# Patient Record
Sex: FEMALE | Race: WHITE | NOT HISPANIC OR LATINO | Employment: FULL TIME | URBAN - METROPOLITAN AREA
[De-identification: names, ages, dates, MRNs, and addresses within clinical notes are randomized per-mention and may not be internally consistent; named-entity substitution may affect disease eponyms.]

---

## 2022-07-07 ENCOUNTER — APPOINTMENT (OUTPATIENT)
Dept: LAB | Facility: CLINIC | Age: 47
End: 2022-07-07

## 2022-07-07 ENCOUNTER — OCCMED (OUTPATIENT)
Dept: URGENT CARE | Facility: CLINIC | Age: 47
End: 2022-07-07

## 2022-07-07 DIAGNOSIS — Z02.1 ENCOUNTER FOR PRE-EMPLOYMENT EXAMINATION: Primary | ICD-10-CM

## 2022-07-07 DIAGNOSIS — Z02.1 ENCOUNTER FOR PRE-EMPLOYMENT EXAMINATION: ICD-10-CM

## 2022-07-07 LAB
MEV IGG SER QL IA: NORMAL
MEV IGG SER QL IA: NORMAL
MUV IGG SER QL IA: NORMAL
MUV IGG SER QL IA: NORMAL
RUBV IGG SERPL IA-ACNC: 43.1 IU/ML
RUBV IGG SERPL IA-ACNC: 43.1 IU/ML
VZV IGG SER QL IA: ABNORMAL
VZV IGG SER QL IA: ABNORMAL

## 2022-07-07 PROCEDURE — 86480 TB TEST CELL IMMUN MEASURE: CPT

## 2022-07-07 PROCEDURE — 86735 MUMPS ANTIBODY: CPT

## 2022-07-07 PROCEDURE — 86762 RUBELLA ANTIBODY: CPT

## 2022-07-07 PROCEDURE — 36415 COLL VENOUS BLD VENIPUNCTURE: CPT

## 2022-07-07 PROCEDURE — 86765 RUBEOLA ANTIBODY: CPT

## 2022-07-07 PROCEDURE — 86787 VARICELLA-ZOSTER ANTIBODY: CPT

## 2022-07-08 LAB
GAMMA INTERFERON BACKGROUND BLD IA-ACNC: 0.06 IU/ML
GAMMA INTERFERON BACKGROUND BLD IA-ACNC: 0.06 IU/ML
M TB IFN-G BLD-IMP: NEGATIVE
M TB IFN-G BLD-IMP: NEGATIVE
M TB IFN-G CD4+ BCKGRND COR BLD-ACNC: -0.01 IU/ML
M TB IFN-G CD4+ BCKGRND COR BLD-ACNC: -0.01 IU/ML
M TB IFN-G CD4+ BCKGRND COR BLD-ACNC: 0.02 IU/ML
M TB IFN-G CD4+ BCKGRND COR BLD-ACNC: 0.02 IU/ML
MITOGEN IGNF BCKGRD COR BLD-ACNC: >10 IU/ML
MITOGEN IGNF BCKGRD COR BLD-ACNC: >10 IU/ML

## 2022-08-24 ENCOUNTER — OFFICE VISIT (OUTPATIENT)
Dept: INTERNAL MEDICINE CLINIC | Facility: OTHER | Age: 47
End: 2022-08-24
Payer: COMMERCIAL

## 2022-08-24 VITALS
OXYGEN SATURATION: 99 % | TEMPERATURE: 98 F | BODY MASS INDEX: 32.96 KG/M2 | WEIGHT: 186 LBS | DIASTOLIC BLOOD PRESSURE: 84 MMHG | HEART RATE: 69 BPM | HEIGHT: 63 IN | SYSTOLIC BLOOD PRESSURE: 128 MMHG

## 2022-08-24 DIAGNOSIS — G47.00 INSOMNIA, UNSPECIFIED TYPE: ICD-10-CM

## 2022-08-24 DIAGNOSIS — Z12.31 ENCOUNTER FOR SCREENING MAMMOGRAM FOR MALIGNANT NEOPLASM OF BREAST: Primary | ICD-10-CM

## 2022-08-24 DIAGNOSIS — Z13.228 SCREENING FOR METABOLIC DISORDER: ICD-10-CM

## 2022-08-24 DIAGNOSIS — G25.0 ESSENTIAL TREMOR: ICD-10-CM

## 2022-08-24 DIAGNOSIS — R91.8 MULTIPLE LUNG NODULES: ICD-10-CM

## 2022-08-24 DIAGNOSIS — R07.89 CHEST DISCOMFORT: ICD-10-CM

## 2022-08-24 DIAGNOSIS — R79.89 ABNORMAL TSH: ICD-10-CM

## 2022-08-24 DIAGNOSIS — E03.8 SUBCLINICAL HYPOTHYROIDISM: ICD-10-CM

## 2022-08-24 DIAGNOSIS — E55.9 VITAMIN D DEFICIENCY: ICD-10-CM

## 2022-08-24 DIAGNOSIS — G47.33 OSA (OBSTRUCTIVE SLEEP APNEA): ICD-10-CM

## 2022-08-24 PROBLEM — K42.9 UMBILICAL HERNIA WITHOUT OBSTRUCTION AND WITHOUT GANGRENE: Status: ACTIVE | Noted: 2022-08-24

## 2022-08-24 PROBLEM — N20.0 KIDNEY STONE ON LEFT SIDE: Status: ACTIVE | Noted: 2022-08-24

## 2022-08-24 PROCEDURE — 93000 ELECTROCARDIOGRAM COMPLETE: CPT | Performed by: NURSE PRACTITIONER

## 2022-08-24 PROCEDURE — 99204 OFFICE O/P NEW MOD 45 MIN: CPT | Performed by: NURSE PRACTITIONER

## 2022-08-24 RX ORDER — HYDROXYZINE HYDROCHLORIDE 25 MG/1
25 TABLET, FILM COATED ORAL EVERY 6 HOURS PRN
Qty: 30 TABLET | Refills: 0 | Status: SHIPPED | OUTPATIENT
Start: 2022-08-24 | End: 2022-09-21

## 2022-08-24 NOTE — ASSESSMENT & PLAN NOTE
Will give referral to sleep medicine  Patient had home sleep testing but would prefer in sleep lab testing

## 2022-08-24 NOTE — PROGRESS NOTES
Assessment/Plan:    IBIS (obstructive sleep apnea)  Will give referral to sleep medicine  Patient had home sleep testing but would prefer in sleep lab testing  Essential tremor  Had previously seen Neurology on propanolol, had side effects with medication, currently not on medication  Multiple lung nodules  Several tiny nodules noted on recent CT  Chest discomfort  EKG NSR  Insomnia  Start Atarax as needed for anxiety  BMI Counseling: Body mass index is 33 48 kg/m²  The BMI is above normal  Nutrition recommendations include decreasing portion sizes, encouraging healthy choices of fruits and vegetables, decreasing fast food intake, consuming healthier snacks, limiting drinks that contain sugar, moderation in carbohydrate intake, increasing intake of lean protein, reducing intake of saturated and trans fat and reducing intake of cholesterol  Exercise recommendations include moderate physical activity 150 minutes/week and exercising 3-5 times per week  Rationale for BMI follow-up plan is due to patient being overweight or obese  Depression Screening and Follow-up Plan: Patient was screened for depression during today's encounter  They screened negative with a PHQ-2 score of 0  Diagnoses and all orders for this visit:    Encounter for screening mammogram for malignant neoplasm of breast  -     Mammo screening bilateral w 3d & cad; Future    IBIS (obstructive sleep apnea)  -     Diagnostic Sleep Study; Future    Insomnia, unspecified type  -     hydrOXYzine HCL (ATARAX) 25 mg tablet; Take 1 tablet (25 mg total) by mouth every 6 (six) hours as needed for anxiety    Chest discomfort  -     POCT ECG    Screening for metabolic disorder  -     CBC and differential  -     Comprehensive metabolic panel;  Future  -     Lipid panel    Subclinical hypothyroidism    Vitamin D deficiency  -     Vitamin D 25 hydroxy    Abnormal TSH  -     TSH, 3rd generation with Free T4 reflex    Essential tremor    Multiple lung nodules          Subjective:      Patient ID: Sal Echeverria is a 42 y o  female  Patient presents today to establish care with our practice  She denies any significant past medical history  Patient reports that she has been having trouble losing weight, she gained weight throughout the [facility] pandemic  She has been hesitant to restart working out because she has been having some chest discomfort on and off  Some mild shortness of breath  Denies crushing chest pain  Does report familial history of cardiac disease  Insomnia/Fatigue- had previous home sleep studies, but she did have a CPAP machine however it is a fields that she which is recalled and patient did not get a replaced CPAP machine, but she had 2 at home studies however due to worsening insomnia she requests and in sleep lab study for further evaluation prior to new CPAP machine being ordered  She reports history of insomnia, has tried multiple medications before either the medication was ineffective or had side effects  Has history of anxiety, previously on Lexapro which had worked well for her  Patient reports she does not want to restart this medication at this time she feels well controlled  The following portions of the patient's history were reviewed and updated as appropriate: allergies, current medications, past family history, past medical history, past social history, past surgical history and problem list     Review of Systems   Constitutional: Positive for fatigue  Negative for activity change, appetite change, chills, diaphoresis and fever  HENT: Negative for congestion, ear discharge, ear pain, postnasal drip, rhinorrhea, sinus pressure, sinus pain and sore throat  Eyes: Negative for pain, discharge, itching and visual disturbance  Respiratory: Negative for cough, chest tightness, shortness of breath and wheezing  Cardiovascular: Negative for chest pain, palpitations and leg swelling  Gastrointestinal: Negative for abdominal pain, constipation, diarrhea, nausea and vomiting  Endocrine: Negative for polydipsia, polyphagia and polyuria  Genitourinary: Negative for difficulty urinating, dysuria and urgency  Musculoskeletal: Negative for arthralgias, back pain and neck pain  Skin: Negative for rash and wound  Neurological: Negative for dizziness, weakness, numbness and headaches  Psychiatric/Behavioral: Positive for sleep disturbance  Past Medical History:   Diagnosis Date    Anxiety     Kidney stone          Current Outpatient Medications:     hydrOXYzine HCL (ATARAX) 25 mg tablet, Take 1 tablet (25 mg total) by mouth every 6 (six) hours as needed for anxiety, Disp: 30 tablet, Rfl: 0    Allergies   Allergen Reactions    Codeine Palpitations    Latex Rash    Penicillin V Rash and Vomiting       Social History   Past Surgical History:   Procedure Laterality Date    BACK SURGERY      L5 - S1    BLADDER SURGERY      HYSTERECTOMY      SPINE SURGERY      TUBAL LIGATION       Family History   Problem Relation Age of Onset    Heart attack Mother     Diabetes Father     Breast cancer Maternal Grandmother     Diabetes Paternal Grandmother     Breast cancer Maternal Aunt     Bone cancer Maternal Aunt        Objective:  /84 (BP Location: Left arm, Patient Position: Sitting, Cuff Size: Large)   Pulse 69   Temp 98 °F (36 7 °C) (Temporal)   Ht 5' 2 5" (1 588 m)   Wt 84 4 kg (186 lb)   SpO2 99%   BMI 33 48 kg/m²     No results found for this or any previous visit (from the past 1344 hour(s))  Physical Exam  Constitutional:       General: She is not in acute distress  Appearance: She is well-developed  She is not diaphoretic  HENT:      Head: Normocephalic and atraumatic  Right Ear: External ear normal       Left Ear: External ear normal       Nose: Nose normal       Mouth/Throat:      Pharynx: No oropharyngeal exudate     Eyes:      General: Right eye: No discharge  Left eye: No discharge  Conjunctiva/sclera: Conjunctivae normal       Pupils: Pupils are equal, round, and reactive to light  Neck:      Thyroid: No thyromegaly  Cardiovascular:      Rate and Rhythm: Normal rate and regular rhythm  Heart sounds: Normal heart sounds  No murmur heard  No friction rub  No gallop  Pulmonary:      Effort: Pulmonary effort is normal  No respiratory distress  Breath sounds: Normal breath sounds  No stridor  No wheezing or rales  Abdominal:      General: Bowel sounds are normal  There is no distension  Palpations: Abdomen is soft  Tenderness: There is no abdominal tenderness  Musculoskeletal:      Cervical back: Normal range of motion and neck supple  Lymphadenopathy:      Cervical: No cervical adenopathy  Skin:     General: Skin is warm and dry  Findings: No erythema or rash  Neurological:      Mental Status: She is alert and oriented to person, place, and time  Psychiatric:         Behavior: Behavior normal          Thought Content:  Thought content normal          Judgment: Judgment normal

## 2022-08-24 NOTE — ASSESSMENT & PLAN NOTE
Had previously seen Neurology on propanolol, had side effects with medication, currently not on medication

## 2022-08-27 ENCOUNTER — APPOINTMENT (OUTPATIENT)
Dept: LAB | Facility: IMAGING CENTER | Age: 47
End: 2022-08-27
Payer: COMMERCIAL

## 2022-08-27 DIAGNOSIS — Z13.228 SCREENING FOR METABOLIC DISORDER: ICD-10-CM

## 2022-08-27 LAB
25(OH)D3 SERPL-MCNC: 52.3 NG/ML (ref 30–100)
ALBUMIN SERPL BCP-MCNC: 3.8 G/DL (ref 3.5–5)
ALP SERPL-CCNC: 76 U/L (ref 46–116)
ALT SERPL W P-5'-P-CCNC: 29 U/L (ref 12–78)
ANION GAP SERPL CALCULATED.3IONS-SCNC: 4 MMOL/L (ref 4–13)
AST SERPL W P-5'-P-CCNC: 15 U/L (ref 5–45)
BASOPHILS # BLD AUTO: 0.08 THOUSANDS/ΜL (ref 0–0.1)
BASOPHILS NFR BLD AUTO: 1 % (ref 0–1)
BILIRUB SERPL-MCNC: 0.22 MG/DL (ref 0.2–1)
BUN SERPL-MCNC: 23 MG/DL (ref 5–25)
CALCIUM SERPL-MCNC: 9.7 MG/DL (ref 8.3–10.1)
CHLORIDE SERPL-SCNC: 113 MMOL/L (ref 96–108)
CHOLEST SERPL-MCNC: 168 MG/DL
CO2 SERPL-SCNC: 25 MMOL/L (ref 21–32)
CREAT SERPL-MCNC: 0.92 MG/DL (ref 0.6–1.3)
EOSINOPHIL # BLD AUTO: 0.2 THOUSAND/ΜL (ref 0–0.61)
EOSINOPHIL NFR BLD AUTO: 4 % (ref 0–6)
ERYTHROCYTE [DISTWIDTH] IN BLOOD BY AUTOMATED COUNT: 12.3 % (ref 11.6–15.1)
GFR SERPL CREATININE-BSD FRML MDRD: 75 ML/MIN/1.73SQ M
GLUCOSE P FAST SERPL-MCNC: 89 MG/DL (ref 65–99)
HCT VFR BLD AUTO: 43.8 % (ref 34.8–46.1)
HDLC SERPL-MCNC: 50 MG/DL
HGB BLD-MCNC: 13.6 G/DL (ref 11.5–15.4)
IMM GRANULOCYTES # BLD AUTO: 0.01 THOUSAND/UL (ref 0–0.2)
IMM GRANULOCYTES NFR BLD AUTO: 0 % (ref 0–2)
LDLC SERPL CALC-MCNC: 85 MG/DL (ref 0–100)
LYMPHOCYTES # BLD AUTO: 1.91 THOUSANDS/ΜL (ref 0.6–4.47)
LYMPHOCYTES NFR BLD AUTO: 33 % (ref 14–44)
MCH RBC QN AUTO: 28 PG (ref 26.8–34.3)
MCHC RBC AUTO-ENTMCNC: 31.1 G/DL (ref 31.4–37.4)
MCV RBC AUTO: 90 FL (ref 82–98)
MONOCYTES # BLD AUTO: 0.46 THOUSAND/ΜL (ref 0.17–1.22)
MONOCYTES NFR BLD AUTO: 8 % (ref 4–12)
NEUTROPHILS # BLD AUTO: 3.12 THOUSANDS/ΜL (ref 1.85–7.62)
NEUTS SEG NFR BLD AUTO: 54 % (ref 43–75)
NONHDLC SERPL-MCNC: 118 MG/DL
NRBC BLD AUTO-RTO: 0 /100 WBCS
PLATELET # BLD AUTO: 229 THOUSANDS/UL (ref 149–390)
PMV BLD AUTO: 12 FL (ref 8.9–12.7)
POTASSIUM SERPL-SCNC: 4.1 MMOL/L (ref 3.5–5.3)
PROT SERPL-MCNC: 7 G/DL (ref 6.4–8.4)
RBC # BLD AUTO: 4.86 MILLION/UL (ref 3.81–5.12)
SODIUM SERPL-SCNC: 142 MMOL/L (ref 135–147)
TRIGL SERPL-MCNC: 166 MG/DL
TSH SERPL DL<=0.05 MIU/L-ACNC: 2.49 UIU/ML (ref 0.45–4.5)
WBC # BLD AUTO: 5.78 THOUSAND/UL (ref 4.31–10.16)

## 2022-08-27 PROCEDURE — 85025 COMPLETE CBC W/AUTO DIFF WBC: CPT | Performed by: NURSE PRACTITIONER

## 2022-08-27 PROCEDURE — 80053 COMPREHEN METABOLIC PANEL: CPT

## 2022-08-27 PROCEDURE — 80061 LIPID PANEL: CPT | Performed by: NURSE PRACTITIONER

## 2022-08-27 PROCEDURE — 82306 VITAMIN D 25 HYDROXY: CPT | Performed by: NURSE PRACTITIONER

## 2022-08-27 PROCEDURE — 36415 COLL VENOUS BLD VENIPUNCTURE: CPT | Performed by: NURSE PRACTITIONER

## 2022-08-27 PROCEDURE — 84443 ASSAY THYROID STIM HORMONE: CPT | Performed by: NURSE PRACTITIONER

## 2022-09-15 ENCOUNTER — TELEPHONE (OUTPATIENT)
Dept: SLEEP CENTER | Facility: CLINIC | Age: 47
End: 2022-09-15

## 2022-09-15 NOTE — TELEPHONE ENCOUNTER
----- Message from John Hughes MD sent at 9/14/2022  1:57 PM EDT -----  Approved    ----- Message -----  From: Alexa De La Cruz  Sent: [id number]   9:35 AM EDT  To: Sleep Medicine [place] Provider    This diagnostic sleep study needs approval      If approved please sign and return to clerical pool  If denied please include reasons why  Also provide alternative testing if warranted  Please sign and return to clerical pool

## 2022-09-16 ENCOUNTER — HOSPITAL ENCOUNTER (OUTPATIENT)
Dept: RADIOLOGY | Facility: IMAGING CENTER | Age: 47
End: 2022-09-16
Payer: COMMERCIAL

## 2022-09-16 VITALS — WEIGHT: 185 LBS | BODY MASS INDEX: 32.78 KG/M2 | HEIGHT: 63 IN

## 2022-09-16 DIAGNOSIS — Z12.31 ENCOUNTER FOR SCREENING MAMMOGRAM FOR MALIGNANT NEOPLASM OF BREAST: ICD-10-CM

## 2022-09-16 PROCEDURE — 77063 BREAST TOMOSYNTHESIS BI: CPT

## 2022-09-16 PROCEDURE — 77067 SCR MAMMO BI INCL CAD: CPT

## 2022-09-21 ENCOUNTER — OFFICE VISIT (OUTPATIENT)
Dept: INTERNAL MEDICINE CLINIC | Facility: OTHER | Age: 47
End: 2022-09-21
Payer: COMMERCIAL

## 2022-09-21 VITALS
HEART RATE: 74 BPM | OXYGEN SATURATION: 98 % | DIASTOLIC BLOOD PRESSURE: 80 MMHG | HEIGHT: 63 IN | WEIGHT: 185 LBS | BODY MASS INDEX: 32.78 KG/M2 | SYSTOLIC BLOOD PRESSURE: 130 MMHG | TEMPERATURE: 98.9 F

## 2022-09-21 DIAGNOSIS — G47.00 INSOMNIA, UNSPECIFIED TYPE: Primary | ICD-10-CM

## 2022-09-21 PROCEDURE — 99213 OFFICE O/P EST LOW 20 MIN: CPT | Performed by: NURSE PRACTITIONER

## 2022-09-21 RX ORDER — ESZOPICLONE 1 MG/1
1 TABLET, FILM COATED ORAL
Qty: 30 TABLET | Refills: 0
Start: 2022-09-21 | End: 2022-09-22 | Stop reason: SDUPTHER

## 2022-09-21 NOTE — PROGRESS NOTES
Assessment/Plan:    Insomnia  Will trail lunesta  Diagnoses and all orders for this visit:    Insomnia, unspecified type  -     eszopiclone (LUNESTA) 1 mg tablet; Take 1 tablet (1 mg total) by mouth daily at bedtime as needed for sleep Take immediately before bedtime    Other orders  -     Multiple Vitamins-Minerals (MULTIVITAMIN ADULTS PO); Take 1 tablet by mouth daily  -     ACIDOPHILUS LACTOBACILLUS PO; Take 1 tablet by mouth daily          Subjective:      Patient ID: Keesha Cameron is a 42 y o  female  Patient presents today to follow up on blood work and insomnia  She reports that Atarax helped for insomnia but she would sometimes feel foggy the next day, and it did not help much for her insomnia  Note from last office visit:  She denies any significant past medical history  Patient reports that she has been having trouble losing weight, she gained weight throughout the [facility] pandemic  She has been hesitant to restart working out because she has been having some chest discomfort on and off  Some mild shortness of breath  Denies crushing chest pain  Does report familial history of cardiac disease  Insomnia/Fatigue- had previous home sleep studies, but she did have a CPAP machine however it is a fields that she which is recalled and patient did not get a replaced CPAP machine, but she had 2 at home studies however due to worsening insomnia she requests and in sleep lab study for further evaluation prior to new CPAP machine being ordered  She reports history of insomnia, has tried multiple medications before either the medication was ineffective or had side effects  Has history of anxiety, previously on Lexapro which had worked well for her  Patient reports she does not want to restart this medication at this time she feels well controlled        The following portions of the patient's history were reviewed and updated as appropriate: allergies, current medications, past family history, past medical history, past social history, past surgical history and problem list     Review of Systems   Constitutional: Negative for activity change, appetite change, chills, diaphoresis and fever  HENT: Negative for congestion, ear discharge, ear pain, postnasal drip, rhinorrhea, sinus pressure, sinus pain and sore throat  Eyes: Negative for pain, discharge, itching and visual disturbance  Respiratory: Negative for cough, chest tightness, shortness of breath and wheezing  Cardiovascular: Negative for chest pain, palpitations and leg swelling  Gastrointestinal: Negative for abdominal pain, constipation, diarrhea, nausea and vomiting  Endocrine: Negative for polydipsia, polyphagia and polyuria  Genitourinary: Negative for difficulty urinating, dysuria and urgency  Musculoskeletal: Negative for arthralgias, back pain and neck pain  Skin: Negative for rash and wound  Neurological: Negative for dizziness, weakness, numbness and headaches  Psychiatric/Behavioral: Positive for sleep disturbance  Negative for self-injury  The patient is not nervous/anxious  Past Medical History:   Diagnosis Date    Anxiety     Kidney stone          Current Outpatient Medications:     ACIDOPHILUS LACTOBACILLUS PO, Take 1 tablet by mouth daily, Disp: , Rfl:     eszopiclone (LUNESTA) 1 mg tablet, Take 1 tablet (1 mg total) by mouth daily at bedtime as needed for sleep Take immediately before bedtime, Disp: 30 tablet, Rfl: 0    Multiple Vitamins-Minerals (MULTIVITAMIN ADULTS PO), Take 1 tablet by mouth daily, Disp: , Rfl:     Allergies   Allergen Reactions    Codeine Palpitations     Other reaction(s):  Other (See Comments)  Other reaction(s): Irregular heart rate  Rash,vomiting, heart palpitations    Latex Rash    Penicillin V Rash and Vomiting       Social History   Past Surgical History:   Procedure Laterality Date    APPENDECTOMY      BACK SURGERY      L5 - S1    BLADDER SURGERY  HYSTERECTOMY      age 34 still has lt ovary    SPINE SURGERY      TUBAL LIGATION       Family History   Problem Relation Age of Onset    Heart attack Mother     Heart disease Mother     Diabetes Father     Alcohol abuse Father     Breast cancer Maternal Grandmother     Lung cancer Maternal Grandmother 76    Arthritis Maternal Grandmother     No Known Problems Maternal Grandfather     Diabetes Paternal Grandmother     Stroke Paternal Grandmother     No Known Problems Paternal Grandfather     Breast cancer Maternal Aunt 37    Bone cancer Maternal Aunt     No Known Problems Maternal Aunt     Thyroid disease Son     No Known Problems Paternal Aunt     Substance Abuse Paternal Uncle     Drug abuse Paternal Uncle     Suicide Attempts Brother        Objective:  /80 (BP Location: Left arm, Patient Position: Sitting, Cuff Size: Large)   Pulse 74   Temp 98 9 °F (37 2 °C) (Temporal)   Ht 5' 3" (1 6 m)   Wt 83 9 kg (185 lb)   SpO2 98%   BMI 32 77 kg/m²     Recent Results (from the past 1344 hour(s))   CBC and differential    Collection Time: 08/27/22  7:07 AM   Result Value Ref Range    WBC 5 78 4 31 - 10 16 Thousand/uL    RBC 4 86 3 81 - 5 12 Million/uL    Hemoglobin 13 6 11 5 - 15 4 g/dL    Hematocrit 43 8 34 8 - 46 1 %    MCV 90 82 - 98 fL    MCH 28 0 26 8 - 34 3 pg    MCHC 31 1 (L) 31 4 - 37 4 g/dL    RDW 12 3 11 6 - 15 1 %    MPV 12 0 8 9 - 12 7 fL    Platelets 212 630 - 350 Thousands/uL    nRBC 0 /100 WBCs    Neutrophils Relative 54 43 - 75 %    Immat GRANS % 0 0 - 2 %    Lymphocytes Relative 33 14 - 44 %    Monocytes Relative 8 4 - 12 %    Eosinophils Relative 4 0 - 6 %    Basophils Relative 1 0 - 1 %    Neutrophils Absolute 3 12 1 85 - 7 62 Thousands/µL    Immature Grans Absolute 0 01 0 00 - 0 20 Thousand/uL    Lymphocytes Absolute 1 91 0 60 - 4 47 Thousands/µL    Monocytes Absolute 0 46 0 17 - 1 22 Thousand/µL    Eosinophils Absolute 0 20 0 00 - 0 61 Thousand/µL    Basophils Absolute 0 08 0 00 - 0 10 Thousands/µL   Lipid panel    Collection Time: 08/27/22  7:07 AM   Result Value Ref Range    Cholesterol 168 See Comment mg/dL    Triglycerides 166 (H) See Comment mg/dL    HDL, Direct 50 >=50 mg/dL    LDL Calculated 85 0 - 100 mg/dL    Non-HDL-Chol (CHOL-HDL) 118 mg/dl   TSH, 3rd generation with Free T4 reflex    Collection Time: 08/27/22  7:07 AM   Result Value Ref Range    TSH 3RD GENERATON 2 490 0 450 - 4 500 uIU/mL   Vitamin D 25 hydroxy    Collection Time: 08/27/22  7:07 AM   Result Value Ref Range    Vit D, 25-Hydroxy 52 3 30 0 - 100 0 ng/mL   Comprehensive metabolic panel    Collection Time: 08/27/22  7:07 AM   Result Value Ref Range    Sodium 142 135 - 147 mmol/L    Potassium 4 1 3 5 - 5 3 mmol/L    Chloride 113 (H) 96 - 108 mmol/L    CO2 25 21 - 32 mmol/L    ANION GAP 4 4 - 13 mmol/L    BUN 23 5 - 25 mg/dL    Creatinine 0 92 0 60 - 1 30 mg/dL    Glucose, Fasting 89 65 - 99 mg/dL    Calcium 9 7 8 3 - 10 1 mg/dL    AST 15 5 - 45 U/L    ALT 29 12 - 78 U/L    Alkaline Phosphatase 76 46 - 116 U/L    Total Protein 7 0 6 4 - 8 4 g/dL    Albumin 3 8 3 5 - 5 0 g/dL    Total Bilirubin 0 22 0 20 - 1 00 mg/dL    eGFR 75 ml/min/1 73sq m            Physical Exam  Constitutional:       General: She is not in acute distress  Appearance: She is well-developed  She is not diaphoretic  HENT:      Head: Normocephalic and atraumatic  Right Ear: External ear normal       Left Ear: External ear normal       Nose: Nose normal       Mouth/Throat:      Pharynx: No oropharyngeal exudate  Eyes:      General:         Right eye: No discharge  Left eye: No discharge  Conjunctiva/sclera: Conjunctivae normal       Pupils: Pupils are equal, round, and reactive to light  Neck:      Thyroid: No thyromegaly  Cardiovascular:      Rate and Rhythm: Normal rate and regular rhythm  Heart sounds: Normal heart sounds  No murmur heard  No friction rub  No gallop     Pulmonary: Effort: Pulmonary effort is normal  No respiratory distress  Breath sounds: Normal breath sounds  No stridor  No wheezing or rales  Abdominal:      General: Bowel sounds are normal  There is no distension  Palpations: Abdomen is soft  Tenderness: There is no abdominal tenderness  Musculoskeletal:      Cervical back: Normal range of motion and neck supple  Lymphadenopathy:      Cervical: No cervical adenopathy  Skin:     General: Skin is warm and dry  Findings: No erythema or rash  Neurological:      Mental Status: She is alert and oriented to person, place, and time  Psychiatric:         Behavior: Behavior normal          Thought Content:  Thought content normal          Judgment: Judgment normal

## 2022-09-22 RX ORDER — ESZOPICLONE 1 MG/1
1 TABLET, FILM COATED ORAL
Qty: 30 TABLET | Refills: 0 | Status: SHIPPED | OUTPATIENT
Start: 2022-09-22

## 2022-10-25 ENCOUNTER — OFFICE VISIT (OUTPATIENT)
Dept: INTERNAL MEDICINE CLINIC | Facility: OTHER | Age: 47
End: 2022-10-25
Payer: COMMERCIAL

## 2022-10-25 ENCOUNTER — HOSPITAL ENCOUNTER (OUTPATIENT)
Dept: RADIOLOGY | Facility: IMAGING CENTER | Age: 47
Discharge: HOME | End: 2022-10-25
Payer: COMMERCIAL

## 2022-10-25 VITALS
HEIGHT: 63 IN | BODY MASS INDEX: 31.89 KG/M2 | WEIGHT: 180 LBS | OXYGEN SATURATION: 96 % | DIASTOLIC BLOOD PRESSURE: 80 MMHG | SYSTOLIC BLOOD PRESSURE: 120 MMHG | TEMPERATURE: 98.6 F | HEART RATE: 95 BPM

## 2022-10-25 DIAGNOSIS — Z12.11 SCREENING FOR COLON CANCER: ICD-10-CM

## 2022-10-25 DIAGNOSIS — K21.9 GASTROESOPHAGEAL REFLUX DISEASE WITHOUT ESOPHAGITIS: ICD-10-CM

## 2022-10-25 DIAGNOSIS — F41.9 ANXIETY: ICD-10-CM

## 2022-10-25 DIAGNOSIS — K21.9 GASTROESOPHAGEAL REFLUX DISEASE WITHOUT ESOPHAGITIS: Primary | ICD-10-CM

## 2022-10-25 PROCEDURE — 99214 OFFICE O/P EST MOD 30 MIN: CPT | Performed by: NURSE PRACTITIONER

## 2022-10-25 PROCEDURE — 71111 X-RAY EXAM RIBS/CHEST4/> VWS: CPT

## 2022-10-25 RX ORDER — ESCITALOPRAM OXALATE 10 MG/1
10 TABLET ORAL DAILY
Qty: 30 TABLET | Refills: 1 | Status: SHIPPED | OUTPATIENT
Start: 2022-10-25

## 2022-10-25 RX ORDER — PANTOPRAZOLE SODIUM 40 MG/1
40 TABLET, DELAYED RELEASE ORAL
Qty: 90 TABLET | Refills: 1 | Status: SHIPPED | OUTPATIENT
Start: 2022-10-25

## 2022-10-25 NOTE — PROGRESS NOTES
Assessment/Plan:    Gastroesophageal reflux disease without esophagitis  Will get x-ray of ribs and chest   Will start Protonix  You may use OTC tums as needed  Recommend small frequent meals throughout the day  Avoid aggravating foods - spicy foods, acidic foods - such as tomato and citrus  Avoid alcohol  Do not lay down for at least 30 mins after eating  May need referral to GI for EGD if no improvement  Diagnoses and all orders for this visit:    Gastroesophageal reflux disease without esophagitis  -     XR ribs bilateral 4+ vw w pa chest; Future  -     pantoprazole (PROTONIX) 40 mg tablet; Take 1 tablet (40 mg total) by mouth daily before breakfast    Anxiety  -     escitalopram (Lexapro) 10 mg tablet; Take 1 tablet (10 mg total) by mouth daily    Screening for colon cancer  -     Cologuaraza          Subjective:      Patient ID: Tom Cotton is a 41 y o  female  She feels a burning that starts in her epigastric area and at times it feels like the food is coming back up  She also feels tender at her epigastric area with a small “lump”  Abdominal Pain  This is a new problem  The current episode started 1 to 4 weeks ago  The onset quality is sudden  The problem occurs constantly  The most recent episode lasted 24 hours  The problem has been unchanged  The pain is located in the epigastric region  The pain is at a severity of 4/10  The quality of the pain is aching  The abdominal pain does not radiate  Pertinent negatives include no anorexia, arthralgias, belching, constipation, diarrhea, dysuria, fever, flatus, frequency, headaches, hematochezia, hematuria, melena, myalgias, nausea, vomiting or weight loss  The pain is aggravated by eating  The pain is relieved by nothing  Prior diagnostic workup includes CT scan         The following portions of the patient's history were reviewed and updated as appropriate: allergies, current medications, past family history, past medical history, past social history, past surgical history and problem list     Review of Systems   Constitutional: Negative for activity change, appetite change, chills, diaphoresis, fever and weight loss  HENT: Negative for congestion, ear discharge, ear pain, postnasal drip, rhinorrhea, sinus pressure, sinus pain and sore throat  Eyes: Negative for pain, discharge, itching and visual disturbance  Respiratory: Negative for cough, chest tightness, shortness of breath and wheezing  Cardiovascular: Negative for chest pain, palpitations and leg swelling  Gastrointestinal: Positive for abdominal pain  Negative for anorexia, constipation, diarrhea, flatus, hematochezia, melena, nausea and vomiting  Endocrine: Negative for polydipsia, polyphagia and polyuria  Genitourinary: Negative for difficulty urinating, dysuria, frequency, hematuria and urgency  Musculoskeletal: Negative for arthralgias, back pain, myalgias and neck pain  Skin: Negative for rash and wound  Neurological: Negative for dizziness, weakness, numbness and headaches  Past Medical History:   Diagnosis Date   • Anxiety    • Kidney stone          Current Outpatient Medications:   •  ACIDOPHILUS LACTOBACILLUS PO, Take 1 tablet by mouth daily, Disp: , Rfl:   •  escitalopram (Lexapro) 10 mg tablet, Take 1 tablet (10 mg total) by mouth daily, Disp: 30 tablet, Rfl: 1  •  Multiple Vitamins-Minerals (MULTIVITAMIN ADULTS PO), Take 1 tablet by mouth daily, Disp: , Rfl:   •  pantoprazole (PROTONIX) 40 mg tablet, Take 1 tablet (40 mg total) by mouth daily before breakfast, Disp: 90 tablet, Rfl: 1  •  eszopiclone (LUNESTA) 1 mg tablet, Take 1 tablet (1 mg total) by mouth daily at bedtime as needed for sleep Take immediately before bedtime (Patient not taking: Reported on 10/25/2022), Disp: 30 tablet, Rfl: 0    Allergies   Allergen Reactions   • Codeine Palpitations     Other reaction(s):  Other (See Comments)  Other reaction(s): Irregular heart rate  Rash,vomiting, heart palpitations   • Latex Rash   • Penicillin V Rash and Vomiting       Social History   Past Surgical History:   Procedure Laterality Date   • APPENDECTOMY     • BACK SURGERY      L5 - S1   • BLADDER SURGERY     • HYSTERECTOMY      age 34 still has lt ovary   • SPINE SURGERY     • TUBAL LIGATION       Family History   Problem Relation Age of Onset   • Heart attack Mother    • Heart disease Mother    • Diabetes Father    • Alcohol abuse Father    • Breast cancer Maternal Grandmother    • Lung cancer Maternal Grandmother 76   • Arthritis Maternal Grandmother    • No Known Problems Maternal Grandfather    • Diabetes Paternal Grandmother    • Stroke Paternal Grandmother    • No Known Problems Paternal Grandfather    • Breast cancer Maternal Aunt 37   • Bone cancer Maternal Aunt    • No Known Problems Maternal Aunt    • Thyroid disease Son    • No Known Problems Paternal Aunt    • Substance Abuse Paternal Uncle    • Drug abuse Paternal Uncle    • Suicide Attempts Brother        Objective:  /80 (BP Location: Left arm, Patient Position: Sitting, Cuff Size: Adult)   Pulse 95   Temp 98 6 °F (37 °C) (Temporal)   Ht 5' 3" (1 6 m)   Wt 81 6 kg (180 lb)   SpO2 96%   BMI 31 89 kg/m²     No results found for this or any previous visit (from the past 1344 hour(s))  Physical Exam  Constitutional:       General: She is not in acute distress  Appearance: She is well-developed  She is not diaphoretic  HENT:      Head: Normocephalic and atraumatic  Right Ear: External ear normal       Left Ear: External ear normal       Nose: Nose normal       Mouth/Throat:      Pharynx: No oropharyngeal exudate  Eyes:      General:         Right eye: No discharge  Left eye: No discharge  Conjunctiva/sclera: Conjunctivae normal       Pupils: Pupils are equal, round, and reactive to light  Neck:      Thyroid: No thyromegaly     Cardiovascular:      Rate and Rhythm: Normal rate and regular rhythm  Heart sounds: Normal heart sounds  No murmur heard  No friction rub  No gallop  Pulmonary:      Effort: Pulmonary effort is normal  No respiratory distress  Breath sounds: Normal breath sounds  No stridor  No wheezing or rales  Abdominal:      General: Bowel sounds are normal  There is no distension  Palpations: Abdomen is soft  Tenderness: There is no abdominal tenderness  Musculoskeletal:        Arms:       Cervical back: Normal range of motion and neck supple  Lymphadenopathy:      Cervical: No cervical adenopathy  Skin:     General: Skin is warm and dry  Findings: No erythema or rash  Neurological:      Mental Status: She is alert and oriented to person, place, and time  Psychiatric:         Behavior: Behavior normal          Thought Content:  Thought content normal          Judgment: Judgment normal

## 2022-10-25 NOTE — ASSESSMENT & PLAN NOTE
Will get x-ray of ribs and chest   Will start Protonix  You may use OTC tums as needed  Recommend small frequent meals throughout the day  Avoid aggravating foods - spicy foods, acidic foods - such as tomato and citrus  Avoid alcohol  Do not lay down for at least 30 mins after eating  May need referral to GI for EGD if no improvement

## 2022-11-16 LAB — COLOGUARD RESULT REPORTABLE: NEGATIVE

## 2022-11-22 ENCOUNTER — TELEMEDICINE (OUTPATIENT)
Dept: INTERNAL MEDICINE CLINIC | Facility: OTHER | Age: 47
End: 2022-11-22

## 2022-11-22 VITALS — BODY MASS INDEX: 31.88 KG/M2 | WEIGHT: 179.9 LBS | HEIGHT: 63 IN

## 2022-11-22 DIAGNOSIS — F41.9 ANXIETY: ICD-10-CM

## 2022-11-22 DIAGNOSIS — K21.9 GASTROESOPHAGEAL REFLUX DISEASE WITHOUT ESOPHAGITIS: Primary | ICD-10-CM

## 2022-11-22 RX ORDER — ESCITALOPRAM OXALATE 10 MG/1
10 TABLET ORAL DAILY
Qty: 90 TABLET | Refills: 1 | Status: SHIPPED | OUTPATIENT
Start: 2022-11-22

## 2022-11-22 RX ORDER — SUCRALFATE 1 G/1
1 TABLET ORAL 4 TIMES DAILY
Qty: 120 TABLET | Refills: 0 | Status: SHIPPED | OUTPATIENT
Start: 2022-11-22

## 2022-11-22 NOTE — PROGRESS NOTES
Virtual Regular Visit    Verification of patient location:    Patient is located in the following state in which I hold an active license [place]      Assessment/Plan:    Problem List Items Addressed This Visit        Digestive    Gastroesophageal reflux disease without esophagitis - Primary     X-rays of ribs interest negative  Continue with Protonix, start Carafate  Will give referral to GI  Relevant Medications    sucralfate (CARAFATE) 1 g tablet    Other Relevant Orders    Ambulatory Referral to Gastroenterology       Other    Anxiety    Relevant Medications    escitalopram (Lexapro) 10 mg tablet            Reason for visit is   Chief Complaint   Patient presents with   • Virtual Regular Visit     Text message [phone]    • Follow-up     Pt is being seen for follow up  Review xray  • Virtual Regular Visit        Encounter provider MOHSEN Griffin    Provider located at [address] [place] [address]      Recent Visits  No visits were found meeting these conditions  Showing recent visits within past 7 days and meeting all other requirements  Today's Visits  Date Type Provider Dept   11/22/22 [place], CRNP [facility]   Showing today's visits and meeting all other requirements  Future Appointments  No visits were found meeting these conditions  Showing future appointments within next 150 days and meeting all other requirements       The patient was identified by name and date of birth  Kun Pereyra was informed that this is a telemedicine visit and that the visit is being conducted through Telephone  My office door was closed  No one else was in the room  She acknowledged consent and understanding of privacy and security of the video platform   The patient has agreed to participate and understands they can discontinue the visit at any time  Patient is aware this is a billable service  Subjective  Jenny Tinsley is a 41 y o  female    Patient calls in today with continued concerns for burning in her epigastric/sternum area  Chest x-ray negative  Started on Protonix, with no relief  Note from last office visit  She feels a burning that starts in her epigastric area and at times it feels like the food is coming back up  She also feels tender at her epigastric area with a small "lump"  Abdominal Pain  This is a new problem  The current episode started 1 to 4 weeks ago  The onset quality is sudden  The problem occurs constantly  The most recent episode lasted 24 hours  The problem has been unchanged  The pain is located in the epigastric region  The pain is at a severity of 4/10  The quality of the pain is aching  The abdominal pain does not radiate  Pertinent negatives include no anorexia, arthralgias, belching, constipation, diarrhea, dysuria, fever, flatus, frequency, headaches, hematochezia, hematuria, melena, myalgias, nausea, vomiting or weight loss  The pain is aggravated by eating  The pain is relieved by nothing  Prior diagnostic workup includes CT scan          Past Medical History:   Diagnosis Date   • Anxiety    • Kidney stone        Past Surgical History:   Procedure Laterality Date   • APPENDECTOMY     • BACK SURGERY      L5 - S1   • BLADDER SURGERY     • HYSTERECTOMY      age 34 still has lt ovary   • SPINE SURGERY     • TUBAL LIGATION         Current Outpatient Medications   Medication Sig Dispense Refill   • ACIDOPHILUS LACTOBACILLUS PO Take 1 tablet by mouth daily     • escitalopram (Lexapro) 10 mg tablet Take 1 tablet (10 mg total) by mouth daily 90 tablet 1   • Multiple Vitamins-Minerals (MULTIVITAMIN ADULTS PO) Take 1 tablet by mouth daily     • pantoprazole (PROTONIX) 40 mg tablet Take 1 tablet (40 mg total) by mouth daily before breakfast 90 tablet 1   • sucralfate (CARAFATE) 1 g tablet Take 1 tablet (1 g total) by mouth 4 (four) times a day 120 tablet 0   • eszopiclone (LUNESTA) 1 mg tablet Take 1 tablet (1 mg total) by mouth daily at bedtime as needed for sleep Take immediately before bedtime (Patient not taking: Reported on 10/25/2022) 30 tablet 0     No current facility-administered medications for this visit  Allergies   Allergen Reactions   • Codeine Palpitations     Other reaction(s): Other (See Comments)  Other reaction(s): Irregular heart rate  Rash,vomiting, heart palpitations   • Latex Rash   • Penicillin V Rash and Vomiting       Review of Systems   Constitutional: Negative for activity change, appetite change, chills, diaphoresis, fever and weight loss  HENT: Negative for congestion, ear discharge, ear pain, postnasal drip, rhinorrhea, sinus pressure, sinus pain and sore throat  Eyes: Negative for pain, discharge, itching and visual disturbance  Respiratory: Negative for cough, chest tightness, shortness of breath and wheezing  Cardiovascular: Negative for chest pain, palpitations and leg swelling  Gastrointestinal: Positive for abdominal pain  Negative for anorexia, constipation, diarrhea, flatus, hematochezia, melena, nausea and vomiting  Endocrine: Negative for polydipsia, polyphagia and polyuria  Genitourinary: Negative for difficulty urinating, dysuria, frequency, hematuria and urgency  Musculoskeletal: Negative for arthralgias, back pain, myalgias and neck pain  Skin: Negative for rash and wound  Neurological: Negative for dizziness, weakness, numbness and headaches  Video Exam    Vitals:    11/22/22 0647   Weight: 81 6 kg (179 lb 14 3 oz)   Height: 5' 3" (1 6 m)       Physical Exam  Neurological:      Mental Status: She is alert and oriented to person, place, and time            I spent 15 minutes directly with the patient during this visit

## 2022-11-23 ENCOUNTER — CONSULT (OUTPATIENT)
Dept: GASTROENTEROLOGY | Facility: CLINIC | Age: 47
End: 2022-11-23

## 2022-11-23 VITALS
HEIGHT: 63 IN | TEMPERATURE: 98.4 F | WEIGHT: 182.2 LBS | SYSTOLIC BLOOD PRESSURE: 114 MMHG | DIASTOLIC BLOOD PRESSURE: 82 MMHG | BODY MASS INDEX: 32.28 KG/M2

## 2022-11-23 DIAGNOSIS — K21.9 GASTROESOPHAGEAL REFLUX DISEASE WITHOUT ESOPHAGITIS: ICD-10-CM

## 2022-11-23 DIAGNOSIS — R09.89 GLOBUS SENSATION: Primary | ICD-10-CM

## 2022-11-23 DIAGNOSIS — R13.10 DYSPHAGIA, UNSPECIFIED TYPE: ICD-10-CM

## 2022-11-23 DIAGNOSIS — R10.9 ABDOMINAL PAIN, UNSPECIFIED ABDOMINAL LOCATION: ICD-10-CM

## 2022-11-23 NOTE — PROGRESS NOTES
[facility] - Outpatient Consultation  Alyssa Pereyra 41 y o  female MRN: [id number]  Encounter: [id number]          ASSESSMENT AND PLAN:      1  Gastroesophageal reflux disease without esophagitis  2  Globus sensation  3  Dysphagia   4  Odynophagia   5  Epigastric pain    Heartburn has been controlled with Protonix  Discussed with patient to take Protonix 30 minutes prior to either breakfast or dinner  Increase head of bed when lying down  Last meal of the day 3 hours before going to lay down  In the meanwhile we will investigate transfer dysphagia symptoms with modified barium swallow  Esophageal symptoms with regular barium swallow  Will get EGD to get esophageal biopsies and also dilate any strictures or rings  Other differentials for abdominal pain are peptic ulcer disease which we will investigate with EGD  Will refer to ENT to evaluate globus sensation not responsive to PPI  Patient agreeable with plan of care  Further management plan based on investigation results  6  Colon cancer screening  Cologuard was negative earlier this year  Repeat in 3 years  RTC 4 months  Opal Liz MD  Gastroenterology  [place] [address]  Date: November 23, 2022    - Ambulatory Referral to Gastroenterology  - FL barium swallow video w speech; Future  - FL barium swallow; Future  - Ambulatory Referral to Otolaryngology; Future  - EGD; Future      ______________________________________________________________________    HPI:  44-year-old presents for evaluation  Patient denies any nausea vomiting  She notes that occasionally pills get stuck in the throat  This has happened intermittently over the last 1-2 years  She has had coughing and choking occasionally with swallows  Heartburn is much improved and now controlled with Protonix 40 milligrams daily  She is taking Protonix before going to sleep every night    She continues to have epigastric pain and sensation of food or liquids passing slowly from the chest into the abdomen  She has pain in the epigastric area during swallowing as well  This has been going on for the last few months  It has not improved with Protonix  She has globus sensation as well  Normal bowel movements  No blood in stools  No bloating  She has had weight gain in the last 1 year  No early satiety  No family history of colon, esophageal or stomach cancer  Rare alcohol intake  She has taken Advil in the past   Ex-smoker  No marijuana use  Never had EGD or colonoscopy in the past       REVIEW OF SYSTEMS:    CONSTITUTIONAL: Denies any fever, chills, rigors, and weight loss  HEENT: No earache or tinnitus  Denies hearing loss or visual disturbances  CARDIOVASCULAR: No chest pain or palpitations  RESPIRATORY: Denies any cough, hemoptysis, shortness of breath or dyspnea on exertion  GASTROINTESTINAL: As noted in the History of Present Illness  GENITOURINARY: No problems with urination  Denies any hematuria or dysuria  NEUROLOGIC: No dizziness or vertigo, denies headaches  MUSCULOSKELETAL: Denies any muscle or joint pain  SKIN: Denies skin rashes or itching  ENDOCRINE: Denies excessive thirst  Denies intolerance to heat or cold  PSYCHOSOCIAL: Denies depression or anxiety  Denies any recent memory loss         Historical Information   Past Medical History:   Diagnosis Date   • Anxiety    • Kidney stone      Past Surgical History:   Procedure Laterality Date   • APPENDECTOMY     • BACK SURGERY      L5 - S1   • BLADDER SURGERY     • COLONOSCOPY     • HYSTERECTOMY      age 34 still has lt ovary   • SPINE SURGERY     • TUBAL LIGATION     • UPPER GASTROINTESTINAL ENDOSCOPY       Social History   Social History     Substance and Sexual Activity   Alcohol Use Yes    Comment: I may drink once a month if that     Social History     Substance and Sexual Activity   Drug Use Never     Social History     Tobacco Use   Smoking Status Former   • Packs/day: 0 50   • Years: 25 00   • Pack years: 12 50   • Types: Cigarettes   • Quit date:    • Years since quittin 8   Smokeless Tobacco Never     Family History   Problem Relation Age of Onset   • Heart attack Mother    • Heart disease Mother    • Diabetes Father    • Alcohol abuse Father    • Breast cancer Maternal Grandmother    • Lung cancer Maternal Grandmother 76   • Arthritis Maternal Grandmother    • No Known Problems Maternal Grandfather    • Diabetes Paternal Grandmother    • Stroke Paternal Grandmother    • No Known Problems Paternal Grandfather    • Breast cancer Maternal Aunt 37   • Bone cancer Maternal Aunt    • No Known Problems Maternal Aunt    • Thyroid disease Son    • No Known Problems Paternal Aunt    • Substance Abuse Paternal Uncle    • Drug abuse Paternal Uncle    • Suicide Attempts Brother        Meds/Allergies       Current Outpatient Medications:   •  ACIDOPHILUS LACTOBACILLUS PO  •  escitalopram (Lexapro) 10 mg tablet  •  Multiple Vitamins-Minerals (MULTIVITAMIN ADULTS PO)  •  pantoprazole (PROTONIX) 40 mg tablet  •  sucralfate (CARAFATE) 1 g tablet  •  eszopiclone (LUNESTA) 1 mg tablet    Allergies   Allergen Reactions   • Codeine Palpitations     Other reaction(s): Other (See Comments)  Other reaction(s): Irregular heart rate  Rash,vomiting, heart palpitations   • Latex Rash   • Penicillin V Rash and Vomiting           Objective     Blood pressure 114/82, temperature 98 4 °F (36 9 °C), temperature source Tympanic, height 5' 3" (1 6 m), weight 82 6 kg (182 lb 3 2 oz)  Body mass index is 32 28 kg/m²  PHYSICAL EXAM:      General Appearance:   Alert, cooperative, no distress   HEENT:   Normocephalic, atraumatic, anicteric      Neck:  Supple, symmetrical, trachea midline   Lungs:   Clear to auscultation bilaterally; no rales, rhonchi or wheezing; respirations unlabored    Heart[de-identified]   Regular rate and rhythm; no murmur, rub, or gallop     Abdomen:   Soft, non-tender, non-distended; normal bowel sounds; no masses, no organomegaly    Genitalia:   Deferred    Rectal:   Deferred    Extremities:  No cyanosis, clubbing or edema    Pulses:  2+ and symmetric    Skin:  No jaundice, rashes, or lesions    Lymph nodes:  No palpable cervical lymphadenopathy        Lab Results:   No visits with results within 1 Day(s) from this visit  Latest known visit with results is:   Office Visit on 10/25/2022   Component Date Value   • Cologuard Result 11/09/2022 Negative          Radiology Results:   XR ribs bilateral 4+ vw w pa chest    Result Date: 10/25/2022  Narrative: BILATERAL RIBS AND CHEST INDICATION: COMPARISON: None VIEWS:  XR RIBS BILATERAL 4+ VW W PA CHEST Images: 7 FINDINGS: Fusion hardware noted in the lower lumbar spine  The cardiomediastinal silhouette is unremarkable  The lungs are clear  No pleural effusions  Minimal pleural thickening noted adjacent to the lateral left 6th rib  There is no pneumothorax  No rib fractures are identified  Impression: No active pulmonary disease  Minimal pleural thickening adjacent to the lateral left 6th rib of uncertain significance and etiology  No evidence of rib fractures  Workstation performed: [id number]   Answers for HPI/ROS submitted by the patient on 11/22/2022  Chronicity: recurrent  Onset: more than 1 month ago  Onset quality: sudden  Frequency: constantly  Episode duration: 30 Months  Progression since onset: gradually worsening  Pain location: epigastric region  Pain - numeric: 7/10  Pain quality: aching, burning  Radiates to: back  anorexia: No  arthralgias: No  belching: No  constipation: No  diarrhea: No  dysuria: No  fever: No  flatus: No  frequency: No  headaches: No  hematochezia: No  hematuria: No  melena: No  myalgias: No  nausea:  No  weight loss: No  vomiting: No  Aggravated by: certain positions, coughing, eating  Relieved by: nothing  Diagnostic workup: CT scan

## 2022-11-23 NOTE — PATIENT INSTRUCTIONS
GERD (Gastroesophageal Reflux Disease)   WHAT YOU NEED TO KNOW:   Gastroesophageal reflux disease (GERD) is reflux that happens more than 2 times a week for a few weeks  Reflux means acid and food in your stomach back up into your esophagus  GERD can cause other health problems over time if it is not treated  DISCHARGE INSTRUCTIONS:   Call your local emergency number (911 in the [facility]) if:   You have severe chest pain and sudden trouble breathing  Return to the emergency department if:   You have trouble breathing after you vomit  You have trouble swallowing, or pain with swallowing  Your bowel movements are black, bloody, or tarry-looking  Your vomit looks like coffee grounds or has blood in it  Call your doctor or gastroenterologist if:   You feel full and cannot burp or vomit  You vomit large amounts, or you vomit often  You are losing weight without trying  Your symptoms get worse or do not improve with treatment  You have questions or concerns about your condition or care  Medicines:   Medicines  are used to decrease stomach acid  Medicine may also be used to help your lower esophageal sphincter and stomach contract (tighten) more  Take your medicine as directed  Contact your healthcare provider if you think your medicine is not helping or if you have side effects  Tell him of her if you are allergic to any medicine  Keep a list of the medicines, vitamins, and herbs you take  Include the amounts, and when and why you take them  Bring the list or the pill bottles to follow-up visits  Carry your medicine list with you in case of an emergency  Manage GERD:       Do not have foods or drinks that may increase heartburn  These include chocolate, peppermint, fried or fatty foods, drinks that contain caffeine, or carbonated drinks (soda)  Other foods include spicy foods, onions, tomatoes, and tomato-based foods   Do not have foods or drinks that can irritate your esophagus, such as citrus fruits, juices, and alcohol  Do not eat large meals  When you eat a lot of food at one time, your stomach needs more acid to digest it  Eat 6 small meals each day instead of 3 large ones, and eat slowly  Do not eat meals 2 to 3 hours before bedtime  Elevate the head of your bed  Place 6-inch blocks under the head of your bed frame  You may also use more than one pillow under your head and shoulders while you sleep  Maintain a healthy weight  If you are overweight, weight loss may help relieve symptoms of GERD  Do not smoke  Smoking weakens the lower esophageal sphincter and increases the risk of GERD  Ask your healthcare provider for information if you currently smoke and need help to quit  E-cigarettes or smokeless tobacco still contain nicotine  Talk to your healthcare provider before you use these products  Do not put pressure on your abdomen  Pressure pushes acid up into your esophagus  Do not wear clothing that is tight around your waist  Do not bend over  Bend at the knees if you need to pick something up  Follow up with your doctor or gastroenterologist as directed:  Write down your questions so you remember to ask them during your visits  © Copyright [employer] 2022 Information is for End User's use only and may not be sold, redistributed or otherwise used for commercial purposes  All illustrations and images included in CareNotes® are the copyrighted property of A D A M , Inc  or [facility] Lalito Landis   The above information is an  only  It is not intended as medical advice for individual conditions or treatments  Talk to your doctor, nurse or pharmacist before following any medical regimen to see if it is safe and effective for you    Scheduled date of EGD(as of today):1/17/23  Physician performing EGD: Dimas  Location of EGD: [place]   Instructions reviewed with patient by:isabela   Clearances:  n/a     Pt will call CS to schedule barium swallow

## 2022-11-29 ENCOUNTER — ANESTHESIA (OUTPATIENT)
Dept: GASTROENTEROLOGY | Facility: MEDICAL CENTER | Age: 47
End: 2022-11-29

## 2022-11-29 ENCOUNTER — ANESTHESIA EVENT (OUTPATIENT)
Dept: GASTROENTEROLOGY | Facility: MEDICAL CENTER | Age: 47
End: 2022-11-29

## 2022-11-29 ENCOUNTER — HOSPITAL ENCOUNTER (OUTPATIENT)
Dept: GASTROENTEROLOGY | Facility: MEDICAL CENTER | Age: 47
Setting detail: OUTPATIENT SURGERY
Discharge: HOME | End: 2022-11-29

## 2022-11-29 VITALS
WEIGHT: 180 LBS | SYSTOLIC BLOOD PRESSURE: 135 MMHG | HEART RATE: 60 BPM | BODY MASS INDEX: 33.13 KG/M2 | OXYGEN SATURATION: 100 % | TEMPERATURE: 98 F | RESPIRATION RATE: 16 BRPM | DIASTOLIC BLOOD PRESSURE: 87 MMHG | HEIGHT: 62 IN

## 2022-11-29 DIAGNOSIS — R10.9 ABDOMINAL PAIN, UNSPECIFIED ABDOMINAL LOCATION: ICD-10-CM

## 2022-11-29 DIAGNOSIS — R09.89 GLOBUS SENSATION: ICD-10-CM

## 2022-11-29 DIAGNOSIS — K21.9 GASTROESOPHAGEAL REFLUX DISEASE WITHOUT ESOPHAGITIS: ICD-10-CM

## 2022-11-29 DIAGNOSIS — R13.10 DYSPHAGIA, UNSPECIFIED TYPE: ICD-10-CM

## 2022-11-29 RX ORDER — LIDOCAINE HYDROCHLORIDE 20 MG/ML
INJECTION, SOLUTION EPIDURAL; INFILTRATION; INTRACAUDAL; PERINEURAL AS NEEDED
Status: DISCONTINUED | OUTPATIENT
Start: 2022-11-29 | End: 2022-11-29

## 2022-11-29 RX ORDER — ONDANSETRON 2 MG/ML
INJECTION INTRAMUSCULAR; INTRAVENOUS
Status: COMPLETED
Start: 2022-11-29 | End: 2022-11-29

## 2022-11-29 RX ORDER — ONDANSETRON 2 MG/ML
INJECTION INTRAMUSCULAR; INTRAVENOUS AS NEEDED
Status: DISCONTINUED | OUTPATIENT
Start: 2022-11-29 | End: 2022-11-29

## 2022-11-29 RX ORDER — PROPOFOL 10 MG/ML
INJECTION, EMULSION INTRAVENOUS AS NEEDED
Status: DISCONTINUED | OUTPATIENT
Start: 2022-11-29 | End: 2022-11-29

## 2022-11-29 RX ORDER — ONDANSETRON 2 MG/ML
4 INJECTION INTRAMUSCULAR; INTRAVENOUS EVERY 6 HOURS PRN
Status: DISCONTINUED | OUTPATIENT
Start: 2022-11-29 | End: 2022-12-03 | Stop reason: HOSPADM

## 2022-11-29 RX ORDER — PROPOFOL 10 MG/ML
INJECTION, EMULSION INTRAVENOUS CONTINUOUS PRN
Status: DISCONTINUED | OUTPATIENT
Start: 2022-11-29 | End: 2022-11-29

## 2022-11-29 RX ORDER — SODIUM CHLORIDE 9 MG/ML
125 INJECTION, SOLUTION INTRAVENOUS CONTINUOUS
Status: DISCONTINUED | OUTPATIENT
Start: 2022-11-29 | End: 2022-12-03 | Stop reason: HOSPADM

## 2022-11-29 RX ADMIN — PROPOFOL 140 MCG/KG/MIN: 10 INJECTION, EMULSION INTRAVENOUS at 10:48

## 2022-11-29 RX ADMIN — PROPOFOL 150 MG: 10 INJECTION, EMULSION INTRAVENOUS at 10:48

## 2022-11-29 RX ADMIN — LIDOCAINE HYDROCHLORIDE 100 MG: 20 INJECTION, SOLUTION EPIDURAL; INFILTRATION; INTRACAUDAL at 10:48

## 2022-11-29 RX ADMIN — ONDANSETRON 4 MG: 2 INJECTION INTRAMUSCULAR; INTRAVENOUS at 10:51

## 2022-11-29 RX ADMIN — ONDANSETRON 4 MG: 2 INJECTION INTRAMUSCULAR; INTRAVENOUS at 11:21

## 2022-11-29 RX ADMIN — SODIUM CHLORIDE 125 ML/HR: 0.9 INJECTION, SOLUTION INTRAVENOUS at 10:39

## 2022-11-29 NOTE — ANESTHESIA POSTPROCEDURE EVALUATION
Post-Op Assessment Note    CV Status:  Stable    Pain management: adequate     Mental Status:  Alert and awake   Hydration Status:  Euvolemic   PONV Controlled:  Controlled   Airway Patency:  Patent      Post Op Vitals Reviewed: Yes      Staff: Anesthesiologist         No notable events documented      /67 (11/29/22 1102)    Temp      Pulse 74 (11/29/22 1102)   Resp 16 (11/29/22 1102)    SpO2 94 % (11/29/22 1102)

## 2022-11-29 NOTE — QUICK NOTE
Pt c/o of nausea, verbal order for zofran 4mg IV  Given at [id number]  Pt denies nausea at this time  Pt stable for discharge

## 2022-11-29 NOTE — ANESTHESIA PREPROCEDURE EVALUATION
Procedure:  EGD    Relevant Problems   ANESTHESIA   (+) PONV (postoperative nausea and vomiting)      GI/HEPATIC   (+) Gastroesophageal reflux disease without esophagitis      /RENAL   (+) Kidney stone on left side      NEURO/PSYCH   (+) Anxiety      PULMONARY   (+) IBIS (obstructive sleep apnea)        Physical Exam    Airway    Mallampati score: I  TM Distance: >3 FB  Neck ROM: full     Dental       Cardiovascular  Rhythm: regular, Rate: normal, Cardiovascular exam normal    Pulmonary  Pulmonary exam normal     Other Findings        Anesthesia Plan  ASA Score- 2     Anesthesia Type- IV sedation with anesthesia with ASA Monitors  Additional Monitors:   Airway Plan:           Plan Factors-Exercise tolerance (METS): >4 METS  Chart reviewed  Existing labs reviewed  Patient summary reviewed  Patient is not a current smoker  Induction- intravenous  Postoperative Plan-     Informed Consent- Anesthetic plan and risks discussed with patient

## 2022-11-29 NOTE — H&P
History and Physical - [facility]  Callie Joyner Ishanontos 41 y o  female MRN: [id number]                  HPI: Hector Ch is a 41y o  year old female who presents for EGD to investigate dysphagia, globus sensation, odynophagia, GERD, abdominal pain  REVIEW OF SYSTEMS: Per the HPI, and otherwise unremarkable      Historical Information   Past Medical History:   Diagnosis Date   • Anxiety    • GERD (gastroesophageal reflux disease)    • Kidney stone    • PONV (postoperative nausea and vomiting)      Past Surgical History:   Procedure Laterality Date   • APPENDECTOMY     • BACK SURGERY      L5 - S1   • BLADDER SURGERY     • COLONOSCOPY     • HYSTERECTOMY      age 34 still has lt ovary   • SPINE SURGERY     • TUBAL LIGATION     • UPPER GASTROINTESTINAL ENDOSCOPY       Social History   Social History     Substance and Sexual Activity   Alcohol Use Yes    Comment: I may drink once a month if that     Social History     Substance and Sexual Activity   Drug Use Never     Social History     Tobacco Use   Smoking Status Former   • Packs/day: 0 50   • Years: 25 00   • Pack years: 12 50   • Types: Cigarettes   • Quit date:    • Years since quittin 9   Smokeless Tobacco Never     Family History   Problem Relation Age of Onset   • Heart attack Mother    • Heart disease Mother    • Diabetes Father    • Alcohol abuse Father    • Breast cancer Maternal Grandmother    • Lung cancer Maternal Grandmother 76   • Arthritis Maternal Grandmother    • No Known Problems Maternal Grandfather    • Diabetes Paternal Grandmother    • Stroke Paternal Grandmother    • No Known Problems Paternal Grandfather    • Breast cancer Maternal Aunt 37   • Bone cancer Maternal Aunt    • No Known Problems Maternal Aunt    • Thyroid disease Son    • No Known Problems Paternal Aunt    • Substance Abuse Paternal Uncle    • Drug abuse Paternal Uncle    • Suicide Attempts Brother        Meds/Allergies     (Not in a hospital admission)      Allergies   Allergen Reactions   • Codeine Palpitations     Other reaction(s): Other (See Comments)  Other reaction(s): Irregular heart rate  Rash,vomiting, heart palpitations   • Latex Rash   • Penicillin V Rash and Vomiting       Objective     Blood pressure 127/93, pulse 68, temperature 98 °F (36 7 °C), temperature source Temporal, resp  rate 18, height 5' 2" (1 575 m), weight 81 6 kg (180 lb), SpO2 96 %  PHYSICAL EXAM    Gen: NAD  CV: RRR  CHEST: Clear  ABD: soft, NT/ND  EXT: no edema      ASSESSMENT/PLAN:  Roni Gallardo is a 41y o  year old female who presents for EGD to investigate dysphagia, globus sensation, odynophagia, GERD, abdominal pain  The patient is stable and optimized for the procedure, we reviewed risk and benefits  Risk include but not limited to infection, bleeding, perforation and missing a lesion

## 2022-12-06 ENCOUNTER — PREP FOR PROCEDURE (OUTPATIENT)
Dept: GASTROENTEROLOGY | Facility: CLINIC | Age: 47
End: 2022-12-06

## 2022-12-06 DIAGNOSIS — R09.89 GLOBUS SENSATION: ICD-10-CM

## 2022-12-06 DIAGNOSIS — K21.9 GASTROESOPHAGEAL REFLUX DISEASE WITHOUT ESOPHAGITIS: Primary | ICD-10-CM

## 2022-12-06 DIAGNOSIS — R13.10 DYSPHAGIA, UNSPECIFIED TYPE: ICD-10-CM

## 2022-12-13 ENCOUNTER — TELEPHONE (OUTPATIENT)
Dept: GASTROENTEROLOGY | Facility: HOSPITAL | Age: 47
End: 2022-12-13

## 2022-12-14 ENCOUNTER — HOSPITAL ENCOUNTER (OUTPATIENT)
Dept: RADIOLOGY | Facility: HOSPITAL | Age: 47
Discharge: HOME | End: 2022-12-14
Attending: INTERNAL MEDICINE

## 2022-12-14 DIAGNOSIS — K21.9 GASTROESOPHAGEAL REFLUX DISEASE WITHOUT ESOPHAGITIS: ICD-10-CM

## 2022-12-14 DIAGNOSIS — R10.9 ABDOMINAL PAIN, UNSPECIFIED ABDOMINAL LOCATION: ICD-10-CM

## 2022-12-14 DIAGNOSIS — R09.89 GLOBUS SENSATION: ICD-10-CM

## 2022-12-14 DIAGNOSIS — R13.10 DYSPHAGIA, UNSPECIFIED TYPE: ICD-10-CM

## 2022-12-14 NOTE — PROCEDURES
Video Swallow Study      Patient Name: Francisco Steward  HFYBP'N Date: 12/14/2022        Past Medical History  Past Medical History:   Diagnosis Date   • Anxiety    • GERD (gastroesophageal reflux disease)    • Kidney stone    • PONV (postoperative nausea and vomiting)         Past Surgical History  Past Surgical History:   Procedure Laterality Date   • APPENDECTOMY     • BACK SURGERY      L5 - S1   • BLADDER SURGERY     • COLONOSCOPY     • HYSTERECTOMY      age 34 still has lt ovary   • SPINE SURGERY     • TUBAL LIGATION     • UPPER GASTROINTESTINAL ENDOSCOPY         Modified (Video) Barium Swallow Study    Summary:  Images are on PACS for review  Pt presents w/ functional oropharyngeal swallow w/ prompt transfers & manipulation, prompt swallows & full hyolaryngeal excursion  No penetration or aspiration noted  Trace pooling in the pyriforms but no over flow of material   Pill got stuck mid esophagus and liquids cleared though some reflux was noted  Recommendations:  Diet: regular (choose softer material)  Liquids: thin   Meds: as desired  Strategies: alternate bites w/ sips, upright for all po and an hour after  Frequent oral care  Upright position  Reflux Precautions      H&P/pertinent provider notes: (PMH noted above)  Pt is 42 y/o f referred for a VBS to assess her swallow function  She has a globus sensation as well as pain in her chest when she eats solids  She has not had any recent URI or pnas  She is independent & arrived walking to her study  Special Studies:  CXR 10/23/22-No active pulmonary disease      Minimal pleural thickening adjacent to the lateral left 6th rib of uncertain significance and etiology      No evidence of rib fractures      Previous VBS:  -  Swallow Mechanism Exam  Facial: symmetrical  Labial: WFL  Lingual: WFL  Velum: symmetrical  Mandible: adequate ROM  Dentition: adequate  Vocal quality:clear/adequate   Volitional Cough: strong/productive   Respiratory Status: on RA       Swallow Information   Current Risks for Dysphagia & Aspiration: h/o GERD  Current Symptoms/Concerns: globus sensation, throat clearing w/ po  Current Diet: regular diet and thin liquids   Baseline Diet: regular diet and thin liquids      Consistencies Administered:  Pt was viewed sitting upright in the lateral and AP positions  Trials administered were consistent with [facility] Protocol: 5-mL thin liquid x2, 20-mL cup sip thin, 40-mL sequential swallow thin, 5-mL nectar thick, 20-mL cup sip nectar thick, 40-mL sequential swallow nectar thick, 5-mL Honey thick, 5-mL pudding, ½ cookie coated with 3-mL pudding, 5-mL nectar thick in the AP position, and 5-mL pudding in the AP position  Pt was also given thin liquids by straw, as well as a barium tablet with thin liquid  Oral Impairment:  Lip Closure:  wfl   Tongue Control During Bolus Hold: wfl   Bolus Preparation/Mastication: timely, effective   Bolus Transport/Lingual Motion: wfl   Oral Residue: mild coating easily cleared w/ a 2* swallow   Initiation of the Pharyngeal Swallow:  Prompt     Pharyngeal Impairment:  Soft Palate Elevation: wfl   Laryngeal Elevation: wfl for all material   Anterior Hyoid Excursion: wfl   Epiglottic Movement: present, rapid  Laryngeal Vestibular Closure:wfl   Pharyngeal Stripping Wave:  Wfl, timely   Pharyngeal Contraction: wfl   PES Opening: mildly tight at the end of boluses w/ some mild retention within   Tongue Base Retraction: wfl   Pharyngeal Residue: trace pooled material in the PS   The pt has a consistent throat clear throughout and states she feels material in her airway  ? Reactive airway    Screening of Esophageal Impairment   Esophageal Clearance: poor clearing w/ the pill/solids/puree  Noted pill stuck in mid esophagus with need for mult swallow thin to clear  Pt w/ c/o discomfort substernally         Penetration/Aspiration:  Thin:  none  Nectar: none   Honey: none   Puree:  None   Solid:  None   Response to Aspiration:-  Strategies/Efficacy:-    8-Point Penetration-Aspiration Scale   1 Material does not enter the airway   2 Material enters the airway, remains above the vocal folds, and is ejected  from the  airway    3 Material enters the airway, remains above the vocal folds, and is not ejected from the airway   4 Material enters the airway, contacts the vocal folds, and is ejected from the airway   5 Material enters the airway, contacts the vocal folds, and is not ejected from the airway    6 Material enters the airway, passes below the vocal folds and is ejected into the larynx or out of the airway    7 Material enters the airway, passes below the vocal folds, and is not ejected from the trachea despite effort    8 Material enters the airway, passes below the vocal folds, and no effort is made to eject

## 2022-12-20 ENCOUNTER — HOSPITAL ENCOUNTER (OUTPATIENT)
Dept: GASTROENTEROLOGY | Facility: HOSPITAL | Age: 47
Discharge: HOME | End: 2022-12-20
Attending: INTERNAL MEDICINE

## 2022-12-20 VITALS
DIASTOLIC BLOOD PRESSURE: 74 MMHG | OXYGEN SATURATION: 94 % | RESPIRATION RATE: 18 BRPM | SYSTOLIC BLOOD PRESSURE: 120 MMHG | HEART RATE: 54 BPM | TEMPERATURE: 97.7 F

## 2022-12-20 DIAGNOSIS — R13.10 DYSPHAGIA, UNSPECIFIED TYPE: ICD-10-CM

## 2022-12-20 DIAGNOSIS — R09.89 GLOBUS SENSATION: ICD-10-CM

## 2022-12-20 DIAGNOSIS — K21.9 GASTROESOPHAGEAL REFLUX DISEASE WITHOUT ESOPHAGITIS: ICD-10-CM

## 2022-12-20 NOTE — PERIOPERATIVE NURSING NOTE
Patient brought in the room and explained the esophageal manometry procedure  After the confirmation of allergies, lidocaine 2% inserted via both nostrils and  a transnasal insertion of the High Resolution esophageal manometry catheter was inserted via right nostril  Patient given water to drink during the insertion and once the catheter inserted pressure bands of both Upper esophageal sphincter  (UES) and Lower esophageal sphincter ( LES) were observed on the color contour  Patient instructed to take a deep breath to verify placement of the catheter, diaphragmatic pinch noted on inspiration  Catheter was secured to right cheek  Patient was assisted to supine position   Patient was instructed to relax  while acclimating the catheter for about 5 minutes  A 30 second baseline resting pressure was obtained to identify the UES and LES followed by a series of 10 liquid swallows using 5 cc room temperature normal saline to assess esophageal motility and bolus transit,1 multiple rapid drink swallow using 2 cc room temperature normal saline given a total of 5 drinks  Patient instructed to sit up at the edge of the stretcher and given 5 upright liquid swallows using 5 cc room temperature normal saline and 1 rapid drink challenge using 100 cc room temperature water  At the end of the procedure the high resolution esophageal manometry catheter was removed from the nostril intact  Dual sensor PH probe inserted via right nostril and secured  Zephr recorder teachback performed and patient verbalized understanding  Patient instructed to return next day to have probe remove  Discharge instructions given and patient ambulated out of room in stable condition

## 2022-12-21 ENCOUNTER — HOSPITAL ENCOUNTER (OUTPATIENT)
Dept: SLEEP CENTER | Facility: CLINIC | Age: 47
Discharge: HOME | End: 2022-12-21

## 2022-12-21 DIAGNOSIS — G47.33 OSA (OBSTRUCTIVE SLEEP APNEA): ICD-10-CM

## 2022-12-22 NOTE — PROGRESS NOTES
Sleep Study Documentation    Pre-Sleep Study       Sleep testing procedure explained to patient:YES    Patient napped prior to study:NO    Caffeine:Dayshift worker after 12PM   Caffeine use:NO    Alcohol:Dayshift workers after 5PM: Alcohol use:NO    Typical day for patient:NO       Study Documentation    Sleep Study Indications: IBIS, snoring, excessive daytime sleepiness, chest discomfort, anxiety, GERD    Sleep Study: Diagnostic   Snore:Mild  Supplemental O2: no    O2 flow rate (L/min) range   O2 flow rate (L/min) final   Minimum SaO2 85%  Baseline SaO2 92%        Mode of Therapy:    EKG abnormalities: no     EEG abnormalities: no    Sleep Study Recorded < 2 hours: N/A    Sleep Study Recorded > 2 hours but incomplete study: N/A    Sleep Study Recorded 6 hours but no sleep obtained: NO    Patient classification: employed       Post-Sleep Study    Medication used at bedtime or during sleep study:YES other prescription medications    Patient reports time it took to fall asleep:20 to 30 minutes    Patient reports waking up during study:3 or more times  Patient reports returning to sleep in 10 to 30 minutes  Patient reports sleeping 4 to 6 hours without dreaming  Patient reports sleep during study:typical    Patient rated sleepiness: Somewhat sleepy or tired    PAP treatment:no

## 2022-12-23 DIAGNOSIS — R14.0 BLOATING: Primary | ICD-10-CM

## 2022-12-23 RX ORDER — SIMETHICONE 125 MG
125 TABLET,CHEWABLE ORAL EVERY 6 HOURS PRN
Qty: 30 TABLET | Refills: 0 | Status: SHIPPED | OUTPATIENT
Start: 2022-12-23

## 2022-12-28 DIAGNOSIS — G47.33 OSA (OBSTRUCTIVE SLEEP APNEA): Primary | ICD-10-CM

## 2023-01-16 ENCOUNTER — OFFICE VISIT (OUTPATIENT)
Dept: SLEEP CENTER | Facility: CLINIC | Age: 48
End: 2023-01-16

## 2023-01-16 VITALS
WEIGHT: 177.6 LBS | HEIGHT: 63 IN | SYSTOLIC BLOOD PRESSURE: 129 MMHG | DIASTOLIC BLOOD PRESSURE: 71 MMHG | BODY MASS INDEX: 31.47 KG/M2 | HEART RATE: 68 BPM

## 2023-01-16 DIAGNOSIS — G47.00 INSOMNIA, UNSPECIFIED TYPE: ICD-10-CM

## 2023-01-16 DIAGNOSIS — G47.10 HYPERSOMNIA: ICD-10-CM

## 2023-01-16 DIAGNOSIS — G47.19 EXCESSIVE DAYTIME SLEEPINESS: Primary | ICD-10-CM

## 2023-01-16 DIAGNOSIS — G47.33 OSA (OBSTRUCTIVE SLEEP APNEA): ICD-10-CM

## 2023-01-16 PROBLEM — D17.79 BRAIN LIPOMA: Status: ACTIVE | Noted: 2018-01-24

## 2023-01-16 PROBLEM — J44.9 COPD (CHRONIC OBSTRUCTIVE PULMONARY DISEASE) (HCC): Status: ACTIVE | Noted: 2017-10-23

## 2023-01-16 PROBLEM — J44.9 COPD (CHRONIC OBSTRUCTIVE PULMONARY DISEASE) (HCC): Status: RESOLVED | Noted: 2017-10-23 | Resolved: 2023-01-16

## 2023-01-16 NOTE — ASSESSMENT & PLAN NOTE
This has been a very chronic issue she told me that since she had her older son which is being 27 years ago she has been struggling with daytime fatigue tiredness and sleepiness she denies significant sleep attacks, cataplexy, sleep paralysis however she describes what would sound like sleep hallucination specially when she was young  Sleep apnea has been ruled out with an in lab diagnostic sleep study, I think it is reasonable to rule out other central hypersomnia disorders I ordered multi sleep intensity test

## 2023-01-16 NOTE — PATIENT INSTRUCTIONS
Sleep hygiene tips:    Keep a consistent bedtime and wake up time  This will lead to a more regular sleep schedule and avoid periods of sleep deprivation or periods of extended wakefulness during the night  Avoid napping, especially naps lasting longer than 1 hour or naps late in the day, which will likely affect your ability to fall asleep that night  Limit caffeine, avoiding caffeine after lunch to allow it to get out of your system and not affect your ability to fall asleep or the quality of your sleep    Limit alcohol, alcohol can be sedating but also activating as it metabolizes, causing you to awaken from sleep  It can affect the quality of your sleep by not letting you get into the more refreshing stages of sleep  Avoid nicotine, of course not smoking or vaping at all is best, but nicotine is a stimulant and should be avoided near bedtime and during the night    Exercise, Daytime physical activity is encouraged, in particular 4-6 hours before bedtime, as this may help you to fall asleep more easily and quality of sleep is improved  Rigorous exercise within 3 hours of bedtime is discouraged  Keep the sleep environment quiet and dark - Noise and light exposure during the night can disrupt sleep  White noise or ear plugs are often recommended to reduce noise  Using black out shades or an eye mask is commonly recommended to reduce light  This also includes avoiding exposure to television or technology near bedtime, as this can have an impact on circadian rhythms by shifting sleep time later  Bedroom clock - Avoid checking the time at night  This includes alarm clocks and other time pieces such as watches and phones  Checking the time increases cognitive arousal and prolongs wakefulness  Evening eating - Avoid a large meal near bedtime, but don't go to bed hungry  Eat a healthy and filling meal in the evening without over-eating and avoid late night snacks      There are some on-line resources that do require a fee that can be of help  Two credible websites are as follows:    Http://Cloneless/cbt-online-insomnia-treatment html    [employer] si    An timur used by the [facility] is as follows:    [facility]     Go!  To Sleep by the [facility]

## 2023-01-16 NOTE — ASSESSMENT & PLAN NOTE
As detailed above I would like to rule out central hypersomnolence orders, if the patient does not have narcolepsy or idiopathic hypersomnia I would focus more on maximizing her insomnia management, as a sleep insufficiency might be a major contributing factor on her clinical scenario with consideration for referral to [facility]BT[facility]

## 2023-01-16 NOTE — ASSESSMENT & PLAN NOTE
Chronic perpetuated insomnia sleep induction and sleep maintenance  She has been tried on many medications in an outside hospital currently and most recently has been tried on [place] by primary care provider 1 mg at bedtime that did not improve her sleep quality at all and the patient stopped taking it on her own  She was started on Lexapro 10 mg to help with insomnia and possible underlying depression she does not feel any improvement as well and has been almost 3 months  At this point I would like to focus and consider ruling out hypersomnia's orders since the patient feels almost unsatisfied and tired/sleepy all day and focus on treating that if that was the case    If multi sleep latency test comes back negative for any central hypersomnia disorder I would consider focusing more on her insomnia management and maximizing therapy for that would start with cognitive behavioral therapy

## 2023-01-16 NOTE — PROGRESS NOTES
Sleep Consultation   Sara Quesada andrea 41 y o  female MRN: [id number]      Reason for consultation: Sleep insufficiency, chronic insomnia, daytime fatigue and sleepiness    Requesting physician: Dr Thang Ervin    Assessment/Plan  1  Excessive daytime sleepiness  Assessment & Plan:  As detailed above I would like to rule out central hypersomnolence orders, if the patient does not have narcolepsy or idiopathic hypersomnia I would focus more on maximizing her insomnia management, as a sleep insufficiency might be a major contributing factor on her clinical scenario with consideration for referral to CBT-I    Orders:  -     Multiple sleep latency test with diagnostic study; Future    2  IBIS (obstructive sleep apnea)  Assessment & Plan:  She had a remote diagnosis of sleep apnea diagnosed in an outside hospital based on home sleep study, interestingly, she tells me that she was approved based on nocturnal hypoxemia she is not sure if she was told that she stops breathing  However, she underwent an in lab diagnostic sleep study at [facility] in December 2022 which found to be negative for IBIS with an AHI of 1 7 events per hour he was slightly worse in supine position, and she had roughly about 74 minutes below 90%  There could be a chance for night to night variability for sleep apnea however that is very rare and less likely at this point that the patient does have sleep disordered breathing    Orders:  -     Ambulatory Referral to Sleep Medicine  -     Multiple sleep latency test with diagnostic study; Future    3  Hypersomnia  Assessment & Plan:   This has been a very chronic issue she told me that since she had her older son which is being 27 years ago she has been struggling with daytime fatigue tiredness and sleepiness she denies significant sleep attacks, cataplexy, sleep paralysis however she describes what would sound like sleep hallucination specially when she was young  Sleep apnea has been ruled out with an in lab diagnostic sleep study, I think it is reasonable to rule out other central hypersomnia disorders I ordered multi sleep intensity test    Orders:  -     Multiple sleep latency test with diagnostic study; Future    4  Insomnia, unspecified type  Assessment & Plan:  Chronic perpetuated insomnia sleep induction and sleep maintenance  She has been tried on many medications in an outside hospital currently and most recently has been tried on [place] by primary care provider 1 mg at bedtime that did not improve her sleep quality at all and the patient stopped taking it on her own  She was started on Lexapro 10 mg to help with insomnia and possible underlying depression she does not feel any improvement as well and has been almost 3 months  At this point I would like to focus and consider ruling out hypersomnia's orders since the patient feels almost unsatisfied and tired/sleepy all day and focus on treating that if that was the case  If multi sleep latency test comes back negative for any central hypersomnia disorder I would consider focusing more on her insomnia management and maximizing therapy for that would start with cognitive behavioral therapy            History of Present Illness   HPI:  Judith Wilson is a 41 y o  female with PMHx as below who comes in for evaluation of difficulty sleeping and fatigue  The patient has long history of chronic insomnia with difficulty initiating, maintaining sleep with multiple awakenings overnight of unknown etiology  She used to follow-up in [facility] underwent multiple home sleep studies tells me that she used to have a CPAP in the past   She is not using it because of the recall currently does not have a CPAP machine  Underwent an evaluation in [facility]'s got an in lab diagnostic sleep study December 2022 which ruled out sleep disordered breathing, showed multiple arousals with no significant periodic limb movements    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 she feels tired sleepy during the day however she does not take naps she goes to bed around 9 PM falls asleep in half an hour multiple awakenings overnight sometimes struggles to fall asleep averaging 20 to 30 minutes, finally wakes up at 5:30 AM unrefreshed unrested and also feels that her sleep has been horrible  She tells me that the problem started with her first son 27 years ago and got significantly worse during [place] as she has gained weight  She denies significant snoring or witnessed apneas she has been tried on multiple medications regimen in the past most recently [place] with no improvement currently Lexapro with no improvement as well  She has recurrent morning headache, teeth grinding, acid reflux and decreased concentration with anxiety denies significant depression or mood disorders  Denies drinking caffeinated drinks, alcohol smoking or drug abuse and her [place] scale is 4                Review of Systems      Other systems reviewed negative except as reviewed above        Historical Information   Past Medical History:   Diagnosis Date   • Anxiety    • GERD (gastroesophageal reflux disease)    • Kidney stone    • PONV (postoperative nausea and vomiting)      Past Surgical History:   Procedure Laterality Date   • APPENDECTOMY     • BACK SURGERY      L5 - S1   • BLADDER SURGERY     • COLONOSCOPY     • HYSTERECTOMY      age 34 still has lt ovary   • SPINE SURGERY     • TUBAL LIGATION     • UPPER GASTROINTESTINAL ENDOSCOPY       Family History   Problem Relation Age of Onset   • Heart attack Mother    • Heart disease Mother    • Diabetes Father    • Alcohol abuse Father    • Breast cancer Maternal Grandmother    • Lung cancer Maternal Grandmother 76   • Arthritis Maternal Grandmother    • No Known Problems Maternal Grandfather    • Diabetes Paternal Grandmother    • Stroke Paternal Grandmother    • No Known Problems Paternal Grandfather    • Breast cancer Maternal Aunt 45   • Bone cancer Maternal Aunt    • No Known Problems Maternal Aunt    • Thyroid disease Son    • No Known Problems Paternal Aunt    • Substance Abuse Paternal Uncle    • Drug abuse Paternal Uncle    • Suicide Attempts Brother      Social History     Socioeconomic History   • Marital status: /Civil Union     Spouse name: Not on file   • Number of children: Not on file   • Years of education: Not on file   • Highest education level: Not on file   Occupational History   • Not on file   Tobacco Use   • Smoking status: Former     Packs/day: 0 50     Years: 25 00     Pack years: 12 50     Types: Cigarettes     Quit date:      Years since quittin 0   • Smokeless tobacco: Never   Vaping Use   • Vaping Use: Former   • Substances: Nicotine, Flavoring   Substance and Sexual Activity   • Alcohol use: Yes     Comment: I may drink once a month if that   • Drug use: Never   • Sexual activity: Not on file   Other Topics Concern   • Not on file   Social History Narrative   • Not on file     Social Determinants of Health     Financial Resource Strain: Not on file   Food Insecurity: Not on file   Transportation Needs: Not on file   Physical Activity: Not on file   Stress: Not on file   Social Connections: Not on file   Intimate Partner Violence: Not on file   Housing Stability: Not on file       Occupational History:     Meds/Allergies   Allergies   Allergen Reactions   • Codeine Palpitations     Other reaction(s): Other (See Comments)  Other reaction(s): Irregular heart rate  Rash,vomiting, heart palpitations   • Latex Rash   • Penicillin V Rash and Vomiting       Home medications:  Prior to Admission medications    Medication Sig Start Date End Date Taking?  Authorizing Provider   ACIDOPHILUS LACTOBACILLUS PO Take 1 tablet by mouth daily   Yes Historical Provider, MD   escitalopram (Lexapro) 10 mg tablet Take 1 tablet (10 mg total) by mouth daily 22  Yes Bayard Gitelman, CRNP   Multiple Vitamins-Minerals (MULTIVITAMIN ADULTS PO) Take 1 tablet by mouth daily   Yes Historical Provider, MD   pantoprazole (PROTONIX) 40 mg tablet Take 1 tablet (40 mg total) by mouth daily before breakfast 10/25/22  Yes MOHSEN Castro   eszopiclone (LUNESTA) 1 mg tablet Take 1 tablet (1 mg total) by mouth daily at bedtime as needed for sleep Take immediately before bedtime  Patient not taking: Reported on 10/25/2022 9/22/22   MOHSEN Castro   simethicone (MYLICON) 627 MG chewable tablet Chew 1 tablet (125 mg total) every 6 (six) hours as needed for flatulence 12/23/22   Zachary Brito MD   sucralfate (CARAFATE) 1 g tablet Take 1 tablet (1 g total) by mouth 4 (four) times a day  Patient not taking: Reported on 1/16/2023 11/22/22   MOHSEN Castro       Vitals:   Blood pressure 129/71, pulse 68, height 5' 3" (1 6 m), weight 80 6 kg (177 lb 9 6 oz)  ,  Body mass index is 31 46 kg/m²  Neck Circumference: 13    Physical Exam  General:  Awake alert and oriented x 3, conversant without conversational dyspnea, NAD, normal affect  HEENT:   Sclera noninjected, nonicteric OU, Nares patent,  no craniofacial abnormalities, Mucous membranes, moist, no oral lesions, normal dentition  NECK:  Trachea midline, no accessory muscle use, no stridor,  JVP not elevated  CARDIAC: Reg, single s1/S2, no m/r/g  PULM: CTA bilaterally no wheezing, rhonchi or rales  ABD: Soft nontender, nondistended, no rebound, no rigidity, no guarding  EXT: No cyanosis, no clubbing, no edema, normal capillary refill  NEURO: no focal neurologic deficits, AAOx3, moving all extremities appropriately    Labs: I have personally reviewed pertinent lab results  , ABG: No results found for: PHART, [id number], PO2ART, LCM8MNO, K6WBJOOI, BEART, SOURCE, BNP: No results found for: BNP, CBC: No results found for: WBC, HGB, HCT, MCV, PLT, ADJUSTEDWBC, MCH, MCHC, RDW, MPV, NRBC, CMP: No results found for: SODIUM, K, CL, CO2, ANIONGAP, BUN, CREATININE, GLUCOSE, CALCIUM, AST, ALT, ALKPHOS, PROT, BILITOT, EGFR, PT/INR: No results found for: PT, INR, Troponin: No results found for: TROPONINI  Lab Results   Component Value Date    WBC 5 78 08/27/2022    HGB 13 6 08/27/2022    HCT 43 8 08/27/2022    MCV 90 08/27/2022     08/27/2022      Lab Results   Component Value Date    CALCIUM 9 7 08/27/2022    K 4 1 08/27/2022    CO2 25 08/27/2022     (H) 08/27/2022    BUN 23 08/27/2022    CREATININE 0 92 08/27/2022     No results found for: IRON, TIBC, FERRITIN  No results found for: [id number]  No results found for: FOLATE      Sleep studies:  Diagnostic 12/2022: AHI 1 5 events/hr         Rissa Alfaro MD  [address] [facility]       Portions of the record may have been created with voice recognition software  Occasional wrong word or "sound a like" substitutions may have occurred due to the inherent limitations of voice recognition software  Read the chart carefully and recognize, using context, where substitutions have occurred

## 2023-01-16 NOTE — ASSESSMENT & PLAN NOTE
She had a remote diagnosis of sleep apnea diagnosed in an outside hospital based on home sleep study, interestingly, she tells me that she was approved based on nocturnal hypoxemia she is not sure if she was told that she stops breathing    However, she underwent an in lab diagnostic sleep study at [facility] in December 2022 which found to be negative for IBIS with an AHI of 1 7 events per hour he was slightly worse in supine position, and she had roughly about 74 minutes below 90%  There could be a chance for night to night variability for sleep apnea however that is very rare and less likely at this point that the patient does have sleep disordered breathing

## 2023-01-17 DIAGNOSIS — R07.89 ATYPICAL CHEST PAIN: Primary | ICD-10-CM

## 2023-01-17 DIAGNOSIS — R05.3 CHRONIC COUGH: ICD-10-CM

## 2023-01-26 ENCOUNTER — HOSPITAL ENCOUNTER (OUTPATIENT)
Dept: RADIOLOGY | Facility: HOSPITAL | Age: 48
Discharge: HOME | End: 2023-01-26
Attending: INTERNAL MEDICINE

## 2023-01-26 DIAGNOSIS — R05.3 CHRONIC COUGH: ICD-10-CM

## 2023-01-26 DIAGNOSIS — R07.89 ATYPICAL CHEST PAIN: ICD-10-CM

## 2023-01-26 RX ADMIN — IOHEXOL 80 ML: 350 INJECTION, SOLUTION INTRAVENOUS at 18:00

## 2023-02-06 ENCOUNTER — TELEPHONE (OUTPATIENT)
Dept: SLEEP CENTER | Facility: CLINIC | Age: 48
End: 2023-02-06

## 2023-02-06 NOTE — TELEPHONE ENCOUNTER
----- Message from [address] [place] [address] sent at 2/6/2023  7:01 AM EST -----  Regarding: CT scan  Contact: [phone]  Hello    I was just wondering if the results of my recent CT scan could be causing my sleep issues?     Thank you  Makenzie Alaniz

## 2023-02-13 ENCOUNTER — PREP FOR PROCEDURE (OUTPATIENT)
Dept: GASTROENTEROLOGY | Facility: CLINIC | Age: 48
End: 2023-02-13

## 2023-02-13 DIAGNOSIS — Q39.8 INLET PATCH OF ESOPHAGUS: Primary | ICD-10-CM

## 2023-02-13 DIAGNOSIS — R09.89 GLOBUS SENSATION: ICD-10-CM

## 2023-02-14 ENCOUNTER — TELEPHONE (OUTPATIENT)
Dept: GASTROENTEROLOGY | Facility: MEDICAL CENTER | Age: 48
End: 2023-02-14

## 2023-02-14 NOTE — TELEPHONE ENCOUNTER
Spoke to patient and scheduled EGD as requested with Dr Jonn Gomez by Dr Minal Finch        Scheduled date of EGD (as of today) 03/14/2023  Physician performing: Dr Jonn Gomez  Location of procedure:  [facility]  Instructions given to patient:  EGD  Clearances: n/a

## 2023-02-14 NOTE — TELEPHONE ENCOUNTER
----- Message from Najma Johnson sent at 2/13/2023  3:24 PM EST -----  Regarding: FW: SHAVONNE  Contact: [phone]    ----- Message -----  From: Juvenal Bello MA  Sent: 2/13/2023   1:00 PM EST  To: , #  Subject: FW: ENT                                            ----- Message -----  From: Eugene Briggs  Sent: 2/13/2023  12:58 PM EST  To: , #  Subject: SHAVONNE [place]  I do have an appointment with you in April  I will keep that one for now  I'll just wait to hear from someone to schedule the EGD   Thank joe for responding

## 2023-03-01 ENCOUNTER — APPOINTMENT (OUTPATIENT)
Dept: LAB | Facility: CLINIC | Age: 48
End: 2023-03-01

## 2023-03-01 DIAGNOSIS — R13.10 DYSPHAGIA, UNSPECIFIED TYPE: ICD-10-CM

## 2023-03-01 DIAGNOSIS — R09.89 GLOBUS SENSATION: ICD-10-CM

## 2023-03-07 LAB
GLIADIN IGG SER IA-ACNC: 48 UNITS (ref 0–19)
GLIADIN PEPTIDE+TTG IGA+IGG SER QL IA: NEGATIVE
NOTE: NORMAL

## 2023-03-08 DIAGNOSIS — R14.0 BLOATING: Primary | ICD-10-CM

## 2023-03-11 ENCOUNTER — APPOINTMENT (OUTPATIENT)
Dept: LAB | Facility: IMAGING CENTER | Age: 48
End: 2023-03-11

## 2023-03-11 DIAGNOSIS — R14.0 BLOATING: ICD-10-CM

## 2023-03-14 ENCOUNTER — ANESTHESIA EVENT (OUTPATIENT)
Dept: GASTROENTEROLOGY | Facility: HOSPITAL | Age: 48
End: 2023-03-14

## 2023-03-14 ENCOUNTER — ANESTHESIA (OUTPATIENT)
Dept: GASTROENTEROLOGY | Facility: HOSPITAL | Age: 48
End: 2023-03-14

## 2023-03-14 ENCOUNTER — HOSPITAL ENCOUNTER (OUTPATIENT)
Dept: GASTROENTEROLOGY | Facility: HOSPITAL | Age: 48
Setting detail: OUTPATIENT SURGERY
Discharge: HOME | End: 2023-03-14
Attending: INTERNAL MEDICINE

## 2023-03-14 VITALS
HEART RATE: 62 BPM | TEMPERATURE: 97.9 F | DIASTOLIC BLOOD PRESSURE: 72 MMHG | SYSTOLIC BLOOD PRESSURE: 125 MMHG | OXYGEN SATURATION: 98 % | RESPIRATION RATE: 18 BRPM

## 2023-03-14 DIAGNOSIS — R09.89 GLOBUS SENSATION: ICD-10-CM

## 2023-03-14 DIAGNOSIS — Q39.8 INLET PATCH OF ESOPHAGUS: ICD-10-CM

## 2023-03-14 RX ORDER — LIDOCAINE HYDROCHLORIDE 20 MG/ML
INJECTION, SOLUTION EPIDURAL; INFILTRATION; INTRACAUDAL; PERINEURAL AS NEEDED
Status: DISCONTINUED | OUTPATIENT
Start: 2023-03-14 | End: 2023-03-14

## 2023-03-14 RX ORDER — PROPOFOL 10 MG/ML
INJECTION, EMULSION INTRAVENOUS CONTINUOUS PRN
Status: DISCONTINUED | OUTPATIENT
Start: 2023-03-14 | End: 2023-03-14

## 2023-03-14 RX ORDER — ONDANSETRON 2 MG/ML
INJECTION INTRAMUSCULAR; INTRAVENOUS AS NEEDED
Status: DISCONTINUED | OUTPATIENT
Start: 2023-03-14 | End: 2023-03-14

## 2023-03-14 RX ORDER — SODIUM CHLORIDE 9 MG/ML
INJECTION, SOLUTION INTRAVENOUS CONTINUOUS PRN
Status: DISCONTINUED | OUTPATIENT
Start: 2023-03-14 | End: 2023-03-14

## 2023-03-14 RX ORDER — ACETAMINOPHEN 325 MG/1
650 TABLET ORAL ONCE
Status: COMPLETED | OUTPATIENT
Start: 2023-03-14 | End: 2023-03-14

## 2023-03-14 RX ORDER — PROPOFOL 10 MG/ML
INJECTION, EMULSION INTRAVENOUS AS NEEDED
Status: DISCONTINUED | OUTPATIENT
Start: 2023-03-14 | End: 2023-03-14

## 2023-03-14 RX ADMIN — ACETAMINOPHEN 650 MG: 325 TABLET, FILM COATED ORAL at 12:30

## 2023-03-14 RX ADMIN — SODIUM CHLORIDE: 0.9 INJECTION, SOLUTION INTRAVENOUS at 11:23

## 2023-03-14 RX ADMIN — LIDOCAINE HYDROCHLORIDE 100 MG: 20 INJECTION, SOLUTION EPIDURAL; INFILTRATION; INTRACAUDAL; PERINEURAL at 11:29

## 2023-03-14 RX ADMIN — PROPOFOL 150 MG: 10 INJECTION, EMULSION INTRAVENOUS at 11:29

## 2023-03-14 RX ADMIN — PROPOFOL 50 MG: 10 INJECTION, EMULSION INTRAVENOUS at 11:37

## 2023-03-14 RX ADMIN — PROPOFOL 50 MG: 10 INJECTION, EMULSION INTRAVENOUS at 11:33

## 2023-03-14 RX ADMIN — ONDANSETRON 4 MG: 2 INJECTION INTRAMUSCULAR; INTRAVENOUS at 11:24

## 2023-03-14 RX ADMIN — PROPOFOL 150 MCG/KG/MIN: 10 INJECTION, EMULSION INTRAVENOUS at 11:31

## 2023-03-14 NOTE — H&P
History and Physical - [facility]  Sara Bárbara Pereyra 41 y o  female MRN: [id number]    HPI: Zana Rivera is a 41y o  year old female who presents with inlet patch and globus  Review of Systems    Historical Information   Past Medical History:   Diagnosis Date   • Anxiety    • GERD (gastroesophageal reflux disease)    • Kidney stone    • PONV (postoperative nausea and vomiting)      Past Surgical History:   Procedure Laterality Date   • APPENDECTOMY     • BACK SURGERY      L5 - S1   • BLADDER SURGERY     • COLONOSCOPY     • HYSTERECTOMY      age 34 still has lt ovary   • SPINE SURGERY     • TUBAL LIGATION     • UPPER GASTROINTESTINAL ENDOSCOPY       Social History   Social History     Substance and Sexual Activity   Alcohol Use Yes    Comment: I may drink once a month if that     Social History     Substance and Sexual Activity   Drug Use Never     Social History     Tobacco Use   Smoking Status Former   • Packs/day: 0 50   • Years: 25 00   • Pack years: 12 50   • Types: Cigarettes   • Quit date:    • Years since quittin 1   Smokeless Tobacco Never     Family History   Problem Relation Age of Onset   • Heart attack Mother    • Heart disease Mother    • Diabetes Father    • Alcohol abuse Father    • Breast cancer Maternal Grandmother    • Lung cancer Maternal Grandmother 76   • Arthritis Maternal Grandmother    • No Known Problems Maternal Grandfather    • Diabetes Paternal Grandmother    • Stroke Paternal Grandmother    • No Known Problems Paternal Grandfather    • Breast cancer Maternal Aunt 37   • Bone cancer Maternal Aunt    • No Known Problems Maternal Aunt    • Thyroid disease Son    • No Known Problems Paternal Aunt    • Substance Abuse Paternal Uncle    • Drug abuse Paternal Uncle    • Suicide Attempts Brother        Meds/Allergies     (Not in a hospital admission)      Allergies   Allergen Reactions   • Codeine Palpitations     Other reaction(s):  Other (See Comments)  Other reaction(s): Irregular heart rate  Rash,vomiting, heart palpitations   • Latex Rash   • Penicillin V Rash and Vomiting       Objective     /84   Pulse 62   Temp 98 8 °F (37 1 °C) (Tympanic)   Resp 18   SpO2 95%       PHYSICAL EXAM    Gen: NAD  CV: RRR  CHEST: Clear  ABD: soft, NT/ND  EXT: no edema  Neuro: AAO      ASSESSMENT/PLAN:  This is a 41y o  year old female here for EGD with RFA of inlet patch for globus  Discussed with the patient that this does not have 100% success and can be associated with bleeding, perforation and stricture which may require dilation       PLAN:   Procedure: EGD/ RFA

## 2023-03-14 NOTE — ANESTHESIA POSTPROCEDURE EVALUATION
Post-Op Assessment Note    CV Status:  Stable  Pain Score: 0    Pain management: adequate     Mental Status:  Sleepy and arousable   Hydration Status:  Euvolemic   PONV Controlled:  Controlled   Airway Patency:  Patent      Post Op Vitals Reviewed: Yes      Staff: Anesthesiologist, CRNA         No notable events documented      BP      Temp      Pulse     Resp      SpO2

## 2023-03-14 NOTE — ANESTHESIA PREPROCEDURE EVALUATION
Procedure:  EGD    Relevant Problems   ANESTHESIA   (+) PONV (postoperative nausea and vomiting)      CARDIO   (+) Chronic migraine without aura      GI/HEPATIC   (+) Gastroesophageal reflux disease without esophagitis      /RENAL   (+) Kidney stone on left side      MUSCULOSKELETAL   (+) DDD (degenerative disc disease), lumbar      NEURO/PSYCH   (+) Anxiety   (+) Chronic migraine without aura      PULMONARY   (+) IBIS (obstructive sleep apnea)      PONV    Physical Exam    Airway    Mallampati score: II         Dental   No notable dental hx     Cardiovascular  Cardiovascular exam normal    Pulmonary  Pulmonary exam normal     Other Findings        Anesthesia Plan  ASA Score- 2     Anesthesia Type- IV sedation with anesthesia with ASA Monitors  Additional Monitors:   Airway Plan:           Plan Factors-    Chart reviewed  EKG reviewed  Existing labs reviewed  Patient summary reviewed  Induction- intravenous  Postoperative Plan-     Informed Consent- Anesthetic plan and risks discussed with patient  I personally reviewed this patient with the CRNA  Discussed and agreed on the Anesthesia Plan with the CRNA  Sameer Montemayor

## 2023-03-21 LAB
ANNOTATION COMMENT IMP: NORMAL
HLA-DQ2 QL: NEGATIVE
HLA-DQ8 QL: POSITIVE
REF LAB TEST METHOD: NORMAL

## 2023-03-27 DIAGNOSIS — R10.9 ABDOMINAL PAIN, UNSPECIFIED ABDOMINAL LOCATION: ICD-10-CM

## 2023-03-27 DIAGNOSIS — R14.0 BLOATING: Primary | ICD-10-CM

## 2023-03-29 ENCOUNTER — APPOINTMENT (OUTPATIENT)
Dept: LAB | Facility: CLINIC | Age: 48
End: 2023-03-29

## 2023-03-29 ENCOUNTER — CONSULT (OUTPATIENT)
Dept: PULMONOLOGY | Facility: CLINIC | Age: 48
End: 2023-03-29

## 2023-03-29 VITALS
SYSTOLIC BLOOD PRESSURE: 104 MMHG | OXYGEN SATURATION: 96 % | BODY MASS INDEX: 30.38 KG/M2 | TEMPERATURE: 98.1 F | WEIGHT: 171.5 LBS | HEART RATE: 64 BPM | DIASTOLIC BLOOD PRESSURE: 62 MMHG

## 2023-03-29 DIAGNOSIS — Z00.8 ENCOUNTER FOR OTHER GENERAL EXAMINATION: ICD-10-CM

## 2023-03-29 DIAGNOSIS — R91.1 LUNG NODULE: ICD-10-CM

## 2023-03-29 DIAGNOSIS — R07.89 ATYPICAL CHEST PAIN: ICD-10-CM

## 2023-03-29 DIAGNOSIS — R14.0 BLOATING: ICD-10-CM

## 2023-03-29 DIAGNOSIS — R10.9 ABDOMINAL PAIN, UNSPECIFIED ABDOMINAL LOCATION: ICD-10-CM

## 2023-03-29 DIAGNOSIS — R05.3 CHRONIC COUGH: ICD-10-CM

## 2023-03-29 DIAGNOSIS — J44.9 CHRONIC OBSTRUCTIVE PULMONARY DISEASE, UNSPECIFIED COPD TYPE (HCC): Primary | ICD-10-CM

## 2023-03-29 LAB
CHOLEST SERPL-MCNC: 222 MG/DL
EST. AVERAGE GLUCOSE BLD GHB EST-MCNC: 105 MG/DL
HBA1C MFR BLD: 5.3 %
HDLC SERPL-MCNC: 51 MG/DL
LDLC SERPL CALC-MCNC: 111 MG/DL (ref 0–100)
NONHDLC SERPL-MCNC: 171 MG/DL
TRIGL SERPL-MCNC: 299 MG/DL

## 2023-03-29 RX ORDER — TIOTROPIUM BROMIDE AND OLODATEROL 3.124; 2.736 UG/1; UG/1
2 SPRAY, METERED RESPIRATORY (INHALATION) DAILY
Qty: 12 G | Refills: 3 | Status: SHIPPED | OUTPATIENT
Start: 2023-03-29

## 2023-03-29 RX ORDER — ALBUTEROL SULFATE 90 UG/1
2 AEROSOL, METERED RESPIRATORY (INHALATION) EVERY 6 HOURS PRN
Qty: 18 G | Refills: 3 | Status: SHIPPED | OUTPATIENT
Start: 2023-03-29

## 2023-03-29 NOTE — PROGRESS NOTES
Assessment/Plan:    Stage 1 mild COPD by GOLD classification ([facility] 75 )  I explained to father the diagnosis of COPD  Fortunately she is quit smoking and her disease should not progress  That being said we need to control her symptoms better and prevent exacerbations  Given that she has persistent symptoms I think she benefit from long-acting inhaled therapy  I started her on Stiolto 2 puffs once a day and an albuterol inhaler to use as needed  She is up-to-date on all her vaccines and no signs and symptoms of exacerbation  We will see her back after the results of her CAT scan  Lung nodule  Kristy has 2 subcentimeter lung nodules and given her smoking history they need to be followed  She also has what the radiologist called is possible honeycombing to me looks like subpleural emphysema at the base of her lungs which will also follow over time  I will schedule her next CAT scan for August with a follow-up visit after that  If she has progression of honeycombing we may consider further work-up for interstitial lung disease however I do think this is all smoking-related changes  Diagnoses and all orders for this visit:    Chronic obstructive pulmonary disease, unspecified COPD type ([facility] 75 )  -     tiotropium-olodaterol (Stiolto Respimat) 2 5-2 5 MCG/ACT inhaler; Inhale 2 puffs daily  -     albuterol (Ventolin HFA) 90 mcg/act inhaler; Inhale 2 puffs every 6 (six) hours as needed for wheezing  -     CT chest without contrast; Future    Atypical chest pain  -     Ambulatory Referral to Pulmonology    Chronic cough  -     Ambulatory Referral to Pulmonology  -     POCT spirometry    Lung nodule          Subjective:      Patient ID: Levan Rubinstein is a 41 y o  female  Shahid Mesa is here for initial evaluation of her breathing  About a year ago she started noticing a lump in her throat which was subsequently diagnosed as an esophageal issue    That being said she started having shortness of breath and that if she gets winded when she walks a city block or even less  Her breathing is exacerbated by cleaning solutions and walking stairs  She hears audible wheezing and has a cough which is chronic  She also has trouble in the shower with shortness of breath and wheeze  She did contract COVID in October 2021 and does feel her symptoms have been worse since that time  She quit smoking about a year ago but smoked a half a pack per day for 20 years and never vaped  She is walked in office jobs  She has 1 dog at home  The following portions of the patient's history were reviewed and updated as appropriate: allergies, current medications, past family history, past medical history, past social history, past surgical history and problem list     Review of Systems   Constitutional: Negative  Negative for unexpected weight change  HENT: Negative  Negative for postnasal drip  Eyes: Negative  Respiratory: Positive for cough, shortness of breath and wheezing  Cardiovascular: Negative  Negative for chest pain and leg swelling  Gastrointestinal: Negative  Endocrine: Negative  Genitourinary: Negative  Musculoskeletal: Negative  Allergic/Immunologic: Negative  Neurological: Negative  Hematological: Negative  Objective:      /62 (BP Location: Left arm, Patient Position: Sitting, Cuff Size: Standard)   Pulse 64   Temp 98 1 °F (36 7 °C) (Tympanic)   Wt 77 8 kg (171 lb 8 oz)   SpO2 96%   BMI 30 38 kg/m²          Physical Exam  Constitutional:       Appearance: She is well-developed  HENT:      Head: Normocephalic  Eyes:      Pupils: Pupils are equal, round, and reactive to light  Cardiovascular:      Rate and Rhythm: Normal rate  Heart sounds: No murmur heard  Pulmonary:      Effort: Pulmonary effort is normal  No respiratory distress  Breath sounds: Normal breath sounds  No wheezing or rales  Abdominal:      Palpations: Abdomen is soft     Musculoskeletal: General: Normal range of motion  Cervical back: Normal range of motion and neck supple  Skin:     General: Skin is warm and dry  Neurological:      Mental Status: She is alert and oriented to person, place, and time

## 2023-03-29 NOTE — ASSESSMENT & PLAN NOTE
Kristy has 2 subcentimeter lung nodules and given her smoking history they need to be followed  She also has what the radiologist called is possible honeycombing to me looks like subpleural emphysema at the base of her lungs which will also follow over time  I will schedule her next CAT scan for August with a follow-up visit after that  If she has progression of honeycombing we may consider further work-up for interstitial lung disease however I do think this is all smoking-related changes  This note was copied from a baby's chart.  Mom P3 who delivered baby boy weighing 8# 2.2 oz at 39.1 wks. Mom states he breast fed her 2 yr old for 6 months before weaning. She had returned to work and she reports she was stressed. Her first child she delivered in high school and did not breast feed at all.  Mom reports darker and enlarged areolas during pregnancy. Mom denies any breast surgeries, diabetes, thyroid or fertility issues.  Nipples are everted and intact. Mom has no complaints of discomfort or pain. Encouraged mom to remain STS during waking hours. Reviewed normal  behavior and feeding patterns. Encouraged to do skin to skin during waking hours and feed when noting early feeding cues of stirring in sleep and putting hand to mouth. Note that crying is a late feeding cue. Offer both breast at every feeding. Demonstrated hand expression for mom and gave hand out to view [employer] video. Mom does have expressible colostrum. Paperwork for [address] [place] [address] given and mom will  pump today or tomorrow before 3 pm      -Demand feed baby 10 or more times in 24 hours. Be aware that periods of cluster feeding are normal. Note rhythmic, effective jaw glide   -Observe for voids and stools and document on feeding log. Noted stool's transitioning color  -View [employer] video website. Hand express onto nipple before and after feedings. If baby not latching, hand express into spoon and feed back.        ++Provided handouts for outpatient breastfeeding support Zoom meeting and phone number for private lactation consults++

## 2023-03-29 NOTE — PATIENT INSTRUCTIONS
Start Stiolto 2 puffs once a day  Albuterol 2 puffs 4 times a day only as needed for cough/wheeze/shortness of breath  CT chest in August with followup AFTER CT

## 2023-03-29 NOTE — ASSESSMENT & PLAN NOTE
I explained to father the diagnosis of COPD  Fortunately she is quit smoking and her disease should not progress  That being said we need to control her symptoms better and prevent exacerbations  Given that she has persistent symptoms I think she benefit from long-acting inhaled therapy  I started her on Stiolto 2 puffs once a day and an albuterol inhaler to use as needed  She is up-to-date on all her vaccines and no signs and symptoms of exacerbation  We will see her back after the results of her CAT scan

## 2023-03-30 LAB
ENDOMYSIUM IGA SER QL: NEGATIVE
GLIADIN PEPTIDE IGA SER-ACNC: 4 UNITS (ref 0–19)
GLIADIN PEPTIDE IGG SER-ACNC: 3 UNITS (ref 0–19)
IGA SERPL-MCNC: 76 MG/DL (ref 87–352)
TTG IGA SER-ACNC: <2 U/ML (ref 0–3)
TTG IGG SER-ACNC: <2 U/ML (ref 0–5)

## 2023-04-03 ENCOUNTER — HOSPITAL ENCOUNTER (OUTPATIENT)
Dept: SLEEP CENTER | Facility: CLINIC | Age: 48
Discharge: HOME | End: 2023-04-03

## 2023-04-03 ENCOUNTER — OFFICE VISIT (OUTPATIENT)
Dept: GASTROENTEROLOGY | Facility: CLINIC | Age: 48
End: 2023-04-03

## 2023-04-03 VITALS
HEIGHT: 63 IN | DIASTOLIC BLOOD PRESSURE: 82 MMHG | TEMPERATURE: 98.8 F | BODY MASS INDEX: 30.44 KG/M2 | WEIGHT: 171.8 LBS | SYSTOLIC BLOOD PRESSURE: 124 MMHG

## 2023-04-03 DIAGNOSIS — R09.89 GLOBUS SENSATION: Primary | ICD-10-CM

## 2023-04-03 DIAGNOSIS — R10.9 ABDOMINAL PAIN, UNSPECIFIED ABDOMINAL LOCATION: ICD-10-CM

## 2023-04-03 DIAGNOSIS — R13.10 DYSPHAGIA, UNSPECIFIED TYPE: ICD-10-CM

## 2023-04-03 DIAGNOSIS — G47.10 HYPERSOMNIA: ICD-10-CM

## 2023-04-03 DIAGNOSIS — G47.19 EXCESSIVE DAYTIME SLEEPINESS: ICD-10-CM

## 2023-04-03 DIAGNOSIS — G47.33 OSA (OBSTRUCTIVE SLEEP APNEA): ICD-10-CM

## 2023-04-03 NOTE — PROGRESS NOTES
[facility] - Outpatient Follow-up Note  Keara Pereyra 41 y o  female MRN: [id number]  Encounter: [id number]          ASSESSMENT AND PLAN:      1  Inlet patch  2  Globus sensation  3  Heartburn  4  Dysphagia  5  Odynophagia    pH study was done while she was not on PPI and the study showed normal results  Manometry was normal as well  Since ablation of inlet patch in the globus sensation has resolved  Other symptoms have resolved as well  She did have abdominal pain and urgency  Patient reported that she has a history of IBS and has taken Bentyl in the past   She also thinks that inhaler use could be related to current symptoms  She reports that inhaler use has not affected her breathing in any way  Discussed with patient about possibly stopping inhaler after discussion with pulmonary medicine and monitoring symptoms  Patient agreeable with plan  We also discussed about potentially decreasing dose of pantoprazole and stopping it but this could be done afterwards  6   Colon cancer screening  Will discuss with patient about management plan at next office visit  RTC 6 months  Shailesh Le MD  Gastroenterology  [place] [address]  Date: April 3, 2023    ______________________________________________________________________    SUBJECTIVE: 68-year-old with chronic lung disease, injured patch of the esophagus status post ablation presents for follow-up  During last visit patient reported heartburn, foreign body sensation in the throat, swallowing problems and painful swallowing as well as abdominal pain  EGD showed inlet patch for which she underwent ablation later  Manometry and pH study were normal   Barium swallow was normal   ENT evaluation did not show any abnormality  She was diagnosed with chronic lung disease by pulmonary medicine and was started on inhaler  Patient reports she is taking PPI daily    She has been asymptomatic since the EGD   Ablation was done but recently started inhaler after which she noticed lower belly pain and urgency as well as increased cough but no diarrhea  Patient feels that current symptoms could be due to inhaler use  Normal bowel movements  REVIEW OF SYSTEMS IS OTHERWISE NEGATIVE        Historical Information   Past Medical History:   Diagnosis Date   • Anxiety    • GERD (gastroesophageal reflux disease)    • Kidney stone    • PONV (postoperative nausea and vomiting)      Past Surgical History:   Procedure Laterality Date   • APPENDECTOMY     • BACK SURGERY      L5 - S1   • BLADDER SURGERY     • COLONOSCOPY     • HYSTERECTOMY      age 34 still has lt ovary   • SPINE SURGERY     • TUBAL LIGATION     • UPPER GASTROINTESTINAL ENDOSCOPY       Social History   Social History     Substance and Sexual Activity   Alcohol Use Yes    Comment: I may drink once a month if that     Social History     Substance and Sexual Activity   Drug Use Never     Social History     Tobacco Use   Smoking Status Former   • Packs/day: 0 50   • Years: 25 00   • Pack years: 12 50   • Types: Cigarettes   • Start date: 0   • Quit date:    • Years since quittin 2   Smokeless Tobacco Never     Family History   Problem Relation Age of Onset   • Heart attack Mother    • Heart disease Mother    • Diabetes Father    • Alcohol abuse Father    • Breast cancer Maternal Grandmother    • Lung cancer Maternal Grandmother 76   • Arthritis Maternal Grandmother    • No Known Problems Maternal Grandfather    • Diabetes Paternal Grandmother    • Stroke Paternal Grandmother    • No Known Problems Paternal Grandfather    • Breast cancer Maternal Aunt 37   • Bone cancer Maternal Aunt    • No Known Problems Maternal Aunt    • Thyroid disease Son    • No Known Problems Paternal Aunt    • Substance Abuse Paternal Uncle    • Drug abuse Paternal Uncle    • Suicide Attempts Brother        Meds/Allergies       Current Outpatient Medications:   •  ACIDOPHILUS "LACTOBACILLUS PO  •  albuterol (Ventolin HFA) 90 mcg/act inhaler  •  escitalopram (Lexapro) 10 mg tablet  •  Multiple Vitamins-Minerals (MULTIVITAMIN ADULTS PO)  •  pantoprazole (PROTONIX) 40 mg tablet  •  tiotropium-olodaterol (Stiolto Respimat) 2 5-2 5 MCG/ACT inhaler    Allergies   Allergen Reactions   • Codeine Palpitations     Other reaction(s): Other (See Comments)  Other reaction(s): Irregular heart rate  Rash,vomiting, heart palpitations   • Latex Rash   • Penicillin V Rash and Vomiting           Objective     Blood pressure 124/82, temperature 98 8 °F (37 1 °C), temperature source Tympanic, height 5' 3\" (1 6 m), weight 77 9 kg (171 lb 12 8 oz)  Body mass index is 30 43 kg/m²  PHYSICAL EXAM:      General Appearance:   Alert, cooperative, no distress   HEENT:   Normocephalic, atraumatic, anicteric      Neck:  Supple, symmetrical, trachea midline   Lungs:   Clear to auscultation bilaterally; no rales, rhonchi or wheezing; respirations unlabored    Heart[de-identified]   Regular rate and rhythm; no murmur, rub, or gallop  Abdomen:   Soft, non-tender, non-distended; normal bowel sounds; no masses, no organomegaly    Genitalia:   Deferred    Rectal:   Deferred    Extremities:  No cyanosis, clubbing or edema    Pulses:  2+ and symmetric    Skin:  No jaundice, rashes, or lesions    Lymph nodes:  No palpable cervical lymphadenopathy        Lab Results:   No visits with results within 1 Day(s) from this visit  Latest known visit with results is:   Appointment on 03/29/2023   Component Date Value   • IgA 03/29/2023 76 (L)    • Gliadin IgA 03/29/2023 4    • Gliadin IgG 03/29/2023 3    • Tissue Transglut Ab IGG 03/29/2023 <2    • TISSUE TRANSGLUTAMINASE * 03/29/2023 <2    • Endomysial IgA 03/29/2023 Negative          Radiology Results:   EGD    Result Date: 3/14/2023  Narrative: Table formatting from the original result was not included   [address] [place] [address] Endoscopy [address] [phone] " DATE OF SERVICE: 3/14/23 PHYSICIAN(S): Attending: Callie Hansen MD Fellow: No Staff Documented INDICATION: Globus sensation, Inlet patch of esophagus POST-OP DIAGNOSIS: See the impression below  PREPROCEDURE: Informed consent was obtained for the procedure, including sedation  Risks of perforation, hemorrhage, adverse drug reaction and aspiration were discussed  The patient was placed in the left lateral decubitus position  Patient was explained about the risks and benefits of the procedure  Risks including but not limited to bleeding, infection, and perforation were explained in detail  Also explained about less than 100% sensitivity with the exam and other alternatives  PROCEDURE: EGD DETAILS OF PROCEDURE: Patient was taken to the procedure room where a time out was performed to confirm correct patient and correct procedure  The patient underwent monitored anesthesia care, which was administered by an anesthesia professional  The patient's blood pressure, heart rate, level of consciousness, respirations and oxygen were monitored throughout the procedure  The scope was advanced to the second part of the duodenum  Retroflexion was performed in the fundus  The patient experienced no blood loss  The procedure was not difficult  The patient tolerated the procedure well  There were no apparent complications  ANESTHESIA INFORMATION: ASA: II Anesthesia Type: IV Sedation with Anesthesia MEDICATIONS: No administrations occurring from 1126 to 1145 on 03/14/23 FINDINGS: Ectopic gastric mucosa with length of 10 mm and width of 5 mm in the upper third of the esophagus (16 cm from the incisors); tissue ablated completely with 8 applications of radiofrequency ablation  2 areas were identified  A through the scope radiofrequency ablation catheter was used  A total of 4 ablations were applied initially that the mucosa was scraped and 4 further ablations were applied   The esophagus was otherwise normal  Small sliding hiatal hernia (type I hiatal hernia) - GE junction 36 cm from the incisors, diaphragmatic impression 38 cm from the incisors  Hill 3  The stomach appeared normal  The duodenum appeared normal  The bulb and 2nd portion were visualized  SPECIMENS: * No specimens in log *     Impression: Inlet patch seen in the proximal esophagus  Successful ablation using radiofrequency  RECOMMENDATION: Continue protonix for 2 more weeks  Carafate and magic mouthwash as needed  Tylenol for pain  Room temperature liquids today and soft foods tomorrow if can tolerate  Otherwise another day of room temperature liquids  Follow up with Dr Danielle Rodríguez MD     POCT spirometry    Impression: Mild COPD  Answers for HPI/ROS submitted by the patient on 3/31/2023  Chronicity: chronic  Onset: more than 1 month ago  Onset quality: undetermined  Frequency: constantly  Progression since onset: unchanged  Pain location: epigastric region  Pain - numeric: 2/10  Pain quality: aching  Radiates to: does not radiate  anorexia: No  arthralgias: No  belching: No  constipation: No  diarrhea: No  dysuria: No  fever: No  flatus: No  frequency: No  headaches: No  hematochezia: No  hematuria: No  melena: No  myalgias: No  nausea:  No  weight loss: No  vomiting: No  Aggravated by: nothing  Relieved by: nothing  Diagnostic workup: CT scan, upper endoscopy

## 2023-04-04 ENCOUNTER — HOSPITAL ENCOUNTER (OUTPATIENT)
Dept: SLEEP CENTER | Facility: CLINIC | Age: 48
Discharge: HOME | End: 2023-04-04

## 2023-04-04 NOTE — PROGRESS NOTES
Sleep Study Documentation    Pre-Sleep Study       Sleep testing procedure explained to patient:YES    Patient napped prior to study:NO    Caffeine:Dayshift worker after 12PM   Caffeine use:YES- hot cocoa  6 ounces    Alcohol:Dayshift workers after 5PM: Alcohol use:NO    Typical day for patient:YES       Study Documentation    Sleep Study Indications: Excessive daytime sleepiness, IBIS, Hypersomnia    Sleep Study: Diagnostic   Snore:None  Supplemental O2: no    O2 flow rate (L/min) range -  O2 flow rate (L/min) final -  Minimum SaO2 90%  Baseline SaO2 93%    EKG abnormalities: no     EEG abnormalities: no    Sleep Study Recorded < 2 hours: N/A    Sleep Study Recorded > 2 hours but incomplete study: N/A    Sleep Study Recorded 6 hours but no sleep obtained: NO    Patient classification: employed       Post-Sleep Study    Medication used at bedtime or during sleep study:NO    Patient reports time it took to fall asleep:20 to 30 minutes    Patient reports waking up during study:3 or more times  Patient reports returning to sleep in 10 to 30 minutes  Patient reports sleeping 6 to 8 hours without dreaming  Patient reports sleep during study:typical    Patient rated sleepiness: Somewhat sleepy or tired    PAP treatment:no

## 2023-04-04 NOTE — PROGRESS NOTES
Pre-Sleep Study       Sleep testing procedure explained to patient:YES    Urine drug screen performed: No: Provide Reason Not needed no order      Study Documentation    Patient reports:    Nap: 1: Sleep no Dream no    Nap 2:  Sleep yes Dream no    Nap 3:  Sleep no Dream no    Nap 4: Sleep no Dream no    Nap 5:  Sleep no Dream no    EKG abnormalities: no     EEG abnormalities: no    Naps completed: 5

## 2023-05-03 DIAGNOSIS — J44.9 STAGE 1 MILD COPD BY GOLD CLASSIFICATION (HCC): Primary | ICD-10-CM

## 2023-05-03 RX ORDER — ALBUTEROL SULFATE 2.5 MG/3ML
2.5 SOLUTION RESPIRATORY (INHALATION) EVERY 6 HOURS PRN
Qty: 360 ML | Refills: 3 | Status: SHIPPED | OUTPATIENT
Start: 2023-05-03 | End: 2023-06-02

## 2023-05-08 ENCOUNTER — DOCUMENTATION (OUTPATIENT)
Dept: PULMONOLOGY | Facility: CLINIC | Age: 48
End: 2023-05-08

## 2023-05-08 LAB
DME PARACHUTE DELIVERY DATE ACTUAL: NORMAL
DME PARACHUTE DELIVERY DATE REQUESTED: NORMAL
DME PARACHUTE ITEM DESCRIPTION: NORMAL
DME PARACHUTE ORDER STATUS: NORMAL
DME PARACHUTE SUPPLIER NAME: NORMAL
DME PARACHUTE SUPPLIER PHONE: NORMAL

## 2023-05-24 ENCOUNTER — OFFICE VISIT (OUTPATIENT)
Dept: INTERNAL MEDICINE CLINIC | Facility: OTHER | Age: 48
End: 2023-05-24

## 2023-05-24 VITALS
HEART RATE: 75 BPM | DIASTOLIC BLOOD PRESSURE: 80 MMHG | WEIGHT: 171 LBS | OXYGEN SATURATION: 99 % | HEIGHT: 63 IN | SYSTOLIC BLOOD PRESSURE: 126 MMHG | BODY MASS INDEX: 30.3 KG/M2 | TEMPERATURE: 98 F

## 2023-05-24 DIAGNOSIS — G47.33 OSA (OBSTRUCTIVE SLEEP APNEA): ICD-10-CM

## 2023-05-24 DIAGNOSIS — E78.49 OTHER HYPERLIPIDEMIA: ICD-10-CM

## 2023-05-24 DIAGNOSIS — K21.9 GASTROESOPHAGEAL REFLUX DISEASE WITHOUT ESOPHAGITIS: ICD-10-CM

## 2023-05-24 DIAGNOSIS — F41.9 ANXIETY: ICD-10-CM

## 2023-05-24 DIAGNOSIS — R91.1 LUNG NODULE: ICD-10-CM

## 2023-05-24 DIAGNOSIS — R00.2 PALPITATIONS: ICD-10-CM

## 2023-05-24 DIAGNOSIS — E53.8 B12 DEFICIENCY: ICD-10-CM

## 2023-05-24 DIAGNOSIS — Z12.31 ENCOUNTER FOR SCREENING MAMMOGRAM FOR MALIGNANT NEOPLASM OF BREAST: Primary | ICD-10-CM

## 2023-05-24 RX ORDER — ESCITALOPRAM OXALATE 10 MG/1
10 TABLET ORAL DAILY
Qty: 90 TABLET | Refills: 1 | Status: SHIPPED | OUTPATIENT
Start: 2023-05-24

## 2023-05-25 NOTE — PROGRESS NOTES
Assessment/Plan:    Gastroesophageal reflux disease without esophagitis   You may use OTC tums as needed  Recommend small frequent meals throughout the day  Avoid aggravating foods - spicy foods, acidic foods - such as tomato and citrus  Avoid alcohol  Do not lay down for at least 30 mins after eating  Lung nodule  Currently following with pulmonary    IBIS (obstructive sleep apnea)  Working with sleep medicine     Anxiety  Well controlled on Lexapro    Other hyperlipidemia  Recommend healthy lifestyle choices for your cholesterol  Low fat/low cholesterol diet  Limit/avoid red meat  Eat more lean meat - chicken breast, ground turkey, fish  Exercise 30 mins at least 5 times a week as tolerated  ASCVD 1 1%      BMI Counseling: Body mass index is 30 29 kg/m²  The BMI is above normal  Nutrition recommendations include decreasing portion sizes, encouraging healthy choices of fruits and vegetables, decreasing fast food intake, consuming healthier snacks, limiting drinks that contain sugar, moderation in carbohydrate intake, increasing intake of lean protein, reducing intake of saturated and trans fat and reducing intake of cholesterol  Exercise recommendations include moderate physical activity 150 minutes/week and exercising 3-5 times per week  Rationale for BMI follow-up plan is due to patient being overweight or obese  Depression Screening and Follow-up Plan: Patient was screened for depression during today's encounter  They screened negative with a PHQ-2 score of 0  Diagnoses and all orders for this visit:    Encounter for screening mammogram for malignant neoplasm of breast  -     Mammo screening bilateral w 3d & cad; Future    Anxiety  -     escitalopram (Lexapro) 10 mg tablet; Take 1 tablet (10 mg total) by mouth daily    B12 deficiency  -     Vitamin B12; Future    Palpitations  -     CBC and differential  -     Comprehensive metabolic panel;  Future  -     TSH, 3rd generation with Free T4 reflex  -     Holter monitor; Future  -     Vitamin B12; Future    Gastroesophageal reflux disease without esophagitis    Lung nodule    IBIS (obstructive sleep apnea)    Other hyperlipidemia          Subjective:      Patient ID: Shira Boyer is a 41 y o  female  Patient presents today to follow up  Patient reports that she has been having trouble losing weight, she gained weight throughout the [facility] pandemic  She has been hesitant to restart working out because she has been having some chest discomfort on and off  Some mild shortness of breath  Denies crushing chest pain  Does report familial history of cardiac disease  Insomnia/Fatigue- had previous home sleep studies, but she did have a CPAP machine however it is a fields that she which is recalled and patient did not get a replaced CPAP machine, but she had 2 at home studies however due to worsening insomnia, she is currently working with sleep medicine  She reports history of insomnia, has tried multiple medications before either the medication was ineffective or had side effects  Has history of anxiety, previously on Lexapro which had worked well for her  She feels well controlled on this medication with no desire to come off of it at this time  The following portions of the patient's history were reviewed and updated as appropriate: allergies, current medications, past family history, past medical history, past social history, past surgical history and problem list     Review of Systems   Constitutional: Negative for activity change, appetite change, chills, diaphoresis and fever  HENT: Negative for congestion, ear discharge, ear pain, postnasal drip, rhinorrhea, sinus pressure, sinus pain and sore throat  Eyes: Negative for pain, discharge, itching and visual disturbance  Respiratory: Negative for cough, chest tightness, shortness of breath and wheezing      Cardiovascular: Negative for chest pain, palpitations and leg swelling  Gastrointestinal: Negative for abdominal pain, constipation, diarrhea, nausea and vomiting  Endocrine: Negative for polydipsia, polyphagia and polyuria  Genitourinary: Negative for difficulty urinating, dysuria and urgency  Musculoskeletal: Negative for arthralgias, back pain and neck pain  Skin: Negative for rash and wound  Neurological: Negative for dizziness, weakness, numbness and headaches  Psychiatric/Behavioral: Positive for sleep disturbance  The patient is not nervous/anxious  Past Medical History:   Diagnosis Date   • Anxiety    • GERD (gastroesophageal reflux disease)    • Kidney stone    • PONV (postoperative nausea and vomiting)          Current Outpatient Medications:   •  ACIDOPHILUS LACTOBACILLUS PO, Take 1 tablet by mouth daily, Disp: , Rfl:   •  escitalopram (Lexapro) 10 mg tablet, Take 1 tablet (10 mg total) by mouth daily, Disp: 90 tablet, Rfl: 1  •  Multiple Vitamins-Minerals (MULTIVITAMIN ADULTS PO), Take 1 tablet by mouth daily, Disp: , Rfl:   •  tiotropium-olodaterol (Stiolto Respimat) 2 5-2 5 MCG/ACT inhaler, Inhale 2 puffs daily, Disp: 12 g, Rfl: 3    Allergies   Allergen Reactions   • Codeine Palpitations     Other reaction(s):  Other (See Comments)  Other reaction(s): Irregular heart rate  Rash,vomiting, heart palpitations   • Latex Rash   • Penicillin V Rash and Vomiting       Social History   Past Surgical History:   Procedure Laterality Date   • APPENDECTOMY     • BACK SURGERY      L5 - S1   • BLADDER SURGERY     • COLONOSCOPY     • HYSTERECTOMY      age 34 still has lt ovary   • SPINE SURGERY     • TUBAL LIGATION     • UPPER GASTROINTESTINAL ENDOSCOPY       Family History   Problem Relation Age of Onset   • Heart attack Mother    • Heart disease Mother    • Diabetes Father    • Alcohol abuse Father    • Breast cancer Maternal Grandmother    • Lung cancer Maternal Grandmother 76   • Arthritis Maternal Grandmother    • No Known Problems Maternal "Grandfather    • Diabetes Paternal Grandmother    • Stroke Paternal Grandmother    • No Known Problems Paternal Grandfather    • Breast cancer Maternal Aunt 37   • Bone cancer Maternal Aunt    • No Known Problems Maternal Aunt    • Thyroid disease Son    • No Known Problems Paternal Aunt    • Substance Abuse Paternal Uncle    • Drug abuse Paternal Uncle    • Suicide Attempts Brother        Objective:  /80 (BP Location: Left arm, Patient Position: Sitting, Cuff Size: Adult)   Pulse 75   Temp 98 °F (36 7 °C) (Temporal)   Ht 5' 3\" (1 6 m)   Wt 77 6 kg (171 lb)   SpO2 99%   BMI 30 29 kg/m²     Recent Results (from the past 1344 hour(s))   Adult Nebulizer Package    Collection Time: 05/08/23  8:50 AM   Result Value Ref Range    Supplier Name [address]     Supplier Phone Number [phone]     Order Status Delivery Successful     Delivery Note      Delivery Request Date 05/08/2023     Date Delivered  05/08/2023     Item Description Nebulizer Compressor with Mouthpiece Only     Item Description Nebulizer Set, Reusable     Item Description Mouthpiece Only, N/A     Item Description Disposable Nebulizer Compressor Filter             Physical Exam  Constitutional:       General: She is not in acute distress  Appearance: She is well-developed  She is not diaphoretic  HENT:      Head: Normocephalic and atraumatic  Right Ear: External ear normal       Left Ear: External ear normal       Nose: Nose normal       Mouth/Throat:      Pharynx: No oropharyngeal exudate  Eyes:      General:         Right eye: No discharge  Left eye: No discharge  Conjunctiva/sclera: Conjunctivae normal       Pupils: Pupils are equal, round, and reactive to light  Neck:      Thyroid: No thyromegaly  Cardiovascular:      Rate and Rhythm: Normal rate and regular rhythm  Heart sounds: Normal heart sounds  No murmur heard  No friction rub  No gallop     Pulmonary:      Effort: " Pulmonary effort is normal  No respiratory distress  Breath sounds: Normal breath sounds  No stridor  No wheezing or rales  Abdominal:      General: Bowel sounds are normal  There is no distension  Palpations: Abdomen is soft  Tenderness: There is no abdominal tenderness  Musculoskeletal:      Cervical back: Normal range of motion and neck supple  Lymphadenopathy:      Cervical: No cervical adenopathy  Skin:     General: Skin is warm and dry  Findings: No erythema or rash  Neurological:      Mental Status: She is alert and oriented to person, place, and time  Psychiatric:         Behavior: Behavior normal          Thought Content:  Thought content normal          Judgment: Judgment normal

## 2023-05-25 NOTE — ASSESSMENT & PLAN NOTE
Recommend healthy lifestyle choices for your cholesterol  Low fat/low cholesterol diet  Limit/avoid red meat  Eat more lean meat - chicken breast, ground turkey, fish  Exercise 30 mins at least 5 times a week as tolerated      ASCVD 1 1%

## 2023-05-26 ENCOUNTER — HOSPITAL ENCOUNTER (OUTPATIENT)
Dept: NON INVASIVE DIAGNOSTICS | Facility: HOSPITAL | Age: 48
Discharge: HOME | End: 2023-05-26

## 2023-05-26 DIAGNOSIS — R00.2 PALPITATIONS: ICD-10-CM

## 2023-05-27 ENCOUNTER — APPOINTMENT (OUTPATIENT)
Dept: LAB | Facility: IMAGING CENTER | Age: 48
End: 2023-05-27

## 2023-05-27 DIAGNOSIS — R00.2 PALPITATIONS: ICD-10-CM

## 2023-05-27 DIAGNOSIS — E53.8 B12 DEFICIENCY: ICD-10-CM

## 2023-05-27 LAB
ALBUMIN SERPL BCP-MCNC: 3.7 G/DL (ref 3.5–5)
ALP SERPL-CCNC: 71 U/L (ref 46–116)
ALT SERPL W P-5'-P-CCNC: 23 U/L (ref 12–78)
ANION GAP SERPL CALCULATED.3IONS-SCNC: 0 MMOL/L (ref 4–13)
AST SERPL W P-5'-P-CCNC: 12 U/L (ref 5–45)
BASOPHILS # BLD AUTO: 0.07 THOUSANDS/ÂΜL (ref 0–0.1)
BASOPHILS NFR BLD AUTO: 1 % (ref 0–1)
BILIRUB SERPL-MCNC: 0.32 MG/DL (ref 0.2–1)
BUN SERPL-MCNC: 20 MG/DL (ref 5–25)
CALCIUM SERPL-MCNC: 9.3 MG/DL (ref 8.3–10.1)
CHLORIDE SERPL-SCNC: 112 MMOL/L (ref 96–108)
CO2 SERPL-SCNC: 27 MMOL/L (ref 21–32)
CREAT SERPL-MCNC: 0.9 MG/DL (ref 0.6–1.3)
EOSINOPHIL # BLD AUTO: 0.24 THOUSAND/ÂΜL (ref 0–0.61)
EOSINOPHIL NFR BLD AUTO: 3 % (ref 0–6)
ERYTHROCYTE [DISTWIDTH] IN BLOOD BY AUTOMATED COUNT: 12.5 % (ref 11.6–15.1)
GFR SERPL CREATININE-BSD FRML MDRD: 77 ML/MIN/1.73SQ M
GLUCOSE P FAST SERPL-MCNC: 86 MG/DL (ref 65–99)
HCT VFR BLD AUTO: 43.9 % (ref 34.8–46.1)
HGB BLD-MCNC: 14.4 G/DL (ref 11.5–15.4)
IMM GRANULOCYTES # BLD AUTO: 0.04 THOUSAND/UL (ref 0–0.2)
IMM GRANULOCYTES NFR BLD AUTO: 0 % (ref 0–2)
LYMPHOCYTES # BLD AUTO: 2.45 THOUSANDS/ÂΜL (ref 0.6–4.47)
LYMPHOCYTES NFR BLD AUTO: 28 % (ref 14–44)
MCH RBC QN AUTO: 29 PG (ref 26.8–34.3)
MCHC RBC AUTO-ENTMCNC: 32.8 G/DL (ref 31.4–37.4)
MCV RBC AUTO: 89 FL (ref 82–98)
MONOCYTES # BLD AUTO: 0.42 THOUSAND/ÂΜL (ref 0.17–1.22)
MONOCYTES NFR BLD AUTO: 5 % (ref 4–12)
NEUTROPHILS # BLD AUTO: 5.68 THOUSANDS/ÂΜL (ref 1.85–7.62)
NEUTS SEG NFR BLD AUTO: 63 % (ref 43–75)
NRBC BLD AUTO-RTO: 0 /100 WBCS
PLATELET # BLD AUTO: 263 THOUSANDS/UL (ref 149–390)
PMV BLD AUTO: 11.3 FL (ref 8.9–12.7)
POTASSIUM SERPL-SCNC: 3.9 MMOL/L (ref 3.5–5.3)
PROT SERPL-MCNC: 6.9 G/DL (ref 6.4–8.4)
RBC # BLD AUTO: 4.96 MILLION/UL (ref 3.81–5.12)
SODIUM SERPL-SCNC: 139 MMOL/L (ref 135–147)
TSH SERPL DL<=0.05 MIU/L-ACNC: 3.14 UIU/ML (ref 0.45–4.5)
VIT B12 SERPL-MCNC: 308 PG/ML (ref 180–914)
WBC # BLD AUTO: 8.9 THOUSAND/UL (ref 4.31–10.16)

## 2023-06-28 ENCOUNTER — OFFICE VISIT (OUTPATIENT)
Dept: INTERNAL MEDICINE CLINIC | Facility: OTHER | Age: 48
End: 2023-06-28
Payer: COMMERCIAL

## 2023-06-28 VITALS
WEIGHT: 168 LBS | HEART RATE: 82 BPM | BODY MASS INDEX: 29.77 KG/M2 | SYSTOLIC BLOOD PRESSURE: 122 MMHG | HEIGHT: 63 IN | DIASTOLIC BLOOD PRESSURE: 72 MMHG | OXYGEN SATURATION: 98 % | TEMPERATURE: 97.1 F

## 2023-06-28 DIAGNOSIS — F41.9 ANXIETY: ICD-10-CM

## 2023-06-28 DIAGNOSIS — G47.00 INSOMNIA, UNSPECIFIED TYPE: ICD-10-CM

## 2023-06-28 DIAGNOSIS — G47.419 NARCOLEPSY WITHOUT CATAPLEXY: Primary | ICD-10-CM

## 2023-06-28 DIAGNOSIS — E78.49 OTHER HYPERLIPIDEMIA: ICD-10-CM

## 2023-06-28 DIAGNOSIS — E53.8 B12 DEFICIENCY: ICD-10-CM

## 2023-06-28 RX ORDER — HYDROXYZINE HYDROCHLORIDE 25 MG/1
25 TABLET, FILM COATED ORAL EVERY 6 HOURS PRN
Qty: 30 TABLET | Refills: 0 | Status: SHIPPED | OUTPATIENT
Start: 2023-06-28

## 2023-06-28 NOTE — PROGRESS NOTES
Assessment/Plan:    Insomnia  Continue to follow with sleep medicine  Anxiety  Well controlled on lexapro, start atarax as need for anxiety  B12 deficiency  Start OTC vitamin B12 1000mcg tablet daily               Diagnoses and all orders for this visit:    Narcolepsy without cataplexy    Anxiety  -     hydrOXYzine HCL (ATARAX) 25 mg tablet; Take 1 tablet (25 mg total) by mouth every 6 (six) hours as needed for anxiety    Other hyperlipidemia  -     Comprehensive metabolic panel; Future  -     CBC and differential  -     Lipid panel    Insomnia, unspecified type    B12 deficiency          Subjective:      Patient ID: Taye Sánchez is a 41 y o  female  Patient presents today to follow up on blood work and holtor monitor results  Insomnia/Fatigue- had previous home sleep studies, but she did have a CPAP machine however it is a fields that she which is recalled and patient did not get a replaced CPAP machine, but she had 2 at home studies however due to worsening insomnia, she is currently working with sleep medicine  She reports history of insomnia, has tried multiple medications before either the medication was ineffective or had side effects  Has history of anxiety, previously on Lexapro which had worked well for her  She feels well controlled on this medication with no desire to come off of it at this time  The following portions of the patient's history were reviewed and updated as appropriate: allergies, current medications, past family history, past medical history, past social history, past surgical history and problem list     Review of Systems   Constitutional: Positive for fatigue  Negative for activity change, appetite change, chills, diaphoresis and fever  HENT: Negative for congestion, ear discharge, ear pain, postnasal drip, rhinorrhea, sinus pressure, sinus pain and sore throat  Eyes: Negative for pain, discharge, itching and visual disturbance     Respiratory: Negative for cough, chest tightness, shortness of breath and wheezing  Cardiovascular: Negative for chest pain, palpitations and leg swelling  Gastrointestinal: Negative for abdominal pain, constipation, diarrhea, nausea and vomiting  Endocrine: Negative for polydipsia, polyphagia and polyuria  Genitourinary: Negative for difficulty urinating, dysuria and urgency  Musculoskeletal: Negative for arthralgias, back pain and neck pain  Skin: Negative for rash and wound  Neurological: Negative for dizziness, weakness, numbness and headaches  Psychiatric/Behavioral: Positive for sleep disturbance  The patient is nervous/anxious  Past Medical History:   Diagnosis Date   • Anxiety    • GERD (gastroesophageal reflux disease)    • Kidney stone    • PONV (postoperative nausea and vomiting)          Current Outpatient Medications:   •  ACIDOPHILUS LACTOBACILLUS PO, Take 1 tablet by mouth daily, Disp: , Rfl:   •  escitalopram (Lexapro) 10 mg tablet, Take 1 tablet (10 mg total) by mouth daily, Disp: 90 tablet, Rfl: 1  •  hydrOXYzine HCL (ATARAX) 25 mg tablet, Take 1 tablet (25 mg total) by mouth every 6 (six) hours as needed for anxiety, Disp: 30 tablet, Rfl: 0  •  Multiple Vitamins-Minerals (MULTIVITAMIN ADULTS PO), Take 1 tablet by mouth daily, Disp: , Rfl:   •  tiotropium-olodaterol (Stiolto Respimat) 2 5-2 5 MCG/ACT inhaler, Inhale 2 puffs daily, Disp: 12 g, Rfl: 3    Allergies   Allergen Reactions   • Codeine Palpitations     Other reaction(s):  Other (See Comments)  Other reaction(s): Irregular heart rate  Rash,vomiting, heart palpitations   • Latex Rash   • Penicillin V Rash and Vomiting       Social History   Past Surgical History:   Procedure Laterality Date   • APPENDECTOMY     • BACK SURGERY      L5 - S1   • BLADDER SURGERY     • COLONOSCOPY     • HYSTERECTOMY      age 34 still has lt ovary   • SPINE SURGERY     • TUBAL LIGATION     • UPPER GASTROINTESTINAL ENDOSCOPY       Family History   Problem "Relation Age of Onset   • Heart attack Mother    • Heart disease Mother    • Diabetes Father    • Alcohol abuse Father    • Breast cancer Maternal Grandmother    • Lung cancer Maternal Grandmother 76   • Arthritis Maternal Grandmother    • No Known Problems Maternal Grandfather    • Diabetes Paternal Grandmother    • Stroke Paternal Grandmother    • No Known Problems Paternal Grandfather    • Breast cancer Maternal Aunt 37   • Bone cancer Maternal Aunt    • No Known Problems Maternal Aunt    • Thyroid disease Son    • No Known Problems Paternal Aunt    • Substance Abuse Paternal Uncle    • Drug abuse Paternal Uncle    • Suicide Attempts Brother        Objective:  /72 (BP Location: Left arm, Patient Position: Sitting, Cuff Size: Standard)   Pulse 82   Temp (!) 97 1 °F (36 2 °C) (Temporal)   Ht 5' 3\" (1 6 m)   Wt 76 2 kg (168 lb)   SpO2 98% Comment: room air  BMI 29 76 kg/m²     Recent Results (from the past 1344 hour(s))   Adult Nebulizer Package    Collection Time: 05/08/23  8:50 AM   Result Value Ref Range    Supplier Name [employer] [facility]     Supplier Phone Number [phone]     Order Status Delivery Successful     Delivery Note      Delivery Request Date 05/08/2023     Date Delivered  05/08/2023     Item Description Nebulizer Compressor with Mouthpiece Only     Item Description Nebulizer Set, Reusable     Item Description Mouthpiece Only, N/A     Item Description Disposable Nebulizer Compressor Filter    CBC and differential    Collection Time: 05/27/23  8:59 AM   Result Value Ref Range    WBC 8 90 4 31 - 10 16 Thousand/uL    RBC 4 96 3 81 - 5 12 Million/uL    Hemoglobin 14 4 11 5 - 15 4 g/dL    Hematocrit 43 9 34 8 - 46 1 %    MCV 89 82 - 98 fL    MCH 29 0 26 8 - 34 3 pg    MCHC 32 8 31 4 - 37 4 g/dL    RDW 12 5 11 6 - 15 1 %    MPV 11 3 8 9 - 12 7 fL    Platelets 675 876 - 550 Thousands/uL    nRBC 0 /100 WBCs    Neutrophils Relative 63 43 - 75 %    Immat GRANS % 0 0 - 2 %    " Lymphocytes Relative 28 14 - 44 %    Monocytes Relative 5 4 - 12 %    Eosinophils Relative 3 0 - 6 %    Basophils Relative 1 0 - 1 %    Neutrophils Absolute 5 68 1 85 - 7 62 Thousands/µL    Immature Grans Absolute 0 04 0 00 - 0 20 Thousand/uL    Lymphocytes Absolute 2 45 0 60 - 4 47 Thousands/µL    Monocytes Absolute 0 42 0 17 - 1 22 Thousand/µL    Eosinophils Absolute 0 24 0 00 - 0 61 Thousand/µL    Basophils Absolute 0 07 0 00 - 0 10 Thousands/µL   TSH, 3rd generation with Free T4 reflex    Collection Time: 05/27/23  8:59 AM   Result Value Ref Range    TSH 3RD GENERATON 3 139 0 450 - 4 500 uIU/mL   Comprehensive metabolic panel    Collection Time: 05/27/23  8:59 AM   Result Value Ref Range    Sodium 139 135 - 147 mmol/L    Potassium 3 9 3 5 - 5 3 mmol/L    Chloride 112 (H) 96 - 108 mmol/L    CO2 27 21 - 32 mmol/L    ANION GAP 0 (L) 4 - 13 mmol/L    BUN 20 5 - 25 mg/dL    Creatinine 0 90 0 60 - 1 30 mg/dL    Glucose, Fasting 86 65 - 99 mg/dL    Calcium 9 3 8 3 - 10 1 mg/dL    AST 12 5 - 45 U/L    ALT 23 12 - 78 U/L    Alkaline Phosphatase 71 46 - 116 U/L    Total Protein 6 9 6 4 - 8 4 g/dL    Albumin 3 7 3 5 - 5 0 g/dL    Total Bilirubin 0 32 0 20 - 1 00 mg/dL    eGFR 77 ml/min/1 73sq m   Vitamin B12    Collection Time: 05/27/23  8:59 AM   Result Value Ref Range    Vitamin B-12 308 180 - 914 pg/mL            Physical Exam  Constitutional:       General: She is not in acute distress  Appearance: She is well-developed  She is not diaphoretic  HENT:      Head: Normocephalic and atraumatic  Right Ear: External ear normal       Left Ear: External ear normal       Nose: Nose normal       Mouth/Throat:      Pharynx: No oropharyngeal exudate  Eyes:      General:         Right eye: No discharge  Left eye: No discharge  Conjunctiva/sclera: Conjunctivae normal       Pupils: Pupils are equal, round, and reactive to light  Neck:      Thyroid: No thyromegaly     Cardiovascular:      Rate and Rhythm: Normal rate and regular rhythm  Heart sounds: Normal heart sounds  No murmur heard  No friction rub  No gallop  Pulmonary:      Effort: Pulmonary effort is normal  No respiratory distress  Breath sounds: Normal breath sounds  No stridor  No wheezing or rales  Abdominal:      General: Bowel sounds are normal  There is no distension  Palpations: Abdomen is soft  Tenderness: There is no abdominal tenderness  Musculoskeletal:      Cervical back: Normal range of motion and neck supple  Lymphadenopathy:      Cervical: No cervical adenopathy  Skin:     General: Skin is warm and dry  Findings: No erythema or rash  Neurological:      Mental Status: She is alert and oriented to person, place, and time  Psychiatric:         Behavior: Behavior normal          Thought Content:  Thought content normal          Judgment: Judgment normal

## 2023-06-29 ENCOUNTER — TELEPHONE (OUTPATIENT)
Dept: INTERNAL MEDICINE CLINIC | Facility: OTHER | Age: 48
End: 2023-06-29

## 2023-06-29 NOTE — TELEPHONE ENCOUNTER
Can you please find out what the concern for interaction is with these meds  We use these two meds often together   Please send to PCP and she can determine if she wants to continue with current plan after hearing back from pharmacy

## 2023-06-29 NOTE — TELEPHONE ENCOUNTER
Theresa Lopez [phone] with [facility] pharmacy has called stating there is an interaction between two of the following medications patient has been prescribed  RX hydrOXYzine HCL (ATARAX) 25 mg tablet and escitalopram (Lexapro) 10 mg tablet  Please advise as ordering pcp is not in office

## 2023-07-26 ENCOUNTER — OFFICE VISIT (OUTPATIENT)
Dept: SLEEP CENTER | Facility: CLINIC | Age: 48
End: 2023-07-26
Payer: COMMERCIAL

## 2023-07-26 VITALS
DIASTOLIC BLOOD PRESSURE: 79 MMHG | BODY MASS INDEX: 29.41 KG/M2 | SYSTOLIC BLOOD PRESSURE: 121 MMHG | WEIGHT: 166 LBS | HEIGHT: 63 IN | HEART RATE: 62 BPM

## 2023-07-26 DIAGNOSIS — G47.11 IDIOPATHIC HYPERSOMNIA: Primary | ICD-10-CM

## 2023-07-26 DIAGNOSIS — G47.34 NOCTURNAL HYPOXIA: ICD-10-CM

## 2023-07-26 PROCEDURE — 99215 OFFICE O/P EST HI 40 MIN: CPT | Performed by: INTERNAL MEDICINE

## 2023-07-26 RX ORDER — MODAFINIL 100 MG/1
100 TABLET ORAL DAILY
Qty: 30 TABLET | Refills: 0 | Status: SHIPPED | OUTPATIENT
Start: 2023-07-26 | End: 2023-08-04

## 2023-07-26 NOTE — PROGRESS NOTES
Progress Note - Sleep Medicine  [facility] [place] Roddy 41 y.o. female MRN: [id number]       Impression & Plan:   Patient is a 42 yo F with PMH including COPD, migraines, GERD, B12 deficiency, essential tremor who presents for follow-up on insomnia, daytime hypersomnolence. At this time, patient continues to suffer from significant daytime hypersomnolence. MSLT demonstrates decreased average sleep onset latency, however does not correspond to diagnosis of narcolepsy. Will provide patient prescription for Provigil to help mitigate symptoms of daytime hypersomnolence. Side effect profile reviewed with patient. She will continue with this medication for the next 3 months, and is to call with any problems on the medication. She does have documented nocturnal hypoxia with Oxygen janet of 85% with 74 minutes below 90% saturation. She requests prescription for supplemental Oxygen at night to possibly help with her daytime fatigue. Have provided prescription to trial. Discussed importance of avoiding driving while drowsy. Patient to follow up in approximately 3 months or sooner on PRN basis. 1. Idiopathic hypersomnia  - modafinil (PROVIGIL) 100 mg tablet; Take 1 tablet (100 mg total) by mouth daily  Dispense: 30 tablet; Refill: 0    2. Nocturnal hypoxia  - Oxygen     Return in about 3 months (around 10/26/2023). ______________________________________________________________________    HPI:    Samra Badillo is a 41 y.o. female with PMH including COPD, migraines, GERD, B12 deficiency, essential tremor. She presents today for follow up of insomnia and fatigue. Patient underwent diagnostic PSG in 12/2022 demonstrating AHI 1.5. Patient subsequently was advised she did not need to utilize PAP therapy.     Patient was last seen in the office in 1/2023 and was referred for MSLT which was completed in 4/2023 demonstrating hypersomnia with decreased average sleep onset latency of 6-7 minutes, however this could have been due to sleep deprivation based on sleep diary results (showing that the patient was getting 6 hours of sleep per night prior to the MSLT). Patient describes longstanding history of sleep initiation and maintenance insomnia ongoing for 27 years since she had her first son. She describes difficulty falling asleep with sleep latency 45minutes. Goes to bed at 9PM, wakes up at 5:15AM, however frequently wakes up at night at least 4-7x per night at which point she moves to a different room for the rest of the night. Does not take naps but feel "exhausted" all day and would nap if she had the opportunity. States she was recently started on Hydroxyzine 2 months ago for anxiety as well as sleep initiation insomnia. States she takes it 1-2x per week, mostly at night to help her fall asleep. She was not taking the Hydroxyzine at the time of the MSLT. Denies any significant change in PMH, PSH, medications, or allergies  Denies weight change  Denies SOB, wheezing, chest pain, or peripheral edema    Wt Readings from Last 3 Encounters:   07/26/23 75.3 kg (166 lb)   06/28/23 76.2 kg (168 lb)   05/24/23 77.6 kg (171 lb)     Occupation: [employer], works daytime shift  Travel across time zones: no  Do you have a CDL license: no  Over the past year have you gained or lost weight: no    Wt Readings from Last 3 Encounters:   07/26/23 75.3 kg (166 lb)   06/28/23 76.2 kg (168 lb)   05/24/23 77.6 kg (171 lb)      What time do you go to bed? 9:00PM  What time do you fall asleep? 9:40PM  Nighttime awakenings: yes, 4-7x/night  What time do you get out of bed? 5:15AM  What wakes you up? alarm  Are you sleepy during the day? If so when are you the most sleepy? Yes, all day  Do you deliberately try to nap? no  Do you or have you used medication to help you sleep?  Yes, takes Hydroxyzine 1-2x per week at night to help fall asleep  Do you take medication to stay awake: no  Are you aware or have you been told that while sleeping you snore loadly, choke, gag, snort, gasp, stop breathing? no  Have you ever used CPAP or BiPAP or Oxygen? no  Have you ever had surgery for sleep apnea? no  Have you ever worn an oral appliance for sleep apnea? no  Do you still have your tonsils? yes    Have you ever experienced:  Headache no  Teeth grinding no  Head injury no  Chronic pain no  Inability to move the arms or legs upon awakening no  Weakness in knees with laughter or strong emotion no  Visual hallucinations when falling asleep or waking up no  Growing pains no  An uncomfortable feeling in legs  no  Inability to keep legs still no  Thrashing or kicking during sleep no  Eating during the night no  Heartburn during sleep no  Sour or bitter tast upon awakening no  Sleep walking/talking no  Nightmares no  Acting out dreams  no  Aggressive behavior no  Involuntary shaking of the body no  Tongue biting no  Bed wetting no  Poor short term memory no  Decreased concentration no  Feelings of depression no  Anxiety +yes, reports controlled with lexapro    Do you drink any caffeinated beverages:  Coffee: 0 oz/day  Soda: 0oz/day  Tea: 0oz/day  Chocolate:  0  Energy drinks: 0 oz/day  Decaff coffee: 0oz/day    Tobacco use: 13 pack year history, quit 18 months ago  Alcohol: denies  Illicit drug use: denies      Driving while drowsy: no    Sitting and reading: Moderate chance of dozing  Watching TV: Moderate chance of dozing  Sitting, inactive in a public place (e.g. a theatre or a meeting):  Would never doze  As a passenger in a car for an hour without a break: Slight chance of dozing  Lying down to rest in the afternoon when circumstances permit: Moderate chance of dozing  Sitting and talking to someone: Would never doze  Sitting quietly after a lunch without alcohol: Would never doze  In a car, while stopped for a few minutes in traffic: Would never doze  Total score: 7   [place]: 7/24    Social history updates:  Social History     Tobacco Use   Smoking Status Former   • Packs/day: 0.50   • Years: 25.00   • Total pack years: 12.50   • Types: Cigarettes   • Start date: 0   • Quit date:    • Years since quittin.5   Smokeless Tobacco Never     Social History     Socioeconomic History   • Marital status: /Civil Union     Spouse name: Not on file   • Number of children: Not on file   • Years of education: Not on file   • Highest education level: Not on file   Occupational History   • Not on file   Tobacco Use   • Smoking status: Former     Packs/day: 0.50     Years: 25.00     Total pack years: 12.50     Types: Cigarettes     Start date: 0     Quit date:      Years since quittin.5   • Smokeless tobacco: Never   Vaping Use   • Vaping Use: Former   • Start date: 2019   • Quit date: 2019   • Substances: Nicotine, Flavoring   Substance and Sexual Activity   • Alcohol use: Yes     Comment: I may drink once a month if that   • Drug use: Never   • Sexual activity: Not on file   Other Topics Concern   • Not on file   Social History Narrative   • Not on file     Social Determinants of Health     Financial Resource Strain: Not on file   Food Insecurity: Not on file   Transportation Needs: Not on file   Physical Activity: Not on file   Stress: Not on file   Social Connections: Not on file   Intimate Partner Violence: Not on file   Housing Stability: Not on file       PhysicalExamination:  Vitals:   /79 (BP Location: Left arm, Patient Position: Sitting, Cuff Size: Large)   Pulse 62   Ht 5' 3" (1.6 m)   Wt 75.3 kg (166 lb)   BMI 29.41 kg/m²     Physical Exam:  General: Sitting in chair, awake alert and oriented to person, place, and time. No acute distress  HEENT: PERRL, nares patent, no craniofacial abnormalities. Mucous membranes, moist, no oral lesions, and normal dentition.  Mallampati class II, tonsils 1+  NECK: Trachea midline, no accessory muscle use, and no stridor   CARDIAC: Regular rate and rhythm  PULM: CTA bilaterally no wheezing, rhonchi or rales. No conversational dyspnea  EXT: No cyanosis, no clubbing, and no peripheral edema    NEURO: No focal neurologic deficits, moving all extremities appropriately    Diagnostic Data:  Labs: I personally reviewed the most recent laboratory data pertinent to today's visit  Lab Results   Component Value Date    K 3.9 05/27/2023     (H) 05/27/2023    CO2 27 05/27/2023    BUN 20 05/27/2023    CREATININE 0.90 05/27/2023    CALCIUM 9.3 05/27/2023     Lab Results   Component Value Date    WBC 8.90 05/27/2023    HGB 14.4 05/27/2023    HCT 43.9 05/27/2023    MCV 89 05/27/2023     05/27/2023       I have personally reviewed pertinent lab results. Lab Results   Component Value Date    WBC 8.90 05/27/2023    HGB 14.4 05/27/2023    HCT 43.9 05/27/2023    MCV 89 05/27/2023     05/27/2023     Lab Results   Component Value Date    CALCIUM 9.3 05/27/2023    K 3.9 05/27/2023    CO2 27 05/27/2023     (H) 05/27/2023    BUN 20 05/27/2023    CREATININE 0.90 05/27/2023     No results found for: "IGE"  Lab Results   Component Value Date    ALT 23 05/27/2023    AST 12 05/27/2023    ALKPHOS 71 05/27/2023     No results found for: "IRON", "TIBC", "FERRITIN"  Lab Results   Component Value Date    [id number] 05/27/2023     No results found for: "FOLATE"         Sleep studies:  Diagnostic:  Titration:  Split:    HISTORY:  21-year-old female with class II obesity and past medical history of insomnia, depression, and GERD underwent PSG followed by MSLT for excessive daytime sleepiness.      SLEEP:  Patient slept for 416.5 minutes out of 512.3 minutes in bed representing a sleep efficiency of 81.3%. Sleep architecture showed a sleep latency of 31.5 minutes and a REM sleep latency of 127.0 minutes. There was 2.2% Stage N1 noted. There was 78.9% Stage N2 noted. There was 4.0% Stage N3 noted. There was 15.0% REM sleep noted.  The patient had 85 arousals for an average of 12.2 arousals per hour of sleep.     RESPIRATORY:  There were a total of 5 respiratory events made up of 0 obstructive apneas, 0 mixed apneas, 1 central apneas, and 4 hypopneas resulting in an apnea/hypopnea index (AHI) of 0.7 events per hour of sleep. The AHI in the supine position was 1.5. The AHI during REM sleep was 1.0.     OXIMETRY:  The baseline oxygen saturation with the patient awake was 92.0%. The mean oxygen saturation throughout the study was 90.6%. The amount of sleep time below 90% was 55.1 minutes. The lowest oxygen saturation was 85.0%.     BODY POSITION:  The patient slept 38.8% of the evening in the supine position, 61.2% on left side, 0.0% on right side, and 0.0%  in the prone position.      LEG MOVEMENT:  There were 42 PLMs; PLM index of 6.1; 0 PLMs associated with arousal; PLM w/arousal index of 0.0.              SUMMARY:                                                               TOTAL INDEX   Apneas and Hypopneas 5 0.7   Central Apneas 1 0.1   Arousals 85 12.2   PLMs 42 6.1      IMPRESSION:   1) Observed sleep architectural abnormalities include slightly reduced sleep efficiency, decreased stage N1 sleep, increased stage N2 sleep, and decreased stage N3 sleep. 2) EKG showed sinus rhythm without any significant arrythmias  3) Low-normal SpO2 in REM and supine position between 89-90%. 4) Number of PLMs were not significant. 5) No evidence of sleep apnea as AHI is normal (0.7 events per hour)  6) Patient recorded 416.5 minutes of sleep on PSG and met criteria for subsequent MSLT. HISTORY:  24-year-old female with class II obesity and past medical history of insomnia, depression, and GERD underwent PSG followed by MSLT for excessive daytime sleepiness.  Patient recorded 416.5 minutes of sleep on overnight PSG and met criteria for MSLT.      MSLT DATA:    Sleep Latency REM Latency   Nap 1 04:30 N/A   Nap 2 09:00 N/A   Nap 3 01:30 N/A   Nap 4 06:00 N/A   Nap 5 13:30 N/A   Average across all Naps 06:54 N/A *Time formats are in min:sec. Note: report will return default time = 20 min for Sleep Latency if no sleep occurs during nap.           IMPRESSION:  Patient fell asleep during all 5 nap opportunities with average sleep-onset latency of 6:54 minutes, but did not achieve SOREMPs on any of the naps. Patient noted to be on escitalopram on chart review which could suppress REM sleep and cause false negative results on MSLT. On review of her two week sleep diary prior to MSLT, she was noted to be averaging 6 hrs of sleep per day.       RECOMMENDATION:  Hypersomnia was noted on this MSLT with decreased average sleep onset latency. However, this could be due to sleep deprivation based on results of the sleep diary. Assessment and treatment of insomnia based on sleep diary should be considered. If the suspicion of narcolepsy is high, a repeat MSLT with improved sleep duration before MSLT and washout period of SSRI can be considered. Clinical correlation is advised.                                             [id number] [facility]  Sleep Medicine Fellow

## 2023-07-26 NOTE — PATIENT INSTRUCTIONS
It is very important to avoid driving while drowsy, this can be very dangerous or even cause serious injury or death. If sleepy, it is not safe to get behind the wheel. If you are driving and feels sleepy, it is very important to pull over right away. Even losing control of the car for a split second can be deadly. If you feel you cannot control when sleepiness occurs and cannot prevented, it is important to not drive at all until this improves. Please let me know if you experience this as it is very important. Modafinil (By mouth)   Modafinil ([id number])  Treats narcolepsy, sleep apnea, and shift work sleep disorder. Brand Name(s): Provigil   There may be other brand names for this medicine. When This Medicine Should Not Be Used: You should not use this medicine if you have had an allergic reaction to modafinil or other similar medicines (such as armodafinil, Nuvigil®). How to Use This Medicine:   Tablet  Your doctor will tell you how much medicine to use. Do not use more than directed. If you use this medicine for daytime wakefulness, take it in the morning. If you use it to stay awake during shift work, take the medicine 1 hour before you begin working. You may take this medicine with or without food. If you have sleep apnea and use a CPAP machine at night, continue using this machine with the medicine. This medicine may not work as well if you use it during a time when you are unusually sleepy. Read and follow the patient instructions that come with this medicine. Talk to your doctor or pharmacist if you have any questions. This medicine should come with a Medication Guide. Ask your pharmacist for a copy if you do not have one. If a dose is missed: Take a dose as soon as you remember. If it is almost time for your next dose, wait until then and take a regular dose. Do not take extra medicine to make up for a missed dose.   If you have missed your dose by more than half a day, skip the missed dose so you will not be kept awake during your normal sleeping hours. How to Store and Dispose of This Medicine:   Store the medicine in a closed container at room temperature, away from heat, moisture, and direct light. Ask your pharmacist, doctor, or health caregiver about the best way to dispose of any outdated medicine or medicine no longer needed. Keep all medicine out of the reach of children. Never share your medicine with anyone. Drugs and Foods to Avoid:   Ask your doctor or pharmacist before using any other medicine, including over-the-counter medicines, vitamins, and herbal products. Make sure your doctor knows if you are also using dextroamphetamine (Rubens Genre), itraconazole (Sporanox®), ketoconazole (Roxy Guppy), methylphenidate (Ritalin®), or rifampin (Rifadin®, Rimactane®). Tell your doctor if you are using blood thinners (such as warfarin, Coumadin®) or an MAO inhibitor (such as Eldepryl®, Marplan®, Nardil®, or Parnate®). Your doctor should know if you are also using medicine for depression (such as clomipramine, desipramine, Anafranil®, or Norpramin®) or medicine for seizures (such as carbamazepine, phenobarbital, or Tegretol®). Talk to your doctor if you are also using birth control pills (such as ethinyl estradiol), cyclosporine (Gengraf®, Neoral®, Sandimmune®), diazepam (Valium®), phenytoin (Dilantin®), propranolol (Inderal®), or triazolam (Halcion®). Do not drink alcohol while you are using this medicine. Warnings While Using This Medicine: It is important to tell your doctor if you become pregnant. Your doctor may want you to join a pregnancy registry for patients taking this medicine. Make sure your doctor knows if you have kidney disease, liver disease, heart disease, heart rhythm problems, high blood pressure, or have recently had chest pain or a heart attack. Tell your doctor if you have a history of mental illness or drug abuse.   Serious skin reactions can occur with this medicine. Stop using this medicine and check with your doctor right away if you have blistering, peeling, or loosening of the skin; red skin lesions; severe acne or skin rash; sores or ulcers on the skin; or fever or chills while you are using this medicine. This medicine may cause a serious type of allergic reaction called anaphylaxis. Anaphylaxis can be life-threatening and requires immediate medical attention. Stop taking this medicine and call your doctor right away if you have a skin rash, itching, hives, hoarseness, trouble breathing, trouble swallowing, or any swelling of your hands, face, or mouth while you are using this medicine. This medicine may cause serious allergic reactions affecting multiple body organs (e.g., heart, liver, or blood cells). Stop using this medicine and check with your doctor right away if you have the following symptoms: chest pain or discomfort, fever and chills, dark urine, headache, rash, stomach pain, unusual tiredness, unusual bleeding or bruising, or yellow eyes or skin. Birth control pills, implants, shots, patches, vaginal rings, or an IUD may not work well while you are using this medicine. To keep from getting pregnant, use another form of birth control while you are using this medicine and for one month after your last dose. Other forms of birth control include condoms, diaphragms, or contraceptive foams or jellies. This medicine may make you dizzy, drowsy, or you may have trouble thinking or seeing clearly. Avoid driving, using machines, or doing anything else that could be dangerous if you are not alert. This medicine is not for use with occasional sleepiness that has not been diagnosed as caused by narcolepsy, sleep apnea, or shift-work sleep disturbance. This medicine can be habit-forming. Do not use more than your prescribed dose. Call your doctor if you think your medicine is not working.   Your doctor will check your progress and the effects of this medicine at regular visits. Keep all appointments. Your blood pressure may need to be checked more often while taking this medicine. Possible Side Effects While Using This Medicine:   Call your doctor right away if you notice any of these side effects: Allergic reaction: Itching or hives, swelling in your face or hands, swelling or tingling in your mouth or throat, chest tightness, trouble breathing  Blistering, peeling, or red skin rash. Chest pain. Fast, slow, pounding, or uneven heartbeat. Feeling unusually agitated, aggressive, confused, or excited. Fever, chills, cough, sore throat, and body aches. Mood or mental changes. Numbness, tingling, or burning pain in your hands, arms, legs, or feet. Seeing, hearing, or feeling things that are not there. Severe muscle weakness. Severe nausea, vomiting, or diarrhea. Thoughts of hurting yourself and others. Tremors or shaking. Trouble with breathing. Unusual bleeding, bruising, or weakness. Unusual thoughts or behavior. If you notice these less serious side effects, talk with your doctor: Anxiety, nervousness, or trouble sleeping. Back pain. Dry mouth. Headache or dizziness. Mild nausea, diarrhea, upset stomach, or loss of appetite. Mild skin rash or itching. Painful menstrual periods. Runny or stuffy nose. If you notice other side effects that you think are caused by this medicine, tell your doctor. Call your doctor for medical advice about side effects. You may report side effects to FDA at 7-710-FDA-5711  © Copyright [employer] 2022 Information is for End User's use only and may not be sold, redistributed or otherwise used for commercial purposes. The above information is an  only. It is not intended as medical advice for individual conditions or treatments. Talk to your doctor, nurse or pharmacist before following any medical regimen to see if it is safe and effective for you.

## 2023-07-27 ENCOUNTER — TELEPHONE (OUTPATIENT)
Dept: SLEEP CENTER | Facility: CLINIC | Age: 48
End: 2023-07-27

## 2023-07-27 LAB
DME PARACHUTE DELIVERY DATE REQUESTED: NORMAL
DME PARACHUTE ITEM DESCRIPTION: NORMAL
DME PARACHUTE ORDER STATUS: NORMAL
DME PARACHUTE SUPPLIER NAME: NORMAL
DME PARACHUTE SUPPLIER PHONE: NORMAL

## 2023-08-03 ENCOUNTER — TELEPHONE (OUTPATIENT)
Dept: SLEEP CENTER | Facility: CLINIC | Age: 48
End: 2023-08-03

## 2023-08-03 DIAGNOSIS — G47.11 IDIOPATHIC HYPERSOMNIA: ICD-10-CM

## 2023-08-03 NOTE — TELEPHONE ENCOUNTER
Per Marta Castano supervisor at [employer] cannot see the O2 order. Oxygen order, sleep study and two most recent clinical notes were given to [employer] from [employer] She is uploading the documents to [employer]. Marta Castano was sent an e mail to notify her the documents are being uploaded.

## 2023-08-03 NOTE — TELEPHONE ENCOUNTER
Patient sent [email or web address] message stating she has been taking Modafinil 100mg for 5 days now and doesn't feel any different. Dr. Josiane Marmolejo, please advise.

## 2023-08-03 NOTE — TELEPHONE ENCOUNTER
Patient sent [employer] message stating she called [facility] to follow up on the oxygen order and they said they have not received anything through [employer]. Replied to patient's message and informed her that oxygen script was sent via [facility] on 7/27/23 and was received by [employer] the same day. I will reach out to [facility] management team to inquire about this.  --------------------------------------------------    Per message found in [employer] website,  [facility] cannot fufill order: patient does not qualify as O2 testing is over 30 days from O2 order and face to face notes. Email sent to Prachi Vaca, [facility], asking to clarify what is required. Awaiting reply.

## 2023-08-04 DIAGNOSIS — G47.11 IDIOPATHIC HYPERSOMNIA: ICD-10-CM

## 2023-08-04 RX ORDER — MODAFINIL 100 MG/1
200 TABLET ORAL DAILY
Qty: 60 TABLET | Refills: 0 | Status: SHIPPED | OUTPATIENT
Start: 2023-08-04 | End: 2023-08-14 | Stop reason: SDUPTHER

## 2023-08-04 RX ORDER — MODAFINIL 100 MG/1
200 TABLET ORAL DAILY
Qty: 60 TABLET | Refills: 0 | Status: CANCELLED | OUTPATIENT
Start: 2023-08-04

## 2023-08-04 NOTE — TELEPHONE ENCOUNTER
Call placed to patient. Advised Rx for modafinil was increased to 200mg daily [facility]. Patient requesting Rx be sent to [facility]. Call placed to [facility] to cancel Rx.     Dr. Landon Salinas notified for new RX

## 2023-08-04 NOTE — TELEPHONE ENCOUNTER
Received e mail response from Eliza Kern at [facility] :   I verified that the order and script must be placed withing 30 days of testing. This testing was done in April and the face to face visit and script were done in July. [place], this is an insurance guideline. Director of [employer]. [email or web address]  (C) [phone]  (F) [phone]  (F) [phone]

## 2023-08-10 DIAGNOSIS — G47.34 NOCTURNAL HYPOXIA: Primary | ICD-10-CM

## 2023-08-11 NOTE — TELEPHONE ENCOUNTER
Dr. Wanda Gaspar, please review Rx and sign if appropriate. Original Rx went to incorrect pharmacy. Thank you.

## 2023-08-14 DIAGNOSIS — G47.11 IDIOPATHIC HYPERSOMNIA: ICD-10-CM

## 2023-08-14 RX ORDER — MODAFINIL 100 MG/1
200 TABLET ORAL DAILY
Qty: 60 TABLET | Refills: 0 | Status: SHIPPED | OUTPATIENT
Start: 2023-08-14

## 2023-08-14 NOTE — TELEPHONE ENCOUNTER
Rx for modafinil went to the incorrect pharmacy. Should be going to [id number]. Dr. Vin Gale, I teed up the order. Please review and sign if agreeable. I apologize if this may be a duplicate request.  Thank you.

## 2023-08-14 NOTE — TELEPHONE ENCOUNTER
Call placed to patient. Left message medication has been sent to the correct pharmacy. If and questions to please call the nursing staff.

## 2023-08-23 ENCOUNTER — TELEPHONE (OUTPATIENT)
Dept: SLEEP CENTER | Facility: CLINIC | Age: 48
End: 2023-08-23

## 2023-08-23 ENCOUNTER — HOSPITAL ENCOUNTER (OUTPATIENT)
Dept: RADIOLOGY | Facility: IMAGING CENTER | Age: 48
Discharge: HOME | End: 2023-08-23
Attending: INTERNAL MEDICINE
Payer: COMMERCIAL

## 2023-08-23 DIAGNOSIS — J44.9 CHRONIC OBSTRUCTIVE PULMONARY DISEASE, UNSPECIFIED COPD TYPE (HCC): ICD-10-CM

## 2023-08-23 PROCEDURE — G1004 CDSM NDSC: HCPCS

## 2023-08-23 PROCEDURE — 71250 CT THORAX DX C-: CPT

## 2023-08-23 NOTE — TELEPHONE ENCOUNTER
[facility] supervisor Luz Daniels emailed TERRI results, F2F, Rx for oxygen per request.  Patient notified via [employer] message that nursing will be in touch once we hear from Luz Daniels.

## 2023-08-24 NOTE — TELEPHONE ENCOUNTER
Inquired whether documents sent yesterday were received via email. Received message from [address]:  Good Morning,     I did not, I thought you got tied up. Do you want to try putting it through [facility] with the uploaded documents? [address] [facility], TERRI results, Rx for oxygen re-sent to [facility] via [facility].

## 2023-08-28 ENCOUNTER — TELEPHONE (OUTPATIENT)
Dept: SLEEP CENTER | Facility: CLINIC | Age: 48
End: 2023-08-28

## 2023-08-31 DIAGNOSIS — R91.1 LUNG NODULE: ICD-10-CM

## 2023-08-31 DIAGNOSIS — J44.9 CHRONIC OBSTRUCTIVE PULMONARY DISEASE, UNSPECIFIED COPD TYPE (HCC): Primary | ICD-10-CM

## 2023-09-12 DIAGNOSIS — G47.11 IDIOPATHIC HYPERSOMNIA: ICD-10-CM

## 2023-09-12 RX ORDER — MODAFINIL 100 MG/1
200 TABLET ORAL DAILY
Qty: 60 TABLET | Refills: 0 | Status: SHIPPED | OUTPATIENT
Start: 2023-09-12

## 2023-09-12 NOTE — TELEPHONE ENCOUNTER
Called the patient in response to her [email or web address] message. She reports that the Modafinil helps for the first 2-3 hours after she takes it then she feels she needs to go to sleep   I scheduled her to see you on 9/27/2023    She will need a refill of current RX unless you plan to change the dose before being seen .     RX to [facility] in [place]

## 2023-09-12 NOTE — TELEPHONE ENCOUNTER
----- Message from [place] [id number] sent at 9/12/2023  6:28 AM EDT -----  Regarding: Modafinil  Contact: [phone]  Hello. I have 4 days of medication left. I'm not sure if I should continue taking it as I have noticed very little difference. I was supposed to follow up in the office in October but there are no appointments until January. Please advise if I should continue taking it and if so can a prescription be sent to [facility] pharmacy in [address] [place] [address]..      Thank you  Pratibha Styles

## 2023-09-13 NOTE — TELEPHONE ENCOUNTER
Spoke to the patient advised medication refilled, no changes to be made until she sees dr. Sintia Webster

## 2023-09-20 ENCOUNTER — HOSPITAL ENCOUNTER (OUTPATIENT)
Dept: RADIOLOGY | Facility: IMAGING CENTER | Age: 48
Discharge: HOME | End: 2023-09-20
Payer: COMMERCIAL

## 2023-09-20 VITALS — HEIGHT: 63 IN | BODY MASS INDEX: 29.41 KG/M2 | WEIGHT: 166 LBS

## 2023-09-20 DIAGNOSIS — Z12.31 ENCOUNTER FOR SCREENING MAMMOGRAM FOR MALIGNANT NEOPLASM OF BREAST: ICD-10-CM

## 2023-09-20 PROCEDURE — 77063 BREAST TOMOSYNTHESIS BI: CPT

## 2023-09-20 PROCEDURE — 77067 SCR MAMMO BI INCL CAD: CPT

## 2023-09-27 ENCOUNTER — OFFICE VISIT (OUTPATIENT)
Dept: SLEEP CENTER | Facility: CLINIC | Age: 48
End: 2023-09-27
Payer: COMMERCIAL

## 2023-09-27 VITALS
BODY MASS INDEX: 28.88 KG/M2 | WEIGHT: 163 LBS | DIASTOLIC BLOOD PRESSURE: 79 MMHG | SYSTOLIC BLOOD PRESSURE: 121 MMHG | HEIGHT: 63 IN

## 2023-09-27 DIAGNOSIS — G47.10 HYPERSOMNIA: Primary | ICD-10-CM

## 2023-09-27 DIAGNOSIS — G47.00 INSOMNIA, UNSPECIFIED TYPE: ICD-10-CM

## 2023-09-27 DIAGNOSIS — G47.19 EXCESSIVE DAYTIME SLEEPINESS: ICD-10-CM

## 2023-09-27 DIAGNOSIS — J44.9 STAGE 1 MILD COPD BY GOLD CLASSIFICATION (HCC): ICD-10-CM

## 2023-09-27 PROBLEM — G47.34 NOCTURNAL HYPOXIA: Status: RESOLVED | Noted: 2023-07-26 | Resolved: 2023-09-27

## 2023-09-27 PROBLEM — G47.33 OSA (OBSTRUCTIVE SLEEP APNEA): Status: RESOLVED | Noted: 2022-08-24 | Resolved: 2023-09-27

## 2023-09-27 PROCEDURE — 99215 OFFICE O/P EST HI 40 MIN: CPT | Performed by: INTERNAL MEDICINE

## 2023-09-27 RX ORDER — DOXEPIN HYDROCHLORIDE 10 MG/ML
3 SOLUTION ORAL
Qty: 30 ML | Refills: 0 | Status: SHIPPED | OUTPATIENT
Start: 2023-09-27

## 2023-09-27 RX ORDER — ARMODAFINIL 150 MG/1
150 TABLET ORAL DAILY
Qty: 30 TABLET | Refills: 0 | Status: SHIPPED | OUTPATIENT
Start: 2023-09-27

## 2023-09-27 NOTE — ASSESSMENT & PLAN NOTE
She is still on Stiolto Respimat she is not using the rescue inhaler as she is worried about tachycardia

## 2023-09-27 NOTE — ASSESSMENT & PLAN NOTE
Multifactorial, sleep insufficiency plus possible underlying idiopathic hypersomnia she has been on modafinil now for few months  Here daytime sleepiness improves after taking the medication however the effects weans quickly in the afternoon, so I will switch her today to armodafinil for the longer acting effects, since side effects profile should be tolerable as she has tolerated the modafinil in the past.  We will start with 150 mg potentially would need to increase 250 if she remains symptomatic in the afternoon  I would also focus on optimizing her sleep quality at night, started on a small dose of doxepin 3 mg

## 2023-09-27 NOTE — PROGRESS NOTES
Pulmonary/Sleep Follow Up Note   Bonnie Christine Csontos 41 y.o. female MRN: [id number]  9/27/2023      Assessment and Plan:    1. Hypersomnia  Assessment & Plan:  Multifactorial, sleep insufficiency plus possible underlying idiopathic hypersomnia she has been on modafinil now for few months  Here daytime sleepiness improves after taking the medication however the effects weans quickly in the afternoon, so I will switch her today to armodafinil for the longer acting effects, since side effects profile should be tolerable as she has tolerated the modafinil in the past.  We will start with 150 mg potentially would need to increase 250 if she remains symptomatic in the afternoon  I would also focus on optimizing her sleep quality at night, started on a small dose of doxepin 3 mg      Orders:  -     Armodafinil 150 MG tablet; Take 1 tablet (150 mg total) by mouth daily    2. Insomnia, unspecified type  Assessment & Plan:  Mainly sleep maintenance insomnia, started her on doxepin 3 mg at bedtime trial today, [place] pharmacy in [place]    Orders:  -     doxepin (SINEquan) 10 mg/mL solution; Take 0.3 mL (3 mg total) by mouth daily at bedtime    3. Stage 1 mild COPD by GOLD classification [facility])  Assessment & Plan:  She is still on Stiolto Respimat she is not using the rescue inhaler as she is worried about tachycardia      4.  Excessive daytime sleepiness  Assessment & Plan:  Multifactorial, sleep insufficiency plus possible underlying idiopathic hypersomnia she has been on modafinil now for few months  Here daytime sleepiness improves after taking the medication however the effects weans quickly in the afternoon, so I will switch her today to armodafinil for the longer acting effects, since side effects profile should be tolerable as she has tolerated the modafinil in the past.  We will start with 150 mg potentially would need to increase 250 if she remains symptomatic in the afternoon  I would also focus on optimizing her sleep quality at night, started on a small dose of doxepin 3 mg          Return in about 6 months (around 3/27/2024). History of Present Illness   HPI:  Garth Chu is a 41 y.o. female who is here for follow-up on recent diagnosis daytime hypersomnolence and sleep maintenance insomnia. The patient had multi sleep intensity test that showed reduced sleep latency despite sleeping good the night. She was started on a trial of modafinil 100 mg with some improvement however persistent afternoon daytime sleepiness, the dose was increased to 200 mg with incremental improvement of her symptoms however she still felt that this afternoon she is very sleepy and she would take daytime naps especially in the weekends. She is 6 hours from multiple awakenings overnight that could be affecting her sleep quality sh is not sure for why is it happening however sometimes when she sleeps in the living room she has better quality of sleep. Sitting and reading: Moderate chance of dozing  Watching TV: Moderate chance of dozing  Sitting, inactive in a public place (e.g. a theatre or a meeting):  Would never doze  As a passenger in a car for an hour without a break: Slight chance of dozing  Lying down to rest in the afternoon when circumstances permit: Moderate chance of dozing  Sitting and talking to someone: Would never doze  Sitting quietly after a lunch without alcohol: Slight chance of dozing  In a car, while stopped for a few minutes in traffic: Would never doze  Total score: 8      Review of Systems    Historical Information   Past Medical History:   Diagnosis Date   • Anxiety    • GERD (gastroesophageal reflux disease)    • Kidney stone    • PONV (postoperative nausea and vomiting)      Past Surgical History:   Procedure Laterality Date   • APPENDECTOMY     • BACK SURGERY      L5 - S1   • BLADDER SURGERY     • COLONOSCOPY     • HYSTERECTOMY      age 34 still has lt ovary   • SPINE SURGERY     • TOTAL VAGINAL HYSTERECTOMY AGE 34   • TUBAL LIGATION     • UPPER GASTROINTESTINAL ENDOSCOPY       Family History   Problem Relation Age of Onset   • Heart attack Mother    • Heart disease Mother    • Diabetes Father    • Alcohol abuse Father    • Breast cancer Maternal Grandmother    • Lung cancer Maternal Grandmother 76   • Arthritis Maternal Grandmother    • No Known Problems Maternal Grandfather    • Diabetes Paternal Grandmother    • Stroke Paternal Grandmother    • No Known Problems Paternal Grandfather    • Suicide Attempts Brother    • Thyroid disease Son    • Breast cancer Maternal Aunt 43   • Bone cancer Maternal Aunt    • No Known Problems Maternal Aunt    • No Known Problems Paternal Aunt    • Substance Abuse Paternal Uncle    • Drug abuse Paternal Uncle          Meds/Allergies     Current Outpatient Medications:   •  ACIDOPHILUS LACTOBACILLUS PO, Take 1 tablet by mouth daily, Disp: , Rfl:   •  Armodafinil 150 MG tablet, Take 1 tablet (150 mg total) by mouth daily, Disp: 30 tablet, Rfl: 0  •  doxepin (SINEquan) 10 mg/mL solution, Take 0.3 mL (3 mg total) by mouth daily at bedtime, Disp: 30 mL, Rfl: 0  •  escitalopram (Lexapro) 10 mg tablet, Take 1 tablet (10 mg total) by mouth daily, Disp: 90 tablet, Rfl: 1  •  Multiple Vitamins-Minerals (MULTIVITAMIN ADULTS PO), Take 1 tablet by mouth daily, Disp: , Rfl:   •  tiotropium-olodaterol (Stiolto Respimat) 2.5-2.5 MCG/ACT inhaler, Inhale 2 puffs daily, Disp: 12 g, Rfl: 3  Allergies   Allergen Reactions   • Codeine Palpitations     Other reaction(s): Other (See Comments)  Other reaction(s): Irregular heart rate  Rash,vomiting, heart palpitations   • Latex Rash   • Penicillin V Rash and Vomiting       Vitals: Blood pressure 121/79, height 5' 3" (1.6 m), weight 73.9 kg (163 lb). Body mass index is 28.87 kg/m².         Physical Exam  General:  Awake alert and oriented x 3, conversant without conversational dyspnea, NAD, normal affect  HEENT:   Sclera noninjected, nonicteric OU, Nares patent, no craniofacial abnormalities  NECK:  Trachea midline, no accessory muscle use, no stridor,  JVP not elevated  CARDIAC: Reg, single s1/S2, no m/r/g  PULM: CTA bilaterally + faint expiratory wheezing, rhonchi or rales  EXT: No cyanosis, no clubbing, no edema, normal capillary refill  NEURO: no focal neurologic deficits, AAOx3, moving all extremities appropriately    Labs: I have personally reviewed pertinent lab results. , ABG: No results found for: "PHART", "DEA0OHU", "PO2ART", "WDE2JZJ", "O7SGUXEY", "BEART", "SOURCE", BNP: No results found for: "BNP", CBC: No results found for: "WBC", "HGB", "HCT", "MCV", "PLT", "ADJUSTEDWBC", "RBC", "MCH", "MCHC", "RDW", "MPV", "NRBC", CMP: No results found for: "SODIUM", "K", "CL", "CO2", "ANIONGAP", "BUN", "CREATININE", "GLUCOSE", "CALCIUM", "AST", "ALT", "ALKPHOS", "PROT", "BILITOT", "EGFR", PT/INR: No results found for: "PT", "INR", Troponin: No results found for: "TROPONINI"  Lab Results   Component Value Date    WBC 8.90 05/27/2023    HGB 14.4 05/27/2023    HCT 43.9 05/27/2023    MCV 89 05/27/2023     05/27/2023     Lab Results   Component Value Date    CALCIUM 9.3 05/27/2023    K 3.9 05/27/2023    CO2 27 05/27/2023     (H) 05/27/2023    BUN 20 05/27/2023    CREATININE 0.90 05/27/2023     No results found for: "IGE"  Lab Results   Component Value Date    ALT 23 05/27/2023    AST 12 05/27/2023    ALKPHOS 71 05/27/2023           Sleep studies: I have personally reviewed pertinent reports. MSLT  IMPRESSION:  Patient fell asleep during all 5 nap opportunities with average sleep-onset latency of 6:54 minutes, but did not achieve SOREMPs on any of the naps. Patient noted to be on escitalopram on chart review which could suppress REM sleep and cause false negative results on MSLT.  On review of her two week sleep diary prior to MSLT, she was noted to be averaging 6 hrs of sleep per day.       RECOMMENDATION:  Hypersomnia was noted on this MSLT with decreased average sleep onset latency. Mark Garcia, this could be due to sleep deprivation based on results of the sleep diary.  Assessment and treatment of insomnia based on sleep diary should be considered.  If the suspicion of narcolepsy is high, a repeat MSLT with improved sleep duration before MSLT and washout period of SSRI can be considered. Clinical correlation is advised.          Mary Ceja MD  [facility]       Portions of the record may have been created with voice recognition software. Occasional wrong word or "sound a like" substitutions may have occurred due to the inherent limitations of voice recognition software. Read the chart carefully and recognize, using context, where substitutions have occurred.

## 2023-09-27 NOTE — ASSESSMENT & PLAN NOTE
Mainly sleep maintenance insomnia, started her on doxepin 3 mg at bedtime trial today, sent to [place] pharmacy in [place]

## 2023-09-27 NOTE — PATIENT INSTRUCTIONS
Doxepin (By mouth)   Doxepin (DOX-e-pin)  Treats depression, anxiety, and sleep disorders. This medicine is a TCA. Brand Name(s): Javier   There may be other brand names for this medicine. When This Medicine Should Not Be Used: This medicine is not right for everyone. Do not use it if you had an allergic reaction to doxepin or similar medicines or if you have glaucoma or problems urinating. How to Use This Medicine:   Capsule, Liquid, Tablet  Your doctor will tell you how much medicine to use. Do not use more than directed. Sinequan® oral liquid:   Measure the dose with the dropper that comes with the medicine. Mix the medicine with 120 mL (4 ounces) of water, milk, or fruit juice (orange, grapefruit, tomato, prune, or pineapple) before you drink it. Do not use grape juice or carbonated beverages (soda pop). Mix the medicine just before you take your dose. Do not prepare it ahead of time. Silenor® tablet:   Do not take the tablet within 3 hours of a meal. It may not work as well, or it might make you sleepy the next day. Take the tablet 30 minutes before you go to bed. Do not take the medicine unless you can get a full night's sleep (7 to 8 hours). This medicine should come with a Medication Guide. Ask your pharmacist for a copy if you do not have one. Missed dose: Take a dose as soon as you remember. If it is almost time for your next dose, wait until then and take a regular dose. Do not take extra medicine to make up for a missed dose. Store the medicine in a closed container at room temperature, away from heat, moisture, and direct light. Drugs and Foods to Avoid:   Ask your doctor or pharmacist before using any other medicine, including over-the-counter medicines, vitamins, and herbal products. Do not use this medicine if you have used an MAO inhibitor within the past 14 days. Some foods and medicines can affect how doxepin works.  Tell your doctor if you are using any of the following:  Cimetidine, tolazamide  Other medicines for depression (including citalopram, escitalopram, fluoxetine, paroxetine, sertraline)  Medicine for heart rhythm problems (including flecainide, propafenone, quinidine)  Phenothiazine medicine (including chlorpromazine, perphenazine, promethazine, prochlorperazine, thioridazine)  Do not drink alcohol while you are using this medicine. This medicine can intensify the effects of other medicine that makes you sleepy, including allergy medicine and narcotic pain medicine. Warnings While Using This Medicine:   Tell your doctor if you are pregnant or planning to become pregnant during treatment with this medicine. This medicine may cause symptoms of sedation (including breathing problems, sluggishness, low muscle tone, feeding problems, or withdrawal symptoms) in the baby during the third trimester. Do not breastfeed during treatment with this medicine. Tell your doctor if you have liver disease, sleep apnea, or a history of mental illness. For some children, teenagers, and young adults, this medicine may increase mental or emotional problems. This may lead to thoughts of suicide and violence. Talk with your doctor right away if you have any thoughts or behavior changes that concern you. Tell your doctor if you or anyone in your family has a history of bipolar disorder or suicide attempts. Do not stop using this medicine suddenly. Your doctor will need to slowly decrease your dose before you stop it completely. Silenor® tablets may cause you to do things while you are still asleep that you may not remember the next morning, including driving a car, having sex, or eating food. Tell your doctor right away if you learn that this has happened. This medicine may make you drowsy. Do not drive or do anything that could be dangerous until you know how this medicine affects you. This medicine could cause infertility.  Talk with your doctor before using this medicine if you plan to have children. Your doctor will check your progress and the effects of this medicine at regular visits. Keep all appointments. Silenor® tablets: Call your doctor if you still have trouble sleeping after you take this medicine for 7 to 10 days. Keep all medicine out of the reach of children. Never share your medicine with anyone. Possible Side Effects While Using This Medicine:   Call your doctor right away if you notice any of these side effects: Allergic reaction: Itching or hives, swelling in your face or hands, swelling or tingling in your mouth or throat, chest tightness, trouble breathing  Changes in behavior, or thoughts of hurting yourself or others  Eye pain, vision changes, seeing halos around lights  Fast, pounding, or uneven heartbeat  Feeling nervous, restless, anxious, agitated, or excited for no reason  Seizures or tremors  Severe confusion, or seeing or hearing things that are not there  Unusual bleeding or bruising  If you notice these less serious side effects, talk with your doctor:   Constipation  Dizziness or drowsiness  Dry mouth  If you notice other side effects that you think are caused by this medicine, tell your doctor. Call your doctor for medical advice about side effects. You may report side effects to FDA at 5-904-FDA-3710  © Copyright Carmela Reece 2023 Information is for End User's use only and may not be sold, redistributed or otherwise used for commercial purposes. The above information is an  only. It is not intended as medical advice for individual conditions or treatments. Talk to your doctor, nurse or pharmacist before following any medical regimen to see if it is safe and effective for you. Armodafinil (By mouth)   Armodafinil (im-cew-BRF-i-nil)  Treats sleepiness from narcolepsy, sleep apnea, or night shift work. Brand Name(s): Nuvigil   There may be other brand names for this medicine. When This Medicine Should Not Be Used:    This medicine is not right for everyone. Do not use it if you had an allergic reaction to armodafinil or modafinil. How to Use This Medicine:   Tablet  Your doctor will tell you how much medicine to use. Do not use more than directed. For daytime wakefulness: Take armodafinil in the morning. During shift work: Take armodafinil 1 hour before you begin working. This medicine should come with a Medication Guide. Ask your pharmacist for a copy if you do not have one. Missed dose: Take a dose as soon as you remember. If it is almost time for your next dose, wait until then and take a regular dose. Do not take extra medicine to make up for a missed dose. Store the medicine in a closed container at room temperature, away from heat, moisture, and direct light. Drugs and Foods to Avoid:   Ask your doctor or pharmacist before using any other medicine, including over-the-counter medicines, vitamins, and herbal products. Some medicines can affect how armodafinil works. Tell your doctor if you are using any of the following:   Carbamazepine, clomipramine, cyclosporine, diazepam, erythromycin, ketoconazole, midazolam, omeprazole, phenobarbital, phenytoin, propranolol, rifampin, or triazolam  Birth control pill  Blood thinner (including warfarin)  MAO inhibitor  Do not drink alcohol while you are using this medicine. Warnings While Using This Medicine:   Tell your doctor if you are pregnant or breastfeeding, or if you have kidney disease, liver disease, heart or blood vessel disease, high blood pressure, or a history of mental health problems, depression, or drug or alcohol abuse. This medicine may prevent hormonal birth control from working. Use a second form of birth control while you are taking armodafinil, and continue to use both forms for 1 month after your last dose.   This medicine may cause the following problems:  Serious skin reactions  Serious allergic reactions that may involve multiple organs, such as your liver or kidneys  Unusual mood or behavior  Do not drive or do anything else that could be dangerous until you know how this medicine affects you. If you use a CPAP machine while you sleep, continue to use it. Armodafinil is not a substitute for a CPAP machine. Tell any doctor or dentist who treats you that you are using this medicine. This medicine may affect certain medical test results. This medicine can be habit-forming. Do not use more than your prescribed dose. Call your doctor if you think your medicine is not working. Your doctor will check your progress and the effects of this medicine at regular visits. Keep all appointments. Keep all medicine out of the reach of children. Never share your medicine with anyone. Possible Side Effects While Using This Medicine:   Call your doctor right away if you notice any of these side effects: Allergic reaction: Itching or hives, swelling in your face or hands, swelling or tingling in your mouth or throat, chest tightness, trouble breathing  Blistering, peeling, red skin rash  Chest pain, trouble breathing  Fast, pounding, or uneven heartbeat  Feeling agitated, aggressive, confused, depressed, excited, or irritated  Fever, swollen, painful, or tender lymph glands in neck, armpit, or groin  Sores or white patches on your lips, mouth, or throat  Unusual mood or behavior, thoughts of suicide  Yellow skin or eyes  If you notice these less serious side effects, talk with your doctor:   Dry mouth  Headache  If you notice other side effects that you think are caused by this medicine, tell your doctor. Call your doctor for medical advice about side effects. You may report side effects to FDA at 5-439-MMV-5062  © Copyright Annalisa De 2023 Information is for End User's use only and may not be sold, redistributed or otherwise used for commercial purposes. The above information is an  only.  It is not intended as medical advice for individual conditions or treatments. Talk to your doctor, nurse or pharmacist before following any medical regimen to see if it is safe and effective for you.

## 2023-10-26 DIAGNOSIS — G47.10 HYPERSOMNIA: ICD-10-CM

## 2023-10-30 RX ORDER — ARMODAFINIL 150 MG/1
150 TABLET ORAL DAILY
Qty: 30 TABLET | Refills: 0 | Status: SHIPPED | OUTPATIENT
Start: 2023-10-30

## 2023-11-15 ENCOUNTER — IMMUNIZATIONS (OUTPATIENT)
Dept: INTERNAL MEDICINE CLINIC | Facility: OTHER | Age: 48
End: 2023-11-15
Payer: COMMERCIAL

## 2023-11-15 DIAGNOSIS — Z23 ENCOUNTER FOR IMMUNIZATION: Primary | ICD-10-CM

## 2023-11-15 PROCEDURE — 90686 IIV4 VACC NO PRSV 0.5 ML IM: CPT

## 2023-11-15 PROCEDURE — 90471 IMMUNIZATION ADMIN: CPT

## 2023-11-21 ENCOUNTER — OFFICE VISIT (OUTPATIENT)
Dept: INTERNAL MEDICINE CLINIC | Facility: CLINIC | Age: 48
End: 2023-11-21
Payer: COMMERCIAL

## 2023-11-21 VITALS
WEIGHT: 158 LBS | OXYGEN SATURATION: 95 % | BODY MASS INDEX: 27.99 KG/M2 | DIASTOLIC BLOOD PRESSURE: 91 MMHG | SYSTOLIC BLOOD PRESSURE: 136 MMHG | HEART RATE: 63 BPM | TEMPERATURE: 97.2 F

## 2023-11-21 DIAGNOSIS — R31.9 HEMATURIA, UNSPECIFIED TYPE: Primary | ICD-10-CM

## 2023-11-21 LAB
BACTERIA UR QL AUTO: ABNORMAL /HPF
BILIRUB UR QL STRIP: NEGATIVE
CAOX CRY URNS QL MICRO: ABNORMAL /HPF
CLARITY UR: ABNORMAL
COLOR UR: YELLOW
GLUCOSE UR STRIP-MCNC: NEGATIVE MG/DL
HGB UR QL STRIP.AUTO: ABNORMAL
KETONES UR STRIP-MCNC: NEGATIVE MG/DL
LEUKOCYTE ESTERASE UR QL STRIP: NEGATIVE
MUCOUS THREADS UR QL AUTO: ABNORMAL
NITRITE UR QL STRIP: NEGATIVE
NON-SQ EPI CELLS URNS QL MICRO: ABNORMAL /HPF
PH UR STRIP.AUTO: 6 [PH]
PROT UR STRIP-MCNC: ABNORMAL MG/DL
RBC #/AREA URNS AUTO: ABNORMAL /HPF
SP GR UR STRIP.AUTO: 1.02 (ref 1–1.03)
UROBILINOGEN UR STRIP-ACNC: <2 MG/DL
WBC #/AREA URNS AUTO: ABNORMAL /HPF

## 2023-11-21 PROCEDURE — 81001 URINALYSIS AUTO W/SCOPE: CPT | Performed by: INTERNAL MEDICINE

## 2023-11-21 PROCEDURE — 99213 OFFICE O/P EST LOW 20 MIN: CPT | Performed by: INTERNAL MEDICINE

## 2023-11-21 NOTE — PROGRESS NOTES
Assessment/Plan:    Diagnoses and all orders for this visit:    Hematuria, unspecified type  -     Cancel: Urinalysis with microscopic  -     CT abdomen pelvis wo contrast; Future  -     Ambulatory Referral to Urology; Future  -     Urinalysis with microscopic       Unclear etiology-not on any AC/AP or chronic NSAID therapy  Office UA with plenty of RBC, negative for nitrates or leukocyte esterase. Follow-up with imaging as above         There are no Patient Instructions on file for this visit. Subjective:      Patient ID: Shiraz Moran is a 57 y.o. female    HPI      Current Outpatient Medications:     ACIDOPHILUS LACTOBACILLUS PO, Take 1 tablet by mouth daily, Disp: , Rfl:     Armodafinil 150 MG tablet, Take 1 tablet (150 mg total) by mouth daily, Disp: 30 tablet, Rfl: 0    doxepin (SINEquan) 10 mg/mL solution, Take 0.3 mL (3 mg total) by mouth daily at bedtime, Disp: 30 mL, Rfl: 0    escitalopram (Lexapro) 10 mg tablet, Take 1 tablet (10 mg total) by mouth daily, Disp: 90 tablet, Rfl: 1    Multiple Vitamins-Minerals (MULTIVITAMIN ADULTS PO), Take 1 tablet by mouth daily, Disp: , Rfl:     tiotropium-olodaterol (Stiolto Respimat) 2.5-2.5 MCG/ACT inhaler, Inhale 2 puffs daily, Disp: 12 g, Rfl: 3     Past Medical History:   Diagnosis Date    Anxiety     GERD (gastroesophageal reflux disease)     Kidney stone     PONV (postoperative nausea and vomiting)          Past Surgical History:   Procedure Laterality Date    APPENDECTOMY      BACK SURGERY      L5 - S1    BLADDER SURGERY      COLONOSCOPY      HYSTERECTOMY      age 34 still has lt ovary    SPINE SURGERY      TOTAL VAGINAL HYSTERECTOMY      AGE 29    TUBAL LIGATION      UPPER GASTROINTESTINAL ENDOSCOPY           Allergies   Allergen Reactions    Codeine Palpitations     Other reaction(s):  Other (See Comments)  Other reaction(s): Irregular heart rate  Rash,vomiting, heart palpitations    Latex Rash    Penicillin V Rash and Vomiting       No results found for this or any previous visit (from the past 1008 hour(s)). The following portions of the patient's history were reviewed and updated as appropriate: allergies, current medications, past family history, past medical history, past social history, past surgical history and problem list.     Review of Systems   Constitutional:  Negative for appetite change, chills, diaphoresis, fatigue, fever and unexpected weight change. Respiratory:  Negative for apnea, cough, choking, chest tightness, shortness of breath, wheezing and stridor. Cardiovascular:  Negative for chest pain, palpitations and leg swelling. Gastrointestinal:  Negative for abdominal distention, abdominal pain, anal bleeding, blood in stool, constipation, diarrhea, nausea and vomiting. Genitourinary:  Positive for hematuria. Negative for decreased urine volume, difficulty urinating, dyspareunia, dysuria, flank pain, frequency, urgency and vaginal bleeding. Musculoskeletal:  Negative for arthralgias, back pain and myalgias. Neurological:  Negative for dizziness, light-headedness, numbness and headaches. Objective:      Vitals:    11/21/23 1612   BP: 136/91   Pulse: 63   Temp: (!) 97.2 °F (36.2 °C)   SpO2: 95%          Physical Exam  Vitals reviewed. Constitutional:       General: She is not in acute distress. Appearance: Normal appearance. She is not ill-appearing, toxic-appearing or diaphoretic. HENT:      Mouth/Throat:      Mouth: Mucous membranes are moist.   Cardiovascular:      Rate and Rhythm: Normal rate and regular rhythm. Pulses: Normal pulses. Heart sounds: Normal heart sounds. No murmur heard. No friction rub. No gallop. Pulmonary:      Effort: Pulmonary effort is normal. No respiratory distress. Breath sounds: Normal breath sounds. No stridor. No wheezing, rhonchi or rales. Chest:      Chest wall: No tenderness. Genitourinary:     General: Normal vulva. Vagina: No vaginal discharge. Musculoskeletal:      Right lower leg: No edema. Left lower leg: No edema. Skin:     General: Skin is warm and dry. Findings: No lesion or rash. Neurological:      Mental Status: She is alert.

## 2023-11-29 ENCOUNTER — APPOINTMENT (OUTPATIENT)
Dept: LAB | Facility: IMAGING CENTER | Age: 48
End: 2023-11-29
Payer: COMMERCIAL

## 2023-11-29 ENCOUNTER — HOSPITAL ENCOUNTER (OUTPATIENT)
Dept: RADIOLOGY | Facility: IMAGING CENTER | Age: 48
Discharge: HOME | End: 2023-11-29
Payer: COMMERCIAL

## 2023-11-29 DIAGNOSIS — R31.9 HEMATURIA, UNSPECIFIED TYPE: ICD-10-CM

## 2023-11-29 DIAGNOSIS — E78.49 OTHER HYPERLIPIDEMIA: ICD-10-CM

## 2023-11-29 LAB
ALBUMIN SERPL BCP-MCNC: 4.4 G/DL (ref 3.5–5)
ALP SERPL-CCNC: 70 U/L (ref 34–104)
ALT SERPL W P-5'-P-CCNC: 16 U/L (ref 7–52)
ANION GAP SERPL CALCULATED.3IONS-SCNC: 7 MMOL/L
AST SERPL W P-5'-P-CCNC: 16 U/L (ref 13–39)
BASOPHILS # BLD AUTO: 0.09 THOUSANDS/ÂΜL (ref 0–0.1)
BASOPHILS NFR BLD AUTO: 1 % (ref 0–1)
BILIRUB SERPL-MCNC: 0.47 MG/DL (ref 0.2–1)
BUN SERPL-MCNC: 22 MG/DL (ref 5–25)
CALCIUM SERPL-MCNC: 10 MG/DL (ref 8.4–10.2)
CHLORIDE SERPL-SCNC: 107 MMOL/L (ref 96–108)
CHOLEST SERPL-MCNC: 228 MG/DL
CO2 SERPL-SCNC: 27 MMOL/L (ref 21–32)
CREAT SERPL-MCNC: 0.84 MG/DL (ref 0.6–1.3)
EOSINOPHIL # BLD AUTO: 0.2 THOUSAND/ÂΜL (ref 0–0.61)
EOSINOPHIL NFR BLD AUTO: 2 % (ref 0–6)
ERYTHROCYTE [DISTWIDTH] IN BLOOD BY AUTOMATED COUNT: 12.1 % (ref 11.6–15.1)
GFR SERPL CREATININE-BSD FRML MDRD: 83 ML/MIN/1.73SQ M
GLUCOSE P FAST SERPL-MCNC: 53 MG/DL (ref 65–99)
HCT VFR BLD AUTO: 48.9 % (ref 34.8–46.1)
HDLC SERPL-MCNC: 54 MG/DL
HGB BLD-MCNC: 15.7 G/DL (ref 11.5–15.4)
IMM GRANULOCYTES # BLD AUTO: 0.02 THOUSAND/UL (ref 0–0.2)
IMM GRANULOCYTES NFR BLD AUTO: 0 % (ref 0–2)
LDLC SERPL CALC-MCNC: 127 MG/DL (ref 0–100)
LYMPHOCYTES # BLD AUTO: 2.44 THOUSANDS/ÂΜL (ref 0.6–4.47)
LYMPHOCYTES NFR BLD AUTO: 26 % (ref 14–44)
MCH RBC QN AUTO: 29.3 PG (ref 26.8–34.3)
MCHC RBC AUTO-ENTMCNC: 32.1 G/DL (ref 31.4–37.4)
MCV RBC AUTO: 91 FL (ref 82–98)
MONOCYTES # BLD AUTO: 0.61 THOUSAND/ÂΜL (ref 0.17–1.22)
MONOCYTES NFR BLD AUTO: 7 % (ref 4–12)
NEUTROPHILS # BLD AUTO: 5.94 THOUSANDS/ÂΜL (ref 1.85–7.62)
NEUTS SEG NFR BLD AUTO: 64 % (ref 43–75)
NONHDLC SERPL-MCNC: 174 MG/DL
NRBC BLD AUTO-RTO: 0 /100 WBCS
PLATELET # BLD AUTO: 301 THOUSANDS/UL (ref 149–390)
PMV BLD AUTO: 11 FL (ref 8.9–12.7)
POTASSIUM SERPL-SCNC: 3.9 MMOL/L (ref 3.5–5.3)
PROT SERPL-MCNC: 7 G/DL (ref 6.4–8.4)
RBC # BLD AUTO: 5.35 MILLION/UL (ref 3.81–5.12)
SODIUM SERPL-SCNC: 141 MMOL/L (ref 135–147)
TRIGL SERPL-MCNC: 236 MG/DL
WBC # BLD AUTO: 9.3 THOUSAND/UL (ref 4.31–10.16)

## 2023-11-29 PROCEDURE — 74176 CT ABD & PELVIS W/O CONTRAST: CPT

## 2023-11-29 PROCEDURE — 80053 COMPREHEN METABOLIC PANEL: CPT

## 2023-11-29 PROCEDURE — G1004 CDSM NDSC: HCPCS

## 2023-11-30 DIAGNOSIS — G47.10 HYPERSOMNIA: ICD-10-CM

## 2023-12-01 RX ORDER — ARMODAFINIL 150 MG/1
150 TABLET ORAL DAILY
Qty: 30 TABLET | Refills: 3 | Status: SHIPPED | OUTPATIENT
Start: 2023-12-01

## 2023-12-04 ENCOUNTER — TELEPHONE (OUTPATIENT)
Dept: INTERNAL MEDICINE CLINIC | Facility: CLINIC | Age: 48
End: 2023-12-04

## 2023-12-04 NOTE — TELEPHONE ENCOUNTER
Spoke with the patient. Discussed the CT abdomen and pelvis reports. Patient not in any acute pain but has some discomfort. Has a follow-up appointment with urology tomorrow.

## 2023-12-04 NOTE — TELEPHONE ENCOUNTER
LACI chapman from [facility] radiology dept called they found significant finding on patients CT  of abdomen and pelvis . they wanted you to know its in epic

## 2023-12-11 ENCOUNTER — OFFICE VISIT (OUTPATIENT)
Dept: UROLOGY | Facility: CLINIC | Age: 48
End: 2023-12-11
Payer: COMMERCIAL

## 2023-12-11 VITALS
HEART RATE: 108 BPM | SYSTOLIC BLOOD PRESSURE: 122 MMHG | RESPIRATION RATE: 16 BRPM | OXYGEN SATURATION: 98 % | HEIGHT: 63 IN | DIASTOLIC BLOOD PRESSURE: 70 MMHG | BODY MASS INDEX: 27.96 KG/M2 | WEIGHT: 157.8 LBS

## 2023-12-11 DIAGNOSIS — R31.9 HEMATURIA, UNSPECIFIED TYPE: ICD-10-CM

## 2023-12-11 DIAGNOSIS — N20.1 URETERAL STONE: Primary | ICD-10-CM

## 2023-12-11 PROCEDURE — 99204 OFFICE O/P NEW MOD 45 MIN: CPT

## 2023-12-11 RX ORDER — CIPROFLOXACIN 2 MG/ML
400 INJECTION, SOLUTION INTRAVENOUS ONCE
Status: CANCELLED | OUTPATIENT
Start: 2023-12-15 | End: 2023-12-11

## 2023-12-11 RX ORDER — TAMSULOSIN HYDROCHLORIDE 0.4 MG/1
0.4 CAPSULE ORAL
Qty: 30 CAPSULE | Refills: 1 | Status: SHIPPED | OUTPATIENT
Start: 2023-12-11

## 2023-12-11 RX ORDER — TAMSULOSIN HYDROCHLORIDE 0.4 MG/1
0.4 CAPSULE ORAL
Qty: 30 CAPSULE | Refills: 1 | Status: SHIPPED | OUTPATIENT
Start: 2023-12-11 | End: 2023-12-11 | Stop reason: SDUPTHER

## 2023-12-11 NOTE — H&P (VIEW-ONLY)
Office Visit- Urology  Caro Villagran 1975 MRN: [id number]      Assessment/Discussion/Plan    57 y.o. female managed by     Left sided 5 mm non-obstructing ureteral calculus   -Patient is currently symptomatic   -discussed observation with medical expulsive therapy versus surgical intervention with cystoscopy, ureteroscopy with holmium laser lithotripsy, basket extraction, retrograde pyelogram, and insertion of a left ureteral stent at this point in time patient wishes to proceed with surgical intervention. Discussed risk including procedure including infection including sepsis, bleeding, injury to the urethra, bladder, ureters, kidneys, ureteral stricture, need for additional procedures, stent related discomfort.  -We will initiate medical expulsive therapy until patient has fast-track surgery. She will obtain a CT scan prior to scheduled operation to ensure that there has been no stone passage in interval.  -Discussed Flomax administration and potential side effects. -Gave strainer and instructed patient to strain all urine  -Placed case request for above surgical intervention. Informed consent signed in office today        Chief Complaint:   Main Gomes is a 57 y.o. female presenting to the office for an initial evaluation regarding left-sided 5 mm nonobstructing ureteral calculus        Subjective    Patient is a 79-year-old female with no significant past urologic history who presents to the office today for evaluation of obstructing ureteral calculus. She states that since around Thanksgiving she was having flank pain as well as urinary symptoms such as frequency, urgency, dysuria. Her PCP obtained a CT stone study which demonstrated a 5 mm calculus in the distal ureter with no significant upstream hydroureteronephrosis. She has not had any stone passage since the CT scan. She presents the office today at the urging of her PCP.   She states that she is still in discomfort with lower tract symptoms. ROS:   Review of Systems   Constitutional: Negative. Negative for chills, fatigue and fever. HENT: Negative. Respiratory:  Negative for shortness of breath. Cardiovascular:  Negative for chest pain. Gastrointestinal: Negative. Negative for abdominal pain. Endocrine: Negative. Musculoskeletal: Negative. Skin: Negative. Neurological: Negative. Negative for dizziness and light-headedness. Hematological: Negative. Psychiatric/Behavioral: Negative.            Past Medical History  Past Medical History:   Diagnosis Date    Anxiety     GERD (gastroesophageal reflux disease)     Kidney stone     PONV (postoperative nausea and vomiting)        Past Surgical History  Past Surgical History:   Procedure Laterality Date    APPENDECTOMY      BACK SURGERY      L5 - S1    BLADDER SURGERY      COLONOSCOPY      HYSTERECTOMY      age 34 still has lt ovary    SPINE SURGERY      TOTAL VAGINAL HYSTERECTOMY      AGE 34    TUBAL LIGATION      UPPER GASTROINTESTINAL ENDOSCOPY         Past Family History  Family History   Problem Relation Age of Onset    Heart attack Mother     Heart disease Mother     Kidney failure Mother     Heart failure Mother     Diabetes Father     Alcohol abuse Father     Suicide Attempts Brother     Thyroid disease Son     Breast cancer Maternal Grandmother     Lung cancer Maternal Grandmother 76    Arthritis Maternal Grandmother     No Known Problems Maternal Grandfather     Diabetes Paternal Grandmother     Stroke Paternal Grandmother     No Known Problems Paternal Grandfather     Breast cancer Maternal Aunt 37    Bone cancer Maternal Aunt     No Known Problems Maternal Aunt     No Known Problems Paternal Aunt     Substance Abuse Paternal Uncle     Drug abuse Paternal Uncle        Past Social history  Social History     Socioeconomic History    Marital status: /Civil Union     Spouse name: Not on file    Number of children: Not on file    Years of education: Not on file    Highest education level: Not on file   Occupational History    Not on file   Tobacco Use    Smoking status: Former     Packs/day: 0.50     Years: 25.00     Total pack years: 12.50     Types: Cigarettes     Start date: 0     Quit date:      Years since quittin.9    Smokeless tobacco: Never   Vaping Use    Vaping Use: Former    Start date: 2019    Quit date: 2019    Substances: Nicotine, Flavoring   Substance and Sexual Activity    Alcohol use: Yes     Comment: I may drink once a month if that    Drug use: Never    Sexual activity: Not on file   Other Topics Concern    Not on file   Social History Narrative    Not on file     Social Determinants of Health     Financial Resource Strain: Not on file   Food Insecurity: Not on file   Transportation Needs: Not on file   Physical Activity: Not on file   Stress: Not on file   Social Connections: Not on file   Intimate Partner Violence: Not on file   Housing Stability: Not on file       Current Medications  Current Outpatient Medications   Medication Sig Dispense Refill    ACIDOPHILUS LACTOBACILLUS PO Take 1 tablet by mouth daily      Armodafinil 150 MG tablet Take 1 tablet (150 mg total) by mouth daily 30 tablet 3    doxepin (SINEquan) 10 mg/mL solution Take 0.3 mL (3 mg total) by mouth daily at bedtime 30 mL 0    escitalopram (Lexapro) 10 mg tablet Take 1 tablet (10 mg total) by mouth daily 90 tablet 1    Multiple Vitamins-Minerals (MULTIVITAMIN ADULTS PO) Take 1 tablet by mouth daily      tiotropium-olodaterol (Stiolto Respimat) 2.5-2.5 MCG/ACT inhaler Inhale 2 puffs daily 12 g 3     No current facility-administered medications for this visit. Allergies  Allergies   Allergen Reactions    Codeine Palpitations     Other reaction(s):  Other (See Comments)  Other reaction(s): Irregular heart rate  Rash,vomiting, heart palpitations    Latex Rash    Penicillin V Rash and Vomiting       OBJECTIVE    Vitals   Vitals:    23 0830   BP: 122/70 BP Location: Left arm   Patient Position: Sitting   Cuff Size: Large   Pulse: (!) 108   Resp: 16   SpO2: 98%   Weight: 71.6 kg (157 lb 12.8 oz)   Height: 5' 3" (1.6 m)       PVR:    Physical Exam  Constitutional:       General: She is not in acute distress. Appearance: Normal appearance. She is normal weight. She is not ill-appearing or toxic-appearing. HENT:      Head: Normocephalic and atraumatic. Eyes:      Conjunctiva/sclera: Conjunctivae normal.   Cardiovascular:      Rate and Rhythm: Normal rate. Pulmonary:      Effort: Pulmonary effort is normal. No respiratory distress. Skin:     General: Skin is warm and dry. Neurological:      General: No focal deficit present. Mental Status: She is alert and oriented to person, place, and time. Cranial Nerves: No cranial nerve deficit. Psychiatric:         Mood and Affect: Mood normal.         Behavior: Behavior normal.         Thought Content: Thought content normal.          Labs:     Lab Results   Component Value Date    CREATININE 0.84 11/29/2023      Lab Results   Component Value Date    HGBA1C 5.3 03/29/2023     Lab Results   Component Value Date    CALCIUM 10.0 11/29/2023    K 3.9 11/29/2023    CO2 27 11/29/2023     11/29/2023    BUN 22 11/29/2023    CREATININE 0.84 11/29/2023       I have personally reviewed all pertinent lab results and reviewed with patient    Imaging   Narrative & Impression   CT ABDOMEN AND PELVIS WITHOUT IV CONTRAST     INDICATION:   R31.9: Hematuria, unspecified. COMPARISON:  None. TECHNIQUE:  CT examination of the abdomen and pelvis was performed without intravenous contrast. Multiplanar 2D reformatted images were created from the source data. This examination, like all CT scans performed in the [facility], was performed utilizing techniques to minimize radiation dose exposure, including the use of iterative reconstruction and automated exposure control.  Radiation dose length product (DLP) for this visit:  485 mGy-cm     Enteric contrast was not administered. FINDINGS:     ABDOMEN     LOWER CHEST: There is a 3 mm pleural-based right lower lobe pulmonary nodule. Please refer to the report of the CT chest dated August 23, 2023 for additional details. LIVER/BILIARY TREE:  Unremarkable. GALLBLADDER:  No calcified gallstones. No pericholecystic inflammatory change. SPLEEN:  Unremarkable. PANCREAS:  Unremarkable. ADRENAL GLANDS:  Unremarkable. KIDNEYS/URETERS: There is a 5 mm calculus at the distal ureter, just prior to the UVJ causing no significant upstream hydroureteronephrosis. There are left renal calculi measuring up to 4 mm. There is a 1 mm nonobstructing right renal lower pole   calculus. STOMACH AND BOWEL: There is colonic diverticulosis without evidence of acute diverticulitis. APPENDIX: There are expected postoperative changes of appendectomy. ABDOMINOPELVIC CAVITY:  No ascites. No pneumoperitoneum. No lymphadenopathy. VESSELS:  Unremarkable for patient's age. PELVIS     REPRODUCTIVE ORGANS: Surgical changes of prior hysterectomy. URINARY BLADDER:  Unremarkable. ABDOMINAL WALL/INGUINAL REGIONS:  Unremarkable. OSSEOUS STRUCTURES:  No acute fracture or destructive osseous lesion. Postoperative changes of posterior fusion are present at L5/S1. IMPRESSION:     5 mm calculus at the level of the distal left ureter causing no significant hydroureteronephrosis. No prior studies are available for comparison. Bilateral subcentimeter nonobstructing intrarenal calculi. Workstation performed: [id number]          Alvin Ross PA-C  Date: 12/11/2023 Time: 8:51 AM  [id number] [facility]    This note was written using [employer] dictation software. Please excuse any resulting minor grammatical errors.

## 2023-12-11 NOTE — PROGRESS NOTES
Office Visit- Urology  Cassia Ramesh 1975 MRN: [id number]      Assessment/Discussion/Plan    57 y.o. female managed by     Left sided 5 mm non-obstructing ureteral calculus   -Patient is currently symptomatic   -discussed observation with medical expulsive therapy versus surgical intervention with cystoscopy, ureteroscopy with holmium laser lithotripsy, basket extraction, retrograde pyelogram, and insertion of a left ureteral stent at this point in time patient wishes to proceed with surgical intervention. Discussed risk including procedure including infection including sepsis, bleeding, injury to the urethra, bladder, ureters, kidneys, ureteral stricture, need for additional procedures, stent related discomfort.  -We will initiate medical expulsive therapy until patient has fast-track surgery. She will obtain a CT scan prior to scheduled operation to ensure that there has been no stone passage in interval.  -Discussed Flomax administration and potential side effects. -Gave strainer and instructed patient to strain all urine  -Placed case request for above surgical intervention. Informed consent signed in office today        Chief Complaint:   Dolores Diaz is a 57 y.o. female presenting to the office for an initial evaluation regarding left-sided 5 mm nonobstructing ureteral calculus        Subjective    Patient is a 80-year-old female with no significant past urologic history who presents to the office today for evaluation of obstructing ureteral calculus. She states that since around Thanksgiving she was having flank pain as well as urinary symptoms such as frequency, urgency, dysuria. Her PCP obtained a CT stone study which demonstrated a 5 mm calculus in the distal ureter with no significant upstream hydroureteronephrosis. She has not had any stone passage since the CT scan. She presents the office today at the urging of her PCP.   She states that she is still in discomfort with lower tract symptoms. ROS:   Review of Systems   Constitutional: Negative. Negative for chills, fatigue and fever. HENT: Negative. Respiratory:  Negative for shortness of breath. Cardiovascular:  Negative for chest pain. Gastrointestinal: Negative. Negative for abdominal pain. Endocrine: Negative. Musculoskeletal: Negative. Skin: Negative. Neurological: Negative. Negative for dizziness and light-headedness. Hematological: Negative. Psychiatric/Behavioral: Negative.            Past Medical History  Past Medical History:   Diagnosis Date    Anxiety     GERD (gastroesophageal reflux disease)     Kidney stone     PONV (postoperative nausea and vomiting)        Past Surgical History  Past Surgical History:   Procedure Laterality Date    APPENDECTOMY      BACK SURGERY      L5 - S1    BLADDER SURGERY      COLONOSCOPY      HYSTERECTOMY      age 34 still has lt ovary    SPINE SURGERY      TOTAL VAGINAL HYSTERECTOMY      AGE 34    TUBAL LIGATION      UPPER GASTROINTESTINAL ENDOSCOPY         Past Family History  Family History   Problem Relation Age of Onset    Heart attack Mother     Heart disease Mother     Kidney failure Mother     Heart failure Mother     Diabetes Father     Alcohol abuse Father     Suicide Attempts Brother     Thyroid disease Son     Breast cancer Maternal Grandmother     Lung cancer Maternal Grandmother 76    Arthritis Maternal Grandmother     No Known Problems Maternal Grandfather     Diabetes Paternal Grandmother     Stroke Paternal Grandmother     No Known Problems Paternal Grandfather     Breast cancer Maternal Aunt 37    Bone cancer Maternal Aunt     No Known Problems Maternal Aunt     No Known Problems Paternal Aunt     Substance Abuse Paternal Uncle     Drug abuse Paternal Uncle        Past Social history  Social History     Socioeconomic History    Marital status: /Civil Union     Spouse name: Not on file    Number of children: Not on file    Years of education: Not on file    Highest education level: Not on file   Occupational History    Not on file   Tobacco Use    Smoking status: Former     Packs/day: 0.50     Years: 25.00     Total pack years: 12.50     Types: Cigarettes     Start date: 0     Quit date:      Years since quittin.9    Smokeless tobacco: Never   Vaping Use    Vaping Use: Former    Start date: 2019    Quit date: 2019    Substances: Nicotine, Flavoring   Substance and Sexual Activity    Alcohol use: Yes     Comment: I may drink once a month if that    Drug use: Never    Sexual activity: Not on file   Other Topics Concern    Not on file   Social History Narrative    Not on file     Social Determinants of Health     Financial Resource Strain: Not on file   Food Insecurity: Not on file   Transportation Needs: Not on file   Physical Activity: Not on file   Stress: Not on file   Social Connections: Not on file   Intimate Partner Violence: Not on file   Housing Stability: Not on file       Current Medications  Current Outpatient Medications   Medication Sig Dispense Refill    ACIDOPHILUS LACTOBACILLUS PO Take 1 tablet by mouth daily      Armodafinil 150 MG tablet Take 1 tablet (150 mg total) by mouth daily 30 tablet 3    doxepin (SINEquan) 10 mg/mL solution Take 0.3 mL (3 mg total) by mouth daily at bedtime 30 mL 0    escitalopram (Lexapro) 10 mg tablet Take 1 tablet (10 mg total) by mouth daily 90 tablet 1    Multiple Vitamins-Minerals (MULTIVITAMIN ADULTS PO) Take 1 tablet by mouth daily      tiotropium-olodaterol (Stiolto Respimat) 2.5-2.5 MCG/ACT inhaler Inhale 2 puffs daily 12 g 3     No current facility-administered medications for this visit. Allergies  Allergies   Allergen Reactions    Codeine Palpitations     Other reaction(s):  Other (See Comments)  Other reaction(s): Irregular heart rate  Rash,vomiting, heart palpitations    Latex Rash    Penicillin V Rash and Vomiting       OBJECTIVE    Vitals   Vitals:    23 0830   BP: 122/70 BP Location: Left arm   Patient Position: Sitting   Cuff Size: Large   Pulse: (!) 108   Resp: 16   SpO2: 98%   Weight: 71.6 kg (157 lb 12.8 oz)   Height: 5' 3" (1.6 m)       PVR:    Physical Exam  Constitutional:       General: She is not in acute distress. Appearance: Normal appearance. She is normal weight. She is not ill-appearing or toxic-appearing. HENT:      Head: Normocephalic and atraumatic. Eyes:      Conjunctiva/sclera: Conjunctivae normal.   Cardiovascular:      Rate and Rhythm: Normal rate. Pulmonary:      Effort: Pulmonary effort is normal. No respiratory distress. Skin:     General: Skin is warm and dry. Neurological:      General: No focal deficit present. Mental Status: She is alert and oriented to person, place, and time. Cranial Nerves: No cranial nerve deficit. Psychiatric:         Mood and Affect: Mood normal.         Behavior: Behavior normal.         Thought Content: Thought content normal.          Labs:     Lab Results   Component Value Date    CREATININE 0.84 11/29/2023      Lab Results   Component Value Date    HGBA1C 5.3 03/29/2023     Lab Results   Component Value Date    CALCIUM 10.0 11/29/2023    K 3.9 11/29/2023    CO2 27 11/29/2023     11/29/2023    BUN 22 11/29/2023    CREATININE 0.84 11/29/2023       I have personally reviewed all pertinent lab results and reviewed with patient    Imaging   Narrative & Impression   CT ABDOMEN AND PELVIS WITHOUT IV CONTRAST     INDICATION:   R31.9: Hematuria, unspecified. COMPARISON:  None. TECHNIQUE:  CT examination of the abdomen and pelvis was performed without intravenous contrast. Multiplanar 2D reformatted images were created from the source data. This examination, like all CT scans performed in the [facility], was performed utilizing techniques to minimize radiation dose exposure, including the use of iterative reconstruction and automated exposure control.  Radiation dose length product (DLP) for this visit:  485 mGy-cm     Enteric contrast was not administered. FINDINGS:     ABDOMEN     LOWER CHEST: There is a 3 mm pleural-based right lower lobe pulmonary nodule. Please refer to the report of the CT chest dated August 23, 2023 for additional details. LIVER/BILIARY TREE:  Unremarkable. GALLBLADDER:  No calcified gallstones. No pericholecystic inflammatory change. SPLEEN:  Unremarkable. PANCREAS:  Unremarkable. ADRENAL GLANDS:  Unremarkable. KIDNEYS/URETERS: There is a 5 mm calculus at the distal ureter, just prior to the UVJ causing no significant upstream hydroureteronephrosis. There are left renal calculi measuring up to 4 mm. There is a 1 mm nonobstructing right renal lower pole   calculus. STOMACH AND BOWEL: There is colonic diverticulosis without evidence of acute diverticulitis. APPENDIX: There are expected postoperative changes of appendectomy. ABDOMINOPELVIC CAVITY:  No ascites. No pneumoperitoneum. No lymphadenopathy. VESSELS:  Unremarkable for patient's age. PELVIS     REPRODUCTIVE ORGANS: Surgical changes of prior hysterectomy. URINARY BLADDER:  Unremarkable. ABDOMINAL WALL/INGUINAL REGIONS:  Unremarkable. OSSEOUS STRUCTURES:  No acute fracture or destructive osseous lesion. Postoperative changes of posterior fusion are present at L5/S1. IMPRESSION:     5 mm calculus at the level of the distal left ureter causing no significant hydroureteronephrosis. No prior studies are available for comparison. Bilateral subcentimeter nonobstructing intrarenal calculi. Workstation performed: [id number]          Nayla Sanchez PA-C  Date: 12/11/2023 Time: 8:51 AM  [facility]    This note was written using [employer] dictation software. Please excuse any resulting minor grammatical errors.

## 2023-12-12 ENCOUNTER — TELEPHONE (OUTPATIENT)
Dept: UROLOGY | Facility: MEDICAL CENTER | Age: 48
End: 2023-12-12

## 2023-12-12 NOTE — TELEPHONE ENCOUNTER
I text Dr. Roel Barone to see if case can be added with him next week (12/19/23 at [facility]) pending Urine C&S results

## 2023-12-13 ENCOUNTER — APPOINTMENT (OUTPATIENT)
Dept: LAB | Facility: IMAGING CENTER | Age: 48
End: 2023-12-13
Payer: COMMERCIAL

## 2023-12-13 ENCOUNTER — ANESTHESIA EVENT (OUTPATIENT)
Dept: PERIOP | Facility: HOSPITAL | Age: 48
End: 2023-12-13
Payer: COMMERCIAL

## 2023-12-13 DIAGNOSIS — N20.1 URETERAL STONE: ICD-10-CM

## 2023-12-13 PROCEDURE — 87086 URINE CULTURE/COLONY COUNT: CPT

## 2023-12-13 RX ORDER — IBUPROFEN 200 MG
400 TABLET ORAL EVERY 6 HOURS PRN
COMMUNITY

## 2023-12-13 NOTE — TELEPHONE ENCOUNTER
I spoke to the patient and scheduled her procedure for 12/15/2023 at [facility] with Dr. Domo Cotto.    -instructions given verbally   -patient aware to be NPO, needs a  -Urine C&S today

## 2023-12-13 NOTE — PRE-PROCEDURE INSTRUCTIONS
Pre-Surgery Instructions:   Medication Instructions    ACIDOPHILUS LACTOBACILLUS PO Pt instructed to stop for sx    Armodafinil 150 MG tablet Hold day of surgery. escitalopram (Lexapro) 10 mg tablet Take day of surgery. ibuprofen (MOTRIN) 200 mg tablet Pt instructed to stop for sx- pt had last dose 12/12/23    Multiple Vitamins-Minerals (MULTIVITAMIN ADULTS PO) Instructed to stop for sx    tamsulosin (FLOMAX) 0.4 mg Take night before surgery    tiotropium-olodaterol (Stiolto Respimat) 2.5-2.5 MCG/ACT inhaler Take day of surgery. Medication instructions for day surgery reviewed. Please use only a sip of water to take your instructed medications. Avoid all over the counter vitamins, supplements and NSAIDS for one week prior to surgery per anesthesia guidelines. Tylenol is ok to take as needed. You will receive a call one business day prior to surgery with an arrival time and hospital directions. If your surgery is scheduled on a Monday, the hospital will be calling you on the Friday prior to your surgery. If you have not heard from anyone by 8pm, please call the hospital supervisor through the hospital  at [phone]. Ale Lopez [phone]). Do not eat or drink anything after midnight the night before your surgery, including candy, mints, lifesavers, or chewing gum. Do not drink alcohol 24hrs before your surgery. Try not to smoke at least 24hrs before your surgery. Follow the pre surgery showering instructions as listed in the [facility] Surgical Experience Booklet” or otherwise provided by your surgeon's office. Do not use a blade to shave the surgical area 1 week before surgery. It is okay to use a clean electric clippers up to 24 hours before surgery. Do not apply any lotions, creams, including makeup, cologne, deodorant, or perfumes after showering on the day of your surgery. Do not use dry shampoo, hair spray, hair gel, or any type of hair products.      No contact lenses, eye make-up, or artificial eyelashes. Remove nail polish, including gel polish, and any artificial, gel, or acrylic nails if possible. Remove all jewelry including rings and body piercing jewelry. Wear causal clothing that is easy to take on and off. Consider your type of surgery. Keep any valuables, jewelry, piercings at home. Please bring any specially ordered equipment (sling, braces) if indicated. Arrange for a responsible person to drive you to and from the hospital on the day of your surgery. Visitor Guidelines discussed. Call the surgeon's office with any new illnesses, exposures, or additional questions prior to surgery. Please reference your [facility] Surgical Experience Booklet” for additional information to prepare for your upcoming surgery.

## 2023-12-13 NOTE — DISCHARGE INSTRUCTIONS
Ureteral Stent Placement   WHAT YOU NEED TO KNOW:   Ureteral stent placement is a procedure to open a blocked or narrow ureter. The ureter is the tube that carries urine from your kidney into your bladder. A stent is a thin hollow plastic tube used to hold your ureter open and allow urine to flow. The stent may stay in for several weeks. Long-term stents will stay in longer and need to be replaced within a certain period of time. DISCHARGE INSTRUCTIONS:   Seek care immediately if:   You urinate very little or not at all. You have severe pain in your abdomen, even after you take medicine. You have heavy bleeding from your urethra. You see large blood clots in your urine, or your urine is bright red. Contact your healthcare provider or urologist if:   You have a fever or chills. You feel like you need to urinate very often. Your symptoms get worse, or you develop new symptoms. You have questions or concerns about your condition or care. Medicines: You may  need any of the following:  Acetaminophen  decreases pain and fever. It is available without a doctor's order. Ask how much to take and how often to take it. Follow directions. Read the labels of all other medicines you are using to see if they also contain acetaminophen, or ask your doctor or pharmacist. Acetaminophen can cause liver damage if not taken correctly. Prescription pain medicine  may be given. Ask your healthcare provider how to take this medicine safely. Some prescription pain medicines contain acetaminophen. Do not take other medicines that contain acetaminophen without talking to your healthcare provider. Too much acetaminophen may cause liver damage. Prescription pain medicine may cause constipation. Ask your healthcare provider how to prevent or treat constipation. Antibiotics  help prevent or treat bacterial infections. Your healthcare provider may prescribe these for you while you have a ureteral stent.      Take your medicine as directed. Contact your healthcare provider if you think your medicine is not helping or if you have side effects. Tell your provider if you are allergic to any medicine. Keep a list of the medicines, vitamins, and herbs you take. Include the amounts, and when and why you take them. Bring the list or the pill bottles to follow-up visits. Carry your medicine list with you in case of an emergency. Self-care:   Drink liquids as directed. Liquids will help flush your urinary tract and prevent infection. Ask your healthcare provider how much liquid to drink each day and which liquids are best for you. Ask when you can return to daily activities. You may be able to return to normal activities the day after your stent placement. Follow up with your urologist as directed: You will need regular follow-up visits with your urologist as long as you have a stent. He or she will check to make sure the stent is working properly. He or she will remove your temporary stent in several weeks. Your provider may do urine cultures to check for infection. Write down your questions so you remember to ask them during your visits. © Copyright Jah Connolly 2023 Information is for End User's use only and may not be sold, redistributed or otherwise used for commercial purposes. The above information is an  only. It is not intended as medical advice for individual conditions or treatments. Talk to your doctor, nurse or pharmacist before following any medical regimen to see if it is safe and effective for you.

## 2023-12-14 LAB — BACTERIA UR CULT: NORMAL

## 2023-12-15 ENCOUNTER — APPOINTMENT (OUTPATIENT)
Dept: RADIOLOGY | Facility: HOSPITAL | Age: 48
End: 2023-12-15
Payer: COMMERCIAL

## 2023-12-15 ENCOUNTER — ANESTHESIA (OUTPATIENT)
Dept: PERIOP | Facility: HOSPITAL | Age: 48
End: 2023-12-15
Payer: COMMERCIAL

## 2023-12-15 ENCOUNTER — HOSPITAL ENCOUNTER (OUTPATIENT)
Facility: HOSPITAL | Age: 48
Setting detail: OUTPATIENT SURGERY
Discharge: HOME | End: 2023-12-15
Attending: UROLOGY | Admitting: UROLOGY
Payer: COMMERCIAL

## 2023-12-15 VITALS
SYSTOLIC BLOOD PRESSURE: 118 MMHG | WEIGHT: 157.85 LBS | BODY MASS INDEX: 27.97 KG/M2 | OXYGEN SATURATION: 94 % | HEIGHT: 63 IN | TEMPERATURE: 97.7 F | RESPIRATION RATE: 16 BRPM | DIASTOLIC BLOOD PRESSURE: 75 MMHG | HEART RATE: 66 BPM

## 2023-12-15 DIAGNOSIS — N20.1 URETERAL STONE: Primary | ICD-10-CM

## 2023-12-15 PROCEDURE — C1769 GUIDE WIRE: HCPCS | Performed by: UROLOGY

## 2023-12-15 PROCEDURE — 74420 UROGRAPHY RTRGR +-KUB: CPT

## 2023-12-15 PROCEDURE — C1747 URETEROSCOPE DIGITAL FLEX SNGL USE RVS DEFLECTION APTRA: HCPCS | Performed by: UROLOGY

## 2023-12-15 PROCEDURE — C2617 STENT, NON-COR, TEM W/O DEL: HCPCS | Performed by: UROLOGY

## 2023-12-15 PROCEDURE — 52356 CYSTO/URETERO W/LITHOTRIPSY: CPT | Performed by: UROLOGY

## 2023-12-15 PROCEDURE — 82360 CALCULUS ASSAY QUANT: CPT | Performed by: UROLOGY

## 2023-12-15 DEVICE — STENT CONTOUR INJ 6FR 30CM: Type: IMPLANTABLE DEVICE | Site: URETER | Status: FUNCTIONAL

## 2023-12-15 RX ORDER — OXYBUTYNIN CHLORIDE 10 MG/1
10 TABLET, EXTENDED RELEASE ORAL
Qty: 7 TABLET | Refills: 0 | Status: SHIPPED | OUTPATIENT
Start: 2023-12-16 | End: 2023-12-27

## 2023-12-15 RX ORDER — SULFAMETHOXAZOLE AND TRIMETHOPRIM 800; 160 MG/1; MG/1
1 TABLET ORAL DAILY
Qty: 7 TABLET | Refills: 0 | Status: SHIPPED | OUTPATIENT
Start: 2023-12-15 | End: 2023-12-22

## 2023-12-15 RX ORDER — PHENAZOPYRIDINE HYDROCHLORIDE 100 MG/1
100 TABLET, FILM COATED ORAL ONCE
Status: DISCONTINUED | OUTPATIENT
Start: 2023-12-15 | End: 2023-12-15

## 2023-12-15 RX ORDER — ONDANSETRON 2 MG/ML
4 INJECTION INTRAMUSCULAR; INTRAVENOUS ONCE AS NEEDED
Status: DISCONTINUED | OUTPATIENT
Start: 2023-12-15 | End: 2023-12-15 | Stop reason: HOSPADM

## 2023-12-15 RX ORDER — PROPOFOL 10 MG/ML
INJECTION, EMULSION INTRAVENOUS AS NEEDED
Status: DISCONTINUED | OUTPATIENT
Start: 2023-12-15 | End: 2023-12-15

## 2023-12-15 RX ORDER — ONDANSETRON 2 MG/ML
INJECTION INTRAMUSCULAR; INTRAVENOUS AS NEEDED
Status: DISCONTINUED | OUTPATIENT
Start: 2023-12-15 | End: 2023-12-15

## 2023-12-15 RX ORDER — HYDROCODONE BITARTRATE AND ACETAMINOPHEN 5; 325 MG/1; MG/1
1 TABLET ORAL EVERY 6 HOURS PRN
Status: DISCONTINUED | OUTPATIENT
Start: 2023-12-15 | End: 2023-12-15 | Stop reason: HOSPADM

## 2023-12-15 RX ORDER — OXYBUTYNIN CHLORIDE 5 MG/1
10 TABLET, EXTENDED RELEASE ORAL ONCE
Status: CANCELLED | OUTPATIENT
Start: 2023-12-15

## 2023-12-15 RX ORDER — MAGNESIUM HYDROXIDE 1200 MG/15ML
LIQUID ORAL AS NEEDED
Status: DISCONTINUED | OUTPATIENT
Start: 2023-12-15 | End: 2023-12-15 | Stop reason: HOSPADM

## 2023-12-15 RX ORDER — HEPARIN SODIUM 5000 [USP'U]/ML
5000 INJECTION, SOLUTION INTRAVENOUS; SUBCUTANEOUS ONCE
Status: COMPLETED | OUTPATIENT
Start: 2023-12-15 | End: 2023-12-15

## 2023-12-15 RX ORDER — DIPHENHYDRAMINE HYDROCHLORIDE 50 MG/ML
25 INJECTION INTRAMUSCULAR; INTRAVENOUS ONCE AS NEEDED
Status: COMPLETED | OUTPATIENT
Start: 2023-12-15 | End: 2023-12-15

## 2023-12-15 RX ORDER — OXYBUTYNIN CHLORIDE 5 MG/1
5 TABLET ORAL ONCE
Status: COMPLETED | OUTPATIENT
Start: 2023-12-15 | End: 2023-12-15

## 2023-12-15 RX ORDER — FENTANYL CITRATE 50 UG/ML
INJECTION, SOLUTION INTRAMUSCULAR; INTRAVENOUS AS NEEDED
Status: DISCONTINUED | OUTPATIENT
Start: 2023-12-15 | End: 2023-12-15

## 2023-12-15 RX ORDER — CIPROFLOXACIN 2 MG/ML
400 INJECTION, SOLUTION INTRAVENOUS ONCE
Status: COMPLETED | OUTPATIENT
Start: 2023-12-15 | End: 2023-12-15

## 2023-12-15 RX ORDER — FENTANYL CITRATE/PF 50 MCG/ML
50 SYRINGE (ML) INJECTION
Status: DISCONTINUED | OUTPATIENT
Start: 2023-12-15 | End: 2023-12-15 | Stop reason: HOSPADM

## 2023-12-15 RX ORDER — PHENAZOPYRIDINE HYDROCHLORIDE 200 MG/1
200 TABLET, FILM COATED ORAL ONCE
Status: COMPLETED | OUTPATIENT
Start: 2023-12-15 | End: 2023-12-15

## 2023-12-15 RX ORDER — MIDAZOLAM HYDROCHLORIDE 2 MG/2ML
INJECTION, SOLUTION INTRAMUSCULAR; INTRAVENOUS AS NEEDED
Status: DISCONTINUED | OUTPATIENT
Start: 2023-12-15 | End: 2023-12-15

## 2023-12-15 RX ORDER — PROMETHAZINE HYDROCHLORIDE 25 MG/ML
6.25 INJECTION, SOLUTION INTRAMUSCULAR; INTRAVENOUS ONCE AS NEEDED
Status: DISCONTINUED | OUTPATIENT
Start: 2023-12-15 | End: 2023-12-15 | Stop reason: HOSPADM

## 2023-12-15 RX ORDER — LIDOCAINE HYDROCHLORIDE 20 MG/ML
INJECTION, SOLUTION EPIDURAL; INFILTRATION; INTRACAUDAL; PERINEURAL AS NEEDED
Status: DISCONTINUED | OUTPATIENT
Start: 2023-12-15 | End: 2023-12-15

## 2023-12-15 RX ORDER — OXYBUTYNIN CHLORIDE 5 MG/1
5 TABLET ORAL 3 TIMES DAILY
Qty: 30 TABLET | Refills: 0 | Status: SHIPPED | OUTPATIENT
Start: 2023-12-15 | End: 2023-12-27

## 2023-12-15 RX ORDER — OXYCODONE HYDROCHLORIDE AND ACETAMINOPHEN 5; 325 MG/1; MG/1
1 TABLET ORAL EVERY 8 HOURS PRN
Qty: 12 TABLET | Refills: 0 | Status: SHIPPED | OUTPATIENT
Start: 2023-12-15 | End: 2023-12-25

## 2023-12-15 RX ORDER — MEPERIDINE HYDROCHLORIDE 25 MG/ML
12.5 INJECTION INTRAMUSCULAR; INTRAVENOUS; SUBCUTANEOUS ONCE AS NEEDED
Status: DISCONTINUED | OUTPATIENT
Start: 2023-12-15 | End: 2023-12-15 | Stop reason: HOSPADM

## 2023-12-15 RX ORDER — HYDROMORPHONE HCL/PF 1 MG/ML
0.5 SYRINGE (ML) INJECTION
Status: DISCONTINUED | OUTPATIENT
Start: 2023-12-15 | End: 2023-12-15 | Stop reason: HOSPADM

## 2023-12-15 RX ORDER — PHENAZOPYRIDINE HYDROCHLORIDE 200 MG/1
200 TABLET, FILM COATED ORAL
Qty: 10 TABLET | Refills: 0 | Status: SHIPPED | OUTPATIENT
Start: 2023-12-15 | End: 2023-12-27

## 2023-12-15 RX ORDER — SODIUM CHLORIDE 9 MG/ML
125 INJECTION, SOLUTION INTRAVENOUS CONTINUOUS
Status: DISCONTINUED | OUTPATIENT
Start: 2023-12-15 | End: 2023-12-15

## 2023-12-15 RX ADMIN — ONDANSETRON 4 MG: 2 INJECTION INTRAMUSCULAR; INTRAVENOUS at 10:49

## 2023-12-15 RX ADMIN — SODIUM CHLORIDE 150 MG: 0.9 INJECTION, SOLUTION INTRAVENOUS at 08:56

## 2023-12-15 RX ADMIN — GENTAMICIN SULFATE 144 MG: 40 INJECTION, SOLUTION INTRAMUSCULAR; INTRAVENOUS at 10:49

## 2023-12-15 RX ADMIN — SODIUM CHLORIDE 125 ML/HR: 0.9 INJECTION, SOLUTION INTRAVENOUS at 08:16

## 2023-12-15 RX ADMIN — DIPHENHYDRAMINE HYDROCHLORIDE 25 MG: 50 INJECTION, SOLUTION INTRAMUSCULAR; INTRAVENOUS at 08:23

## 2023-12-15 RX ADMIN — CIPROFLOXACIN: 2 INJECTION, SOLUTION INTRAVENOUS at 10:12

## 2023-12-15 RX ADMIN — PHENAZOPYRIDINE 200 MG: 200 TABLET ORAL at 13:43

## 2023-12-15 RX ADMIN — PROPOFOL 200 MG: 10 INJECTION, EMULSION INTRAVENOUS at 10:22

## 2023-12-15 RX ADMIN — SODIUM CHLORIDE 125 ML/HR: 0.9 INJECTION, SOLUTION INTRAVENOUS at 11:41

## 2023-12-15 RX ADMIN — FENTANYL CITRATE 50 MCG: 50 INJECTION INTRAMUSCULAR; INTRAVENOUS at 11:28

## 2023-12-15 RX ADMIN — OXYBUTYNIN CHLORIDE 5 MG: 5 TABLET ORAL at 13:43

## 2023-12-15 RX ADMIN — FENTANYL CITRATE 50 MCG: 50 INJECTION, SOLUTION INTRAMUSCULAR; INTRAVENOUS at 11:43

## 2023-12-15 RX ADMIN — FENTANYL CITRATE 50 MCG: 50 INJECTION, SOLUTION INTRAMUSCULAR; INTRAVENOUS at 11:55

## 2023-12-15 RX ADMIN — MIDAZOLAM 2 MG: 1 INJECTION INTRAMUSCULAR; INTRAVENOUS at 10:15

## 2023-12-15 RX ADMIN — HYDROCODONE BITARTRATE AND ACETAMINOPHEN 1 TABLET: 5; 325 TABLET ORAL at 14:00

## 2023-12-15 RX ADMIN — LIDOCAINE HYDROCHLORIDE 80 MG: 20 INJECTION, SOLUTION EPIDURAL; INFILTRATION; INTRACAUDAL at 10:22

## 2023-12-15 RX ADMIN — FENTANYL CITRATE 25 MCG: 50 INJECTION INTRAMUSCULAR; INTRAVENOUS at 11:18

## 2023-12-15 RX ADMIN — HYDROMORPHONE HYDROCHLORIDE 0.5 MG: 1 INJECTION, SOLUTION INTRAMUSCULAR; INTRAVENOUS; SUBCUTANEOUS at 12:17

## 2023-12-15 RX ADMIN — FENTANYL CITRATE 25 MCG: 50 INJECTION INTRAMUSCULAR; INTRAVENOUS at 10:34

## 2023-12-15 RX ADMIN — HEPARIN SODIUM 5000 UNITS: 5000 INJECTION INTRAVENOUS; SUBCUTANEOUS at 08:12

## 2023-12-15 NOTE — ANESTHESIA PREPROCEDURE EVALUATION
Procedure:  CYSTO USCOPE W/ LASER, & STENT (Left: Bladder)    Relevant Problems   ANESTHESIA   (+) PONV (postoperative nausea and vomiting)      CARDIO   (+) Chronic migraine without aura   (+) Other hyperlipidemia      GI/HEPATIC   (+) Gastroesophageal reflux disease without esophagitis      /RENAL   (+) Kidney stone on left side      MUSCULOSKELETAL   (+) DDD (degenerative disc disease), lumbar      NEURO/PSYCH   (+) Anxiety   (+) Chronic migraine without aura      PULMONARY   (+) Stage 1 mild COPD by GOLD classification (HCC)        Physical Exam    Airway    Mallampati score: I         Dental       Cardiovascular  Rhythm: regular, Rate: normal    Pulmonary   Breath sounds clear to auscultation    Other Findings  post-pubertal.      Anesthesia Plan  ASA Score- 2     Anesthesia Type- general with ASA Monitors. Additional Monitors:     Airway Plan: LMA. Comment: Emend pre op . Plan Factors-    Chart reviewed. Existing labs reviewed. Patient summary reviewed. Patient is not a current smoker. Induction- intravenous. Postoperative Plan-     Informed Consent- Anesthetic plan and risks discussed with patient.

## 2023-12-15 NOTE — ANESTHESIA POSTPROCEDURE EVALUATION
Post-Op Assessment Note    CV Status:  Stable    Pain management: adequate       Mental Status:  Alert and awake   Hydration Status:  Euvolemic   PONV Controlled:  Controlled   Airway Patency:  Patent     Post Op Vitals Reviewed: Yes      Staff: Anesthesiologist           /71   Pulse (!) 54   Temp 97.7 °F (36.5 °C)   Resp 17   Ht 5' 3" (1.6 m)   Wt 71.6 kg (157 lb 13.6 oz)   SpO2 99%   BMI 27.96 kg/m²      BP      Temp      Pulse     Resp      SpO2

## 2023-12-15 NOTE — OP NOTE
OPERATIVE REPORT  PATIENT NAME: Sonny Pereyra    :  1975  MRN: [id number]  Pt Location: [facility] OR ROOM 01    SURGERY DATE: 12/15/2023    Surgeons and Role:     * Jennifer Brito MD - Primary    Preop Diagnosis:  Ureteral stone [N20.1]-left  Renal stones    Post-Op Diagnosis Codes:     * Ureteral stone [N20.1]-left  Renal stones    Procedure(s):  Left - CYSTO USCOPE W/ LASER. & STENT  Cystoscopy left ureteropyeloscopic holmium laser lithotripsy left retrograde pyelogram left ureteroscopic stone extraction and stent insertion  Specimen(s):  Fragment left renal stone    Estimated Blood Loss:   Minimal    Drains:  6 Congolese Contour left ureteral stent draining left renal pelvis via the left ureter into the urinary bladder with a transurethral Dangler suture taped to the mons pubis    Anesthesia Type:   General-LMA    Operative Indications:  Ureteral stone [N20.1]-left  Left renal stones    Operative Findings:  See operative note below    Complications:   None    Procedure and Technique:  I met the patient in the holding area and reviewed her most recent CT imaging of her kidneys with her demonstrated the distal left ureteral stone as well as calcifications in the left renal unit. Patient I discussed the procedure as proposed step-by-step I performed history and physical discussed potential risks and complications of bleeding infection retained stones or stone fragments need for stent potential urinary retention potential need for Gannon catheter potential perforation contracture stricture as well as potential need for additional operative interventions. Patient expressed understanding and provided verbal and signed informed consent. The patient was taken to the operating room identified by the surgeon prepped and draped in usual sterile fashion in the dorsolithotomy position after general LMA anesthesia was induced and appropriate timeout took place.   Using a 25 Congolese cystoscope with 30 and 70 degree lenses cystourethroscopy revealed a normal bladder and urethra without stricture or lesion intrinsic mass lesion or extrinsic mass compression. Both ureteral orifices left and right were in normal position and configuration bilaterally with clear reflux bilaterally. Fluoroscopy overlying the left renal shadow as well as the area of the left ureter took place identifying radiopacity in the lower pole of the left kidney as outlined on previous CT. No other calcifications were noted. At this point a 0.035 Jamey-coated floppy tip guidewire was inserted retrograde through the cystoscope up the left ureter under fluoroscopic control into the renal pelvis. The cystoscope was then removed and a semirigid short Reyna Sheron ureteroscope placed per urethra into the urinary bladder and retrograde up the left ureter. Entire length of the ureter from UVJ to UPJ was identified and there is absolutely no stone within the ureter itself. At this point a 0.038 floppy tip guidewire was inserted through the ureteroscope into the renal pelvis as a second or working wire. The semirigid ureteroscope was removed and the patient and a flexible ureteroscope was passed over the wire into the renal pelvis. Unfortunately the nondisposable ureteroscope had very poor image quality and this ureteroscope was removed in favor of a disposable ureteroscope. The flexible disposable ureteroscope was then placed over the wire (working wire) and advanced under fluoroscopic control into the renal pelvis with the wire being removed. Examination of all of the calyces infundibula and renal pelvis took place identifying a very small stone in the upper pole calyx. Using a holmium laser fiber 365 µm this stone was fragmented into submillimeter particles quite easily.   Examination of the remainder of the calyces failed to reveal any residual stone except for a 0.5 mm stone in the lower pole calyx which was identified and also fragmented to submillimeter particles using the holmium laser. The [employer] basket was used to basket the larger of the remaining fragments which was removed atraumatically from the patient and sent to the lab for specimen. With the flexible ureteroscope out of the patient and the only thing remaining was the 0.038 floppy tip safety wire this wire was used to introduce a 6 [place] contour double-J stent with 3 curls seen in the renal pelvis and 1 curl after internalization in the bladder. Contrast injection identified good positioning without extravasation. All equipment other than the stent was removed from the patient and a transurethral Dangler suture was allowed to be taped to the mons pubis for future stent extraction in 1 week. The patient was extubated transferred to the PACU in good condition. She was later discharged that same day. She will return in 1 week for stent removal and then in 3 months to urologic provider prior to which time renal ultrasound stone risk profile chemistry profile PTH and uric acid level will be obtained. There were no complications. I was present for the entire procedure. and I was present for all critical portions of the procedure.     Patient Disposition:  PACU         SIGNATURE: Argelia Wells MD  DATE: December 15, 2023  TIME: 11:22 AM

## 2023-12-15 NOTE — INTERVAL H&P NOTE
H&P reviewed. After examining the patient I find no changes in the patients condition since the H&P had been written. I spoke to the patient in the holding area and described the proposed procedure step-by-step demonstrated CT films showing the distal left ureteral calculus. I discussed potential risks and complications postoperative care expectations with ureteral stent. I answered all patient questions performed history and physical and the patient gave verbal and signed informed consent understanding risks of bleeding infection perforation stricture contracture retained stone or stone fragments need for ureteral stent possible need for additional interventions or operations possibility of urinary retention or need for a Gannon catheter.   The patient expressed understanding and provided verbal and signed informed consent  Vitals:    12/15/23 0752   BP: 118/79   Pulse: 70   Resp: 16   Temp: 98.4 °F (36.9 °C)   SpO2: 96%

## 2023-12-15 NOTE — INTERVAL H&P NOTE
H&P reviewed. After examining the patient I find no changes in the patients condition since the H&P had been written.     Vitals:    12/15/23 0752   BP: 118/79   Pulse: 70   Resp: 16   Temp: 98.4 °F (36.9 °C)   SpO2: 96%

## 2023-12-18 ENCOUNTER — OFFICE VISIT (OUTPATIENT)
Dept: UROLOGY | Facility: CLINIC | Age: 48
End: 2023-12-18
Payer: COMMERCIAL

## 2023-12-18 ENCOUNTER — TELEPHONE (OUTPATIENT)
Dept: UROLOGY | Facility: CLINIC | Age: 48
End: 2023-12-18

## 2023-12-18 ENCOUNTER — NURSE TRIAGE (OUTPATIENT)
Age: 48
End: 2023-12-18

## 2023-12-18 ENCOUNTER — TELEPHONE (OUTPATIENT)
Dept: UROLOGY | Facility: MEDICAL CENTER | Age: 48
End: 2023-12-18

## 2023-12-18 VITALS
WEIGHT: 157.4 LBS | SYSTOLIC BLOOD PRESSURE: 112 MMHG | DIASTOLIC BLOOD PRESSURE: 76 MMHG | HEIGHT: 63 IN | HEART RATE: 92 BPM | BODY MASS INDEX: 27.89 KG/M2 | OXYGEN SATURATION: 98 %

## 2023-12-18 DIAGNOSIS — N20.1 URETERAL STONE: ICD-10-CM

## 2023-12-18 DIAGNOSIS — R31.9 HEMATURIA, UNSPECIFIED TYPE: Primary | ICD-10-CM

## 2023-12-18 DIAGNOSIS — R11.0 NAUSEA: ICD-10-CM

## 2023-12-18 LAB
SL AMB  POCT GLUCOSE, UA: NORMAL
SL AMB LEUKOCYTE ESTERASE,UA: NORMAL
SL AMB POCT BILIRUBIN,UA: NORMAL
SL AMB POCT BLOOD,UA: NORMAL
SL AMB POCT CLARITY,UA: NORMAL
SL AMB POCT COLOR,UA: NORMAL
SL AMB POCT KETONES,UA: NORMAL
SL AMB POCT NITRITE,UA: NORMAL
SL AMB POCT PH,UA: 7.5
SL AMB POCT SPECIFIC GRAVITY,UA: 1.02
SL AMB POCT URINE PROTEIN: NORMAL
SL AMB POCT UROBILINOGEN: NORMAL

## 2023-12-18 PROCEDURE — 81002 URINALYSIS NONAUTO W/O SCOPE: CPT | Performed by: PHYSICIAN ASSISTANT

## 2023-12-18 PROCEDURE — 99213 OFFICE O/P EST LOW 20 MIN: CPT | Performed by: PHYSICIAN ASSISTANT

## 2023-12-18 RX ORDER — ONDANSETRON 4 MG/1
4 TABLET, ORALLY DISINTEGRATING ORAL EVERY 6 HOURS PRN
Qty: 12 TABLET | Refills: 0 | Status: SHIPPED | OUTPATIENT
Start: 2023-12-18

## 2023-12-18 NOTE — TELEPHONE ENCOUNTER
Patient had procedure Friday 12/15/23 Procedure: CYSTO USCOPE W/ LASER, & STENT (Left: Bladder) .  Started Saturday  w/pain, pressure pubic area to sternum and nausea. When she does urinate at times, she has small clots and hematuria. Patient does not feel like she is emptying bladder completely at all.  Denies fevers, but has had the chills, no thermometer available at home.  Denies vomiting.    Reviewed ED protocol. No appts available in [place] office. Patient willing to go to another office.    Appt made for 10:40am today in [facility].     Reason for Disposition   The patient has a post-op issue    Answer Questions - Initial Assessment   When did this start: Saturday     Do you have a fever:unsure , no thermometer     Do you have pain: Yes: Where is the pain located: pubic to sternum    Pain description: sharp pain , pressure      Pain level: 8    Do you have a catheter in place: No:     Do you have any blood clots: yes- small clots    Have you become unable to urinate: no     Do you have a drain in place and is it draining: no    Do you have any incisions: No    Have you had a bowel movement recently or since surgery: yes- but not over weekend . Took stool softener    Protocols used: Urology Post-Op Problem

## 2023-12-18 NOTE — PROGRESS NOTES
1. Hematuria, unspecified type  POCT urine dip      2. Nausea  ondansetron (ZOFRAN-ODT) 4 mg disintegrating tablet      3. Ureteral stone  US kidney and bladder              Assessment and plan:       Nephrolithiasis  Stent colic  - POD3 s/p left URS  - stent colic measures reviewed - tamsulosin, oxybutynin, pyridium/AZO urinary relief, hydration, heating pad. RX for zofran d/t some nausea.  - stent removal at home on Friday.   - f/u imaging thereafter       Sherri Toro PA-C      Chief Complaint     Suprapubic pain    History of Present Illness     Jenny Pereyra is a 48 y.o. female presenting today for an acute follow-up.    CT 11/29/2023 revealing a 5 mm distal left ureteral stone.  Status post cystoscopy, left ureteroscopy, laser lithotripsy, and left ureteral stent placement 12/15/2023 with Dr. Fleming.  Stent is left on string for removal this Friday, 12/22/2023.    Patient has been experiencing some urinary frequency and suprapubic pain that radiates above her umbilicus.  Having intermittent hematuria.  Some nausea without emesis.  Dry mouth secondary to oxybutynin.  Urinary frequency with small volumes of urine.    Laboratory     Lab Results   Component Value Date    CREATININE 0.84 11/29/2023     Review of Systems     Review of Systems   Constitutional:  Negative for activity change, appetite change, chills, diaphoresis, fatigue, fever and unexpected weight change.   Respiratory:  Negative for chest tightness and shortness of breath.    Cardiovascular:  Negative for chest pain, palpitations and leg swelling.   Gastrointestinal:  Positive for abdominal pain and nausea. Negative for abdominal distention, constipation, diarrhea and vomiting.   Genitourinary:  Positive for frequency and hematuria. Negative for decreased urine volume, difficulty urinating, dysuria, enuresis, flank pain, genital sores and urgency.   Musculoskeletal:  Negative for back pain, gait problem and myalgias.   Skin:   "Negative for color change, pallor, rash and wound.   Psychiatric/Behavioral:  Negative for behavioral problems. The patient is not nervous/anxious.        Allergies     Allergies   Allergen Reactions    Chlorhexidine Hives     Itching and red rash on body from CHG cloth    Codeine Palpitations     Other reaction(s): Other (See Comments)  Other reaction(s): Irregular heart rate  Rash,vomiting, heart palpitations    Latex Rash    Penicillin V Rash and Vomiting       Physical Exam     Physical Exam  Constitutional:       General: She is not in acute distress.     Appearance: Normal appearance. She is normal weight. She is not ill-appearing, toxic-appearing or diaphoretic.   HENT:      Head: Normocephalic and atraumatic.   Eyes:      General:         Right eye: No discharge.         Left eye: No discharge.      Conjunctiva/sclera: Conjunctivae normal.   Pulmonary:      Effort: Pulmonary effort is normal. No respiratory distress.   Genitourinary:     Comments: Urine specimen is dark red in color without clots  Musculoskeletal:         General: No swelling or tenderness. Normal range of motion.   Skin:     General: Skin is warm and dry.      Coloration: Skin is not jaundiced or pale.   Neurological:      General: No focal deficit present.      Mental Status: She is alert and oriented to person, place, and time.   Psychiatric:         Mood and Affect: Mood normal.         Behavior: Behavior normal.         Thought Content: Thought content normal.         Vital Signs     Vitals:    12/18/23 1025   BP: 112/76   BP Location: Left arm   Patient Position: Sitting   Cuff Size: Standard   Pulse: 92   SpO2: 98%   Weight: 71.4 kg (157 lb 6.4 oz)   Height: 5' 3\" (1.6 m)         Current Medications       Current Outpatient Medications:     ACIDOPHILUS LACTOBACILLUS PO, Take 1 tablet by mouth daily, Disp: , Rfl:     Armodafinil 150 MG tablet, Take 1 tablet (150 mg total) by mouth daily (Patient taking differently: Take 150 mg by mouth " daily), Disp: 30 tablet, Rfl: 3    escitalopram (Lexapro) 10 mg tablet, Take 1 tablet (10 mg total) by mouth daily, Disp: 90 tablet, Rfl: 1    ibuprofen (MOTRIN) 200 mg tablet, Take 400 mg by mouth every 6 (six) hours as needed for mild pain, Disp: , Rfl:     Multiple Vitamins-Minerals (MULTIVITAMIN ADULTS PO), Take 1 tablet by mouth daily, Disp: , Rfl:     oxybutynin (DITROPAN) 5 mg tablet, Take 1 tablet (5 mg total) by mouth 3 (three) times a day, Disp: 30 tablet, Rfl: 0    oxybutynin (DITROPAN-XL) 10 MG 24 hr tablet, Take 1 tablet (10 mg total) by mouth daily at bedtime Do not start before December 16, 2023., Disp: 7 tablet, Rfl: 0    oxyCODONE-acetaminophen (PERCOCET) 5-325 mg per tablet, Take 1 tablet by mouth every 8 (eight) hours as needed for severe pain or moderate pain for up to 10 days Max Daily Amount: 3 tablets, Disp: 12 tablet, Rfl: 0    phenazopyridine (PYRIDIUM) 200 mg tablet, Take 1 tablet (200 mg total) by mouth 3 (three) times a day with meals, Disp: 10 tablet, Rfl: 0    sulfamethoxazole-trimethoprim (BACTRIM DS) 800-160 mg per tablet, Take 1 tablet by mouth daily for 7 days, Disp: 7 tablet, Rfl: 0    tamsulosin (FLOMAX) 0.4 mg, Take 1 capsule (0.4 mg total) by mouth daily with dinner, Disp: 30 capsule, Rfl: 1    tiotropium-olodaterol (Stiolto Respimat) 2.5-2.5 MCG/ACT inhaler, Inhale 2 puffs daily, Disp: 12 g, Rfl: 3    doxepin (SINEquan) 10 mg/mL solution, Take 0.3 mL (3 mg total) by mouth daily at bedtime (Patient not taking: Reported on 12/13/2023), Disp: 30 mL, Rfl: 0      Active Problems     Patient Active Problem List   Diagnosis    Lung nodule    Umbilical hernia without obstruction and without gangrene    Kidney stone on left side    Essential tremor    Chest discomfort    Insomnia    Anxiety    Gastroesophageal reflux disease without esophagitis    PONV (postoperative nausea and vomiting)    B12 deficiency    Brain lipoma    Chronic interstitial cystitis    Chronic migraine without aura     Stage 1 mild COPD by GOLD classification ([facility])    DDD (degenerative disc disease), lumbar    Excessive daytime sleepiness    Hypersomnia    Other hyperlipidemia         Past Medical History     Past Medical History:   Diagnosis Date    Anxiety     COPD (chronic obstructive pulmonary disease) ([facility])     GERD (gastroesophageal reflux disease)     Kidney stone     Motion sickness     PONV (postoperative nausea and vomiting)          Surgical History     Past Surgical History:   Procedure Laterality Date    APPENDECTOMY      BACK SURGERY      L5 - S1    BLADDER SURGERY      COLONOSCOPY      HYSTERECTOMY      age 29 still has lt ovary    IR RADIOFREQUENCY ABLATION      esophagus    VT CYSTO/URETERO W/LITHOTRIPSY &INDWELL STENT INSRT Left 12/15/2023    Procedure: CYSTO USCOPE W/ LASER, & STENT;  Surgeon: Jhonatan Fleming MD;  Location: [facility];  Service: Urology    SPINE SURGERY      TOTAL VAGINAL HYSTERECTOMY      AGE 29    TUBAL LIGATION      UPPER GASTROINTESTINAL ENDOSCOPY           Family History     Family History   Problem Relation Age of Onset    Heart attack Mother     Heart disease Mother     Kidney failure Mother     Heart failure Mother     Diabetes Father     Alcohol abuse Father     Suicide Attempts Brother     Thyroid disease Son     Breast cancer Maternal Grandmother     Lung cancer Maternal Grandmother 75    Arthritis Maternal Grandmother     No Known Problems Maternal Grandfather     Diabetes Paternal Grandmother     Stroke Paternal Grandmother     No Known Problems Paternal Grandfather     Breast cancer Maternal Aunt 43    Bone cancer Maternal Aunt     No Known Problems Maternal Aunt     No Known Problems Paternal Aunt     Substance Abuse Paternal Uncle     Drug abuse Paternal Uncle          Social History     Social History       Radiology

## 2023-12-19 ENCOUNTER — NURSE TRIAGE (OUTPATIENT)
Age: 48
End: 2023-12-19

## 2023-12-19 NOTE — TELEPHONE ENCOUNTER
"Answer Assessment - Initial Assessment Questions  1. REASON FOR CALL or QUESTION: \"What is your reason for calling today?\" or \"How can I best help you?\" or \"What question do you have that I can help answer?\"      Pt called in and would like to get clarification on note for work if you could give pt a call. Thanks    Protocols used: Information Only Call - No Triage-ADULT-[place]    "

## 2023-12-20 DIAGNOSIS — Z00.6 ENCOUNTER FOR EXAMINATION FOR NORMAL COMPARISON OR CONTROL IN CLINICAL RESEARCH PROGRAM: ICD-10-CM

## 2023-12-22 ENCOUNTER — TELEPHONE (OUTPATIENT)
Dept: UROLOGY | Facility: MEDICAL CENTER | Age: 48
End: 2023-12-22

## 2023-12-22 DIAGNOSIS — N20.1 URETERAL STONE: Primary | ICD-10-CM

## 2023-12-22 LAB
CALCIUM OXALATE DIHYDRATE MFR STONE IR: 20 %
COLOR STONE: NORMAL
COM MFR STONE: 80 %
COMMENT-STONE3: NORMAL
COMPOSITION: NORMAL
LABORATORY COMMENT REPORT: NORMAL
PHOTO: NORMAL
SIZE STONE: NORMAL MM
SPEC SOURCE SUBJ: NORMAL
STONE ANALYSIS-IMP: NORMAL
WT STONE: 4 MG

## 2023-12-22 NOTE — TELEPHONE ENCOUNTER
Spoke to pt to verify she removed her stent as instructed.  She stated it was removed intact.  She is having some residual discomfort and was advised this was to be expected and that she can take OTC pain relievers or use  heating pad.  She is scheduled for 3 mo fu with Dr. Fleming on 4/10/24 with stone risk profile and labs prior. She is scheduled for renal US ordered by MATHEW on 12/18/23.  Instructions for stone risk profile will be mailed to pt's home address.  She knows she can  receptacle from the lab.  Pt aware to contact the office with any issues or concerns.

## 2023-12-23 ENCOUNTER — APPOINTMENT (OUTPATIENT)
Dept: LAB | Facility: IMAGING CENTER | Age: 48
End: 2023-12-23

## 2023-12-23 DIAGNOSIS — Z00.6 ENCOUNTER FOR EXAMINATION FOR NORMAL COMPARISON OR CONTROL IN CLINICAL RESEARCH PROGRAM: ICD-10-CM

## 2023-12-23 PROCEDURE — 36415 COLL VENOUS BLD VENIPUNCTURE: CPT

## 2023-12-24 DIAGNOSIS — F41.9 ANXIETY: ICD-10-CM

## 2023-12-26 RX ORDER — ESCITALOPRAM OXALATE 10 MG/1
10 TABLET ORAL DAILY
Qty: 90 TABLET | Refills: 1 | Status: SHIPPED | OUTPATIENT
Start: 2023-12-26

## 2023-12-27 ENCOUNTER — TELEPHONE (OUTPATIENT)
Dept: UROLOGY | Facility: MEDICAL CENTER | Age: 48
End: 2023-12-27

## 2023-12-27 ENCOUNTER — OFFICE VISIT (OUTPATIENT)
Dept: INTERNAL MEDICINE CLINIC | Facility: OTHER | Age: 48
End: 2023-12-27
Payer: COMMERCIAL

## 2023-12-27 VITALS
SYSTOLIC BLOOD PRESSURE: 118 MMHG | HEIGHT: 63 IN | WEIGHT: 157 LBS | OXYGEN SATURATION: 99 % | TEMPERATURE: 98 F | HEART RATE: 79 BPM | DIASTOLIC BLOOD PRESSURE: 80 MMHG | BODY MASS INDEX: 27.82 KG/M2

## 2023-12-27 DIAGNOSIS — G25.0 ESSENTIAL TREMOR: ICD-10-CM

## 2023-12-27 DIAGNOSIS — K21.9 GASTROESOPHAGEAL REFLUX DISEASE WITHOUT ESOPHAGITIS: ICD-10-CM

## 2023-12-27 DIAGNOSIS — G47.10 HYPERSOMNIA: ICD-10-CM

## 2023-12-27 DIAGNOSIS — F41.9 ANXIETY: ICD-10-CM

## 2023-12-27 DIAGNOSIS — E78.49 OTHER HYPERLIPIDEMIA: Primary | ICD-10-CM

## 2023-12-27 DIAGNOSIS — E55.9 VITAMIN D DEFICIENCY: ICD-10-CM

## 2023-12-27 DIAGNOSIS — E53.8 B12 DEFICIENCY: ICD-10-CM

## 2023-12-27 DIAGNOSIS — R91.1 LUNG NODULE: ICD-10-CM

## 2023-12-27 PROBLEM — R07.89 CHEST DISCOMFORT: Status: RESOLVED | Noted: 2022-08-24 | Resolved: 2023-12-27

## 2023-12-27 PROCEDURE — 99214 OFFICE O/P EST MOD 30 MIN: CPT | Performed by: NURSE PRACTITIONER

## 2023-12-27 RX ORDER — HYDROXYZINE HYDROCHLORIDE 25 MG/1
25 TABLET, FILM COATED ORAL EVERY 6 HOURS PRN
Qty: 30 TABLET | Refills: 0 | Status: SHIPPED | OUTPATIENT
Start: 2023-12-27 | End: 2023-12-27

## 2023-12-27 RX ORDER — FENOFIBRATE 145 MG/1
145 TABLET, COATED ORAL DAILY
Qty: 90 TABLET | Refills: 1 | Status: SHIPPED | OUTPATIENT
Start: 2023-12-27

## 2023-12-27 NOTE — ASSESSMENT & PLAN NOTE
Had previously seen Neurology on propanolol, had side effects with medication, currently not on medication.

## 2023-12-27 NOTE — ASSESSMENT & PLAN NOTE
You may use OTC tums as needed.  Recommend small frequent meals throughout the day.  Avoid aggravating foods - spicy foods, acidic foods - such as tomato and citrus.  Avoid alcohol.  Do not lay down for at least 30 mins after eating.

## 2023-12-27 NOTE — ASSESSMENT & PLAN NOTE
-start fenofibrate   Recommend healthy lifestyle choices for your cholesterol.  Low fat/low cholesterol diet.  Limit/avoid red meat.  Eat more lean meat - chicken breast, ground turkey, fish.  Exercise 30 mins at least 5 times a week as tolerated.

## 2023-12-27 NOTE — PROGRESS NOTES
Assessment/Plan:    Gastroesophageal reflux disease without esophagitis   You may use OTC tums as needed.  Recommend small frequent meals throughout the day.  Avoid aggravating foods - spicy foods, acidic foods - such as tomato and citrus.  Avoid alcohol.  Do not lay down for at least 30 mins after eating.         Lung nodule  Currently following with pulmonary    Essential tremor  Had previously seen Neurology on propanolol, had side effects with medication, currently not on medication.    Kidney stone on left side  -currently following urology     Other hyperlipidemia  -start fenofibrate   Recommend healthy lifestyle choices for your cholesterol.  Low fat/low cholesterol diet.  Limit/avoid red meat.  Eat more lean meat - chicken breast, ground turkey, fish.  Exercise 30 mins at least 5 times a week as tolerated.        Hypersomnia  -currently follows sleep medicine     B12 deficiency  Start OTC vitamin B12 1000mcg tablet daily     Anxiety  Well controlled on lexapro      Depression Screening and Follow-up Plan: Patient was screened for depression during today's encounter. They screened negative with a PHQ-2 score of 0.           Diagnoses and all orders for this visit:    Other hyperlipidemia  -     fenofibrate (TRICOR) 145 mg tablet; Take 1 tablet (145 mg total) by mouth daily  -     CBC and differential; Future  -     Comprehensive metabolic panel; Future  -     Lipid panel; Future    B12 deficiency  -     Vitamin B12; Future  -     Iron Panel (Includes Ferritin, Iron Sat%, Iron, and TIBC); Future    Vitamin D deficiency  -     Vitamin D 25 hydroxy; Future    Anxiety  -     Discontinue: hydrOXYzine HCL (ATARAX) 25 mg tablet; Take 1 tablet (25 mg total) by mouth every 6 (six) hours as needed for anxiety    Gastroesophageal reflux disease without esophagitis    Lung nodule    Essential tremor    Hypersomnia          Subjective:      Patient ID: Jenny Pereyra is a 48 y.o. female.    Patient presents today to  follow up on blood work.    Insomnia/Fatigue- had previous home sleep studies, but she did have a CPAP machine however it is a fields that she which is recalled and patient did not get a replaced CPAP machine, but she had 2 at home studies however due to worsening insomnia, she is currently working with sleep medicine.  She reports history of insomnia, has tried multiple medications before either the medication was ineffective or had side effects.    Has history of anxiety, previously on Lexapro which had worked well for her. She feels well controlled on this medication with no desire to come off of it at this time.     Hyperlipidemia-currently not on cholesterol medication, triglycerides and cholesterol as well as LDL elevated        The following portions of the patient's history were reviewed and updated as appropriate: allergies, current medications, past family history, past medical history, past social history, past surgical history, and problem list.    Review of Systems   Constitutional:  Positive for fatigue. Negative for activity change, appetite change, chills, diaphoresis and fever.   HENT:  Negative for congestion, ear discharge, ear pain, postnasal drip, rhinorrhea, sinus pressure, sinus pain and sore throat.    Eyes:  Negative for pain, discharge, itching and visual disturbance.   Respiratory:  Negative for cough, chest tightness, shortness of breath and wheezing.    Cardiovascular:  Negative for chest pain, palpitations and leg swelling.   Gastrointestinal:  Negative for abdominal pain, constipation, diarrhea, nausea and vomiting.   Endocrine: Negative for polydipsia, polyphagia and polyuria.   Genitourinary:  Negative for difficulty urinating, dysuria and urgency.   Musculoskeletal:  Negative for arthralgias, back pain and neck pain.   Skin:  Negative for rash and wound.   Neurological:  Negative for dizziness, weakness, numbness and headaches.         Past Medical History:   Diagnosis Date     Anxiety     COPD (chronic obstructive pulmonary disease) (HCC)     GERD (gastroesophageal reflux disease)     Kidney stone     Motion sickness     PONV (postoperative nausea and vomiting)          Current Outpatient Medications:     ACIDOPHILUS LACTOBACILLUS PO, Take 1 tablet by mouth daily, Disp: , Rfl:     Armodafinil 150 MG tablet, Take 1 tablet (150 mg total) by mouth daily (Patient taking differently: Take 150 mg by mouth daily), Disp: 30 tablet, Rfl: 3    escitalopram (Lexapro) 10 mg tablet, Take 1 tablet (10 mg total) by mouth daily, Disp: 90 tablet, Rfl: 1    fenofibrate (TRICOR) 145 mg tablet, Take 1 tablet (145 mg total) by mouth daily, Disp: 90 tablet, Rfl: 1    ibuprofen (MOTRIN) 200 mg tablet, Take 400 mg by mouth every 6 (six) hours as needed for mild pain, Disp: , Rfl:     Multiple Vitamins-Minerals (MULTIVITAMIN ADULTS PO), Take 1 tablet by mouth daily, Disp: , Rfl:     tiotropium-olodaterol (Stiolto Respimat) 2.5-2.5 MCG/ACT inhaler, Inhale 2 puffs daily, Disp: 12 g, Rfl: 3    ondansetron (ZOFRAN-ODT) 4 mg disintegrating tablet, Take 1 tablet (4 mg total) by mouth every 6 (six) hours as needed for nausea or vomiting (Patient not taking: Reported on 12/27/2023), Disp: 12 tablet, Rfl: 0    Allergies   Allergen Reactions    Chlorhexidine Hives     Itching and red rash on body from CHG cloth    Codeine Palpitations     Other reaction(s): Other (See Comments)  Other reaction(s): Irregular heart rate  Rash,vomiting, heart palpitations    Latex Rash    Penicillin V Rash and Vomiting       Social History   Past Surgical History:   Procedure Laterality Date    APPENDECTOMY      BACK SURGERY      L5 - S1    BLADDER SURGERY      COLONOSCOPY      FL RETROGRADE PYELOGRAM  12/15/2023    HYSTERECTOMY      age 29 still has lt ovary    IR RADIOFREQUENCY ABLATION      esophagus    MT CYSTO/URETERO W/LITHOTRIPSY &INDWELL STENT INSRT Left 12/15/2023    Procedure: CYSTO USCOPE W/ LASER, & STENT;  Surgeon: Jhonatan  "MD Lonnie;  Location: [facility];  Service: Urology    SPINE SURGERY      TOTAL VAGINAL HYSTERECTOMY      AGE 29    TUBAL LIGATION      UPPER GASTROINTESTINAL ENDOSCOPY       Family History   Problem Relation Age of Onset    Heart attack Mother     Heart disease Mother     Kidney failure Mother     Heart failure Mother     Diabetes Father     Alcohol abuse Father     Suicide Attempts Brother     Thyroid disease Son     Breast cancer Maternal Grandmother     Lung cancer Maternal Grandmother 75    Arthritis Maternal Grandmother     No Known Problems Maternal Grandfather     Diabetes Paternal Grandmother     Stroke Paternal Grandmother     No Known Problems Paternal Grandfather     Breast cancer Maternal Aunt 43    Bone cancer Maternal Aunt     No Known Problems Maternal Aunt     No Known Problems Paternal Aunt     Substance Abuse Paternal Uncle     Drug abuse Paternal Uncle        Objective:  /80 (BP Location: Left arm, Patient Position: Sitting, Cuff Size: Adult)   Pulse 79   Temp 98 °F (36.7 °C)   Ht 5' 3\" (1.6 m)   Wt 71.2 kg (157 lb)   SpO2 99%   BMI 27.81 kg/m²     Recent Results (from the past 1344 hour(s))   Urinalysis with microscopic    Collection Time: 11/21/23  4:40 PM   Result Value Ref Range    Color, UA Yellow     Clarity, UA Turbid     Specific Gravity, UA 1.021 1.003 - 1.030    pH, UA 6.0 4.5, 5.0, 5.5, 6.0, 6.5, 7.0, 7.5, 8.0    Leukocytes, UA Negative Negative    Nitrite, UA Negative Negative    Protein, UA Trace (A) Negative mg/dl    Glucose, UA Negative Negative mg/dl    Ketones, UA Negative Negative mg/dl    Urobilinogen, UA <2.0 <2.0 mg/dl mg/dl    Bilirubin, UA Negative Negative    Occult Blood, UA Large (A) Negative    RBC, UA Innumerable (A) None Seen, 1-2 /hpf    WBC, UA 10-20 (A) None Seen, 1-2 /hpf    Epithelial Cells Occasional None Seen, Occasional /hpf    Bacteria, UA Occasional None Seen, Occasional /hpf    MUCUS THREADS Occasional (A) None Seen    Ca Oxalate [place], UA " Innumerable (A) None Seen /hpf   CBC and differential    Collection Time: 11/29/23  7:50 AM   Result Value Ref Range    WBC 9.30 4.31 - 10.16 Thousand/uL    RBC 5.35 (H) 3.81 - 5.12 Million/uL    Hemoglobin 15.7 (H) 11.5 - 15.4 g/dL    Hematocrit 48.9 (H) 34.8 - 46.1 %    MCV 91 82 - 98 fL    MCH 29.3 26.8 - 34.3 pg    MCHC 32.1 31.4 - 37.4 g/dL    RDW 12.1 11.6 - 15.1 %    MPV 11.0 8.9 - 12.7 fL    Platelets 301 149 - 390 Thousands/uL    nRBC 0 /100 WBCs    Neutrophils Relative 64 43 - 75 %    Immat GRANS % 0 0 - 2 %    Lymphocytes Relative 26 14 - 44 %    Monocytes Relative 7 4 - 12 %    Eosinophils Relative 2 0 - 6 %    Basophils Relative 1 0 - 1 %    Neutrophils Absolute 5.94 1.85 - 7.62 Thousands/µL    Immature Grans Absolute 0.02 0.00 - 0.20 Thousand/uL    Lymphocytes Absolute 2.44 0.60 - 4.47 Thousands/µL    Monocytes Absolute 0.61 0.17 - 1.22 Thousand/µL    Eosinophils Absolute 0.20 0.00 - 0.61 Thousand/µL    Basophils Absolute 0.09 0.00 - 0.10 Thousands/µL   Lipid panel    Collection Time: 11/29/23  7:50 AM   Result Value Ref Range    Cholesterol 228 (H) See Comment mg/dL    Triglycerides 236 (H) See Comment mg/dL    HDL, Direct 54 >=50 mg/dL    LDL Calculated 127 (H) 0 - 100 mg/dL    Non-HDL-Chol (CHOL-HDL) 174 mg/dl   Comprehensive metabolic panel    Collection Time: 11/29/23  7:50 AM   Result Value Ref Range    Sodium 141 135 - 147 mmol/L    Potassium 3.9 3.5 - 5.3 mmol/L    Chloride 107 96 - 108 mmol/L    CO2 27 21 - 32 mmol/L    ANION GAP 7 mmol/L    BUN 22 5 - 25 mg/dL    Creatinine 0.84 0.60 - 1.30 mg/dL    Glucose, Fasting 53 (L) 65 - 99 mg/dL    Calcium 10.0 8.4 - 10.2 mg/dL    AST 16 13 - 39 U/L    ALT 16 7 - 52 U/L    Alkaline Phosphatase 70 34 - 104 U/L    Total Protein 7.0 6.4 - 8.4 g/dL    Albumin 4.4 3.5 - 5.0 g/dL    Total Bilirubin 0.47 0.20 - 1.00 mg/dL    eGFR 83 ml/min/1.73sq m   Urine culture    Collection Time: 12/13/23 11:50 AM    Specimen: Urine, Clean Catch   Result Value Ref Range     Urine Culture No Growth <1000 cfu/mL    Stone analysis    Collection Time: 12/15/23 11:40 AM   Result Value Ref Range    COLOR Brown     Size 2x2 mm    Stone Weight 4 mg    Composition Comment     Ca Oxalate,Dihydrate 20 %    Ca Oxalate,Monohydr. 80 %    STONE COMMENT Comment     Please note Comment     Disclaimer Comment     Photo Comment     Stone Source Comment    POCT urine dip    Collection Time: 12/18/23 10:36 AM   Result Value Ref Range    LEUKOCYTE ESTERASE,UA -     NITRITE,UA -     SL AMB POCT UROBILINOGEN -     POCT URINE PROTEIN +++      PH,UA 7.5     BLOOD,UA +++     SPECIFIC GRAVITY,UA 1.025     KETONES,UA -     BILIRUBIN,UA -     GLUCOSE, UA -      COLOR,UA [place]     CLARITY,UA cloudy             Physical Exam  Constitutional:       General: She is not in acute distress.     Appearance: She is well-developed. She is not diaphoretic.   HENT:      Head: Normocephalic and atraumatic.      Right Ear: External ear normal.      Left Ear: External ear normal.      Nose: Nose normal.      Mouth/Throat:      Mouth: Mucous membranes are moist.      Pharynx: No oropharyngeal exudate or posterior oropharyngeal erythema.   Eyes:      General:         Right eye: No discharge.         Left eye: No discharge.      Conjunctiva/sclera: Conjunctivae normal.      Pupils: Pupils are equal, round, and reactive to light.   Neck:      Thyroid: No thyromegaly.   Cardiovascular:      Rate and Rhythm: Normal rate and regular rhythm.      Heart sounds: Normal heart sounds. No murmur heard.     No friction rub. No gallop.   Pulmonary:      Effort: Pulmonary effort is normal. No respiratory distress.      Breath sounds: Normal breath sounds. No stridor. No wheezing or rales.   Abdominal:      General: Bowel sounds are normal. There is no distension.      Palpations: Abdomen is soft.      Tenderness: There is no abdominal tenderness.   Musculoskeletal:      Cervical back: Normal range of motion and neck supple.    Lymphadenopathy:      Cervical: No cervical adenopathy.   Skin:     General: Skin is warm and dry.      Findings: No erythema or rash.   Neurological:      Mental Status: She is alert and oriented to person, place, and time.   Psychiatric:         Behavior: Behavior normal.         Thought Content: Thought content normal.         Judgment: Judgment normal.

## 2024-01-02 ENCOUNTER — TELEPHONE (OUTPATIENT)
Dept: SLEEP CENTER | Facility: CLINIC | Age: 49
End: 2024-01-02

## 2024-01-02 NOTE — TELEPHONE ENCOUNTER
----- Message from Jenny Pereyra sent at 1/1/2024 11:08 AM EST -----  Regarding: Armodafinil  Contact: [phone]  Hello,    I have 6 days of medication left. I feel like it isn't working. I have 4 out of 7 days a week where I am literally struggling to keep my eyes open. 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     Thank you  Jenny

## 2024-01-03 NOTE — TELEPHONE ENCOUNTER
Xywav forms sent to Dr. Fairbanks to complete and sign. Will need to contact patient and have her sign hers

## 2024-01-04 NOTE — TELEPHONE ENCOUNTER
Received  completed Xywav form from Dr. Fairbanks. Left call back message, need to coordinate with patient to get signature on paper work

## 2024-01-07 ENCOUNTER — PATIENT MESSAGE (OUTPATIENT)
Dept: INTERNAL MEDICINE CLINIC | Facility: OTHER | Age: 49
End: 2024-01-07

## 2024-01-08 ENCOUNTER — TELEMEDICINE (OUTPATIENT)
Age: 49
End: 2024-01-08
Payer: COMMERCIAL

## 2024-01-08 VITALS — BODY MASS INDEX: 27.82 KG/M2 | HEIGHT: 63 IN | TEMPERATURE: 99.2 F | WEIGHT: 157 LBS

## 2024-01-08 DIAGNOSIS — U07.1 COVID-19: Primary | ICD-10-CM

## 2024-01-08 PROCEDURE — 99213 OFFICE O/P EST LOW 20 MIN: CPT | Performed by: NURSE PRACTITIONER

## 2024-01-08 RX ORDER — PREDNISONE 20 MG/1
40 TABLET ORAL DAILY
Qty: 10 TABLET | Refills: 0 | Status: SHIPPED | OUTPATIENT
Start: 2024-01-08 | End: 2024-01-13

## 2024-01-08 RX ORDER — BENZONATATE 200 MG/1
200 CAPSULE ORAL 3 TIMES DAILY PRN
Qty: 20 CAPSULE | Refills: 0 | Status: SHIPPED | OUTPATIENT
Start: 2024-01-08

## 2024-01-08 NOTE — PROGRESS NOTES
COVID-19 Outpatient Progress Note    Assessment/Plan:    Problem List Items Addressed This Visit    None  Visit Diagnoses       COVID-19    -  Primary    Relevant Medications    predniSONE 20 mg tablet    benzonatate (TESSALON) 200 MG capsule           Disposition:     Patient with asymptomatic/mild COVID-19: They were recommended to isolate for at least 5 days (day 0 is the day symptoms appeared or the date the specimen was collected for the positive test for people who are asymptomatic). If they are asymptomatic or symptoms are improving with no fevers in the past 24 hours, isolation may be ended followed by 5 days of wearing a high quality mask when around others to minimize risk of infecting others. They should wear a mask through day 10 and a test-based strategy may be used to remove a mask sooner.      Will send in prednisone and tessalon pearls   Rest and fluids advised.   Educated that the course of this illness could be 2-4 weeks.   Discussed symptomatic relief, such as warm steam inhalations, tylenol/ibuprofen for fevers and body aches, rest, and drink plenty of fluids.  Warm salt gargles for sore throat.   Discussed red flag signs to go to the ER, such as chest pain or shortness of breath.   Return to the office for reevaluation if symptoms worsen or do not improve in 1-2 weeks       I have spent a total time of 15 minutes on the day of the encounter for this patient including       Encounter provider: MOHSEN Moore     Provider located at: [facility] [place] [facility] [place] [address] [place] [address] [phone]     Recent Visits  No visits were found meeting these conditions.  Showing recent visits within past 7 days and meeting all other requirements  Today's Visits  Date Type Provider Dept   01/08/24 Telemedicine MOHSEN Moore [facility]   Showing today's visits and  meeting all other requirements  Future Appointments  No visits were found meeting these conditions.  Showing future appointments within next 150 days and meeting all other requirements     This virtual check-in was done via [email or web address] and patient was informed that this is a secure, HIPAA-compliant platform. She agrees to proceed.    Patient agrees to participate in a virtual check in via telephone or video visit instead of presenting to the office to address urgent/immediate medical needs. Patient is aware this is a billable service. She acknowledged consent and understanding of privacy and security of the video platform. The patient has agreed to participate and understands they can discontinue the visit at any time.    After connecting through [employer], the patient was identified by name and date of birth. Jenny Pereyra was informed that this was a telemedicine visit and that the exam was being conducted confidentially over secure lines. My office door was closed. No one else was in the room. Jenny Pereyra acknowledged consent and understanding of privacy and security of the telemedicine visit. I informed the patient that I have reviewed her record in Epic and presented the opportunity for her to ask any questions regarding the visit today. The patient agreed to participate.     Verification of patient location:  Patient is located in the following state in which I hold an active license: [place]    Subjective:   Jenny Pereyra is a 48 y.o. female who has been screened for COVID-19. Symptom change since last report: improving. Patient's symptoms include fatigue, cough (dry) and shortness of breath. Patient denies fever, chills, malaise, congestion, rhinorrhea, sore throat, anosmia, loss of taste, chest tightness, abdominal pain, nausea, vomiting, diarrhea, myalgias and headaches.     - Date of symptom onset: 1/2/2024  - Date of positive COVID-19 test: 1/5/2024. Type of test: Home antigen. Patient with typical  symptoms of COVID-19 and they attest that they were positive on home rapid antigen testing. Image of positive result is not able to be uploaded into their chart.     COVID-19 vaccination status: Fully vaccinated with booster    Jenny has been staying home and has isolated themselves in her home. She is taking care to not share personal items and is cleaning all surfaces that are touched often, like counters, tabletops, and doorknobs using household cleaning sprays or wipes. She is wearing a mask when she leaves her room.     Patient started on Tuesday with body aches and fatigue   Thursday had sore throat, and having some shortness of breathe when moving around, and low grade fever    Advil       Lab Results   Component Value Date    SARSCORONAVI Detected (A) 11/03/2021       Review of Systems   Constitutional:  Positive for fatigue. Negative for activity change, appetite change, chills, diaphoresis and fever.   HENT:  Negative for congestion, ear discharge, ear pain, postnasal drip, rhinorrhea, sinus pressure, sinus pain and sore throat.    Eyes:  Negative for pain, discharge, itching and visual disturbance.   Respiratory:  Positive for cough (dry), shortness of breath and wheezing. Negative for chest tightness.    Cardiovascular:  Negative for chest pain, palpitations and leg swelling.   Gastrointestinal:  Negative for abdominal pain, constipation, diarrhea, nausea and vomiting.   Endocrine: Negative for polydipsia, polyphagia and polyuria.   Genitourinary:  Negative for difficulty urinating, dysuria and urgency.   Musculoskeletal:  Negative for arthralgias, back pain, myalgias and neck pain.   Skin:  Negative for rash and wound.   Neurological:  Negative for dizziness, weakness, numbness and headaches.     Current Outpatient Medications on File Prior to Visit   Medication Sig    ACIDOPHILUS LACTOBACILLUS PO Take 1 tablet by mouth daily    Armodafinil 150 MG tablet Take 1 tablet (150 mg total) by mouth daily  "(Patient taking differently: Take 150 mg by mouth daily)    escitalopram (Lexapro) 10 mg tablet Take 1 tablet (10 mg total) by mouth daily    fenofibrate (TRICOR) 145 mg tablet Take 1 tablet (145 mg total) by mouth daily    ibuprofen (MOTRIN) 200 mg tablet Take 400 mg by mouth every 6 (six) hours as needed for mild pain    Multiple Vitamins-Minerals (MULTIVITAMIN ADULTS PO) Take 1 tablet by mouth daily    tiotropium-olodaterol (Stiolto Respimat) 2.5-2.5 MCG/ACT inhaler Inhale 2 puffs daily    ondansetron (ZOFRAN-ODT) 4 mg disintegrating tablet Take 1 tablet (4 mg total) by mouth every 6 (six) hours as needed for nausea or vomiting       Objective:    Temp 99.2 °F (37.3 °C)   Ht 5' 3\" (1.6 m)   Wt 71.2 kg (157 lb)   BMI 27.81 kg/m²        Physical Exam  Constitutional:       Appearance: Normal appearance. She is not ill-appearing.   Eyes:      General: No scleral icterus.  Pulmonary:      Effort: No respiratory distress.   Neurological:      Mental Status: She is alert.   Psychiatric:         Mood and Affect: Mood normal.         Behavior: Behavior normal.       MOHSEN Moore    "

## 2024-01-10 DIAGNOSIS — G47.10 HYPERSOMNIA: ICD-10-CM

## 2024-01-10 RX ORDER — ARMODAFINIL 150 MG/1
150 TABLET ORAL DAILY
Qty: 30 TABLET | Refills: 3 | Status: SHIPPED | OUTPATIENT
Start: 2024-01-10

## 2024-01-12 ENCOUNTER — TELEPHONE (OUTPATIENT)
Dept: SLEEP CENTER | Facility: CLINIC | Age: 49
End: 2024-01-12

## 2024-01-15 ENCOUNTER — TELEPHONE (OUTPATIENT)
Dept: SLEEP CENTER | Facility: CLINIC | Age: 49
End: 2024-01-15

## 2024-01-17 ENCOUNTER — TELEPHONE (OUTPATIENT)
Dept: SLEEP CENTER | Facility: CLINIC | Age: 49
End: 2024-01-17

## 2024-01-17 NOTE — TELEPHONE ENCOUNTER
Dr. Fairbanks patient has concerns about Xywav and her pulmonary issues. Please call her to discuss.

## 2024-01-17 NOTE — TELEPHONE ENCOUNTER
Returned a call from Pharmacist at [facility]   They will not ship the Xywav without confirmation that you are aware patient has COPD that is being treated by Stiolto. They are concerned about the combination of Xywav and COPD putting patient at risk for increased respiratory depression.   Please advise do you want Xywav shipped to the patient.

## 2024-01-17 NOTE — TELEPHONE ENCOUNTER
Notified [facility] pharmacist that Dr. Fairbanks is aware of COPD and Xywav can be shipped to the patient

## 2024-01-17 NOTE — TELEPHONE ENCOUNTER
----- Message from Jenny Pereyra sent at 1/16/2024  4:53 PM EST -----  Regarding: Xywav form  Contact: [phone]  So will my lung issues increase the risk? I don't drink or use other sedation medications

## 2024-01-17 NOTE — TELEPHONE ENCOUNTER
I spoke to Jenny, discussed with her risk versus benefit, I reviewed her chart again she does have COPD however no evidence of hypoventilation or even nocturnal hypoxemia on her repeat sleep studies, hence, I believe the benefit outweighs the risk in her case, notified her and counseled her for any side effects and to let us know how she feels after the first couple of nights of using the medication.

## 2024-01-19 ENCOUNTER — HOSPITAL ENCOUNTER (OUTPATIENT)
Dept: RADIOLOGY | Facility: IMAGING CENTER | Age: 49
End: 2024-01-19
Payer: COMMERCIAL

## 2024-01-19 DIAGNOSIS — N20.1 URETERAL STONE: ICD-10-CM

## 2024-01-19 PROCEDURE — 76775 US EXAM ABDO BACK WALL LIM: CPT

## 2024-01-19 NOTE — TELEPHONE ENCOUNTER
If [facility] has not replied within 72 hours for urgent requests and up to 15 days for standard requests, please contact [email or web address] at [phone].

## 2024-01-24 ENCOUNTER — TELEPHONE (OUTPATIENT)
Dept: SLEEP CENTER | Facility: CLINIC | Age: 49
End: 2024-01-24

## 2024-01-24 DIAGNOSIS — R11.0 NAUSEA: Primary | ICD-10-CM

## 2024-01-24 RX ORDER — ONDANSETRON 4 MG/1
4 TABLET, FILM COATED ORAL EVERY 8 HOURS PRN
Qty: 20 TABLET | Refills: 0 | Status: SHIPPED | OUTPATIENT
Start: 2024-01-24 | End: 2024-02-08

## 2024-01-24 NOTE — TELEPHONE ENCOUNTER
Called the patient and got the side effects with her her side effects or not unexpected with beginning Xywav and probably would happen with increase the dosages.  The cough is not related but if gets worse to be worse at treating it and further investigation, she can continue Zofran on as-needed basis I will prescribe extra refills for her

## 2024-01-24 NOTE — TELEPHONE ENCOUNTER
----- Message from Jenny Pereyra sent at 1/24/2024  4:55 AM EST -----  Regarding: Xywav  Contact: [phone]  Hello,    Just wanted to give you an update. I started taking the xywav on Friday. Within an hour of taking it I wake up extremely nauseous I called the pharmacy and asked them if it was OK to take Zofran and they said it was. I found if I take the Zofran 10 minutes before the xywav I don't get that. Is it safe to take Zofran long term? I sleep for 3 to 4 hours without waking up and then I wake up every 2 hours after. I am on 3 grams until Friday then increase to 4 grams. The last 2 nights I have been coughing alot during the night. I do wake up with quite a headache. But advil does take it away.

## 2024-01-29 DIAGNOSIS — G47.10 HYPERSOMNIA: Primary | ICD-10-CM

## 2024-01-29 RX ORDER — (CALCIUM, MAGNESIUM, POTASSIUM, AND SODIUM OXYBATES) .5; .5; .5; .5 G/ML; G/ML; G/ML; G/ML
3 SOLUTION ORAL
Qty: 180 ML | Refills: 2 | Status: SHIPPED | OUTPATIENT
Start: 2024-01-29 | End: 2024-02-02 | Stop reason: SDUPTHER

## 2024-02-02 DIAGNOSIS — G47.10 HYPERSOMNIA: ICD-10-CM

## 2024-02-02 RX ORDER — (CALCIUM, MAGNESIUM, POTASSIUM, AND SODIUM OXYBATES) .5; .5; .5; .5 G/ML; G/ML; G/ML; G/ML
3.25 SOLUTION ORAL
Qty: 180 ML | Refills: 2 | Status: SHIPPED | OUTPATIENT
Start: 2024-02-02

## 2024-02-16 DIAGNOSIS — G47.10 HYPERSOMNIA: ICD-10-CM

## 2024-02-16 DIAGNOSIS — R11.0 NAUSEA: ICD-10-CM

## 2024-02-16 NOTE — TELEPHONE ENCOUNTER
Patient called the RX Refill Line. Message is being forwarded to the office.     Patient is requesting - [facility] called requesting a new script with new dose that Pt stated it was increase by provider from Xywav 3.25 g to 3.5 g. Please review. Thank you.    Please contact patient at - Pharmacy [phone]

## 2024-02-17 ENCOUNTER — PATIENT MESSAGE (OUTPATIENT)
Dept: UROLOGY | Facility: CLINIC | Age: 49
End: 2024-02-17

## 2024-02-19 RX ORDER — ONDANSETRON 4 MG/1
4 TABLET, FILM COATED ORAL EVERY 8 HOURS PRN
Qty: 20 TABLET | Refills: 0 | Status: SHIPPED | OUTPATIENT
Start: 2024-02-19 | End: 2024-03-05

## 2024-02-19 RX ORDER — (CALCIUM, MAGNESIUM, POTASSIUM, AND SODIUM OXYBATES) .5; .5; .5; .5 G/ML; G/ML; G/ML; G/ML
3.5 SOLUTION ORAL
Qty: 210 ML | Refills: 2 | Status: SHIPPED | OUTPATIENT
Start: 2024-02-19

## 2024-02-20 ENCOUNTER — APPOINTMENT (OUTPATIENT)
Dept: LAB | Facility: CLINIC | Age: 49
End: 2024-02-20

## 2024-02-22 ENCOUNTER — APPOINTMENT (OUTPATIENT)
Dept: LAB | Facility: CLINIC | Age: 49
End: 2024-02-22
Payer: COMMERCIAL

## 2024-02-22 DIAGNOSIS — N20.1 URETERAL STONE: ICD-10-CM

## 2024-02-24 LAB — GENE DIS ANL INTERP-IMP: NORMAL

## 2024-03-01 LAB
AMMONIA UR-SCNC: 11 MMOL/24 HR (ref 15–60)
CA H2 PHOS DIHYD 24H SATFR UR: 3.38 (ref 0.5–2)
CALCIUM 24H UR-MRATE: 291 MG/24 HR
CALCIUM/CREAT UR: 237 MG/G CREAT (ref 51–262)
CALCIUM/KG BODY WEIGHT: ABNORMAL MG/24 HR/KG
CAOX INDEX 24H UR-RTO: 12.14 (ref 6–10)
CHLORIDE 24H UR-SRATE: 108 MMOL/24 HR (ref 70–250)
CITRATE 24H UR-MCNC: 889 MG/24 HR
COMMENT: ABNORMAL
CREAT UR-MCNC: 1230 MG/24 HR
CREAT/BW 24H UR-RELMRAT: ABNORMAL MG/24 HR/KG
CYSTINE 24H UR-MCNC: ABNORMAL MG/DL
MAGNESIUM 24H UR-MRATE: 95 MG/24 HR (ref 30–120)
OXALATE UR-MCNC: 27 MG/24 HR (ref 20–40)
PH 24H UR: 6.6 [PH] (ref 5.8–6.2)
PHOSPHATE 24H UR-MRATE: 416 MG/24 HR (ref 600–1200)
POTASSIUM 24H UR-SCNC: 39 MMOL/24 HR (ref 20–100)
PROTEIN CATABOLIC RATE 24H UR-MRATE: ABNORMAL G/KG/24 HR
SODIUM 24H UR-SRATE: 112 MMOL/24 HR (ref 50–150)
SPECIMEN VOL 24H UR: 1180 ML/24 HR (ref 500–4000)
SULFATE 24H UR-SRATE: 12 MEQ/24 HR (ref 20–80)
URATE 24H SATFR UR: 0.26
URATE 24H UR-MRATE: 521 MG/24 HR
UUN 24H UR-MRATE: 4.3 G/24 HR (ref 6–14)

## 2024-03-02 ENCOUNTER — APPOINTMENT (OUTPATIENT)
Dept: LAB | Facility: IMAGING CENTER | Age: 49
End: 2024-03-02
Payer: COMMERCIAL

## 2024-03-02 LAB
ALBUMIN SERPL BCP-MCNC: 4.6 G/DL (ref 3.5–5)
ALP SERPL-CCNC: 45 U/L (ref 34–104)
ALT SERPL W P-5'-P-CCNC: 17 U/L (ref 7–52)
ANION GAP SERPL CALCULATED.3IONS-SCNC: 7 MMOL/L
AST SERPL W P-5'-P-CCNC: 19 U/L (ref 13–39)
BILIRUB SERPL-MCNC: 0.34 MG/DL (ref 0.2–1)
BUN SERPL-MCNC: 21 MG/DL (ref 5–25)
CALCIUM SERPL-MCNC: 9.6 MG/DL (ref 8.4–10.2)
CHLORIDE SERPL-SCNC: 105 MMOL/L (ref 96–108)
CO2 SERPL-SCNC: 28 MMOL/L (ref 21–32)
CREAT SERPL-MCNC: 0.91 MG/DL (ref 0.6–1.3)
GFR SERPL CREATININE-BSD FRML MDRD: 75 ML/MIN/1.73SQ M
GLUCOSE P FAST SERPL-MCNC: 80 MG/DL (ref 65–99)
POTASSIUM SERPL-SCNC: 4.1 MMOL/L (ref 3.5–5.3)
PROT SERPL-MCNC: 6.9 G/DL (ref 6.4–8.4)
PTH-INTACT SERPL-MCNC: 18.8 PG/ML (ref 12–88)
SODIUM SERPL-SCNC: 140 MMOL/L (ref 135–147)
URATE SERPL-MCNC: 3.1 MG/DL (ref 2–7.5)

## 2024-03-02 PROCEDURE — 80053 COMPREHEN METABOLIC PANEL: CPT

## 2024-03-02 PROCEDURE — 36415 COLL VENOUS BLD VENIPUNCTURE: CPT

## 2024-03-02 PROCEDURE — 83970 ASSAY OF PARATHORMONE: CPT

## 2024-03-02 PROCEDURE — 84550 ASSAY OF BLOOD/URIC ACID: CPT

## 2024-03-10 DIAGNOSIS — J44.9 CHRONIC OBSTRUCTIVE PULMONARY DISEASE, UNSPECIFIED COPD TYPE (HCC): ICD-10-CM

## 2024-03-11 NOTE — TELEPHONE ENCOUNTER
Last office Visit 03.29.2023.   Medication cannot be delegated. Refill must be reviewed and completed by the office or provider. The refill is unable to be approved by the medication management team.

## 2024-03-12 ENCOUNTER — TELEPHONE (OUTPATIENT)
Age: 49
End: 2024-03-12

## 2024-03-12 NOTE — TELEPHONE ENCOUNTER
Patient was calling because she had an appt scheduled for 4/10 with Dr. Fleming. The office rescheduled her for 4/30 with the CRNP. So, can not get off that day. She would like to keep the appt but was wondering if it can be virtual or a call.       Please Advise.     Patient also stated that she works for [employer] as well and PTO is limited and requesting off is hard.     Jenny  1975

## 2024-03-13 RX ORDER — TIOTROPIUM BROMIDE AND OLODATEROL 3.124; 2.736 UG/1; UG/1
2 SPRAY, METERED RESPIRATORY (INHALATION) DAILY
Qty: 12 G | Refills: 0 | Status: SHIPPED | OUTPATIENT
Start: 2024-03-13

## 2024-03-14 ENCOUNTER — PATIENT MESSAGE (OUTPATIENT)
Dept: SLEEP CENTER | Facility: CLINIC | Age: 49
End: 2024-03-14

## 2024-03-15 ENCOUNTER — TELEPHONE (OUTPATIENT)
Dept: SLEEP CENTER | Facility: CLINIC | Age: 49
End: 2024-03-15

## 2024-03-15 NOTE — TELEPHONE ENCOUNTER
----- Message from Jenny Pereyra sent at 3/14/2024 12:47 PM EDT -----  Regarding: Jaime   Contact: [phone]  Hello,    I'm starting to have more days where I just want to sleep again. Does this mean I'm not at the right dose? Can I try to increase it to 4 again?    Thank you  Jenny

## 2024-03-22 NOTE — TELEPHONE ENCOUNTER
even better- will simply call her with her US results. She is postop stone doing well already saw Khadra in december, this is for 3 month US results- not yet done

## 2024-03-24 LAB
APOB+LDLR+PCSK9 GENE MUT ANL BLD/T: NOT DETECTED
BRCA1+BRCA2 DEL+DUP + FULL MUT ANL BLD/T: NOT DETECTED
MLH1+MSH2+MSH6+PMS2 GN DEL+DUP+FUL M: NOT DETECTED

## 2024-03-26 NOTE — TELEPHONE ENCOUNTER
Called pt and left msg on VM per comm consent that her 4/30/24 appt can be cancelled and she will receive a phone call with US results when received.     HUBER Rm-C4 days ago     even better- will simply call her with her US results. She is postop stone doing well already saw Khadra in december, this is for 3 month US results- not yet done

## 2024-03-27 ENCOUNTER — OFFICE VISIT (OUTPATIENT)
Dept: SLEEP CENTER | Facility: CLINIC | Age: 49
End: 2024-03-27
Payer: COMMERCIAL

## 2024-03-27 VITALS
BODY MASS INDEX: 27.1 KG/M2 | DIASTOLIC BLOOD PRESSURE: 78 MMHG | HEART RATE: 63 BPM | SYSTOLIC BLOOD PRESSURE: 127 MMHG | WEIGHT: 153 LBS

## 2024-03-27 DIAGNOSIS — G47.10 HYPERSOMNIA: ICD-10-CM

## 2024-03-27 DIAGNOSIS — G47.09 OTHER INSOMNIA: ICD-10-CM

## 2024-03-27 DIAGNOSIS — G47.19 EXCESSIVE DAYTIME SLEEPINESS: Primary | ICD-10-CM

## 2024-03-27 PROCEDURE — 99214 OFFICE O/P EST MOD 30 MIN: CPT | Performed by: INTERNAL MEDICINE

## 2024-03-27 RX ORDER — (CALCIUM, MAGNESIUM, POTASSIUM, AND SODIUM OXYBATES) .5; .5; .5; .5 G/ML; G/ML; G/ML; G/ML
4 SOLUTION ORAL
Qty: 210 ML | Refills: 2 | Status: SHIPPED | OUTPATIENT
Start: 2024-03-27

## 2024-03-27 NOTE — PROGRESS NOTES
Pulmonary/Sleep Follow Up Note   Jenny Pereyra 48 y.o. female MRN: [id number]  3/27/2024      Assessment and Plan:    1. Hypersomnia  She has got significant idiopathic hypersomnia with reduced sleep latency no sleep onset REM despite the fact that she had good sleep on the night prior on her multi sleep latency test she failed armodafinil as a sole medication since she was started on Xywav she has noted significant improvement of her sleep quality at night and excessive daytime sleepiness during the day she 60-70% better she is only tried 3 and then she went up to 3.5 g 1 dose at night and she was not increasing her dose for the fear of side effects.  She tells me that at least 2-3 nights per week she is still struggling during the day so I elected to increase her dose to 4 g per night today she will do that for 1 month and if she has a desired effect then we will continue at 4 g but if she still missing out on some of the nights per week then we could probably go higher by a 0.5 g per time  She is reluctant to try 2 doses regimen because of her social life that she has to drive her mother in the morning for her dialysis sessions  - Ca, Mg, K, and Na Oxybates (Xywav) 500 MG/ML SOLN; Take 4 g by mouth daily at bedtime  Dispense: 210 mL; Refill: 2    2. Excessive daytime sleepiness  As detailed above currently fairly managed by Xywav and armodafinil            Return in about 6 months (around 9/27/2024).    History of Present Illness   HPI:  Jenny Pereyra is a 48 y.o. female who is here for a follow-up appointment she has a history of persistent daytime hypersomnolence, sleep disturbances at night she was started on Xywav 3 months ago due to persistent symptoms during the day despite the fact that she was switched from modafinil to armodafinil with a good dosage.  She has noted some side effects mainly nausea in the beginning of the therapy however that went away and she is not on Zofran anymore she is doing  fairly well on 3.5 g of Xywav at night with no significant side effects currently 2-3 nights per week she is still struggling during the day so we elected to increase her dose today.  She is aware of all the side effects she will communicate with us through [facility] to let us know if any new side effects were to happen.    She follows up for COPD and lung nodules with Dr. Weber    Historical Information   Past Medical History:   Diagnosis Date    Anxiety     COPD (chronic obstructive pulmonary disease) (HCC)     GERD (gastroesophageal reflux disease)     Kidney stone     Motion sickness     PONV (postoperative nausea and vomiting)      Past Surgical History:   Procedure Laterality Date    APPENDECTOMY      BACK SURGERY      L5 - S1    BLADDER SURGERY      COLONOSCOPY      FL RETROGRADE PYELOGRAM  12/15/2023    HYSTERECTOMY      age 29 still has lt ovary    IR RADIOFREQUENCY ABLATION      esophagus    DC CYSTO/URETERO W/LITHOTRIPSY &INDWELL STENT INSRT Left 12/15/2023    Procedure: CYSTO USCOPE W/ LASER, & STENT;  Surgeon: Jhonatan Fleming MD;  Location: [facility];  Service: Urology    SPINE SURGERY      TOTAL VAGINAL HYSTERECTOMY      AGE 29    TUBAL LIGATION      UPPER GASTROINTESTINAL ENDOSCOPY       Family History   Problem Relation Age of Onset    Heart attack Mother     Heart disease Mother     Kidney failure Mother     Heart failure Mother     Diabetes Father     Alcohol abuse Father     Suicide Attempts Brother     Thyroid disease Son     Breast cancer Maternal Grandmother     Lung cancer Maternal Grandmother 75    Arthritis Maternal Grandmother     No Known Problems Maternal Grandfather     Diabetes Paternal Grandmother     Stroke Paternal Grandmother     No Known Problems Paternal Grandfather     Breast cancer Maternal Aunt 43    Bone cancer Maternal Aunt     No Known Problems Maternal Aunt     No Known Problems Paternal Aunt     Substance Abuse Paternal Uncle     Drug abuse Paternal Uncle           Meds/Allergies     Current Outpatient Medications:     ACIDOPHILUS LACTOBACILLUS PO, Take 1 tablet by mouth daily, Disp: , Rfl:     Armodafinil 150 MG tablet, Take 1 tablet (150 mg total) by mouth daily, Disp: 30 tablet, Rfl: 3    Ca, Mg, K, and Na Oxybates (Xywav) 500 MG/ML SOLN, Take 4 g by mouth daily at bedtime, Disp: 210 mL, Rfl: 2    escitalopram (Lexapro) 10 mg tablet, Take 1 tablet (10 mg total) by mouth daily, Disp: 90 tablet, Rfl: 1    fenofibrate (TRICOR) 145 mg tablet, Take 1 tablet (145 mg total) by mouth daily, Disp: 90 tablet, Rfl: 1    Multiple Vitamins-Minerals (MULTIVITAMIN ADULTS PO), Take 1 tablet by mouth daily, Disp: , Rfl:     ondansetron (ZOFRAN) 4 mg tablet, Take 1 tablet (4 mg total) by mouth every 8 (eight) hours as needed for nausea or vomiting for up to 15 days, Disp: 20 tablet, Rfl: 0    tiotropium-olodaterol (Stiolto Respimat) 2.5-2.5 MCG/ACT inhaler, Inhale 2 puffs daily, Disp: 12 g, Rfl: 0    benzonatate (TESSALON) 200 MG capsule, Take 1 capsule (200 mg total) by mouth 3 (three) times a day as needed for cough, Disp: 20 capsule, Rfl: 0    ibuprofen (MOTRIN) 200 mg tablet, Take 400 mg by mouth every 6 (six) hours as needed for mild pain, Disp: , Rfl:     ondansetron (ZOFRAN-ODT) 4 mg disintegrating tablet, Take 1 tablet (4 mg total) by mouth every 6 (six) hours as needed for nausea or vomiting, Disp: 12 tablet, Rfl: 0  Allergies   Allergen Reactions    Chlorhexidine Hives     Itching and red rash on body from CHG cloth    Codeine Palpitations     Other reaction(s): Other (See Comments)  Other reaction(s): Irregular heart rate  Rash,vomiting, heart palpitations    Latex Rash    Penicillin V Rash and Vomiting       Vitals: Blood pressure 127/78, pulse 63, weight 69.4 kg (153 lb). Body mass index is 27.1 kg/m².        Physical Exam  General:  Awake alert and oriented x 3, conversant without conversational dyspnea, NAD, normal affect  HEENT:   Sclera noninjected, nonicteric OU,  "Nares patent,  no craniofacial abnormalities, Mucous membranes, moist, no oral lesions  NECK:  Trachea midline, no accessory muscle use, no stridor,  JVP not elevated  CARDIAC: Reg, single s1/S2, no m/r/g  PULM: CTA bilaterally no wheezing, rhonchi or rales  EXT: No cyanosis, no clubbing, no edema  NEURO: no focal neurologic deficits, AAOx3, moving all extremities appropriately    Labs: I have personally reviewed pertinent lab results., ABG: No results found for: \"PHART\", \"MCE0AYS\", \"PO2ART\", \"CEV4KEZ\", \"W5ZZAHCB\", \"BEART\", \"SOURCE\", BNP: No results found for: \"BNP\", CBC: No results found for: \"WBC\", \"HGB\", \"HCT\", \"MCV\", \"PLT\", \"ADJUSTEDWBC\", \"RBC\", \"MCH\", \"MCHC\", \"RDW\", \"MPV\", \"NRBC\", CMP: No results found for: \"SODIUM\", \"K\", \"CL\", \"CO2\", \"ANIONGAP\", \"BUN\", \"CREATININE\", \"GLUCOSE\", \"CALCIUM\", \"AST\", \"ALT\", \"ALKPHOS\", \"PROT\", \"BILITOT\", \"EGFR\", PT/INR: No results found for: \"PT\", \"INR\", Troponin: No results found for: \"TROPONINI\"  Lab Results   Component Value Date    WBC 9.30 11/29/2023    HGB 15.7 (H) 11/29/2023    HCT 48.9 (H) 11/29/2023    MCV 91 11/29/2023     11/29/2023     Lab Results   Component Value Date    CALCIUM 9.6 03/02/2024    K 4.1 03/02/2024    CO2 28 03/02/2024     03/02/2024    BUN 21 03/02/2024    CREATININE 0.91 03/02/2024     No results found for: \"IGE\"  Lab Results   Component Value Date    ALT 17 03/02/2024    AST 19 03/02/2024    ALKPHOS 45 03/02/2024             Sleep studies: I have personally reviewed pertinent reports.    MSLT  IMPRESSION:  Patient fell asleep during all 5 nap opportunities with average sleep-onset latency of 6:54 minutes, but did not achieve SOREMPs on any of the naps. Patient noted to be on escitalopram on chart review which could suppress REM sleep and cause false negative results on MSLT. On review of her two week sleep diary prior to MSLT, she was noted to be averaging 6 hrs of sleep per day.       RECOMMENDATION:  Hypersomnia was noted on this MSLT " "with decreased average sleep onset latency.  However, this could be due to sleep deprivation based on results of the sleep diary.  Assessment and treatment of insomnia based on sleep diary should be considered.  If the suspicion of narcolepsy is high, a repeat MSLT with improved sleep duration before MSLT and washout period of SSRI can be considered.  Clinical correlation is advised.              John Fairbanks MD  [facility]       Portions of the record may have been created with voice recognition software. Occasional wrong word or \"sound a like\" substitutions may have occurred due to the inherent limitations of voice recognition software. Read the chart carefully and recognize, using context, where substitutions have occurred.  "

## 2024-03-27 NOTE — TELEPHONE ENCOUNTER
Patient calling in stating she had US done and all of her blood work. She is wondering what else needs to be done. Please review and call patient.      CB: [phone]

## 2024-03-27 NOTE — PATIENT INSTRUCTIONS
Calcium Oxybate/Magnesium Oxybate/Potassium Oxybate/Sodium Oxybate (By mouth)   Calcium Oxybate (TODD-see-um OX-i-nelson), Magnesium Oxybate (mag-NEE-zee-um OX-i-nelson), Potassium Oxybate (xvh-LAB-ah-um OX-i-nelson), Sodium Oxybate (MALA-jamar-um OX-i-nelson)  Treats sudden onset of weak or paralyzed muscles, or excessive daytime sleepiness in patients with narcolepsy. Also treats idiopathic hypersomnia.   Brand Name(s): Xywav   There may be other brand names for this medicine.  When This Medicine Should Not Be Used:   This medicine is not right for everyone. Do not use it if you had an allergic reaction to calcium oxybate, magnesium oxybate, potassium oxybate, or sodium oxybate, or if you have succinic semialdehyde dehydrogenase deficiency.  How to Use This Medicine:   Liquid  Take your medicine as directed. Your dose may need to be changed several times to find what works best for you.  This medicine can be taken as a single dose (for idiopathic hypersomnia) or divided into 2 nightly doses. The first nightly dose should be taken at least 2 hours after you eat.  This medicine can make you sleep very quickly (within 5 to 15 minutes) without feeling drowsy. Take it only at bedtime when you are ready to fall asleep. Stay in bed after you take the first dose.  Measure your dose with the marked plastic syringe that comes with the medicine. Mix it with 1/4 cup of water. Mix both doses before you go to bed. Store the second dose close to your bed, out of the reach of children.  Take the second dose 2 1/2 to 4 hours after you take the first dose. You may need to set an alarm clock to wake you up so you can take the second dose on time. Remain on your bed after taking the first and second doses of this medicine.  Use the medicine within 24 hours after it has been mixed with water.  This medicine should come with a Medication Guide. Ask your pharmacist for a copy if you do not have one.  Missed dose: If you miss the second dose, skip  that dose and do not take any more medicine until the next night. Do not use extra medicine to make up for a missed dose.  Store the medicine in a closed container at room temperature, away from heat, moisture, and direct light. Pour any unused medicine down the sink drain after 24 hours. Use a marker to cross out the label on the bottle, then throw it in the trash.  Drugs and Foods to Avoid:   Ask your doctor or pharmacist before using any other medicine, including over-the-counter medicines, vitamins, and herbal products.  Do not drink alcohol while you are using this medicine. Do not use this medicine if you are also using a sedative medicine.  Some medicines can affect how calcium oxybate/magnesium oxybate/potassium oxybate/sodium oxybate works. Tell your doctor if you are using any of the following:  Divalproex sodium  Any other medicine that makes you sleepy, including sleeping pills, cold and allergy medicine, or narcotic pain medicine  Warnings While Using This Medicine:   Tell your doctor if you are pregnant or breastfeeding, or if you have kidney disease, liver disease, lung or breathing problems (including hypopnea, sleep apnea), obesity, or a history of depression or alcohol or drug abuse.  This medicine may cause the following problems:  Serious breathing problems, which can be life-threatening  Changes in mood or behavior  Sleeping problems, including sleepwalking, which can cause injuries  This medicine may make you dizzy, drowsy, or less alert. Do not drive or do anything else that could be dangerous for at least 6 hours after you take this medicine.  This medicine can be habit-forming. Do not use more than your prescribed dose. Call your doctor if you think your medicine is not working.  Your doctor will check your progress and the effects of this medicine at regular visits. Keep all appointments.  Keep all medicine out of the reach of children. Never share your medicine with anyone.  Possible Side  Effects While Using This Medicine:   Call your doctor right away if you notice any of these side effects:  Allergic reaction: Itching or hives, swelling in your face or hands, swelling or tingling in your mouth or throat, chest tightness, trouble breathing  Depression, anxiety, confusion, unusual mood or behavior, seeing or hearing things that are not there, thoughts of hurting yourself  Extreme dizziness or weakness, slow heartbeat or breathing, seizures, trouble breathing, cold, clammy skin  Sleepwalking, abnormal dreams  If you notice these less serious side effects, talk with your doctor:   Bedwetting  Decreased appetite, dry mouth  Diarrhea, nausea, vomiting  Headache, dizziness  Increased sweating  If you notice other side effects that you think are caused by this medicine, tell your doctor.   Call your doctor for medical advice about side effects. You may report side effects to FDA at 3-307-FDA-3168  © Copyright [employer] 2023 Information is for End User's use only and may not be sold, redistributed or otherwise used for commercial purposes.  The above information is an  only. It is not intended as medical advice for individual conditions or treatments. Talk to your doctor, nurse or pharmacist before following any medical regimen to see if it is safe and effective for you.

## 2024-03-29 NOTE — TELEPHONE ENCOUNTER
Nothing more to be done at this time. Looks like Maria M is going to call her with US results and there is no need for an in person follow up.

## 2024-04-02 NOTE — TELEPHONE ENCOUNTER
US finalized/reviewed- well treated left ureteral stone now resolved/retrieved, hydronephrosis resolved. other small fragments left renal from lithotripsy. no large stone burden. right side renal stone also very small. hydration and observation is safe plan, next KUB 1 year

## 2024-04-03 NOTE — TELEPHONE ENCOUNTER
Left voice message for pt to give the office a call back. Call back number provided. Pt will be returning call for her lab and u/s results. Pls advise pt of results below. Thanks.    Opal Newby PA-C Physician Assistant Signed 2:04 PM     Copy     Labs were normal           Maria M Jerry PA-C Physician Assistant Signed Yesterday     Copy     US finalized/reviewed- well treated left ureteral stone now resolved/retrieved, hydronephrosis resolved. other small fragments left renal from lithotripsy. no large stone burden. right side renal stone also very small. hydration and observation is safe plan, next KUB 1 year

## 2024-04-05 NOTE — TELEPHONE ENCOUNTER
Spoke with pt and she was notified of her lab and u/s results as noted below:     Pt was told to follow up in one year with KUB. If any issues to arise sooner, she is aware to contact the office sooner than that time.    Opal Newby PA-C Physician Assistant Signed 2:04 PM      Copy      Labs were normal            Maria M Jerry PA-C Physician Assistant Signed Yesterday      Copy      US finalized/reviewed- well treated left ureteral stone now resolved/retrieved, hydronephrosis resolved. other small fragments left renal from lithotripsy. no large stone burden. right side renal stone also very small. hydration and observation is safe plan, next KUB 1 year

## 2024-04-06 ENCOUNTER — PATIENT MESSAGE (OUTPATIENT)
Age: 49
End: 2024-04-06

## 2024-04-06 ENCOUNTER — APPOINTMENT (OUTPATIENT)
Dept: LAB | Facility: IMAGING CENTER | Age: 49
End: 2024-04-06
Payer: COMMERCIAL

## 2024-04-06 DIAGNOSIS — E53.8 B12 DEFICIENCY: ICD-10-CM

## 2024-04-06 DIAGNOSIS — Z00.8 HEALTH EXAMINATION IN POPULATION SURVEY: ICD-10-CM

## 2024-04-06 DIAGNOSIS — E55.9 VITAMIN D DEFICIENCY: ICD-10-CM

## 2024-04-06 DIAGNOSIS — E78.49 OTHER HYPERLIPIDEMIA: ICD-10-CM

## 2024-04-06 LAB
25(OH)D3 SERPL-MCNC: 54.1 NG/ML (ref 30–100)
ALBUMIN SERPL BCP-MCNC: 4.4 G/DL (ref 3.5–5)
ALP SERPL-CCNC: 41 U/L (ref 34–104)
ALT SERPL W P-5'-P-CCNC: 21 U/L (ref 7–52)
ANION GAP SERPL CALCULATED.3IONS-SCNC: 9 MMOL/L (ref 4–13)
AST SERPL W P-5'-P-CCNC: 19 U/L (ref 13–39)
BASOPHILS # BLD AUTO: 0.09 THOUSANDS/ÂΜL (ref 0–0.1)
BASOPHILS NFR BLD AUTO: 1 % (ref 0–1)
BILIRUB SERPL-MCNC: 0.29 MG/DL (ref 0.2–1)
BUN SERPL-MCNC: 21 MG/DL (ref 5–25)
CALCIUM SERPL-MCNC: 9.5 MG/DL (ref 8.4–10.2)
CHLORIDE SERPL-SCNC: 109 MMOL/L (ref 96–108)
CHOLEST SERPL-MCNC: 171 MG/DL
CO2 SERPL-SCNC: 25 MMOL/L (ref 21–32)
CREAT SERPL-MCNC: 0.87 MG/DL (ref 0.6–1.3)
EOSINOPHIL # BLD AUTO: 0.35 THOUSAND/ÂΜL (ref 0–0.61)
EOSINOPHIL NFR BLD AUTO: 3 % (ref 0–6)
ERYTHROCYTE [DISTWIDTH] IN BLOOD BY AUTOMATED COUNT: 12.5 % (ref 11.6–15.1)
EST. AVERAGE GLUCOSE BLD GHB EST-MCNC: 111 MG/DL
FERRITIN SERPL-MCNC: 70 NG/ML (ref 11–307)
GFR SERPL CREATININE-BSD FRML MDRD: 80 ML/MIN/1.73SQ M
GLUCOSE P FAST SERPL-MCNC: 92 MG/DL (ref 65–99)
HBA1C MFR BLD: 5.5 %
HCT VFR BLD AUTO: 45.1 % (ref 34.8–46.1)
HDLC SERPL-MCNC: 74 MG/DL
HGB BLD-MCNC: 14.5 G/DL (ref 11.5–15.4)
IMM GRANULOCYTES # BLD AUTO: 0.04 THOUSAND/UL (ref 0–0.2)
IMM GRANULOCYTES NFR BLD AUTO: 0 % (ref 0–2)
IRON SATN MFR SERPL: 20 % (ref 15–50)
IRON SERPL-MCNC: 86 UG/DL (ref 50–212)
LDLC SERPL CALC-MCNC: 69 MG/DL (ref 0–100)
LYMPHOCYTES # BLD AUTO: 2.44 THOUSANDS/ÂΜL (ref 0.6–4.47)
LYMPHOCYTES NFR BLD AUTO: 22 % (ref 14–44)
MCH RBC QN AUTO: 29.6 PG (ref 26.8–34.3)
MCHC RBC AUTO-ENTMCNC: 32.2 G/DL (ref 31.4–37.4)
MCV RBC AUTO: 92 FL (ref 82–98)
MONOCYTES # BLD AUTO: 0.55 THOUSAND/ÂΜL (ref 0.17–1.22)
MONOCYTES NFR BLD AUTO: 5 % (ref 4–12)
NEUTROPHILS # BLD AUTO: 7.81 THOUSANDS/ÂΜL (ref 1.85–7.62)
NEUTS SEG NFR BLD AUTO: 69 % (ref 43–75)
NONHDLC SERPL-MCNC: 97 MG/DL
NRBC BLD AUTO-RTO: 0 /100 WBCS
PLATELET # BLD AUTO: 329 THOUSANDS/UL (ref 149–390)
PMV BLD AUTO: 10.8 FL (ref 8.9–12.7)
POTASSIUM SERPL-SCNC: 4.2 MMOL/L (ref 3.5–5.3)
PROT SERPL-MCNC: 6.5 G/DL (ref 6.4–8.4)
RBC # BLD AUTO: 4.9 MILLION/UL (ref 3.81–5.12)
SODIUM SERPL-SCNC: 143 MMOL/L (ref 135–147)
TIBC SERPL-MCNC: 426 UG/DL (ref 250–450)
TRIGL SERPL-MCNC: 139 MG/DL
UIBC SERPL-MCNC: 340 UG/DL (ref 155–355)
VIT B12 SERPL-MCNC: 265 PG/ML (ref 180–914)
WBC # BLD AUTO: 11.28 THOUSAND/UL (ref 4.31–10.16)

## 2024-04-06 PROCEDURE — 80053 COMPREHEN METABOLIC PANEL: CPT

## 2024-04-06 PROCEDURE — 36415 COLL VENOUS BLD VENIPUNCTURE: CPT

## 2024-04-06 PROCEDURE — 83550 IRON BINDING TEST: CPT

## 2024-04-06 PROCEDURE — 83540 ASSAY OF IRON: CPT

## 2024-04-06 PROCEDURE — 82607 VITAMIN B-12: CPT

## 2024-04-06 PROCEDURE — 82728 ASSAY OF FERRITIN: CPT

## 2024-04-06 PROCEDURE — 85025 COMPLETE CBC W/AUTO DIFF WBC: CPT

## 2024-04-06 PROCEDURE — 80061 LIPID PANEL: CPT

## 2024-04-06 PROCEDURE — 82306 VITAMIN D 25 HYDROXY: CPT

## 2024-04-06 PROCEDURE — 83036 HEMOGLOBIN GLYCOSYLATED A1C: CPT

## 2024-04-10 ENCOUNTER — OFFICE VISIT (OUTPATIENT)
Age: 49
End: 2024-04-10
Payer: COMMERCIAL

## 2024-04-10 VITALS
HEART RATE: 80 BPM | WEIGHT: 147.2 LBS | SYSTOLIC BLOOD PRESSURE: 110 MMHG | TEMPERATURE: 98 F | DIASTOLIC BLOOD PRESSURE: 80 MMHG | HEIGHT: 62 IN | OXYGEN SATURATION: 96 % | BODY MASS INDEX: 27.09 KG/M2

## 2024-04-10 DIAGNOSIS — G47.00 INSOMNIA, UNSPECIFIED TYPE: ICD-10-CM

## 2024-04-10 DIAGNOSIS — R91.1 LUNG NODULE: ICD-10-CM

## 2024-04-10 DIAGNOSIS — G56.03 BILATERAL CARPAL TUNNEL SYNDROME: Primary | ICD-10-CM

## 2024-04-10 DIAGNOSIS — J44.9 STAGE 1 MILD COPD BY GOLD CLASSIFICATION (HCC): ICD-10-CM

## 2024-04-10 DIAGNOSIS — E78.49 OTHER HYPERLIPIDEMIA: ICD-10-CM

## 2024-04-10 DIAGNOSIS — D72.829 LEUKOCYTOSIS, UNSPECIFIED TYPE: ICD-10-CM

## 2024-04-10 DIAGNOSIS — G47.19 EXCESSIVE DAYTIME SLEEPINESS: ICD-10-CM

## 2024-04-10 DIAGNOSIS — F41.9 ANXIETY: ICD-10-CM

## 2024-04-10 DIAGNOSIS — K21.9 GASTROESOPHAGEAL REFLUX DISEASE WITHOUT ESOPHAGITIS: ICD-10-CM

## 2024-04-10 DIAGNOSIS — N20.0 KIDNEY STONE ON LEFT SIDE: ICD-10-CM

## 2024-04-10 DIAGNOSIS — G47.10 HYPERSOMNIA: ICD-10-CM

## 2024-04-10 DIAGNOSIS — G25.0 ESSENTIAL TREMOR: ICD-10-CM

## 2024-04-10 DIAGNOSIS — E53.8 B12 DEFICIENCY: ICD-10-CM

## 2024-04-10 PROCEDURE — 99214 OFFICE O/P EST MOD 30 MIN: CPT | Performed by: NURSE PRACTITIONER

## 2024-04-10 RX ORDER — METHYLPREDNISOLONE 4 MG/1
TABLET ORAL
Qty: 21 EACH | Refills: 0 | Status: SHIPPED | OUTPATIENT
Start: 2024-04-10 | End: 2024-04-10 | Stop reason: SDUPTHER

## 2024-04-10 RX ORDER — METHYLPREDNISOLONE 4 MG/1
TABLET ORAL
Qty: 21 EACH | Refills: 0 | Status: SHIPPED | OUTPATIENT
Start: 2024-04-10

## 2024-04-10 NOTE — PROGRESS NOTES
Assessment/Plan:    Stage 1 mild COPD by GOLD classification (HCC)  -currently follows pulmonary   -continues on Stiolto    Lung nodule  Currently following with pulmonary    Gastroesophageal reflux disease without esophagitis   You may use OTC tums as needed.  Recommend small frequent meals throughout the day.  Avoid aggravating foods - spicy foods, acidic foods - such as tomato and citrus.  Avoid alcohol.  Do not lay down for at least 30 mins after eating.         Essential tremor  Had previously seen Neurology on propanolol, had side effects with medication, currently not on medication.    Kidney stone on left side  -currently following urology     Anxiety  Well controlled on lexapro    Insomnia  -continue to follow with sleep medicine     Hypersomnia  -currently follows sleep medicine     B12 deficiency  Start OTC vitamin B12 1000mcg tablet daily     Other hyperlipidemia  -continue fenofibrate   Recommend healthy lifestyle choices for your cholesterol.  Low fat/low cholesterol diet.  Limit/avoid red meat.  Eat more lean meat - chicken breast, ground turkey, fish.  Exercise 30 mins at least 5 times a week as tolerated.        Leukocytosis  -replete white blood cell count in 1 month    Bilateral carpal tunnel syndrome  -Start methylprednisone  -Continue with wrist splits  -Referral to hand surgery  -Recommend EMG              Diagnoses and all orders for this visit:    Bilateral carpal tunnel syndrome  -     EMG 2 Limb Upper Extremity; Future  -     Discontinue: methylPREDNISolone 4 MG tablet therapy pack; Use as directed on package  -     Ambulatory Referral to Orthopedic Surgery; Future  -     methylPREDNISolone 4 MG tablet therapy pack; Use as directed on package    Leukocytosis, unspecified type  -     CBC and differential; Future    Stage 1 mild COPD by GOLD classification ([facility])    Lung nodule    Gastroesophageal reflux disease without esophagitis    Essential tremor    Kidney stone on left  side    Anxiety    Insomnia, unspecified type    Hypersomnia    Excessive daytime sleepiness    B12 deficiency    Other hyperlipidemia          Subjective:      Patient ID: Jenny Pereyra is a 48 y.o. female.    Patient presents today to follow up on blood work.    Insomnia/Fatigue- had previous home sleep studies, but she did have a CPAP machine however it is a fields that she which is recalled and patient did not get a replaced CPAP machine, but she had 2 at home studies however due to worsening insomnia, she is currently working with sleep medicine.  She reports history of insomnia, has tried multiple medications before either the medication was ineffective or had side effects.  She was started on Xywav (started in January) for sleep, following sleep medicine   She reports that she has been losing some weight     Has history of anxiety, previously on Lexapro which had worked well for her. She feels well controlled on this medication with no desire to come off of it at this time.     Hyperlipidemia-added on fenofibrate at last office visit, noted improvement, cholesterol 171, LDL 69, triglycerides 139    White blood cells noted to be elevated today at 11.28, absolute neutrophils 7.81, patient denies any signs and symptoms of infection    She reports having pain to both arms and the back of her hands feel numb, was told in the past she may have carpal tunnel and she was advised to wear wrist splits.   She has had this sensation over the years but it has gotten worse.  Pain can sometimes go up her arms.  She reports that her middle fingers are always numb.  She reports dropping things on ocassion.   Hurts more in the morning and at night. Has been taking Advil but does not seem to be helpful.             The following portions of the patient's history were reviewed and updated as appropriate: allergies, current medications, past family history, past medical history, past social history, past surgical history, and  problem list.    Review of Systems   Constitutional:  Negative for activity change, appetite change, chills, diaphoresis and fever.   HENT:  Negative for congestion, ear discharge, ear pain, postnasal drip, rhinorrhea, sinus pressure, sinus pain and sore throat.    Eyes:  Negative for pain, discharge, itching and visual disturbance.   Respiratory:  Negative for cough, chest tightness, shortness of breath and wheezing.    Cardiovascular:  Negative for chest pain, palpitations and leg swelling.   Gastrointestinal:  Negative for abdominal pain, constipation, diarrhea, nausea and vomiting.   Endocrine: Negative for polydipsia, polyphagia and polyuria.   Genitourinary:  Negative for difficulty urinating, dysuria and urgency.   Musculoskeletal:  Positive for arthralgias. Negative for back pain and neck pain.   Skin:  Negative for rash and wound.   Neurological:  Negative for dizziness, weakness, numbness and headaches.         Past Medical History:   Diagnosis Date    Anxiety     COPD (chronic obstructive pulmonary disease) ([facility])     GERD (gastroesophageal reflux disease)     Kidney stone     Motion sickness     PONV (postoperative nausea and vomiting)          Current Outpatient Medications:     ACIDOPHILUS LACTOBACILLUS PO, Take 1 tablet by mouth daily, Disp: , Rfl:     Armodafinil 150 MG tablet, Take 1 tablet (150 mg total) by mouth daily, Disp: 30 tablet, Rfl: 3    Ca, Mg, K, and Na Oxybates (Xywav) 500 MG/ML SOLN, Take 4 g by mouth daily at bedtime, Disp: 210 mL, Rfl: 2    escitalopram (Lexapro) 10 mg tablet, Take 1 tablet (10 mg total) by mouth daily, Disp: 90 tablet, Rfl: 1    fenofibrate (TRICOR) 145 mg tablet, Take 1 tablet (145 mg total) by mouth daily, Disp: 90 tablet, Rfl: 1    methylPREDNISolone 4 MG tablet therapy pack, Use as directed on package, Disp: 21 each, Rfl: 0    Multiple Vitamins-Minerals (MULTIVITAMIN ADULTS PO), Take 1 tablet by mouth daily, Disp: , Rfl:     ondansetron (ZOFRAN) 4 mg tablet, Take  1 tablet (4 mg total) by mouth every 8 (eight) hours as needed for nausea or vomiting for up to 15 days, Disp: 20 tablet, Rfl: 0    tiotropium-olodaterol (Stiolto Respimat) 2.5-2.5 MCG/ACT inhaler, Inhale 2 puffs daily, Disp: 12 g, Rfl: 0    benzonatate (TESSALON) 200 MG capsule, Take 1 capsule (200 mg total) by mouth 3 (three) times a day as needed for cough, Disp: 20 capsule, Rfl: 0    Allergies   Allergen Reactions    Chlorhexidine Hives     Itching and red rash on body from CHG cloth    Codeine Palpitations     Other reaction(s): Other (See Comments)  Other reaction(s): Irregular heart rate  Rash,vomiting, heart palpitations    Latex Rash    Penicillin V Rash and Vomiting       Social History   Past Surgical History:   Procedure Laterality Date    APPENDECTOMY      BACK SURGERY      L5 - S1    BLADDER SURGERY      COLONOSCOPY      FL RETROGRADE PYELOGRAM  12/15/2023    HYSTERECTOMY      age 29 still has lt ovary    IR RADIOFREQUENCY ABLATION      esophagus    MI CYSTO/URETERO W/LITHOTRIPSY &INDWELL STENT INSRT Left 12/15/2023    Procedure: CYSTO USCOPE W/ LASER, & STENT;  Surgeon: Jhonatan Fleming MD;  Location: [facility];  Service: Urology    SPINE SURGERY      TOTAL VAGINAL HYSTERECTOMY      AGE 29    TUBAL LIGATION      UPPER GASTROINTESTINAL ENDOSCOPY       Family History   Problem Relation Age of Onset    Heart attack Mother     Heart disease Mother     Kidney failure Mother     Heart failure Mother     Diabetes Father     Alcohol abuse Father     Suicide Attempts Brother     Thyroid disease Son     Breast cancer Maternal Grandmother     Lung cancer Maternal Grandmother 75    Arthritis Maternal Grandmother     No Known Problems Maternal Grandfather     Diabetes Paternal Grandmother     Stroke Paternal Grandmother     No Known Problems Paternal Grandfather     Breast cancer Maternal Aunt 43    Bone cancer Maternal Aunt     No Known Problems Maternal Aunt     No Known Problems Paternal Aunt     Substance  "Abuse Paternal Uncle     Drug abuse Paternal Uncle        Objective:  /80 (BP Location: Left arm, Patient Position: Sitting, Cuff Size: Standard)   Pulse 80   Temp 98 °F (36.7 °C) (Temporal)   Ht 5' 2.32\" (1.583 m)   Wt 66.8 kg (147 lb 3.2 oz)   SpO2 96%   BMI 26.64 kg/m²     Recent Results (from the past 1344 hour(s))   Stone risk profile    Collection Time: 02/22/24  9:58 AM   Result Value Ref Range    Ammonia, Urine 11 (L) 15 - 60 mmol/24 hr    Calcium 24HR Ur 291 (H) <200 mg/24 hr    Citrate, Ur 889 >550 mg/24 hr    Chloride 24HR Ur 108 70 - 250 mmol/24 hr    Cystine, Juan Diego. Urine Neg Negative    Magnesium, 24H Ur 95 30 - 120 mg/24 hr    pH, 24 Hr Urine 6.603 (H) 5.800 - 6.200    Phosphorus, 24HR Ur 416 (L) 600 - 1200 mg/24 hr    Potassium, 24HR Ur 39 20 - 100 mmol/24 hr    Sodium, 24HR Ur 112 50 - 150 mmol/24 hr    Uric Acid, 24HR Ur 521 <750 mg/24 hr    Calcium Oxalate Saturation 12.14 (H) 6.00 - 10.00    Calcium Phosphate Saturation 3.38 (H) 0.50 - 2.00    Uric Acid Saturation 0.26 <1.00    Urea Nitrogen, 24H Ur 4.30 (L) 6.00 - 14.00 g/24 hr    Protein Catabolic Rate Test not performed g/kg/24 hr    Creatinine/Kg Body Weight Test not performed mg/24 hr/kg    Calcium/Kg Body Weight Test not performed mg/24 hr/kg    Calcium/Creat.Ratio 237 51 - 262 mg/g creat    COMMENT Note     Urine Volume (Preserved) 1180 500 - 4000 mL/24 hr    Oxalate, Ur 27 20 - 40 mg/24 hr    Sulfate, Urine 12 (L) 20 - 80 meq/24 hr    Creatinine, Urine 1230 Not Applic. mg/24 hr   Comprehensive metabolic panel    Collection Time: 03/02/24  9:03 AM   Result Value Ref Range    Sodium 140 135 - 147 mmol/L    Potassium 4.1 3.5 - 5.3 mmol/L    Chloride 105 96 - 108 mmol/L    CO2 28 21 - 32 mmol/L    ANION GAP 7 mmol/L    BUN 21 5 - 25 mg/dL    Creatinine 0.91 0.60 - 1.30 mg/dL    Glucose, Fasting 80 65 - 99 mg/dL    Calcium 9.6 8.4 - 10.2 mg/dL    AST 19 13 - 39 U/L    ALT 17 7 - 52 U/L    Alkaline Phosphatase 45 34 - 104 U/L    " Total Protein 6.9 6.4 - 8.4 g/dL    Albumin 4.6 3.5 - 5.0 g/dL    Total Bilirubin 0.34 0.20 - 1.00 mg/dL    eGFR 75 ml/min/1.73sq m   PTH, intact    Collection Time: 03/02/24  9:03 AM   Result Value Ref Range    PTH 18.8 12.0 - 88.0 pg/mL   Uric acid    Collection Time: 03/02/24  9:03 AM   Result Value Ref Range    Uric Acid 3.1 2.0 - 7.5 mg/dL   [facility] Molecular Screen    Collection Time: 03/02/24  9:03 AM   Result Value Ref Range    DAVIS SYNDROME DNA ANALYSIS Not Detected     HEREDITARY BREAST & OVARIAN CANCER DNA ANALYSIS Not Detected     FAMILIAL HYPERCHOLESTEROLEMIA DNA ANALYSIS Not Detected    CBC and differential    Collection Time: 04/06/24  8:35 AM   Result Value Ref Range    WBC 11.28 (H) 4.31 - 10.16 Thousand/uL    RBC 4.90 3.81 - 5.12 Million/uL    Hemoglobin 14.5 11.5 - 15.4 g/dL    Hematocrit 45.1 34.8 - 46.1 %    MCV 92 82 - 98 fL    MCH 29.6 26.8 - 34.3 pg    MCHC 32.2 31.4 - 37.4 g/dL    RDW 12.5 11.6 - 15.1 %    MPV 10.8 8.9 - 12.7 fL    Platelets 329 149 - 390 Thousands/uL    nRBC 0 /100 WBCs    Segmented % 69 43 - 75 %    Immature Grans % 0 0 - 2 %    Lymphocytes % 22 14 - 44 %    Monocytes % 5 4 - 12 %    Eosinophils Relative 3 0 - 6 %    Basophils Relative 1 0 - 1 %    Absolute Neutrophils 7.81 (H) 1.85 - 7.62 Thousands/µL    Absolute Immature Grans 0.04 0.00 - 0.20 Thousand/uL    Absolute Lymphocytes 2.44 0.60 - 4.47 Thousands/µL    Absolute Monocytes 0.55 0.17 - 1.22 Thousand/µL    Eosinophils Absolute 0.35 0.00 - 0.61 Thousand/µL    Basophils Absolute 0.09 0.00 - 0.10 Thousands/µL   Comprehensive metabolic panel    Collection Time: 04/06/24  8:35 AM   Result Value Ref Range    Sodium 143 135 - 147 mmol/L    Potassium 4.2 3.5 - 5.3 mmol/L    Chloride 109 (H) 96 - 108 mmol/L    CO2 25 21 - 32 mmol/L    ANION GAP 9 4 - 13 mmol/L    BUN 21 5 - 25 mg/dL    Creatinine 0.87 0.60 - 1.30 mg/dL    Glucose, Fasting 92 65 - 99 mg/dL    Calcium 9.5 8.4 - 10.2 mg/dL    AST 19 13 - 39 U/L    ALT 21 7 - 52  U/L    Alkaline Phosphatase 41 34 - 104 U/L    Total Protein 6.5 6.4 - 8.4 g/dL    Albumin 4.4 3.5 - 5.0 g/dL    Total Bilirubin 0.29 0.20 - 1.00 mg/dL    eGFR 80 ml/min/1.73sq m   Lipid panel    Collection Time: 04/06/24  8:35 AM   Result Value Ref Range    Cholesterol 171 See Comment mg/dL    Triglycerides 139 See Comment mg/dL    HDL, Direct 74 >=50 mg/dL    LDL Calculated 69 0 - 100 mg/dL    Non-HDL-Chol (CHOL-HDL) 97 mg/dl   Vitamin B12    Collection Time: 04/06/24  8:35 AM   Result Value Ref Range    Vitamin B-12 265 180 - 914 pg/mL   Vitamin D 25 hydroxy    Collection Time: 04/06/24  8:35 AM   Result Value Ref Range    Vit D, 25-Hydroxy 54.1 30.0 - 100.0 ng/mL   Hemoglobin A1C    Collection Time: 04/06/24  8:35 AM   Result Value Ref Range    Hemoglobin A1C 5.5 Normal 4.0-5.6%; PreDiabetic 5.7-6.4%; Diabetic >=6.5%; Glycemic control for adults with diabetes <7.0% %     mg/dl   TIBC Panel (incl. Iron, TIBC, % Iron Saturation)    Collection Time: 04/06/24  8:35 AM   Result Value Ref Range    Iron Saturation 20 15 - 50 %    TIBC 426 250 - 450 ug/dL    Iron 86 50 - 212 ug/dL    UIBC 340 155 - 355 ug/dL   Ferritin    Collection Time: 04/06/24  8:35 AM   Result Value Ref Range    Ferritin 70 11 - 307 ng/mL            Physical Exam  Constitutional:       General: She is not in acute distress.     Appearance: She is well-developed. She is not diaphoretic.   HENT:      Head: Normocephalic and atraumatic.      Right Ear: External ear normal.      Left Ear: External ear normal.      Nose: Nose normal.      Mouth/Throat:      Mouth: Mucous membranes are moist.      Pharynx: No oropharyngeal exudate or posterior oropharyngeal erythema.   Eyes:      General:         Right eye: No discharge.         Left eye: No discharge.      Conjunctiva/sclera: Conjunctivae normal.      Pupils: Pupils are equal, round, and reactive to light.   Neck:      Thyroid: No thyromegaly.   Cardiovascular:      Rate and Rhythm: Normal rate  and regular rhythm.      Heart sounds: Normal heart sounds. No murmur heard.     No friction rub. No gallop.   Pulmonary:      Effort: Pulmonary effort is normal. No respiratory distress.      Breath sounds: Normal breath sounds. No stridor. No wheezing or rales.   Abdominal:      General: Bowel sounds are normal. There is no distension.      Palpations: Abdomen is soft.      Tenderness: There is no abdominal tenderness.   Musculoskeletal:      Cervical back: Normal range of motion and neck supple.      Comments: Positive Tinel test    Lymphadenopathy:      Cervical: No cervical adenopathy.   Skin:     General: Skin is warm and dry.      Findings: No erythema or rash.   Neurological:      Mental Status: She is alert and oriented to person, place, and time.   Psychiatric:         Behavior: Behavior normal.         Thought Content: Thought content normal.         Judgment: Judgment normal.

## 2024-04-10 NOTE — ASSESSMENT & PLAN NOTE
-continue fenofibrate   Recommend healthy lifestyle choices for your cholesterol.  Low fat/low cholesterol diet.  Limit/avoid red meat.  Eat more lean meat - chicken breast, ground turkey, fish.  Exercise 30 mins at least 5 times a week as tolerated.

## 2024-04-11 NOTE — PATIENT INSTRUCTIONS
Carpal Tunnel Syndrome Exercises   WHAT YOU NEED TO KNOW:   What do I need to know about carpal tunnel syndrome (CTS) exercises?  CTS exercises can help relieve pain and increase your range of motion. Your healthcare provider or physical therapist can tell you how often to do the exercises.  How do I perform CTS exercises?   Finger extensions:  Hold your fingers and thumb close together. Keep them straight. Put a rubber band around the outside of your fingers and thumb. Spread your fingers apart. Then slowly bring them together without letting the rubber band fall off. Repeat 40 times.         Wrist flexor stretch:  Hold your arm out straight. Grasp your fingers with your other hand. Slowly bend the fingers back (palm facing away) until you feel a stretch in your wrist. Hold for 10 seconds. Repeat 5 times.         Wrist extensor stretch:  Hold your arm out straight. Grasp your fingers with your other hand. Slowly bend the fingers and hand down (palm facing you) until you feel a stretch on top of your hand. Hold for 10 seconds. Repeat 5 times.         Wrist curls without weights:  Sit in a chair with your forearm resting on your thigh or a table. With your palm facing down, flex your wrist up 3 inches and slowly lower it. Turn your forearm over and repeat with your palm facing up. Do each exercise 20 times.         Shrugs:  Stand with your arms by your side. Lift your shoulders up to your ears and hold for 1 second. Then pull your shoulders back, pinching your shoulder blades together. Hold for 1 second. Then relax your shoulders. Repeat 20 times.       CARE AGREEMENT:   You have the right to help plan your care. Learn about your health condition and how it may be treated. Discuss treatment options with your healthcare providers to decide what care you want to receive. You always have the right to refuse treatment. The above information is an  only. It is not intended as medical advice for individual  conditions or treatments. Talk to your doctor, nurse or pharmacist before following any medical regimen to see if it is safe and effective for you.  © Copyright [employer] 2023 Information is for End User's use only and may not be sold, redistributed or otherwise used for commercial purposes.

## 2024-04-11 NOTE — PROGRESS NOTES
Orthopaedic Surgery - Office Note  Jenny Pereyra (48 y.o. female)   : 1975   MRN: [id number]  Encounter Date: 2024    Chief Complaint   Patient presents with    Left Hand - Pain    Right Hand - Pain         Assessment/Plan  Diagnoses and all orders for this visit:    Right hand pain  -     US MSK limited; Future  -     Ambulatory Referral to Orthopedic Surgery; Future    Left hand pain  -     US MSK limited; Future  -     Ambulatory Referral to Orthopedic Surgery; Future    Numbness and tingling in right hand  -     US MSK limited; Future  -     Ambulatory Referral to Orthopedic Surgery; Future    Numbness and tingling in left hand  -     US MSK limited; Future  -     Ambulatory Referral to Orthopedic Surgery; Future    Bilateral carpal tunnel syndrome  -     Ambulatory Referral to Orthopedic Surgery  -     US MSK limited; Future  -     Ambulatory Referral to Orthopedic Surgery; Future    The carpal tunnel syndrome diagnosis was reviewed with the patient in the office today.  Initial treatment recommendations would consist of nighttime carpal tunnel splinting-she reports the nighttime bracing's have not helped actually make her feel worse.  Oral anti-inflammatory are not recommended as she states she cannot take NSAIDs with her history.  Patient will be provided a physician directed home exercise program for carpal tunnel syndrome and will be available in the after visit summary.  In addition patient will be sent for an ultrasound of the wrist to confirm the diagnosis.  Patient will follow-up with a hand surgeon to discuss the success/failure of the conservative treatments as well as the ultrasound results.  All question concerns were answered in the office today.       Return for Recheck with hand surgeon to discuss u/s results.        History of Present Illness  This is a new patient with numbness tingling and pain involving the thumb index and middle finger.  She discussed the symptoms with her PCP  "on 4/10/2024 and a working diagnosis of bilateral carpal tunnel syndrome was made.  Patient was started on a Medrol Dosepak and an EMG was ordered.  She has been advised to wear night splints in the past.  The middle fingers are always numb.  The symptoms are worse in the morning.  Advil has not helped her symptoms.    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    Review of Systems  Pertinent items are noted in HPI.  All other systems were reviewed and are negative.    Physical Exam  /85 (BP Location: Left arm, Patient Position: Sitting, Cuff Size: Standard)   Pulse 91   Ht 5' 2\" (1.575 m)   Wt 66.7 kg (147 lb)   BMI 26.89 kg/m²   Cons: Appears well.  No apparent distress.  Psych: Alert. Oriented x3.  Mood and affect normal.  Patient's right and left wrist are without skin breakdown lesions or signs of infection.  She has markedly positive phalanx at under 5 seconds.  She has a positive Tinel's.  Negative Finkelstein's.  She has full active and passive range of motion of both wrists.   strength and pinch strength is decreased bilaterally to 4 out of 5.  There is no thenar atrophy or wasting.  No trigger fingers or Dupuytren's contractures are noted.  She is nontender at the CMC joint.  She is nontender at the distal radius and ulnar styloid.  Distal radial and ulnar pulses are +2              Studies Reviewed  PCP notes from 4/10/2024 were reviewed by myself in the office today    Procedures  No procedures today.    Medical, Surgical, Family, and Social History  The patient's medical history, family history, and social history, were reviewed and updated as appropriate.    Past Medical History:   Diagnosis Date    Anxiety     COPD (chronic obstructive pulmonary disease) (HCC)     GERD (gastroesophageal reflux disease)     Kidney stone     Motion sickness     PONV (postoperative nausea and vomiting)        Past Surgical History:   Procedure Laterality Date    APPENDECTOMY      BACK SURGERY      L5 - S1 "    BLADDER SURGERY      COLONOSCOPY      FL RETROGRADE PYELOGRAM  12/15/2023    HYSTERECTOMY      age 29 still has lt ovary    IR RADIOFREQUENCY ABLATION      esophagus    LA CYSTO/URETERO W/LITHOTRIPSY &INDWELL STENT INSRT Left 12/15/2023    Procedure: CYSTO USCOPE W/ LASER, & STENT;  Surgeon: Jhonatan Fleming MD;  Location: [facility];  Service: Urology    SPINE SURGERY      TOTAL VAGINAL HYSTERECTOMY      AGE 29    TUBAL LIGATION      UPPER GASTROINTESTINAL ENDOSCOPY         Family History   Problem Relation Age of Onset    Heart attack Mother     Heart disease Mother     Kidney failure Mother     Heart failure Mother     Diabetes Father     Alcohol abuse Father     Suicide Attempts Brother     Thyroid disease Son     Breast cancer Maternal Grandmother     Lung cancer Maternal Grandmother 75    Arthritis Maternal Grandmother     No Known Problems Maternal Grandfather     Diabetes Paternal Grandmother     Stroke Paternal Grandmother     No Known Problems Paternal Grandfather     Breast cancer Maternal Aunt 43    Bone cancer Maternal Aunt     No Known Problems Maternal Aunt     No Known Problems Paternal Aunt     Substance Abuse Paternal Uncle     Drug abuse Paternal Uncle        Social History     Occupational History    Not on file   Tobacco Use    Smoking status: Former     Current packs/day: 0.00     Average packs/day: 0.5 packs/day for 25.0 years (12.5 ttl pk-yrs)     Types: Cigarettes     Start date:      Quit date:      Years since quittin.2    Smokeless tobacco: Never   Vaping Use    Vaping status: Never Used   Substance and Sexual Activity    Alcohol use: Not Currently    Drug use: Never    Sexual activity: Not on file       Allergies   Allergen Reactions    Chlorhexidine Hives     Itching and red rash on body from CHG cloth    Codeine Palpitations     Other reaction(s): Other (See Comments)  Other reaction(s): Irregular heart rate  Rash,vomiting, heart palpitations    Latex Rash     Penicillin V Rash and Vomiting         Current Outpatient Medications:     ACIDOPHILUS LACTOBACILLUS PO, Take 1 tablet by mouth daily, Disp: , Rfl:     Armodafinil 150 MG tablet, Take 1 tablet (150 mg total) by mouth daily, Disp: 30 tablet, Rfl: 3    benzonatate (TESSALON) 200 MG capsule, Take 1 capsule (200 mg total) by mouth 3 (three) times a day as needed for cough, Disp: 20 capsule, Rfl: 0    Ca, Mg, K, and Na Oxybates (Xywav) 500 MG/ML SOLN, Take 4 g by mouth daily at bedtime, Disp: 210 mL, Rfl: 2    escitalopram (Lexapro) 10 mg tablet, Take 1 tablet (10 mg total) by mouth daily, Disp: 90 tablet, Rfl: 1    fenofibrate (TRICOR) 145 mg tablet, Take 1 tablet (145 mg total) by mouth daily, Disp: 90 tablet, Rfl: 1    methylPREDNISolone 4 MG tablet therapy pack, Use as directed on package, Disp: 21 each, Rfl: 0    Multiple Vitamins-Minerals (MULTIVITAMIN ADULTS PO), Take 1 tablet by mouth daily, Disp: , Rfl:     ondansetron (ZOFRAN) 4 mg tablet, Take 1 tablet (4 mg total) by mouth every 8 (eight) hours as needed for nausea or vomiting for up to 15 days, Disp: 20 tablet, Rfl: 0    tiotropium-olodaterol (Stiolto Respimat) 2.5-2.5 MCG/ACT inhaler, Inhale 2 puffs daily, Disp: 12 g, Rfl: 0      Francisco Hammond PA-C

## 2024-04-13 ENCOUNTER — OFFICE VISIT (OUTPATIENT)
Dept: OBGYN CLINIC | Facility: CLINIC | Age: 49
End: 2024-04-13

## 2024-04-13 VITALS
HEIGHT: 62 IN | SYSTOLIC BLOOD PRESSURE: 125 MMHG | HEART RATE: 91 BPM | DIASTOLIC BLOOD PRESSURE: 85 MMHG | WEIGHT: 147 LBS | BODY MASS INDEX: 27.05 KG/M2

## 2024-04-13 DIAGNOSIS — R20.2 NUMBNESS AND TINGLING IN LEFT HAND: ICD-10-CM

## 2024-04-13 DIAGNOSIS — R20.0 NUMBNESS AND TINGLING IN LEFT HAND: ICD-10-CM

## 2024-04-13 DIAGNOSIS — G56.03 BILATERAL CARPAL TUNNEL SYNDROME: ICD-10-CM

## 2024-04-13 DIAGNOSIS — M79.641 RIGHT HAND PAIN: Primary | ICD-10-CM

## 2024-04-13 DIAGNOSIS — R20.2 NUMBNESS AND TINGLING IN RIGHT HAND: ICD-10-CM

## 2024-04-13 DIAGNOSIS — M79.642 LEFT HAND PAIN: ICD-10-CM

## 2024-04-13 DIAGNOSIS — R20.0 NUMBNESS AND TINGLING IN RIGHT HAND: ICD-10-CM

## 2024-05-05 DIAGNOSIS — G47.10 HYPERSOMNIA: ICD-10-CM

## 2024-05-07 ENCOUNTER — HOSPITAL ENCOUNTER (OUTPATIENT)
Dept: RADIOLOGY | Facility: HOSPITAL | Age: 49
Discharge: HOME | End: 2024-05-07
Payer: COMMERCIAL

## 2024-05-07 DIAGNOSIS — G56.03 BILATERAL CARPAL TUNNEL SYNDROME: ICD-10-CM

## 2024-05-07 DIAGNOSIS — R20.0 NUMBNESS AND TINGLING IN RIGHT HAND: ICD-10-CM

## 2024-05-07 DIAGNOSIS — R20.2 NUMBNESS AND TINGLING IN LEFT HAND: ICD-10-CM

## 2024-05-07 DIAGNOSIS — M79.641 RIGHT HAND PAIN: ICD-10-CM

## 2024-05-07 DIAGNOSIS — R20.2 NUMBNESS AND TINGLING IN RIGHT HAND: ICD-10-CM

## 2024-05-07 DIAGNOSIS — G47.10 HYPERSOMNIA: ICD-10-CM

## 2024-05-07 DIAGNOSIS — R20.0 NUMBNESS AND TINGLING IN LEFT HAND: ICD-10-CM

## 2024-05-07 DIAGNOSIS — M79.642 LEFT HAND PAIN: ICD-10-CM

## 2024-05-07 PROCEDURE — 76882 US LMTD JT/FCL EVL NVASC XTR: CPT

## 2024-05-07 RX ORDER — ARMODAFINIL 150 MG/1
150 TABLET ORAL DAILY
Qty: 30 TABLET | Refills: 3 | Status: SHIPPED | OUTPATIENT
Start: 2024-05-07

## 2024-05-07 NOTE — TELEPHONE ENCOUNTER
Received fax from [facility] pharmacy requesting refill of Xywav.    Last office visit 3/27/24.  Follow up scheduled 10/1/24.

## 2024-05-08 RX ORDER — (CALCIUM, MAGNESIUM, POTASSIUM, AND SODIUM OXYBATES) .5; .5; .5; .5 G/ML; G/ML; G/ML; G/ML
4 SOLUTION ORAL
Qty: 240 ML | Refills: 2 | Status: SHIPPED | OUTPATIENT
Start: 2024-05-08

## 2024-05-20 ENCOUNTER — OFFICE VISIT (OUTPATIENT)
Dept: OBGYN CLINIC | Facility: HOSPITAL | Age: 49
End: 2024-05-20
Payer: COMMERCIAL

## 2024-05-20 VITALS — BODY MASS INDEX: 27.05 KG/M2 | WEIGHT: 147 LBS | HEIGHT: 62 IN

## 2024-05-20 DIAGNOSIS — R20.0 NUMBNESS AND TINGLING IN RIGHT HAND: ICD-10-CM

## 2024-05-20 DIAGNOSIS — R20.0 NUMBNESS AND TINGLING IN LEFT HAND: ICD-10-CM

## 2024-05-20 DIAGNOSIS — R20.2 NUMBNESS AND TINGLING IN LEFT HAND: ICD-10-CM

## 2024-05-20 DIAGNOSIS — G56.21 CUBITAL TUNNEL SYNDROME ON RIGHT: ICD-10-CM

## 2024-05-20 DIAGNOSIS — M79.641 RIGHT HAND PAIN: ICD-10-CM

## 2024-05-20 DIAGNOSIS — M79.642 LEFT HAND PAIN: ICD-10-CM

## 2024-05-20 DIAGNOSIS — G56.01 CARPAL TUNNEL SYNDROME ON RIGHT: Primary | ICD-10-CM

## 2024-05-20 DIAGNOSIS — G56.03 BILATERAL CARPAL TUNNEL SYNDROME: ICD-10-CM

## 2024-05-20 DIAGNOSIS — R20.2 NUMBNESS AND TINGLING IN RIGHT HAND: ICD-10-CM

## 2024-05-20 PROCEDURE — 99244 OFF/OP CNSLTJ NEW/EST MOD 40: CPT | Performed by: SURGERY

## 2024-05-20 RX ORDER — SODIUM CHLORIDE 9 MG/ML
100 INJECTION, SOLUTION INTRAVENOUS CONTINUOUS
Status: CANCELLED | OUTPATIENT
Start: 2024-05-31

## 2024-05-20 NOTE — H&P
Dorothea Mayo M.D.  Attending, Orthopaedic Surgery  Hand, Wrist, and Elbow Surgery  [facility]      ORTHOPAEDIC HAND, WRIST, AND ELBOW OFFICE  VISIT       ASSESSMENT/PLAN:      48 year old female who has bilateral carpal tunnel syndrome noted on US. On exam, she has + tinel's at the elbow right >left. It's recommended to obtain US of the bilateral elbows for ulnar nerve compression.   The anatomy and physiology of carpal tunnel syndrome was discussed with the patient today.  Increase pressure localized under the transverse carpal ligament can cause pain, numbness, tingling, or dysesthesias within the median nerve distribution as well as feelings of fatigue, clumsiness, or awkwardness.  These symptoms typically occur at night and worse in the morning upon waking.  Eventually, untreated carpal tunnel syndrome can result in weakness and permanent loss of muscle within the thenar compartment of the hand.  Treatment options were discussed with the patient.  Conservative treatment includes nocturnal resting splints to keep the nerve in a neutral position, ergonomic changes within the work or home environment, activity modification, and tendon gliding exercises.  Steroid injections within the carpal canal can help a majority of patients, however this is often self-limited in a majority of patients.  Surgical intervention to divide the transverse carpal ligament typically results in a long-lasting relief of the patient's complaints, with the recurrence rate of less than 1%.   The anatomy and physiology of cubital tunnel syndrome were discussed with the patient today in the office.  Typically, increased elbow flexion activities decrease blood flow within the intraneural spaces, resulting in a feeling of numbness, tingling, weakness, or clumsiness within the hand and fingers.  Occasionally, anatomic structures such as medial elbow osteophytes, the medial head of the triceps, were subluxing ulnar  nerve may result in increased pressure or aggravation at the cubital tunnel.  Typical signs and symptoms usually include numbness and tingling within the ring and small finger, weakness with , and weakness with pinch.  Conservative treatment and includes nocturnal bracing to keep the elbow in a semi-extended position, activity modification, therapy, and avoiding excessive elbow flexion activities.  A majority of patients typically respond to conservative treatment over a period of approximately 3-6 months.  EMG/NCV testing of the ulnar nerve at the elbow is not as reliable as carpal tunnel syndrome.  Surgical intervention in the form of in situ release of the ulnar nerve at the elbow or ulnar nerve transposition may be required in up to 20% of patients.  A right carpal tunnel release with possible cubital tunnel release with transposition with possible adipose flap outpatient at [place].  She gets PONV  Patient's hand will be wrapped up for 5 days postoperatively, lifting nothing heavier than a coffee cup.  After 5 days postop dressings can come off, she can wash with warm water, soap and pat dry.  But still no submerging or lifting anything of weight.  Sutures will come out between 10 and 14 days postoperatively.  She is still do not submerge hand into any body of water for 3 to 5 days after sutures come out to allow suture holes to close.  Patient shows understanding and agrees with this plan of care.  We will discuss work status due to patient having limited time and works in the [employer] at home.        The patient verbalized understanding of exam findings and treatment plan. We engaged in the shared decision-making process and treatment options were discussed at length with the patient. Surgical and conservative management discussed today along with risks and benefits.    Diagnoses and all orders for this visit:    Right hand pain  -     Ambulatory Referral to Orthopedic Surgery    Left hand  pain  -     Ambulatory Referral to Orthopedic Surgery    Numbness and tingling in right hand  -     Ambulatory Referral to Orthopedic Surgery    Numbness and tingling in left hand  -     Ambulatory Referral to Orthopedic Surgery    Bilateral carpal tunnel syndrome  -     Ambulatory Referral to Orthopedic Surgery        Follow Up:  Return for Follow up post- op.    To Do Next Visit:  Re-evaluation of current issue      Operative Discussions:  Cubital Tunnel Release:   The anatomy and physiology of cubital tunnel syndrome were discussed with the patient today in the office.  Typically, increased elbow flexion activities decrease blood flow within the intraneural spaces, resulting in a feeling of numbness, tingling, weakness, or clumsiness within the hand and fingers.  Occasionally, anatomic structures such as medial elbow osteophytes, the medial head of the triceps, were subluxing ulnar nerve may result in increased pressure or aggravation at the cubital tunnel.  Typical signs and symptoms usually include numbness and tingling within the ring and small finger, weakness with , and weakness with pinch.  Conservative treatment and includes nocturnal bracing to keep the elbow in a semi-extended position, activity modification, therapy, and avoiding excessive elbow flexion activities.  A majority of patients typically respond to conservative treatment over a period of approximately 3-6 months.  Vitamin B6 one tablet daily over the counter may helpful to reduce symptoms.  EMG/NCV testing of the ulnar nerve at the elbow is not as reliable as carpal tunnel syndrome.  Surgical intervention in the form of in situ release of the ulnar nerve at the elbow or ulnar nerve transposition may be required in up to 20% of patients. The patient has elected to undergo cubital tunnel release.  The possibility of converting to a subcutaneous or submuscular ulnar nerve transposition depending on the nerve stability was discussed with the  patient.  Typically, in the postoperative period, light activities are allowed immediately, driving is allowed when narcotic medications have stopped, and the incision may get wet after 5 days.  Heavy activities will be allowed after follow up appointment in 1-2 weeks.  While the pain within the ring and small finger of the hands generally improves rapidly, the numbness and tingling, as well as the strength, will slowly improve over a period of weeks to months.  Total recovery can take up to 18 months from the time of surgery.  Numbness and tingling near the incision, or near the medial aspect of the forearm was discussed with the patient.  The patient has an understanding of the above mentioned discussion. The risks and benefits of the procedure were explained to the patient, which include, but are not limited to: Bleeding, infection, recurrence, pain, scar, damage to tendons, damage to nerves, and damage to blood vessels, failure to give desired results and complications related to anesthesia.  These risks, along with alternative conservative treatment options, and postoperative protocols were voiced back and understood by the patient.  All questions were answered to the patient's satisfaction.  The patient agrees to comply with a standard postoperative protocol, and is willing to proceed.  Education was provided via written and auditory forms.  There were no barriers to learning. Written handouts regarding wound care, incision and scar care, and general preoperative information was provided to the patient.  Prior to surgery, the patient may be requested to stop all anti-inflammatory medications.  Prophylactic aspirin, Plavix, and Coumadin may be allowed to be continued.  Medications including vitamin E., ginkgo, and fish oil are requested to be stopped approximately one week prior to surgery.  Hypertensive medications and beta blockers, if taken, should be continued. Vitamin B6 one tablet daily over the counter  may helpful to reduce symptoms.   and Open Carpal Tunnel Release:   The anatomy and physiology of carpal tunnel syndrome was discussed with the patient today.  Increase pressure localized under the transverse carpal ligament can cause pain, numbness, tingling, or dysesthesias within the median nerve distribution as well as feelings of fatigue, clumsiness, or awkwardness.  These symptoms typically occur at night and worse in the morning upon waking.  Eventually, untreated carpal tunnel syndrome can result in weakness and permanent loss of muscle within the thenar compartment of the hand.  Treatment options were discussed with the patient.  Conservative treatment includes nocturnal resting splints to keep the nerve in a neutral position, ergonomic changes within the work or home environment, activity modification, and tendon gliding exercises.  Vitamin B6 one tablet daily over the counter may helpful to reduce symptoms.  Steroid injections within the carpal canal can help a majority of patients, however this is often self-limited in a majority of patients.  Surgical intervention to divide the transverse carpal ligament typically results in a long-lasting relief of the patient's complaints, with the recurrence rate of less than 1%. The patient has elected to undergo an open carpal tunnel release.  The palmar incision technique was discussed in the office with the patient today.   In the postoperative period, light activities are allowed immediately, driving is allowed when narcotic medication has stopped, and the bandages may be removed and incision may get wet after 2 days.  Heavy activities (lifting more than approximately 10 pounds) will be allowed after follow up appointment in 1-2 weeks.  While night symptoms (waking from sleep, pain, and discomfort in the hands) generally improves rapidly, the numbness and tingling as well as the strength will slowly improve over weeks to months depending on the chronicity and  severity of the carpal tunnel syndrome.  Pillar pain and scar discomfort were discussed with the patient which are self-limiting conditions.  The risks of bleeding and infection from the surgery are less than 1%.  Risk of recurrence is approximately 0.5%.  The risks of nerve injury or nerve damage or damage to the blood vessels is approximately 1 in 1200. The patient has an understanding of the above mentioned discussion.The risks and benefits of the procedure were explained to the patient, which include, but are not limited to: Bleeding, infection, recurrence, pain, scar, damage to tendons, damage to nerves, and damage to blood vessels, failure to give desired results and complications related to anesthesia.  These risks, along with alternative conservative treatment options, and postoperative protocols were voiced back and understood by the patient.  All questions were answered to the patient's satisfaction.  The patient agrees to comply with a standard postoperative protocol, and is willing to proceed.  Education was provided via written and auditory forms.  There were no barriers to learning. Written handouts regarding wound care, incision and scar care, and general preoperative information was provided to the patient.  Prior to surgery, the patient may be requested to stop all anti-inflammatory medications.  Prophylactic aspirin, Plavix, and Coumadin may be allowed to be continued.  Medications including vitamin E., ginkgo, and fish oil are requested to be stopped approximately one week prior to surgery.  Hypertensive medications and beta blockers, if taken, s      ____________________________________________________________________________________________________________________________________________      CHIEF COMPLAINT:  Chief Complaint   Patient presents with    Left Hand - Numbness     Middle fingers on both hands are always numb. First 4 go numb regular.     Right Hand - Numbness        SUBJECTIVE:  Jenny Pereyra is a 48 y.o. year old RHD female who presents today for consultation for bilateral carpal tunnel syndrome referred by Francisco Hammond PA-C.  Patient was seen by Francisco Parekh on 4/13/2024, due to numbness and tingling involving the thumb, index and middle finger.  Patient was placed on a Medrol Dosepak and an EMG was ordered.  Patient has been wearing nighttime splints.  Most of her symptoms are worse in the morning.  Patient works in the [employer] home.      Pain/symptom timing:  Worse during the day when active  Pain/symptom context:  Worse with activites and work  Pain/symptom modifying factors:  Rest makes better, activities make worse  Pain/symptom associated signs/symptoms: none    Prior treatment   NSAIDsYes   Injections No   Bracing/Orthotics Yes    Physical Therapy No     I have personally reviewed all the relevant PMH, PSH, SH, FH, Medications and allergies      PAST MEDICAL HISTORY:  Past Medical History:   Diagnosis Date    Anxiety     COPD (chronic obstructive pulmonary disease) (HCC)     GERD (gastroesophageal reflux disease)     Kidney stone     Motion sickness     PONV (postoperative nausea and vomiting)        PAST SURGICAL HISTORY:  Past Surgical History:   Procedure Laterality Date    APPENDECTOMY      BACK SURGERY      L5 - S1    BLADDER SURGERY      COLONOSCOPY      FL RETROGRADE PYELOGRAM  12/15/2023    HYSTERECTOMY      age 29 still has lt ovary    IR RADIOFREQUENCY ABLATION      esophagus    ND CYSTO/URETERO W/LITHOTRIPSY &INDWELL STENT INSRT Left 12/15/2023    Procedure: CYSTO USCOPE W/ LASER, & STENT;  Surgeon: Jhonatan Fleming MD;  Location: [facility];  Service: Urology    SPINE SURGERY      TOTAL VAGINAL HYSTERECTOMY      AGE 29    TUBAL LIGATION      UPPER GASTROINTESTINAL ENDOSCOPY         FAMILY HISTORY:  Family History   Problem Relation Age of Onset    Heart attack Mother     Heart disease Mother     Kidney failure Mother     Heart  failure Mother     Diabetes Father     Alcohol abuse Father     Suicide Attempts Brother     Thyroid disease Son     Breast cancer Maternal Grandmother     Lung cancer Maternal Grandmother 75    Arthritis Maternal Grandmother     No Known Problems Maternal Grandfather     Diabetes Paternal Grandmother     Stroke Paternal Grandmother     No Known Problems Paternal Grandfather     Breast cancer Maternal Aunt 43    Bone cancer Maternal Aunt     No Known Problems Maternal Aunt     No Known Problems Paternal Aunt     Substance Abuse Paternal Uncle     Drug abuse Paternal Uncle        SOCIAL HISTORY:  Social History     Tobacco Use    Smoking status: Former     Current packs/day: 0.00     Average packs/day: 0.5 packs/day for 25.0 years (12.5 ttl pk-yrs)     Types: Cigarettes     Start date:      Quit date:      Years since quittin.3    Smokeless tobacco: Never   Vaping Use    Vaping status: Never Used   Substance Use Topics    Alcohol use: Not Currently    Drug use: Never       MEDICATIONS:    Current Outpatient Medications:     ACIDOPHILUS LACTOBACILLUS PO, Take 1 tablet by mouth daily, Disp: , Rfl:     Armodafinil 150 MG tablet, Take 1 tablet (150 mg total) by mouth daily, Disp: 30 tablet, Rfl: 3    Ca, Mg, K, and Na Oxybates (Xywav) 500 MG/ML SOLN, Take 4 g by mouth daily at bedtime, Disp: 240 mL, Rfl: 2    escitalopram (Lexapro) 10 mg tablet, Take 1 tablet (10 mg total) by mouth daily, Disp: 90 tablet, Rfl: 1    fenofibrate (TRICOR) 145 mg tablet, Take 1 tablet (145 mg total) by mouth daily, Disp: 90 tablet, Rfl: 1    Multiple Vitamins-Minerals (MULTIVITAMIN ADULTS PO), Take 1 tablet by mouth daily, Disp: , Rfl:     tiotropium-olodaterol (Stiolto Respimat) 2.5-2.5 MCG/ACT inhaler, Inhale 2 puffs daily, Disp: 12 g, Rfl: 0    benzonatate (TESSALON) 200 MG capsule, Take 1 capsule (200 mg total) by mouth 3 (three) times a day as needed for cough, Disp: 20 capsule, Rfl: 0    methylPREDNISolone 4 MG tablet  therapy pack, Use as directed on package, Disp: 21 each, Rfl: 0    ondansetron (ZOFRAN) 4 mg tablet, Take 1 tablet (4 mg total) by mouth every 8 (eight) hours as needed for nausea or vomiting for up to 15 days, Disp: 20 tablet, Rfl: 0    ALLERGIES:  Allergies   Allergen Reactions    Chlorhexidine Hives     Itching and red rash on body from CHG cloth    Codeine Palpitations     Other reaction(s): Other (See Comments)  Other reaction(s): Irregular heart rate  Rash,vomiting, heart palpitations    Latex Rash    Penicillin V Rash and Vomiting           REVIEW OF SYSTEMS:  Review of Systems   Constitutional:  Negative for chills, fever and unexpected weight change.   HENT:  Negative for hearing loss, nosebleeds and sore throat.    Eyes:  Negative for pain, redness and visual disturbance.   Respiratory:  Negative for cough, shortness of breath and wheezing.    Cardiovascular:  Negative for chest pain, palpitations and leg swelling.   Gastrointestinal:  Negative for abdominal pain and nausea.   Genitourinary:  Negative for dyspareunia, dysuria and frequency.   Skin:  Negative for rash and wound.   Neurological:  Positive for numbness. Negative for dizziness and headaches.   Psychiatric/Behavioral:  Negative for decreased concentration and suicidal ideas. The patient is not nervous/anxious.        VITALS:  Vitals:       LABS:      _____________________________________________________  PHYSICAL EXAMINATION:  General: well developed and well nourished, alert, oriented times 3, and appears comfortable  Psychiatric: Normal  HEENT: Normocephalic, Atraumatic Trachea Midline, No torticollis  Pulmonary: No audible wheezing or respiratory distress   Abdomen/GI: Non tender, non distended   Cardiovascular: No pitting edema, 2+ radial pulse   Skin: No masses, erythema, lacerations, fluctation, ulcerations  Neurovascular: Sensation Intact to the Radial Nerve, Decreased Sensation to  the Median Nerve, Decreased Sensation to  the Ulnar  Nerve, Motor Intact to the Median, Ulnar, Radial Nerve, and Pulses Intact  Musculoskeletal: Normal, except as noted in detailed exam and in HPI.      MUSCULOSKELETAL EXAMINATION:  Bilateral Carpal Tunnel Exam:    Negative thenar atrophy. Positive phalen's test. Positive carpal tunnel compression. Positive tinels over median nerve at the wrist.  Opposition strength 5/5.  Abduction strength 5/5.      Right Ulnar Nerve Exam:    Negative intrinsic atrophy.  Negative  deformity at the elbow.   Full range of motion with flexion and extension of the elbow.   Positive ulnar nerve compression test at the elbow. Positive tinels over the ulnar nerve at the elbow.  Negative cross finger test in the index and long fingers.   Intrinsic strength 5/5 .    ___________________________________________________  STUDIES REVIEWED:  I have personally reviewed and interpreted  US of bilateral wrists reviewed from  5/7/2024 which demonstrate IMPRESSION:     RIGHT SIDE: Carpal tunnel syndrome ruled in based on marked abnormal CSA > 14 sq mm.   Maximum median nerve cross sectional area within carpal tunnel (CSAc): 14.7 sq mm   LEFT SIDE: Carpal tunnel syndrome ruled in based on marked abnormal CSA > 14 sq mm.   Maximum median nerve cross sectional area within carpal tunnel (CSAc): 14.8 sq mm.       LABS REVIEWED:    HgA1c:   Lab Results   Component Value Date    HGBA1C 5.5 04/06/2024     BMP:   Lab Results   Component Value Date    CALCIUM 9.5 04/06/2024    K 4.2 04/06/2024    CO2 25 04/06/2024     (H) 04/06/2024    BUN 21 04/06/2024    CREATININE 0.87 04/06/2024         PROCEDURES PERFORMED:  Procedures  No Procedures performed today    _____________________________________________________      Scribe Attestation      I,:  Tawana Winslow am acting as a scribe while in the presence of the attending physician.:       I,:  Dorothea Mayo MD personally performed the services described in this documentation    as scribed in my  presence.:

## 2024-05-20 NOTE — H&P (VIEW-ONLY)
Dorothea Mayo M.D.  Attending, Orthopaedic Surgery  Hand, Wrist, and Elbow Surgery  [facility]      ORTHOPAEDIC HAND, WRIST, AND ELBOW OFFICE  VISIT       ASSESSMENT/PLAN:      48 year old female who has bilateral carpal tunnel syndrome noted on US. On exam, she has + tinel's at the elbow right >left. It's recommended to obtain US of the bilateral elbows for ulnar nerve compression.   The anatomy and physiology of carpal tunnel syndrome was discussed with the patient today.  Increase pressure localized under the transverse carpal ligament can cause pain, numbness, tingling, or dysesthesias within the median nerve distribution as well as feelings of fatigue, clumsiness, or awkwardness.  These symptoms typically occur at night and worse in the morning upon waking.  Eventually, untreated carpal tunnel syndrome can result in weakness and permanent loss of muscle within the thenar compartment of the hand.  Treatment options were discussed with the patient.  Conservative treatment includes nocturnal resting splints to keep the nerve in a neutral position, ergonomic changes within the work or home environment, activity modification, and tendon gliding exercises.  Steroid injections within the carpal canal can help a majority of patients, however this is often self-limited in a majority of patients.  Surgical intervention to divide the transverse carpal ligament typically results in a long-lasting relief of the patient's complaints, with the recurrence rate of less than 1%.   The anatomy and physiology of cubital tunnel syndrome were discussed with the patient today in the office.  Typically, increased elbow flexion activities decrease blood flow within the intraneural spaces, resulting in a feeling of numbness, tingling, weakness, or clumsiness within the hand and fingers.  Occasionally, anatomic structures such as medial elbow osteophytes, the medial head of the triceps, were subluxing ulnar  nerve may result in increased pressure or aggravation at the cubital tunnel.  Typical signs and symptoms usually include numbness and tingling within the ring and small finger, weakness with , and weakness with pinch.  Conservative treatment and includes nocturnal bracing to keep the elbow in a semi-extended position, activity modification, therapy, and avoiding excessive elbow flexion activities.  A majority of patients typically respond to conservative treatment over a period of approximately 3-6 months.  EMG/NCV testing of the ulnar nerve at the elbow is not as reliable as carpal tunnel syndrome.  Surgical intervention in the form of in situ release of the ulnar nerve at the elbow or ulnar nerve transposition may be required in up to 20% of patients.  A right carpal tunnel release with possible cubital tunnel release with transposition with possible adipose flap outpatient at [facility].  She gets PONV  Patient's hand will be wrapped up for 5 days postoperatively, lifting nothing heavier than a coffee cup.  After 5 days postop dressings can come off, she can wash with warm water, soap and pat dry.  But still no submerging or lifting anything of weight.  Sutures will come out between 10 and 14 days postoperatively.  She is still do not submerge hand into any body of water for 3 to 5 days after sutures come out to allow suture holes to close.  Patient shows understanding and agrees with this plan of care.  We will discuss work status due to patient having limited time and works in the [employer] at home.        The patient verbalized understanding of exam findings and treatment plan. We engaged in the shared decision-making process and treatment options were discussed at length with the patient. Surgical and conservative management discussed today along with risks and benefits.    Diagnoses and all orders for this visit:    Right hand pain  -     Ambulatory Referral to Orthopedic Surgery    Left hand  pain  -     Ambulatory Referral to Orthopedic Surgery    Numbness and tingling in right hand  -     Ambulatory Referral to Orthopedic Surgery    Numbness and tingling in left hand  -     Ambulatory Referral to Orthopedic Surgery    Bilateral carpal tunnel syndrome  -     Ambulatory Referral to Orthopedic Surgery        Follow Up:  Return for Follow up post- op.    To Do Next Visit:  Re-evaluation of current issue      Operative Discussions:  Cubital Tunnel Release:   The anatomy and physiology of cubital tunnel syndrome were discussed with the patient today in the office.  Typically, increased elbow flexion activities decrease blood flow within the intraneural spaces, resulting in a feeling of numbness, tingling, weakness, or clumsiness within the hand and fingers.  Occasionally, anatomic structures such as medial elbow osteophytes, the medial head of the triceps, were subluxing ulnar nerve may result in increased pressure or aggravation at the cubital tunnel.  Typical signs and symptoms usually include numbness and tingling within the ring and small finger, weakness with , and weakness with pinch.  Conservative treatment and includes nocturnal bracing to keep the elbow in a semi-extended position, activity modification, therapy, and avoiding excessive elbow flexion activities.  A majority of patients typically respond to conservative treatment over a period of approximately 3-6 months.  Vitamin B6 one tablet daily over the counter may helpful to reduce symptoms.  EMG/NCV testing of the ulnar nerve at the elbow is not as reliable as carpal tunnel syndrome.  Surgical intervention in the form of in situ release of the ulnar nerve at the elbow or ulnar nerve transposition may be required in up to 20% of patients. The patient has elected to undergo cubital tunnel release.  The possibility of converting to a subcutaneous or submuscular ulnar nerve transposition depending on the nerve stability was discussed with the  patient.  Typically, in the postoperative period, light activities are allowed immediately, driving is allowed when narcotic medications have stopped, and the incision may get wet after 5 days.  Heavy activities will be allowed after follow up appointment in 1-2 weeks.  While the pain within the ring and small finger of the hands generally improves rapidly, the numbness and tingling, as well as the strength, will slowly improve over a period of weeks to months.  Total recovery can take up to 18 months from the time of surgery.  Numbness and tingling near the incision, or near the medial aspect of the forearm was discussed with the patient.  The patient has an understanding of the above mentioned discussion. The risks and benefits of the procedure were explained to the patient, which include, but are not limited to: Bleeding, infection, recurrence, pain, scar, damage to tendons, damage to nerves, and damage to blood vessels, failure to give desired results and complications related to anesthesia.  These risks, along with alternative conservative treatment options, and postoperative protocols were voiced back and understood by the patient.  All questions were answered to the patient's satisfaction.  The patient agrees to comply with a standard postoperative protocol, and is willing to proceed.  Education was provided via written and auditory forms.  There were no barriers to learning. Written handouts regarding wound care, incision and scar care, and general preoperative information was provided to the patient.  Prior to surgery, the patient may be requested to stop all anti-inflammatory medications.  Prophylactic aspirin, Plavix, and Coumadin may be allowed to be continued.  Medications including vitamin E., ginkgo, and fish oil are requested to be stopped approximately one week prior to surgery.  Hypertensive medications and beta blockers, if taken, should be continued. Vitamin B6 one tablet daily over the counter  may helpful to reduce symptoms.   and Open Carpal Tunnel Release:   The anatomy and physiology of carpal tunnel syndrome was discussed with the patient today.  Increase pressure localized under the transverse carpal ligament can cause pain, numbness, tingling, or dysesthesias within the median nerve distribution as well as feelings of fatigue, clumsiness, or awkwardness.  These symptoms typically occur at night and worse in the morning upon waking.  Eventually, untreated carpal tunnel syndrome can result in weakness and permanent loss of muscle within the thenar compartment of the hand.  Treatment options were discussed with the patient.  Conservative treatment includes nocturnal resting splints to keep the nerve in a neutral position, ergonomic changes within the work or home environment, activity modification, and tendon gliding exercises.  Vitamin B6 one tablet daily over the counter may helpful to reduce symptoms.  Steroid injections within the carpal canal can help a majority of patients, however this is often self-limited in a majority of patients.  Surgical intervention to divide the transverse carpal ligament typically results in a long-lasting relief of the patient's complaints, with the recurrence rate of less than 1%. The patient has elected to undergo an open carpal tunnel release.  The palmar incision technique was discussed in the office with the patient today.   In the postoperative period, light activities are allowed immediately, driving is allowed when narcotic medication has stopped, and the bandages may be removed and incision may get wet after 2 days.  Heavy activities (lifting more than approximately 10 pounds) will be allowed after follow up appointment in 1-2 weeks.  While night symptoms (waking from sleep, pain, and discomfort in the hands) generally improves rapidly, the numbness and tingling as well as the strength will slowly improve over weeks to months depending on the chronicity and  severity of the carpal tunnel syndrome.  Pillar pain and scar discomfort were discussed with the patient which are self-limiting conditions.  The risks of bleeding and infection from the surgery are less than 1%.  Risk of recurrence is approximately 0.5%.  The risks of nerve injury or nerve damage or damage to the blood vessels is approximately 1 in 1200. The patient has an understanding of the above mentioned discussion.The risks and benefits of the procedure were explained to the patient, which include, but are not limited to: Bleeding, infection, recurrence, pain, scar, damage to tendons, damage to nerves, and damage to blood vessels, failure to give desired results and complications related to anesthesia.  These risks, along with alternative conservative treatment options, and postoperative protocols were voiced back and understood by the patient.  All questions were answered to the patient's satisfaction.  The patient agrees to comply with a standard postoperative protocol, and is willing to proceed.  Education was provided via written and auditory forms.  There were no barriers to learning. Written handouts regarding wound care, incision and scar care, and general preoperative information was provided to the patient.  Prior to surgery, the patient may be requested to stop all anti-inflammatory medications.  Prophylactic aspirin, Plavix, and Coumadin may be allowed to be continued.  Medications including vitamin E., ginkgo, and fish oil are requested to be stopped approximately one week prior to surgery.  Hypertensive medications and beta blockers, if taken, s      ____________________________________________________________________________________________________________________________________________      CHIEF COMPLAINT:  Chief Complaint   Patient presents with    Left Hand - Numbness     Middle fingers on both hands are always numb. First 4 go numb regular.     Right Hand - Numbness        SUBJECTIVE:  Jenny Pereyra is a 48 y.o. year old RHD female who presents today for consultation for bilateral carpal tunnel syndrome referred by Francisco Hammond PA-C.  Patient was seen by Francisco Parekh on 4/13/2024, due to numbness and tingling involving the thumb, index and middle finger.  Patient was placed on a Medrol Dosepak and an EMG was ordered.  Patient has been wearing nighttime splints.  Most of her symptoms are worse in the morning.  Patient works in the [employer] for [employer].      Pain/symptom timing:  Worse during the day when active  Pain/symptom context:  Worse with activites and work  Pain/symptom modifying factors:  Rest makes better, activities make worse  Pain/symptom associated signs/symptoms: none    Prior treatment   NSAIDsYes   Injections No   Bracing/Orthotics Yes    Physical Therapy No     I have personally reviewed all the relevant PMH, PSH, SH, FH, Medications and allergies      PAST MEDICAL HISTORY:  Past Medical History:   Diagnosis Date    Anxiety     COPD (chronic obstructive pulmonary disease) (HCC)     GERD (gastroesophageal reflux disease)     Kidney stone     Motion sickness     PONV (postoperative nausea and vomiting)        PAST SURGICAL HISTORY:  Past Surgical History:   Procedure Laterality Date    APPENDECTOMY      BACK SURGERY      L5 - S1    BLADDER SURGERY      COLONOSCOPY      FL RETROGRADE PYELOGRAM  12/15/2023    HYSTERECTOMY      age 29 still has lt ovary    IR RADIOFREQUENCY ABLATION      esophagus    AR CYSTO/URETERO W/LITHOTRIPSY &INDWELL STENT INSRT Left 12/15/2023    Procedure: CYSTO USCOPE W/ LASER, & STENT;  Surgeon: Jhonatan Fleming MD;  Location: [facility];  Service: Urology    SPINE SURGERY      TOTAL VAGINAL HYSTERECTOMY      AGE 29    TUBAL LIGATION      UPPER GASTROINTESTINAL ENDOSCOPY         FAMILY HISTORY:  Family History   Problem Relation Age of Onset    Heart attack Mother     Heart disease Mother     Kidney failure Mother     Heart  failure Mother     Diabetes Father     Alcohol abuse Father     Suicide Attempts Brother     Thyroid disease Son     Breast cancer Maternal Grandmother     Lung cancer Maternal Grandmother 75    Arthritis Maternal Grandmother     No Known Problems Maternal Grandfather     Diabetes Paternal Grandmother     Stroke Paternal Grandmother     No Known Problems Paternal Grandfather     Breast cancer Maternal Aunt 43    Bone cancer Maternal Aunt     No Known Problems Maternal Aunt     No Known Problems Paternal Aunt     Substance Abuse Paternal Uncle     Drug abuse Paternal Uncle        SOCIAL HISTORY:  Social History     Tobacco Use    Smoking status: Former     Current packs/day: 0.00     Average packs/day: 0.5 packs/day for 25.0 years (12.5 ttl pk-yrs)     Types: Cigarettes     Start date:      Quit date:      Years since quittin.3    Smokeless tobacco: Never   Vaping Use    Vaping status: Never Used   Substance Use Topics    Alcohol use: Not Currently    Drug use: Never       MEDICATIONS:    Current Outpatient Medications:     ACIDOPHILUS LACTOBACILLUS PO, Take 1 tablet by mouth daily, Disp: , Rfl:     Armodafinil 150 MG tablet, Take 1 tablet (150 mg total) by mouth daily, Disp: 30 tablet, Rfl: 3    Ca, Mg, K, and Na Oxybates (Xywav) 500 MG/ML SOLN, Take 4 g by mouth daily at bedtime, Disp: 240 mL, Rfl: 2    escitalopram (Lexapro) 10 mg tablet, Take 1 tablet (10 mg total) by mouth daily, Disp: 90 tablet, Rfl: 1    fenofibrate (TRICOR) 145 mg tablet, Take 1 tablet (145 mg total) by mouth daily, Disp: 90 tablet, Rfl: 1    Multiple Vitamins-Minerals (MULTIVITAMIN ADULTS PO), Take 1 tablet by mouth daily, Disp: , Rfl:     tiotropium-olodaterol (Stiolto Respimat) 2.5-2.5 MCG/ACT inhaler, Inhale 2 puffs daily, Disp: 12 g, Rfl: 0    benzonatate (TESSALON) 200 MG capsule, Take 1 capsule (200 mg total) by mouth 3 (three) times a day as needed for cough, Disp: 20 capsule, Rfl: 0    methylPREDNISolone 4 MG tablet  therapy pack, Use as directed on package, Disp: 21 each, Rfl: 0    ondansetron (ZOFRAN) 4 mg tablet, Take 1 tablet (4 mg total) by mouth every 8 (eight) hours as needed for nausea or vomiting for up to 15 days, Disp: 20 tablet, Rfl: 0    ALLERGIES:  Allergies   Allergen Reactions    Chlorhexidine Hives     Itching and red rash on body from CHG cloth    Codeine Palpitations     Other reaction(s): Other (See Comments)  Other reaction(s): Irregular heart rate  Rash,vomiting, heart palpitations    Latex Rash    Penicillin V Rash and Vomiting           REVIEW OF SYSTEMS:  Review of Systems   Constitutional:  Negative for chills, fever and unexpected weight change.   HENT:  Negative for hearing loss, nosebleeds and sore throat.    Eyes:  Negative for pain, redness and visual disturbance.   Respiratory:  Negative for cough, shortness of breath and wheezing.    Cardiovascular:  Negative for chest pain, palpitations and leg swelling.   Gastrointestinal:  Negative for abdominal pain and nausea.   Genitourinary:  Negative for dyspareunia, dysuria and frequency.   Skin:  Negative for rash and wound.   Neurological:  Positive for numbness. Negative for dizziness and headaches.   Psychiatric/Behavioral:  Negative for decreased concentration and suicidal ideas. The patient is not nervous/anxious.        VITALS:  Vitals:       LABS:      _____________________________________________________  PHYSICAL EXAMINATION:  General: well developed and well nourished, alert, oriented times 3, and appears comfortable  Psychiatric: Normal  HEENT: Normocephalic, Atraumatic Trachea Midline, No torticollis  Pulmonary: No audible wheezing or respiratory distress   Abdomen/GI: Non tender, non distended   Cardiovascular: No pitting edema, 2+ radial pulse   Skin: No masses, erythema, lacerations, fluctation, ulcerations  Neurovascular: Sensation Intact to the Radial Nerve, Decreased Sensation to  the Median Nerve, Decreased Sensation to  the Ulnar  Nerve, Motor Intact to the Median, Ulnar, Radial Nerve, and Pulses Intact  Musculoskeletal: Normal, except as noted in detailed exam and in HPI.      MUSCULOSKELETAL EXAMINATION:  Bilateral Carpal Tunnel Exam:    Negative thenar atrophy. Positive phalen's test. Positive carpal tunnel compression. Positive tinels over median nerve at the wrist.  Opposition strength 5/5.  Abduction strength 5/5.      Right Ulnar Nerve Exam:    Negative intrinsic atrophy.  Negative  deformity at the elbow.   Full range of motion with flexion and extension of the elbow.   Positive ulnar nerve compression test at the elbow. Positive tinels over the ulnar nerve at the elbow.  Negative cross finger test in the index and long fingers.   Intrinsic strength 5/5 .    ___________________________________________________  STUDIES REVIEWED:  I have personally reviewed and interpreted  US of bilateral wrists reviewed from  5/7/2024 which demonstrate IMPRESSION:     RIGHT SIDE: Carpal tunnel syndrome ruled in based on marked abnormal CSA > 14 sq mm.   Maximum median nerve cross sectional area within carpal tunnel (CSAc): 14.7 sq mm   LEFT SIDE: Carpal tunnel syndrome ruled in based on marked abnormal CSA > 14 sq mm.   Maximum median nerve cross sectional area within carpal tunnel (CSAc): 14.8 sq mm.       LABS REVIEWED:    HgA1c:   Lab Results   Component Value Date    HGBA1C 5.5 04/06/2024     BMP:   Lab Results   Component Value Date    CALCIUM 9.5 04/06/2024    K 4.2 04/06/2024    CO2 25 04/06/2024     (H) 04/06/2024    BUN 21 04/06/2024    CREATININE 0.87 04/06/2024         PROCEDURES PERFORMED:  Procedures  No Procedures performed today    _____________________________________________________      Scribe Attestation      I,:  Tawana Winslow am acting as a scribe while in the presence of the attending physician.:       I,:  Dorothea Mayo MD personally performed the services described in this documentation    as scribed in my  presence.:

## 2024-05-20 NOTE — PROGRESS NOTES
Dorothea Mayo M.D.  Attending, Orthopaedic Surgery  Hand, Wrist, and Elbow Surgery  [facility]      ORTHOPAEDIC HAND, WRIST, AND ELBOW OFFICE  VISIT       ASSESSMENT/PLAN:      48 year old female who has bilateral carpal tunnel syndrome noted on US. On exam, she has + tinel's at the elbow right >left. It's recommended to obtain US of the bilateral elbows for ulnar nerve compression.   The anatomy and physiology of carpal tunnel syndrome was discussed with the patient today.  Increase pressure localized under the transverse carpal ligament can cause pain, numbness, tingling, or dysesthesias within the median nerve distribution as well as feelings of fatigue, clumsiness, or awkwardness.  These symptoms typically occur at night and worse in the morning upon waking.  Eventually, untreated carpal tunnel syndrome can result in weakness and permanent loss of muscle within the thenar compartment of the hand.  Treatment options were discussed with the patient.  Conservative treatment includes nocturnal resting splints to keep the nerve in a neutral position, ergonomic changes within the work or home environment, activity modification, and tendon gliding exercises.  Steroid injections within the carpal canal can help a majority of patients, however this is often self-limited in a majority of patients.  Surgical intervention to divide the transverse carpal ligament typically results in a long-lasting relief of the patient's complaints, with the recurrence rate of less than 1%.   The anatomy and physiology of cubital tunnel syndrome were discussed with the patient today in the office.  Typically, increased elbow flexion activities decrease blood flow within the intraneural spaces, resulting in a feeling of numbness, tingling, weakness, or clumsiness within the hand and fingers.  Occasionally, anatomic structures such as medial elbow osteophytes, the medial head of the triceps, were subluxing ulnar  nerve may result in increased pressure or aggravation at the cubital tunnel.  Typical signs and symptoms usually include numbness and tingling within the ring and small finger, weakness with , and weakness with pinch.  Conservative treatment and includes nocturnal bracing to keep the elbow in a semi-extended position, activity modification, therapy, and avoiding excessive elbow flexion activities.  A majority of patients typically respond to conservative treatment over a period of approximately 3-6 months.  EMG/NCV testing of the ulnar nerve at the elbow is not as reliable as carpal tunnel syndrome.  Surgical intervention in the form of in situ release of the ulnar nerve at the elbow or ulnar nerve transposition may be required in up to 20% of patients.  A right carpal tunnel release with possible cubital tunnel release with transposition with possible adipose flap outpatient at [place].  She gets PONV  Patient's hand will be wrapped up for 5 days postoperatively, lifting nothing heavier than a coffee cup.  After 5 days postop dressings can come off, she can wash with warm water, soap and pat dry.  But still no submerging or lifting anything of weight.  Sutures will come out between 10 and 14 days postoperatively.  She is still do not submerge hand into any body of water for 3 to 5 days after sutures come out to allow suture holes to close.  Patient shows understanding and agrees with this plan of care.  We will discuss work status due to patient having limited time and works in the [employer] at home.        The patient verbalized understanding of exam findings and treatment plan. We engaged in the shared decision-making process and treatment options were discussed at length with the patient. Surgical and conservative management discussed today along with risks and benefits.    Diagnoses and all orders for this visit:    Right hand pain  -     Ambulatory Referral to Orthopedic Surgery    Left hand  pain  -     Ambulatory Referral to Orthopedic Surgery    Numbness and tingling in right hand  -     Ambulatory Referral to Orthopedic Surgery    Numbness and tingling in left hand  -     Ambulatory Referral to Orthopedic Surgery    Bilateral carpal tunnel syndrome  -     Ambulatory Referral to Orthopedic Surgery        Follow Up:  Return for Follow up post- op.    To Do Next Visit:  Re-evaluation of current issue      Operative Discussions:  Cubital Tunnel Release:   The anatomy and physiology of cubital tunnel syndrome were discussed with the patient today in the office.  Typically, increased elbow flexion activities decrease blood flow within the intraneural spaces, resulting in a feeling of numbness, tingling, weakness, or clumsiness within the hand and fingers.  Occasionally, anatomic structures such as medial elbow osteophytes, the medial head of the triceps, were subluxing ulnar nerve may result in increased pressure or aggravation at the cubital tunnel.  Typical signs and symptoms usually include numbness and tingling within the ring and small finger, weakness with , and weakness with pinch.  Conservative treatment and includes nocturnal bracing to keep the elbow in a semi-extended position, activity modification, therapy, and avoiding excessive elbow flexion activities.  A majority of patients typically respond to conservative treatment over a period of approximately 3-6 months.  Vitamin B6 one tablet daily over the counter may helpful to reduce symptoms.  EMG/NCV testing of the ulnar nerve at the elbow is not as reliable as carpal tunnel syndrome.  Surgical intervention in the form of in situ release of the ulnar nerve at the elbow or ulnar nerve transposition may be required in up to 20% of patients. The patient has elected to undergo cubital tunnel release.  The possibility of converting to a subcutaneous or submuscular ulnar nerve transposition depending on the nerve stability was discussed with the  patient.  Typically, in the postoperative period, light activities are allowed immediately, driving is allowed when narcotic medications have stopped, and the incision may get wet after 5 days.  Heavy activities will be allowed after follow up appointment in 1-2 weeks.  While the pain within the ring and small finger of the hands generally improves rapidly, the numbness and tingling, as well as the strength, will slowly improve over a period of weeks to months.  Total recovery can take up to 18 months from the time of surgery.  Numbness and tingling near the incision, or near the medial aspect of the forearm was discussed with the patient.  The patient has an understanding of the above mentioned discussion. The risks and benefits of the procedure were explained to the patient, which include, but are not limited to: Bleeding, infection, recurrence, pain, scar, damage to tendons, damage to nerves, and damage to blood vessels, failure to give desired results and complications related to anesthesia.  These risks, along with alternative conservative treatment options, and postoperative protocols were voiced back and understood by the patient.  All questions were answered to the patient's satisfaction.  The patient agrees to comply with a standard postoperative protocol, and is willing to proceed.  Education was provided via written and auditory forms.  There were no barriers to learning. Written handouts regarding wound care, incision and scar care, and general preoperative information was provided to the patient.  Prior to surgery, the patient may be requested to stop all anti-inflammatory medications.  Prophylactic aspirin, Plavix, and Coumadin may be allowed to be continued.  Medications including vitamin E., ginkgo, and fish oil are requested to be stopped approximately one week prior to surgery.  Hypertensive medications and beta blockers, if taken, should be continued. Vitamin B6 one tablet daily over the counter  may helpful to reduce symptoms.   and Open Carpal Tunnel Release:   The anatomy and physiology of carpal tunnel syndrome was discussed with the patient today.  Increase pressure localized under the transverse carpal ligament can cause pain, numbness, tingling, or dysesthesias within the median nerve distribution as well as feelings of fatigue, clumsiness, or awkwardness.  These symptoms typically occur at night and worse in the morning upon waking.  Eventually, untreated carpal tunnel syndrome can result in weakness and permanent loss of muscle within the thenar compartment of the hand.  Treatment options were discussed with the patient.  Conservative treatment includes nocturnal resting splints to keep the nerve in a neutral position, ergonomic changes within the work or home environment, activity modification, and tendon gliding exercises.  Vitamin B6 one tablet daily over the counter may helpful to reduce symptoms.  Steroid injections within the carpal canal can help a majority of patients, however this is often self-limited in a majority of patients.  Surgical intervention to divide the transverse carpal ligament typically results in a long-lasting relief of the patient's complaints, with the recurrence rate of less than 1%. The patient has elected to undergo an open carpal tunnel release.  The palmar incision technique was discussed in the office with the patient today.   In the postoperative period, light activities are allowed immediately, driving is allowed when narcotic medication has stopped, and the bandages may be removed and incision may get wet after 2 days.  Heavy activities (lifting more than approximately 10 pounds) will be allowed after follow up appointment in 1-2 weeks.  While night symptoms (waking from sleep, pain, and discomfort in the hands) generally improves rapidly, the numbness and tingling as well as the strength will slowly improve over weeks to months depending on the chronicity and  severity of the carpal tunnel syndrome.  Pillar pain and scar discomfort were discussed with the patient which are self-limiting conditions.  The risks of bleeding and infection from the surgery are less than 1%.  Risk of recurrence is approximately 0.5%.  The risks of nerve injury or nerve damage or damage to the blood vessels is approximately 1 in 1200. The patient has an understanding of the above mentioned discussion.The risks and benefits of the procedure were explained to the patient, which include, but are not limited to: Bleeding, infection, recurrence, pain, scar, damage to tendons, damage to nerves, and damage to blood vessels, failure to give desired results and complications related to anesthesia.  These risks, along with alternative conservative treatment options, and postoperative protocols were voiced back and understood by the patient.  All questions were answered to the patient's satisfaction.  The patient agrees to comply with a standard postoperative protocol, and is willing to proceed.  Education was provided via written and auditory forms.  There were no barriers to learning. Written handouts regarding wound care, incision and scar care, and general preoperative information was provided to the patient.  Prior to surgery, the patient may be requested to stop all anti-inflammatory medications.  Prophylactic aspirin, Plavix, and Coumadin may be allowed to be continued.  Medications including vitamin E., ginkgo, and fish oil are requested to be stopped approximately one week prior to surgery.  Hypertensive medications and beta blockers, if taken, s      ____________________________________________________________________________________________________________________________________________      CHIEF COMPLAINT:  Chief Complaint   Patient presents with    Left Hand - Numbness     Middle fingers on both hands are always numb. First 4 go numb regular.     Right Hand - Numbness        SUBJECTIVE:  Jenny Pereyra is a 48 y.o. year old RHD female who presents today for consultation for bilateral carpal tunnel syndrome referred by Francisco Hammond PA-C.  Patient was seen by Francisco Parekh on 4/13/2024, due to numbness and tingling involving the thumb, index and middle finger.  Patient was placed on a Medrol Dosepak and an EMG was ordered.  Patient has been wearing nighttime splints.  Most of her symptoms are worse in the morning.  Patient works in the [employer] for [employer] home.      Pain/symptom timing:  Worse during the day when active  Pain/symptom context:  Worse with activites and work  Pain/symptom modifying factors:  Rest makes better, activities make worse  Pain/symptom associated signs/symptoms: none    Prior treatment   NSAIDsYes   Injections No   Bracing/Orthotics Yes    Physical Therapy No     I have personally reviewed all the relevant PMH, PSH, SH, FH, Medications and allergies      PAST MEDICAL HISTORY:  Past Medical History:   Diagnosis Date    Anxiety     COPD (chronic obstructive pulmonary disease) (HCC)     GERD (gastroesophageal reflux disease)     Kidney stone     Motion sickness     PONV (postoperative nausea and vomiting)        PAST SURGICAL HISTORY:  Past Surgical History:   Procedure Laterality Date    APPENDECTOMY      BACK SURGERY      L5 - S1    BLADDER SURGERY      COLONOSCOPY      FL RETROGRADE PYELOGRAM  12/15/2023    HYSTERECTOMY      age 29 still has lt ovary    IR RADIOFREQUENCY ABLATION      esophagus    GA CYSTO/URETERO W/LITHOTRIPSY &INDWELL STENT INSRT Left 12/15/2023    Procedure: CYSTO USCOPE W/ LASER, & STENT;  Surgeon: Jhonatan Fleming MD;  Location: [facility];  Service: Urology    SPINE SURGERY      TOTAL VAGINAL HYSTERECTOMY      AGE 29    TUBAL LIGATION      UPPER GASTROINTESTINAL ENDOSCOPY         FAMILY HISTORY:  Family History   Problem Relation Age of Onset    Heart attack Mother     Heart disease Mother     Kidney failure Mother     Heart  failure Mother     Diabetes Father     Alcohol abuse Father     Suicide Attempts Brother     Thyroid disease Son     Breast cancer Maternal Grandmother     Lung cancer Maternal Grandmother 75    Arthritis Maternal Grandmother     No Known Problems Maternal Grandfather     Diabetes Paternal Grandmother     Stroke Paternal Grandmother     No Known Problems Paternal Grandfather     Breast cancer Maternal Aunt 43    Bone cancer Maternal Aunt     No Known Problems Maternal Aunt     No Known Problems Paternal Aunt     Substance Abuse Paternal Uncle     Drug abuse Paternal Uncle        SOCIAL HISTORY:  Social History     Tobacco Use    Smoking status: Former     Current packs/day: 0.00     Average packs/day: 0.5 packs/day for 25.0 years (12.5 ttl pk-yrs)     Types: Cigarettes     Start date:      Quit date:      Years since quittin.3    Smokeless tobacco: Never   Vaping Use    Vaping status: Never Used   Substance Use Topics    Alcohol use: Not Currently    Drug use: Never       MEDICATIONS:    Current Outpatient Medications:     ACIDOPHILUS LACTOBACILLUS PO, Take 1 tablet by mouth daily, Disp: , Rfl:     Armodafinil 150 MG tablet, Take 1 tablet (150 mg total) by mouth daily, Disp: 30 tablet, Rfl: 3    Ca, Mg, K, and Na Oxybates (Xywav) 500 MG/ML SOLN, Take 4 g by mouth daily at bedtime, Disp: 240 mL, Rfl: 2    escitalopram (Lexapro) 10 mg tablet, Take 1 tablet (10 mg total) by mouth daily, Disp: 90 tablet, Rfl: 1    fenofibrate (TRICOR) 145 mg tablet, Take 1 tablet (145 mg total) by mouth daily, Disp: 90 tablet, Rfl: 1    Multiple Vitamins-Minerals (MULTIVITAMIN ADULTS PO), Take 1 tablet by mouth daily, Disp: , Rfl:     tiotropium-olodaterol (Stiolto Respimat) 2.5-2.5 MCG/ACT inhaler, Inhale 2 puffs daily, Disp: 12 g, Rfl: 0    benzonatate (TESSALON) 200 MG capsule, Take 1 capsule (200 mg total) by mouth 3 (three) times a day as needed for cough, Disp: 20 capsule, Rfl: 0    methylPREDNISolone 4 MG tablet  therapy pack, Use as directed on package, Disp: 21 each, Rfl: 0    ondansetron (ZOFRAN) 4 mg tablet, Take 1 tablet (4 mg total) by mouth every 8 (eight) hours as needed for nausea or vomiting for up to 15 days, Disp: 20 tablet, Rfl: 0    ALLERGIES:  Allergies   Allergen Reactions    Chlorhexidine Hives     Itching and red rash on body from CHG cloth    Codeine Palpitations     Other reaction(s): Other (See Comments)  Other reaction(s): Irregular heart rate  Rash,vomiting, heart palpitations    Latex Rash    Penicillin V Rash and Vomiting           REVIEW OF SYSTEMS:  Review of Systems   Constitutional:  Negative for chills, fever and unexpected weight change.   HENT:  Negative for hearing loss, nosebleeds and sore throat.    Eyes:  Negative for pain, redness and visual disturbance.   Respiratory:  Negative for cough, shortness of breath and wheezing.    Cardiovascular:  Negative for chest pain, palpitations and leg swelling.   Gastrointestinal:  Negative for abdominal pain and nausea.   Genitourinary:  Negative for dyspareunia, dysuria and frequency.   Skin:  Negative for rash and wound.   Neurological:  Positive for numbness. Negative for dizziness and headaches.   Psychiatric/Behavioral:  Negative for decreased concentration and suicidal ideas. The patient is not nervous/anxious.        VITALS:  Vitals:       LABS:      _____________________________________________________  PHYSICAL EXAMINATION:  General: well developed and well nourished, alert, oriented times 3, and appears comfortable  Psychiatric: Normal  HEENT: Normocephalic, Atraumatic Trachea Midline, No torticollis  Pulmonary: No audible wheezing or respiratory distress   Abdomen/GI: Non tender, non distended   Cardiovascular: No pitting edema, 2+ radial pulse   Skin: No masses, erythema, lacerations, fluctation, ulcerations  Neurovascular: Sensation Intact to the Radial Nerve, Decreased Sensation to  the Median Nerve, Decreased Sensation to  the Ulnar  Nerve, Motor Intact to the Median, Ulnar, Radial Nerve, and Pulses Intact  Musculoskeletal: Normal, except as noted in detailed exam and in HPI.      MUSCULOSKELETAL EXAMINATION:  Bilateral Carpal Tunnel Exam:    Negative thenar atrophy. Positive phalen's test. Positive carpal tunnel compression. Positive tinels over median nerve at the wrist.  Opposition strength 5/5.  Abduction strength 5/5.      Right Ulnar Nerve Exam:    Negative intrinsic atrophy.  Negative  deformity at the elbow.   Full range of motion with flexion and extension of the elbow.   Positive ulnar nerve compression test at the elbow. Positive tinels over the ulnar nerve at the elbow.  Negative cross finger test in the index and long fingers.   Intrinsic strength 5/5 .    ___________________________________________________  STUDIES REVIEWED:  I have personally reviewed and interpreted  US of bilateral wrists reviewed from  5/7/2024 which demonstrate IMPRESSION:     RIGHT SIDE: Carpal tunnel syndrome ruled in based on marked abnormal CSA > 14 sq mm.   Maximum median nerve cross sectional area within carpal tunnel (CSAc): 14.7 sq mm   LEFT SIDE: Carpal tunnel syndrome ruled in based on marked abnormal CSA > 14 sq mm.   Maximum median nerve cross sectional area within carpal tunnel (CSAc): 14.8 sq mm.       LABS REVIEWED:    HgA1c:   Lab Results   Component Value Date    HGBA1C 5.5 04/06/2024     BMP:   Lab Results   Component Value Date    CALCIUM 9.5 04/06/2024    K 4.2 04/06/2024    CO2 25 04/06/2024     (H) 04/06/2024    BUN 21 04/06/2024    CREATININE 0.87 04/06/2024         PROCEDURES PERFORMED:  Procedures  No Procedures performed today    _____________________________________________________      Scribe Attestation      I,:  Tawana Winslow am acting as a scribe while in the presence of the attending physician.:       I,:  Dorothea Mayo MD personally performed the services described in this documentation    as scribed in my  presence.:

## 2024-05-21 ENCOUNTER — ANESTHESIA EVENT (OUTPATIENT)
Age: 49
End: 2024-05-21
Payer: COMMERCIAL

## 2024-05-22 NOTE — PRE-PROCEDURE INSTRUCTIONS
Pre-Surgery Instructions:   Medication Instructions    ACIDOPHILUS LACTOBACILLUS PO Hold day of surgery.    Armodafinil 150 MG tablet Instructions provided by MD    Ca, Mg, K, and Na Oxybates (Xywav) 500 MG/ML SOLN Instructions provided by MD    escitalopram (Lexapro) 10 mg tablet Take day of surgery.    fenofibrate (TRICOR) 145 mg tablet Take day of surgery.    Multiple Vitamins-Minerals (MULTIVITAMIN ADULTS PO) Stop taking 7 days prior to surgery.    ondansetron (ZOFRAN) 4 mg tablet Uses PRN- OK to take day of surgery    tiotropium-olodaterol (Stiolto Respimat) 2.5-2.5 MCG/ACT inhaler Take day of surgery.   Medication instructions for day surgery reviewed. Please use only a sip of water to take your instructed medications. Avoid all over the counter vitamins, supplements and NSAIDS for one week prior to surgery per anesthesia guidelines. Tylenol is ok to take as needed.     You will receive a call one business day prior to surgery with an arrival time and hospital directions. If your surgery is scheduled on a Monday, the hospital will be calling you on the Friday prior to your surgery. If you have not heard from anyone by 8pm, please call the hospital supervisor through the hospital  at [phone]. ([place] [phone] or [place] [phone]).    Do not eat or drink anything after midnight the night before your surgery, including candy, mints, lifesavers, or chewing gum. Do not drink alcohol 24hrs before your surgery. Try not to smoke at least 24hrs before your surgery.       Follow the pre surgery showering instructions as listed in the “My Surgical Experience Booklet” or otherwise provided by your surgeon's office. Do not use a blade to shave the surgical area 1 week before surgery. It is okay to use a clean electric clippers up to 24 hours before surgery. Do not apply any lotions, creams, including makeup, cologne, deodorant, or perfumes after showering on the day of your surgery. Do not use dry  shampoo, hair spray, hair gel, or any type of hair products.     No contact lenses, eye make-up, or artificial eyelashes. Remove nail polish, including gel polish, and any artificial, gel, or acrylic nails if possible. Remove all jewelry including rings and body piercing jewelry.     Wear causal clothing that is easy to take on and off. Consider your type of surgery.    Keep any valuables, jewelry, piercings at home. Please bring any specially ordered equipment (sling, braces) if indicated.    Arrange for a responsible person to drive you to and from the hospital on the day of your surgery. Please confirm the visitor policy for the day of your procedure when you receive your phone call with an arrival time.     Call the surgeon's office with any new illnesses, exposures, or additional questions prior to surgery.    Please reference your “My Surgical Experience Booklet” for additional information to prepare for your upcoming surgery.

## 2024-05-25 ENCOUNTER — APPOINTMENT (OUTPATIENT)
Dept: LAB | Facility: IMAGING CENTER | Age: 49
End: 2024-05-25
Payer: COMMERCIAL

## 2024-05-25 DIAGNOSIS — D72.829 LEUKOCYTOSIS, UNSPECIFIED TYPE: ICD-10-CM

## 2024-05-25 LAB
BASOPHILS # BLD AUTO: 0.09 THOUSANDS/ÂΜL (ref 0–0.1)
BASOPHILS NFR BLD AUTO: 1 % (ref 0–1)
EOSINOPHIL # BLD AUTO: 0.4 THOUSAND/ÂΜL (ref 0–0.61)
EOSINOPHIL NFR BLD AUTO: 3 % (ref 0–6)
ERYTHROCYTE [DISTWIDTH] IN BLOOD BY AUTOMATED COUNT: 12.5 % (ref 11.6–15.1)
HCT VFR BLD AUTO: 45.2 % (ref 34.8–46.1)
HGB BLD-MCNC: 14.5 G/DL (ref 11.5–15.4)
IMM GRANULOCYTES # BLD AUTO: 0.05 THOUSAND/UL (ref 0–0.2)
IMM GRANULOCYTES NFR BLD AUTO: 0 % (ref 0–2)
LYMPHOCYTES # BLD AUTO: 2.61 THOUSANDS/ÂΜL (ref 0.6–4.47)
LYMPHOCYTES NFR BLD AUTO: 19 % (ref 14–44)
MCH RBC QN AUTO: 29.2 PG (ref 26.8–34.3)
MCHC RBC AUTO-ENTMCNC: 32.1 G/DL (ref 31.4–37.4)
MCV RBC AUTO: 91 FL (ref 82–98)
MONOCYTES # BLD AUTO: 0.79 THOUSAND/ÂΜL (ref 0.17–1.22)
MONOCYTES NFR BLD AUTO: 6 % (ref 4–12)
NEUTROPHILS # BLD AUTO: 10.12 THOUSANDS/ÂΜL (ref 1.85–7.62)
NEUTS SEG NFR BLD AUTO: 71 % (ref 43–75)
NRBC BLD AUTO-RTO: 0 /100 WBCS
PLATELET # BLD AUTO: 312 THOUSANDS/UL (ref 149–390)
PMV BLD AUTO: 10.6 FL (ref 8.9–12.7)
RBC # BLD AUTO: 4.97 MILLION/UL (ref 3.81–5.12)
WBC # BLD AUTO: 14.06 THOUSAND/UL (ref 4.31–10.16)

## 2024-05-25 PROCEDURE — 36415 COLL VENOUS BLD VENIPUNCTURE: CPT

## 2024-05-25 PROCEDURE — 85025 COMPLETE CBC W/AUTO DIFF WBC: CPT

## 2024-05-29 ENCOUNTER — HOSPITAL ENCOUNTER (OUTPATIENT)
Dept: ULTRASOUND IMAGING | Facility: HOSPITAL | Age: 49
Discharge: HOME | End: 2024-05-29
Attending: SURGERY
Payer: COMMERCIAL

## 2024-05-29 DIAGNOSIS — R20.0 NUMBNESS AND TINGLING IN LEFT HAND: ICD-10-CM

## 2024-05-29 DIAGNOSIS — R20.0 NUMBNESS AND TINGLING IN RIGHT HAND: ICD-10-CM

## 2024-05-29 DIAGNOSIS — R20.2 NUMBNESS AND TINGLING IN RIGHT HAND: ICD-10-CM

## 2024-05-29 DIAGNOSIS — D72.829 LEUKOCYTOSIS, UNSPECIFIED TYPE: Primary | ICD-10-CM

## 2024-05-29 DIAGNOSIS — R20.2 NUMBNESS AND TINGLING IN LEFT HAND: ICD-10-CM

## 2024-05-29 PROCEDURE — 76882 US LMTD JT/FCL EVL NVASC XTR: CPT

## 2024-05-30 PROBLEM — G56.01 CARPAL TUNNEL SYNDROME ON RIGHT: Status: ACTIVE | Noted: 2024-04-10

## 2024-05-30 PROBLEM — G56.21 CUBITAL TUNNEL SYNDROME ON RIGHT: Status: ACTIVE | Noted: 2024-05-30

## 2024-05-30 NOTE — DISCHARGE INSTR - AVS FIRST PAGE
Post Operative Instructions    You have had surgery on your arm today, please read and follow the information below:  Elevate your hand above your elbow during the next 24-48 hours to help with swelling.  Place your hand and arm over your head with motion at your shoulder three times a day.  Do not apply any cream/ointment/oil to your incisions including antibiotics.  Do not soak your hands in standing water (dishwater, tubs, Jacuzzi's, pools, etc.) until given permission (typically 2-3 weeks after injury)    Call the office if you notice any:  Increased numbness or tingling of your hand or fingers that is not relieved with elevation.  Increasing pain that is not controlled with medication.  Difficulty chewing, breathing, swallowing.  Chest pains or shortness of breath.  Fever over 101.4 degrees.    Bandage: Please keep bandages clean and dry. Remove bandage after 5 days. Once bandages are removed you may wash hands with soap and water. Short showers are okay as well, but please avoid soaking the hand as described above (ie no pools, baths, dirty dish water, hot tubs, ocean/lake water, etc). Sutures will be removed in the office at your first follow up visit, please do not remove them yourself.    Please do NOT put any topical agents on the surgical wound including neosporin, peroxide, tea tree oil, vitamin E, etc. as these can delay wound healing.    Motion: Move fingers into a fist 5 times a day, DO NOT move any splinted fingers.    Weight bearing status: Avoid heavy lifting (>5 pounds) with the extremity that was operated on until follow up appointment. Normal activities of daily living are OK.    Ice: Ice for 10 minutes every hour as needed for swelling x 24 hours.    Sling: Please use your sling while your arm is numb from the block. When your arm is FULLY awake again, you no longer need this and may use your sling as needed for comfort. While using the sling, make sure to move your shoulder throughout the day  to prevent stiffness here.     Pain medication:   Naproxen 220 mg two times a day (this is over the counter!)  *do not take this medication if you were told by your doctor that you cannot take anti-inflammatories or NSAIDS  Tylenol Extended Release 650 mg every 8 hours (this is over the counter!)   Norco/Hydrocodone one tab every 6 hours ONLY AS NEEDED for severe pain        Follow-up Appointment: 10-14 days with Dr Mayo        Please call the office if you have any questions or concerns regarding your post-operative care.

## 2024-05-31 ENCOUNTER — HOSPITAL ENCOUNTER (OUTPATIENT)
Age: 49
Setting detail: OUTPATIENT SURGERY
Discharge: HOME | End: 2024-05-31
Attending: SURGERY | Admitting: SURGERY
Payer: COMMERCIAL

## 2024-05-31 ENCOUNTER — ANESTHESIA (OUTPATIENT)
Age: 49
End: 2024-05-31
Payer: COMMERCIAL

## 2024-05-31 VITALS
WEIGHT: 142.4 LBS | OXYGEN SATURATION: 95 % | BODY MASS INDEX: 26.2 KG/M2 | SYSTOLIC BLOOD PRESSURE: 129 MMHG | DIASTOLIC BLOOD PRESSURE: 58 MMHG | TEMPERATURE: 97.1 F | HEART RATE: 70 BPM | RESPIRATION RATE: 14 BRPM | HEIGHT: 62 IN

## 2024-05-31 DIAGNOSIS — Z47.89 AFTERCARE FOLLOWING SURGERY OF THE MUSCULOSKELETAL SYSTEM: ICD-10-CM

## 2024-05-31 DIAGNOSIS — G56.21 CUBITAL TUNNEL SYNDROME ON RIGHT: Primary | ICD-10-CM

## 2024-05-31 DIAGNOSIS — G56.01 CARPAL TUNNEL SYNDROME ON RIGHT: ICD-10-CM

## 2024-05-31 PROCEDURE — 64721 CARPAL TUNNEL SURGERY: CPT | Performed by: PHYSICIAN ASSISTANT

## 2024-05-31 PROCEDURE — 64721 CARPAL TUNNEL SURGERY: CPT | Performed by: SURGERY

## 2024-05-31 PROCEDURE — 64718 REVISE ULNAR NERVE AT ELBOW: CPT | Performed by: PHYSICIAN ASSISTANT

## 2024-05-31 PROCEDURE — 64718 REVISE ULNAR NERVE AT ELBOW: CPT | Performed by: SURGERY

## 2024-05-31 RX ORDER — ROPIVACAINE HYDROCHLORIDE 5 MG/ML
INJECTION, SOLUTION EPIDURAL; INFILTRATION; PERINEURAL AS NEEDED
Status: DISCONTINUED | OUTPATIENT
Start: 2024-05-31 | End: 2024-05-31

## 2024-05-31 RX ORDER — DEXAMETHASONE SODIUM PHOSPHATE 10 MG/ML
INJECTION, SOLUTION INTRAMUSCULAR; INTRAVENOUS AS NEEDED
Status: DISCONTINUED | OUTPATIENT
Start: 2024-05-31 | End: 2024-05-31

## 2024-05-31 RX ORDER — FENTANYL CITRATE 50 UG/ML
INJECTION, SOLUTION INTRAMUSCULAR; INTRAVENOUS AS NEEDED
Status: DISCONTINUED | OUTPATIENT
Start: 2024-05-31 | End: 2024-05-31

## 2024-05-31 RX ORDER — ACETAMINOPHEN 325 MG/1
650 TABLET ORAL EVERY 6 HOURS PRN
Status: DISCONTINUED | OUTPATIENT
Start: 2024-05-31 | End: 2024-05-31 | Stop reason: HOSPADM

## 2024-05-31 RX ORDER — SODIUM CHLORIDE 9 MG/ML
100 INJECTION, SOLUTION INTRAVENOUS CONTINUOUS
Status: DISCONTINUED | OUTPATIENT
Start: 2024-05-31 | End: 2024-05-31 | Stop reason: HOSPADM

## 2024-05-31 RX ORDER — ACETAMINOPHEN 325 MG/1
975 TABLET ORAL ONCE AS NEEDED
Status: DISCONTINUED | OUTPATIENT
Start: 2024-05-31 | End: 2024-05-31 | Stop reason: HOSPADM

## 2024-05-31 RX ORDER — HYDROCODONE BITARTRATE AND ACETAMINOPHEN 5; 325 MG/1; MG/1
1 TABLET ORAL EVERY 6 HOURS PRN
Qty: 10 TABLET | Refills: 0 | Status: SHIPPED | OUTPATIENT
Start: 2024-05-31 | End: 2024-06-10

## 2024-05-31 RX ORDER — ONDANSETRON 2 MG/ML
4 INJECTION INTRAMUSCULAR; INTRAVENOUS ONCE AS NEEDED
Status: DISCONTINUED | OUTPATIENT
Start: 2024-05-31 | End: 2024-05-31 | Stop reason: HOSPADM

## 2024-05-31 RX ORDER — LIDOCAINE HYDROCHLORIDE 10 MG/ML
INJECTION, SOLUTION EPIDURAL; INFILTRATION; INTRACAUDAL; PERINEURAL AS NEEDED
Status: DISCONTINUED | OUTPATIENT
Start: 2024-05-31 | End: 2024-05-31

## 2024-05-31 RX ORDER — FENTANYL CITRATE/PF 50 MCG/ML
25 SYRINGE (ML) INJECTION
Status: DISCONTINUED | OUTPATIENT
Start: 2024-05-31 | End: 2024-05-31 | Stop reason: HOSPADM

## 2024-05-31 RX ORDER — MAGNESIUM HYDROXIDE 1200 MG/15ML
LIQUID ORAL AS NEEDED
Status: DISCONTINUED | OUTPATIENT
Start: 2024-05-31 | End: 2024-05-31 | Stop reason: HOSPADM

## 2024-05-31 RX ORDER — CEFAZOLIN SODIUM 2 G/50ML
2000 SOLUTION INTRAVENOUS ONCE
Status: COMPLETED | OUTPATIENT
Start: 2024-05-31 | End: 2024-05-31

## 2024-05-31 RX ORDER — HYDROCODONE BITARTRATE AND ACETAMINOPHEN 5; 325 MG/1; MG/1
1 TABLET ORAL EVERY 6 HOURS PRN
Status: DISCONTINUED | OUTPATIENT
Start: 2024-05-31 | End: 2024-05-31 | Stop reason: HOSPADM

## 2024-05-31 RX ORDER — SODIUM CHLORIDE 9 MG/ML
75 INJECTION, SOLUTION INTRAVENOUS CONTINUOUS
Status: DISCONTINUED | OUTPATIENT
Start: 2024-05-31 | End: 2024-05-31 | Stop reason: HOSPADM

## 2024-05-31 RX ORDER — ONDANSETRON 2 MG/ML
INJECTION INTRAMUSCULAR; INTRAVENOUS AS NEEDED
Status: DISCONTINUED | OUTPATIENT
Start: 2024-05-31 | End: 2024-05-31

## 2024-05-31 RX ORDER — PROPOFOL 10 MG/ML
INJECTION, EMULSION INTRAVENOUS CONTINUOUS PRN
Status: DISCONTINUED | OUTPATIENT
Start: 2024-05-31 | End: 2024-05-31

## 2024-05-31 RX ORDER — HYDROMORPHONE HCL/PF 1 MG/ML
0.5 SYRINGE (ML) INJECTION
Status: DISCONTINUED | OUTPATIENT
Start: 2024-05-31 | End: 2024-05-31 | Stop reason: HOSPADM

## 2024-05-31 RX ORDER — ACETAMINOPHEN 325 MG/1
TABLET ORAL
Status: DISCONTINUED
Start: 2024-05-31 | End: 2024-05-31 | Stop reason: HOSPADM

## 2024-05-31 RX ORDER — MIDAZOLAM HYDROCHLORIDE 2 MG/2ML
INJECTION, SOLUTION INTRAMUSCULAR; INTRAVENOUS AS NEEDED
Status: DISCONTINUED | OUTPATIENT
Start: 2024-05-31 | End: 2024-05-31

## 2024-05-31 RX ADMIN — DEXAMETHASONE SODIUM PHOSPHATE 10 MG: 10 INJECTION, SOLUTION INTRAMUSCULAR; INTRAVENOUS at 13:48

## 2024-05-31 RX ADMIN — ROPIVACAINE HYDROCHLORIDE 30 ML: 5 INJECTION EPIDURAL; INFILTRATION; PERINEURAL at 13:24

## 2024-05-31 RX ADMIN — ACETAMINOPHEN 325MG 650 MG: 325 TABLET ORAL at 15:22

## 2024-05-31 RX ADMIN — CEFAZOLIN SODIUM 2000 MG: 2 SOLUTION INTRAVENOUS at 13:42

## 2024-05-31 RX ADMIN — MIDAZOLAM 2 MG: 1 INJECTION INTRAMUSCULAR; INTRAVENOUS at 13:18

## 2024-05-31 RX ADMIN — PROPOFOL 100 MCG/KG/MIN: 10 INJECTION, EMULSION INTRAVENOUS at 13:46

## 2024-05-31 RX ADMIN — FENTANYL CITRATE 100 MCG: 50 INJECTION INTRAMUSCULAR; INTRAVENOUS at 13:18

## 2024-05-31 RX ADMIN — LIDOCAINE HYDROCHLORIDE 50 MG: 10 INJECTION, SOLUTION EPIDURAL; INFILTRATION; INTRACAUDAL; PERINEURAL at 13:45

## 2024-05-31 RX ADMIN — ONDANSETRON 4 MG: 2 INJECTION INTRAMUSCULAR; INTRAVENOUS at 13:44

## 2024-05-31 RX ADMIN — SODIUM CHLORIDE: 0.9 INJECTION, SOLUTION INTRAVENOUS at 13:15

## 2024-05-31 NOTE — ANESTHESIA PROCEDURE NOTES
Peripheral Block    Patient location during procedure: holding area  Start time: 5/31/2024 1:24 PM  Reason for block: at surgeon's request and post-op pain management  Staffing  Performed by: Reinier Stephenson MD  Authorized by: Reinier Stephenson MD    Preanesthetic Checklist  Completed: patient identified, IV checked, site marked, risks and benefits discussed, surgical consent, monitors and equipment checked, pre-op evaluation and timeout performed  Peripheral Block  Patient position: sitting  Prep: ChloraPrep  Patient monitoring: frequent blood pressure checks, continuous pulse oximetry and heart rate  Block type: Supraclavicular  Laterality: right  Injection technique: single-shot  Procedures: ultrasound guided, Ultrasound guidance required for the procedure to increase accuracy and safety of medication placement and decrease risk of complications.  Ultrasound permanent image saved    Needle  Needle type: Stimuplex   Needle gauge: 20 G  Needle length: 4 in  Needle localization: anatomical landmarks and ultrasound guidance  Assessment  Injection assessment: incremental injection, frequent aspiration, injected with ease, negative aspiration, negative for heart rate change, no paresthesia on injection, no symptoms of intraneural/intravenous injection and needle tip visualized at all times  Paresthesia pain: none  Post-procedure:  site cleaned  patient tolerated the procedure well with no immediate complications

## 2024-05-31 NOTE — ANESTHESIA POSTPROCEDURE EVALUATION
Post-Op Assessment Note    CV Status:  Stable  Pain Score: 0    Pain management: adequate       Mental Status:  Alert and awake   Hydration Status:  Euvolemic   PONV Controlled:  Controlled   Airway Patency:  Patent     Post Op Vitals Reviewed: Yes    No anethesia notable event occurred.    Staff: CRNA               BP   105/64   Temp   97.2   Pulse  71   Resp   20   SpO2   97%

## 2024-05-31 NOTE — OP NOTE
OPERATIVE REPORT  PATIENT NAME: Jenny Pereyra  :  1975  MRN: [id number]  Pt Location: [facility]    SURGERY DATE: 24    Surgeons and Role:     * Dorothea Mayo MD - Primary     * Genny Gonzalez PA-C - Assisting    Pre-Op Diagnosis:  Carpal tunnel syndrome on right [G56.01]  Cubital tunnel syndrome on right [G56.21]    Post-Op Diagnosis Codes:     * Carpal tunnel syndrome on right [G56.01]     * Cubital tunnel syndrome on right [G56.21]    Procedure(s):  RELEASE CUBITAL TUNNEL POSSIBLE TRANSPOSITION AND ADIPOSE FLAP (Right)  RELEASE CARPAL TUNNEL (Right)    Specimen(s):  No specimens collected during this procedure.    Estimated Blood Loss:   Minimal    Anesthesia Type:   Regional with Sedation    IMPLANTS:  * No implants in log *    PERIOPERATIVE ANTIBIOTICS:    cefazolin, 2 grams    Tourniquet Time: 18 minutes large hemaclear            Operative Indications:  The patient has a history of Carpal Tunnel Syndrome  right and Cubital Tunnel Syndrome  right that was recalcitrant to conservative management.  The decision was made to bring the patient to the operating room for Open Carpal Tunnel Release  right and Cubital Tunnel Release  right.  Risks of the procedure were explained which include, but are not limited to bleeding; infection; damage to nerves, arteries,veins, tendons; scar; pain; need for reoperation; failure to give desired result; and risks of anaesthesia.  All questions were answered to satisfaction and they were willing to proceed.         Operative Findings:  Hypertrophy transverse carpal ligament  Ulnar nerve compression at the leading edge of the FCU fascia with a small overlying anconeus epitrochlearis    Complications:   None    Procedure and Technique:  After the patient, site, and procedure were identified, the patient was brought into the operating room in a supine position.  Regional anaesthesia and sedation were provided.    The  left upper extremity was then prepped and  drapped in a normal, sterile, orthopedic fashion.    A large Hemoclear was applied in sterile fashion    An anterior approach to the carpal tunnel was undertaken. A 2 cm incision was made in line with the fourth digit, proximal to Perez's line.  The skin and the subcutaneous tissue were sharply incised.  Under loupe magnification, dissection was carried down to the palmar fascia and it was incised. The transverse carpal ligament was visualized and  Released distally with a #64 blade. A freer was was passed above and below the TCL in a retrograde fashion, freeing up any adhesions. A Knife Lite was used to release the proximal portion of the transverse carpal ligament under direct visualization.  Distally the superficial arch was identified.  A complete release was carried out and exploration of the carpal canal revealed no significant tenosynovitis. The median nerve and its branches were intact.      A 6 cm Incision was made with a 15 blade between medial epicondyle and olecranon.  A  branch of the MABC was identified distally and protected.  The ulnar nerve was identified at the level of the cubital tunnel.  The nerve was not found to be subluxed.  The Ulnar nerve was traced 1st traced proximally.  Proximal release was performed under direct visualization  the overlying fascia was released with a scissor up to the level of the intermuscular septum which was incised.  Next dissection was taken proximally both with a scissor and with a 64 blade.  Care was taken preserve any crossing branches of the medial antebrachial cutaneous nerve.  Decompression was taken around the olecranon, a small anconeus epitrochlear us was noted and gently released.  Dissection was then taken to the  FCU fascia.  The super all facial layer of the fascia was released with a knife and the 2 bellies of the FCU spread gently with a scissor.  The deep fascia of the FCU was identified and with the nerve protected all times incised.  Release was  taken just past the level of the 1st motor branch to the FCU.  Once a complete release was verified,  the Elbow was taken through a full range of motion and the nerve was found to be stable with no subluxation. After the release, it was appreciated that the nerve had been significantly constricted at the level of the deep FCU fascia  with flattening of there nerve .  The tourniquet was brought down and hemostasis was obtained. The wound was copiously irrigated and closed in a layered fashion with 3-0 monocryl  for deep dermal .     At the completion of the procedure, hemostasis was obtained with cautery and direct pressure.  The wounds were copiously irrigated with sterile solution.  The wounds were closed with Prolene, Monocryl, and Steri-strips.  Sterile dressings were applied, including Xeroform, gauze, tweeners, webril, ACE and Sling.  Please note, all sponge, needle, and instrument counts were correct prior to closure.  Loupe magnification was utilized.  The patient tolerated the procedure well.     I was present for the entire procedure., A qualified resident physician was not available., and A physician assistant was required during the procedure for retraction, tissue handling, dissection and suturing.   The aid of Genny OATES was required in the approach dissection and handling of soft tissues with retraction and with closure during this cubital tunnel release.  Her aide was instrumental in protecting the medial antebrachial cutaneous branches  during the approach as well as a allowing  for adequate visualization through a minimally invasive incision      Patient Disposition:  PACU     SIGNATURE: Dorothea Mayo MD  DATE: 05/31/24  TIME: 2:15 PM

## 2024-05-31 NOTE — INTERVAL H&P NOTE
H&P reviewed. After examining the patient I find no changes in the patients condition since the H&P had been written.   US positive for cubital tunnel , will proceed with carpal cubital tunnel releases on the right

## 2024-05-31 NOTE — ANESTHESIA PREPROCEDURE EVALUATION
Procedure:  RELEASE CUBITAL TUNNEL POSSIBLE TRANSPOSITION AND ADIPOSE FLAP (Right: Elbow)  RELEASE CARPAL TUNNEL (Right: Wrist)    Relevant Problems   ANESTHESIA   (+) PONV (postoperative nausea and vomiting)      CARDIO   (+) Chronic migraine without aura   (+) Other hyperlipidemia      GI/HEPATIC   (+) Gastroesophageal reflux disease without esophagitis      /RENAL   (+) Kidney stone on left side      MUSCULOSKELETAL   (+) DDD (degenerative disc disease), lumbar      NEURO/PSYCH   (+) Anxiety   (+) Chronic migraine without aura      PULMONARY   (+) Stage 1 mild COPD by GOLD classification (HCC)      PONV  COPD    Physical Exam    Airway    Mallampati score: II  TM Distance: >3 FB  Neck ROM: full     Dental       Cardiovascular  Cardiovascular exam normal    Pulmonary  Pulmonary exam normal     Other Findings  post-pubertal.      Anesthesia Plan  ASA Score- 2     Anesthesia Type- regional with ASA Monitors.         Additional Monitors:     Airway Plan:            Plan Factors-    Chart reviewed. EKG reviewed.  Existing labs reviewed. Patient summary reviewed.                  Induction- intravenous.    Postoperative Plan-         Informed Consent- Anesthetic plan and risks discussed with patient.  I personally reviewed this patient with the CRNA. Discussed and agreed on the Anesthesia Plan with the CRNA..

## 2024-06-10 ENCOUNTER — OFFICE VISIT (OUTPATIENT)
Dept: OBGYN CLINIC | Facility: HOSPITAL | Age: 49
End: 2024-06-10

## 2024-06-10 VITALS — WEIGHT: 142 LBS | BODY MASS INDEX: 26.13 KG/M2 | HEIGHT: 62 IN

## 2024-06-10 DIAGNOSIS — G56.22 CUBITAL TUNNEL SYNDROME, LEFT: Primary | ICD-10-CM

## 2024-06-10 DIAGNOSIS — G56.02 CARPAL TUNNEL SYNDROME ON LEFT: ICD-10-CM

## 2024-06-10 PROCEDURE — 99024 POSTOP FOLLOW-UP VISIT: CPT | Performed by: SURGERY

## 2024-06-10 RX ORDER — SODIUM CHLORIDE 9 MG/ML
75 INJECTION, SOLUTION INTRAVENOUS ONCE
OUTPATIENT
Start: 2024-06-10

## 2024-06-10 RX ORDER — CLINDAMYCIN PHOSPHATE 900 MG/50ML
900 INJECTION, SOLUTION INTRAVENOUS ONCE
OUTPATIENT
Start: 2024-06-10 | End: 2024-06-10

## 2024-06-10 NOTE — PROGRESS NOTES
"Assessment/Plan:  Patient ID: 48 y.o. female   Surgery: Release Cubital Tunnel Possible Transposition And Adipose Flap - Right and Release Carpal Tunnel - Right  Date of Surgery: 5/31/2024    Pt overall doing very well from her R side.  Can proceed with activities as tolerated.  Scar tissue massage discussed.     Assessment: Left carpal and cubital tunnel syndromes    Plan: Pt would like to proceed with left CTR and left CuTR with possible ulnar nerve transposition and adipose flap.  Postop recovery again discussed and all of pt's questions/concerns were answered today.    Follow Up:  After surgery    To Do Next Visit:  Re-evaluation of current issue      CHIEF COMPLAINT:  Chief Complaint   Patient presents with    Post-op     Post op first one. Stiches out. Everything going well.          SUBJECTIVE:  Patient is a 48 y.o. year old female who presents for follow up after Release Cubital Tunnel Possible Transposition And Adipose Flap - Right and Release Carpal Tunnel - Right. Today patient states she is doing \"awesome.\" States her n/t has completely resolved. Describes some burning/discomfort along palm. Patient states she is interested in proceeding with her left side now. Continues to have n/t here. No significant changes from previous appt.    Occupation: works at [employer]    REVIEW OF SYSTEMS:  Musculoskeletal:        As noted in HPI.   All other systems reviewed and are negative.    PHYSICAL EXAMINATION:  General: Well developed and well nourished, alert & oriented x 3, appears comfortable  Psychiatric: Normal  HEENT: Normocephalic, Atraumatic Trachea Midline, No torticollis  Pulmonary: No audible wheezing or respiratory distress   Abdomen/GI: Non tender, non distended   Cardiovascular: No pitting edema, 2+ radial pulse   Skin: No masses, erythema, lacerations, fluctation, ulcerations  Neurovascular: Sensation Intact to the Median, Ulnar, Radial Nerve, Motor Intact to the Median, Ulnar, Radial Nerve, and " Pulses Intact  Musculoskeletal: Normal, except as noted in detailed exam and in HPI.    MUSCULOSKELETAL EXAMINATION:  Incision: Clean, dry, intact  Surgery Site: normal, no evidence of infection   Range of Motion: As expected, full composite fist possible, and FROM elbow  Neurovascular status: Neuro intact, good cap refill  Done today: Sutures out      STUDIES REVIEWED:  Ultrasound images were personally reviewed and independently interpreted by Dr. Pan and demonstrate left CTS ruled in with abnormal CSA > 14 sq mm and left enlarged ulnar nerve near/within cubital tunnel c/w CuTS.        PROCEDURES PERFORMED:  Procedures  No Procedures performed today    Scribe Attestation      I,:  Nadia Elliott PA-C am acting as a scribe while in the presence of the attending physician.:       I,:  Herbert Pan MD personally performed the services described in this documentation    as scribed in my presence.:

## 2024-06-15 ENCOUNTER — APPOINTMENT (OUTPATIENT)
Dept: LAB | Facility: IMAGING CENTER | Age: 49
End: 2024-06-15
Payer: COMMERCIAL

## 2024-06-15 DIAGNOSIS — D72.829 LEUKOCYTOSIS, UNSPECIFIED TYPE: ICD-10-CM

## 2024-06-15 LAB
AMORPH URATE CRY URNS QL MICRO: ABNORMAL
BACTERIA UR QL AUTO: ABNORMAL /HPF
BASOPHILS # BLD AUTO: 0.11 THOUSANDS/ÂΜL (ref 0–0.1)
BASOPHILS NFR BLD AUTO: 1 % (ref 0–1)
BILIRUB UR QL STRIP: NEGATIVE
CLARITY UR: CLEAR
COLOR UR: ABNORMAL
EOSINOPHIL # BLD AUTO: 0.27 THOUSAND/ÂΜL (ref 0–0.61)
EOSINOPHIL NFR BLD AUTO: 3 % (ref 0–6)
ERYTHROCYTE [DISTWIDTH] IN BLOOD BY AUTOMATED COUNT: 12.3 % (ref 11.6–15.1)
GLUCOSE UR STRIP-MCNC: NEGATIVE MG/DL
HCT VFR BLD AUTO: 43.2 % (ref 34.8–46.1)
HGB BLD-MCNC: 13.9 G/DL (ref 11.5–15.4)
HGB UR QL STRIP.AUTO: ABNORMAL
IMM GRANULOCYTES # BLD AUTO: 0.04 THOUSAND/UL (ref 0–0.2)
IMM GRANULOCYTES NFR BLD AUTO: 0 % (ref 0–2)
KETONES UR STRIP-MCNC: NEGATIVE MG/DL
LEUKOCYTE ESTERASE UR QL STRIP: NEGATIVE
LYMPHOCYTES # BLD AUTO: 2.56 THOUSANDS/ÂΜL (ref 0.6–4.47)
LYMPHOCYTES NFR BLD AUTO: 27 % (ref 14–44)
MCH RBC QN AUTO: 29.4 PG (ref 26.8–34.3)
MCHC RBC AUTO-ENTMCNC: 32.2 G/DL (ref 31.4–37.4)
MCV RBC AUTO: 91 FL (ref 82–98)
MONOCYTES # BLD AUTO: 0.41 THOUSAND/ÂΜL (ref 0.17–1.22)
MONOCYTES NFR BLD AUTO: 4 % (ref 4–12)
NEUTROPHILS # BLD AUTO: 6.05 THOUSANDS/ÂΜL (ref 1.85–7.62)
NEUTS SEG NFR BLD AUTO: 65 % (ref 43–75)
NITRITE UR QL STRIP: NEGATIVE
NON-SQ EPI CELLS URNS QL MICRO: ABNORMAL /HPF
NRBC BLD AUTO-RTO: 0 /100 WBCS
PH UR STRIP.AUTO: 7 [PH]
PLATELET # BLD AUTO: 308 THOUSANDS/UL (ref 149–390)
PMV BLD AUTO: 10.8 FL (ref 8.9–12.7)
PROT UR STRIP-MCNC: NEGATIVE MG/DL
RBC # BLD AUTO: 4.73 MILLION/UL (ref 3.81–5.12)
RBC #/AREA URNS AUTO: ABNORMAL /HPF
SP GR UR STRIP.AUTO: 1.01 (ref 1–1.03)
UROBILINOGEN UR STRIP-ACNC: <2 MG/DL
WBC # BLD AUTO: 9.44 THOUSAND/UL (ref 4.31–10.16)
WBC #/AREA URNS AUTO: ABNORMAL /HPF

## 2024-06-15 PROCEDURE — 85025 COMPLETE CBC W/AUTO DIFF WBC: CPT

## 2024-06-15 PROCEDURE — 36415 COLL VENOUS BLD VENIPUNCTURE: CPT

## 2024-06-15 PROCEDURE — 81001 URINALYSIS AUTO W/SCOPE: CPT

## 2024-06-18 ENCOUNTER — TELEPHONE (OUTPATIENT)
Dept: INTERNAL MEDICINE CLINIC | Facility: OTHER | Age: 49
End: 2024-06-18

## 2024-06-18 NOTE — TELEPHONE ENCOUNTER
Pt was going to make appt.  Her work schedule was causing her not to be able to make appt since latest was 4:00.  She will look online she said.  Can we fit her in after 4:30 a day this week?

## 2024-06-18 NOTE — TELEPHONE ENCOUNTER
Message left to discuss urine specimen results.     Provider noted large blood seen, no evidence of infection. Please ask patient if she had menstrual cycle while giving sample?

## 2024-06-18 NOTE — TELEPHONE ENCOUNTER
Patient returned call states did not have  menstrual cycle as has hx of hysterectomy.  Pt states has had on and off abd pain and has noted blood while giving specimen. Pt believes possible kidney stone.    Please review and advise.  Thank You.

## 2024-06-19 NOTE — TELEPHONE ENCOUNTER
Spoke to patient. Was able to get her to come in at 7am BEFORE work with [employer]psy Juarez at [facility] office on Monday.

## 2024-06-24 ENCOUNTER — HOSPITAL ENCOUNTER (OUTPATIENT)
Dept: ULTRASOUND IMAGING | Facility: HOSPITAL | Age: 49
Discharge: HOME | End: 2024-06-24
Payer: COMMERCIAL

## 2024-06-24 ENCOUNTER — OFFICE VISIT (OUTPATIENT)
Dept: INTERNAL MEDICINE CLINIC | Facility: OTHER | Age: 49
End: 2024-06-24
Payer: COMMERCIAL

## 2024-06-24 VITALS
TEMPERATURE: 99.1 F | WEIGHT: 144.2 LBS | HEART RATE: 82 BPM | BODY MASS INDEX: 26.54 KG/M2 | OXYGEN SATURATION: 96 % | HEIGHT: 62 IN | DIASTOLIC BLOOD PRESSURE: 72 MMHG | SYSTOLIC BLOOD PRESSURE: 116 MMHG

## 2024-06-24 DIAGNOSIS — R31.0 GROSS HEMATURIA: ICD-10-CM

## 2024-06-24 DIAGNOSIS — Z12.31 ENCOUNTER FOR SCREENING MAMMOGRAM FOR BREAST CANCER: ICD-10-CM

## 2024-06-24 DIAGNOSIS — D72.829 LEUKOCYTOSIS, UNSPECIFIED TYPE: ICD-10-CM

## 2024-06-24 DIAGNOSIS — R31.0 GROSS HEMATURIA: Primary | ICD-10-CM

## 2024-06-24 DIAGNOSIS — N20.0 KIDNEY STONE ON LEFT SIDE: ICD-10-CM

## 2024-06-24 DIAGNOSIS — G47.00 INSOMNIA, UNSPECIFIED TYPE: ICD-10-CM

## 2024-06-24 LAB
BACTERIA UR QL AUTO: ABNORMAL /HPF
BILIRUB UR QL STRIP: NEGATIVE
CLARITY UR: CLEAR
COLOR UR: ABNORMAL
GLUCOSE UR STRIP-MCNC: NEGATIVE MG/DL
HGB UR QL STRIP.AUTO: NEGATIVE
KETONES UR STRIP-MCNC: NEGATIVE MG/DL
LEUKOCYTE ESTERASE UR QL STRIP: ABNORMAL
MUCOUS THREADS UR QL AUTO: ABNORMAL
NITRITE UR QL STRIP: NEGATIVE
NON-SQ EPI CELLS URNS QL MICRO: ABNORMAL /HPF
PH UR STRIP.AUTO: 6 [PH]
PROT UR STRIP-MCNC: NEGATIVE MG/DL
RBC #/AREA URNS AUTO: ABNORMAL /HPF
SL AMB  POCT GLUCOSE, UA: NEGATIVE
SL AMB LEUKOCYTE ESTERASE,UA: NORMAL
SL AMB POCT BILIRUBIN,UA: NEGATIVE
SL AMB POCT BLOOD,UA: NEGATIVE
SL AMB POCT CLARITY,UA: CLEAR
SL AMB POCT COLOR,UA: YELLOW
SL AMB POCT KETONES,UA: NEGATIVE
SL AMB POCT NITRITE,UA: NEGATIVE
SL AMB POCT PH,UA: 5.5
SL AMB POCT SPECIFIC GRAVITY,UA: 1.02
SL AMB POCT URINE PROTEIN: NEGATIVE
SL AMB POCT UROBILINOGEN: NORMAL
SP GR UR STRIP.AUTO: 1.01 (ref 1–1.03)
UROBILINOGEN UR STRIP-ACNC: <2 MG/DL
WBC #/AREA URNS AUTO: ABNORMAL /HPF

## 2024-06-24 PROCEDURE — 99214 OFFICE O/P EST MOD 30 MIN: CPT

## 2024-06-24 PROCEDURE — 81001 URINALYSIS AUTO W/SCOPE: CPT

## 2024-06-24 PROCEDURE — 81003 URINALYSIS AUTO W/O SCOPE: CPT

## 2024-06-24 PROCEDURE — 76775 US EXAM ABDO BACK WALL LIM: CPT

## 2024-06-24 NOTE — PROGRESS NOTES
Assessment/Plan:    1. Gross hematuria  Assessment & Plan:  Patient noted several days of right-sided flank pain, no suprapubic pain  Had urine dip last week which showed large blood.  Patient does not have a history of kidney stones, unsure if stone has passed as not visually seen  Endorsing continued dysuria and decreased urinary output  Urine dip in office negative for blood, trace leukocytes  Suspect this is likely kidney stone that passed, however, will get ultrasound kidney bladder to rule out residual stone  Red flag symptoms discussed with patient  Follow-up with urology if symptoms do not improve  Orders:  -     US kidney and bladder; Future; Expected date: 06/24/2024  -     POCT urine dip auto non-scope  -     UA w Reflex to Microscopic w Reflex to Culture  2. Encounter for screening mammogram for breast cancer  -     Mammo screening bilateral w 3d & cad; Future; Expected date: 06/24/2024  3. Kidney stone on left side  Assessment & Plan:  Has been following with urology.  See plan as above   4. Leukocytosis, unspecified type  Assessment & Plan:  Leukocytosis resolved.  White blood cell count 9.44  5. Insomnia, unspecified type  Assessment & Plan:  Follows with sleep medicine.  Has been noting a consistent bowel habits since starting Xywav            M*Modal software was used to dictate this note.  It may contain errors with dictating incorrect words or incorrect spelling. Please contact the provider directly with any questions.    Subjective:      Patient ID: Jenny Pereyra is a 48 y.o. female.    Patient is a 48-year-old female presenting to the office for follow-up on blood work  Patient recently gave urine sample which showed large blood, patient did notice gross hematuria when giving sample  Patient is also endorsing right-sided flank pain, suprapubic tenderness  She does have a history of kidney stones.  Bilateral nonobstructing kidney stones noted on ultrasound January 2024  Patient does follow  with urology    Today, patient states residual suprapubic discomfort, dysuria, decreased urinary output  Feels as though she has been having incomplete emptying  Denies fevers, chills, nausea, vomiting  Unsure if stone is passed, did not visually see stone          The following portions of the patient's history were reviewed and updated as appropriate: allergies, current medications, past family history, past medical history, past social history, past surgical history, and problem list.    Review of Systems   Constitutional:  Negative for chills, fatigue and fever.   Gastrointestinal:  Positive for abdominal pain and constipation. Negative for diarrhea, nausea and vomiting.   Genitourinary:  Positive for difficulty urinating, dysuria, frequency and hematuria. Negative for urgency.         Past Medical History:   Diagnosis Date   • Anxiety    • Brain lipoma 2007   • COPD (chronic obstructive pulmonary disease) (HCC)    • GERD (gastroesophageal reflux disease)    • Kidney stone    • Motion sickness    • PONV (postoperative nausea and vomiting)    • Tremors of nervous system     essential         Current Outpatient Medications:   •  ACIDOPHILUS LACTOBACILLUS PO, Take 1 tablet by mouth daily, Disp: , Rfl:   •  Armodafinil 150 MG tablet, Take 1 tablet (150 mg total) by mouth daily, Disp: 30 tablet, Rfl: 3  •  Ca, Mg, K, and Na Oxybates (Xywav) 500 MG/ML SOLN, Take 4 g by mouth daily at bedtime, Disp: 240 mL, Rfl: 2  •  escitalopram (Lexapro) 10 mg tablet, Take 1 tablet (10 mg total) by mouth daily, Disp: 90 tablet, Rfl: 1  •  fenofibrate (TRICOR) 145 mg tablet, Take 1 tablet (145 mg total) by mouth daily, Disp: 90 tablet, Rfl: 1  •  Multiple Vitamins-Minerals (MULTIVITAMIN ADULTS PO), Take 1 tablet by mouth daily, Disp: , Rfl:   •  ondansetron (ZOFRAN) 4 mg tablet, Take 1 tablet (4 mg total) by mouth every 8 (eight) hours as needed for nausea or vomiting for up to 15 days, Disp: 20 tablet, Rfl: 0  •   tiotropium-olodaterol (Stiolto Respimat) 2.5-2.5 MCG/ACT inhaler, Inhale 2 puffs daily, Disp: 12 g, Rfl: 0    Allergies   Allergen Reactions   • Chlorhexidine Hives     Itching and red rash on body from CHG cloth   • Codeine Palpitations     Other reaction(s): Other (See Comments)  Other reaction(s): Irregular heart rate  Rash,vomiting, heart palpitations   • Latex Rash   • Penicillin V Rash and Vomiting       Social History   Past Surgical History:   Procedure Laterality Date   • APPENDECTOMY     • BACK SURGERY      fusion L5 - S1   • BLADDER SURGERY     • CARPAL TUNNEL RELEASE Right 05/31/2024   • COLONOSCOPY     • FL RETROGRADE PYELOGRAM  12/15/2023   • HYSTERECTOMY      age 29 still has lt ovary   • IR RADIOFREQUENCY ABLATION      esophagus   • IA CYSTO/URETERO W/LITHOTRIPSY &INDWELL STENT INSRT Left 12/15/2023    Procedure: CYSTO USCOPE W/ LASER, & STENT;  Surgeon: Jhonatan Fleming MD;  Location: [facility];  Service: Urology   • IA NEUROPLASTY &/TRANSPOS MEDIAN NRV CARPAL TUNNE Right 05/31/2024    Procedure: RELEASE CARPAL TUNNEL;  Surgeon: Dorothea Mayo MD;  Location: [facility];  Service: Orthopedics   • IA NEUROPLASTY &/TRANSPOSITION ULNAR NERVE ELBOW Right 05/31/2024    Procedure: RELEASE CUBITAL TUNNEL POSSIBLE TRANSPOSITION AND ADIPOSE FLAP;  Surgeon: Dorothea Mayo MD;  Location: [facility];  Service: Orthopedics   • SPINE SURGERY      twin laminectomy lumbar   • TOTAL VAGINAL HYSTERECTOMY      AGE 29   • TUBAL LIGATION     • UPPER GASTROINTESTINAL ENDOSCOPY       Family History   Problem Relation Age of Onset   • Heart attack Mother    • Heart disease Mother    • Kidney failure Mother    • Heart failure Mother    • Diabetes Father    • Alcohol abuse Father    • Suicide Attempts Brother    • Thyroid disease Son    • Breast cancer Maternal Grandmother    • Lung cancer Maternal Grandmother 75   • Arthritis Maternal Grandmother    • No Known Problems Maternal Grandfather    • Diabetes Paternal  "Grandmother    • Stroke Paternal Grandmother    • No Known Problems Paternal Grandfather    • Breast cancer Maternal Aunt 43   • Bone cancer Maternal Aunt    • No Known Problems Maternal Aunt    • No Known Problems Paternal Aunt    • Substance Abuse Paternal Uncle    • Drug abuse Paternal Uncle        Objective:  /72 (BP Location: Left arm, Patient Position: Sitting, Cuff Size: Standard)   Pulse 82   Temp 99.1 °F (37.3 °C) (Temporal)   Ht 5' 2\" (1.575 m)   Wt 65.4 kg (144 lb 3.2 oz) Comment: shoes on  SpO2 96% Comment: ra  BMI 26.37 kg/m²      Physical Exam  Vitals and nursing note reviewed.   Constitutional:       General: She is not in acute distress.     Appearance: Normal appearance. She is not ill-appearing.   HENT:      Head: Normocephalic and atraumatic.      Right Ear: External ear normal.      Left Ear: External ear normal.      Nose: Nose normal.      Mouth/Throat:      Mouth: Mucous membranes are moist.      Pharynx: Oropharynx is clear.   Eyes:      General: No scleral icterus.     Conjunctiva/sclera: Conjunctivae normal.   Cardiovascular:      Rate and Rhythm: Normal rate and regular rhythm.      Heart sounds: Normal heart sounds. No murmur heard.     No friction rub. No gallop.   Pulmonary:      Effort: Pulmonary effort is normal. No respiratory distress.      Breath sounds: Normal breath sounds. No wheezing, rhonchi or rales.   Chest:      Chest wall: No tenderness.   Abdominal:      General: Abdomen is flat. There is no distension.      Palpations: Abdomen is soft.      Tenderness: There is abdominal tenderness in the suprapubic area. There is left CVA tenderness (mild). There is no right CVA tenderness, guarding or rebound.   Skin:     General: Skin is warm and dry.   Neurological:      General: No focal deficit present.      Mental Status: She is alert and oriented to person, place, and time. Mental status is at baseline.   Psychiatric:         Mood and Affect: Mood normal.         " Behavior: Behavior normal.         Disclaimer: This note was generated with voice recognition software.  Phonetic, grammatical, and spelling errors may be present as a result.  Please contact provider with any concerns or questions

## 2024-06-24 NOTE — ASSESSMENT & PLAN NOTE
Patient noted several days of right-sided flank pain, no suprapubic pain  Had urine dip last week which showed large blood.  Patient does not have a history of kidney stones, unsure if stone has passed as not visually seen  Endorsing continued dysuria and decreased urinary output  Urine dip in office negative for blood, trace leukocytes  Suspect this is likely kidney stone that passed, however, will get ultrasound kidney bladder to rule out residual stone  Red flag symptoms discussed with patient  Follow-up with urology if symptoms do not improve

## 2024-06-25 ENCOUNTER — PATIENT MESSAGE (OUTPATIENT)
Dept: PULMONOLOGY | Facility: CLINIC | Age: 49
End: 2024-06-25

## 2024-06-25 DIAGNOSIS — E78.49 OTHER HYPERLIPIDEMIA: ICD-10-CM

## 2024-06-25 RX ORDER — FENOFIBRATE 145 MG/1
145 TABLET, COATED ORAL DAILY
Qty: 90 TABLET | Refills: 1 | Status: SHIPPED | OUTPATIENT
Start: 2024-06-25

## 2024-06-27 ENCOUNTER — TELEPHONE (OUTPATIENT)
Dept: OBGYN CLINIC | Facility: HOSPITAL | Age: 49
End: 2024-06-27

## 2024-06-27 ENCOUNTER — ANESTHESIA (OUTPATIENT)
Age: 49
End: 2024-06-27

## 2024-06-27 ENCOUNTER — ANESTHESIA EVENT (OUTPATIENT)
Age: 49
End: 2024-06-27

## 2024-06-27 DIAGNOSIS — J44.9 CHRONIC OBSTRUCTIVE PULMONARY DISEASE, UNSPECIFIED COPD TYPE (HCC): ICD-10-CM

## 2024-06-27 RX ORDER — TIOTROPIUM BROMIDE AND OLODATEROL 3.124; 2.736 UG/1; UG/1
2 SPRAY, METERED RESPIRATORY (INHALATION) DAILY
Qty: 12 G | Refills: 0 | Status: SHIPPED | OUTPATIENT
Start: 2024-06-27

## 2024-06-27 NOTE — PATIENT COMMUNICATION
Medication:   tiotropium-olodaterol (Stiolto Respimat) 2.5-2.5 MCG/ACT inhaler     Dose/Frequency: Dose: 2 puff. Sig: Inhale 2 puffs daily        Quantity: 12 g     Pharmacy: \[facility]\"" [facility] [place] [address] [employer] [employer] [facility] [address], [place] [address]  Phone: [phone]  Fax: [phone]  DAYA #: --        Office:   [] PCP/Provider -   [x] Speciality/Provider - Dr. Weber    Does the patient have enough for 3 days?   [] Yes   [x] No - Send as HP to POD

## 2024-07-04 DIAGNOSIS — F41.9 ANXIETY: ICD-10-CM

## 2024-07-05 RX ORDER — ESCITALOPRAM OXALATE 10 MG/1
10 TABLET ORAL DAILY
Qty: 100 TABLET | Refills: 1 | Status: SHIPPED | OUTPATIENT
Start: 2024-07-05

## 2024-07-09 ENCOUNTER — ANESTHESIA EVENT (OUTPATIENT)
Age: 49
End: 2024-07-09
Payer: COMMERCIAL

## 2024-07-11 PROBLEM — G56.22 CUBITAL TUNNEL SYNDROME, LEFT: Status: ACTIVE | Noted: 2024-07-11

## 2024-07-11 PROBLEM — G56.02 CARPAL TUNNEL SYNDROME ON LEFT: Status: ACTIVE | Noted: 2024-07-11

## 2024-07-11 PROCEDURE — NC001 PR NO CHARGE: Performed by: SURGERY

## 2024-07-11 NOTE — H&P
"Assessment/Plan:    48-year-old female with left carpal and cubital tunnel syndrome  Plan to proceed with left carpal and cubital tunnel release possible anterior transposition of the ulnar nerve with pedicle based adipose flap  Wrist benefits alternatives to surgical intervention discussed at length and patient wishes to proceed     Assessment: Left carpal and cubital tunnel syndromes     Plan: Pt would like to proceed with left CTR and left CuTR with possible ulnar nerve transposition and adipose flap.  Postop recovery again discussed and all of pt's questions/concerns were answered today.     Follow Up:  After surgery     To Do Next Visit:  Re-evaluation of current issue        CHIEF COMPLAINT:       Chief Complaint   Patient presents with    Post-op       Post op first one. Stiches out. Everything going well.             SUBJECTIVE:  Patient is a 48 y.o. year old female who presents for follow up after Release Cubital Tunnel Possible Transposition And Adipose Flap - Right and Release Carpal Tunnel - Right. Today patient states she is doing \"awesome.\" States her n/t has completely resolved. Describes some burning/discomfort along palm. Patient states she is interested in proceeding with her left side now. Continues to have n/t here. No significant changes from previous appt.     Occupation: works at [employer]     REVIEW OF SYSTEMS:  Musculoskeletal:        As noted in HPI.   All other systems reviewed and are negative.     PHYSICAL EXAMINATION:  General: Well developed and well nourished, alert & oriented x 3, appears comfortable  Psychiatric: Normal  HEENT: Normocephalic, Atraumatic Trachea Midline, No torticollis  Pulmonary: No audible wheezing or respiratory distress   Abdomen/GI: Non tender, non distended   Cardiovascular: No pitting edema, 2+ radial pulse   Skin: No masses, erythema, lacerations, fluctation, ulcerations  Neurovascular: Sensation Intact to the Median, Ulnar, Radial Nerve, Motor Intact " to the Median, Ulnar, Radial Nerve, and Pulses Intact  Musculoskeletal: Normal, except as noted in detailed exam and in HPI.     MUSCULOSKELETAL EXAMINATION:  Incision: Clean, dry, intact  Surgery Site: normal, no evidence of infection   Range of Motion: As expected, full composite fist possible, and FROM elbow  Neurovascular status: Neuro intact, good cap refill  Done today: Sutures out     Left Carpal Tunnel Exam:    negative thenar atrophy. Positive phalen's test. Positive carpal tunnel compression. Positive tinels over median nerve at the wrist.  Opposition strength 5/5.  Abduction strength 5/5.      Left Ulnar Nerve Exam:    Negative intrinsic atrophy.  Negative  deformity at the elbow.   Full range of motion with flexion and extension of the elbow.   Positive ulnar nerve compression test at the elbow. Positive tinels over the ulnar nerve at the elbow.  Positive cross finger test .   FDP strength is 5/5 to the ring finger. FDP strength is 5/5 to the small finger.  Intrinsic strength 5/5 .           STUDIES REVIEWED:  Ultrasound images were personally reviewed and independently interpreted by Dr. Mayo and demonstrate left CTS ruled in with abnormal CSA > 14 sq mm and left enlarged ulnar nerve near/within cubital tunnel c/w CuTS.          PROCEDURES PERFORMED:  Procedures  No Procedures performed today

## 2024-07-12 ENCOUNTER — ANESTHESIA (OUTPATIENT)
Age: 49
End: 2024-07-12
Payer: COMMERCIAL

## 2024-07-12 ENCOUNTER — HOSPITAL ENCOUNTER (OUTPATIENT)
Age: 49
Setting detail: OUTPATIENT SURGERY
Discharge: HOME | End: 2024-07-12
Attending: SURGERY | Admitting: SURGERY
Payer: COMMERCIAL

## 2024-07-12 VITALS
OXYGEN SATURATION: 96 % | HEIGHT: 62 IN | WEIGHT: 142 LBS | RESPIRATION RATE: 24 BRPM | HEART RATE: 64 BPM | DIASTOLIC BLOOD PRESSURE: 78 MMHG | TEMPERATURE: 97.3 F | SYSTOLIC BLOOD PRESSURE: 115 MMHG | BODY MASS INDEX: 26.13 KG/M2

## 2024-07-12 DIAGNOSIS — G56.22 CUBITAL TUNNEL SYNDROME, LEFT: Primary | ICD-10-CM

## 2024-07-12 DIAGNOSIS — G56.21 CUBITAL TUNNEL SYNDROME ON RIGHT: ICD-10-CM

## 2024-07-12 DIAGNOSIS — G56.02 CARPAL TUNNEL SYNDROME ON LEFT: ICD-10-CM

## 2024-07-12 DIAGNOSIS — Z47.89 AFTERCARE FOLLOWING SURGERY OF THE MUSCULOSKELETAL SYSTEM: ICD-10-CM

## 2024-07-12 DIAGNOSIS — G56.01 CARPAL TUNNEL SYNDROME ON RIGHT: ICD-10-CM

## 2024-07-12 PROCEDURE — 64721 CARPAL TUNNEL SURGERY: CPT | Performed by: PHYSICIAN ASSISTANT

## 2024-07-12 PROCEDURE — 64718 REVISE ULNAR NERVE AT ELBOW: CPT | Performed by: SURGERY

## 2024-07-12 PROCEDURE — 64721 CARPAL TUNNEL SURGERY: CPT | Performed by: SURGERY

## 2024-07-12 PROCEDURE — 64718 REVISE ULNAR NERVE AT ELBOW: CPT | Performed by: PHYSICIAN ASSISTANT

## 2024-07-12 RX ORDER — SODIUM CHLORIDE, SODIUM LACTATE, POTASSIUM CHLORIDE, CALCIUM CHLORIDE 600; 310; 30; 20 MG/100ML; MG/100ML; MG/100ML; MG/100ML
INJECTION, SOLUTION INTRAVENOUS CONTINUOUS PRN
Status: DISCONTINUED | OUTPATIENT
Start: 2024-07-12 | End: 2024-07-12

## 2024-07-12 RX ORDER — SODIUM CHLORIDE, SODIUM LACTATE, POTASSIUM CHLORIDE, CALCIUM CHLORIDE 600; 310; 30; 20 MG/100ML; MG/100ML; MG/100ML; MG/100ML
125 INJECTION, SOLUTION INTRAVENOUS CONTINUOUS
Status: DISCONTINUED | OUTPATIENT
Start: 2024-07-12 | End: 2024-07-12 | Stop reason: HOSPADM

## 2024-07-12 RX ORDER — METOCLOPRAMIDE HYDROCHLORIDE 5 MG/ML
10 INJECTION INTRAMUSCULAR; INTRAVENOUS ONCE AS NEEDED
Status: DISCONTINUED | OUTPATIENT
Start: 2024-07-12 | End: 2024-07-12 | Stop reason: HOSPADM

## 2024-07-12 RX ORDER — HYDRALAZINE HYDROCHLORIDE 20 MG/ML
5 INJECTION INTRAMUSCULAR; INTRAVENOUS
Status: DISCONTINUED | OUTPATIENT
Start: 2024-07-12 | End: 2024-07-12 | Stop reason: HOSPADM

## 2024-07-12 RX ORDER — ONDANSETRON 2 MG/ML
4 INJECTION INTRAMUSCULAR; INTRAVENOUS ONCE AS NEEDED
Status: DISCONTINUED | OUTPATIENT
Start: 2024-07-12 | End: 2024-07-12 | Stop reason: HOSPADM

## 2024-07-12 RX ORDER — PROPOFOL 10 MG/ML
INJECTION, EMULSION INTRAVENOUS CONTINUOUS PRN
Status: DISCONTINUED | OUTPATIENT
Start: 2024-07-12 | End: 2024-07-12

## 2024-07-12 RX ORDER — ROPIVACAINE HYDROCHLORIDE 5 MG/ML
INJECTION, SOLUTION EPIDURAL; INFILTRATION; PERINEURAL
Status: COMPLETED | OUTPATIENT
Start: 2024-07-12 | End: 2024-07-12

## 2024-07-12 RX ORDER — PROMETHAZINE HYDROCHLORIDE 25 MG/ML
12.5 INJECTION, SOLUTION INTRAMUSCULAR; INTRAVENOUS ONCE AS NEEDED
Status: DISCONTINUED | OUTPATIENT
Start: 2024-07-12 | End: 2024-07-12 | Stop reason: HOSPADM

## 2024-07-12 RX ORDER — HYDROMORPHONE HCL IN WATER/PF 6 MG/30 ML
0.2 PATIENT CONTROLLED ANALGESIA SYRINGE INTRAVENOUS
Status: DISCONTINUED | OUTPATIENT
Start: 2024-07-12 | End: 2024-07-12 | Stop reason: HOSPADM

## 2024-07-12 RX ORDER — PROPOFOL 10 MG/ML
INJECTION, EMULSION INTRAVENOUS AS NEEDED
Status: DISCONTINUED | OUTPATIENT
Start: 2024-07-12 | End: 2024-07-12

## 2024-07-12 RX ORDER — MIDAZOLAM HYDROCHLORIDE 2 MG/2ML
INJECTION, SOLUTION INTRAMUSCULAR; INTRAVENOUS
Status: COMPLETED | OUTPATIENT
Start: 2024-07-12 | End: 2024-07-12

## 2024-07-12 RX ORDER — HYDROMORPHONE HCL/PF 1 MG/ML
0.5 SYRINGE (ML) INJECTION
Status: DISCONTINUED | OUTPATIENT
Start: 2024-07-12 | End: 2024-07-12 | Stop reason: HOSPADM

## 2024-07-12 RX ORDER — CEFAZOLIN SODIUM 2 G/50ML
2000 SOLUTION INTRAVENOUS ONCE
Status: COMPLETED | OUTPATIENT
Start: 2024-07-12 | End: 2024-07-12

## 2024-07-12 RX ORDER — ALBUTEROL SULFATE 2.5 MG/3ML
2.5 SOLUTION RESPIRATORY (INHALATION) ONCE AS NEEDED
Status: DISCONTINUED | OUTPATIENT
Start: 2024-07-12 | End: 2024-07-12 | Stop reason: HOSPADM

## 2024-07-12 RX ORDER — DEXAMETHASONE SODIUM PHOSPHATE 4 MG/ML
INJECTION, SOLUTION INTRA-ARTICULAR; INTRALESIONAL; INTRAMUSCULAR; INTRAVENOUS; SOFT TISSUE
Status: COMPLETED | OUTPATIENT
Start: 2024-07-12 | End: 2024-07-12

## 2024-07-12 RX ORDER — HYDROCODONE BITARTRATE AND ACETAMINOPHEN 5; 325 MG/1; MG/1
1 TABLET ORAL EVERY 6 HOURS PRN
Qty: 10 TABLET | Refills: 0 | Status: SHIPPED | OUTPATIENT
Start: 2024-07-12 | End: 2024-07-22

## 2024-07-12 RX ORDER — HYDROCODONE BITARTRATE AND ACETAMINOPHEN 5; 325 MG/1; MG/1
1 TABLET ORAL EVERY 6 HOURS PRN
Status: DISCONTINUED | OUTPATIENT
Start: 2024-07-12 | End: 2024-07-12 | Stop reason: HOSPADM

## 2024-07-12 RX ORDER — LABETALOL HYDROCHLORIDE 5 MG/ML
10 INJECTION, SOLUTION INTRAVENOUS
Status: DISCONTINUED | OUTPATIENT
Start: 2024-07-12 | End: 2024-07-12 | Stop reason: HOSPADM

## 2024-07-12 RX ORDER — FENTANYL CITRATE/PF 50 MCG/ML
25 SYRINGE (ML) INJECTION
Status: DISCONTINUED | OUTPATIENT
Start: 2024-07-12 | End: 2024-07-12 | Stop reason: HOSPADM

## 2024-07-12 RX ORDER — SODIUM CHLORIDE 9 MG/ML
75 INJECTION, SOLUTION INTRAVENOUS ONCE
Status: COMPLETED | OUTPATIENT
Start: 2024-07-12 | End: 2024-07-12

## 2024-07-12 RX ADMIN — CEFAZOLIN SODIUM 2000 MG: 2 SOLUTION INTRAVENOUS at 10:53

## 2024-07-12 RX ADMIN — PROPOFOL 70 MCG/KG/MIN: 10 INJECTION, EMULSION INTRAVENOUS at 10:55

## 2024-07-12 RX ADMIN — MIDAZOLAM 2 MG: 1 INJECTION INTRAMUSCULAR; INTRAVENOUS at 09:45

## 2024-07-12 RX ADMIN — ROPIVACAINE HYDROCHLORIDE 30 ML: 5 INJECTION EPIDURAL; INFILTRATION; PERINEURAL at 09:47

## 2024-07-12 RX ADMIN — PROPOFOL 70 MG: 10 INJECTION, EMULSION INTRAVENOUS at 10:52

## 2024-07-12 RX ADMIN — SODIUM CHLORIDE 75 ML/HR: 0.9 INJECTION, SOLUTION INTRAVENOUS at 09:32

## 2024-07-12 RX ADMIN — DEXAMETHASONE SODIUM PHOSPHATE 4 MG: 4 INJECTION INTRA-ARTICULAR; INTRALESIONAL; INTRAMUSCULAR; INTRAVENOUS; SOFT TISSUE at 09:47

## 2024-07-12 RX ADMIN — SODIUM CHLORIDE, SODIUM LACTATE, POTASSIUM CHLORIDE, AND CALCIUM CHLORIDE: .6; .31; .03; .02 INJECTION, SOLUTION INTRAVENOUS at 09:45

## 2024-07-12 NOTE — ANESTHESIA PREPROCEDURE EVALUATION
Procedure:  RELEASE CUBITAL TUNNEL,possible anterior transposition of ulnar nerve with pedicle based adipose flap (Left: Elbow)  RELEASE CARPAL TUNNEL (Left: Wrist)    Relevant Problems   ANESTHESIA   (+) PONV (postoperative nausea and vomiting)      CARDIO   (+) Chronic migraine without aura   (+) Other hyperlipidemia      GI/HEPATIC   (+) Gastroesophageal reflux disease without esophagitis      /RENAL   (+) Kidney stone on left side      MUSCULOSKELETAL   (+) DDD (degenerative disc disease), lumbar      NEURO/PSYCH   (+) Anxiety   (+) Chronic migraine without aura      PULMONARY   (+) Stage 1 mild COPD by GOLD classification ([facility])      Neurology/Sleep   (+) Essential tremor      Rheumatology   (+) Chronic interstitial cystitis      Orthopedic/Musculoskeletal   (+) Carpal tunnel syndrome on left   (+) Cubital tunnel syndrome, left        Physical Exam    Airway    Mallampati score: II  TM Distance: >3 FB  Neck ROM: full     Dental   No notable dental hx     Cardiovascular  Rhythm: regular, Rate: normal, Cardiovascular exam normal    Pulmonary  Pulmonary exam normal Breath sounds clear to auscultation    Other Findings  post-pubertal.      Anesthesia Plan  ASA Score- 2     Anesthesia Type- regional with ASA Monitors.         Additional Monitors:     Airway Plan:            Plan Factors-Exercise tolerance (METS): >4 METS.    Chart reviewed.   Existing labs reviewed. Patient summary reviewed.    Patient is not a current smoker.              Induction-     Postoperative Plan-         Informed Consent- Anesthetic plan and risks discussed with patient.  I personally reviewed this patient with the CRNA. Discussed and agreed on the Anesthesia Plan with the CRNA..

## 2024-07-12 NOTE — ANESTHESIA PROCEDURE NOTES
Peripheral Block    Patient location during procedure: holding area  Start time: 7/12/2024 9:47 AM  Reason for block: at surgeon's request and post-op pain management  Staffing  Performed by: Saurav Avalos MD  Authorized by: Saurav Avalos MD    Preanesthetic Checklist  Completed: patient identified, IV checked, site marked, risks and benefits discussed, surgical consent, monitors and equipment checked, pre-op evaluation and timeout performed  Peripheral Block  Patient position: supine  Prep: ChloraPrep  Patient monitoring: frequent blood pressure checks, continuous pulse oximetry and heart rate  Block type: Supraclavicular  Laterality: left  Injection technique: single-shot  Procedures: ultrasound guided, Ultrasound guidance required for the procedure to increase accuracy and safety of medication placement and decrease risk of complications.  Ultrasound permanent image saved  ropivacaine (NAROPIN) 0.5 % injection 20 mL - Perineural   30 mL - 7/12/2024 9:47:00 AM  midazolam (VERSED) injection 0.5 mg - Intravenous   2 mg - 7/12/2024 9:45:00 AM  dexamethasone (DECADRON) injection 4 mg - Perineural   4 mg - 7/12/2024 9:47:00 AM  Needle  Needle type: Stimuplex   Needle gauge: 20 G  Needle length: 4 in  Needle localization: anatomical landmarks and ultrasound guidance  Assessment  Injection assessment: incremental injection, frequent aspiration, injected with ease, negative aspiration, negative for heart rate change, no paresthesia on injection, no symptoms of intraneural/intravenous injection and needle tip visualized at all times  Paresthesia pain: none  Post-procedure:  site cleaned  patient tolerated the procedure well with no immediate complications

## 2024-07-12 NOTE — OP NOTE
OPERATIVE REPORT  PATIENT NAME: Jenny Pereyra  :  1975  MRN: [id number]  Pt Location: [facility]    SURGERY DATE: 24    Surgeons and Role:     * Dorothea Mayo MD - Primary     * Genny Gonzalez PA-C - Assisting    Pre-Op Diagnosis:  Cubital tunnel syndrome, left [G56.22]  Carpal tunnel syndrome on left [G56.02]    Post-Op Diagnosis Codes:     * Cubital tunnel syndrome, left [G56.22]     * Carpal tunnel syndrome on left [G56.02]    Procedure(s):  RELEASE CUBITAL TUNNEL (Left)  RELEASE CARPAL TUNNEL (Left)    Specimen(s):  No specimens collected during this procedure.    Estimated Blood Loss:   Minimal    Anesthesia Type:   Regional with Sedation    IMPLANTS:  * No implants in log *    PERIOPERATIVE ANTIBIOTICS:    cefazolin, 2 grams    Tourniquet Time: 21 minutes large hemaclear            Operative Indications:  The patient has a history of Carpal Tunnel Syndrome  left and Cubital Tunnel Syndrome  left that was recalcitrant to conservative management.  The decision was made to bring the patient to the operating room for Open Carpal Tunnel Release  left and Cubital Tunnel Release  left.  Risks of the procedure were explained which include, but are not limited to bleeding; infection; damage to nerves, arteries,veins, tendons; scar; pain; need for reoperation; failure to give desired result; and risks of anaesthesia.  All questions were answered to satisfaction and they were willing to proceed.         Operative Findings:  Hypertrophy TCL   Ulnar nerve compression at the deep fascia of the anconeus epitrochlear areas and deep FCU fascia    Complications:   None    Procedure and Technique:  After the patient, site, and procedure were identified, the patient was brought into the operating room in a supine position.  Regional anaesthesia and sedation were provided.   The  left upper extremity was then prepped and drapped in a normal, sterile, orthopedic fashion.    An anterior approach to the carpal  tunnel was undertaken. A 2 cm incision was made in line with the fourth digit, proximal to Perez's line.  The skin and the subcutaneous tissue were sharply incised.  Under loupe magnification, dissection was carried down to the palmar fascia and it was incised. The transverse carpal ligament was visualized and  Released distally with a #64 blade. A freer was was passed above and below the TCL in a retrograde fashion, freeing up any adhesions. A Knife Lite was used to release the proximal portion of the transverse carpal ligament under direct visualization.  Distally the superficial arch was identified.  A complete release was carried out and exploration of the carpal canal revealed no significant tenosynovitis. The median nerve and its branches were intact.      A 5 cm Incision was made with a 15 blade between medial epicondyle and olecranon.  A branch of the MABC was identified distally and protected.  The ulnar nerve was identified at the level of the cubital tunnel.  The nerve was not found to be subluxed.  The Ulnar nerve was traced 1st traced proximally.  Proximal release was performed under direct visualization  the overlying fascia was released with a scissor up to the level of the intermuscular septum which was incised.  Next dissection was taken proximally both with a scissor and with a 64 blade.  Care was taken preserve any crossing branches of the medial antebrachial cutaneous nerve.  Decompression was taken around the olecranon.  A small anconeus epitrochlear us muscle was encountered the superficial fascia was released sharply the muscle dissected bluntly and the deep fascia released under direct visualization protecting the ulnar nerve at all times.  Next the dissection was taken distally to the FCU.  The superficial layer of the fascia was released with a knife and the 2 bellies of the FCU spread gently with a scissor.  The deep fascia of the FCU was identified and with the nerve protected all times  incised.  Release was taken just past the level of the 1st motor branch to the FCU.  Once a complete release was verified,  the Elbow was taken through a full range of motion and the nerve was found to be stable with no subluxation. After the release, it was appreciated that the nerve had been constricted at the level of the deep anconeus epitrochlear's fascia and the deep FCU fascia with flattening of the nerve the tourniquet was brought down and hemostasis was obtained. The wound was copiously irrigated and closed in a layered fashion with 3-0 monocryl  for deep dermal .       At the completion of the procedure, hemostasis was obtained with cautery and direct pressure.  The wounds were copiously irrigated with sterile solution.  The wounds were closed with Prolene, Monocryl, and Steri-strips.  Sterile dressings were applied, including Xeroform, gauze, tweeners, webril, ACE and Sling.  Please note, all sponge, needle, and instrument counts were correct prior to closure.  Loupe magnification was utilized.  The patient tolerated the procedure well.     I was present for the entire procedure., A qualified resident physician was not available., and A physician assistant was required during the procedure for retraction, tissue handling, dissection and suturing.  The aid of Genny OATES was required in the approach dissection and handling of soft tissues with retraction and with closure during this cubital tunnel release.  Her aide was instrumental in protecting the medial antebrachial cutaneous branches  during the approach as well as a allowing  for adequate visualization through a minimally invasive incision      Patient Disposition:  PACU     SIGNATURE: Dorothea Mayo MD  DATE: 07/12/24  TIME: 11:32 AM

## 2024-07-16 DIAGNOSIS — G47.10 HYPERSOMNIA: ICD-10-CM

## 2024-07-16 NOTE — TELEPHONE ENCOUNTER
Received fax from [facility] requesting refill of Xywav.    Last office visit 3/27/2024.  Next office visit 10/1/2024.    Magdi Gleason, please review Rx request and sign if agreeable.  Thank you.

## 2024-07-17 RX ORDER — (CALCIUM, MAGNESIUM, POTASSIUM, AND SODIUM OXYBATES) .5; .5; .5; .5 G/ML; G/ML; G/ML; G/ML
4 SOLUTION ORAL
Qty: 240 ML | Refills: 2 | Status: SHIPPED | OUTPATIENT
Start: 2024-07-17

## 2024-07-22 ENCOUNTER — OFFICE VISIT (OUTPATIENT)
Dept: GASTROENTEROLOGY | Facility: CLINIC | Age: 49
End: 2024-07-22
Payer: COMMERCIAL

## 2024-07-22 VITALS
BODY MASS INDEX: 25.88 KG/M2 | DIASTOLIC BLOOD PRESSURE: 64 MMHG | TEMPERATURE: 98.8 F | WEIGHT: 140.6 LBS | HEIGHT: 62 IN | SYSTOLIC BLOOD PRESSURE: 122 MMHG

## 2024-07-22 DIAGNOSIS — R19.5 ABNORMAL STOOLS: ICD-10-CM

## 2024-07-22 DIAGNOSIS — R15.9 INCONTINENCE OF FECES, UNSPECIFIED FECAL INCONTINENCE TYPE: ICD-10-CM

## 2024-07-22 DIAGNOSIS — R63.4 WEIGHT LOSS, ABNORMAL: Primary | ICD-10-CM

## 2024-07-22 PROCEDURE — 99214 OFFICE O/P EST MOD 30 MIN: CPT | Performed by: INTERNAL MEDICINE

## 2024-07-22 RX ORDER — MAGNESIUM CARB/ALUMINUM HYDROX 105-160MG
TABLET,CHEWABLE ORAL
Qty: 900 ML | Refills: 0 | Status: SHIPPED | OUTPATIENT
Start: 2024-07-22

## 2024-07-22 RX ORDER — POLYETHYLENE GLYCOL 3350 17 G/17G
17 POWDER, FOR SOLUTION ORAL DAILY
Qty: 510 G | Refills: 5 | Status: SHIPPED | OUTPATIENT
Start: 2024-07-22 | End: 2025-01-18

## 2024-07-22 NOTE — PROGRESS NOTES
[facility] - Outpatient Follow-up Note  Jenny Pereyra 48 y.o. female MRN: [id number]  Encounter: [id number]          ASSESSMENT AND PLAN:      1. Weight loss, abnormal  2.  Abnormal stools  3.  Stool incontinence    Differentials include anorectal dysfunction/dyssynergy defecation, rectal polyps or tumors, IBD.  Patient has unintentional weight loss.  She had Cologuard 2 years ago however due to alarm symptoms and change in bowel habits, she has indication for colonoscopy to investigate further.  Discussed with patient.  She is agreeable to get procedure done.  Ordered procedure with prep.  In the meanwhile we will start patient on MiraLAX daily with goal to have 1-2 soft loose bowel movements per day with feeling of complete evacuation.  She can increase the dose of MiraLAX to 3 times a day if needed to achieve the goal.  Continue with good hydration.  If colonoscopy is normal and the patient continues to have symptoms despite MiraLAX intake, get further evaluation with anal manometry.  Patient agreeable with plan.    RTC 4 to 6 months.      Anthony Cochran MD  Gastroenterology  [facility]  Date: July 22, 2024    - Colonoscopy; Future  - polyethylene glycol (MIRALAX) 17 g packet; Take 17 g by mouth daily  Dispense: 510 g; Refill: 5  - magnesium citrate (CITROMA) 1.745 g/30 mL oral solution; 1 to 1.5 bottles (300 to 450 mL or 10 to 15 oz) in the early evening (ie, between 6 and 8 PM) followed by clear liquids (at least three 240 mL glasses) over 2 hours. Patient should also be given a clear liquid diet the day prior to the procedure. Six hours prior to the colonoscopy, administer a second 1 to 1.5 bottle dose followed by clear liquids (three 240 mL glasses) over 1 hour.  Dispense: 900 mL; Refill: 0    ______________________________________________________________________    SUBJECTIVE: 48-year-old with COPD, inlet patch in the esophagus status post ablation  presents for follow-up.    Patient reports having stool incontinence/accidents while sleeping over the last 6 months.  She reports at least 6 episodes over the last 6 months.  She started Xywav about 6 months ago and was wondering if it is medication side effect but apparently has been told by sleep medicine that the medication may not be causing the symptoms.  Usually she has normal bowel movements but has noticed thin caliber stools and feeling of incomplete evacuation intermittently after passage of bowel movements.  No blood in stools.  No nausea, vomiting or abdominal pain.  She has had weight loss of 17 pounds over the last 6 months.  This is unintentional.  Her last colonoscopy was 15 to 20 years ago.      She had Cologuard 2 years ago which was negative according to my review of the labs today.  CBC was normal earlier 2024 and creatinine as well.    REVIEW OF SYSTEMS IS OTHERWISE NEGATIVE.      Historical Information   Past Medical History:   Diagnosis Date    Anxiety     Brain lipoma 2007    COPD (chronic obstructive pulmonary disease) (HCC)     GERD (gastroesophageal reflux disease)     Kidney stone     Motion sickness     PONV (postoperative nausea and vomiting)     Tremors of nervous system     essential     Past Surgical History:   Procedure Laterality Date    APPENDECTOMY      BACK SURGERY      fusion L5 - S1    BLADDER SURGERY      CARPAL TUNNEL RELEASE Right 05/31/2024    COLONOSCOPY      FL RETROGRADE PYELOGRAM  12/15/2023    HYSTERECTOMY      age 29 still has lt ovary    IR RADIOFREQUENCY ABLATION      esophagus    PA CYSTO/URETERO W/LITHOTRIPSY &INDWELL STENT INSRT Left 12/15/2023    Procedure: CYSTO USCOPE W/ LASER, & STENT;  Surgeon: Jhonatan Fleming MD;  Location: [facility];  Service: Urology    PA NEUROPLASTY &/TRANSPOS MEDIAN NRV CARPAL TUNNE Right 05/31/2024    Procedure: RELEASE CARPAL TUNNEL;  Surgeon: Dorothea Mayo MD;  Location:  MAIN OR;  Service: Orthopedics    PA  NEUROPLASTY &/TRANSPOS MEDIAN NRV CARPAL TUNNE Left 2024    Procedure: RELEASE CARPAL TUNNEL;  Surgeon: Dorothea Mayo MD;  [facility];  Service: Orthopedics    NY NEUROPLASTY &/TRANSPOSITION ULNAR NERVE ELBOW Right 2024    Procedure: RELEASE CUBITAL TUNNEL POSSIBLE TRANSPOSITION AND ADIPOSE FLAP;  Surgeon: Dorothea Mayo MD;  [facility];  Service: Orthopedics    NY NEUROPLASTY &/TRANSPOSITION ULNAR NERVE ELBOW Left 2024    Procedure: RELEASE CUBITAL TUNNEL;  Surgeon: Dorothea Mayo MD;  [facility];  Service: Orthopedics    SPINE SURGERY      twin laminectomy lumbar    TOTAL VAGINAL HYSTERECTOMY      AGE 29    TUBAL LIGATION      UPPER GASTROINTESTINAL ENDOSCOPY       Social History   Social History     Substance and Sexual Activity   Alcohol Use Not Currently     Social History     Substance and Sexual Activity   Drug Use Never     Social History     Tobacco Use   Smoking Status Former    Current packs/day: 0.00    Average packs/day: 0.5 packs/day for 25.0 years (12.5 ttl pk-yrs)    Types: Cigarettes    Start date:     Quit date:     Years since quittin.5   Smokeless Tobacco Never     Family History   Problem Relation Age of Onset    Heart attack Mother     Heart disease Mother     Kidney failure Mother     Heart failure Mother     Diabetes Father     Alcohol abuse Father     Suicide Attempts Brother     Thyroid disease Son     Breast cancer Maternal Grandmother     Lung cancer Maternal Grandmother 75    Arthritis Maternal Grandmother     No Known Problems Maternal Grandfather     Diabetes Paternal Grandmother     Stroke Paternal Grandmother     No Known Problems Paternal Grandfather     Breast cancer Maternal Aunt 43    Bone cancer Maternal Aunt     No Known Problems Maternal Aunt     No Known Problems Paternal Aunt     Substance Abuse Paternal Uncle     Drug abuse Paternal Uncle        Meds/Allergies       Current Outpatient Medications:      "ACIDOPHILUS LACTOBACILLUS PO    Armodafinil 150 MG tablet    Ca, Mg, K, and Na Oxybates (Xywav) 500 MG/ML SOLN    escitalopram (Lexapro) 10 mg tablet    fenofibrate (TRICOR) 145 mg tablet    magnesium citrate (CITROMA) 1.745 g/30 mL oral solution    Multiple Vitamins-Minerals (MULTIVITAMIN ADULTS PO)    polyethylene glycol (MIRALAX) 17 g packet    tiotropium-olodaterol (Stiolto Respimat) 2.5-2.5 MCG/ACT inhaler    HYDROcodone-acetaminophen (Norco) 5-325 mg per tablet    ondansetron (ZOFRAN) 4 mg tablet    Allergies   Allergen Reactions    Chlorhexidine Hives     Itching and red rash on body from CHG cloth    Codeine Palpitations     Other reaction(s): Other (See Comments)  Other reaction(s): Irregular heart rate  Rash,vomiting, heart palpitations    Latex Rash    Penicillin V Rash and Vomiting           Objective     Blood pressure 122/64, temperature 98.8 °F (37.1 °C), height 5' 2\" (1.575 m), weight 63.8 kg (140 lb 9.6 oz). Body mass index is 25.72 kg/m².      PHYSICAL EXAM:      General Appearance:   Alert, cooperative, no distress   HEENT:   Normocephalic, atraumatic, anicteric.     Neck:  Supple, symmetrical, trachea midline   Lungs:   Clear to auscultation bilaterally; no rales, rhonchi or wheezing; respirations unlabored    Heart::   Regular rate and rhythm; no murmur, rub, or gallop.   Abdomen:   Soft, non-tender, non-distended; normal bowel sounds; no masses, no organomegaly    Genitalia:   Deferred    Rectal:   Deferred    Extremities:  No cyanosis, clubbing or edema    Pulses:  2+ and symmetric    Skin:  No jaundice, rashes, or lesions    Lymph nodes:  No palpable cervical lymphadenopathy        Lab Results:   No visits with results within 1 Day(s) from this visit.   Latest known visit with results is:   Office Visit on 06/24/2024   Component Date Value     COLOR,UA 06/24/2024 yellow     CLARITY,UA 06/24/2024 clear     SPECIFIC GRAVITY,UA 06/24/2024 1.020      PH,UA 06/24/2024 5.5     LEUKOCYTE ESTERASE,UA " 06/24/2024 trace     NITRITE,UA 06/24/2024 negative     GLUCOSE, UA 06/24/2024 negative     KETONES,UA 06/24/2024 negative     BILIRUBIN,UA 06/24/2024 negative     BLOOD,UA 06/24/2024 negative     POCT URINE PROTEIN 06/24/2024 negative     [facility] AMB POCT UROBILINOGEN 06/24/2024 0.2 E.U./dl     Color, UA 06/24/2024 Light Yellow     Clarity, UA 06/24/2024 Clear     Specific Gravity, UA 06/24/2024 1.011     pH, UA 06/24/2024 6.0     Leukocytes, UA 06/24/2024 Small (A)     Nitrite, UA 06/24/2024 Negative     Protein, UA 06/24/2024 Negative     Glucose, UA 06/24/2024 Negative     Ketones, UA 06/24/2024 Negative     Urobilinogen, UA 06/24/2024 <2.0     Bilirubin, UA 06/24/2024 Negative     Occult Blood, UA 06/24/2024 Negative     RBC, UA 06/24/2024 1-2     WBC, UA 06/24/2024 4-10 (A)     Epithelial Cells 06/24/2024 Occasional     Bacteria, UA 06/24/2024 None Seen     MUCUS THREADS 06/24/2024 Occasional (A)          Radiology Results:   US bedside procedure    Result Date: 7/12/2024  Narrative: [id number]    US kidney and bladder    Result Date: 6/24/2024  Narrative: RENAL ULTRASOUND INDICATION: R31.0: Gross hematuria. COMPARISON: Renal ultrasound January 19, 2024. Correlation with CT abdomen pelvis November 29, 2023 and fluoroscopic images from December 15, 2023 TECHNIQUE: Ultrasound of the retroperitoneum was performed with a curvilinear transducer utilizing volumetric sweeps and still imaging techniques. FINDINGS: KIDNEYS: Symmetric and normal size. Right kidney: 10.8 x 4.8 x 5.1 cm. Volume 137.5 mL Left kidney: 11.1 x 6.0 x 5.9 cm. Volume 204.9 mL Right kidney Normal echogenicity and contour. No mass is identified. No hydronephrosis. No shadowing calculi. No perinephric fluid collections. Left kidney Normal echogenicity and contour. 0.5 cm hyperechoic focus in the lower pole with possible posterior acoustic shadowing No hydronephrosis. No shadowing calculi. No perinephric fluid collections.  URETERS: Nonvisualized. BLADDER: Normally distended. No focal thickening or mass lesions. Bilateral ureteral jets detected.     Impression: Findings indeterminate for a 0.5 cm left renal calculus. No hydronephrosis. Workstation performed: [id number]    Answers submitted by the patient for this visit:  Abdominal Pain Questionnaire (Submitted on 7/19/2024)  Chief Complaint: Abdominal pain  Chronicity: recurrent  Onset: more than 1 month ago  Onset quality: undetermined  Frequency: intermittently  Episode duration: 1 Hours  Progression since onset: unchanged  Pain location: epigastric region  Pain - numeric: 1/10  Pain quality: dull  Radiates to: does not radiate  anorexia: No  arthralgias: No  belching: No  constipation: No  diarrhea: No  dysuria: No  fever: No  flatus: No  frequency: No  headaches: No  hematochezia: No  hematuria: No  melena: No  myalgias: No  nausea: No  weight loss: No  vomiting: No  Aggravated by: nothing  Relieved by: nothing  Diagnostic workup: ultrasound

## 2024-07-22 NOTE — PATIENT INSTRUCTIONS
Scheduled date of colonoscopy (as of today):08.06.24  Physician performing colonoscopy:DR ZAMUDIO  Location of colonoscopy:  Bowel prep reviewed with patient:MAG CITRATE  Instructions reviewed with patient by:HUSAM  Clearances: N/A

## 2024-07-29 ENCOUNTER — OFFICE VISIT (OUTPATIENT)
Dept: OBGYN CLINIC | Facility: HOSPITAL | Age: 49
End: 2024-07-29

## 2024-07-29 VITALS
SYSTOLIC BLOOD PRESSURE: 121 MMHG | DIASTOLIC BLOOD PRESSURE: 79 MMHG | WEIGHT: 140 LBS | BODY MASS INDEX: 25.76 KG/M2 | HEIGHT: 62 IN | HEART RATE: 80 BPM

## 2024-07-29 DIAGNOSIS — R52 PAIN IN SURGICAL SCAR: Primary | ICD-10-CM

## 2024-07-29 DIAGNOSIS — T78.40XA HYPERSENSITIVITY, INITIAL ENCOUNTER: ICD-10-CM

## 2024-07-29 DIAGNOSIS — L90.5 PAIN IN SURGICAL SCAR: Primary | ICD-10-CM

## 2024-07-29 PROCEDURE — 99024 POSTOP FOLLOW-UP VISIT: CPT | Performed by: SURGERY

## 2024-07-29 NOTE — PROGRESS NOTES
Assessment/Plan:  Patient ID: Jenny Pereyra 48 y.o. female   Surgery: Release Cubital Tunnel - Left and Release Carpal Tunnel - Left  Date of Surgery: 7/12/2024    Sutures removed today from the left hand. She may begin scar massage and begin to resume activity as tolerated on this side.   On the contralateral side she is having hypersensitivity and a mass over the nerve that began the last few weeks. Patient was sent to OT for desensitization and scar modalities. She was also provided with a note requesting a vertical mouse and a carpal tunnel gel sleeve      Follow Up:       To Do Next Visit:         CHIEF COMPLAINT:  Chief Complaint   Patient presents with    Post-op     Sutures removed today patient is doing well no complaints         SUBJECTIVE:  Jenny Pereyra is a 48 y.o. year old female who presents for follow up after Release Cubital Tunnel - Left and Release Carpal Tunnel - Left. Today patient has no issues with the left side. She notes paresthesias are gone and the hand is feeling overall well. On the right she has recently noticed a burning pain and hypersensitivity at the scar site as well as a mass just proximal to this which has not gone away since surgery. This is particularly bothersome when using the computer at work.        PHYSICAL EXAMINATION:  General: well developed and well nourished, alert, oriented times 3, and appears comfortable  Psychiatric: Normal    MUSCULOSKELETAL EXAMINATION:  Incision: Clean, dry, intact and healed  Surgery Site: normal, no evidence of infection   Range of Motion: opposition intact and full composite fist possible  Neurovascular status: Neuro intact, good cap refill  Activity Restrictions: No restrictions  Done today: Sutures out    Right wrist: incision is well healed, she does have a volar mass and sensitivity at the scar site and palmar cutaneous branch, full motion     STUDIES REVIEWED:  No new studies to review        LABS REVIEWED:    HgA1c:   Lab Results    Component Value Date    HGBA1C 5.5 04/06/2024     BMP:   Lab Results   Component Value Date    CALCIUM 9.5 04/06/2024    K 4.2 04/06/2024    CO2 25 04/06/2024     (H) 04/06/2024    BUN 21 04/06/2024    CREATININE 0.87 04/06/2024           PROCEDURES PERFORMED:  Procedures  No Procedures performed today    Scribe Attestation      I,:  Genny Gonzalez PA-C am acting as a scribe while in the presence of the attending physician.:       I,:  Dorothea Mayo MD personally performed the services described in this documentation    as scribed in my presence.:

## 2024-07-29 NOTE — LETTER
July 29, 2024     Patient: Jenny Pereyra  YOB: 1975  Date of Visit: 7/29/2024      To Whom it May Concern:    Jenny Pereyra is under my professional care. Jenny was seen in my office on 7/29/2024. Jenny would benefit from using a vertical mouse on the right side for work purposes. Please make appropriate accommodations for her.     If you have any questions or concerns, please don't hesitate to call.         Sincerely,          Dorothea Mayo MD        CC: No Recipients

## 2024-08-05 RX ORDER — SODIUM CHLORIDE, SODIUM LACTATE, POTASSIUM CHLORIDE, CALCIUM CHLORIDE 600; 310; 30; 20 MG/100ML; MG/100ML; MG/100ML; MG/100ML
50 INJECTION, SOLUTION INTRAVENOUS CONTINUOUS
Status: CANCELLED | OUTPATIENT
Start: 2024-08-05

## 2024-08-06 ENCOUNTER — ANESTHESIA (OUTPATIENT)
Dept: GASTROENTEROLOGY | Facility: HOSPITAL | Age: 49
End: 2024-08-06

## 2024-08-06 ENCOUNTER — ANESTHESIA EVENT (OUTPATIENT)
Dept: GASTROENTEROLOGY | Facility: HOSPITAL | Age: 49
End: 2024-08-06

## 2024-08-06 ENCOUNTER — HOSPITAL ENCOUNTER (OUTPATIENT)
Dept: GASTROENTEROLOGY | Facility: HOSPITAL | Age: 49
Setting detail: OUTPATIENT SURGERY
Discharge: HOME | End: 2024-08-06
Attending: INTERNAL MEDICINE
Payer: COMMERCIAL

## 2024-08-06 VITALS
SYSTOLIC BLOOD PRESSURE: 105 MMHG | DIASTOLIC BLOOD PRESSURE: 68 MMHG | TEMPERATURE: 98.2 F | HEART RATE: 66 BPM | OXYGEN SATURATION: 98 % | RESPIRATION RATE: 15 BRPM

## 2024-08-06 DIAGNOSIS — R63.4 WEIGHT LOSS, ABNORMAL: ICD-10-CM

## 2024-08-06 DIAGNOSIS — R19.5 ABNORMAL STOOLS: ICD-10-CM

## 2024-08-06 PROBLEM — K64.8 OTHER HEMORRHOIDS: Status: ACTIVE | Noted: 2024-08-06

## 2024-08-06 PROBLEM — K57.90 DIVERTICULOSIS: Status: ACTIVE | Noted: 2024-08-06

## 2024-08-06 PROCEDURE — 45385 COLONOSCOPY W/LESION REMOVAL: CPT | Performed by: INTERNAL MEDICINE

## 2024-08-06 PROCEDURE — 88305 TISSUE EXAM BY PATHOLOGIST: CPT | Performed by: STUDENT IN AN ORGANIZED HEALTH CARE EDUCATION/TRAINING PROGRAM

## 2024-08-06 RX ORDER — LIDOCAINE HYDROCHLORIDE 10 MG/ML
INJECTION, SOLUTION EPIDURAL; INFILTRATION; INTRACAUDAL; PERINEURAL AS NEEDED
Status: DISCONTINUED | OUTPATIENT
Start: 2024-08-06 | End: 2024-08-06

## 2024-08-06 RX ORDER — SODIUM CHLORIDE, SODIUM LACTATE, POTASSIUM CHLORIDE, CALCIUM CHLORIDE 600; 310; 30; 20 MG/100ML; MG/100ML; MG/100ML; MG/100ML
50 INJECTION, SOLUTION INTRAVENOUS CONTINUOUS
Status: DISCONTINUED | OUTPATIENT
Start: 2024-08-06 | End: 2024-08-10 | Stop reason: HOSPADM

## 2024-08-06 RX ORDER — SODIUM CHLORIDE, SODIUM LACTATE, POTASSIUM CHLORIDE, CALCIUM CHLORIDE 600; 310; 30; 20 MG/100ML; MG/100ML; MG/100ML; MG/100ML
INJECTION, SOLUTION INTRAVENOUS CONTINUOUS PRN
Status: DISCONTINUED | OUTPATIENT
Start: 2024-08-06 | End: 2024-08-06

## 2024-08-06 RX ORDER — PROPOFOL 10 MG/ML
INJECTION, EMULSION INTRAVENOUS AS NEEDED
Status: DISCONTINUED | OUTPATIENT
Start: 2024-08-06 | End: 2024-08-06

## 2024-08-06 RX ADMIN — PROPOFOL 40 MG: 10 INJECTION, EMULSION INTRAVENOUS at 09:06

## 2024-08-06 RX ADMIN — LIDOCAINE HYDROCHLORIDE 50 MG: 10 INJECTION, SOLUTION EPIDURAL; INFILTRATION; INTRACAUDAL; PERINEURAL at 09:00

## 2024-08-06 RX ADMIN — SODIUM CHLORIDE, SODIUM LACTATE, POTASSIUM CHLORIDE, AND CALCIUM CHLORIDE 50 ML/HR: .6; .31; .03; .02 INJECTION, SOLUTION INTRAVENOUS at 08:16

## 2024-08-06 RX ADMIN — PROPOFOL 30 MG: 10 INJECTION, EMULSION INTRAVENOUS at 09:09

## 2024-08-06 RX ADMIN — PROPOFOL 50 MG: 10 INJECTION, EMULSION INTRAVENOUS at 09:03

## 2024-08-06 RX ADMIN — PROPOFOL 20 MG: 10 INJECTION, EMULSION INTRAVENOUS at 09:12

## 2024-08-06 RX ADMIN — PROPOFOL 110 MG: 10 INJECTION, EMULSION INTRAVENOUS at 09:00

## 2024-08-06 RX ADMIN — SODIUM CHLORIDE, SODIUM LACTATE, POTASSIUM CHLORIDE, AND CALCIUM CHLORIDE: .6; .31; .03; .02 INJECTION, SOLUTION INTRAVENOUS at 08:57

## 2024-08-06 NOTE — H&P
History and Physical - [facility]  Jenny Pereyra 48 y.o. female MRN: [id number]                  HPI: Jenny Pereyra is a 48 y.o. year old female who presents for colonoscopy to investigate weight loss, abnormal stools and incontinence.      REVIEW OF SYSTEMS: Per the HPI, and otherwise unremarkable.    Historical Information   Past Medical History:   Diagnosis Date    Anxiety     Brain lipoma 2007    COPD (chronic obstructive pulmonary disease) (HCC)     GERD (gastroesophageal reflux disease)     Kidney stone     Motion sickness     PONV (postoperative nausea and vomiting)     Tremors of nervous system     essential     Past Surgical History:   Procedure Laterality Date    APPENDECTOMY      BACK SURGERY      fusion L5 - S1    BLADDER SURGERY      CARPAL TUNNEL RELEASE Right 05/31/2024    COLONOSCOPY      FL RETROGRADE PYELOGRAM  12/15/2023    HYSTERECTOMY      age 29 still has lt ovary    IR RADIOFREQUENCY ABLATION      esophagus    OK CYSTO/URETERO W/LITHOTRIPSY &INDWELL STENT INSRT Left 12/15/2023    Procedure: CYSTO USCOPE W/ LASER, & STENT;  Surgeon: Jhonatan Fleming MD;  [facility];  Service: Urology    OK NEUROPLASTY &/TRANSPOS MEDIAN NRV CARPAL TUNNE Right 05/31/2024    Procedure: RELEASE CARPAL TUNNEL;  Surgeon: Dorothea Mayo MD;  [facility];  Service: Orthopedics    OK NEUROPLASTY &/TRANSPOS MEDIAN NRV CARPAL TUNNE Left 7/12/2024    Procedure: RELEASE CARPAL TUNNEL;  Surgeon: Dorothea Mayo MD;  [facility];  Service: Orthopedics    OK NEUROPLASTY &/TRANSPOSITION ULNAR NERVE ELBOW Right 05/31/2024    Procedure: RELEASE CUBITAL TUNNEL POSSIBLE TRANSPOSITION AND ADIPOSE FLAP;  Surgeon: Dorothea Mayo MD;  [facility];  Service: Orthopedics    OK NEUROPLASTY &/TRANSPOSITION ULNAR NERVE ELBOW Left 7/12/2024    Procedure: RELEASE CUBITAL TUNNEL;  Surgeon: Dorothea Mayo MD;  [facility];  Service: Orthopedics    SPINE  SURGERY      twin laminectomy lumbar    TOTAL VAGINAL HYSTERECTOMY      AGE 29    TUBAL LIGATION      UPPER GASTROINTESTINAL ENDOSCOPY       Social History   Social History     Substance and Sexual Activity   Alcohol Use Not Currently     Social History     Substance and Sexual Activity   Drug Use Never     Social History     Tobacco Use   Smoking Status Former    Current packs/day: 0.00    Average packs/day: 0.5 packs/day for 25.0 years (12.5 ttl pk-yrs)    Types: Cigarettes    Start date:     Quit date:     Years since quittin.5   Smokeless Tobacco Never     Family History   Problem Relation Age of Onset    Heart attack Mother     Heart disease Mother     Kidney failure Mother     Heart failure Mother     Diabetes Father     Alcohol abuse Father     Suicide Attempts Brother     Thyroid disease Son     Breast cancer Maternal Grandmother     Lung cancer Maternal Grandmother 75    Arthritis Maternal Grandmother     No Known Problems Maternal Grandfather     Diabetes Paternal Grandmother     Stroke Paternal Grandmother     No Known Problems Paternal Grandfather     Breast cancer Maternal Aunt 43    Bone cancer Maternal Aunt     No Known Problems Maternal Aunt     No Known Problems Paternal Aunt     Substance Abuse Paternal Uncle     Drug abuse Paternal Uncle        Meds/Allergies     Not in a hospital admission.    Allergies   Allergen Reactions    Chlorhexidine Hives     Itching and red rash on body from CHG cloth    Codeine Palpitations     Other reaction(s): Other (See Comments)  Other reaction(s): Irregular heart rate  Rash,vomiting, heart palpitations    Latex Rash    Penicillin V Rash and Vomiting       Objective     Blood pressure 118/79, pulse 79, temperature 97.5 °F (36.4 °C), temperature source Temporal, resp. rate 16, SpO2 95%.      PHYSICAL EXAM    Gen: NAD  CV: RRR  CHEST: Clear  ABD: soft, NT/ND  EXT: no edema      ASSESSMENT/PLAN:   Jenny Pereyra is a 48 y.o. year old female who presents  for colonoscopy to investigate weight loss, abnormal stools and incontinence. The patient is stable and optimized for the procedure, we reviewed risk and benefits. Risk include but not limited to infection, bleeding, perforation and missing a lesion.

## 2024-08-06 NOTE — ANESTHESIA POSTPROCEDURE EVALUATION
Post-Op Assessment Note    CV Status:  Stable    Pain management: adequate       Mental Status:  Alert and awake   Hydration Status:  Euvolemic   PONV Controlled:  Controlled   Airway Patency:  Patent     Post Op Vitals Reviewed: Yes    No anethesia notable event occurred.    Staff: LEIGH ANN               BP 99/60 (08/06/24 0922)    Temp 97.5 °F (36.4 °C) (08/06/24 0922)    Pulse 66 (08/06/24 0922)   Resp 15 (08/06/24 0922)    SpO2 95 % (08/06/24 0922)

## 2024-08-06 NOTE — ANESTHESIA PREPROCEDURE EVALUATION
Procedure:  COLONOSCOPY    Relevant Problems   ANESTHESIA   (+) PONV (postoperative nausea and vomiting)      CARDIO   (+) Chronic migraine without aura   (+) Other hyperlipidemia      GI/HEPATIC   (+) Gastroesophageal reflux disease without esophagitis      /RENAL   (+) Kidney stone on left side      MUSCULOSKELETAL   (+) DDD (degenerative disc disease), lumbar      NEURO/PSYCH   (+) Anxiety   (+) Chronic migraine without aura      PULMONARY   (+) Stage 1 mild COPD by GOLD classification (HCC)      Neurology/Sleep   (+) Essential tremor   (+) Insomnia        Physical Exam    Airway    Mallampati score: II  TM Distance: >3 FB  Neck ROM: full     Dental       Cardiovascular  Cardiovascular exam normal    Pulmonary  Pulmonary exam normal     Other Findings  post-pubertal.      Anesthesia Plan  ASA Score- 2     Anesthesia Type- IV sedation with anesthesia with ASA Monitors.         Additional Monitors:     Airway Plan:            Plan Factors-Exercise tolerance (METS): >4 METS.    Chart reviewed.   Existing labs reviewed. Patient summary reviewed.    Patient is not a current smoker. Patient not instructed to abstain from smoking on day of procedure. Patient did not smoke on day of surgery.            Induction-     Postoperative Plan-     Perioperative Resuscitation Plan - Level 1 - Full Code.       Informed Consent- Anesthetic plan and risks discussed with patient and spouse.  I personally reviewed this patient with the CRNA. Discussed and agreed on the Anesthesia Plan with the CRNA..        Lab Results   Component Value Date    HGBA1C 5.5 04/06/2024       Lab Results   Component Value Date    K 4.2 04/06/2024     (H) 04/06/2024    CO2 25 04/06/2024    BUN 21 04/06/2024    CREATININE 0.87 04/06/2024    GLUF 92 04/06/2024    CALCIUM 9.5 04/06/2024    AST 19 04/06/2024    ALT 21 04/06/2024    ALKPHOS 41 04/06/2024    EGFR 80 04/06/2024       Lab Results   Component Value Date    WBC 9.44 06/15/2024    HGB 13.9  06/15/2024    HCT 43.2 06/15/2024    MCV 91 06/15/2024     06/15/2024

## 2024-08-08 PROCEDURE — 88305 TISSUE EXAM BY PATHOLOGIST: CPT | Performed by: STUDENT IN AN ORGANIZED HEALTH CARE EDUCATION/TRAINING PROGRAM

## 2024-08-28 ENCOUNTER — HOSPITAL ENCOUNTER (OUTPATIENT)
Dept: RADIOLOGY | Facility: IMAGING CENTER | Age: 49
Discharge: HOME | End: 2024-08-28
Payer: COMMERCIAL

## 2024-08-28 DIAGNOSIS — R91.1 LUNG NODULE: ICD-10-CM

## 2024-08-28 PROCEDURE — 71250 CT THORAX DX C-: CPT

## 2024-08-28 PROCEDURE — G1004 CDSM NDSC: HCPCS

## 2024-09-09 DIAGNOSIS — G47.10 HYPERSOMNIA: ICD-10-CM

## 2024-09-09 RX ORDER — ARMODAFINIL 150 MG/1
150 TABLET ORAL DAILY
Qty: 30 TABLET | Refills: 1 | Status: SHIPPED | OUTPATIENT
Start: 2024-09-09

## 2024-09-09 NOTE — TELEPHONE ENCOUNTER
Medication: Armodafinil 150 mg   PDMP  09/03/2024 07/17/2024 Xywav (Solution) 180.0 22 0.5 GM/1 ML [facility] Insurance 0 / 2 [place]   1 [id number] 08/13/2024 05/07/2024 Armodafinil (Tablet) 30.0 30 150 MG [facility] YESENIA [id number] Commercial Insurance 3 / 3 PA   1 [id number] 08/09/2024 05/08/2024 Xywav (Solution) 180.0 22 0.5 GM/1 ML FABIAN PATTERSON [facility] [employer] Commercial Insurance 2 / 2 PA   1 [id number] 07/12/2024 05/08/2024 Xywav (Solution) 180.0 22 0.5 GM/1 ML [employer] [employer] Insurance 1 / 2 PA   1 [id number] 07/11/2024 05/07/2024 Armodafinil (Tablet) 30.0 30 150 MG [facility] YESENIA [id number] Commercial Insurance 2 / 3 PA   Active agreement on file -No      Refill must be reviewed and completed by the office or provider. The refill is unable to be approved or denied by the medication management team.

## 2024-09-09 NOTE — TELEPHONE ENCOUNTER
Last office visit 3/27/2024.  Next office visit 10/23/2024.    Dr. Fairbanks, please review Rx request and sign if appropriate.  Thank you.

## 2024-09-26 ENCOUNTER — HOSPITAL ENCOUNTER (OUTPATIENT)
Dept: RADIOLOGY | Facility: IMAGING CENTER | Age: 49
End: 2024-09-26
Payer: COMMERCIAL

## 2024-09-26 VITALS — BODY MASS INDEX: 25.76 KG/M2 | HEIGHT: 62 IN | WEIGHT: 140 LBS

## 2024-09-26 DIAGNOSIS — Z12.31 ENCOUNTER FOR SCREENING MAMMOGRAM FOR BREAST CANCER: ICD-10-CM

## 2024-09-26 PROCEDURE — 77063 BREAST TOMOSYNTHESIS BI: CPT

## 2024-09-26 PROCEDURE — 77067 SCR MAMMO BI INCL CAD: CPT

## 2024-09-30 DIAGNOSIS — R15.9 INCONTINENCE OF FECES, UNSPECIFIED FECAL INCONTINENCE TYPE: ICD-10-CM

## 2024-09-30 DIAGNOSIS — R19.5 ABNORMAL STOOLS: Primary | ICD-10-CM

## 2024-09-30 RX ORDER — BISACODYL 5 MG/1
10 TABLET, DELAYED RELEASE ORAL DAILY
Qty: 60 TABLET | Refills: 5 | Status: SHIPPED | OUTPATIENT
Start: 2024-09-30 | End: 2025-03-29

## 2024-10-01 DIAGNOSIS — G47.10 HYPERSOMNIA: ICD-10-CM

## 2024-10-01 RX ORDER — (CALCIUM, MAGNESIUM, POTASSIUM, AND SODIUM OXYBATES) .5; .5; .5; .5 G/ML; G/ML; G/ML; G/ML
4 SOLUTION ORAL
Qty: 240 ML | Refills: 2 | Status: SHIPPED | OUTPATIENT
Start: 2024-10-01

## 2024-10-01 NOTE — TELEPHONE ENCOUNTER
Received Rx refill request for Xywav from [facility] via fax.    Last office visit 3/27/2024.  Next office visit 10/23/2024.    Dr. Fairbanks, please review Rx request and sign if appropriate.  Thank you.

## 2024-10-02 ENCOUNTER — PREP FOR PROCEDURE (OUTPATIENT)
Dept: GASTROENTEROLOGY | Facility: CLINIC | Age: 49
End: 2024-10-02

## 2024-10-02 DIAGNOSIS — R19.5 ABNORMAL STOOLS: Primary | ICD-10-CM

## 2024-10-02 DIAGNOSIS — R15.9 INCONTINENCE OF FECES, UNSPECIFIED FECAL INCONTINENCE TYPE: ICD-10-CM

## 2024-10-08 ENCOUNTER — HOSPITAL ENCOUNTER (OUTPATIENT)
Dept: MAMMOGRAPHY | Facility: CLINIC | Age: 49
Discharge: HOME | End: 2024-10-08
Payer: COMMERCIAL

## 2024-10-08 ENCOUNTER — HOSPITAL ENCOUNTER (OUTPATIENT)
Dept: ULTRASOUND IMAGING | Facility: CLINIC | Age: 49
Discharge: HOME | End: 2024-10-08
Payer: COMMERCIAL

## 2024-10-08 VITALS — SYSTOLIC BLOOD PRESSURE: 126 MMHG | HEART RATE: 66 BPM | DIASTOLIC BLOOD PRESSURE: 86 MMHG

## 2024-10-08 VITALS — BODY MASS INDEX: 25.76 KG/M2 | HEIGHT: 62 IN | WEIGHT: 140 LBS

## 2024-10-08 DIAGNOSIS — R92.8 ABNORMAL MAMMOGRAM: ICD-10-CM

## 2024-10-08 PROCEDURE — 88305 TISSUE EXAM BY PATHOLOGIST: CPT | Performed by: PATHOLOGY

## 2024-10-08 PROCEDURE — G0279 TOMOSYNTHESIS, MAMMO: HCPCS

## 2024-10-08 PROCEDURE — 88360 TUMOR IMMUNOHISTOCHEM/MANUAL: CPT | Performed by: PATHOLOGY

## 2024-10-08 PROCEDURE — 88341 IMHCHEM/IMCYTCHM EA ADD ANTB: CPT | Performed by: PATHOLOGY

## 2024-10-08 PROCEDURE — A4648 IMPLANTABLE TISSUE MARKER: HCPCS

## 2024-10-08 PROCEDURE — 19083 BX BREAST 1ST LESION US IMAG: CPT

## 2024-10-08 PROCEDURE — 77065 DX MAMMO INCL CAD UNI: CPT

## 2024-10-08 PROCEDURE — 76642 ULTRASOUND BREAST LIMITED: CPT

## 2024-10-08 PROCEDURE — 88342 IMHCHEM/IMCYTCHM 1ST ANTB: CPT | Performed by: PATHOLOGY

## 2024-10-08 RX ORDER — LIDOCAINE HYDROCHLORIDE 10 MG/ML
5 INJECTION, SOLUTION EPIDURAL; INFILTRATION; INTRACAUDAL; PERINEURAL ONCE
Status: COMPLETED | OUTPATIENT
Start: 2024-10-08 | End: 2024-10-08

## 2024-10-08 RX ADMIN — LIDOCAINE HYDROCHLORIDE 5 ML: 10 INJECTION, SOLUTION EPIDURAL; INFILTRATION; INTRACAUDAL; PERINEURAL at 13:22

## 2024-10-08 NOTE — PROGRESS NOTES
Met with patient and Dr. Farnsworth regarding recommendation for;    __X___ RIGHT ______LEFT      __X___Ultrasound guided  ______Stereotactic breast biopsy.      __X___Verbalized understanding.    Reviewed clip placement with patient, pt states understanding: Yes: __X__ No: ____  Comments:    Blood thinners:  No: __X___ Yes: ______ What:          Biopsy teaching sheet given:  Yes: ___X___ No: ________    Pt given contact information and adv to call with any questions/needs    Patient advised to arrive at 1245 for a 1300 same day add on biopsy.

## 2024-10-08 NOTE — PROGRESS NOTES
Procedure type:    __x___ultrasound guided _____stereotactic    Breast:    _____Left ___x__Right    Location: 3 o'clock 4 cmfn     Needle: 14g    # of passes: 4    Clip: Open coil    Performed by: Dr. Farnsworth     Pressure held for 5 minutes by: Daryl Ramos Strips:    ___X__yes _____no    Band aid:    __X___yes_____no    Tolerated procedure:    __X___yes _____no

## 2024-10-09 NOTE — PROGRESS NOTES
Post procedure call completed    Bleeding: _____yes __X___no (Pt denies)    Pain: _____yes ___X___no (Pt reports discomfort, is taking Advil prn, encouraged use of ice prn)    Redness/Swelling: ______yes ___X___no (Pt denies)    Band aid removed: _____yes ___X__no (discussed removing when she showers)    Steri-Strips intact: ___X___yes _____no (discussed with patient to remove steri strips on Sunday if they have not come off on their own)    Pt with no questions at this time, adv will call when results available, adv to call with any questions or concerns, has name/# for contact

## 2024-10-10 ENCOUNTER — OFFICE VISIT (OUTPATIENT)
Dept: PULMONOLOGY | Facility: CLINIC | Age: 49
End: 2024-10-10
Payer: COMMERCIAL

## 2024-10-10 ENCOUNTER — TELEPHONE (OUTPATIENT)
Dept: MAMMOGRAPHY | Facility: CLINIC | Age: 49
End: 2024-10-10

## 2024-10-10 VITALS
TEMPERATURE: 97.8 F | HEART RATE: 86 BPM | HEIGHT: 62 IN | BODY MASS INDEX: 26.31 KG/M2 | DIASTOLIC BLOOD PRESSURE: 84 MMHG | RESPIRATION RATE: 16 BRPM | OXYGEN SATURATION: 97 % | SYSTOLIC BLOOD PRESSURE: 122 MMHG | WEIGHT: 143 LBS

## 2024-10-10 DIAGNOSIS — J44.9 CHRONIC OBSTRUCTIVE PULMONARY DISEASE, UNSPECIFIED COPD TYPE (HCC): Primary | ICD-10-CM

## 2024-10-10 DIAGNOSIS — J44.9 STAGE 1 MILD COPD BY GOLD CLASSIFICATION (HCC): ICD-10-CM

## 2024-10-10 DIAGNOSIS — R91.1 LUNG NODULE: ICD-10-CM

## 2024-10-10 PROCEDURE — 99213 OFFICE O/P EST LOW 20 MIN: CPT | Performed by: INTERNAL MEDICINE

## 2024-10-10 PROCEDURE — 88305 TISSUE EXAM BY PATHOLOGIST: CPT | Performed by: PATHOLOGY

## 2024-10-10 PROCEDURE — 88360 TUMOR IMMUNOHISTOCHEM/MANUAL: CPT | Performed by: PATHOLOGY

## 2024-10-10 PROCEDURE — 95012 NITRIC OXIDE EXP GAS DETER: CPT | Performed by: INTERNAL MEDICINE

## 2024-10-10 PROCEDURE — 90471 IMMUNIZATION ADMIN: CPT

## 2024-10-10 PROCEDURE — 88341 IMHCHEM/IMCYTCHM EA ADD ANTB: CPT | Performed by: PATHOLOGY

## 2024-10-10 PROCEDURE — 90673 RIV3 VACCINE NO PRESERV IM: CPT

## 2024-10-10 PROCEDURE — 88342 IMHCHEM/IMCYTCHM 1ST ANTB: CPT | Performed by: PATHOLOGY

## 2024-10-10 NOTE — PROGRESS NOTES
Ambulatory Visit  Name: Jenny Pereyra      : 1975      MRN: [id number]  Encounter Provider: Keri Weber DO  Encounter Date: 10/10/2024   Encounter department: [facility] PULMONARY [facility]    Assessment & Plan  Chronic obstructive pulmonary disease, unspecified COPD type ([facility])    Orders:    Complete PFT without post bronchodilator; Future    CT chest without contrast; Future    POCT FeNO    influenza vaccine, recombinant, PF, 0.5 mL IM (Flublok)    Lung nodule  Given her smoking history we will finish 2 year follow up in February.         Stage 1 mild COPD by GOLD classification ([facility])  Jenny will continue Stiolto 2 puffs a day.  Likely no benefit to adding inhaled steroid with FENO 8.  She will do full PFTS prior to next visit and I will fill a Xopenex inhaler to use as needed due to aversion to side effects to albuterol. I was ableto give her flu shot today           History of Present Illness     Jenny Pereyra is a 49 y.o. female who presents for follow up of mild COPD. She still feels her breathing is limited compared to others, occasional cough and wheeze.  She remains smoke free.    History obtained from : patient  Review of Systems   Constitutional: Negative.  Negative for appetite change, fever and unexpected weight change.   HENT: Negative.  Negative for ear pain, postnasal drip, rhinorrhea, sneezing, sore throat and trouble swallowing.    Eyes: Negative.    Respiratory:  Positive for cough, shortness of breath and wheezing.    Cardiovascular:  Positive for chest pain. Negative for leg swelling.   Gastrointestinal: Negative.    Endocrine: Negative.    Genitourinary: Negative.    Musculoskeletal: Negative.  Negative for myalgias.   Allergic/Immunologic: Negative.    Neurological:  Positive for headaches.   Hematological: Negative.      Medical History Reviewed by provider this encounter:           Objective     /84 (BP Location: Left arm, Patient Position: Sitting, Cuff Size:  "Adult)   Pulse 86   Temp 97.8 °F (36.6 °C) (Tympanic)   Resp 16   Ht 5' 2\" (1.575 m)   Wt 64.9 kg (143 lb)   SpO2 97%   BMI 26.16 kg/m²     Physical Exam  Constitutional:       Appearance: She is well-developed.   HENT:      Head: Normocephalic and atraumatic.   Eyes:      Pupils: Pupils are equal, round, and reactive to light.   Cardiovascular:      Rate and Rhythm: Normal rate and regular rhythm.      Heart sounds: No murmur heard.  Pulmonary:      Effort: Pulmonary effort is normal. No respiratory distress.      Breath sounds: Normal breath sounds. No wheezing or rales.   Abdominal:      Palpations: Abdomen is soft.   Musculoskeletal:      Cervical back: Normal range of motion and neck supple.   Skin:     General: Skin is warm and dry.   Neurological:      Mental Status: She is alert and oriented to person, place, and time.       Administrative Statements   I have spent a total time of 20 minutes in caring for this patient on the day of the visit/encounter including Counseling / Coordination of care, Reviewing / ordering tests, medicine, procedures  , and Obtaining or reviewing history  .  Answers submitted by the patient for this visit:  Pulmonology Questionnaire (Submitted on 10/6/2024)  Chief Complaint: Primary symptoms  Do you have chest tightness?: Yes  Chronicity: chronic  When did you first notice your symptoms?: more than 1 month ago  How often do your symptoms occur?: intermittently  Since you first noticed this problem, how has it changed?: unchanged  Do you have shortness of breath that occurs with effort or exertion?: Yes  Do you have ear congestion?: No  Do you have heartburn?: No  Do you have fatigue?: Yes  Do you have nasal congestion?: No  Do you have shortness of breath when lying flat?: Yes  Do you have sweats?: Yes  Have you experienced weight loss?: Yes  Which of the following makes your symptoms worse?: climbing stairs, exercise, minimal activity, strenuous activity  Which of the " following makes your symptoms better?: rest  Risk factors for lung disease: animal exposure, smoking/tobacco exposure

## 2024-10-10 NOTE — ASSESSMENT & PLAN NOTE
Jenny will continue Stiolto 2 puffs a day.  Likely no benefit to adding inhaled steroid with FENO 8.  She will do full PFTS prior to next visit and I will fill a Xopenex inhaler to use as needed due to aversion to side effects to albuterol. I was ableto give her flu shot today

## 2024-10-11 ENCOUNTER — TELEPHONE (OUTPATIENT)
Age: 49
End: 2024-10-11

## 2024-10-11 ENCOUNTER — DOCUMENTATION (OUTPATIENT)
Dept: HEMATOLOGY ONCOLOGY | Facility: CLINIC | Age: 49
End: 2024-10-11

## 2024-10-11 DIAGNOSIS — R92.8 ABNORMAL MAMMOGRAM: Primary | ICD-10-CM

## 2024-10-11 PROBLEM — Z17.0 MALIGNANT NEOPLASM OF CENTRAL PORTION OF RIGHT BREAST IN FEMALE, ESTROGEN RECEPTOR POSITIVE (HCC): Status: ACTIVE | Noted: 2024-10-11

## 2024-10-11 PROBLEM — C50.111 MALIGNANT NEOPLASM OF CENTRAL PORTION OF RIGHT BREAST IN FEMALE, ESTROGEN RECEPTOR POSITIVE (HCC): Status: ACTIVE | Noted: 2024-10-11

## 2024-10-11 NOTE — PROGRESS NOTES
In basket message received from pathology.  Patient had recent breast biopsy.  Results came back positive for breast cancer.  Reflex testing to [employer] to be ordered by pathology based upon established criteria.          Information needed for [employer] Test Requisition Form:      Winifred Status:  Was there a lymph node biopsied?  no  [] NX-not submitted or found  [] N0-negative  [] N1-1-3 nodes  []N2 or N3-greater than or equal to 4 nodes     Was ultrasound/imaging of axilla performed? Yes, 10/8       Tumor Information:  Tumor Size: (based upon breast ultrasound-largest tumor, if more than one)  [x] Less than or equal to 5 cm  [] Greater than or equal to 5 cm      Ordering Physician: Dr. Cassandra Cardarelli  Phone #: [phone]  NPI #: [id number]  Fax #: [phone]      Information sent to pathologist as well as referring physician and staff.

## 2024-10-11 NOTE — PROGRESS NOTES
All records needed are in patients chart. No records retrieval needed at this time.     Referral received/ Chart reviewed for work up completed     Imaging completed: [facility]   [] PET/CT   [] MRI   [] CT   [x] US   [x] Mammo   [] Bone scan   [] N/A    Pathology completed:    Date: 10/08/2024   Location: [facility]   []N/A    Additional records needed:   [] Genomic report   [] Genetic testing results   [] Office Note   [] Procedure/ Operative note   [] Lab results   [x] N/A      [] Radiation Oncology records retrieval needed (PN to route to rad/onc clerical pool once scheduled)  Date:  Location:

## 2024-10-11 NOTE — PROGRESS NOTES
Breast Cancer Nurse Navigator    Chart reviewed for prepping for initial outreach by nurse navigator.    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  Breast MRI for the right breast should be considered      Recent Pathology: 10/08/2024 Tissue Exam   Breast, Right 300 4cmfn Invasive breast carcinoma with ductal and lobular features Gr 1 ER %, NC %, HER2 negative (0)    Does patient have a GRACIELA ? no    Genetic testing: TBD    Genomic testing: ordered 10/11/2024    Family history of cancer:  History of breast cancer in Maternal Grandmother, Maternal Aunt.   Previous Breast Cancer Diagnosis:  No    Any further needs:     Other:  Surgical history includes hysterectomy.   Hx birth control    Information forwarded to Outbound [facility]: 10/11/2024    Please schedule patient with tbd by [facility] at [place] (preferred location)    Nurse Navigator will call patient for initial outreach.      Will have Patient Navigator outreach patient after surgical oncology consultation.  Any clinical questions to be directed to office nurse.

## 2024-10-14 ENCOUNTER — OFFICE VISIT (OUTPATIENT)
Dept: SURGICAL ONCOLOGY | Facility: CLINIC | Age: 49
End: 2024-10-14
Payer: COMMERCIAL

## 2024-10-14 ENCOUNTER — HOSPITAL ENCOUNTER (OUTPATIENT)
Dept: RADIOLOGY | Age: 49
Discharge: HOME | End: 2024-10-14
Payer: COMMERCIAL

## 2024-10-14 ENCOUNTER — TELEPHONE (OUTPATIENT)
Age: 49
End: 2024-10-14

## 2024-10-14 ENCOUNTER — APPOINTMENT (OUTPATIENT)
Dept: LAB | Age: 49
End: 2024-10-14
Payer: COMMERCIAL

## 2024-10-14 ENCOUNTER — TELEPHONE (OUTPATIENT)
Dept: SURGICAL ONCOLOGY | Facility: CLINIC | Age: 49
End: 2024-10-14

## 2024-10-14 VITALS
SYSTOLIC BLOOD PRESSURE: 120 MMHG | OXYGEN SATURATION: 95 % | BODY MASS INDEX: 26.31 KG/M2 | WEIGHT: 143 LBS | HEIGHT: 62 IN | TEMPERATURE: 97.9 F | DIASTOLIC BLOOD PRESSURE: 70 MMHG | HEART RATE: 82 BPM

## 2024-10-14 DIAGNOSIS — F51.01 PRIMARY INSOMNIA: ICD-10-CM

## 2024-10-14 DIAGNOSIS — Z80.3 FAMILY HISTORY OF BREAST CANCER: ICD-10-CM

## 2024-10-14 DIAGNOSIS — Z17.0 MALIGNANT NEOPLASM OF CENTRAL PORTION OF RIGHT BREAST IN FEMALE, ESTROGEN RECEPTOR POSITIVE (HCC): ICD-10-CM

## 2024-10-14 DIAGNOSIS — C50.111 MALIGNANT NEOPLASM OF CENTRAL PORTION OF RIGHT BREAST IN FEMALE, ESTROGEN RECEPTOR POSITIVE (HCC): Primary | ICD-10-CM

## 2024-10-14 DIAGNOSIS — R63.4 UNINTENTIONAL WEIGHT LOSS OF LESS THAN 10% BODY WEIGHT WITHIN 6 MONTHS: ICD-10-CM

## 2024-10-14 DIAGNOSIS — Z12.11 SCREENING FOR COLORECTAL CANCER: ICD-10-CM

## 2024-10-14 DIAGNOSIS — R23.3 EASY BRUISING: ICD-10-CM

## 2024-10-14 DIAGNOSIS — C50.919 INVASIVE CARCINOMA OF BREAST (HCC): ICD-10-CM

## 2024-10-14 DIAGNOSIS — Z87.891 HISTORY OF SMOKING 10-25 PACK YEARS: ICD-10-CM

## 2024-10-14 DIAGNOSIS — C50.111 MALIGNANT NEOPLASM OF CENTRAL PORTION OF RIGHT BREAST IN FEMALE, ESTROGEN RECEPTOR POSITIVE (HCC): ICD-10-CM

## 2024-10-14 DIAGNOSIS — R06.02 SHORTNESS OF BREATH: ICD-10-CM

## 2024-10-14 DIAGNOSIS — Z12.12 SCREENING FOR COLORECTAL CANCER: ICD-10-CM

## 2024-10-14 DIAGNOSIS — Z17.0 MALIGNANT NEOPLASM OF CENTRAL PORTION OF RIGHT BREAST IN FEMALE, ESTROGEN RECEPTOR POSITIVE (HCC): Primary | ICD-10-CM

## 2024-10-14 DIAGNOSIS — F98.1 ENCOPRESIS NOT DUE TO SUBSTANCE OR KNOWN PHYSIOLOGICAL CONDITION: ICD-10-CM

## 2024-10-14 LAB
ALBUMIN SERPL BCG-MCNC: 4 G/DL (ref 3.5–5)
ALP SERPL-CCNC: 34 U/L (ref 34–104)
ALT SERPL W P-5'-P-CCNC: 10 U/L (ref 7–52)
ANION GAP SERPL CALCULATED.3IONS-SCNC: 6 MMOL/L (ref 4–13)
AST SERPL W P-5'-P-CCNC: 15 U/L (ref 13–39)
BASOPHILS # BLD AUTO: 0.06 THOUSANDS/ΜL (ref 0–0.1)
BASOPHILS NFR BLD AUTO: 1 % (ref 0–1)
BILIRUB SERPL-MCNC: 0.31 MG/DL (ref 0.2–1)
BUN SERPL-MCNC: 20 MG/DL (ref 5–25)
CALCIUM SERPL-MCNC: 8.9 MG/DL (ref 8.4–10.2)
CHLORIDE SERPL-SCNC: 104 MMOL/L (ref 96–108)
CO2 SERPL-SCNC: 27 MMOL/L (ref 21–32)
CREAT SERPL-MCNC: 0.82 MG/DL (ref 0.6–1.3)
EOSINOPHIL # BLD AUTO: 0.2 THOUSAND/ΜL (ref 0–0.61)
EOSINOPHIL NFR BLD AUTO: 3 % (ref 0–6)
ERYTHROCYTE [DISTWIDTH] IN BLOOD BY AUTOMATED COUNT: 12.3 % (ref 11.6–15.1)
GFR SERPL CREATININE-BSD FRML MDRD: 85 ML/MIN/1.73SQ M
GLUCOSE P FAST SERPL-MCNC: 77 MG/DL (ref 65–99)
HCT VFR BLD AUTO: 39.3 % (ref 34.8–46.1)
HGB BLD-MCNC: 12.9 G/DL (ref 11.5–15.4)
IMM GRANULOCYTES # BLD AUTO: 0.02 THOUSAND/UL (ref 0–0.2)
IMM GRANULOCYTES NFR BLD AUTO: 0 % (ref 0–2)
LYMPHOCYTES # BLD AUTO: 2.61 THOUSANDS/ΜL (ref 0.6–4.47)
LYMPHOCYTES NFR BLD AUTO: 33 % (ref 14–44)
MCH RBC QN AUTO: 29.7 PG (ref 26.8–34.3)
MCHC RBC AUTO-ENTMCNC: 32.8 G/DL (ref 31.4–37.4)
MCV RBC AUTO: 91 FL (ref 82–98)
MONOCYTES # BLD AUTO: 0.4 THOUSAND/ΜL (ref 0.17–1.22)
MONOCYTES NFR BLD AUTO: 5 % (ref 4–12)
NEUTROPHILS # BLD AUTO: 4.66 THOUSANDS/ΜL (ref 1.85–7.62)
NEUTS SEG NFR BLD AUTO: 58 % (ref 43–75)
NRBC BLD AUTO-RTO: 0 /100 WBCS
PLATELET # BLD AUTO: 286 THOUSANDS/UL (ref 149–390)
PMV BLD AUTO: 10.5 FL (ref 8.9–12.7)
POTASSIUM SERPL-SCNC: 3.9 MMOL/L (ref 3.5–5.3)
PROT SERPL-MCNC: 5.8 G/DL (ref 6.4–8.4)
RBC # BLD AUTO: 4.34 MILLION/UL (ref 3.81–5.12)
SODIUM SERPL-SCNC: 137 MMOL/L (ref 135–147)
WBC # BLD AUTO: 7.95 THOUSAND/UL (ref 4.31–10.16)

## 2024-10-14 PROCEDURE — 85025 COMPLETE CBC W/AUTO DIFF WBC: CPT

## 2024-10-14 PROCEDURE — 74177 CT ABD & PELVIS W/CONTRAST: CPT

## 2024-10-14 PROCEDURE — 99245 OFF/OP CONSLTJ NEW/EST HI 55: CPT | Performed by: STUDENT IN AN ORGANIZED HEALTH CARE EDUCATION/TRAINING PROGRAM

## 2024-10-14 PROCEDURE — 71260 CT THORAX DX C+: CPT

## 2024-10-14 PROCEDURE — 80053 COMPREHEN METABOLIC PANEL: CPT

## 2024-10-14 PROCEDURE — 36415 COLL VENOUS BLD VENIPUNCTURE: CPT

## 2024-10-14 RX ADMIN — IOHEXOL 100 ML: 350 INJECTION, SOLUTION INTRAVENOUS at 13:48

## 2024-10-14 NOTE — H&P (VIEW-ONLY)
Breast Consultation-Surgical Oncology     [address] [facility]  [facility]  [address] [facility] [place] [id number]    Name:  Jenny Pereyra  YOB: 1975  MRN:  [id number]    Assessment & Plan  Malignant neoplasm of central portion of right breast in female, estrogen receptor positive (HCC)  Diagnosis invasive breast cancer    We began by discussing the natural history of breast cancer, and the rationale for both locoregional and systemic therapy, which is multi-step and multidisciplinary.     We then discussed management options. It was advised that with the currently known information, surgical options include breast conservation (consisting of partial mastectomy and subsequent breast irradiation) versus total mastectomy with or without reconstruction. The details of each were described, including the the indications and reasons for choosing one as opposed to the other.  We reviewed the lack of known impact on survival with regard to the surgical options and the risk of local recurrence with both interventions were discussed.     She expressed interest in bilateral mastectomy. We discussed that while an attempt is made to remove all visible breast tissue, some may be left behind, and thus there remains a risk for recurrence even after a mastectomy. The rationale behind removal of the nipple and areola complex was discussed, and this led to a conversation of the need to leave behind breast tissue beneath the nipple to preserve a blood supply during a nipple-sparing mastectomy was discussed in detail. Based on location of the tumor, we currently believe she is a candidate for nipple-sparing mastectomy from an oncologic perspective; however, she has significant ptosis and thus this option may need to be further discussed with reconstructive plastic surgery.  We also discussed that post-mastectomy breast reconstruction can be performed, started as part of the  same procedure. We briefly reviewed the options of aesthetic flat closure, and reconstruction with implant or autologous techniques. She is interested in breast reconstruction; we will arrange consultation with Plastic and Reconstructive Surgery. Finally, we reviewed the risks of the procedure, which include but are not limited to, bleeding, infection, seroma, scar formation, cosmetic deformity, and sensory changes.      She also expressed interest in breast conservation, and the remainder of the discussion was dedicated to this. The use of FELA localization and the anticipated perioperative course were discussed (including need for Fela localizer placement).  We reviewed the risks of the procedure, which include but are not limited to, bleeding, infection, seroma, scar formation, cosmetic deformity, Fela localization malfunction and sensory changes.  I explained that while all efforts are made to remove all cancer cells at the initial surgery, the need for a second surgery to obtain clear margins was described.      Management of the axilla was also discussed. The use of sentinel lymphadenectomy for axillary staging was reviewed, which would be performed using lymphatic mapping with radiocolloid as well as blue dye.  These injections will take place following the initiation of anesthesia on day of surgery.  It was explained that in certain circumstances further axillary surgery may be required, a determination that would be made based upon pathology from surgery. We discussed the possibility of a false negative result if intraoperative analysis of lymph nodes is performed. We also reviewed the risks of axillary surgery, which include but are not limited to bleeding, infection, seroma, altered sensation of the axilla and/or upper arm, injury to surrounding neurovascular structures, blue dye allergic reaction, and lymphedema.    We discussed that the vast majority of breast cancers are not related to an inherited  gene, but that for many patients, especially those with a family history of breast or ovarian cancer, genetic testing is valuable. She has previous Helix testing.  It was negative in March 2024.  Additional genetics testing was ordered.     We then discussed the potential role of chemotherapy, endocrine therapy and radiation based on the results of surgical pathology.    It was advised that given the ER+ subtype of disease, she will likely require adjuvant endocrine therapy, and may require adjuvant chemotherapy which is a determination that will be made based upon information obtained from surgical pathology.  A MammaPrint was sent and these results are pending.  I explained that the final determination of this will be made by medical oncology.    Appropriate consultation with medical oncology and radiation oncology will be made postoperatively to complete her adjuvant therapy plan.    Lastly, patient has borderline heterogenously dense components of her breast parenchyma in the setting of new diagnosis of lobular carcinoma.  Patient was recommended breast MRI.  I explained to patient that while breast MRI can assist in mammographically occult lesions; it can also identify multiple other lesions that may not have clinical consequence.  She is aware that this may result in additional biopsies.  Patient has MRI scheduled for October 17, 2024.  Should any additional lesions to be located and require biopsy, we will alter her surgical plan accordingly.Orders:    Ambulatory referral to Plastic Surgery; Future    CBC and differential; Future    Comprehensive metabolic panel; Future    NM bone scan whole body; Future    Ambulatory Referral to Oncology Genetics; Future    CT chest abdomen pelvis w contrast; Future      Due to patient's ongoing unintentional weight loss, easy bruising, worsening fatigue and other systemic illness will perform staging workup.  Patient at this time would like to contemplate her options  between mastectomy, bilateral mastectomy, and lumpectomy coupled with radiation and endocrine therapy.  Patient will return to clinic for follow-up once her staging workup, breast MRI and plastics consultation are complete.  In the interim I advised patient that she continue her follow-up with primary care regarding her ongoing systemic illnesses.      All questions and concerns were addressed at the time of the visit.  Patient has contact information should questions arise.  Unintentional weight loss of less than 10% body weight within 6 months  During this initial consultative visit, patient mention she has had approximately a 49 pound weight loss over the past year that was unintentional.  She does states that she has a good appetite and is eating however she is unable to gain weight.  Patient has had a recent colonoscopy which was reviewed.  She had 2 sessile polyps at that time that did not contain malignant cells.    In light of this unintentional weight loss, despite low stage of her current breast cancer, it is prudent that we perform a staging workup to rule out any distant metastatic disease that may be the source of her unintentional weight loss.  A CT scan of the chest abdomen and pelvis with IV contrast was ordered.  A nuclear medicine bone scan was also ordered to rule out any bony metastases.         Addendum: Following the visit patient underwent her CT scan.  There is no evidence of distant metastatic disease.  Again pulmonary nodules were noted.  Family history of breast cancer  3/2024 Helix screening negative.        Invasive carcinoma of breast (HCC)    Orders:    Ambulatory Referral to Surgical Oncology    History of smoking 10-25 pack years  Patient has a history of multiple pulmonary nodules.  Her most recent imaging was reviewed from August 2024.    IMPRESSION:     1. Multiple pulmonary nodules measuring up to 5 mm, stable from January 2023. No evidence of new or enlarging pulmonary  nodules.     2. Mild centrilobular emphysema.    I reviewed with patient that the lobules have been stable since January 2023.       Encopresis not due to substance or known physiological condition  Patient reports she has had multiple unplanned bowel movements overnight.  She states this has been happening over the past year.  She has anal manometry ordered.  Patient was instructed that she continue can continue this workup throughout her breast cancer workup.       Screening for colorectal cancer  Patient underwent colorectal cancer screening in August 2024.  The pathology was reviewed.  She had fragments of tubular adenomas that were negative for high-grade dysplasia and/or malignancy.       Shortness of breath  Patient reported worsening shortness of breath with activity.  She does not report any lower extremity edema.  Patient states that her shortness of breath is being worked up by her primary care.  She has an appointment with PCM next week.       Easy bruising  Patient reports that she has ongoing worsening bruising without associated trauma.  He also noted she has worsening shortness of breath and significant fatigue.  Will order a CBC and CMP to rule out any abnormalities.       Primary insomnia  Patient reports she is on 2 medications for daytime sleepiness as well as insomnia.  I reviewed her medications.  Patient is unclear if her unintentional weight loss started when the initiation of these medications.  Patient will discuss this at her primary care visit.         CHIEF COMPLAINT: This is an initial consultation visit for right breast invasive cancer with ductal and lobular features.    HPI:  Jenny Pereyra is a 49 y.o. year old female who presents with following diagnosis of right breast cancer.  She reports prior to this she was feeling well.  She denies any breast symptoms including nipple discharge, pain, changes in the appearance of the breast or any other concerns.       Patient reports  significant systemic illnesses to include unintentional weight loss (49 pound weight loss over approximately 6 to 12 months), significant night sweats, worsening daily fatigue, inability to climb a flight of stairs due to fatigue and shortness of breath, nocturnal accidental bowel movements, easy bruising as well as generalized not feeling well.    Patient expressed immediately upon arrival that she wanted a bilateral mastectomy and would not perform chemotherapy.       Oncology History:    Oncology History   Malignant neoplasm of central portion of right breast in female, estrogen receptor positive (HCC)   10/8/2024 Biopsy    Right breast ultrasound-guided biopsy  3 o'clock, 4 cm from nipple (open coil)  Invasive breast carcinoma with ductal and lobular features  Grade 1  ER , [place] , HER2 0  Lymphovascular invasion not identified    Concordant. Unifocal / multifocal. SIZE. Concordant. Right malignancy appears unifocal; suspicious mass covers an area up to 6 mm. US right axilla negative. Left breast clear. Breast MRI should be considered given lobular features within the carcinoma and overall appearance of the breast parenchyma (scattered with borderline heterogeneously dense components).     10/11/2024 Genomic Testing    Reflex MammaPrint/BluePrint testing ordered         Review of Systems:     Constitutional: + Night sweats.  Unintentional weight loss  HENT:  Negative for ear pain and sore throat.    Eyes:  Negative for pain and visual disturbance.   Respiratory:  + shortness of breath.    Cardiovascular:  Negative for chest pain and palpitations.   Gastrointestinal:  Negative for abdominal pain and vomiting.  Accidental bowel movements.  Genitourinary:  Negative for dysuria.  Blood in urine..  Musculoskeletal:  Negative for arthralgias and back pain.   Skin:  Negative for color change and rash.  Ports easy bruising  Neurological:  Negative for seizures and syncope.       Physical Exam: /70    "Pulse 82   Temp 97.9 °F (36.6 °C)   Ht 5' 2\" (1.575 m)   Wt 64.9 kg (143 lb)   SpO2 95%   BMI 26.16 kg/m²     Physical Exam  Vitals reviewed. Exam conducted with a chaperone present.   Constitutional:       Appearance: Normal appearance.   HENT:      Head: Normocephalic and atraumatic.      Mouth/Throat:      Mouth: Mucous membranes are moist.   Cardiovascular:      Rate and Rhythm: Normal rate and regular rhythm.      Pulses: Normal pulses.      Heart sounds: Normal heart sounds.   Pulmonary:      Effort: Pulmonary effort is normal.      Breath sounds: Normal breath sounds.   Chest:   Breasts:     Right: Normal.      Left: Normal.      Comments: The left breast was examined in the sitting and supine position.  There are no worrisome skin changes, tenderness, inverted nipple, nipple discharge, swelling, bleeding or evidence of a mass in any quadrant.  Winifred survey demonstrated no evidence of any clinically suspicious axillary, pectoral or paraclavicular lymph nodes    The right breast was examined in the sitting and supine position.  There are no worrisome skin changes, tenderness, inverted nipple, nipple discharge, swelling, bleeding or evidence of a mass in any quadrant.  Winifred survey demonstrated no evidence of any clinically suspicious axillary, pectoral or paraclavicular lymph nodes  Abdominal:      General: Abdomen is flat.      Palpations: Abdomen is soft.   Musculoskeletal:      Right lower leg: No edema.      Left lower leg: No edema.   Lymphadenopathy:      Upper Body:      Right upper body: No supraclavicular, axillary or pectoral adenopathy.      Left upper body: No supraclavicular, axillary or pectoral adenopathy.   Skin:     General: Skin is warm.   Neurological:      General: No focal deficit present.      Mental Status: She is alert and oriented to person, place, and time.   Psychiatric:         Mood and Affect: Mood normal.         Behavior: Behavior normal.         Thought Content: Thought " content normal.         Laboratory:      Pathology revealed:   10/8/2024 Biopsy, right invasive breast carcinoma with ductal and lobular features, grade 1, ER , [place] , HER2 0; 3:00, 4cmfn (open coil). Concordant.     Staging:  Cancer Staging   Malignant neoplasm of central portion of right breast in female, estrogen receptor positive (HCC)  Staging form: Breast, AJCC 8th Edition  - Clinical stage from 10/14/2024: Stage IA (cT1b, cN0, cM0, G1, ER+, [place]+, HER2-) - Signed by Cassandra Lynn Cardarelli, MD on 10/14/2024  Histopathologic type: Lobular carcinoma, NOS  Stage prefix: Initial diagnosis  Method of lymph node assessment: Clinical  Nuclear grade: G1  Histologic grading system: 3 grade system        Imaging:     10/10/2024: US guided breast biopsy right complete, Mammo post biopsy right    Addendum Date: 10/10/2024 Addendum:   ADDENDUM: PATHOLOGY RESULTS: 1) Asymmetry [A] (Right; Inner; 3 o'clock; Middle; 4 cm from nipple) The pathology results indicate a Malignant finding with invasive breast carcinoma.  Radiology and pathology are concordant. In review of the patient’s recent imaging, the right malignancy appears unifocal. Suspicious mass covers an area of 6 x 4 mm. Ultrasound of the right axilla was performed on October 8, 2024 and showed no suspicious adenopathy. Recent imaging of the contralateral left breast dated September 26, 2024 was reviewed and shows no suspicious findings. RECOMMENDATION:      - Surgical Consultation for the right breast.      - Breast MRI for the right breast should be considered given the lobular features within the carcinoma and overall appearance of the breast parenchyma (scattered with borderline heterogeneously dense components). I personally contacted the patient via telephone discuss these results and recommendations on 10/10/2024.  Patient will be contacted by the breast health services nurse to coordinate follow-up appointment with a breast surgeon. Signed by:   Rose Marie Farnsworth MD END ADDENDUM      FINDINGS: RIGHT 1) DEVELOPING ASYMMETRY [A] Mammo post biopsy right: There is a 6 mm x 4 mm x 5 mm developing asymmetry seen in the inner region of the right breast at 3 o'clock in the middle depth, 4 cm from the nipple. US guided breast biopsy right complete: Images of the right breast asymmetry in the inner region at 3 o'clock in the middle portion, 4 cm from the nipple were obtained. The patient was placed supine on the biopsy table. A core needle biopsy of a 6 mm x 4 mm x 5 mm asymmetry was performed under ultrasound guidance using a lateral approach. The skin was prepped in the usual fashion. Anesthesia was administered using 5 mL of lidocaine 1%. A 14 G core needle biopsy device was advanced. Using the device, 4 samples were collected. An open coil clip was inserted into the target area. Post-placement tomosynthesis imaging demonstrates the clip was at the site. The patient tolerated the procedure well. There were no immediate complications.        10/8/2024: Mammo diagnostic right w 3d and cad, US breast right limited (diagnostic)    FINDINGS: RIGHT 1) DEVELOPING ASYMMETRY [A] Mammo diagnostic right w 3d and cad: Follow-up evaluation was performed for the asymmetry seen in the inner right breast on prior screening mammogram dated September 26, 2022.  On the present examination, the area persists with suggestion of an embedded distortion on supplemental spot compression views (spot CC 23/43).  The asymmetry is located around the inner central tissue approximately 5 cm from the nipple. US breast right limited (diagnostic): In the inner right breast at the 3:00 axis, 4 cm from the nipple, an irregular hypoechoic mass with associated posterior acoustic shadowing measures 6 x 4 x 5 mm.  Size and configuration appear to correspond with the mammographic asymmetry.  No discrete internal vascularity is seen. Additional representative images of the 2:00 and 4:00 axis revealed  no suspicious findings. Targeted ultrasound of the right axillary tissue reveals a benign-appearing lymph node.         Result Date: 9/30/2024  Narrative: DIAGNOSIS: Encounter for screening mammogram for breast cancer TECHNIQUE: Digital screening mammography was performed. Computer Aided Detection (CAD) analyzed all applicable images. COMPARISONS: Prior breast imaging dated: 09/20/2023, 09/16/2022, 08/19/2021, 08/03/2020, 04/22/2019, and 04/16/2018 RELEVANT HISTORY: Family Breast Cancer History: History of breast cancer in Maternal Grandmother, Maternal Aunt. Family Medical History: Family medical history includes breast cancer in 2 relatives (maternal aunt, maternal grandmother). Personal History: Hormone history includes birth control. Surgical history includes hysterectomy. No known relevant medical history. The patient is scheduled in a reminder system for screening mammography. 8-10% of cancers will be missed on mammography. Management of a palpable abnormality must be based on clinical grounds.  Patients will be notified of their results via letter from our facility. Accredited by [employer] College [employer] and FDA. RISK ASSESSMENT: 5 Year Tyrer-Cuzick: 0.79% 10 Year Tyrer-Cuzick: 1.76% Lifetime Tyrer-Cuzick: 9.17% TISSUE DENSITY: There are scattered areas of fibroglandular density. INDICATION: Jenny Pereyra is a 48 y.o. female presenting for screening mammography. FINDINGS: RIGHT 1) DEVELOPING ASYMMETRY [A]: There is a developing asymmetry seen in the inner region of the right breast in the middle depth on the CC view. Left There are no suspicious masses, grouped microcalcifications or areas of unexplained architectural distortion. The skin and nipple areolar complex are unremarkable.      Impression: Additional imaging required. A breast health care nurse from our facility will be contacting the patient regarding the need for additional imaging. ASSESSMENT/BI-RADS CATEGORY: Left: 1 - Negative Right: 0 -  Incomplete: Needs Additional Imaging Evaluation Overall: 0 - Incomplete: Needs Additional Imaging Evaluation RECOMMENDATION:      - Diagnostic mammogram at the current time for the right breast.      - Ultrasound at the current time for the right breast.      - Routine screening mammogram in 1 year for the left breast. Workstation ID: [id number] Signed by:  John Michaels MD        OB History          1    Para   1    Term   1            AB        Living             SAB        IAB        Ectopic        Multiple        Live Births   1                   The following portions of the patient's history were reviewed and updated as appropriate: allergies, current medications, past family history, past medical history, past social history, past surgical history, and problem list.    Problem List:   Patient Active Problem List   Diagnosis    Lung nodule    Umbilical hernia without obstruction and without gangrene    Kidney stone on left side    Essential tremor    Insomnia    Anxiety    Gastroesophageal reflux disease without esophagitis    PONV (postoperative nausea and vomiting)    B12 deficiency    Brain lipoma    Chronic interstitial cystitis    Chronic migraine without aura    Stage 1 mild COPD by GOLD classification ([facility])    DDD (degenerative disc disease), lumbar    Excessive daytime sleepiness    Hypersomnia    Other hyperlipidemia    Leukocytosis    Gross hematuria    Carpal tunnel syndrome on left    Cubital tunnel syndrome, left    Diverticulosis    Other hemorrhoids    Malignant neoplasm of central portion of right breast in female, estrogen receptor positive ([facility])     Past Medical History:   Diagnosis Date    Anxiety     Brain lipoma     COPD (chronic obstructive pulmonary disease) (Former[facility])     GERD (gastroesophageal reflux disease)     Kidney stone     Motion sickness     PONV (postoperative nausea and vomiting)     Tremors of nervous system     essential     Past Surgical History:    Procedure Laterality Date    APPENDECTOMY      BACK SURGERY      fusion L5 - S1    BLADDER SURGERY      BREAST BIOPSY Right 10/08/2024    300 4cmfn, open coil clip    CARPAL TUNNEL RELEASE Right 05/31/2024    COLONOSCOPY      FL RETROGRADE PYELOGRAM  12/15/2023    HYSTERECTOMY      age 29 still has lt ovary    IR RADIOFREQUENCY ABLATION      esophagus    MA CYSTO/URETERO W/LITHOTRIPSY &INDWELL STENT INSRT Left 12/15/2023    Procedure: CYSTO USCOPE W/ LASER, & STENT;  Surgeon: Jhonatan Fleming MD;  Location: [facility];  Service: Urology    MA NEUROPLASTY &/TRANSPOS MEDIAN NRV CARPAL TUNNE Right 05/31/2024    Procedure: RELEASE CARPAL TUNNEL;  Surgeon: Dorothea Mayo MD;  [facility]: [facility];  Service: Orthopedics    MA NEUROPLASTY &/TRANSPOS MEDIAN NRV CARPAL TUNNE Left 07/12/2024    Procedure: RELEASE CARPAL TUNNEL;  Surgeon: Dorothea Mayo MD;  [facility];  Service: Orthopedics    MA NEUROPLASTY &/TRANSPOSITION ULNAR NERVE ELBOW Right 05/31/2024    Procedure: RELEASE CUBITAL TUNNEL POSSIBLE TRANSPOSITION AND ADIPOSE FLAP;  Surgeon: Dorothea Mayo MD;  [facility];  Service: Orthopedics    MA NEUROPLASTY &/TRANSPOSITION ULNAR NERVE ELBOW Left 07/12/2024    Procedure: RELEASE CUBITAL TUNNEL;  Surgeon: Dorothea Mayo MD;  [facility];  Service: Orthopedics    SPINE SURGERY      twin laminectomy lumbar    TOTAL VAGINAL HYSTERECTOMY      AGE 29    TUBAL LIGATION      UPPER GASTROINTESTINAL ENDOSCOPY      US GUIDED BREAST BIOPSY RIGHT COMPLETE Right 10/8/2024     Family History   Problem Relation Age of Onset    Heart attack Mother     Heart disease Mother     Kidney failure Mother     Heart failure Mother     Diabetes Father     Alcohol abuse Father     Breast cancer Maternal Grandmother         unknown age    Lung cancer Maternal Grandmother 75    Arthritis Maternal Grandmother     Cancer Maternal Grandmother     No Known Problems Maternal Grandfather     Diabetes  Paternal Grandmother     Stroke Paternal Grandmother     No Known Problems Paternal Grandfather     Suicide Attempts Brother     Thyroid disease Son     Breast cancer Maternal Aunt 43    Bone cancer Maternal Aunt     Cancer Maternal Aunt     No Known Problems Maternal Aunt     No Known Problems Paternal Aunt     Substance Abuse Paternal Uncle     Drug abuse Paternal Uncle      Social History     Socioeconomic History    Marital status: /Civil Union     Spouse name: Not on file    Number of children: Not on file    Years of education: Not on file    Highest education level: Not on file   Occupational History    Not on file   Tobacco Use    Smoking status: Former     Current packs/day: 0.00     Average packs/day: 0.5 packs/day for 25.0 years (12.5 ttl pk-yrs)     Types: Cigarettes     Start date:      Quit date:      Years since quittin.7    Smokeless tobacco: Never   Vaping Use    Vaping status: Never Used   Substance and Sexual Activity    Alcohol use: Yes     Comment: I may drink once a month if that    Drug use: Never    Sexual activity: Yes   Other Topics Concern    Not on file   Social History Narrative    Not on file     Social Determinants of Health     Financial Resource Strain: Not on file   Food Insecurity: Not on file   Transportation Needs: Not on file   Physical Activity: Not on file   Stress: Not on file   Social Connections: Not on file   Intimate Partner Violence: Not on file   Housing Stability: Not on file     Current Outpatient Medications   Medication Sig Dispense Refill    ACIDOPHILUS LACTOBACILLUS PO Take 1 tablet by mouth daily      Armodafinil 150 MG tablet Take 1 tablet (150 mg total) by mouth daily 30 tablet 1    bisacodyl (DULCOLAX) 5 mg EC tablet Take 2 tablets (10 mg total) by mouth in the morning 60 tablet 5    Ca, Mg, K, and Na Oxybates (Xywav) 500 MG/ML SOLN Take 4 g by mouth daily at bedtime 240 mL 2    escitalopram (Lexapro) 10 mg tablet Take 1 tablet (10 mg total)  by mouth daily 100 tablet 1    fenofibrate (TRICOR) 145 mg tablet Take 1 tablet (145 mg total) by mouth daily 90 tablet 1    magnesium citrate (CITROMA) 1.745 g/30 mL oral solution 1 to 1.5 bottles (300 to 450 mL or 10 to 15 oz) in the early evening (ie, between 6 and 8 PM) followed by clear liquids (at least three 240 mL glasses) over 2 hours. Patient should also be given a clear liquid diet the day prior to the procedure. Six hours prior to the colonoscopy, administer a second 1 to 1.5 bottle dose followed by clear liquids (three 240 mL glasses) over 1 hour. 900 mL 0    Multiple Vitamins-Minerals (MULTIVITAMIN ADULTS PO) Take 1 tablet by mouth daily      ondansetron (ZOFRAN) 4 mg tablet Take 1 tablet (4 mg total) by mouth every 8 (eight) hours as needed for nausea or vomiting for up to 15 days 20 tablet 0    polyethylene glycol (MIRALAX) 17 g packet Take 17 g by mouth daily 510 g 5    tiotropium-olodaterol (Stiolto Respimat) 2.5-2.5 MCG/ACT inhaler Inhale 2 puffs daily 12 g 0     No current facility-administered medications for this visit.     Allergies   Allergen Reactions    Chlorhexidine Hives     Itching and red rash on body from CHG cloth    Codeine Palpitations     Other reaction(s): Other (See Comments)  Other reaction(s): Irregular heart rate  Rash,vomiting, heart palpitations    Latex Rash    Penicillin V Rash and Vomiting

## 2024-10-14 NOTE — TELEPHONE ENCOUNTER
Patient calling to schedule for consult with referral for       C50.111, Z17.0 (ICD-10-CM) -   Malignant neoplasm of central portion of right   breast in female, estrogen receptor   positive (HCC)       Consult for mastectomy/ removal of masses    Please contact

## 2024-10-14 NOTE — PROGRESS NOTES
Breast Consultation-Surgical Oncology     [facility]  [facility]  [address] [facility] [place] [id number]    Name:  Jenny Pereyra  YOB: 1975  MRN:  [id number]    Assessment & Plan  Malignant neoplasm of central portion of right breast in female, estrogen receptor positive (HCC)  Diagnosis invasive breast cancer    We began by discussing the natural history of breast cancer, and the rationale for both locoregional and systemic therapy, which is multi-step and multidisciplinary.     We then discussed management options. It was advised that with the currently known information, surgical options include breast conservation (consisting of partial mastectomy and subsequent breast irradiation) versus total mastectomy with or without reconstruction. The details of each were described, including the the indications and reasons for choosing one as opposed to the other.  We reviewed the lack of known impact on survival with regard to the surgical options and the risk of local recurrence with both interventions were discussed.     She expressed interest in bilateral mastectomy. We discussed that while an attempt is made to remove all visible breast tissue, some may be left behind, and thus there remains a risk for recurrence even after a mastectomy. The rationale behind removal of the nipple and areola complex was discussed, and this led to a conversation of the need to leave behind breast tissue beneath the nipple to preserve a blood supply during a nipple-sparing mastectomy was discussed in detail. Based on location of the tumor, we currently believe she is a candidate for nipple-sparing mastectomy from an oncologic perspective; however, she has significant ptosis and thus this option may need to be further discussed with reconstructive plastic surgery.  We also discussed that post-mastectomy breast reconstruction can be performed, started as part of the  same procedure. We briefly reviewed the options of aesthetic flat closure, and reconstruction with implant or autologous techniques. She is interested in breast reconstruction; we will arrange consultation with Plastic and Reconstructive Surgery. Finally, we reviewed the risks of the procedure, which include but are not limited to, bleeding, infection, seroma, scar formation, cosmetic deformity, and sensory changes.      She also expressed interest in breast conservation, and the remainder of the discussion was dedicated to this. The use of FELA localization and the anticipated perioperative course were discussed (including need for Fela localizer placement).  We reviewed the risks of the procedure, which include but are not limited to, bleeding, infection, seroma, scar formation, cosmetic deformity, Fela localization malfunction and sensory changes.  I explained that while all efforts are made to remove all cancer cells at the initial surgery, the need for a second surgery to obtain clear margins was described.      Management of the axilla was also discussed. The use of sentinel lymphadenectomy for axillary staging was reviewed, which would be performed using lymphatic mapping with radiocolloid as well as blue dye.  These injections will take place following the initiation of anesthesia on day of surgery.  It was explained that in certain circumstances further axillary surgery may be required, a determination that would be made based upon pathology from surgery. We discussed the possibility of a false negative result if intraoperative analysis of lymph nodes is performed. We also reviewed the risks of axillary surgery, which include but are not limited to bleeding, infection, seroma, altered sensation of the axilla and/or upper arm, injury to surrounding neurovascular structures, blue dye allergic reaction, and lymphedema.    We discussed that the vast majority of breast cancers are not related to an inherited  gene, but that for many patients, especially those with a family history of breast or ovarian cancer, genetic testing is valuable. She has previous Helix testing.  It was negative in March 2024.  Additional genetics testing was ordered.     We then discussed the potential role of chemotherapy, endocrine therapy and radiation based on the results of surgical pathology.    It was advised that given the ER+ subtype of disease, she will likely require adjuvant endocrine therapy, and may require adjuvant chemotherapy which is a determination that will be made based upon information obtained from surgical pathology.  A MammaPrint was sent and these results are pending.  I explained that the final determination of this will be made by medical oncology.    Appropriate consultation with medical oncology and radiation oncology will be made postoperatively to complete her adjuvant therapy plan.    Lastly, patient has borderline heterogenously dense components of her breast parenchyma in the setting of new diagnosis of lobular carcinoma.  Patient was recommended breast MRI.  I explained to patient that while breast MRI can assist in mammographically occult lesions; it can also identify multiple other lesions that may not have clinical consequence.  She is aware that this may result in additional biopsies.  Patient has MRI scheduled for October 17, 2024.  Should any additional lesions to be located and require biopsy, we will alter her surgical plan accordingly.Orders:    Ambulatory referral to Plastic Surgery; Future    CBC and differential; Future    Comprehensive metabolic panel; Future    NM bone scan whole body; Future    Ambulatory Referral to Oncology Genetics; Future    CT chest abdomen pelvis w contrast; Future      Due to patient's ongoing unintentional weight loss, easy bruising, worsening fatigue and other systemic illness will perform staging workup.  Patient at this time would like to contemplate her options  between mastectomy, bilateral mastectomy, and lumpectomy coupled with radiation and endocrine therapy.  Patient will return to clinic for follow-up once her staging workup, breast MRI and plastics consultation are complete.  In the interim I advised patient that she continue her follow-up with primary care regarding her ongoing systemic illnesses.      All questions and concerns were addressed at the time of the visit.  Patient has contact information should questions arise.  Unintentional weight loss of less than 10% body weight within 6 months  During this initial consultative visit, patient mention she has had approximately a 49 pound weight loss over the past year that was unintentional.  She does states that she has a good appetite and is eating however she is unable to gain weight.  Patient has had a recent colonoscopy which was reviewed.  She had 2 sessile polyps at that time that did not contain malignant cells.    In light of this unintentional weight loss, despite low stage of her current breast cancer, it is prudent that we perform a staging workup to rule out any distant metastatic disease that may be the source of her unintentional weight loss.  A CT scan of the chest abdomen and pelvis with IV contrast was ordered.  A nuclear medicine bone scan was also ordered to rule out any bony metastases.         Addendum: Following the visit patient underwent her CT scan.  There is no evidence of distant metastatic disease.  Again pulmonary nodules were noted.  Family history of breast cancer  3/2024 Helix screening negative.        Invasive carcinoma of breast (HCC)    Orders:    Ambulatory Referral to Surgical Oncology    History of smoking 10-25 pack years  Patient has a history of multiple pulmonary nodules.  Her most recent imaging was reviewed from August 2024.    IMPRESSION:     1. Multiple pulmonary nodules measuring up to 5 mm, stable from January 2023. No evidence of new or enlarging pulmonary  nodules.     2. Mild centrilobular emphysema.    I reviewed with patient that the lobules have been stable since January 2023.       Encopresis not due to substance or known physiological condition  Patient reports she has had multiple unplanned bowel movements overnight.  She states this has been happening over the past year.  She has anal manometry ordered.  Patient was instructed that she continue can continue this workup throughout her breast cancer workup.       Screening for colorectal cancer  Patient underwent colorectal cancer screening in August 2024.  The pathology was reviewed.  She had fragments of tubular adenomas that were negative for high-grade dysplasia and/or malignancy.       Shortness of breath  Patient reported worsening shortness of breath with activity.  She does not report any lower extremity edema.  Patient states that her shortness of breath is being worked up by her primary care.  She has an appointment with PCM next week.       Easy bruising  Patient reports that she has ongoing worsening bruising without associated trauma.  He also noted she has worsening shortness of breath and significant fatigue.  Will order a CBC and CMP to rule out any abnormalities.       Primary insomnia  Patient reports she is on 2 medications for daytime sleepiness as well as insomnia.  I reviewed her medications.  Patient is unclear if her unintentional weight loss started when the initiation of these medications.  Patient will discuss this at her primary care visit.         CHIEF COMPLAINT: This is an initial consultation visit for right breast invasive cancer with ductal and lobular features.    HPI:  Jenny Pereyra is a 49 y.o. year old female who presents with following diagnosis of right breast cancer.  She reports prior to this she was feeling well.  She denies any breast symptoms including nipple discharge, pain, changes in the appearance of the breast or any other concerns.       Patient reports  significant systemic illnesses to include unintentional weight loss (49 pound weight loss over approximately 6 to 12 months), significant night sweats, worsening daily fatigue, inability to climb a flight of stairs due to fatigue and shortness of breath, nocturnal accidental bowel movements, easy bruising as well as generalized not feeling well.    Patient expressed immediately upon arrival that she wanted a bilateral mastectomy and would not perform chemotherapy.       Oncology History:    Oncology History   Malignant neoplasm of central portion of right breast in female, estrogen receptor positive (HCC)   10/8/2024 Biopsy    Right breast ultrasound-guided biopsy  3 o'clock, 4 cm from nipple (open coil)  Invasive breast carcinoma with ductal and lobular features  Grade 1  ER , OR , HER2 0  Lymphovascular invasion not identified    Concordant. Unifocal / multifocal. SIZE. Concordant. Right malignancy appears unifocal; suspicious mass covers an area up to 6 mm. US right axilla negative. Left breast clear. Breast MRI should be considered given lobular features within the carcinoma and overall appearance of the breast parenchyma (scattered with borderline heterogeneously dense components).     10/11/2024 Genomic Testing    Reflex MammaPrint/BluePrint testing ordered         Review of Systems:     Constitutional: + Night sweats.  Unintentional weight loss  HENT:  Negative for ear pain and sore throat.    Eyes:  Negative for pain and visual disturbance.   Respiratory:  + shortness of breath.    Cardiovascular:  Negative for chest pain and palpitations.   Gastrointestinal:  Negative for abdominal pain and vomiting.  Accidental bowel movements.  Genitourinary:  Negative for dysuria.  Blood in urine..  Musculoskeletal:  Negative for arthralgias and back pain.   Skin:  Negative for color change and rash.  Ports easy bruising  Neurological:  Negative for seizures and syncope.       Physical Exam: /70    "Pulse 82   Temp 97.9 °F (36.6 °C)   Ht 5' 2\" (1.575 m)   Wt 64.9 kg (143 lb)   SpO2 95%   BMI 26.16 kg/m²     Physical Exam  Vitals reviewed. Exam conducted with a chaperone present.   Constitutional:       Appearance: Normal appearance.   HENT:      Head: Normocephalic and atraumatic.      Mouth/Throat:      Mouth: Mucous membranes are moist.   Cardiovascular:      Rate and Rhythm: Normal rate and regular rhythm.      Pulses: Normal pulses.      Heart sounds: Normal heart sounds.   Pulmonary:      Effort: Pulmonary effort is normal.      Breath sounds: Normal breath sounds.   Chest:   Breasts:     Right: Normal.      Left: Normal.      Comments: The left breast was examined in the sitting and supine position.  There are no worrisome skin changes, tenderness, inverted nipple, nipple discharge, swelling, bleeding or evidence of a mass in any quadrant.  Winifred survey demonstrated no evidence of any clinically suspicious axillary, pectoral or paraclavicular lymph nodes    The right breast was examined in the sitting and supine position.  There are no worrisome skin changes, tenderness, inverted nipple, nipple discharge, swelling, bleeding or evidence of a mass in any quadrant.  Winifred survey demonstrated no evidence of any clinically suspicious axillary, pectoral or paraclavicular lymph nodes  Abdominal:      General: Abdomen is flat.      Palpations: Abdomen is soft.   Musculoskeletal:      Right lower leg: No edema.      Left lower leg: No edema.   Lymphadenopathy:      Upper Body:      Right upper body: No supraclavicular, axillary or pectoral adenopathy.      Left upper body: No supraclavicular, axillary or pectoral adenopathy.   Skin:     General: Skin is warm.   Neurological:      General: No focal deficit present.      Mental Status: She is alert and oriented to person, place, and time.   Psychiatric:         Mood and Affect: Mood normal.         Behavior: Behavior normal.         Thought Content: Thought " content normal.         Laboratory:      Pathology revealed:   10/8/2024 Biopsy, right invasive breast carcinoma with ductal and lobular features, grade 1, ER , DE , HER2 0; 3:00, 4cmfn (open coil). Concordant.     Staging:  Cancer Staging   Malignant neoplasm of central portion of right breast in female, estrogen receptor positive (HCC)  Staging form: Breast, AJCC 8th Edition  - Clinical stage from 10/14/2024: Stage IA (cT1b, cN0, cM0, G1, ER+, DE+, HER2-) - Signed by Cassandra Lynn Cardarelli, MD on 10/14/2024  Histopathologic type: Lobular carcinoma, NOS  Stage prefix: Initial diagnosis  Method of lymph node assessment: Clinical  Nuclear grade: G1  Histologic grading system: 3 grade system        Imaging:     10/10/2024: US guided breast biopsy right complete, Mammo post biopsy right    Addendum Date: 10/10/2024 Addendum:   ADDENDUM: PATHOLOGY RESULTS: 1) Asymmetry [A] (Right; Inner; 3 o'clock; Middle; 4 cm from nipple) The pathology results indicate a Malignant finding with invasive breast carcinoma.  Radiology and pathology are concordant. In review of the patient’s recent imaging, the right malignancy appears unifocal. Suspicious mass covers an area of 6 x 4 mm. Ultrasound of the right axilla was performed on October 8, 2024 and showed no suspicious adenopathy. Recent imaging of the contralateral left breast dated September 26, 2024 was reviewed and shows no suspicious findings. RECOMMENDATION:      - Surgical Consultation for the right breast.      - Breast MRI for the right breast should be considered given the lobular features within the carcinoma and overall appearance of the breast parenchyma (scattered with borderline heterogeneously dense components). I personally contacted the patient via telephone discuss these results and recommendations on 10/10/2024.  Patient will be contacted by the breast health services nurse to coordinate follow-up appointment with a breast surgeon. Signed by:   Rose Marie Farnsworth MD END ADDENDUM      FINDINGS: RIGHT 1) DEVELOPING ASYMMETRY [A] Mammo post biopsy right: There is a 6 mm x 4 mm x 5 mm developing asymmetry seen in the inner region of the right breast at 3 o'clock in the middle depth, 4 cm from the nipple. US guided breast biopsy right complete: Images of the right breast asymmetry in the inner region at 3 o'clock in the middle portion, 4 cm from the nipple were obtained. The patient was placed supine on the biopsy table. A core needle biopsy of a 6 mm x 4 mm x 5 mm asymmetry was performed under ultrasound guidance using a lateral approach. The skin was prepped in the usual fashion. Anesthesia was administered using 5 mL of lidocaine 1%. A 14 G core needle biopsy device was advanced. Using the device, 4 samples were collected. An open coil clip was inserted into the target area. Post-placement tomosynthesis imaging demonstrates the clip was at the site. The patient tolerated the procedure well. There were no immediate complications.        10/8/2024: Mammo diagnostic right w 3d and cad, US breast right limited (diagnostic)    FINDINGS: RIGHT 1) DEVELOPING ASYMMETRY [A] Mammo diagnostic right w 3d and cad: Follow-up evaluation was performed for the asymmetry seen in the inner right breast on prior screening mammogram dated September 26, 2022.  On the present examination, the area persists with suggestion of an embedded distortion on supplemental spot compression views (spot CC 23/43).  The asymmetry is located around the inner central tissue approximately 5 cm from the nipple. US breast right limited (diagnostic): In the inner right breast at the 3:00 axis, 4 cm from the nipple, an irregular hypoechoic mass with associated posterior acoustic shadowing measures 6 x 4 x 5 mm.  Size and configuration appear to correspond with the mammographic asymmetry.  No discrete internal vascularity is seen. Additional representative images of the 2:00 and 4:00 axis revealed  no suspicious findings. Targeted ultrasound of the right axillary tissue reveals a benign-appearing lymph node.         Result Date: 9/30/2024  Narrative: DIAGNOSIS: Encounter for screening mammogram for breast cancer TECHNIQUE: Digital screening mammography was performed. Computer Aided Detection (CAD) analyzed all applicable images. COMPARISONS: Prior breast imaging dated: 09/20/2023, 09/16/2022, 08/19/2021, 08/03/2020, 04/22/2019, and 04/16/2018 RELEVANT HISTORY: Family Breast Cancer History: History of breast cancer in Maternal Grandmother, Maternal Aunt. Family Medical History: Family medical history includes breast cancer in 2 relatives (maternal aunt, maternal grandmother). Personal History: Hormone history includes birth control. Surgical history includes hysterectomy. No known relevant medical history. The patient is scheduled in a reminder system for screening mammography. 8-10% of cancers will be missed on mammography. Management of a palpable abnormality must be based on clinical grounds.  Patients will be notified of their results via letter from our facility. Accredited by [employer] College [employer] and FDA. RISK ASSESSMENT: 5 Year Tyrer-Cuzick: 0.79% 10 Year Tyrer-Cuzick: 1.76% Lifetime Tyrer-Cuzick: 9.17% TISSUE DENSITY: There are scattered areas of fibroglandular density. INDICATION: Jenny Pereyra is a 48 y.o. female presenting for screening mammography. FINDINGS: RIGHT 1) DEVELOPING ASYMMETRY [A]: There is a developing asymmetry seen in the inner region of the right breast in the middle depth on the CC view. Left There are no suspicious masses, grouped microcalcifications or areas of unexplained architectural distortion. The skin and nipple areolar complex are unremarkable.      Impression: Additional imaging required. A breast health care nurse from our facility will be contacting the patient regarding the need for additional imaging. ASSESSMENT/BI-RADS CATEGORY: Left: 1 - Negative Right: 0 -  Incomplete: Needs Additional Imaging Evaluation Overall: 0 - Incomplete: Needs Additional Imaging Evaluation RECOMMENDATION:      - Diagnostic mammogram at the current time for the right breast.      - Ultrasound at the current time for the right breast.      - Routine screening mammogram in 1 year for the left breast. Workstation ID: [id number] Signed by:  John Michaels MD        OB History          1    Para   1    Term   1            AB        Living             SAB        IAB        Ectopic        Multiple        Live Births   1                   The following portions of the patient's history were reviewed and updated as appropriate: allergies, current medications, past family history, past medical history, past social history, past surgical history, and problem list.    Problem List:   Patient Active Problem List   Diagnosis    Lung nodule    Umbilical hernia without obstruction and without gangrene    Kidney stone on left side    Essential tremor    Insomnia    Anxiety    Gastroesophageal reflux disease without esophagitis    PONV (postoperative nausea and vomiting)    B12 deficiency    Brain lipoma    Chronic interstitial cystitis    Chronic migraine without aura    Stage 1 mild COPD by GOLD classification ([facility])    DDD (degenerative disc disease), lumbar    Excessive daytime sleepiness    Hypersomnia    Other hyperlipidemia    Leukocytosis    Gross hematuria    Carpal tunnel syndrome on left    Cubital tunnel syndrome, left    Diverticulosis    Other hemorrhoids    Malignant neoplasm of central portion of right breast in female, estrogen receptor positive ([facility])     Past Medical History:   Diagnosis Date    Anxiety     Brain lipoma     COPD (chronic obstructive pulmonary disease) ([facility])     GERD (gastroesophageal reflux disease)     Kidney stone     Motion sickness     PONV (postoperative nausea and vomiting)     Tremors of nervous system     essential     Past Surgical History:    Procedure Laterality Date    APPENDECTOMY      BACK SURGERY      fusion L5 - S1    BLADDER SURGERY      BREAST BIOPSY Right 10/08/2024    300 4cmfn, open coil clip    CARPAL TUNNEL RELEASE Right 05/31/2024    COLONOSCOPY      FL RETROGRADE PYELOGRAM  12/15/2023    HYSTERECTOMY      age 29 still has lt ovary    IR RADIOFREQUENCY ABLATION      esophagus    KS CYSTO/URETERO W/LITHOTRIPSY &INDWELL STENT INSRT Left 12/15/2023    Procedure: CYSTO USCOPE W/ LASER, & STENT;  Surgeon: Jhonatan Fleming MD;  Location: [facility];  Service: Urology    KS NEUROPLASTY &/TRANSPOS MEDIAN NRV CARPAL TUNNE Right 05/31/2024    Procedure: RELEASE CARPAL TUNNEL;  Surgeon: Dorothea Mayo MD;  [facility]: [facility];  Service: Orthopedics    KS NEUROPLASTY &/TRANSPOS MEDIAN NRV CARPAL TUNNE Left 07/12/2024    Procedure: RELEASE CARPAL TUNNEL;  Surgeon: Dorothea Mayo MD;  [facility];  Service: Orthopedics    KS NEUROPLASTY &/TRANSPOSITION ULNAR NERVE ELBOW Right 05/31/2024    Procedure: RELEASE CUBITAL TUNNEL POSSIBLE TRANSPOSITION AND ADIPOSE FLAP;  Surgeon: Dorothea Mayo MD;  [facility];  Service: Orthopedics    KS NEUROPLASTY &/TRANSPOSITION ULNAR NERVE ELBOW Left 07/12/2024    Procedure: RELEASE CUBITAL TUNNEL;  Surgeon: Dorothea Mayo MD;  [facility];  Service: Orthopedics    SPINE SURGERY      twin laminectomy lumbar    TOTAL VAGINAL HYSTERECTOMY      AGE 29    TUBAL LIGATION      UPPER GASTROINTESTINAL ENDOSCOPY      US GUIDED BREAST BIOPSY RIGHT COMPLETE Right 10/8/2024     Family History   Problem Relation Age of Onset    Heart attack Mother     Heart disease Mother     Kidney failure Mother     Heart failure Mother     Diabetes Father     Alcohol abuse Father     Breast cancer Maternal Grandmother         unknown age    Lung cancer Maternal Grandmother 75    Arthritis Maternal Grandmother     Cancer Maternal Grandmother     No Known Problems Maternal Grandfather     Diabetes  Paternal Grandmother     Stroke Paternal Grandmother     No Known Problems Paternal Grandfather     Suicide Attempts Brother     Thyroid disease Son     Breast cancer Maternal Aunt 43    Bone cancer Maternal Aunt     Cancer Maternal Aunt     No Known Problems Maternal Aunt     No Known Problems Paternal Aunt     Substance Abuse Paternal Uncle     Drug abuse Paternal Uncle      Social History     Socioeconomic History    Marital status: /Civil Union     Spouse name: Not on file    Number of children: Not on file    Years of education: Not on file    Highest education level: Not on file   Occupational History    Not on file   Tobacco Use    Smoking status: Former     Current packs/day: 0.00     Average packs/day: 0.5 packs/day for 25.0 years (12.5 ttl pk-yrs)     Types: Cigarettes     Start date:      Quit date:      Years since quittin.7    Smokeless tobacco: Never   Vaping Use    Vaping status: Never Used   Substance and Sexual Activity    Alcohol use: Yes     Comment: I may drink once a month if that    Drug use: Never    Sexual activity: Yes   Other Topics Concern    Not on file   Social History Narrative    Not on file     Social Determinants of Health     Financial Resource Strain: Not on file   Food Insecurity: Not on file   Transportation Needs: Not on file   Physical Activity: Not on file   Stress: Not on file   Social Connections: Not on file   Intimate Partner Violence: Not on file   Housing Stability: Not on file     Current Outpatient Medications   Medication Sig Dispense Refill    ACIDOPHILUS LACTOBACILLUS PO Take 1 tablet by mouth daily      Armodafinil 150 MG tablet Take 1 tablet (150 mg total) by mouth daily 30 tablet 1    bisacodyl (DULCOLAX) 5 mg EC tablet Take 2 tablets (10 mg total) by mouth in the morning 60 tablet 5    Ca, Mg, K, and Na Oxybates (Xywav) 500 MG/ML SOLN Take 4 g by mouth daily at bedtime 240 mL 2    escitalopram (Lexapro) 10 mg tablet Take 1 tablet (10 mg total)  by mouth daily 100 tablet 1    fenofibrate (TRICOR) 145 mg tablet Take 1 tablet (145 mg total) by mouth daily 90 tablet 1    magnesium citrate (CITROMA) 1.745 g/30 mL oral solution 1 to 1.5 bottles (300 to 450 mL or 10 to 15 oz) in the early evening (ie, between 6 and 8 PM) followed by clear liquids (at least three 240 mL glasses) over 2 hours. Patient should also be given a clear liquid diet the day prior to the procedure. Six hours prior to the colonoscopy, administer a second 1 to 1.5 bottle dose followed by clear liquids (three 240 mL glasses) over 1 hour. 900 mL 0    Multiple Vitamins-Minerals (MULTIVITAMIN ADULTS PO) Take 1 tablet by mouth daily      ondansetron (ZOFRAN) 4 mg tablet Take 1 tablet (4 mg total) by mouth every 8 (eight) hours as needed for nausea or vomiting for up to 15 days 20 tablet 0    polyethylene glycol (MIRALAX) 17 g packet Take 17 g by mouth daily 510 g 5    tiotropium-olodaterol (Stiolto Respimat) 2.5-2.5 MCG/ACT inhaler Inhale 2 puffs daily 12 g 0     No current facility-administered medications for this visit.     Allergies   Allergen Reactions    Chlorhexidine Hives     Itching and red rash on body from CHG cloth    Codeine Palpitations     Other reaction(s): Other (See Comments)  Other reaction(s): Irregular heart rate  Rash,vomiting, heart palpitations    Latex Rash    Penicillin V Rash and Vomiting

## 2024-10-14 NOTE — TELEPHONE ENCOUNTER
"Received a message from the plastic surgery team that the patient had additional questions following her consult today with Dr. Cardarelli. The patient is still unsure about which surgery she wants to pursue (mastectomy vs lumpectomy) and reported feeling \"confused.\" Scheduled a pre-op appointment for the patient with Dr. Cardarelli on 10/28 at 1:00 for surgery planning since her results should all be finalized by then. Encouraged the patient to take some time to think about her options prior to making her surgical decision. She has been scheduled for her consult with the plastics team on 10/22. The patient was appreciative of the call and all of her questions were answered at this time.  "

## 2024-10-14 NOTE — ASSESSMENT & PLAN NOTE
Patient reports she is on 2 medications for daytime sleepiness as well as insomnia.  I reviewed her medications.  Patient is unclear if her unintentional weight loss started when the initiation of these medications.  Patient will discuss this at her primary care visit.

## 2024-10-14 NOTE — ASSESSMENT & PLAN NOTE
Diagnosis invasive breast cancer    We began by discussing the natural history of breast cancer, and the rationale for both locoregional and systemic therapy, which is multi-step and multidisciplinary.     We then discussed management options. It was advised that with the currently known information, surgical options include breast conservation (consisting of partial mastectomy and subsequent breast irradiation) versus total mastectomy with or without reconstruction. The details of each were described, including the the indications and reasons for choosing one as opposed to the other.  We reviewed the lack of known impact on survival with regard to the surgical options and the risk of local recurrence with both interventions were discussed.     She expressed interest in bilateral mastectomy. We discussed that while an attempt is made to remove all visible breast tissue, some may be left behind, and thus there remains a risk for recurrence even after a mastectomy. The rationale behind removal of the nipple and areola complex was discussed, and this led to a conversation of the need to leave behind breast tissue beneath the nipple to preserve a blood supply during a nipple-sparing mastectomy was discussed in detail. Based on location of the tumor, we currently believe she is a candidate for nipple-sparing mastectomy from an oncologic perspective; however, she has significant ptosis and thus this option may need to be further discussed with reconstructive plastic surgery.  We also discussed that post-mastectomy breast reconstruction can be performed, started as part of the same procedure. We briefly reviewed the options of aesthetic flat closure, and reconstruction with implant or autologous techniques. She is interested in breast reconstruction; we will arrange consultation with Plastic and Reconstructive Surgery. Finally, we reviewed the risks of the procedure, which include but are not limited to, bleeding,  infection, seroma, scar formation, cosmetic deformity, and sensory changes.      She also expressed interest in breast conservation, and the remainder of the discussion was dedicated to this. The use of FELA localization and the anticipated perioperative course were discussed (including need for Fela localizer placement).  We reviewed the risks of the procedure, which include but are not limited to, bleeding, infection, seroma, scar formation, cosmetic deformity, Fela localization malfunction and sensory changes.  I explained that while all efforts are made to remove all cancer cells at the initial surgery, the need for a second surgery to obtain clear margins was described.      Management of the axilla was also discussed. The use of sentinel lymphadenectomy for axillary staging was reviewed, which would be performed using lymphatic mapping with radiocolloid as well as blue dye.  These injections will take place following the initiation of anesthesia on day of surgery.  It was explained that in certain circumstances further axillary surgery may be required, a determination that would be made based upon pathology from surgery. We discussed the possibility of a false negative result if intraoperative analysis of lymph nodes is performed. We also reviewed the risks of axillary surgery, which include but are not limited to bleeding, infection, seroma, altered sensation of the axilla and/or upper arm, injury to surrounding neurovascular structures, blue dye allergic reaction, and lymphedema.    We discussed that the vast majority of breast cancers are not related to an inherited gene, but that for many patients, especially those with a family history of breast or ovarian cancer, genetic testing is valuable. She has previous [employer] testing.  It was negative in March 2024.  Additional genetics testing was ordered.     We then discussed the potential role of chemotherapy, endocrine therapy and radiation based on the results  of surgical pathology.    It was advised that given the ER+ subtype of disease, she will likely require adjuvant endocrine therapy, and may require adjuvant chemotherapy which is a determination that will be made based upon information obtained from surgical pathology.  A MammaPrint was sent and these results are pending.  I explained that the final determination of this will be made by medical oncology.    Appropriate consultation with medical oncology and radiation oncology will be made postoperatively to complete her adjuvant therapy plan.    Lastly, patient has borderline heterogenously dense components of her breast parenchyma in the setting of new diagnosis of lobular carcinoma.  Patient was recommended breast MRI.  I explained to patient that while breast MRI can assist in mammographically occult lesions; it can also identify multiple other lesions that may not have clinical consequence.  She is aware that this may result in additional biopsies.  Patient has MRI scheduled for October 17, 2024.  Should any additional lesions to be located and require biopsy, we will alter her surgical plan accordingly.Orders:    Ambulatory referral to Plastic Surgery; Future    CBC and differential; Future    Comprehensive metabolic panel; Future    NM bone scan whole body; Future    Ambulatory Referral to Oncology Genetics; Future    CT chest abdomen pelvis w contrast; Future      Due to patient's ongoing unintentional weight loss, easy bruising, worsening fatigue and other systemic illness will perform staging workup.  Patient at this time would like to contemplate her options between mastectomy, bilateral mastectomy, and lumpectomy coupled with radiation and endocrine therapy.  Patient will return to clinic for follow-up once her staging workup, breast MRI and plastics consultation are complete.  In the interim I advised patient that she continue her follow-up with primary care regarding her ongoing systemic illnesses.       All questions and concerns were addressed at the time of the visit.  Patient has contact information should questions arise.

## 2024-10-15 ENCOUNTER — TELEPHONE (OUTPATIENT)
Dept: GENETICS | Facility: CLINIC | Age: 49
End: 2024-10-15

## 2024-10-15 ENCOUNTER — PATIENT OUTREACH (OUTPATIENT)
Dept: HEMATOLOGY ONCOLOGY | Facility: CLINIC | Age: 49
End: 2024-10-15

## 2024-10-15 DIAGNOSIS — Z17.0 MALIGNANT NEOPLASM OF CENTRAL PORTION OF RIGHT BREAST IN FEMALE, ESTROGEN RECEPTOR POSITIVE (HCC): Primary | ICD-10-CM

## 2024-10-15 DIAGNOSIS — C50.111 MALIGNANT NEOPLASM OF CENTRAL PORTION OF RIGHT BREAST IN FEMALE, ESTROGEN RECEPTOR POSITIVE (HCC): Primary | ICD-10-CM

## 2024-10-15 NOTE — PROGRESS NOTES
Breast Oncology Nurse Navigator    Called patient for initial outreach from nurse navigator.  Introduced myself and my role as well as members of the navigation team.  Oncology Nurse Navigator will follow patient until they are seen by surgical oncology, after which the patient navigator initiate outreach and follow the patient to survivorship.      Referral received from [facility] on 10/11 and was scheduled for 10/14 with Dr Cardarelli.  Breast cancer diagnosis was made after an abnormal mammogram  Patient states an understanding/awareness of diagnosis. She is unsure of what type of surgery she wants to pursue, either lumpectomy vs mastectomy.She is scheduled to meet with Dr Ortiz on 10/22 to discuss plastic surgery options and then again with Dr Cardarelli on 10/28 to finalize her surgery plans.    She is [employer] employee. She has transportation to and from her appointments. She is a former smoker.   She has family history of cancer, a referral to oncology genetics was submitted.   She had a CT completed on 10/14 and is scheduled for a Bone scan on 10/18    Discussed the Cancer Support Community and some of the programs and services offered  Discussed Breast Cancer Support group that meets monthly at [facility].  Information sent via [employer].    Patient has my contact information and knows she can reach out with questions.  Office hours provided.  Patient provided with the phone number for [employer]-[phone].  General assessment completed.  Patient does have [employer] set up  [employer] message sent with our team's contact information.

## 2024-10-15 NOTE — TELEPHONE ENCOUNTER
I introduced myself to Jenny and reviewed the following:      Jenny had previous Negative Genetic testing for BRCA 1/2 with  [facility]  in  2016 through [employer] [facility]. I explained to the patient that we are doing updated gene testing along with RNA testing to ensure there are no deep intronic variants that could explain personal history of multiple cancers. She expressed understanding of update testing and will provide personal/family history.    Personal History:  Do you have a personal history of any other cancer? No  If yes type/age of diagnosis:     Family history:   Do you have Ashkenazi Temple ancestry? No  If yes, maternal, paternal, or both?    Do you have any children? Yes  How many sons? 1  How many daughters? 0  Do any of your children have a history of cancer? No  If yes type/age of diagnosis:     Do you have any brothers or sisters? Yes  How many brothers? 1  How many sisters? 0  Are they from the same parents? Yes  If no how maternal/paternal half-siblings:  Do any of your brothers or sisters have a history of cancer? No  If yes who and the type/age of diagnosis:           Do you have nieces or nephews? Yes  Do any of them have a history of cancer? No  If yes type/age of diagnosis:    Does your mother have a history of cancer? No  If yes, cancer type and age of diagnosis:   Is your mother still living? Yes  Age/Age of death: 69    Thinking about your mother's family (aunts, uncles, cousins, grandparents) is there anyone with a history of cancer? Yes  If yes, list relationship, cancer type and age of diagnosis:  MGM - Breast Cancer age unsure; Lung Cancer age unsure ()  Maternal Aunt - Breast Cancer age 47; metastatic ()  Maternal Great Aunt - Breast Cancer age unsure ()    Does your father have a history of cancer? No  If yes, cancer type and age of diagnosis:  Is your father still living? No  Age/age of death: 54    Thinking about your father's family (aunts, uncles, cousins, grandparents)  is there anyone with a history of cancer? No  If yes, list relationship, cancer type and age of diagnosis:      Plan:  A blood kit was mailed out on 10/15/2024 along with information on genetic testing and the lab's billing policy.     Genetic Testing Preformed: [facility] BRCAplus STAT Panel (8 genes): SUKHI, BRCA1, BRCA2, CDH1, CHEK2, PALB2, PTEN, TP53 with reflex to [facility] CustomNext: Cancer+RNAinsight (59 genes): APC, SUKHI, AXIN2, BAP1, BARD1, BMPR1A, BRCA1, BRCA2, BRIP1, CDH1, CDK4, CDKN1B, CDKN2A, CHEK2, CTNNA1, DICER1, EGLN1, EPCAM, FH, FLCN, GREM1, HOXB13, KIF1B, KIT, MAX, MEN1, MET, MITF, MLH1, MSH2, MSH3, MSH6, MUTYH, NF1, NTHL1, PALB2, PDGFRA PMS2, POLD1, POLE, POT1, PTEN, RAD51C, RAD51D, RB1, RET, SDHA, SDHAF2, SDHB, SDHC, SDHD, SMAD4, SMARCA4, STK11, [id number], TP53, TSC1, TSC2, VHL     Result Call Information:  I confirmed the patient's mobile number on file as the best number to call with results  I confirmed with the patient that we can leave a voicemail on the provided numbers    Initial results will take approximately 5-12 days to return     Additional results may take up to 2-3 weeks to complete once test is started.    Patient does not have any further questions, declined meeting with genetic counselor    When your results are ready, someone from the genetics team will call you, review the results, and contact your breast surgeon. You will be contacted with any type of result- positive, negative, or uncertain.

## 2024-10-15 NOTE — TELEPHONE ENCOUNTER
I introduced myself to Jenny and reviewed the following:      Jenny had previous Negative Genetic testing for BRCA 1/2 with  [facility]  in  2016 through [facility] [facility]. I explained to the patient that we are doing updated gene testing along with RNA testing to ensure there are no deep intronic variants that could explain personal history of multiple cancers. She expressed understanding of update testing and will provide personal/family history.     Personal History:  Do you have a personal history of any other cancer? No  If yes type/age of diagnosis:      Family history:   Do you have Ashkenazi Qu[employer] ancestry? No  If yes, maternal, paternal, or both?     Do you have any children? Yes  How many sons? 1  How many daughters? 0  Do any of your children have a history of cancer? No  If yes type/age of diagnosis:      Do you have any brothers or sisters? Yes  How many brothers? 1  How many sisters? 0  Are they from the same parents? Yes  If no how maternal/paternal half-siblings:  Do any of your brothers or sisters have a history of cancer? No  If yes who and the type/age of diagnosis:            Do you have nieces or nephews? Yes  Do any of them have a history of cancer? No  If yes type/age of diagnosis:     Does your mother have a history of cancer? No  If yes, cancer type and age of diagnosis:   Is your mother still living? Yes  Age/Age of death: 69     Thinking about your mother's family (aunts, uncles, cousins, grandparents) is there anyone with a history of cancer? Yes  If yes, list relationship, cancer type and age of diagnosis:  MGM - Breast Cancer age unsure; Lung Cancer age unsure ()  Maternal Aunt - Breast Cancer age 47; metastatic ()  Maternal Great Aunt - Breast Cancer age unsure ()     Does your father have a history of cancer? No  If yes, cancer type and age of diagnosis:  Is your father still living? No  Age/age of death: 54     Thinking about your father's family (aunts, uncles, cousins,  grandparents) is there anyone with a history of cancer? No  If yes, list relationship, cancer type and age of diagnosis:      Plan:  A blood kit was mailed out on 10/15/2024 along with information on genetic testing and the lab's billing policy.      Gen[facility] [facility] BRCA[facility] STAT Panel (8 genes): SUKHI, BRCA1, BRCA2, CDH1, CHEK2, PALB2, PTEN, TP53 with reflex to [facility] CustomNext: Cancer+RNAinsight (59 genes): APC, SUKHI, AXIN2, BAP1, BARD1, BMPR1A, BRCA1, BRCA2, BRIP1, CDH1, CDK4, CDKN1B, CDKN2A, CHEK2, CTNNA1, DICER1, EGLN1, EPCAM, FH, FLCN, GREM1, HOXB13, KIF1B, KIT, MAX, MEN1, MET, MITF, MLH1, MSH2, MSH3, MSH6, MUTYH, NF1, NTHL1, PALB2, PDGFRA PMS2, POLD1, POLE, POT1, PTEN, RAD51C, RAD51D, RB1, RET, SDHA, SDHAF2, SDHB, SDHC, SDHD, SMAD4, SMARCA4, STK11, [id number], TP53, TSC1, TSC2, VHL      Result Call Information:  I confirmed the patient's mobile number on file as the best number to call with results  I confirmed with the patient that we can leave a voicemail on the provided numbers     Initial results will take approximately 5-12 days to return     Additional results may take up to 2-3 weeks to complete once test is started.     Patient does not have any further questions, declined meeting with genetic counselor     When your results are ready, someone from the genetics team will call you, review the results, and contact your breast surgeon. You will be contacted with any type of result- positive, negative, or uncertain.

## 2024-10-15 NOTE — PROGRESS NOTES
I reached out to Jenny to complete the Distress Thermometer, review for any barriers to care and to offer any supportive services that may be needed. I left  with the reason for my call including my direct phone number [phone].

## 2024-10-16 ENCOUNTER — APPOINTMENT (OUTPATIENT)
Dept: LAB | Age: 49
End: 2024-10-16
Payer: COMMERCIAL

## 2024-10-16 ENCOUNTER — TELEPHONE (OUTPATIENT)
Dept: GASTROENTEROLOGY | Facility: CLINIC | Age: 49
End: 2024-10-16

## 2024-10-16 ENCOUNTER — TELEPHONE (OUTPATIENT)
Dept: NUTRITION | Facility: CLINIC | Age: 49
End: 2024-10-16

## 2024-10-16 ENCOUNTER — OFFICE VISIT (OUTPATIENT)
Dept: INTERNAL MEDICINE CLINIC | Facility: OTHER | Age: 49
End: 2024-10-16
Payer: COMMERCIAL

## 2024-10-16 VITALS
DIASTOLIC BLOOD PRESSURE: 80 MMHG | WEIGHT: 141 LBS | BODY MASS INDEX: 25.95 KG/M2 | TEMPERATURE: 97.8 F | HEART RATE: 65 BPM | HEIGHT: 62 IN | SYSTOLIC BLOOD PRESSURE: 130 MMHG | OXYGEN SATURATION: 98 %

## 2024-10-16 DIAGNOSIS — G25.0 ESSENTIAL TREMOR: ICD-10-CM

## 2024-10-16 DIAGNOSIS — G47.10 HYPERSOMNIA: ICD-10-CM

## 2024-10-16 DIAGNOSIS — Z80.3 FAMILY HISTORY OF MALIGNANT NEOPLASM OF BREAST: ICD-10-CM

## 2024-10-16 DIAGNOSIS — G47.00 INSOMNIA, UNSPECIFIED TYPE: ICD-10-CM

## 2024-10-16 DIAGNOSIS — F41.9 ANXIETY: ICD-10-CM

## 2024-10-16 DIAGNOSIS — K21.9 GASTROESOPHAGEAL REFLUX DISEASE WITHOUT ESOPHAGITIS: ICD-10-CM

## 2024-10-16 DIAGNOSIS — R91.1 LUNG NODULE: ICD-10-CM

## 2024-10-16 DIAGNOSIS — Z17.0 MALIGNANT NEOPLASM OF CENTRAL PORTION OF RIGHT BREAST IN FEMALE, ESTROGEN RECEPTOR POSITIVE (HCC): ICD-10-CM

## 2024-10-16 DIAGNOSIS — E78.49 OTHER HYPERLIPIDEMIA: ICD-10-CM

## 2024-10-16 DIAGNOSIS — E53.8 B12 DEFICIENCY: ICD-10-CM

## 2024-10-16 DIAGNOSIS — J44.9 STAGE 1 MILD COPD BY GOLD CLASSIFICATION (HCC): Primary | ICD-10-CM

## 2024-10-16 DIAGNOSIS — C50.111 MALIGNANT NEOPLASM OF CENTRAL PORTION OF RIGHT BREAST IN FEMALE, ESTROGEN RECEPTOR POSITIVE (HCC): ICD-10-CM

## 2024-10-16 DIAGNOSIS — D05.01 LOBULAR CARCINOMA IN SITU OF RIGHT BREAST: ICD-10-CM

## 2024-10-16 PROBLEM — D72.829 LEUKOCYTOSIS: Status: RESOLVED | Noted: 2024-04-10 | Resolved: 2024-10-16

## 2024-10-16 PROCEDURE — 99214 OFFICE O/P EST MOD 30 MIN: CPT | Performed by: NURSE PRACTITIONER

## 2024-10-16 PROCEDURE — 36415 COLL VENOUS BLD VENIPUNCTURE: CPT

## 2024-10-16 NOTE — PROGRESS NOTES
Ambulatory Visit  Name: Jenny Pereyra      : 1975      MRN: [id number]  Encounter Provider: MOHSEN Moore  Encounter Date: 10/16/2024   Encounter department: [facility]    Assessment & Plan  Stage 1 mild COPD by GOLD classification (HCC)  -Continue to follow with pulmonary         Lung nodule  -Continue to follow with pulmonary  -Did have recent CT scan         Gastroesophageal reflux disease without esophagitis   You may use OTC tums as needed.  Recommend small frequent meals throughout the day.  Avoid aggravating foods - spicy foods, acidic foods - such as tomato and citrus.  Avoid alcohol.  Do not lay down for at least 30 mins after eating.              Essential tremor  Had previously seen Neurology on propanolol, had side effects with medication, currently not on medication.         Anxiety  Well controlled on lexapro         Malignant neoplasm of central portion of right breast in female, estrogen receptor positive (HCC)  Currently following surgical oncology  Has consult with plastic surgery         Insomnia, unspecified type  -continue to follow with sleep medicine          Hypersomnia  -currently follows sleep medicine          Other hyperlipidemia  -continue fenofibrate   Recommend healthy lifestyle choices for your cholesterol.  Low fat/low cholesterol diet.  Limit/avoid red meat.  Eat more lean meat - chicken breast, ground turkey, fish.  Exercise 30 mins at least 5 times a week as tolerated.             B12 deficiency  Continue Vitamin B12 1000mcg tablet daily             History of Present Illness     Patient presents today to follow-up on chronic medical conditions.    Unfortunately she recently found out that she was diagnosed with breast cancer, currently following surgical oncology and will be following plastic surgery, at this point patient would like to move forward with bilateral mastectomy however would like to get opinion from  plastic surgery    Insomnia/Fatigue- had previous home sleep studies, but she did have a CPAP machine however it is a fields that she which is recalled and patient did not get a replaced CPAP machine, but she had 2 at home studies however due to worsening insomnia, she is currently working with sleep medicine.  She reports history of insomnia, has tried multiple medications before either the medication was ineffective or had side effects.  She was started on Xywav (started in January) for sleep, following sleep medicine     Has history of anxiety, previously on Lexapro which had worked well for her. She feels well controlled on this medication with no desire to come off of it at this time.     Hyperlipidemia-added on fenofibrate at last office visit, noted improvement, cholesterol 171, LDL 69, triglycerides 139    Patient reports ongoing unintentional weight loss, when she was referred to a nutritionist for her, she reports at this time her weight has leveled off, she is lost approximately 20 pounds over the past year  She reports ongoing easy bruising and worsening fatigue  She is having trouble taking off of work due to limited PTO and needing to attend multiple appointments, first missed day of work was 10/8, she will be bringing in [facility] paperwork for intermittently so she can attend doctor appointment            History obtained from : patient  Review of Systems   Constitutional:  Positive for fatigue and unexpected weight change. Negative for activity change, appetite change, chills, diaphoresis and fever.   HENT:  Negative for congestion, ear discharge, ear pain, postnasal drip, rhinorrhea, sinus pressure, sinus pain and sore throat.    Eyes:  Negative for pain, discharge, itching and visual disturbance.   Respiratory:  Negative for cough, chest tightness, shortness of breath and wheezing.    Cardiovascular:  Negative for chest pain, palpitations and leg swelling.   Gastrointestinal:  Negative for abdominal  "pain, constipation, diarrhea, nausea and vomiting.   Endocrine: Negative for polydipsia, polyphagia and polyuria.   Genitourinary:  Negative for difficulty urinating, dysuria and urgency.   Musculoskeletal:  Negative for arthralgias, back pain and neck pain.   Skin:  Negative for rash and wound.   Neurological:  Negative for dizziness, weakness, numbness and headaches.   Psychiatric/Behavioral:  Positive for sleep disturbance.          Objective     /80 (BP Location: Left arm, Patient Position: Sitting, Cuff Size: Standard)   Pulse 65   Temp 97.8 °F (36.6 °C) (Temporal)   Ht 5' 2\" (1.575 m)   Wt 64 kg (141 lb)   SpO2 98%   BMI 25.79 kg/m²     Physical Exam  Constitutional:       General: She is not in acute distress.     Appearance: She is well-developed. She is not diaphoretic.   HENT:      Head: Normocephalic and atraumatic.      Right Ear: External ear normal.      Left Ear: External ear normal.      Nose: Nose normal.      Mouth/Throat:      Mouth: Mucous membranes are moist.      Pharynx: No oropharyngeal exudate or posterior oropharyngeal erythema.   Eyes:      General:         Right eye: No discharge.         Left eye: No discharge.      Conjunctiva/sclera: Conjunctivae normal.      Pupils: Pupils are equal, round, and reactive to light.   Neck:      Thyroid: No thyromegaly.   Cardiovascular:      Rate and Rhythm: Normal rate and regular rhythm.      Heart sounds: Normal heart sounds. No murmur heard.     No friction rub. No gallop.   Pulmonary:      Effort: Pulmonary effort is normal. No respiratory distress.      Breath sounds: Normal breath sounds. No stridor. No wheezing or rales.   Abdominal:      General: Bowel sounds are normal. There is no distension.      Palpations: Abdomen is soft.      Tenderness: There is no abdominal tenderness.   Musculoskeletal:      Cervical back: Normal range of motion and neck supple.   Lymphadenopathy:      Cervical: No cervical adenopathy.   Skin:     General: " Skin is warm and dry.      Findings: No erythema or rash.   Neurological:      Mental Status: She is alert and oriented to person, place, and time.   Psychiatric:         Behavior: Behavior normal.         Thought Content: Thought content normal.         Judgment: Judgment normal.

## 2024-10-16 NOTE — TELEPHONE ENCOUNTER
Received notification by patient navigator (Jazmin CEDILLO) on 10/15/24 that pt has triggered for oncology nutrition care (reason for referral: Malnutrition Screening Tool (MST) Triggers: scored a 4 indicating >/=34# (>/=15.4 kg) recent wt loss and is not eating poorly due to a decreased appetite. (Date of MST: 10/15/24) and breast cancer ).    Contacted Jenny today to establish care.  No answer.  Left voice message with the reason for today's call and this RD’s contact information asking that Jenny call back as able/desired.

## 2024-10-16 NOTE — TELEPHONE ENCOUNTER
Voice message received from pt returning my call.  Called pt back and spoke with Jenny today.  Introduced myself and the reason for my call.  Pt states she is unsure what her tx plan will look like but she knows she will be having surgery.    Discussed oncology nutrition services available (options for in-person and phone consultation) and the benefits of meeting for a consultation.    Initial RD phone consultation set up for 10/31/24 at 12:30pm.  Consult will be held over pts lunch break, so there will be a time constraint.    Encouraged pt to eat healthfully, increase her lean protein intake, and stabilize her wt in the meantime.    E-mailed to pt (@[email or web address]'): [employer] Eating Hints Book, Protein Dense Foods list, Nutrition for Cancer Survivors, Nutrition for Breast Cancer Survivors    Jenny has RD contact info.

## 2024-10-17 ENCOUNTER — HOSPITAL ENCOUNTER (OUTPATIENT)
Dept: RADIOLOGY | Facility: HOSPITAL | Age: 49
Discharge: HOME | End: 2024-10-17
Payer: COMMERCIAL

## 2024-10-17 ENCOUNTER — PATIENT MESSAGE (OUTPATIENT)
Dept: PULMONOLOGY | Facility: CLINIC | Age: 49
End: 2024-10-17

## 2024-10-17 ENCOUNTER — TELEPHONE (OUTPATIENT)
Dept: GASTROENTEROLOGY | Facility: HOSPITAL | Age: 49
End: 2024-10-17

## 2024-10-17 ENCOUNTER — PATIENT OUTREACH (OUTPATIENT)
Dept: CASE MANAGEMENT | Facility: OTHER | Age: 49
End: 2024-10-17

## 2024-10-17 ENCOUNTER — TELEPHONE (OUTPATIENT)
Dept: INTERNAL MEDICINE CLINIC | Facility: OTHER | Age: 49
End: 2024-10-17

## 2024-10-17 DIAGNOSIS — R92.8 ABNORMAL MAMMOGRAM: ICD-10-CM

## 2024-10-17 PROCEDURE — C8905 MRI W/O FOL W/CONT, BRST, UN: HCPCS

## 2024-10-17 PROCEDURE — C8937 CAD BREAST MRI: HCPCS

## 2024-10-17 PROCEDURE — A9585 GADOBUTROL INJECTION: HCPCS | Performed by: NURSE PRACTITIONER

## 2024-10-17 RX ORDER — GADOBUTROL 604.72 MG/ML
6 INJECTION INTRAVENOUS
Status: COMPLETED | OUTPATIENT
Start: 2024-10-17 | End: 2024-10-17

## 2024-10-17 RX ADMIN — GADOBUTROL 6 ML: 604.72 INJECTION INTRAVENOUS at 18:57

## 2024-10-17 NOTE — TELEPHONE ENCOUNTER
Pts  dropped off FMLA ppw for [employer] to fill out at [facility] office, informed that there will be a forms fee when picked up-- forms scanned into chart and at desk at [facility]

## 2024-10-18 ENCOUNTER — HOSPITAL ENCOUNTER (OUTPATIENT)
Dept: PULMONOLOGY | Facility: HOSPITAL | Age: 49
End: 2024-10-18
Attending: INTERNAL MEDICINE
Payer: COMMERCIAL

## 2024-10-18 ENCOUNTER — HOSPITAL ENCOUNTER (OUTPATIENT)
Dept: NUCLEAR MEDICINE | Facility: HOSPITAL | Age: 49
Discharge: HOME | End: 2024-10-18
Attending: STUDENT IN AN ORGANIZED HEALTH CARE EDUCATION/TRAINING PROGRAM
Payer: COMMERCIAL

## 2024-10-18 DIAGNOSIS — J44.9 CHRONIC OBSTRUCTIVE PULMONARY DISEASE, UNSPECIFIED COPD TYPE (HCC): ICD-10-CM

## 2024-10-18 DIAGNOSIS — C50.111 MALIGNANT NEOPLASM OF CENTRAL PORTION OF RIGHT BREAST IN FEMALE, ESTROGEN RECEPTOR POSITIVE (HCC): ICD-10-CM

## 2024-10-18 DIAGNOSIS — Z17.0 MALIGNANT NEOPLASM OF CENTRAL PORTION OF RIGHT BREAST IN FEMALE, ESTROGEN RECEPTOR POSITIVE (HCC): ICD-10-CM

## 2024-10-18 PROCEDURE — 94760 N-INVAS EAR/PLS OXIMETRY 1: CPT

## 2024-10-18 PROCEDURE — 78306 BONE IMAGING WHOLE BODY: CPT

## 2024-10-18 PROCEDURE — 94010 BREATHING CAPACITY TEST: CPT

## 2024-10-18 PROCEDURE — 94010 BREATHING CAPACITY TEST: CPT | Performed by: STUDENT IN AN ORGANIZED HEALTH CARE EDUCATION/TRAINING PROGRAM

## 2024-10-18 PROCEDURE — A9503 TC99M MEDRONATE: HCPCS

## 2024-10-18 PROCEDURE — 94729 DIFFUSING CAPACITY: CPT | Performed by: STUDENT IN AN ORGANIZED HEALTH CARE EDUCATION/TRAINING PROGRAM

## 2024-10-18 PROCEDURE — 94726 PLETHYSMOGRAPHY LUNG VOLUMES: CPT

## 2024-10-18 PROCEDURE — 94729 DIFFUSING CAPACITY: CPT

## 2024-10-18 PROCEDURE — 94726 PLETHYSMOGRAPHY LUNG VOLUMES: CPT | Performed by: STUDENT IN AN ORGANIZED HEALTH CARE EDUCATION/TRAINING PROGRAM

## 2024-10-18 RX ORDER — LEVALBUTEROL TARTRATE 45 UG/1
1-2 AEROSOL, METERED ORAL EVERY 4 HOURS PRN
Qty: 15 G | Refills: 3 | Status: SHIPPED | OUTPATIENT
Start: 2024-10-18

## 2024-10-18 RX ORDER — TIOTROPIUM BROMIDE AND OLODATEROL 3.124; 2.736 UG/1; UG/1
2 SPRAY, METERED RESPIRATORY (INHALATION) DAILY
Qty: 12 G | Refills: 5 | Status: SHIPPED | OUTPATIENT
Start: 2024-10-18

## 2024-10-21 ENCOUNTER — PATIENT OUTREACH (OUTPATIENT)
Dept: CASE MANAGEMENT | Facility: OTHER | Age: 49
End: 2024-10-21

## 2024-10-21 NOTE — PROGRESS NOTES
Outpatient Oncology Nutrition Consultation   Type of Consult: Initial Consult  Care Location: Telephone Call    Reason for referral: Received notification by patient navigator (Jazmin CEDILLO) on 10/15/24 that pt has triggered for oncology nutrition care (reason for referral: Malnutrition Screening Tool (MST) Triggers: scored a 4 indicating >/=34# (>/=15.4 kg) recent wt loss and is not eating poorly due to a decreased appetite. (Date of MST: 10/15/24) and breast cancer).    Nutrition Assessment:   Oncology Diagnosis & Treatments: Breast Cancer   Bilateral mastectomy planned for 11/12/24  Oncology History   Malignant neoplasm of central portion of right breast in female, estrogen receptor positive (HCC)   10/8/2024 Biopsy    Right breast ultrasound-guided biopsy  3 o'clock, 4 cm from nipple (open coil)  Invasive breast carcinoma with ductal and lobular features  Grade 1  ER , MI , HER2 0  Lymphovascular invasion not identified    Concordant. Unifocal / multifocal. SIZE. Concordant. Right malignancy appears unifocal; suspicious mass covers an area up to 6 mm. US right axilla negative. Left breast clear. Breast MRI should be considered given lobular features within the carcinoma and overall appearance of the breast parenchyma (scattered with borderline heterogeneously dense components).     10/11/2024 Genomic Testing    Reflex MammaPrint/BluePrint UltraLow Risk (Luminal A)     10/14/2024 -  Cancer Staged    Staging form: Breast, AJCC 8th Edition  - Clinical stage from 10/14/2024: Stage IA (cT1b, cN0, cM0, G1, ER+, MI+, HER2-) - Signed by Cassandra Lynn Cardarelli, MD on 10/14/2024  Histopathologic type: Lobular carcinoma, NOS  Stage prefix: Initial diagnosis  Method of lymph node assessment: Clinical  Nuclear grade: G1  Histologic grading system: 3 grade system       10/16/2024 Genetic Testing    STAT panel negative  ~~FINAL + RNA PENDING~~  [facility]     10/17/2024 Observation    Breast MRI IMPRESSION:  1.  Right  breast 3:00 biopsy-proven invasive carcinoma measures up to 12 mm.  2.  Possible satellite lesion right breast 6:00 for which MRI guided biopsy is recommended if it would change clinical management.  3.  Otherwise, no MR evidence of contralateral left breast malignancy.  4.  No axillary or internal mammary adenopathy.     11/12/2024 Surgery    Right mastectomy with SLN biopsy      Left simple mastectomy      Immediate reconstruction with Dr. Brito       Past Medical & Surgical Hx:   Patient Active Problem List   Diagnosis    Lung nodule    Umbilical hernia without obstruction and without gangrene    Kidney stone on left side    Essential tremor    Insomnia    Anxiety    Gastroesophageal reflux disease without esophagitis    PONV (postoperative nausea and vomiting)    B12 deficiency    Brain lipoma    Chronic interstitial cystitis    Chronic migraine without aura    Stage 1 mild COPD by GOLD classification (HCC)    DDD (degenerative disc disease), lumbar    Excessive daytime sleepiness    Hypersomnia    Other hyperlipidemia    Gross hematuria    Carpal tunnel syndrome on left    Cubital tunnel syndrome, left    Diverticulosis    Other hemorrhoids    Malignant neoplasm of central portion of right breast in female, estrogen receptor positive (HCC)     Past Medical History:   Diagnosis Date    Anxiety     Brain lipoma 2007    Breast cancer (HCC)     COPD (chronic obstructive pulmonary disease) (HCC)     GERD (gastroesophageal reflux disease)     Kidney stone     Motion sickness     PONV (postoperative nausea and vomiting)     Tremors of nervous system     essential     Past Surgical History:   Procedure Laterality Date    APPENDECTOMY      BACK SURGERY      fusion L5 - S1    BLADDER SURGERY      BREAST BIOPSY Right 10/08/2024    300 4cmfn, open coil clip    CARPAL TUNNEL RELEASE Right 05/31/2024    COLONOSCOPY      FL RETROGRADE PYELOGRAM  12/15/2023    HYSTERECTOMY      age 29 still has lt ovary    IR RADIOFREQUENCY  ABLATION      esophagus    AZ CYSTO/URETERO W/LITHOTRIPSY &INDWELL STENT INSRT Left 12/15/2023    Procedure: CYSTO USCOPE W/ LASER, & STENT;  Surgeon: Jhonatan Fleming MD;  Location: [facility];  Service: Urology    AZ NEUROPLASTY &/TRANSPOS MEDIAN NRV CARPAL TUNNE Right 05/31/2024    Procedure: RELEASE CARPAL TUNNEL;  Surgeon: Dorothea Mayo MD;  [facility];  Service: Orthopedics    AZ NEUROPLASTY &/TRANSPOS MEDIAN NRV CARPAL TUNNE Left 07/12/2024    Procedure: RELEASE CARPAL TUNNEL;  Surgeon: Dorothea Mayo MD;  [facility];  Service: Orthopedics    AZ NEUROPLASTY &/TRANSPOSITION ULNAR NERVE ELBOW Right 05/31/2024    Procedure: RELEASE CUBITAL TUNNEL POSSIBLE TRANSPOSITION AND ADIPOSE FLAP;  Surgeon: Dorothea Mayo MD;  [facility];  Service: Orthopedics    AZ NEUROPLASTY &/TRANSPOSITION ULNAR NERVE ELBOW Left 07/12/2024    Procedure: RELEASE CUBITAL TUNNEL;  Surgeon: Dorothea Mayo MD;  [facility];  Service: Orthopedics    SPINE SURGERY      twin laminectomy lumbar    TOTAL VAGINAL HYSTERECTOMY      AGE 29    TUBAL LIGATION      UPPER GASTROINTESTINAL ENDOSCOPY      US GUIDED BREAST BIOPSY RIGHT COMPLETE Right 10/8/2024     Review of Medications:   Vitamins, Supplements and Herbals: Med List Reviewed & pt is only taking: women's one a day MVI, probiotic and Discussed the potential interactions of high dose antioxidants with cancer tx and recommended exercising caution when taking these supplements    Current Outpatient Medications:     ACIDOPHILUS LACTOBACILLUS PO, Take 1 tablet by mouth daily, Disp: , Rfl:     Armodafinil 150 MG tablet, Take 1 tablet (150 mg total) by mouth daily, Disp: 30 tablet, Rfl: 1    bisacodyl (DULCOLAX) 5 mg EC tablet, Take 2 tablets (10 mg total) by mouth in the morning (Patient taking differently: Take 10 mg by mouth if needed), Disp: 60 tablet, Rfl: 5    Ca, Mg, K, and Na Oxybates (Xywav) 500 MG/ML SOLN, Take 4 g by mouth  "daily at bedtime, Disp: 240 mL, Rfl: 2    escitalopram (Lexapro) 10 mg tablet, Take 1 tablet (10 mg total) by mouth daily, Disp: 100 tablet, Rfl: 1    fenofibrate (TRICOR) 145 mg tablet, Take 1 tablet (145 mg total) by mouth daily, Disp: 90 tablet, Rfl: 1    levalbuterol (Xopenex HFA) 45 mcg/act inhaler, Inhale 1-2 puffs every 4 (four) hours as needed for wheezing (Patient not taking: Reported on 10/29/2024), Disp: 15 g, Rfl: 3    Multiple Vitamins-Minerals (MULTIVITAMIN ADULTS PO), Take 1 tablet by mouth daily, Disp: , Rfl:     polyethylene glycol (MIRALAX) 17 g packet, Take 17 g by mouth daily (Patient not taking: Reported on 10/29/2024), Disp: 510 g, Rfl: 5    tiotropium-olodaterol (Stiolto Respimat) 2.5-2.5 MCG/ACT inhaler, Inhale 2 puffs daily, Disp: 12 g, Rfl: 5    Most Recent Lab Results:   Lab Results   Component Value Date    WBC 7.95 10/14/2024    NEUTROABS 4.66 10/14/2024    IRON 86 04/06/2024    TIBC 426 04/06/2024    FERRITIN 70 04/06/2024    CHOLESTEROL 171 04/06/2024    TRIG 139 04/06/2024    HDL 74 04/06/2024    LDLCALC 69 04/06/2024    ALT 10 10/14/2024    AST 15 10/14/2024    ALB 4.0 10/14/2024    SODIUM 137 10/14/2024    SODIUM 143 04/06/2024    K 3.9 10/14/2024    K 4.2 04/06/2024     10/14/2024    BUN 20 10/14/2024    BUN 21 04/06/2024    CREATININE 0.82 10/14/2024    CREATININE 0.87 04/06/2024    EGFR 85 10/14/2024    TSH 2.90 12/11/2021    GLUF 77 10/14/2024    GLUF 92 04/06/2024    GLUC 101 (H) 01/15/2022    HGBA1C 5.5 04/06/2024    HGBA1C 5.3 03/29/2023    HGBA1C 5.5 12/11/2021    CALCIUM 8.9 10/14/2024       Anthropometric Measurements:   Height: 62\"  Ht Readings from Last 1 Encounters:   10/29/24 5' 2\" (1.575 m)     Wt Readings from Last 20 Encounters:   10/29/24 63 kg (139 lb)   10/28/24 63 kg (139 lb)   10/23/24 63 kg (139 lb)   10/22/24 64 kg (141 lb)   10/16/24 64 kg (141 lb)   10/14/24 64.9 kg (143 lb)   10/10/24 64.9 kg (143 lb)   10/08/24 63.5 kg (140 lb)   09/26/24 63.5 kg " (140 lb)   07/29/24 63.5 kg (140 lb)   07/22/24 63.8 kg (140 lb 9.6 oz)   07/12/24 64.4 kg (142 lb)   06/24/24 65.4 kg (144 lb 3.2 oz)   06/10/24 64.4 kg (142 lb)   05/31/24 64.6 kg (142 lb 6.4 oz)   05/20/24 66.7 kg (147 lb)   04/13/24 66.7 kg (147 lb)   04/10/24 66.8 kg (147 lb 3.2 oz)   03/27/24 69.4 kg (153 lb)   01/08/24 71.2 kg (157 lb)     Weight History:   Usual Weight: unsure, was 188# 2 yr ago  Comments: wt has been fluctuating since COVID (unintended wt gain, followed by intentional loss, gained wt again, now losing wt)    Oncology Nutrition-Anthropometrics      Flowsheet Row Nutrition from 10/31/2024 in [facility]   Patient age (years): 49 years   Patient (female) height (in): 62 in   Current Weight to be used for anthropometric calculations (lbs) 139 lbs   Current Weight to be used for anthropometric calculations (kg) 63.2 kg   BMI: 25.4   IBW female: 110 lbs   IBW (kg) female: 50 kg   IBW % (female) 126.4 %   Adjusted BW (female): 117.3 lbs   Adjusted BW kg (female): 53.3 kg   % weight change after 1 week: -1.4 %   Weight change after 1 week (lbs) -2 lbs   % weight change after 1 month: -0.7 %   Weight change after 1 month (lbs) -1 lbs   % weight change after 3 months: -0.7 %   Weight change after 3 months (lbs) -1 lbs   % weight change after 6 months: -5.4 %   Weight change after 6 months (lbs) -8 lbs   % weight change after 1 year: -14.7 %   Weight change after 1 year (lbs) -24 lbs          Nutrition-Focused Physical Findings: n/a due to telephone call    Food/Nutrition-Related History & Client/Social History:    Current Nutrition Impact Symptoms:  [] Nausea  [] Reduced Appetite  [] Acid Reflux    [] Vomiting  [x] Unintended Wt Loss - hx, not significant at this time [] Malabsorption    [] Diarrhea - hx nocturnal diarrhea (pt unaware until she wakes up), has happened 3x since August 2024  -has seen GI, cause is unknown at this time [] Unintended Wt Gain   [] Dumping Syndrome    [x] Constipation - using medications, has not had constipation the past couple of days since starting milk based protein shakes  -BM's are tiny and hard unless she uses dulcolax [] Thick Mucous/Secretions  [x] Abdominal Pain    [] Dysgeusia (Altered Taste)  [] Xerostomia (Dry Mouth)  [] Gas    [] Dysosmia (Altered Smell)  [] Thrush  [] Difficulty Chewing    [] Oral Mucositis (Sore Mouth)  [] Fatigue  [] Hyperglycemia   Lab Results   Component Value Date    HGBA1C 5.5 04/06/2024      [] Odynophagia  [] Esophagitis  [] Other:    [] Dysphagia  [] Early Satiety  [] No Problems Eating      Food Allergies & Intolerances: yes: lactose intolerance (can tolerate certain cheeses and Greek yogurt, cannot have milk or ice cream)    Current Diet: Regular Diet, No Restrictions  States she doesn't like a lot of fruits and veggies d/t texture, likes bananas and grapes.  Willing to eat more F&V in smoothie form.  Current Nutrition Intake: Unchanged from usual  Appetite: Good  Nutrition Route: PO  Activity level: ADL's & walks the dog BID, gets some SOB  Social: Pt takes care of her mom who will be starting hospice today    24 Hr Diet Recall:   Breakfast: tasty cake pumpkin pie; usually has: poptart or bowl of cereal (raisin bran or frosted flakes with almond milk)  Snack: usually has protein shake or potato chips  Lunch: 1/2 [employer] Italian hoagie (or leftovers from dinner or uncrustables)  Snack: a small candy bar or pretzels  Dinner: 1/2 [employer] Italian hoagie; the night before: homemade red cheeseburger casserole with biscuits; or: chicken edna with noodles; or: chip steak soup with potatoes and peas; makes a lot of soups  Snack: does not eat after 7pm d/t sleep medication    Beverages: water (16.9 oz x5-6), light [place] iced tea (a couple swallows with meals)  Supplements:   [employer] - caused immediate BM and abd cramping, discontinued these  JBN protein powder      Oncology  Nutrition-Estimated Needs      Flowsheet Row Nutrition from 10/31/2024 in [facility]   Weight type used Adjusted weight   Weight in kilograms (kg) used for estimated needs 53.3 kg   Energy needs formula:  25-30 kcal/kg   Energy needs based on 25 kcal/k kcal   Energy needs based on 30 kcal/k kcal   Protein needs formula: 1.2-1.5 g/kg   Protein needs based on 1.2 g/k g   Protein needs based on 1.5 g/kg 80 g   Fluid needs formula: 30-35 mL/kg   Fluid needs based on 30 mL/kg 1596 mL   Fluid needs in ounces 54 oz   Fluid needs based on 35 mL/kg 1862 mL   Fluid needs in ounces 63 oz             Discussion & Intervention:   Jenny was evaluated today for an initial RD consultation regarding unintended weight loss.  Jenny is planned to undergo tx for breast cancer .  She is scheduled to have a bilateral mastectomy in 2 weeks.  She reports wt fluctuations since COVID started.  Thankfully her wt has been relatively stable recently.  She reports that she does not like many fruits or vegetables d/t texture issues, but she is willing to increase her intake of these by making smoothies.  She reports constipation and occasional nocturnal diarrhea.  She is taking multiple bowel medications at this time.  She does have a good fluid intake and goes for walks twice daily.  She seems to be under significant stress and her diet is relatively low in fiber which could be contributing to her bowel pattern.  Our visit was limited today because pt was on her 30 min lunch break.  She was provided with written materials to review and a follow up date after her surgery.   Reviewed 24 hour recall, which revealed an suboptimal po intake, and discussed ways to optimize nutrient intake and increase protein intake.  Also reviewed the importance of wt management throughout the tx process and the role of a cancer preventative diet and high protein & low fat diet  in managing wt and  overall health.  Based on today's assessment, discussion included: MNT for: bowel management, protein needs, choosing a variety of colorful, plant-based foods to support a cancer preventative diet, adequate hydration & fluid choices, utilizing oral nutrition supplements, and recipe suggestions/resources, trying new recipes, & cooking at home more often.   Moving forward, Jenny was encouraged to increase her fiber and protein intakes and will practice MNT for nutrition impact sx management.  Materials Provided: all e-mailed to pt (@ [email or web address]'): , [facility] Eating Hints book, [facility] Oncology Nutrition Milkshake & Smoothie Recipes, Commercial Nutrition Supplements handout, Protein Dense Foods handout, and Constipation handout  All questions and concerns addressed during today’s visit.  Jenny has RD contact information.    Nutrition Diagnosis:   Increased Nutrient Needs (kcal & pro) related to increased demand for nutrients and disease state as evidenced by cancer dx and pt undergoing tx for cancer.  Altered GI Function related to alteration in GI tract motility as evidenced by Diarrhea, Constipation, and Abdominal Pain.  Monitoring & Evaluation:   Goals:  weight maintenance/stabilization  adequate nutrition impact symptom management  pt to meet >/=75% estimated nutrition needs daily    Progress Towards Goals: Initiated    Nutrition Rx & Recommendations:  Diet: High Protein, Low Fat, High Fiber Diet, Lactose-free (or low-lactose if tolerated)  Small, frequent meals/snacks may be easier to tolerate than 3 large daily meals.  Aim for 5-6 small meals per day (every 2-3 hours).  Include protein at all meals/snacks.  Include a variety of foods (as tolerated/allowed by diet).  Follow a Cancer Preventative Nutrition Pattern: colorful, plant-based, low-fat, avoid added sugars, limit alcohol, include high fiber foods, limit processed meats, limit red meat, choose lean protein sources, use low-fat cooking methods, balance  "calories with physical activity, avoid excessive weight gain throughout life.  Incorporate physical activity as able/allowed.  Stay hydrated by sipping fluids of choice/tolerance throughout the day.  Refer to Eating Hints book for other meal/snack ideas and symptom management.  For constipation: drink plenty of fluids (>64 oz/day); drink hot liquids; eat high-fiber foods as tolerated (whole grains, beans, peas, nuts, seeds, fruits, vegetables, etc); increase physical activity as tolerated.  Avoid increasing fiber intake too quickly, add fiber into your diet slowly; keep a record of your bowel movements (see pages 13-14 in you Eating Hints book).  Consider trying \"Apple/Prune Sauce\" recipe on page 14 of your Eating Hints booklet.  Be sure to drink 8 oz of water after taking 1-2 tbsp of the mixture before bedtime.  Weigh yourself regularly. If you notice weight loss, make an effort to increase your daily food/calorie intake. If you continue to notice loss after these efforts, reach out to your dietitian to establish a plan to stabilize weight.  Consider stopping all high dose antioxidant supplements (vitamin A, C, E, selenium, etc.).  Refer to [facility] \"About Herbs\" website for more information on the safety of supplements.  Try high protein smoothies for breakfast - see recipes provided, incorporate fruits and veggies into these  Choose a lean (low-fat) protein source at each meal and snack: chicken, turkey, fish, seafood, Greek yogurt, nuts, nut butter, beans, lentils, legumes, etc. (See Protein Dense Foods handout previously provided)  Aim for 55-65 oz of fluid daily  Aim for 25-30 grams of fiber daily  Gradually increase your fiber intake to avoid GI upset, stay well hydrated as you are increasing your fiber intake    Follow Up Plan:  11/21 at 11am by phone  Recommend Referral to Other Providers: none at this time  " Home Suture Removal Text: Patient was provided instructions on removing sutures and will remove their sutures at home.  If they have any questions or difficulties they will call the office.

## 2024-10-21 NOTE — PROGRESS NOTES
"Biopsychosocial and Barriers Assessment    Type of Cancer: Breast  Treatment plan: Most likely a bilateral mastectomy and radiation  Noted barriers to care: none  Cultural/Hoahaoism concerns: none  Hair Loss/ Wig resources needed: not at this time    DT completed: yes  DT score: 10/10  Issues noted: sleep, fatigue, memory/concentration, worry/anxiety, fear,anger, taking care of others, work, fiances and treatment decisions    Marital status/Lives with: /spouse  Pt's support system: Very strong family support  Mental Health history: Anxiety-Takes Lexapro  Substance Abuse: former smoker    Employment/income source: [employer] Employee  Concerns with bills (treatment vs household): yes-will send resources to her  Noted issues with home: none    Narrative note:   OSW placed outreach TC to pt this morning. OSW introduced self and role. Pt reports that she feels like her \"head is spinning\", as she has had many appointments and believes that her MRI showed a second spot. She is waiting for the doctor to call and review her results. OSW allowed time for ehr to express herself and offered support. She does meet with plastics tomorrow. Pt reports that she has strong support from many family members. Pt states that she works for [employer]. She does have ST and LT disability, however she is worried about paying for bills/food. This writer educated on breast cancer resources. These will be completed once she has a plan in place. OSW also offered to research additional resources and she was agreeable. She gave this writer permission to text the resources to her. This writer also mailed out a hard copy.   The following resources were sent to the pt this day:    Home Cooked Meals by [employer]   Once a Month Volunteer Prepared Meal Delivery by [employer]   [facility] by [facility] by [facility]   Financial Help by [employer]   Living Beyond " Breast Cancer Fund by Living Beyond Breast Cancer   Breast Cancer Financial Assistance [place] by [facility]   Wellness Program by [employer] [facility] [employer]   Financial Assistance by [email or web address]   OSW offered to check in with her in a few weeks, at that time she should have a definite treatment plan in place. OSW encouraged her to call with any questions/concerns.   OSW will continue to follow.

## 2024-10-22 ENCOUNTER — PATIENT OUTREACH (OUTPATIENT)
Dept: HEMATOLOGY ONCOLOGY | Facility: CLINIC | Age: 49
End: 2024-10-22

## 2024-10-22 ENCOUNTER — CONSULT (OUTPATIENT)
Dept: PLASTIC SURGERY | Facility: CLINIC | Age: 49
End: 2024-10-22
Payer: COMMERCIAL

## 2024-10-22 VITALS
BODY MASS INDEX: 25.95 KG/M2 | TEMPERATURE: 98 F | HEIGHT: 62 IN | SYSTOLIC BLOOD PRESSURE: 124 MMHG | DIASTOLIC BLOOD PRESSURE: 93 MMHG | WEIGHT: 141 LBS | HEART RATE: 75 BPM

## 2024-10-22 DIAGNOSIS — C50.111 MALIGNANT NEOPLASM OF CENTRAL PORTION OF RIGHT BREAST IN FEMALE, ESTROGEN RECEPTOR POSITIVE (HCC): Primary | ICD-10-CM

## 2024-10-22 DIAGNOSIS — Z17.0 MALIGNANT NEOPLASM OF CENTRAL PORTION OF RIGHT BREAST IN FEMALE, ESTROGEN RECEPTOR POSITIVE (HCC): Primary | ICD-10-CM

## 2024-10-22 PROCEDURE — 99204 OFFICE O/P NEW MOD 45 MIN: CPT | Performed by: PLASTIC SURGERY

## 2024-10-22 NOTE — TELEPHONE ENCOUNTER
Called pt to inform forms were complete and about the fee- pt stated she does not have the money to pay the fee and would like for Yecenia to call her when she is able to

## 2024-10-22 NOTE — PROGRESS NOTES
"Breast Oncology Nurse Navigator    Patient left the following message via [employer]:    \"Hello     I was just at my appointment with the plastic surgeon. I have some questions about this whole process. I'm getting very frustrated. I'm not sur3 if you are the right person to be reaching out to. \"    Returned patient's message via phone call at this time.  Left a detailed message and included my contact information and office hours.    Patient returned my call at this time.  Patient had a consult with the plastic surgeon today.  Patient states she is out of PTO.  She is on intermittent FMLA.  Does not have the money to pay co-pays.  States the plastic surgeon ordered a scan.  She would like to go today, if possible.  Secure Chat message sent to Dr Brito while on the phone.  No orders seen in chart at this time.    Patient states she has no sick time or PTO.  States, \"I am thinking about letting this cancer run it's course and not getting treatment.\"  Emotional support offered.  Patient is an [employer] employee.  She works for the [employer] from home.  She is interested in bilateral mastectomies with immediate BONITA flap reconstruction.  We discussed other options that might use less time off, including lumpectomy and no reconstruction.  Patient wants mastectomies.  She said she may pursue no recon.  She does not want multiple surgeries.   We discussed the SL Mastectomy Bra [employer] and options offered and covered by insurance.  Patient states she is applying for intermittent FMLA, and her PCP's office wants $30 for the paperwork.  Patient states she does not have the money for all of the co-pays needed for visits at this time.  Patient did speak with the OSW yesterday, but offered to send a message to the Oncology Financial Advocacy group.  Patient agreeable.  Will route this message to the group for financial assistance.    "

## 2024-10-23 ENCOUNTER — OFFICE VISIT (OUTPATIENT)
Dept: SLEEP CENTER | Facility: CLINIC | Age: 49
End: 2024-10-23
Payer: COMMERCIAL

## 2024-10-23 VITALS
DIASTOLIC BLOOD PRESSURE: 84 MMHG | BODY MASS INDEX: 25.58 KG/M2 | HEIGHT: 62 IN | SYSTOLIC BLOOD PRESSURE: 123 MMHG | WEIGHT: 139 LBS

## 2024-10-23 DIAGNOSIS — G47.19 EXCESSIVE DAYTIME SLEEPINESS: ICD-10-CM

## 2024-10-23 DIAGNOSIS — G47.10 HYPERSOMNIA: Primary | ICD-10-CM

## 2024-10-23 DIAGNOSIS — F41.9 ANXIETY: ICD-10-CM

## 2024-10-23 DIAGNOSIS — G47.00 INSOMNIA, UNSPECIFIED TYPE: ICD-10-CM

## 2024-10-23 PROCEDURE — 99214 OFFICE O/P EST MOD 30 MIN: CPT | Performed by: INTERNAL MEDICINE

## 2024-10-23 NOTE — PROGRESS NOTES
Plastic Surgery H&P  [facility]astic  [facility] Reconstructive Surgery   [address] [place] [address]    Assessment & Plan  Malignant neoplasm of central portion of right breast in female, estrogen receptor positive (HCC)  Jenny Pereyra is a 50 y/o female with a PMH of asthma, former tobacco use, COPD, diverticulosis, degenerative disc disease who presents with a new diagnosis of invasive breast cancer of the right breast. Patient notes that she is interested in pursuing bilateral mastectomy.  Patient is here today to discuss options for reconstruction.     A long discussion was held with the patient. However, I did remind the patient that this is an elective part of her process and reconstruction is not required for her cancer care nor would it affect her cancer outcome.     We also discussed that breast reconstruction is a process and was explained that no matter which reconstructive option, the patient would likely require additional surgeries or revisions in the future, as well as possible implant montoring and exchanges.     We reviewed the patient's goals for the reconstruction. Patient notes that she is not interested in implant-based reconstruction and is interested in autologous reconstruction.  She is not interested in being any larger than she is currently. She notes that if she is not a candidate for autologous reconstruction she would prefer to go flat.    We first reviewed immediate versus delayed reconstruction. We discussed that when Immediate reconstruction is feasible it can be advantageous in that it offers the patient a reconstruction at the time of surgery. Often times it is easier to achieve a more aesthetic result with immediate reconstruction. I explained that there are certain instances in which we elect for delayed reconstruction. These include but are not limited to comorbidities that can or should be optimized prior to elective reconstruction (ex. obesity, smoking, uncontrolled  diabetes), aggressive cancer staging requiring expedient treatment (ex. inflammatory breast cancer), or patient preference.    Regarding implant based reconstruction, we explained that tissue expanders would be placed after the mastectomy. These tissue expanders can be placed completely under the muscle or partially under the muscle. If placed partially under the muscle, we would use an acellular dermal matrix (ADM) to cover the lower portion of the expander and the patient was informed of this. Occasionally, a permanent implant can be placed at the time of mastectomy but this is a minority of cases. Also, we discussed pre-pectoral reconstruction and if the patient would be a candidate for this. This determination is sometimes made at the time of surgery and would require a mesh or acellular dermal matrix (ADM). The patient may require ADM regardless of the placement of the implant to gain better coverage if the muscles are thin, attenuated, or are of poor quality/thickness. We also discussed nipple sparing mastectomy and this determination would be between the oncologic surgeon and the patient. The surgery time for unilateral reconstruction is about 2 - 2.5 hours and for bilateral reconstruction is 3.5 - 4 hours. The patient would typically spend the evening in the hospital then go home the next day and have at least 1 drain in each breast that was operated on. If the patient opted for nipple sparing mastectomy, the incision would usually be in the breast fold or lateral to the nipple. The drains would stay in until outputs decrease to < 25 mL per day for 2days, and this is usually 2-3 weeks but can be longer. The patient would then return to clinic on a weekly basis to have the tissue expanders filled to the desired volume and then a subsequent 4 to 6 week recovery period, depending on need for adjuvant therapy. The expanders would then be exchanged to permanent implants in a second procedure as an outpatient and  requires no drains.    Advantages of implant based reconstruction are shorter surgery, shorter hospital stay, shorter recovery, no need for donor site, no risk of microvascular complications, and more customizable size. Risks were discussed including bleeding, infection (ranging from superficial skin infection to prosthetic infection requiring explantation), injury to surrounding structures, poor scarring, wound non-healing, mastectomy skin flap necrosis, need for prolonged wound care, implant exposure necessitating explantation, chronic pain/muscle spasms, numbness, need for further unanticipated surgery, seroma, capsular contracture, cosmetic deformity, asymmetry, malposition, animation deformity, silent rupture (for silicone implants), device failure, breast implant associated anaplastic large cell lymphoma (HELEN-ALCL), VTE, stroke, MI, and death. Each of these risks were discussed and explained to the patient. If silicone implants are used they would have to be screened for silent rupture which is ultrasound/MRI @ 5-6 years then every 2-3 years after that.    We then discussed autologous tissue reconstruction. Given that her weight has been stable for at least 4 months so her exam today will be reliable for surgical decision making. We discussed that autologous reconstruction can be performed in an immediate or staged fashion.  Immediate being the autologous recon is performed during the same surgery as the mastectomies.  If done in a staged fashion, expanders can be placed at the time of the mastectomy and the autologous reconstruction performed months later following completion of their cancer treatment. The primary option would be to perform free tissue transfer from the abdomen in the way of BONITA or muscle sparing free TRAM flaps. We explained that the lower abdominal tissue is used to reconstruct the breast mound. This involves identifying the blood supply of the tissue and tracing this through the abdominal  wall muscles. Depending on the patient's vessel anatomy, a variable amount of muscle may have to be removed to preserve enough blood supply to the tissue. We discussed the nature of the microsurgical procedure, vessel dissection, recipient vessel dissection via removal of part of the rib cartilage, and the possibility of return to the OR for perfusion related issues immediately or in a delayed setting. It was also explained that in a very rare case, the tissue that was transferred cannot be salvaged and another reconstructive option will have to be pursued. We explained the nature of the donor site including a hip to hip and circumumbilical scar, as well as risk of bulge or hernia from vessel dissection. The patient may require intraoperative placement of abdominal mesh, either synthetic or biologic, to reduce the risk of hernia or bulge if fascia is harvested with the flap and cannot be closed without tension. The patient would have either circular scars, circular and vertical, or a full inverted T pattern on the breast depending on the excess skin and the size of the flap. Again, nipple sparing mastectomy would be a determination between the oncologic surgeon and the patient. 1 to 2 drains will be placed in each breast and 2 drains will be placed in the abdomen. The surgical time for a unilateral reconstruction is about 6-8 hours and for a bilateral reconstruction is 10-12 hours. The patient would have an approximate 3 to 4 day hospital stay and a total 8 week recovery period.     We discussed the advantages of autologous reconstruction are a more natural appearing breast, less maintenance of implants and less risk of implant related issues, longevity of the reconstruction, and more tolerance of radiation.  Risks were discussed including bleeding, infection, injury to surrounding structures (pleura, lung, intra-abdominal structures), poor scarring, wound non-healing, mastectomy skin flap necrosis, need for  prolonged wound care, delay of adjuvant therapy, chronic pain/muscle spasms, numbness, need for further unanticipated surgery/return to OR for perfusion related issues, partial or total flap loss, cosmetic deformity at the breast or abdomen, asymmetry, malposition of breast or umbilicus, fat necrosis, seroma, bulge or hernia, loss of the umbilicus, sensory changes at the lower abdomen, VTE, stroke, MI, and death. We also discussed the patient's desires if the flap cannot be transferred or salvaged.       The patient is not interested in implant-based reconstruction.     The patient expressed sincere interest in immediate BONITA flap reconstruction.  The patient has moderate excess abdominal soft tissue.  I discussed that while she does have some excess abdominal tissue she does not have a tremendous amount.  We discussed that each half of the abdomen would be able to recreate a small breast.  The patient expressed understanding and was ok with this.  We discussed her desire for immediate flap reconstruction and I explained that I will ask my schedulers to explore this option.  We also discussed that immediate DIEPs may not be possible from a coordination standpoint and that we could offer a staged autologous reconstruction in which a tissue expander is placed in a prepectoral plane at the time of mastectomy to create a pocket for her eventual BONITA flap reconstruction which would occur at a time after the completion of her cancer care.  The autologous stage of her reconstruction would likely occur   several months after the expander is placed.       We discussed that we will order a CTA of her abdomen/pelvis to assess the vasculature feeding her abdominal tissue that would be used in a BONITA flap reconstruction.     Patient will return to clinic for us to discuss her operative plan once she has        History of Present Illness   Jenny Pereyra is a 48 y/o female with a PMH of asthma, former tobacco use, COPD,  diverticulosis, degenerative disc disease who presents with a new diagnosis of invasive breast cancer of the right breast.  Patient notes that she underwent a screening mammogram on 9/26 which revealed an area of concern in the central right breast.  She underwent US guided biopsy on 10/8 which revealed invasive breast carcinoma.  Patient has met with Dr. Cardarelli and presents today to discuss reconstructive options.  She notes that she is interested in undergoing bilateral mastectomies.     Patient notes that she has unintentionally lost 50 pounds over the past years which is being worked up by Dr. Cardarelli.  She does note that her weight has been stable for the past 4 months.     Patient reports a history of smoking approximately 1/2 pack per year but has quit for approximately 2 years now.        Review of Systems   Constitutional:  Positive for fatigue and unexpected weight change.       Past Medical History:   Diagnosis Date    Anxiety     Brain lipoma 2007    Breast cancer (HCC)     COPD (chronic obstructive pulmonary disease) (HCC)     GERD (gastroesophageal reflux disease)     Kidney stone     Motion sickness     PONV (postoperative nausea and vomiting)     Tremors of nervous system     essential     Past Surgical History:   Procedure Laterality Date    APPENDECTOMY      BACK SURGERY      fusion L5 - S1    BLADDER SURGERY      BREAST BIOPSY Right 10/08/2024    300 4cmfn, open coil clip    CARPAL TUNNEL RELEASE Right 05/31/2024    COLONOSCOPY      FL RETROGRADE PYELOGRAM  12/15/2023    HYSTERECTOMY      age 29 still has lt ovary    IR RADIOFREQUENCY ABLATION      esophagus    TX CYSTO/URETERO W/LITHOTRIPSY &INDWELL STENT INSRT Left 12/15/2023    Procedure: CYSTO USCOPE W/ LASER, & STENT;  Surgeon: Jhonatan Flmeing MD;  Location: [facility];  Service: Urology    TX NEUROPLASTY &/TRANSPOS MEDIAN NRV CARPAL TUNNE Right 05/31/2024    Procedure: RELEASE CARPAL TUNNEL;  Surgeon: Dorothea Mayo MD;   Location: [facility];  Service: Orthopedics    WI NEUROPLASTY &/TRANSPOS MEDIAN NRV CARPAL TUNNE Left 07/12/2024    Procedure: RELEASE CARPAL TUNNEL;  Surgeon: Dorothea Mayo MD;  Location: [facility];  Service: Orthopedics    WI NEUROPLASTY &/TRANSPOSITION ULNAR NERVE ELBOW Right 05/31/2024    Procedure: RELEASE CUBITAL TUNNEL POSSIBLE TRANSPOSITION AND ADIPOSE FLAP;  Surgeon: Dorothea Mayo MD;  [facility]: [facility];  Service: Orthopedics    WI NEUROPLASTY &/TRANSPOSITION ULNAR NERVE ELBOW Left 07/12/2024    Procedure: RELEASE CUBITAL TUNNEL;  Surgeon: Dorothea Mayo MD;  [facility]: [facility];  Service: Orthopedics    SPINE SURGERY      twin laminectomy lumbar    TOTAL VAGINAL HYSTERECTOMY      AGE 29    TUBAL LIGATION      UPPER GASTROINTESTINAL ENDOSCOPY      US GUIDED BREAST BIOPSY RIGHT COMPLETE Right 10/8/2024     Current Outpatient Medications on File Prior to Visit   Medication Sig Dispense Refill    ACIDOPHILUS LACTOBACILLUS PO Take 1 tablet by mouth daily      Armodafinil 150 MG tablet Take 1 tablet (150 mg total) by mouth daily 30 tablet 1    bisacodyl (DULCOLAX) 5 mg EC tablet Take 2 tablets (10 mg total) by mouth in the morning (Patient taking differently: Take 10 mg by mouth if needed) 60 tablet 5    Ca, Mg, K, and Na Oxybates (Xywav) 500 MG/ML SOLN Take 4 g by mouth daily at bedtime 240 mL 2    escitalopram (Lexapro) 10 mg tablet Take 1 tablet (10 mg total) by mouth daily 100 tablet 1    fenofibrate (TRICOR) 145 mg tablet Take 1 tablet (145 mg total) by mouth daily 90 tablet 1    polyethylene glycol (MIRALAX) 17 g packet Take 17 g by mouth daily (Patient taking differently: Take 17 g by mouth if needed) 510 g 5    tiotropium-olodaterol (Stiolto Respimat) 2.5-2.5 MCG/ACT inhaler Inhale 2 puffs daily 12 g 5    levalbuterol (Xopenex HFA) 45 mcg/act inhaler Inhale 1-2 puffs every 4 (four) hours as needed for wheezing 15 g 3    Multiple Vitamins-Minerals (MULTIVITAMIN ADULTS PO) Take 1  tablet by mouth daily      ondansetron (ZOFRAN) 4 mg tablet Take 1 tablet (4 mg total) by mouth every 8 (eight) hours as needed for nausea or vomiting for up to 15 days (Patient not taking: Reported on 10/14/2024) 20 tablet 0     No current facility-administered medications on file prior to visit.     Allergies   Allergen Reactions    Chlorhexidine Hives     Itching and red rash on body from CHG cloth    Codeine Palpitations     Other reaction(s): Other (See Comments)  Other reaction(s): Irregular heart rate  Rash,vomiting, heart palpitations    Latex Rash    Penicillin V Rash and Vomiting     SOCIAL:  Patient notes that she works for [employer] here at [employer].  She enjoys spending time with her grandchildren.  She does note that she has been too tired to really do the things that she enjoys lately.      Physical Exam   Alert, oriented, NAD  Breathing comfortably on room air  Bilateral breasts with moderate symmetry.  R breast 1-2cm more ptotic than the left.  Grade II ptosis bilaterally with deflated upper poles bilaterally.  No skin changes other than fading bruise over right superomedial breast. No palpable masses bilaterally. No inversion of nipples.  Skin is healthy and elastic.  Abdomen: moderate excess skin and subcutaneous fat of the lower abdomen. Also exhibits evidence of intraabdominal adiposity    on file   Stress: Not on file   Social Connections: Not on file   Intimate Partner Violence: Not on file   Housing Stability: Not on file       SOCIAL:  Patient notes that she works for [employer] here at [facility]. She does note that she has been too tired to really do the things that she enjoys lately.      Physical Exam   Alert, oriented, NAD  Normocephalic, atraumatic  Breathing comfortably on room air  Bilateral breasts with moderate symmetry.  R breast 1-2cm more ptotic than the left.  Grade II ptosis bilaterally with deflated upper poles bilaterally.  No skin changes other than fading bruise over right superomedial breast. No palpable masses bilaterally. No inversion of nipples.  Skin is healthy and elastic.  Abdomen: moderate excess skin and subcutaneous fat of the lower abdomen. Also exhibits evidence of intraabdominal adiposity   No appreciable swelling of the extremities   Mood/behavior normal

## 2024-10-23 NOTE — PROGRESS NOTES
CC: Patient is here for hypersomnia follow up    1.Hypersomnia  The patient's more frequent excessive daytime sleepiness may be multifactorial including hypersomonia vs anxiety vs recent diagnosed breast cancer vs psychosocial distress.  A shared decision making was made to increase Armodafenil and not change Xywav as the latter is working at 3.75 mg.  Additionally, switching to DNRI (Welbutrin) would be an alternative treatment option down the road. Discussed watching for changes in sleep patterns, respiratory changes while being on Xywav, having worsening fecal incontinence and let us know through My Chart.    2.Excessive daytime sleepiness  Mildly worsening since the breast cancer diagnosis.  Initially obtained benefit form 3.75 mg of Xywav and she would like to remain at that dose.  Discussed risk and benefits of using this medication and the endorsed understanding the potential side effects and will watch out and notify if changes ensue.      3. Insomnia  Improved with Xywav but the patient has had frequent (1-3) nighttime awakenings without triggers, suggestive of depression-related type of maintain insomnia. Encouraged to implement sleep hygiene interventions (avoiding heavy meals, avoiding caffeine past 4 pm, screening close to bedtime).  Asked to talk to PCP to address underlying anxiety/depression after being diagnosed with breast cancer.    Review of Systems -     General: +more fatigue, +more sleepy, +weight loss  HEENT: No nasal congestion  LUNGS: No dyspnea  HEART: No palpitations, chest pain  ABDOMEN: +abdominal pain  NEUR: No headaches      HPI:  Jenny Pereyra is a 48 y.o. female who is here for a follow-up appointment she has a history of persistent daytime hypersomnolence, sleep disturbances at night she was started on Xywav 3 months ago due to persistent symptoms during the day despite the fact that she was switched from modafinil to armodafinil with a good dosage.  She has noted some side  effects mainly nausea in the beginning of the therapy however that went away and she is not on Zofran anymore she is doing fairly well on 3.75 g of Xywav at night with no significant side effects currently 2-3 nights per week she is still struggling during the day so we elected to increase her dose today.  She is aware of all the side effects she will communicate with us through [employer] to let us know if any new side effects were to happen.    Pertinent interval medical history since last visit is notable for noticing that she is more sleepy during the day since September and was recently diagnosed with breast cancer, which has made her feel more anxious and depressed.  The patient saw plastic surgery and is going to have a CT scan of adomen and pelvis. The patient is also seeing pulmonology for left lower lobe finding.       Pertinent medical history: Breast cancer, hypersomnia (idiopathic), solitary lung nodule.  Pertinent family history: No prior sleep disorders in first degree relatives.    Pertinent Sleep History:  Bedtime: 9:30-10 pm  Sleep latency: less 5 minutes   Nighttime awakenings: 1-3   Reasons for nighttime awakenings: No triggers  Able to fall asleep after nighttime awakenings: Able to fall back to sleep on Xywav  Wakes up: MWF 4:45 am, T/TH at 6:00 am  No naps usually, but falls asleep.   Denies sleep paralysis, vivid dreams, loss of muscle tone with intense emotions.  Denies urge to move legs at night    Pertinent Social History  Cups of coffee:  Alcohol: Socially  Recreational drug use: None  No sedative/hypnotic/narcotic use at night.      Physical Exam  General:  Awake alert and oriented x 3, conversant without conversational dyspnea, NAD, normal affect  HEENT:   Sclera noninjected, nonicteric OU, Nares patent,  no craniofacial abnormalities, Mucous membranes, moist, no oral lesions  NECK:  Trachea midline, no accessory muscle use, no stridor,  JVP not elevated  CARDIAC: Reg, single s1/S2, no  m/r/g  PULM: CTA bilaterally no wheezing, rhonchi or rales  EXT: No cyanosis, no clubbing, no edema  NEURO: no focal neurologic deficits, AAOx3, moving all extremities appropriately    Milo Tang MD  Sleep fellow

## 2024-10-23 NOTE — ASSESSMENT & PLAN NOTE
Jenny Pereyra is a 50 y/o female with a PMH of asthma, former tobacco use, COPD, diverticulosis, degenerative disc disease who presents with a new diagnosis of invasive breast cancer of the right breast. Patient notes that she is interested in pursuing bilateral mastectomy.  Patient is here today to discuss options for reconstruction.     A long discussion was held with the patient. However, I did remind the patient that this is an elective part of her process and reconstruction is not required for her cancer care nor would it affect her cancer outcome.     We also discussed that breast reconstruction is a process and was explained that no matter which reconstructive option, the patient would likely require additional surgeries or revisions in the future, as well as possible implant montoring and exchanges.     We reviewed the patient's goals for the reconstruction. Patient notes that she is not interested in implant-based reconstruction and is interested in autologous reconstruction.  She is not interested in being any larger than she is currently. She notes that if she is not a candidate for autologous reconstruction she would prefer to go flat.    We first reviewed immediate versus delayed reconstruction. We discussed that when Immediate reconstruction is feasible it can be advantageous in that it offers the patient a reconstruction at the time of surgery. Often times it is easier to achieve a more aesthetic result with immediate reconstruction. I explained that there are certain instances in which we elect for delayed reconstruction. These include but are not limited to comorbidities that can or should be optimized prior to elective reconstruction (ex. obesity, smoking, uncontrolled diabetes), aggressive cancer staging requiring expedient treatment (ex. inflammatory breast cancer), or patient preference.    Regarding implant based reconstruction, we explained that tissue expanders would be placed after the  mastectomy. These tissue expanders can be placed completely under the muscle or partially under the muscle. If placed partially under the muscle, we would use an acellular dermal matrix (ADM) to cover the lower portion of the expander and the patient was informed of this. Occasionally, a permanent implant can be placed at the time of mastectomy but this is a minority of cases. Also, we discussed pre-pectoral reconstruction and if the patient would be a candidate for this. This determination is sometimes made at the time of surgery and would require a mesh or acellular dermal matrix (ADM). The patient may require ADM regardless of the placement of the implant to gain better coverage if the muscles are thin, attenuated, or are of poor quality/thickness. We also discussed nipple sparing mastectomy and this determination would be between the oncologic surgeon and the patient. The surgery time for unilateral reconstruction is about 2 - 2.5 hours and for bilateral reconstruction is 3.5 - 4 hours. The patient would typically spend the evening in the hospital then go home the next day and have at least 1 drain in each breast that was operated on. If the patient opted for nipple sparing mastectomy, the incision would usually be in the breast fold or lateral to the nipple. The drains would stay in until outputs decrease to < 25 mL per day for 2days, and this is usually 2-3 weeks but can be longer. The patient would then return to clinic on a weekly basis to have the tissue expanders filled to the desired volume and then a subsequent 4 to 6 week recovery period, depending on need for adjuvant therapy. The expanders would then be exchanged to permanent implants in a second procedure as an outpatient and requires no drains.    Advantages of implant based reconstruction are shorter surgery, shorter hospital stay, shorter recovery, no need for donor site, no risk of microvascular complications, and more customizable size. Risks  were discussed including bleeding, infection (ranging from superficial skin infection to prosthetic infection requiring explantation), injury to surrounding structures, poor scarring, wound non-healing, mastectomy skin flap necrosis, need for prolonged wound care, implant exposure necessitating explantation, chronic pain/muscle spasms, numbness, need for further unanticipated surgery, seroma, capsular contracture, cosmetic deformity, asymmetry, malposition, animation deformity, silent rupture (for silicone implants), device failure, breast implant associated anaplastic large cell lymphoma (HELEN-ALCL), VTE, stroke, MI, and death. Each of these risks were discussed and explained to the patient. If silicone implants are used they would have to be screened for silent rupture which is ultrasound/MRI @ 5-6 years then every 2-3 years after that.    We then discussed autologous tissue reconstruction. Given that her weight has been stable for at least 4 months so her exam today will be reliable for surgical decision making. We discussed that autologous reconstruction can be performed in an immediate or staged fashion.  Immediate being the autologous recon is performed during the same surgery as the mastectomies.  If done in a staged fashion, expanders can be placed at the time of the mastectomy and the autologous reconstruction performed months later following completion of their cancer treatment. The primary option would be to perform free tissue transfer from the abdomen in the way of BONITA or muscle sparing free TRAM flaps. We explained that the lower abdominal tissue is used to reconstruct the breast mound. This involves identifying the blood supply of the tissue and tracing this through the abdominal wall muscles. Depending on the patient's vessel anatomy, a variable amount of muscle may have to be removed to preserve enough blood supply to the tissue. We discussed the nature of the microsurgical procedure, vessel  dissection, recipient vessel dissection via removal of part of the rib cartilage, and the possibility of return to the OR for perfusion related issues immediately or in a delayed setting. It was also explained that in a very rare case, the tissue that was transferred cannot be salvaged and another reconstructive option will have to be pursued. We explained the nature of the donor site including a hip to hip and circumumbilical scar, as well as risk of bulge or hernia from vessel dissection. The patient may require intraoperative placement of abdominal mesh, either synthetic or biologic, to reduce the risk of hernia or bulge if fascia is harvested with the flap and cannot be closed without tension. The patient would have either circular scars, circular and vertical, or a full inverted T pattern on the breast depending on the excess skin and the size of the flap. Again, nipple sparing mastectomy would be a determination between the oncologic surgeon and the patient. 1 to 2 drains will be placed in each breast and 2 drains will be placed in the abdomen. The surgical time for a unilateral reconstruction is about 6-8 hours and for a bilateral reconstruction is 10-12 hours. The patient would have an approximate 3 to 4 day hospital stay and a total 8 week recovery period.     We discussed the advantages of autologous reconstruction are a more natural appearing breast, less maintenance of implants and less risk of implant related issues, longevity of the reconstruction, and more tolerance of radiation.  Risks were discussed including bleeding, infection, injury to surrounding structures (pleura, lung, intra-abdominal structures), poor scarring, wound non-healing, mastectomy skin flap necrosis, need for prolonged wound care, delay of adjuvant therapy, chronic pain/muscle spasms, numbness, need for further unanticipated surgery/return to OR for perfusion related issues, partial or total flap loss, cosmetic deformity at the  breast or abdomen, asymmetry, malposition of breast or umbilicus, fat necrosis, seroma, bulge or hernia, loss of the umbilicus, sensory changes at the lower abdomen, VTE, stroke, MI, and death. We also discussed the patient's desires if the flap cannot be transferred or salvaged.       The patient is not interested in implant-based reconstruction.     The patient expressed sincere interest in immediate BONITA flap reconstruction.  The patient has moderate excess abdominal soft tissue.  I discussed that while she does have some excess abdominal tissue she does not have a tremendous amount.  We discussed that each half of the abdomen would be able to recreate a small breast.  The patient expressed understanding and was ok with this.  We discussed her desire for immediate flap reconstruction and I explained that I will ask my schedulers to explore this option.  We also discussed that immediate DIEPs may not be possible from a coordination standpoint and that we could offer a staged autologous reconstruction in which a tissue expander is placed in a prepectoral plane at the time of mastectomy to create a pocket for her eventual BONITA flap reconstruction which would occur at a time after the completion of her cancer care.  The autologous stage of her reconstruction would likely occur   several months after the expander is placed.       We discussed that we will order a CTA of her abdomen/pelvis to assess the vasculature feeding her abdominal tissue that would be used in a BONITA flap reconstruction.     Patient will return to clinic for us to discuss her operative plan once she has

## 2024-10-24 ENCOUNTER — PATIENT OUTREACH (OUTPATIENT)
Dept: CASE MANAGEMENT | Facility: OTHER | Age: 49
End: 2024-10-24

## 2024-10-24 NOTE — PROGRESS NOTES
OSW received an email from pt this day. She asked this writer to outreach her on her lunch break. OSW will place outreach TC at that time.     ADDENDUM:  OSW placed TC to pt this afternoon. She shared that Monday was a very difficult day for her. She states that she cried a lot. OSW allowed time for her to express her feelings and offered support. She is feeling very overwhelmed with the amount of co-payments and appointments and states that she doesn't have any PTO/Sick time left. This writer was going to suggest that she ask her manager to send an email to her co-workers, however she states that they just donated her time when her mother was having health concerns and she wouldn't feel right asking again.   OSW encouraged her to ask to be billed and offered to request that a ajay care application be mailed out to her. She was agreeable, but is doubtful that she will qualify. If not this writer stated that she can set up a payment plan that she is able to afford.     Pt has applied for forbearance with her mortgage company and is waiting for a response. She has also started the FMLA process. Once she is out of work she has ST/LT disability, which is a relief because right now she isn't getting paid when she has to take time off.   Pt has her follow up with Dr. Cardarelli on Monday and is looking forward to having a plan.   OSW offered to check in with her in a few weeks and she was agreeable. She was encouraged to call with any questions/concerns.

## 2024-10-25 ENCOUNTER — TELEPHONE (OUTPATIENT)
Dept: PLASTIC SURGERY | Facility: CLINIC | Age: 49
End: 2024-10-25

## 2024-10-25 LAB
GENE DIS ANL INTERP-IMP: NORMAL
GENES NOT REPORTED: NORMAL
INTERPRETATION: NORMAL

## 2024-10-25 NOTE — TELEPHONE ENCOUNTER
[facility] for patient to set up 2nd recon visit with Dr. Brito after she sees Dr. Cardarelli on Monday.   Left my direct number.

## 2024-10-25 NOTE — PATIENT COMMUNICATION
Spoke with [facility] the Levalbuterol (Xopenex) inhaler is not on formulary it would either have to be Ventolin or Albuterol Inhaler.      If new script can be sent.

## 2024-10-28 ENCOUNTER — OFFICE VISIT (OUTPATIENT)
Dept: LAB | Facility: CLINIC | Age: 49
End: 2024-10-28
Payer: COMMERCIAL

## 2024-10-28 ENCOUNTER — OFFICE VISIT (OUTPATIENT)
Dept: SURGICAL ONCOLOGY | Facility: CLINIC | Age: 49
End: 2024-10-28
Payer: COMMERCIAL

## 2024-10-28 VITALS
BODY MASS INDEX: 25.58 KG/M2 | HEIGHT: 62 IN | HEART RATE: 73 BPM | TEMPERATURE: 98.1 F | DIASTOLIC BLOOD PRESSURE: 82 MMHG | WEIGHT: 139 LBS | SYSTOLIC BLOOD PRESSURE: 122 MMHG | OXYGEN SATURATION: 98 %

## 2024-10-28 DIAGNOSIS — Z17.0 MALIGNANT NEOPLASM OF CENTRAL PORTION OF RIGHT BREAST IN FEMALE, ESTROGEN RECEPTOR POSITIVE (HCC): ICD-10-CM

## 2024-10-28 DIAGNOSIS — Z17.0 MALIGNANT NEOPLASM OF CENTRAL PORTION OF RIGHT BREAST IN FEMALE, ESTROGEN RECEPTOR POSITIVE (HCC): Primary | ICD-10-CM

## 2024-10-28 DIAGNOSIS — C50.111 MALIGNANT NEOPLASM OF CENTRAL PORTION OF RIGHT BREAST IN FEMALE, ESTROGEN RECEPTOR POSITIVE (HCC): ICD-10-CM

## 2024-10-28 DIAGNOSIS — C50.111 MALIGNANT NEOPLASM OF CENTRAL PORTION OF RIGHT BREAST IN FEMALE, ESTROGEN RECEPTOR POSITIVE (HCC): Primary | ICD-10-CM

## 2024-10-28 PROCEDURE — 93005 ELECTROCARDIOGRAM TRACING: CPT

## 2024-10-28 PROCEDURE — 99245 OFF/OP CONSLTJ NEW/EST HI 55: CPT | Performed by: STUDENT IN AN ORGANIZED HEALTH CARE EDUCATION/TRAINING PROGRAM

## 2024-10-28 NOTE — ASSESSMENT & PLAN NOTE
With the patient's  present, we reviewed that her staging workup thus far has been negative.  We reviewed the findings of her most recent bilateral breast MRI which included an area of concern in the right breast.  Due to the patient's want for bilateral mastectomy, no additional biopsy was performed.    We again reviewed the natural history of breast cancer, and the rationale for both locoregional and systemic therapy, which is multi-step and multidisciplinary.     We then discussed management options. It was advised that with the currently known information, surgical options include breast conservation (consisting of partial mastectomy and subsequent breast irradiation) versus total mastectomy with or without reconstruction. The details of each were described, including the the indications and reasons for choosing one as opposed to the other.  We reviewed the lack of known impact on survival with regard to the surgical options and the risk of local recurrence with both interventions were discussed.  We have previously discussed this and patient has declined lumpectomy, hence we did not do additional biopsies of area of concern on breast MRI.    She expressed interest in mastectomy, and the remainder of the discussion was dedicated to this. We discussed that while an attempt is made to remove all visible breast tissue, some may be left behind, and thus there remains a risk for recurrence even after a mastectomy. The rationale behind removal of the nipple and areola complex was discussed, and this led to a conversation of the need to leave behind breast tissue beneath the nipple to preserve a blood supply during a nipple-sparing mastectomy was discussed in detail. Based on significant ptosis, we currently believe she is not a candidate for nipple-sparing mastectomy and she is not interested in pursuing nipple-sparing mastectomy.  We also discussed that post-mastectomy breast reconstruction can be performed,  started as part of the same procedure. We briefly reviewed the options of aesthetic flat closure, and reconstruction with implant or autologous techniques. She is interested in breast reconstruction; she has a follow-up arranged for 10/29/2024 with plastic surgery.  Patient is not interested in flat aesthetic closure at this time.  She would like tissue expanders placed as a bridge to likely Mehnaz flap; however, she will discuss implants versus Mehnaz flap at her plastic surgery appointment.    Management of the axilla was also discussed. The use of sentinel lymphadenectomy for axillary staging was reviewed, which would be performed using lymphatic mapping with radiocolloid as well as blue dye.  These injections will take place following the initiation of anesthesia on day of surgery.  It was explained that in certain circumstances further axillary surgery may be required, a determination that would be made based upon pathology from surgery. We discussed the possibility of a false negative result if intraoperative analysis of lymph nodes is performed. We also reviewed the risks of axillary surgery, which include but are not limited to bleeding, infection, seroma, altered sensation of the axilla and/or upper arm, injury to surrounding neurovascular structures, blue dye allergic reaction, and lymphedema.    We discussed that the vast majority of breast cancers are not related to an inherited gene, but that for many patients, especially those with a family history of breast or ovarian cancer, genetic testing is valuable. Given that she is age < 50, she underwent stat genetic testing.  Stat genetic testing thus far has been negative. [10/16/2024 [facility]].  Full panel is pending.      We then discussed the potential role of chemotherapy, endocrine therapy and radiation based on the results of surgical pathology.  Patient  are aware that there is a possibility that she may need postmastectomy radiation based on the  final pathology.    It was advised that given the ER+ subtype of disease, she will likely require adjuvant endocrine therapy, and may require adjuvant chemotherapy which is a determination that will be made based upon information obtained from surgical pathology.  A MammaPrint was sent and these results were reviewed with patient.  A copy of these results were provided.  Based on the available information at this time, and the currently available pathology results, patient is a low likelihood to benefit from adjuvant chemotherapy.  I explained that the final determination of this will be made by medical oncology.    Appropriate consultation with medical oncology and radiation oncology will be made postoperatively to complete her adjuvant therapy plan.    Lastly, I obtained a signed consent for bilateral skin sparing mastectomy with right sentinel node mapping/biopsy, with possible axillary lymph node dissection should nodes not map. We discussed the rationale for and benefits of the plan for treatment of right breast cancer, the alternatives to the proposed procedure and treatment plan (including doing nothing), as well as the risks of those alternatives.  We specifically discussed, but due to patient's unexplained weight loss that she is at higher risk for a wound complication.  Patient is aware of this risk and would like to proceed with the prophylactic mastectomy.  She does have a normal intake of a high-protein low-carb diet.  Patient was advised to continue a well-balanced diet throughout the surgical intervention and postoperative.    Patient and  asked appropriate questions and seemed to understand. We also considered the risks of procedures with general anesthesia.      The proposed procedure will be performed under anesthesia and that her consent to pursue the specific anesthesia plan will be obtained by the anesthesiologist.      There is a very small risk that during the hospitalization it may be  advisable to administer blood or blood products.  She is comfortable receiving blood products should she need them in the event of an emergency.     Lastly, I reviewed patient's CODE STATUS.  Patient with has elected to continue her full CODE STATUS.

## 2024-10-28 NOTE — PROGRESS NOTES
Breast Consultation-Surgical Oncology     [address] [facility]  [facility]  [address] [facility] [place] [id number]    Name:  Jenny Pereyra  YOB: 1975  MRN:  [id number]    Assessment & Plan  Malignant neoplasm of central portion of right breast in female, estrogen receptor positive (HCC)  With the patient's  present, we reviewed that her staging workup thus far has been negative.  We reviewed the findings of her most recent bilateral breast MRI which included an area of concern in the right breast.  Due to the patient's want for bilateral mastectomy, no additional biopsy was performed.    We again reviewed the natural history of breast cancer, and the rationale for both locoregional and systemic therapy, which is multi-step and multidisciplinary.     We then discussed management options. It was advised that with the currently known information, surgical options include breast conservation (consisting of partial mastectomy and subsequent breast irradiation) versus total mastectomy with or without reconstruction. The details of each were described, including the the indications and reasons for choosing one as opposed to the other.  We reviewed the lack of known impact on survival with regard to the surgical options and the risk of local recurrence with both interventions were discussed.  We have previously discussed this and patient has declined lumpectomy, hence we did not do additional biopsies of area of concern on breast MRI.    She expressed interest in mastectomy, and the remainder of the discussion was dedicated to this. We discussed that while an attempt is made to remove all visible breast tissue, some may be left behind, and thus there remains a risk for recurrence even after a mastectomy. The rationale behind removal of the nipple and areola complex was discussed, and this led to a conversation of the need to leave behind breast tissue  beneath the nipple to preserve a blood supply during a nipple-sparing mastectomy was discussed in detail. Based on significant ptosis, we currently believe she is not a candidate for nipple-sparing mastectomy and she is not interested in pursuing nipple-sparing mastectomy.  We also discussed that post-mastectomy breast reconstruction can be performed, started as part of the same procedure. We briefly reviewed the options of aesthetic flat closure, and reconstruction with implant or autologous techniques. She is interested in breast reconstruction; she has a follow-up arranged for 10/29/2024 with plastic surgery.  Patient is not interested in flat aesthetic closure at this time.  She would like tissue expanders placed as a bridge to likely Mehnaz flap; however, she will discuss implants versus Mehnaz flap at her plastic surgery appointment.    Management of the axilla was also discussed. The use of sentinel lymphadenectomy for axillary staging was reviewed, which would be performed using lymphatic mapping with radiocolloid as well as blue dye.  These injections will take place following the initiation of anesthesia on day of surgery.  It was explained that in certain circumstances further axillary surgery may be required, a determination that would be made based upon pathology from surgery. We discussed the possibility of a false negative result if intraoperative analysis of lymph nodes is performed. We also reviewed the risks of axillary surgery, which include but are not limited to bleeding, infection, seroma, altered sensation of the axilla and/or upper arm, injury to surrounding neurovascular structures, blue dye allergic reaction, and lymphedema.    We discussed that the vast majority of breast cancers are not related to an inherited gene, but that for many patients, especially those with a family history of breast or ovarian cancer, genetic testing is valuable. Given that she is age < 50, she underwent stat genetic  testing.  Stat genetic testing thus far has been negative. [10/16/2024 [facility]].  Full panel is pending.      We then discussed the potential role of chemotherapy, endocrine therapy and radiation based on the results of surgical pathology.  Patient  are aware that there is a possibility that she may need postmastectomy radiation based on the final pathology.    It was advised that given the ER+ subtype of disease, she will likely require adjuvant endocrine therapy, and may require adjuvant chemotherapy which is a determination that will be made based upon information obtained from surgical pathology.  A MammaPrint was sent and these results were reviewed with patient.  A copy of these results were provided.  Based on the available information at this time, and the currently available pathology results, patient is a low likelihood to benefit from adjuvant chemotherapy.  I explained that the final determination of this will be made by medical oncology.    Appropriate consultation with medical oncology and radiation oncology will be made postoperatively to complete her adjuvant therapy plan.    Lastly, I obtained a signed consent for bilateral skin sparing mastectomy with right sentinel node mapping/biopsy, with possible axillary lymph node dissection should nodes not map. We discussed the rationale for and benefits of the plan for treatment of right breast cancer, the alternatives to the proposed procedure and treatment plan (including doing nothing), as well as the risks of those alternatives.  We specifically discussed, but due to patient's unexplained weight loss that she is at higher risk for a wound complication.  Patient is aware of this risk and would like to proceed with the prophylactic mastectomy.  She does have a normal intake of a high-protein low-carb diet.  Patient was advised to continue a well-balanced diet throughout the surgical intervention and postoperative.    Patient and  asked  appropriate questions and seemed to understand. We also considered the risks of procedures with general anesthesia.      The proposed procedure will be performed under anesthesia and that her consent to pursue the specific anesthesia plan will be obtained by the anesthesiologist.      There is a very small risk that during the hospitalization it may be advisable to administer blood or blood products.  She is comfortable receiving blood products should she need them in the event of an emergency.     Lastly, I reviewed patient's CODE STATUS.  Patient with has elected to continue her full CODE STATUS.            CHIEF COMPLAINT: This is an follow-up consultation visit for right breast invasive cancer with ductal and lobular features.    Interval history: In the interim from her initial consultation, patient has undergone a staging workup due to her unexplained weight loss.  Staging workup has been negative.  She also underwent a breast MRI.  There was an additional satellite lesion in proximity to the known cancer.  Patient continues to desire bilateral mastectomy, and thus additional biopsies were not performed.  Patient also was evaluated by plastic surgery who did not recommend immediate Mehnaz flap.  Patient was offered immediate reconstruction with expanders and plan for delayed Mehnaz flap, or flat closure.  Patient does not want implants.    HPI:  Jenny Pereyra is a 49 y.o. year old female who presents with following diagnosis of right breast cancer.  She reports prior to this she was feeling well.  She denies any breast symptoms including nipple discharge, pain, changes in the appearance of the breast or any other concerns.       Patient reports significant systemic illnesses to include unintentional weight loss (49 pound weight loss over approximately 6 to 12 months), significant night sweats, worsening daily fatigue, inability to climb a flight of stairs due to fatigue and shortness of breath, nocturnal  accidental bowel movements, easy bruising as well as generalized not feeling well.    Patient expressed immediately upon arrival that she wanted a bilateral mastectomy and would not perform chemotherapy.       Oncology History:    Oncology History   Malignant neoplasm of central portion of right breast in female, estrogen receptor positive (HCC)   10/8/2024 Biopsy    Right breast ultrasound-guided biopsy  3 o'clock, 4 cm from nipple (open coil)  Invasive breast carcinoma with ductal and lobular features  Grade 1  ER , AL , HER2 0  Lymphovascular invasion not identified    Concordant. Unifocal / multifocal. SIZE. Concordant. Right malignancy appears unifocal; suspicious mass covers an area up to 6 mm. US right axilla negative. Left breast clear. Breast MRI should be considered given lobular features within the carcinoma and overall appearance of the breast parenchyma (scattered with borderline heterogeneously dense components).     10/11/2024 Genomic Testing    Reflex MammaPrint/BluePrint UltraLow Risk (Luminal A)     10/14/2024 -  Cancer Staged    Staging form: Breast, AJCC 8th Edition  - Clinical stage from 10/14/2024: Stage IA (cT1b, cN0, cM0, G1, ER+, AL+, HER2-) - Signed by Cassandra Lynn Cardarelli, MD on 10/14/2024  Histopathologic type: Lobular carcinoma, NOS  Stage prefix: Initial diagnosis  Method of lymph node assessment: Clinical  Nuclear grade: G1  Histologic grading system: 3 grade system       10/16/2024 Genetic Testing    Genetics ordered  [place]     10/17/2024 Observation    Breast MRI IMPRESSION:  1.  Right breast 3:00 biopsy-proven invasive carcinoma measures up to 12 mm.  2.  Possible satellite lesion right breast 6:00 for which MRI guided biopsy is recommended if it would change clinical management.  3.  Otherwise, no MR evidence of contralateral left breast malignancy.  4.  No axillary or internal mammary adenopathy.         Review of Systems:     Constitutional: + Night sweats.   Unintentional weight loss  HENT:  Negative for ear pain and sore throat.    Eyes:  Negative for pain and visual disturbance.   Respiratory:  + shortness of breath.    Cardiovascular:  Negative for chest pain and palpitations.   Gastrointestinal:  Negative for abdominal pain and vomiting.  Accidental bowel movements.  Genitourinary:  Negative for dysuria.  Blood in urine..  Musculoskeletal:  Negative for arthralgias and back pain.   Skin:  Negative for color change and rash.  Ports easy bruising  Neurological:  Negative for seizures and syncope.       Physical Exam: There were no vitals taken for this visit.    Physical Exam  Vitals reviewed. Exam conducted with a chaperone present.   Constitutional:       Appearance: Normal appearance.   HENT:      Head: Normocephalic and atraumatic.      Mouth/Throat:      Mouth: Mucous membranes are moist.   Cardiovascular:      Rate and Rhythm: Normal rate and regular rhythm.      Pulses: Normal pulses.      Heart sounds: Normal heart sounds.   Pulmonary:      Effort: Pulmonary effort is normal.      Breath sounds: Normal breath sounds.   Chest:   Breasts:     Right: Normal.      Left: Normal.      Comments: The left breast was examined in the sitting and supine position.  There are no worrisome skin changes, tenderness, inverted nipple, nipple discharge, swelling, bleeding or evidence of a mass in any quadrant.  Winifred survey demonstrated no evidence of any clinically suspicious axillary, pectoral or paraclavicular lymph nodes    The right breast was examined in the sitting and supine position.  There are no worrisome skin changes, tenderness, inverted nipple, nipple discharge, swelling, bleeding or evidence of a mass in any quadrant.  Winifred survey demonstrated no evidence of any clinically suspicious axillary, pectoral or paraclavicular lymph nodes  Abdominal:      General: Abdomen is flat.      Palpations: Abdomen is soft.   Musculoskeletal:      Right lower leg: No edema.       Left lower leg: No edema.   Lymphadenopathy:      Upper Body:      Right upper body: No supraclavicular, axillary or pectoral adenopathy.      Left upper body: No supraclavicular, axillary or pectoral adenopathy.   Skin:     General: Skin is warm.   Neurological:      General: No focal deficit present.      Mental Status: She is alert and oriented to person, place, and time.   Psychiatric:         Mood and Affect: Mood normal.         Behavior: Behavior normal.         Thought Content: Thought content normal.       Laboratory: Reviewed.      Pathology revealed:   10/8/2024 Biopsy, right invasive breast carcinoma with ductal and lobular features, grade 1, ER , MO , HER2 0; 3:00, 4cmfn (open coil). Concordant.     Staging:  Cancer Staging   Malignant neoplasm of central portion of right breast in female, estrogen receptor positive (HCC)  Staging form: Breast, AJCC 8th Edition  - Clinical stage from 10/14/2024: Stage IA (cT1b, cN0, cM0, G1, ER+, MO+, HER2-) - Signed by Cassandra Lynn Cardarelli, MD on 10/14/2024  Histopathologic type: Lobular carcinoma, NOS  Stage prefix: Initial diagnosis  Method of lymph node assessment: Clinical  Nuclear grade: G1  Histologic grading system: 3 grade system    Concordance: Confirmed    Staging Workup:     NM bone scan whole body    Result Date: 10/18/2024  FINDINGS: Whole body uptake is within normal limits for age. There is no scintigraphic evidence of osseous metastasis. Symmetric renal uptake.     Impression: 1. No scintigraphic evidence of osseous metastasis.     MRI breast right w and wo contrast w cad    Result Date: 10/18/2024  FINDINGS: RIGHT 1) MASS [A]: Biopsy-proven right breast 3:00 invasive carcinoma is identified as an irregular enhancing mass measuring up to 12 x 8 mm (series 09816 image 149).  Mass is well  from the skin surface, chest wall, pectoral muscle and nipple. 2) FOCUS [B]: However, anterior inferior to the index lesion at 6:00, there is  a 4 mm focus of enhancement with similar progressive kinetics (series 16260 image 162).  The focus of enhancement is  from the index lesion by 1.8 cm. Elsewhere in both breasts on MR, there are no suspicious enhancing masses or suspicious areas of non-mass enhancement. There is no axillary or internal mammary adenopathy.     Impression:  1.  Right breast 3:00 biopsy-proven invasive carcinoma measures up to 12 mm. 2.  Possible satellite lesion right breast 6:00 for which MRI guided biopsy is recommended if it would change clinical management. 3.  Otherwise, no MR evidence of contralateral left breast malignancy. 4.  No axillary or internal mammary adenopathy. ASSESSMENT/BI-RADS CATEGORY: Right: 4 - Suspicious Overall: 4 - Suspicious RECOMMENDATION:      - MRI-guided breast biopsy for  the right breast.     CT chest abdomen pelvis w contrast    Result Date: 10/14/2024  Narrative: CT CHEST, ABDOMEN AND PELVIS WITH IV CONTRAST INDICATION: C50.111: Malignant neoplasm of central portion of right female breast Z17.0: Estrogen receptor positive status (ER+). COMPARISON: Multiple prior chest CTs, most recently 8/28/2024. CT abdomen/pelvis 11/29/2023. TECHNIQUE: CT examination of the chest, abdomen and pelvis was performed. Multiplanar 2D reformatted images were created from the source data. This examination, like all CT scans performed in the [facility], was performed utilizing techniques to minimize radiation dose exposure, including the use of iterative reconstruction and automated exposure control. Radiation dose length product (DLP) for this visit: 551 mGy-cm IV Contrast: 100 mL of iohexol (OMNIPAQUE) Enteric Contrast: Not administered. FINDINGS: CHEST LUNGS: Mild emphysema. There is a 4 mm right lower lobe subpleural solid nodule (series 4 image 83), a 4 mm right lower lobe subpleural solid nodule (series 4 image 75), 2 mm right middle lobe perifissural nodule (series 4 image 78) and a 4 mm  left lower  lobe subpleural solid nodule (series 4 image 68), unchanged since 1/26/2023. No new or suspicious nodules. No tracheal or endobronchial lesion. PLEURA: Unremarkable. HEART/GREAT VESSELS: Heart is unremarkable for patient's age. No thoracic aortic aneurysm. MEDIASTINUM AND KERRIE: Unremarkable. CHEST WALL AND LOWER NECK: Unremarkable. ABDOMEN LIVER/BILIARY TREE: Unremarkable. GALLBLADDER: No calcified gallstones. No pericholecystic inflammatory change. SPLEEN: Unremarkable. PANCREAS: Unremarkable. ADRENAL GLANDS: Unremarkable. KIDNEYS/URETERS: Nonobstructing bilateral renal calculi. No hydronephrosis. STOMACH AND BOWEL: Colonic diverticulosis without findings of acute diverticulitis. APPENDIX: Surgically absent. ABDOMINOPELVIC CAVITY: No ascites. No pneumoperitoneum. No lymphadenopathy. VESSELS: Unremarkable for patient's age. PELVIS REPRODUCTIVE ORGANS: Post hysterectomy. URINARY BLADDER: Unremarkable. ABDOMINAL WALL/INGUINAL REGIONS: Unremarkable. BONES: Posterior fusion at L5-S1.     Impression: Scattered pulmonary nodules measuring up to 4 mm, stable since 1/26/2023, likely benign. Follow-up chest CT in January or February 2025 can be obtained to confirm 2-year stability if clinically warranted. Otherwise, no evidence of metastatic disease in the chest, abdomen or pelvis.     US guided breast biopsy right complete, Mammo post biopsy right    Addendum Date: 10/10/2024 Addendum:   ADDENDUM: PATHOLOGY RESULTS: 1) Asymmetry [A] (Right; Inner; 3 o'clock; Middle; 4 cm from nipple) The pathology results indicate a Malignant finding with invasive breast carcinoma.  Radiology and pathology are concordant. In review of the patient’s recent imaging, the right malignancy appears unifocal. Suspicious mass covers an area of 6 x 4 mm. Ultrasound of the right axilla was performed on October 8, 2024 and showed no suspicious adenopathy. Recent imaging of the contralateral left breast dated September 26, 2024 was reviewed  and shows no suspicious findings. RECOMMENDATION:      - Surgical Consultation for the right breast.      - Breast MRI for the right breast should be considered given the lobular features within the carcinoma and overall appearance of the breast parenchyma (scattered with borderline heterogeneously dense components). I personally contacted the patient via telephone discuss these results and recommendations on 10/10/2024.  Patient will be contacted by the breast health services nurse to coordinate follow-up appointment with a breast surgeon. Signed by:  Rose Marie Farnsworth MD END ADDENDUM    Result Date: 10/10/2024  Narrative: DIAGNOSIS: Abnormal mammogram INDICATION: Jenny Pereyra is a 49 y.o. female presenting for ultrasound-guided biopsy of a right breast mass at the 3:00 axis. Prior to the procedure, previous imaging was reviewed.  The procedure was explained to the patient in detail.  All questions were answered.  Written and verbal informed consent was obtained. FINDINGS: RIGHT 1) DEVELOPING ASYMMETRY [A] Mammo post biopsy right: There is a 6 mm x 4 mm x 5 mm developing asymmetry seen in the inner region of the right breast at 3 o'clock in the middle depth, 4 cm from the nipple. US guided breast biopsy right complete: Images of the right breast asymmetry in the inner region at 3 o'clock in the middle portion, 4 cm from the nipple were obtained. The patient was placed supine on the biopsy table. A core needle biopsy of a 6 mm x 4 mm x 5 mm asymmetry was performed under ultrasound guidance using a lateral approach. The skin was prepped in the usual fashion. Anesthesia was administered using 5 mL of lidocaine 1%. A 14 G core needle biopsy device was advanced. Using the device, 4 samples were collected. An open coil clip was inserted into the target area. Post-placement tomosynthesis imaging demonstrates the clip was at the site. The patient tolerated the procedure well. There were no immediate complications.       Impression:  Uncomplicated ultrasound-guided biopsy of a right breast mass at the 3:00 axis and placement of an open coil biopsy marker in expected position ASSESSMENT/BI-RADS CATEGORY: Assessment not documented. Overall: No FDA approved assessment is documented. RECOMMENDATION:      - Waiting for pathology for the right breast. Workstation ID: [id number] Signed by:  Rose Marie Farnsworth MD     POCT FeNO    Impression: Feno 8    Mammo diagnostic right w 3d and cad, US breast right limited (diagnostic)    Result Date: 10/8/2024  Narrative: DIAGNOSIS: Abnormal mammogram TECHNIQUE: Digital diagnostic mammography was performed. Computer Aided Detection (CAD) analyzed all applicable images. COMPARISONS: Prior breast imaging dated: 09/26/2024, 09/20/2023, 09/16/2022, 08/19/2021, 08/03/2020, 04/22/2019, and 04/16/2018 RELEVANT HISTORY: Family Breast Cancer History: History of breast cancer in Maternal Grandmother, Maternal Aunt. Family Medical History: Family medical history includes breast cancer in 2 relatives (maternal aunt, maternal grandmother). Personal History: Hormone history includes birth control. Surgical history includes hysterectomy. No known relevant medical history. RISK ASSESSMENT: 5 Year Tyrer-Cuzick: 0.85% 10 Year Tyrer-Cuzick: 1.84% Lifetime Tyrer-Cuzick: 9.12% TISSUE DENSITY: There are scattered areas of fibroglandular density. INDICATION: Jenny Pereyra is a 49 y.o. female presenting for abnormmal mammogram. FINDINGS: RIGHT 1) DEVELOPING ASYMMETRY [A] Mammo diagnostic right w 3d and cad: Follow-up evaluation was performed for the asymmetry seen in the inner right breast on prior screening mammogram dated September 26, 2022.  On the present examination, the area persists with suggestion of an embedded distortion on supplemental spot compression views (spot CC 23/43).  The asymmetry is located around the inner central tissue approximately 5 cm from the nipple. US breast right limited  (diagnostic): In the inner right breast at the 3:00 axis, 4 cm from the nipple, an irregular hypoechoic mass with associated posterior acoustic shadowing measures 6 x 4 x 5 mm.  Size and configuration appear to correspond with the mammographic asymmetry.  No discrete internal vascularity is seen. Additional representative images of the 2:00 and 4:00 axis revealed no suspicious findings. Targeted ultrasound of the right axillary tissue reveals a benign-appearing lymph node.     Impression:  Suspicious right breast mass at the 3:00 axis, felt to correspond with the asymmetry seen on prior screening mammogram.  Recommend ultrasound-guided biopsy. Findings and recommendations were discussed directly with the patient at the termination of the examination.  A same-day ultrasound-guided biopsy will be facilitated by the breast health services nurse. ASSESSMENT/BI-RADS CATEGORY: Right: 4B - Moderate Suspicion for Malignancy Overall: 4 - Suspicious RECOMMENDATION:      - Ultrasound-guided breast biopsy for the right breast. Workstation ID: [id number] Signed by:  Rose Marie Farnsworth MD        Imaging:     10/10/2024: US guided breast biopsy right complete, Mammo post biopsy right    Addendum Date: 10/10/2024 Addendum:   ADDENDUM: PATHOLOGY RESULTS: 1) Asymmetry [A] (Right; Inner; 3 o'clock; Middle; 4 cm from nipple) The pathology results indicate a Malignant finding with invasive breast carcinoma.  Radiology and pathology are concordant. In review of the patient’s recent imaging, the right malignancy appears unifocal. Suspicious mass covers an area of 6 x 4 mm. Ultrasound of the right axilla was performed on October 8, 2024 and showed no suspicious adenopathy. Recent imaging of the contralateral left breast dated September 26, 2024 was reviewed and shows no suspicious findings. RECOMMENDATION:      - Surgical Consultation for the right breast.      - Breast MRI for the right breast should be considered given the lobular  features within the carcinoma and overall appearance of the breast parenchyma (scattered with borderline heterogeneously dense components). I personally contacted the patient via telephone discuss these results and recommendations on 10/10/2024.  Patient will be contacted by the breast health services nurse to coordinate follow-up appointment with a breast surgeon. Signed by:  Rose Marie Farnsworth MD END ADDENDUM      FINDINGS: RIGHT 1) DEVELOPING ASYMMETRY [A] Mammo post biopsy right: There is a 6 mm x 4 mm x 5 mm developing asymmetry seen in the inner region of the right breast at 3 o'clock in the middle depth, 4 cm from the nipple. US guided breast biopsy right complete: Images of the right breast asymmetry in the inner region at 3 o'clock in the middle portion, 4 cm from the nipple were obtained. The patient was placed supine on the biopsy table. A core needle biopsy of a 6 mm x 4 mm x 5 mm asymmetry was performed under ultrasound guidance using a lateral approach. The skin was prepped in the usual fashion. Anesthesia was administered using 5 mL of lidocaine 1%. A 14 G core needle biopsy device was advanced. Using the device, 4 samples were collected. An open coil clip was inserted into the target area. Post-placement tomosynthesis imaging demonstrates the clip was at the site. The patient tolerated the procedure well. There were no immediate complications.        10/8/2024: Mammo diagnostic right w 3d and cad, US breast right limited (diagnostic)    FINDINGS: RIGHT 1) DEVELOPING ASYMMETRY [A] Mammo diagnostic right w 3d and cad: Follow-up evaluation was performed for the asymmetry seen in the inner right breast on prior screening mammogram dated September 26, 2022.  On the present examination, the area persists with suggestion of an embedded distortion on supplemental spot compression views (spot CC 23/43).  The asymmetry is located around the inner central tissue approximately 5 cm from the nipple. US breast  right limited (diagnostic): In the inner right breast at the 3:00 axis, 4 cm from the nipple, an irregular hypoechoic mass with associated posterior acoustic shadowing measures 6 x 4 x 5 mm.  Size and configuration appear to correspond with the mammographic asymmetry.  No discrete internal vascularity is seen. Additional representative images of the 2:00 and 4:00 axis revealed no suspicious findings. Targeted ultrasound of the right axillary tissue reveals a benign-appearing lymph node.         Result Date: 9/30/2024  Narrative: DIAGNOSIS: Encounter for screening mammogram for breast cancer TECHNIQUE: Digital screening mammography was performed. Computer Aided Detection (CAD) analyzed all applicable images. COMPARISONS: Prior breast imaging dated: 09/20/2023, 09/16/2022, 08/19/2021, 08/03/2020, 04/22/2019, and 04/16/2018 RELEVANT HISTORY: Family Breast Cancer History: History of breast cancer in Maternal Grandmother, Maternal Aunt. Family Medical History: Family medical history includes breast cancer in 2 relatives (maternal aunt, maternal grandmother). Personal History: Hormone history includes birth control. Surgical history includes hysterectomy. No known relevant medical history. The patient is scheduled in a reminder system for screening mammography. 8-10% of cancers will be missed on mammography. Management of a palpable abnormality must be based on clinical grounds.  Patients will be notified of their results via letter from our facility. Accredited by [employer] and FDA. RISK ASSESSMENT: 5 Year Tyrer-Cuzick: 0.79% 10 Year Tyrer-Cuzick: 1.76% Lifetime Tyrer-Cuzick: 9.17% TISSUE DENSITY: There are scattered areas of fibroglandular density. INDICATION: Jenny Pereyra is a 48 y.o. female presenting for screening mammography. FINDINGS: RIGHT 1) DEVELOPING ASYMMETRY [A]: There is a developing asymmetry seen in the inner region of the right breast in the middle depth on the CC view. Left There  are no suspicious masses, grouped microcalcifications or areas of unexplained architectural distortion. The skin and nipple areolar complex are unremarkable.      Impression: Additional imaging required. A breast health care nurse from our facility will be contacting the patient regarding the need for additional imaging. ASSESSMENT/BI-RADS CATEGORY: Left: 1 - Negative Right: 0 - Incomplete: Needs Additional Imaging Evaluation Overall: 0 - Incomplete: Needs Additional Imaging Evaluation RECOMMENDATION:      - Diagnostic mammogram at the current time for the right breast.      - Ultrasound at the current time for the right breast.      - Routine screening mammogram in 1 year for the left breast. Workstation ID: [id number] Signed by:  John Michaels MD        OB History          1    Para   1    Term   1            AB        Living             SAB        IAB        Ectopic        Multiple        Live Births   1           Obstetric Comments   Menarche age 14  Age at first live birth 19  Hysterectomy at age 29                 The following portions of the patient's history were reviewed and updated as appropriate: allergies, current medications, past family history, past medical history, past social history, past surgical history, and problem list.    Problem List:   Patient Active Problem List   Diagnosis    Lung nodule    Umbilical hernia without obstruction and without gangrene    Kidney stone on left side    Essential tremor    Insomnia    Anxiety    Gastroesophageal reflux disease without esophagitis    PONV (postoperative nausea and vomiting)    B12 deficiency    Brain lipoma    Chronic interstitial cystitis    Chronic migraine without aura    Stage 1 mild COPD by GOLD classification (HCC)    DDD (degenerative disc disease), lumbar    Excessive daytime sleepiness    Hypersomnia    Other hyperlipidemia    Gross hematuria    Carpal tunnel syndrome on left    Cubital tunnel syndrome, left     Diverticulosis    Other hemorrhoids    Malignant neoplasm of central portion of right breast in female, estrogen receptor positive (HCC)     Past Medical History:   Diagnosis Date    Anxiety     Brain lipoma 2007    Breast cancer (HCC)     COPD (chronic obstructive pulmonary disease) (HCC)     GERD (gastroesophageal reflux disease)     Kidney stone     Motion sickness     PONV (postoperative nausea and vomiting)     Tremors of nervous system     essential     Past Surgical History:   Procedure Laterality Date    APPENDECTOMY      BACK SURGERY      fusion L5 - S1    BLADDER SURGERY      BREAST BIOPSY Right 10/08/2024    300 4cmfn, open coil clip    CARPAL TUNNEL RELEASE Right 05/31/2024    COLONOSCOPY      FL RETROGRADE PYELOGRAM  12/15/2023    HYSTERECTOMY      age 29 still has lt ovary    IR RADIOFREQUENCY ABLATION      esophagus    OR CYSTO/URETERO W/LITHOTRIPSY &INDWELL STENT INSRT Left 12/15/2023    Procedure: CYSTO USCOPE W/ LASER, & STENT;  Surgeon: Jhonatan Fleming MD;  Location: [facility];  Service: Urology    OR NEUROPLASTY &/TRANSPOS MEDIAN NRV CARPAL TUNNE Right 05/31/2024    Procedure: RELEASE CARPAL TUNNEL;  Surgeon: Dorothea Mayo MD;  Location: [facility];  Service: Orthopedics    OR NEUROPLASTY &/TRANSPOS MEDIAN NRV CARPAL TUNNE Left 07/12/2024    Procedure: RELEASE CARPAL TUNNEL;  Surgeon: Dorothea Mayo MD;  [facility];  Service: Orthopedics    OR NEUROPLASTY &/TRANSPOSITION ULNAR NERVE ELBOW Right 05/31/2024    Procedure: RELEASE CUBITAL TUNNEL POSSIBLE TRANSPOSITION AND ADIPOSE FLAP;  Surgeon: Dorothea Mayo MD;  [facility];  Service: Orthopedics    OR NEUROPLASTY &/TRANSPOSITION ULNAR NERVE ELBOW Left 07/12/2024    Procedure: RELEASE CUBITAL TUNNEL;  Surgeon: Dorothea Mayo MD;  [facility];  Service: Orthopedics    SPINE SURGERY      twin laminectomy lumbar    TOTAL VAGINAL HYSTERECTOMY      AGE 29    TUBAL LIGATION      UPPER GASTROINTESTINAL  ENDOSCOPY      US GUIDED BREAST BIOPSY RIGHT COMPLETE Right 10/8/2024     Family History   Problem Relation Age of Onset    Heart attack Mother     Heart disease Mother     Kidney failure Mother     Heart failure Mother     Diabetes Father     Alcohol abuse Father     Suicide Attempts Brother     Thyroid disease Son     Breast cancer Maternal Aunt 47    Bone cancer Maternal Aunt     Cancer Maternal Aunt     No Known Problems Maternal Aunt     No Known Problems Paternal Aunt     Substance Abuse Paternal Uncle     Drug abuse Paternal Uncle     Breast cancer Maternal Grandmother         unknown age    Lung cancer Maternal Grandmother 75    Arthritis Maternal Grandmother     Cancer Maternal Grandmother     No Known Problems Maternal Grandfather     Diabetes Paternal Grandmother     Stroke Paternal Grandmother     No Known Problems Paternal Grandfather      Social History     Socioeconomic History    Marital status: /Civil Union     Spouse name: Not on file    Number of children: Not on file    Years of education: Not on file    Highest education level: Not on file   Occupational History    Not on file   Tobacco Use    Smoking status: Former     Current packs/day: 0.00     Average packs/day: 0.5 packs/day for 25.0 years (12.5 ttl pk-yrs)     Types: Cigarettes     Start date:      Quit date:      Years since quittin.8    Smokeless tobacco: Never   Vaping Use    Vaping status: Never Used   Substance and Sexual Activity    Alcohol use: Not Currently     Comment: I may drink once a month if that    Drug use: Never    Sexual activity: Yes   Other Topics Concern    Not on file   Social History Narrative    Not on file     Social Determinants of Health     Financial Resource Strain: Not on file   Food Insecurity: Not on file   Transportation Needs: Not on file   Physical Activity: Not on file   Stress: Not on file   Social Connections: Not on file   Intimate Partner Violence: Not on file   Housing Stability:  Not on file     Current Outpatient Medications   Medication Sig Dispense Refill    ACIDOPHILUS LACTOBACILLUS PO Take 1 tablet by mouth daily      Armodafinil 150 MG tablet Take 1 tablet (150 mg total) by mouth daily 30 tablet 1    bisacodyl (DULCOLAX) 5 mg EC tablet Take 2 tablets (10 mg total) by mouth in the morning (Patient taking differently: Take 10 mg by mouth if needed) 60 tablet 5    Ca, Mg, K, and Na Oxybates (Xywav) 500 MG/ML SOLN Take 4 g by mouth daily at bedtime 240 mL 2    escitalopram (Lexapro) 10 mg tablet Take 1 tablet (10 mg total) by mouth daily 100 tablet 1    fenofibrate (TRICOR) 145 mg tablet Take 1 tablet (145 mg total) by mouth daily 90 tablet 1    levalbuterol (Xopenex HFA) 45 mcg/act inhaler Inhale 1-2 puffs every 4 (four) hours as needed for wheezing 15 g 3    Multiple Vitamins-Minerals (MULTIVITAMIN ADULTS PO) Take 1 tablet by mouth daily      ondansetron (ZOFRAN) 4 mg tablet Take 1 tablet (4 mg total) by mouth every 8 (eight) hours as needed for nausea or vomiting for up to 15 days (Patient not taking: Reported on 10/14/2024) 20 tablet 0    polyethylene glycol (MIRALAX) 17 g packet Take 17 g by mouth daily (Patient taking differently: Take 17 g by mouth if needed) 510 g 5    tiotropium-olodaterol (Stiolto Respimat) 2.5-2.5 MCG/ACT inhaler Inhale 2 puffs daily 12 g 5     No current facility-administered medications for this visit.     Allergies   Allergen Reactions    Chlorhexidine Hives     Itching and red rash on body from CHG cloth    Codeine Palpitations     Other reaction(s): Other (See Comments)  Other reaction(s): Irregular heart rate  Rash,vomiting, heart palpitations    Latex Rash    Penicillin V Rash and Vomiting

## 2024-10-29 ENCOUNTER — DOCUMENTATION (OUTPATIENT)
Dept: HEMATOLOGY ONCOLOGY | Facility: CLINIC | Age: 49
End: 2024-10-29

## 2024-10-29 ENCOUNTER — OFFICE VISIT (OUTPATIENT)
Dept: PLASTIC SURGERY | Facility: CLINIC | Age: 49
End: 2024-10-29
Payer: COMMERCIAL

## 2024-10-29 ENCOUNTER — PATIENT OUTREACH (OUTPATIENT)
Dept: CASE MANAGEMENT | Facility: OTHER | Age: 49
End: 2024-10-29

## 2024-10-29 VITALS — WEIGHT: 139 LBS | HEIGHT: 62 IN | BODY MASS INDEX: 25.58 KG/M2

## 2024-10-29 DIAGNOSIS — C50.111 MALIGNANT NEOPLASM OF CENTRAL PORTION OF RIGHT BREAST IN FEMALE, ESTROGEN RECEPTOR POSITIVE (HCC): ICD-10-CM

## 2024-10-29 DIAGNOSIS — Z01.818 PRE-OP TESTING: Primary | ICD-10-CM

## 2024-10-29 DIAGNOSIS — Z17.0 MALIGNANT NEOPLASM OF CENTRAL PORTION OF RIGHT BREAST IN FEMALE, ESTROGEN RECEPTOR POSITIVE (HCC): ICD-10-CM

## 2024-10-29 DIAGNOSIS — Z87.891 HISTORY OF TOBACCO USE: ICD-10-CM

## 2024-10-29 LAB
ATRIAL RATE: 57 BPM
P AXIS: 58 DEGREES
PR INTERVAL: 138 MS
QRS AXIS: 56 DEGREES
QRSD INTERVAL: 80 MS
QT INTERVAL: 434 MS
QTC INTERVAL: 422 MS
T WAVE AXIS: 40 DEGREES
VENTRICULAR RATE: 57 BPM

## 2024-10-29 PROCEDURE — 93010 ELECTROCARDIOGRAM REPORT: CPT | Performed by: INTERNAL MEDICINE

## 2024-10-29 PROCEDURE — 99214 OFFICE O/P EST MOD 30 MIN: CPT | Performed by: PLASTIC SURGERY

## 2024-10-29 NOTE — PROGRESS NOTES
OSW received an email from the pt stating that she has both of her pre op appointments and is scheduled for surgery on 11/27. OSW responded to the email and asked her preference regarding when she would like this writer to place an outreach TC to her.

## 2024-10-29 NOTE — PROGRESS NOTES
Oncology Finance Advocacy Intake and Intervention  Oncology Finance Counselor/Advocate placed call to patient. This writer informed patient that this writer is here to assist patient with billing questions, financial assistance, payment/payment plans, quotes, copayment assistance, insurance optimization, and insurance navigation.    This writer conducted a thorough benefit review of copayment, deductible, and out of pocket cost. This information is documented below and has been reviewed with patient.     Copayment:$15 pcp / $25 specialists  Deductible: $250/ $250 remaining  Out of Pocket Cost: $99621 / $8700 remaining  Insurance optimization (Limited benefit vs self-pay): n/a  Patient assistance status: n/a  Free Drug Applications: n/a  Oral Chemo Application: n/a  BIN#:  PCN#:  GRP#:  Copay:$  Interventions:    Information above was review thoroughly with patient and patient was advise of possible assistance programs/interventions. If any question arise patient can contact this writer at below information. This information was given to patient at time of contact.      Ana Lilia Valentine  Phone:[phone]  Email: [email or web address]

## 2024-10-30 ENCOUNTER — PATIENT OUTREACH (OUTPATIENT)
Dept: CASE MANAGEMENT | Facility: OTHER | Age: 49
End: 2024-10-30

## 2024-10-30 NOTE — TELEPHONE ENCOUNTER
Patient paid the forms and she is all good with the forms.  Kallie faxed them over as well to Human Resources

## 2024-10-30 NOTE — PROGRESS NOTES
OSW received SW referral for [facility]. OSW will continue to follow for acceptance.     OSW sent an email to the pt regarding Cleaning for a Reason, as she asked this writer if there is any resource to assist with cleaning after her surgery. OSW explained that she can apply right on their website.

## 2024-10-31 ENCOUNTER — NUTRITION (OUTPATIENT)
Dept: NUTRITION | Facility: CLINIC | Age: 49
End: 2024-10-31

## 2024-10-31 ENCOUNTER — PATIENT MESSAGE (OUTPATIENT)
Dept: PLASTIC SURGERY | Facility: CLINIC | Age: 49
End: 2024-10-31

## 2024-10-31 DIAGNOSIS — Z71.3 NUTRITIONAL COUNSELING: Primary | ICD-10-CM

## 2024-10-31 NOTE — PATIENT INSTRUCTIONS
"Nutrition Rx & Recommendations:  Diet: High Protein, Low Fat, High Fiber Diet, Lactose-free (or low-lactose if tolerated)  Small, frequent meals/snacks may be easier to tolerate than 3 large daily meals.  Aim for 5-6 small meals per day (every 2-3 hours).  Include protein at all meals/snacks.  Include a variety of foods (as tolerated/allowed by diet).  Follow a Cancer Preventative Nutrition Pattern: colorful, plant-based, low-fat, avoid added sugars, limit alcohol, include high fiber foods, limit processed meats, limit red meat, choose lean protein sources, use low-fat cooking methods, balance calories with physical activity, avoid excessive weight gain throughout life.  Incorporate physical activity as able/allowed.  Stay hydrated by sipping fluids of choice/tolerance throughout the day.  Refer to Eating Hints book for other meal/snack ideas and symptom management.  For constipation: drink plenty of fluids (>64 oz/day); drink hot liquids; eat high-fiber foods as tolerated (whole grains, beans, peas, nuts, seeds, fruits, vegetables, etc); increase physical activity as tolerated.  Avoid increasing fiber intake too quickly, add fiber into your diet slowly; keep a record of your bowel movements (see pages 13-14 in you Eating Hints book).  Consider trying \"Apple/Prune Sauce\" recipe on page 14 of your Eating Hints booklet.  Be sure to drink 8 oz of water after taking 1-2 tbsp of the mixture before bedtime.  Weigh yourself regularly. If you notice weight loss, make an effort to increase your daily food/calorie intake. If you continue to notice loss after these efforts, reach out to your dietitian to establish a plan to stabilize weight.  Consider stopping all high dose antioxidant supplements (vitamin A, C, E, selenium, etc.).  Refer to [facility] \"About Herbs\" website for more information on the safety of supplements.  Try high protein smoothies for breakfast - see recipes provided, incorporate fruits and veggies into " these  Choose a lean (low-fat) protein source at each meal and snack: chicken, turkey, fish, seafood, Greek yogurt, nuts, nut butter, beans, lentils, legumes, etc. (See Protein Dense Foods handout previously provided)  Aim for 55-65 oz of fluid daily  Aim for 25-30 grams of fiber daily  Gradually increase your fiber intake to avoid GI upset, stay well hydrated as you are increasing your fiber intake    Follow Up Plan: 11/21 at 11am by phone  Recommend Referral to Other Providers: none at this time

## 2024-11-01 ENCOUNTER — ANESTHESIA EVENT (OUTPATIENT)
Dept: PERIOP | Facility: HOSPITAL | Age: 49
End: 2024-11-01
Payer: COMMERCIAL

## 2024-11-01 NOTE — PROGRESS NOTES
[facility]   Plastic and Reconstructive Surgery   [address] [place] [id number]         Assessment & Plan  Malignant neoplasm of central portion of right breast in female, estrogen receptor positive (HCC)    Jenny Pereyra is a 48 y/o female with a PMH of asthma, former tobacco use, mild COPD, diverticulosis, degenerative disc disease who presents with a new diagnosis of ER+/SD+/HER2- invasive breast cancer of the right breast.  At her last visit, the patient noted she was interested in pursuing bilateral mastectomy.  Today she confirms that she is still planning to pursue bilateral mastectomy.  We reviewed her goals for reconstruction.  The patient notes that she is not interested in being any larger than she is now.  She is interested in a natural appearing reconstruction that fits her body. She does not want to be too large.  She is not interested in keeping her nipples. The patient will likely require adjuvant endocrine therapy based on the ER+ subtype of her cancer and may require post mastectomy radiation.  She may require adjuvant chemotherapy.    At our last appointment the patient noted she was most interested in autologous tissue reconstruction and was not fond of the idea of having implants in her body at that time.  Since our last visit the patient notes that she had the opportunity to speak to a former breast cancer patient who underwent implant based reconstruction and had more time to think about breast implants in general.  She notes that she is much more comfortable with the idea of implants today.      To that effect, we then reviewed the process of implant-based reconstruction including placement of tissue expanders at the time of mastectomies , use of ADM and the timeline for expansion and implant exchange implant exchange.  We also reviewed the risks and benefits of implant-based reconstruction which include but are not limited to shorter surgery, shorter hospital stay, shorter  recovery, no need for donor site, no risk of microvascular complications, and more customizable size. Risks were discussed including bleeding, infection (ranging from superficial skin infection to prosthetic infection requiring explantation), injury to surrounding structures, poor scarring, wound non-healing, mastectomy skin flap necrosis, need for prolonged wound care, implant exposure necessitating explantation, chronic pain/muscle spasms, numbness, need for further unanticipated surgery, seroma, capsular contracture, cosmetic deformity, asymmetry, malposition, animation deformity, silent rupture (for silicone implants), device failure, breast implant associated anaplastic large cell lymphoma (HELEN-ALCL), VTE, stroke, MI, and death. Each of these risks were discussed and explained to the patient. If silicone implants are used they would have to be screened for silent rupture which is ultrasound/MRI @ 5-6 years then every 2-3 years after that.     We then reviewed the process, technique and timeline of autologous reconstruction, which in this patient's case to avoid delay of her cancer surgery due to coordination of surgical teams would have to be performed in a staged fashion, placing expanders at the time of her mastectomies. We discussed the advantages of autologous reconstruction are a more natural appearing breast, less maintenance of implants and less risk of implant related issues, longevity of the reconstruction, and more tolerance of radiation.  Risks were discussed including bleeding, infection, injury to surrounding structures (pleura, lung, intra-abdominal structures), poor scarring, wound non-healing, mastectomy skin flap necrosis, need for prolonged wound care, delay of adjuvant therapy, chronic pain/muscle spasms, numbness, need for further unanticipated surgery/return to OR for perfusion related issues, partial or total flap loss, cosmetic deformity at the breast or abdomen, asymmetry, malposition of  breast or umbilicus, fat necrosis, seroma, bulge or hernia, loss of the umbilicus, sensory changes at the lower abdomen, VTE, stroke, MI, and death.     Patient and  took the opportunity to handle and inspect sample tissue expanders and silicone implants during the visit.    Patient and  expressed their understanding of the nature of each type of reconstruction, the risks and benefits associated with each type of reconstruction, the timeline associated with each type of reconstruction, and the recovery associated with each type of reconstruction.    Patient and  note that after much consideration and discussion between them, they feel that they would like to avoid the more intense and lengthy recovery associated with autologous breast reconstruction.  The patient asked if she went with implant based reconstruction if she could still have autologous reconstruction sometime in the future if she changes her mind.  I discussed with the patient that having implant based reconstruction does not preclude her from having autologous based reconstruction in the future.  If she does end up disliking her implant-based reconstruction and would like to pursue autologous breast reconstruction, as long as she has adequate abdominal tissue she could have autologous reconstruction.    Based on the shorter surgery times, shorter recovery times, and lack of donor site associated with implant-based reconstruction, and the knowledge that if she were to be unhappy with the implant-based reconstruction, autologous reconstruction can still be performed, she is interested in pursuing implant-based reconstruction at this time.    Given that this was a change in opinion from our previous visit, I confirmed with her that she would like to pursue implant-based reconstruction.  She again walked me through her very logical thought process and affirmed that she feels implant-based reconstruction is best for her at this  time.    The patient elects to pursue implant-based reconstruction.  Discussed with the patient that we will plan to place tissue expanders and ADM at the time of her mastectomies.   The surgery time for bilateral reconstruction is 3.5 - 4 hours.  She will likely spend the evening in the hospital then go home the next day and have at least 1 drain in each breast. The drains would stay in until outputs decrease to < 25 mL per day for 2days, and this is usually 2-3 weeks but can be longer. The patient would then return to clinic on a weekly basis to have the tissue expanders filled to the desired volume and then a subsequent 4 to 6 week recovery period, depending on need for adjuvant therapy. The expanders would then be exchanged to permanent implants in a second procedure as an outpatient and requires no drains.     Finally, we discussed that as standard procedure for any patients with a history of tobacco or nicotine use, we obtain a urine cotinine test prior to surgery to ensure no nicotine use is occurring at the time of reconstruction.  Because nicotine use puts patients at a high risk for surgical complications we like be sure prior to proceeding with reconstruction so that we can optimize the safety and outcome of her procedure.  We discussed that if the urine cotinine test indicates active nicotine use, she will still have her mastectomies performed as they are currently scheduled but the reconstructive portion of her surgery will be cancelled.    Patient expressed understanding and agrees to perform the urine cotinine test.      History of Present Illness   Jenny Pereyra is a 49 y.o. female with a new diagnosis of right breast .  Since our last visit the patient notes that she had the opportunity to speak to a former breast cancer patient who underwent implant based reconstruction and had more time to think about breast implants in general.  She notes that she is much more comfortable with the idea of  implants today. She would like to further discuss implant based reconstruction today.      Review of Systems   Constitutional:  Negative for chills and fever.   HENT:  Negative for sore throat.    Respiratory:  Negative for cough, chest tightness and shortness of breath.    Cardiovascular:  Negative for chest pain.   Neurological:  Negative for weakness, light-headedness and headaches.   Psychiatric/Behavioral:  Negative for confusion.        Physical Exam   Alert, oriented, no acute distress  Breathing comfortably on room air  Bilateral breasts with grade 2 ptosis.  Deflated upper pole.  Right breast 1 to 2 cm more ptotic than the left.    Right breast base width: 13cm  Left breast base width: 12 cm  No appreciable skin changes.  No palpable masses.  Skin is healthy and elastic.

## 2024-11-01 NOTE — PRE-PROCEDURE INSTRUCTIONS
Pre-Surgery Instructions:   Medication Instructions    ACIDOPHILUS LACTOBACILLUS PO Avoid 1 week prior to surgery      Armodafinil 150 MG tablet Do not take day of surgery; continue as prescribed excluding DOS     bisacodyl (DULCOLAX) 5 mg EC tablet Take Day of Surgery; unless usually taken at night - prn    Ca, Mg, K, and Na Oxybates (Xywav) 500 MG/ML SOLN Do not take day of surgery; continue as prescribed excluding DOS - HS    escitalopram (Lexapro) 10 mg tablet Take Day of Surgery; unless usually taken at night     fenofibrate (TRICOR) 145 mg tablet Take Day of Surgery; unless usually taken at night     polyethylene glycol (MIRALAX) 17 g packet Do not take day of surgery; continue as prescribed excluding DOS     tiotropium-olodaterol (Stiolto Respimat) 2.5-2.5 MCG/ACT inhaler Take Day of Surgery; unless usually taken at night     Medication instructions for day surgery reviewed. Please use only a sip of water to take your instructed medications. Avoid all over the counter vitamins, supplements and NSAIDS for one week prior to surgery per anesthesia guidelines. Tylenol is ok to take as needed.     You will receive a call one business day prior to surgery with an arrival time and hospital directions. If your surgery is scheduled on a Monday, the hospital will be calling you on the Friday prior to your surgery. If you have not heard from anyone by 8pm, please call the hospital supervisor through the hospital  at [phone]. ([employer] [phone] or [employer] [phone]).    Do not eat or drink anything after midnight the night before your surgery, including candy, mints, lifesavers, or chewing gum. Do not drink alcohol 24hrs before your surgery. Try not to smoke at least 24hrs before your surgery.       Follow the pre surgery showering instructions as listed in the “My Surgical Experience Booklet” or otherwise provided by your surgeon's office. Do not use a blade to shave the surgical area 1 week before  surgery. It is okay to use a clean electric clippers up to 24 hours before surgery. Do not apply any lotions, creams, including makeup, cologne, deodorant, or perfumes after showering on the day of your surgery. Do not use dry shampoo, hair spray, hair gel, or any type of hair products.     No contact lenses, eye make-up, or artificial eyelashes. Remove nail polish, including gel polish, and any artificial, gel, or acrylic nails if possible. Remove all jewelry including rings and body piercing jewelry.     Wear causal clothing that is easy to take on and off. Consider your type of surgery.    Keep any valuables, jewelry, piercings at home. Please bring any specially ordered equipment (sling, braces) if indicated.    Arrange for a responsible person to drive you to and from the hospital on the day of your surgery. Please confirm the visitor policy for the day of your procedure when you receive your phone call with an arrival time.     Call the surgeon's office with any new illnesses, exposures, or additional questions prior to surgery.    Please reference your “My Surgical Experience Booklet” for additional information to prepare for your upcoming surgery.

## 2024-11-01 NOTE — H&P (VIEW-ONLY)
[facility]   Plastic and Reconstructive Surgery   [address] [place] [id number]         Assessment & Plan  Malignant neoplasm of central portion of right breast in female, estrogen receptor positive (HCC)    Jenny Pereyra is a 48 y/o female with a PMH of asthma, former tobacco use, mild COPD, diverticulosis, degenerative disc disease who presents with a new diagnosis of ER+/VA+/HER2- invasive breast cancer of the right breast.  At her last visit, the patient noted she was interested in pursuing bilateral mastectomy.  Today she confirms that she is still planning to pursue bilateral mastectomy.  We reviewed her goals for reconstruction.  The patient notes that she is not interested in being any larger than she is now.  She is interested in a natural appearing reconstruction that fits her body. She does not want to be too large.  She is not interested in keeping her nipples. The patient will likely require adjuvant endocrine therapy based on the ER+ subtype of her cancer and may require post mastectomy radiation.  She may require adjuvant chemotherapy.    At our last appointment the patient noted she was most interested in autologous tissue reconstruction and was not fond of the idea of having implants in her body at that time.  Since our last visit the patient notes that she had the opportunity to speak to a former breast cancer patient who underwent implant based reconstruction and had more time to think about breast implants in general.  She notes that she is much more comfortable with the idea of implants today.      To that effect, we then reviewed the process of implant-based reconstruction including placement of tissue expanders at the time of mastectomies , use of ADM and the timeline for expansion and implant exchange implant exchange.  We also reviewed the risks and benefits of implant-based reconstruction which include but are not limited to shorter surgery, shorter hospital stay, shorter  recovery, no need for donor site, no risk of microvascular complications, and more customizable size. Risks were discussed including bleeding, infection (ranging from superficial skin infection to prosthetic infection requiring explantation), injury to surrounding structures, poor scarring, wound non-healing, mastectomy skin flap necrosis, need for prolonged wound care, implant exposure necessitating explantation, chronic pain/muscle spasms, numbness, need for further unanticipated surgery, seroma, capsular contracture, cosmetic deformity, asymmetry, malposition, animation deformity, silent rupture (for silicone implants), device failure, breast implant associated anaplastic large cell lymphoma (HELEN-ALCL), VTE, stroke, MI, and death. Each of these risks were discussed and explained to the patient. If silicone implants are used they would have to be screened for silent rupture which is ultrasound/MRI @ 5-6 years then every 2-3 years after that.     We then reviewed the process, technique and timeline of autologous reconstruction, which in this patient's case to avoid delay of her cancer surgery due to coordination of surgical teams would have to be performed in a staged fashion, placing expanders at the time of her mastectomies. We discussed the advantages of autologous reconstruction are a more natural appearing breast, less maintenance of implants and less risk of implant related issues, longevity of the reconstruction, and more tolerance of radiation.  Risks were discussed including bleeding, infection, injury to surrounding structures (pleura, lung, intra-abdominal structures), poor scarring, wound non-healing, mastectomy skin flap necrosis, need for prolonged wound care, delay of adjuvant therapy, chronic pain/muscle spasms, numbness, need for further unanticipated surgery/return to OR for perfusion related issues, partial or total flap loss, cosmetic deformity at the breast or abdomen, asymmetry, malposition of  breast or umbilicus, fat necrosis, seroma, bulge or hernia, loss of the umbilicus, sensory changes at the lower abdomen, VTE, stroke, MI, and death.     Patient and  took the opportunity to handle and inspect sample tissue expanders and silicone implants during the visit.    Patient and  expressed their understanding of the nature of each type of reconstruction, the risks and benefits associated with each type of reconstruction, the timeline associated with each type of reconstruction, and the recovery associated with each type of reconstruction.    Patient and  note that after much consideration and discussion between them, they feel that they would like to avoid the more intense and lengthy recovery associated with autologous breast reconstruction.  The patient asked if she went with implant based reconstruction if she could still have autologous reconstruction sometime in the future if she changes her mind.  I discussed with the patient that having implant based reconstruction does not preclude her from having autologous based reconstruction in the future.  If she does end up disliking her implant-based reconstruction and would like to pursue autologous breast reconstruction, as long as she has adequate abdominal tissue she could have autologous reconstruction.    Based on the shorter surgery times, shorter recovery times, and lack of donor site associated with implant-based reconstruction, and the knowledge that if she were to be unhappy with the implant-based reconstruction, autologous reconstruction can still be performed, she is interested in pursuing implant-based reconstruction at this time.    Given that this was a change in opinion from our previous visit, I confirmed with her that she would like to pursue implant-based reconstruction.  She again walked me through her very logical thought process and affirmed that she feels implant-based reconstruction is best for her at this  time.    The patient elects to pursue implant-based reconstruction.  Discussed with the patient that we will plan to place tissue expanders and ADM at the time of her mastectomies.   The surgery time for bilateral reconstruction is 3.5 - 4 hours.  She will likely spend the evening in the hospital then go home the next day and have at least 1 drain in each breast. The drains would stay in until outputs decrease to < 25 mL per day for 2days, and this is usually 2-3 weeks but can be longer. The patient would then return to clinic on a weekly basis to have the tissue expanders filled to the desired volume and then a subsequent 4 to 6 week recovery period, depending on need for adjuvant therapy. The expanders would then be exchanged to permanent implants in a second procedure as an outpatient and requires no drains.     Finally, we discussed that as standard procedure for any patients with a history of tobacco or nicotine use, we obtain a urine cotinine test prior to surgery to ensure no nicotine use is occurring at the time of reconstruction.  Because nicotine use puts patients at a high risk for surgical complications we like be sure prior to proceeding with reconstruction so that we can optimize the safety and outcome of her procedure.  We discussed that if the urine cotinine test indicates active nicotine use, she will still have her mastectomies performed as they are currently scheduled but the reconstructive portion of her surgery will be cancelled.    Patient expressed understanding and agrees to perform the urine cotinine test.      History of Present Illness   Jenny Pereyra is a 49 y.o. female with a new diagnosis of right breast .  Since our last visit the patient notes that she had the opportunity to speak to a former breast cancer patient who underwent implant based reconstruction and had more time to think about breast implants in general.  She notes that she is much more comfortable with the idea of  implants today. She would like to further discuss implant based reconstruction today.      Review of Systems   Constitutional:  Negative for chills and fever.   HENT:  Negative for sore throat.    Respiratory:  Negative for cough, chest tightness and shortness of breath.    Cardiovascular:  Negative for chest pain.   Neurological:  Negative for weakness, light-headedness and headaches.   Psychiatric/Behavioral:  Negative for confusion.        Physical Exam   Alert, oriented, no acute distress  Breathing comfortably on room air  Bilateral breasts with grade 2 ptosis.  Deflated upper pole.  Right breast 1 to 2 cm more ptotic than the left.    Right breast base width: 13cm  Left breast base width: 12 cm  No appreciable skin changes.  No palpable masses.  Skin is healthy and elastic.

## 2024-11-01 NOTE — ASSESSMENT & PLAN NOTE
Jenny Pereyra is a 48 y/o female with a PMH of asthma, former tobacco use, mild COPD, diverticulosis, degenerative disc disease who presents with a new diagnosis of ER+/AL+/HER2- invasive breast cancer of the right breast.  At her last visit, the patient noted she was interested in pursuing bilateral mastectomy.  Today she confirms that she is still planning to pursue bilateral mastectomy.  We reviewed her goals for reconstruction.  The patient notes that she is not interested in being any larger than she is now.  She is interested in a natural appearing reconstruction that fits her body. She does not want to be too large.  She is not interested in keeping her nipples. The patient will likely require adjuvant endocrine therapy based on the ER+ subtype of her cancer and may require post mastectomy radiation.  She may require adjuvant chemotherapy.    At our last appointment the patient noted she was most interested in autologous tissue reconstruction and was not fond of the idea of having implants in her body at that time.  Since our last visit the patient notes that she had the opportunity to speak to a former breast cancer patient who underwent implant based reconstruction and had more time to think about breast implants in general.  She notes that she is much more comfortable with the idea of implants today.      To that effect, we then reviewed the process of implant-based reconstruction including placement of tissue expanders at the time of mastectomies , use of ADM and the timeline for expansion and implant exchange implant exchange.  We also reviewed the risks and benefits of implant-based reconstruction which include but are not limited to shorter surgery, shorter hospital stay, shorter recovery, no need for donor site, no risk of microvascular complications, and more customizable size. Risks were discussed including bleeding, infection (ranging from superficial skin infection to prosthetic infection  requiring explantation), injury to surrounding structures, poor scarring, wound non-healing, mastectomy skin flap necrosis, need for prolonged wound care, implant exposure necessitating explantation, chronic pain/muscle spasms, numbness, need for further unanticipated surgery, seroma, capsular contracture, cosmetic deformity, asymmetry, malposition, animation deformity, silent rupture (for silicone implants), device failure, breast implant associated anaplastic large cell lymphoma (HELEN-ALCL), VTE, stroke, MI, and death. Each of these risks were discussed and explained to the patient. If silicone implants are used they would have to be screened for silent rupture which is ultrasound/MRI @ 5-6 years then every 2-3 years after that.     We then reviewed the process, technique and timeline of autologous reconstruction, which in this patient's case to avoid delay of her cancer surgery due to coordination of surgical teams would have to be performed in a staged fashion, placing expanders at the time of her mastectomies. We discussed the advantages of autologous reconstruction are a more natural appearing breast, less maintenance of implants and less risk of implant related issues, longevity of the reconstruction, and more tolerance of radiation.  Risks were discussed including bleeding, infection, injury to surrounding structures (pleura, lung, intra-abdominal structures), poor scarring, wound non-healing, mastectomy skin flap necrosis, need for prolonged wound care, delay of adjuvant therapy, chronic pain/muscle spasms, numbness, need for further unanticipated surgery/return to OR for perfusion related issues, partial or total flap loss, cosmetic deformity at the breast or abdomen, asymmetry, malposition of breast or umbilicus, fat necrosis, seroma, bulge or hernia, loss of the umbilicus, sensory changes at the lower abdomen, VTE, stroke, MI, and death.     Patient and  took the opportunity to handle and inspect  sample tissue expanders and silicone implants during the visit.    Patient and  expressed their understanding of the nature of each type of reconstruction, the risks and benefits associated with each type of reconstruction, the timeline associated with each type of reconstruction, and the recovery associated with each type of reconstruction.    Patient and  note that after much consideration and discussion between them, they feel that they would like to avoid the more intense and lengthy recovery associated with autologous breast reconstruction.  The patient asked if she went with implant based reconstruction if she could still have autologous reconstruction sometime in the future if she changes her mind.  I discussed with the patient that having implant based reconstruction does not preclude her from having autologous based reconstruction in the future.  If she does end up disliking her implant-based reconstruction and would like to pursue autologous breast reconstruction, as long as she has adequate abdominal tissue she could have autologous reconstruction.    Based on the shorter surgery times, shorter recovery times, and lack of donor site associated with implant-based reconstruction, and the knowledge that if she were to be unhappy with the implant-based reconstruction, autologous reconstruction can still be performed, she is interested in pursuing implant-based reconstruction at this time.    Given that this was a change in opinion from our previous visit, I confirmed with her that she would like to pursue implant-based reconstruction.  She again walked me through her very logical thought process and affirmed that she feels implant-based reconstruction is best for her at this time.    The patient elects to pursue implant-based reconstruction.  Discussed with the patient that we will plan to place tissue expanders and ADM at the time of her mastectomies.   The surgery time for bilateral  reconstruction is 3.5 - 4 hours.  She will likely spend the evening in the hospital then go home the next day and have at least 1 drain in each breast. The drains would stay in until outputs decrease to < 25 mL per day for 2days, and this is usually 2-3 weeks but can be longer. The patient would then return to clinic on a weekly basis to have the tissue expanders filled to the desired volume and then a subsequent 4 to 6 week recovery period, depending on need for adjuvant therapy. The expanders would then be exchanged to permanent implants in a second procedure as an outpatient and requires no drains.     Finally, we discussed that as standard procedure for any patients with a history of tobacco or nicotine use, we obtain a urine cotinine test prior to surgery to ensure no nicotine use is occurring at the time of reconstruction.  Because nicotine use puts patients at a high risk for surgical complications we like be sure prior to proceeding with reconstruction so that we can optimize the safety and outcome of her procedure.  We discussed that if the urine cotinine test indicates active nicotine use, she will still have her mastectomies performed as they are currently scheduled but the reconstructive portion of her surgery will be cancelled.    Patient expressed understanding and agrees to perform the urine cotinine test.

## 2024-11-02 LAB
GENE DIS ANL INTERP-IMP: NORMAL
INTERPRETATION: NORMAL

## 2024-11-04 ENCOUNTER — TELEPHONE (OUTPATIENT)
Dept: GENETICS | Facility: CLINIC | Age: 49
End: 2024-11-04

## 2024-11-04 ENCOUNTER — PATIENT OUTREACH (OUTPATIENT)
Dept: HEMATOLOGY ONCOLOGY | Facility: CLINIC | Age: 49
End: 2024-11-04

## 2024-11-04 NOTE — PROGRESS NOTES
I reached out and spoke with Jenny to follow up and to review for any changes in barriers to care and offer supportive services as needed.    Barriers noted previously; fmla and pto time.  Looks like the surgeons office completed fmla paper work today for patient 11/4.     Current barriers and interventions provided: no current barriers.  However patient is concerned about bills and paying for everything- she mentioned she's not behind on anything and doesn't have any outstanding bills.  Mentioned to patient that if this ever becomes an issue to please reach out to me so we can put in a referral. Patient mentioned that my number popped up as something else so I provided her with my number and let you know she can always call me and leave a message that #[phone] is my direct line.     Bases on individual needs I will follow up with them in 2-3 weeks.   I have provided my direct contact information and welcome them to contact me if their needs as discussed above change. They were appreciative for the call.

## 2024-11-05 ENCOUNTER — TELEPHONE (OUTPATIENT)
Dept: OTHER | Facility: HOSPITAL | Age: 49
End: 2024-11-05

## 2024-11-05 ENCOUNTER — APPOINTMENT (OUTPATIENT)
Dept: LAB | Age: 49
End: 2024-11-05
Payer: COMMERCIAL

## 2024-11-05 ENCOUNTER — TELEPHONE (OUTPATIENT)
Dept: PLASTIC SURGERY | Facility: CLINIC | Age: 49
End: 2024-11-05

## 2024-11-05 DIAGNOSIS — Z01.818 PRE-OP TESTING: ICD-10-CM

## 2024-11-05 DIAGNOSIS — Z87.891 HISTORY OF TOBACCO USE: ICD-10-CM

## 2024-11-05 PROCEDURE — 80307 DRUG TEST PRSMV CHEM ANLYZR: CPT

## 2024-11-05 NOTE — TELEPHONE ENCOUNTER
Spoke with patient after she spoke with Dr. Brito regarding positive home nicotine test 1 week before surgery. Pt called upset and explained she was frustrated that she had mentioned the positive test because our portion of surgery is being cancelled. I explained to the patient that she should not feel bad about being honest about the positive test. I explained that her health is very important to us and that it was pertinent for her to be honest. I explained that wound healing and severe complications were a real possibility with exposure to nicotine, especially 1 week out from surgery.     Pt verbalized she was very upset (rightfully so) and wanted to see if we would reconsider cancelling if she has the Nicotine Metabolite done in the lab vs taking the positive home test. I let her know I had discussed with Dr. Brito and per our protocol, it was a liability to ignore the positive test and go through with surgery. Pt let me know she wanted to do the lab anyway. I let her know she was more than welcome to do the test but per Dr. Brito, the result would remain the same. Pt was put on hold to discuss with Dr Brito. When call was resumed pt spoke with Cara Cantrell MA. Cara reiterated that Dr. Brito's stance was not changing and if she wanted to proceed with reconstruction, it would have to wait for  a later date. Pt let us know she would be looking for a second opinion and disconnected the line. Pt does have the office number and my email if she were to have any further questions.

## 2024-11-05 NOTE — TELEPHONE ENCOUNTER
Patient returned a phone call and was asking if even if she has a negative test at home for nicotine, is she able to complete lab order to reconfirm this negative?  Since she is exposed to her mother who smokes she says that may have caused the positive test.    I explained unfortunately with a positive test they won't be able to review the negative result. Patient requested to speak with in office MA to discuss further about keeping the surgical procedure.     I transferred call Yonny.

## 2024-11-05 NOTE — TELEPHONE ENCOUNTER
I spoke to the patient today and had a lengthy phone call regarding her concern for positive home nicotine urine tests. Patient contacted our office because she had taken multiple at-home nicotine urine tests that resulted positive. Pt stated she has continued to not use tobacco products, but lives in close contact with her mother who smokes tobacco. Discussed with the patient that given her at-home tests are resulting positive, unfortunately, this means that nicotine is within her system, and she is at risk for poor wound healing and other surgical complications. The risks of nicotine exposure and healing complications were reviewed with her at her pre-operative appt on 10/29/2024. We Re-discussed these risks during this phone call. I informed the patient that unfortunately, it is not safe to perform breast reconstruction at this time and we will be cancelling the reconstructive portion of her surgery. Discussed with the patient that delayed breast reconstruction is an option that we may absolutely revisit at a later date. Patient was tearful and understandably frustrated.  Also discussed with the patient that given she has tested positive within one week of surgery, no additional testing (positive or negative) will change the decision to cancel the reconstructive portion of her surgery. All of her questions were answered at this time. Verified with the patient that we are only cancelling the reconstructive part of her case and that her mastectomies with Dr. Cardarelli will proceed as scheduled on 11/12/2024. Will contact our office staff to assist in transfer of [place] and associated paper works to Dr. Cardarelli's office.

## 2024-11-06 ENCOUNTER — PATIENT OUTREACH (OUTPATIENT)
Dept: CASE MANAGEMENT | Facility: OTHER | Age: 49
End: 2024-11-06

## 2024-11-06 NOTE — PROGRESS NOTES
OSW received a email from pt asking this writer to please call her. OSW placed outreach TC this morning and received her VM. Detailed VM was provided.     ADDENDUM:  OSW received a return TC from the patient. She reports that she is very upset. She continued to speak about her upcoming surgery next Tuesday, where she was supposed to have reconstruction. 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 Pts mother is on hospice. Pt states that she took a urine test for nicotine prior to having her surgery next week. She informed Dr. Brito of the positive results. She reports that she was informed that due to the positive results reconstruction would not take place. Pt states she is very upset and states that she has not smoked in over 2 years. OSW provided significant support and offered to complete a grievance on her behalf. Pt states that she filed a complaint through the phone, however was agreeable to this writer completing a written grievance.    This writer completed it and emailed it to my manager, Jonathan Rayo.   Pt states that if she has the surgery she without reconstruction she will need therapy for not having any breasts. This writer reassured her that if this occurs there is support available. Pt states that she did call Dr. Cardarelli's office and states once she is finished with surgery she will call her to discuss her surgery.   OSW offered to check in with her next week and she was agreeable. OSW encouraged that she call with any other questions/concerns.

## 2024-11-07 ENCOUNTER — HOME HEALTH ADMISSION (OUTPATIENT)
Dept: HOME HEALTH SERVICES | Facility: HOME HEALTHCARE | Age: 49
End: 2024-11-07
Payer: COMMERCIAL

## 2024-11-07 ENCOUNTER — PATIENT OUTREACH (OUTPATIENT)
Dept: CASE MANAGEMENT | Facility: OTHER | Age: 49
End: 2024-11-07

## 2024-11-07 LAB
COTININE, URINE: NEGATIVE NG/ML
Lab: NORMAL

## 2024-11-07 NOTE — PROGRESS NOTES
OSW received an incoming email from the pt stating that her nicotine test came back negative. This writer informed her that the grievance was forwarded to my manager, who sent it to the appropriate person. She also has some insurance questions. OSW provided Ana Lilia Valentine's contact information. She thanked this writer for the assistance.     ADDENDUM:  OSW received a TC from pt this afternoon. She states that she called Dr. Brito's office today regarding him completing her reconstruction next week. She stated that she did another urine test for nicotine, through the lab at [facility], and her results were negative. She communicated this to the staff at plastic surgery today. She was told that he will call her back to discuss. She reports that it was the first time she ever completed that type of test and she could have done something wrong. She expressed that here is no way that she can have a positive and a negative result in one day. OSW provided supportive listening and support. She expressed that the whole situation has caused her a great deal of stress. She would just like to put it behind her. She states that last night she has a panic attack while in the shower. Her  was able to assist her. As she expressed she suffers from tremors. OSW expressed how sorry I am to hear this.   She stated that she wanted to keep this writer posted. OSW encouraged her to call if she needs anything. OSW will continue to follow. .

## 2024-11-07 NOTE — TELEPHONE ENCOUNTER
Update on Telephone encounter from 11/5.    During our last conversation on 11/5 Jenny informed our office that she had taken a home urine nicotine test and it came back positive.  She also noted taking an additional home nicotine test that came back negative. During that discussion, in an abundance of caution I discussed with Jenny that we would cancel the reconstructive portion of her case to avoid nicotine related complications and that we could revisit reconstruction in a delayed fashion. That same evening after our conversation, Jenny took a urine cotinine test with [facility].    Today, her urine cotinine test from 11/5 resulted negative. I reached out to Jenny today to further discuss her breast reconstruction.  I explained to Jenny that I have taken time to reconsider all of the information we have and discussed my thought process with her. While I said to her that no additional testing would change the circumstance this was in an abundance of caution.  I acknowledge that I do not know the validity of the at home nicotine tests.  She resulted both positive and negative.  I do know that we use [facility] urine cotinine as our standard for nicotine screening. As such I trust a negative laboratory test.  Additionally, we discussed the criteria that she would have to meet to have her delayed reconstruction scheduled which would be her word that she is not smoking and a negative laboratory urine cotinine test.  She meets both of those criteria today. Given that she meets standard criteria for surgery, it would best for her and her surgical outcomes to receive immediate reconstruction with tissue expanders rather than delayed reconstruction.    I expressed that I believe it is safe and reasonable to proceed with immediate reconstruction at the time of her mastectomies as originally planned for Tuesday if she would like to proceed.  Patient expressed that she would like to proceed to immediate  reconstruction.  We will reschedule the reconstructive portion of the case.    We again reviewed the risk of smoking and nicotine use including but not limited to increased risk of wound infections, tissue death, delayed wound healing, implant loss or failure and anesthesia related complications.    Jenny expressed understanding and wishes to proceed.  She affirms that she is not actively smoking.

## 2024-11-09 DIAGNOSIS — G47.10 HYPERSOMNIA: ICD-10-CM

## 2024-11-10 ENCOUNTER — TELEPHONE (OUTPATIENT)
Dept: SURGICAL ONCOLOGY | Facility: CLINIC | Age: 49
End: 2024-11-10

## 2024-11-11 RX ORDER — ARMODAFINIL 150 MG/1
150 TABLET ORAL DAILY
Qty: 30 TABLET | Refills: 2 | Status: SHIPPED | OUTPATIENT
Start: 2024-11-11

## 2024-11-11 NOTE — PROGRESS NOTES
Outpatient Oncology Nutrition Consultation   Type of Consult: Follow Up  Care Location: Telephone Call  Nutrition Assessment:   Oncology Diagnosis & Treatments: Breast Cancer   S/p bilateral mastectomy 11/12/24  Pt reports she will need hormone therapy for 5 years and will not be needing any other tx  Oncology History   Malignant neoplasm of central portion of right breast in female, estrogen receptor positive (HCC)   10/8/2024 Biopsy    Right breast ultrasound-guided biopsy  3 o'clock, 4 cm from nipple (open coil)  Invasive breast carcinoma with ductal and lobular features  Grade 1  ER , ND , HER2 0  Lymphovascular invasion not identified    Concordant. Unifocal / multifocal. SIZE. Concordant. Right malignancy appears unifocal; suspicious mass covers an area up to 6 mm. US right axilla negative. Left breast clear. Breast MRI should be considered given lobular features within the carcinoma and overall appearance of the breast parenchyma (scattered with borderline heterogeneously dense components).     10/11/2024 Genomic Testing    Reflex MammaPrint/BluePrint UltraLow Risk (Luminal A)     10/14/2024 -  Cancer Staged    Staging form: Breast, AJCC 8th Edition  - Clinical stage from 10/14/2024: Stage IA (cT1b, cN0, cM0, G1, ER+, ND+, HER2-) - Signed by Cassandra Lynn Cardarelli, MD on 10/14/2024  Histopathologic type: Lobular carcinoma, NOS  Stage prefix: Initial diagnosis  Method of lymph node assessment: Clinical  Nuclear grade: G1  Histologic grading system: 3 grade system       10/16/2024 Genetic Testing    NEGATIVE: No Clinically Significant Variants Detected  [place] - A total of 59 genes were evaluated with RNA insight: APC, SUKHI, BAP1, BARD1, BMPR1A, BRCA1, BRCA2, BRIP1, CDH1, CDK4, CDKN1B, CDKN2A, CHEK2, DICER1, FH, FLCN, KIF1B, MAX, MEN1, MET, MLH1, MSH2, MSH6, MUTYH, NF1, NTHL1, PALB2, PMS2, POT1, PTEN, RAD51C, RAD51D, RB1, RET, SDHA, SDHAF2, SDHB, SDHC, SDHD, SMAD4, SMARCA4, STK11, HBVE768, TP53,  TSC1, TSC2 and VHL (sequencing and deletion/duplication); AXIN2, CTNNA1, EGLN1, HOXB13, KIT, MITF, MSH3, PDGFRA, POLD1 and POLE (sequencing only); EPCAM and GREM1 (deletion/duplication only).     10/17/2024 Observation    Breast MRI IMPRESSION:  1.  Right breast 3:00 biopsy-proven invasive carcinoma measures up to 12 mm.  2.  Possible satellite lesion right breast 6:00 for which MRI guided biopsy is recommended if it would change clinical management.  3.  Otherwise, no MR evidence of contralateral left breast malignancy.  4.  No axillary or internal mammary adenopathy.     11/12/2024 Surgery    Right mastectomy with SLN biopsy (Dr. Cardarelli)  Invasive carcinoma with mixed ductal and lobular features  Grade 2  1.1 cm  Margins negative  0/5 Lymph nodes    Left simple mastectomy  Columnar cell change, usual ductal hyperplasia, apocrine metaplasia    Immediate reconstruction with tissue expanders (Dr. Brito)     11/20/2024 -  Cancer Staged    Staging form: Breast, AJCC 8th Edition  - Pathologic stage from 11/20/2024: Stage IA (pT1c, pN0, cM0, G2, ER+, SD+, HER2-) - Signed by Cassandra Lynn Cardarelli, MD on 11/20/2024  Histopathologic type: Lobular carcinoma, NOS  Stage prefix: Initial diagnosis  Histologic grading system: 3 grade system  Laterality: Right  Lymph-vascular invasion (LVI): LVI not present (absent)/not identified         Past Medical & Surgical Hx:   Patient Active Problem List   Diagnosis    Lung nodule    Umbilical hernia without obstruction and without gangrene    Kidney stone on left side    Essential tremor    Insomnia    Anxiety    Gastroesophageal reflux disease without esophagitis    PONV (postoperative nausea and vomiting)    B12 deficiency    Brain lipoma    Chronic interstitial cystitis    Chronic migraine without aura    Stage 1 mild COPD by GOLD classification (HCC)    DDD (degenerative disc disease), lumbar    Excessive daytime sleepiness    Hypersomnia    Other hyperlipidemia    Gross  hematuria    Carpal tunnel syndrome on left    Cubital tunnel syndrome, left    Diverticulosis    Other hemorrhoids    Malignant neoplasm of central portion of right breast in female, estrogen receptor positive (HCC)     Past Medical History:   Diagnosis Date    Anxiety     Brain lipoma 2007    Breast cancer (HCC)     COPD (chronic obstructive pulmonary disease) (HCC)     GERD (gastroesophageal reflux disease)     Kidney stone     Motion sickness     PONV (postoperative nausea and vomiting)     Tremors of nervous system     essential     Past Surgical History:   Procedure Laterality Date    APPENDECTOMY      BLADDER SURGERY      BREAST BIOPSY Right 10/08/2024    300 4cmfn, open coil clip    CARPAL TUNNEL RELEASE Right 05/31/2024    COLONOSCOPY      FL RETROGRADE PYELOGRAM  12/15/2023    HYSTERECTOMY      age 29 still has lt ovary    IR RADIOFREQUENCY ABLATION      esophagus    LAMINECTOMY      twin, lumbar    LUMBAR FUSION      L5-s1    HI BX/EXC LYMPH NODE OPEN SUPERFICIAL Right 11/12/2024    Procedure: RIGHT SENTINEL LYMPH NODE MAPPING INCLUDING BLUE DYE INJECTION AND RADIOCOLLOID INJECTION, SENTINEL LYMPH NODE BIOPSY (INJECT AT 0730 BY DR CARDARELLI IN THE OR);  Surgeon: Cassandra Lynn Cardarelli, MD;  [facility];  Service: Surgical Oncology    HI CYSTO/URETERO W/LITHOTRIPSY &INDWELL STENT INSRT Left 12/15/2023    Procedure: CYSTO USCOPE W/ LASER, & STENT;  Surgeon: Jhonatan Fleming MD;  [facility];  Service: Urology    HI INJ RADIOACTIVE TRACER FOR ID OF SENTINEL NODE Right 11/12/2024    Procedure: RIGHT SENTINEL LYMPH NODE MAPPING INCLUDING BLUE DYE INJECTION AND RADIOCOLLOID INJECTION, SENTINEL LYMPH NODE BIOPSY (INJECT AT 0730 BY DR CARDARELLI IN THE OR);  Surgeon: Cassandra Lynn Cardarelli, MD;  [facility];  Service: Surgical Oncology    HI INTRAOP SENTINEL LYMPH NODE ID W/DYE INJECTION Right 11/12/2024    Procedure: RIGHT SENTINEL LYMPH NODE MAPPING INCLUDING BLUE DYE  INJECTION AND RADIOCOLLOID INJECTION, SENTINEL LYMPH NODE BIOPSY (INJECT AT 0730 BY DR CARDARELLI IN THE OR);  Surgeon: Cassandra Lynn Cardarelli, MD;  [facility];  Service: Surgical Oncology    ME MASTECTOMY SIMPLE COMPLETE Bilateral 11/12/2024    Procedure: BILATERAL MASTECTOMY;  Surgeon: Cassandra Lynn Cardarelli, MD;  [facility];  Service: Surgical Oncology    ME NEUROPLASTY &/TRANSPOS MEDIAN NRV CARPAL TUNNE Right 05/31/2024    Procedure: RELEASE CARPAL TUNNEL;  Surgeon: Dorothea Mayo MD;  [facility];  Service: Orthopedics    ME NEUROPLASTY &/TRANSPOS MEDIAN NRV CARPAL TUNNE Left 07/12/2024    Procedure: RELEASE CARPAL TUNNEL;  Surgeon: Dorothea Mayo MD;  [facility];  Service: Orthopedics    ME NEUROPLASTY &/TRANSPOSITION ULNAR NERVE ELBOW Right 05/31/2024    Procedure: RELEASE CUBITAL TUNNEL POSSIBLE TRANSPOSITION AND ADIPOSE FLAP;  Surgeon: Dorothea Mayo MD;  [facility];  Service: Orthopedics    ME NEUROPLASTY &/TRANSPOSITION ULNAR NERVE ELBOW Left 07/12/2024    Procedure: RELEASE CUBITAL TUNNEL;  Surgeon: Dorothea Mayo MD;  [facility];  Service: Orthopedics    ME TISSUE EXPANDER PLACEMENT BREAST RECONSTRUCTION Bilateral 11/12/2024    Procedure: BILATERAL IMMEDIATE BREAST RECONSTRUCTION WITH TISSUE EXPANDERS AND ADM;  Surgeon: Karthik Brito MD;  [facility];  Service: Plastics    TOTAL VAGINAL HYSTERECTOMY      AGE 29    TUBAL LIGATION      UPPER GASTROINTESTINAL ENDOSCOPY      US GUIDED BREAST BIOPSY RIGHT COMPLETE Right 10/08/2024     Review of Medications:   Vitamins, Supplements and Herbals: Med List Reviewed & pt is only taking: probiotic    Current Outpatient Medications:     acetaminophen (TYLENOL) 500 mg tablet, Take 1 tablet (500 mg total) by mouth every 6 (six) hours as needed for mild pain, Disp: , Rfl:     ACIDOPHILUS LACTOBACILLUS PO, Take 1 tablet by mouth daily, Disp: , Rfl:     Armodafinil 150 MG  tablet, Take 1 tablet (150 mg total) by mouth daily, Disp: 30 tablet, Rfl: 2    bisacodyl (DULCOLAX) 5 mg EC tablet, Take 2 tablets (10 mg total) by mouth in the morning (Patient taking differently: Take 10 mg by mouth if needed for constipation.), Disp: 60 tablet, Rfl: 5    Ca, Mg, K, and Na Oxybates (Xywav) 500 MG/ML SOLN, Take 4 g by mouth daily at bedtime (Patient not taking: Reported on 11/14/2024), Disp: 240 mL, Rfl: 2    cyclobenzaprine (FLEXERIL) 10 mg tablet, Take 1 tablet (10 mg total) by mouth 3 (three) times a day as needed for muscle spasms for up to 7 days, Disp: 21 tablet, Rfl: 0    escitalopram (Lexapro) 10 mg tablet, Take 1 tablet (10 mg total) by mouth daily (Patient taking differently: Take 10 mg by mouth daily with lunch), Disp: 100 tablet, Rfl: 1    fenofibrate (TRICOR) 145 mg tablet, Take 1 tablet (145 mg total) by mouth daily, Disp: 90 tablet, Rfl: 1    gabapentin (NEURONTIN) 300 mg capsule, Take 1 capsule (300 mg total) by mouth 3 (three) times a day for 7 days, Disp: 21 capsule, Rfl: 0    naloxone (NARCAN) 4 mg/0.1 mL nasal spray, Administer 1 spray into a nostril. If no response after 2-3 minutes, give another dose in the other nostril using a new spray. (Patient not taking: Reported on 11/14/2024), Disp: 1 each, Rfl: 0    oxyCODONE (ROXICODONE) 5 immediate release tablet, Take 1 tablet (5 mg total) by mouth every 4 (four) hours as needed for severe pain or moderate pain for up to 10 doses Max Daily Amount: 30 mg, Disp: 10 tablet, Rfl: 0    polyethylene glycol (MIRALAX) 17 g packet, Take 17 g by mouth daily (Patient taking differently: Take 17 g by mouth if needed (constipation). dissolve 8 ounces in liquid of choice  Indications: Constipation), Disp: 510 g, Rfl: 5    sulfamethoxazole-trimethoprim (BACTRIM DS) 800-160 mg per tablet, Take 1 tablet by mouth every 12 (twelve) hours for 14 days, Disp: 28 tablet, Rfl: 0    tiotropium-olodaterol (Stiolto Respimat) 2.5-2.5 MCG/ACT inhaler, Inhale  "2 puffs daily, Disp: 12 g, Rfl: 5    Most Recent Lab Results:   Lab Results   Component Value Date    WBC 7.95 10/14/2024    NEUTROABS 4.66 10/14/2024    IRON 86 04/06/2024    TIBC 426 04/06/2024    FERRITIN 70 04/06/2024    CHOLESTEROL 171 04/06/2024    TRIG 139 04/06/2024    HDL 74 04/06/2024    LDLCALC 69 04/06/2024    ALT 10 10/14/2024    AST 15 10/14/2024    ALB 4.0 10/14/2024    SODIUM 137 10/14/2024    SODIUM 143 04/06/2024    K 3.9 10/14/2024    K 4.2 04/06/2024     10/14/2024    BUN 20 10/14/2024    BUN 21 04/06/2024    CREATININE 0.82 10/14/2024    CREATININE 0.87 04/06/2024    EGFR 85 10/14/2024    TSH 2.90 12/11/2021    GLUF 77 10/14/2024    GLUF 92 04/06/2024    GLUC 101 (H) 01/15/2022    HGBA1C 5.5 04/06/2024    HGBA1C 5.3 03/29/2023    HGBA1C 5.5 12/11/2021    CALCIUM 8.9 10/14/2024     Anthropometric Measurements:   Height: 62\"  Ht Readings from Last 1 Encounters:   11/12/24 5' 2\" (1.575 m)     Wt Readings from Last 20 Encounters:   11/12/24 63 kg (139 lb)   10/29/24 63 kg (139 lb)   10/28/24 63 kg (139 lb)   10/23/24 63 kg (139 lb)   10/22/24 64 kg (141 lb)   10/16/24 64 kg (141 lb)   10/14/24 64.9 kg (143 lb)   10/10/24 64.9 kg (143 lb)   10/08/24 63.5 kg (140 lb)   09/26/24 63.5 kg (140 lb)   07/29/24 63.5 kg (140 lb)   07/22/24 63.8 kg (140 lb 9.6 oz)   07/12/24 64.4 kg (142 lb)   06/24/24 65.4 kg (144 lb 3.2 oz)   06/10/24 64.4 kg (142 lb)   05/31/24 64.6 kg (142 lb 6.4 oz)   05/20/24 66.7 kg (147 lb)   04/13/24 66.7 kg (147 lb)   04/10/24 66.8 kg (147 lb 3.2 oz)   03/27/24 69.4 kg (153 lb)     Weight History:   Usual Weight: unsure, was 188# 2 yr ago  Comments: wt has been fluctuating since COVID (unintended wt gain, followed by intentional loss, gained wt again, now losing wt)    Oncology Nutrition-Anthropometrics      Flowsheet Row Nutrition from 11/21/2024 in [facility] Nutrition from 10/31/2024 in [facility] " Dietitian Services   Patient [age] (years): 49 years 49 years   Patient (female) height (in): 62 in 62 in   Current Weight to be used for anthropometric calculations (lbs) 139 lbs 139 lbs   Current Weight to be used for anthropometric calculations (kg) 63.2 kg 63.2 kg   BMI: 25.4 25.4   IBW female: 110 lbs 110 lbs   IBW (kg) female: 50 kg 50 kg   IBW % (female) 126.4 % 126.4 %   Adjusted BW (female): 117.3 lbs 117.3 lbs   Adjusted BW kg (female): 53.3 kg 53.3 kg   % weight change after 1 week: -- -1.4 %   Weight change after 1 week (lbs) -- -2 lbs   % weight change after 1 month: -2.8 % -0.7 %   Weight change after 1 month (lbs) -4 lbs -1 lbs   % weight change after 3 months: -- -0.7 %   Weight change after 3 months (lbs) -- -1 lbs   % weight change after 6 months: -5.4 % -5.4 %   Weight change after 6 months (lbs) -8 lbs -8 lbs   % weight change after 1 year: -- -14.7 %   Weight change after 1 year (lbs) -- -24 lbs          Nutrition-Focused Physical Findings: n/a due to telephone call    Food/Nutrition-Related History & Client/Social History:    Current Nutrition Impact Symptoms:  [] Nausea  [x] Reduced Appetite - poor appetite but did get hungry yesterday [] Acid Reflux    [] Vomiting  [] Unintended Wt Loss - hx [] Malabsorption    [] Diarrhea - hx nocturnal diarrhea (pt unaware until she wakes up), had an episode the night before surgery (11/11/24)  -has seen GI, cause is unknown at this time [] Unintended Wt Gain  [] Dumping Syndrome    [] Constipation - this resolved once stopped MVI and probiotic; currently on probiotic for abx use [] Thick Mucous/Secretions  [] Abdominal Pain    [] Dysgeusia (Altered Taste)  [x] Xerostomia (Dry Mouth) - d/t gabapentin use [] Gas    [] Dysosmia (Altered Smell)  [] Thrush  [] Difficulty Chewing    [] Oral Mucositis (Sore Mouth)  [] Fatigue  [] Hyperglycemia   Lab Results   Component Value Date    HGBA1C 5.5 04/06/2024      [] Odynophagia  [] Esophagitis  [] Other:    []  "Dysphagia  [] Early Satiety  [] No Problems Eating      Food Allergies & Intolerances: yes: lactose intolerance (can tolerate certain cheeses and Greek yogurt, cannot have milk or ice cream)    Current Diet: Regular Diet, No Restrictions  States she doesn't like a lot of fruits and veggies d/t texture, likes bananas and grapes.  Willing to eat more F&V in smoothie form.  Nutrition Route: PO  Activity level: ADL's & walks the dog BID, gets some SOB  Social: Pt reports mom recently passed away    24 Hr Diet Recall:   Breakfast: 1 donut with meds; yesterday: cookies (was doing protein smoothie)  Snack: none  Lunch: yesterday: none; planning to have a protein shake and cheese and crackers today  Snack: none, was sleeping  Dinner: \"too much\" hamburger casserole (beef, rice, peppers, onion, tomato sauce, corn) (had heartburn afterwards)  Snack: does not eat after 7pm d/t sleep medication    Beverages: water (16.9 oz x5-6+), light [place] iced tea (a couple swallows with meals, none for 3-4 days)  Supplements:   Regen [employer] - caused immediate BM and abd cramping, discontinued these  JBN protein powder mixed with fruit, yogurt, and almond milk - none since the day after surgery    Oncology Nutrition-Estimated Needs      Flowsheet Row Nutrition from 10/31/2024 in  [facility]   Weight type used Adjusted weight   Weight in kilograms (kg) used for estimated needs 53.3 kg   Energy needs formula:  25-30 kcal/kg   Energy needs based on 25 kcal/k kcal   Energy needs based on 30 kcal/k kcal   Protein needs formula: 1.2-1.5 g/kg   Protein needs based on 1.2 g/k g   Protein needs based on 1.5 g/kg 80 g   Fluid needs formula: 30-35 mL/kg   Fluid needs based on 30 mL/kg 1596 mL   Fluid needs in ounces 54 oz   Fluid needs based on 35 mL/kg 1862 mL   Fluid needs in ounces 63 oz           Discussion & Intervention:   Jenny was evaluated today for an RD follow up " regarding unintended weight loss.  Jenny  is s/p bilateral mastectomy on 11/12/24 .  She reports that her mother passed away right after surgery, so things have been exceptionally difficult for her.  She has a poor appetite and some xerostomia r/t gabapentin use.  She reports the resolution of constipation after stopping her MVI.  She was doing well with using protein smoothies at breakfast and would like to start incorporating these again.   Reviewed 24 hour recall, which revealed an inadequate po intake, and discussed ways to increase energy intake, increase protein intake, and optimize nutrient intake to promote healing.  Also reviewed the importance of wt management throughout the healing process and the role of a cancer preventative diet and high protein & low fat diet  in managing wt and overall health.  Based on today's assessment, discussion included: MNT for: healing, xerostomia, reduced appetite, adequate hydration & fluid choices, and utilizing oral nutrition supplements.   Moving forward, Jenny was encouraged to increase kcal and protein intakes and will practice MNT for nutrition impact sx management.  Materials Provided: not applicable  All questions and concerns addressed during today’s visit.  Jenny has RD contact information.    Nutrition Diagnosis:   Inadequate Energy Intake related to physiological causes, disease state and treatment related issues as evidenced by food recall, wt loss and discussion with pt and/or family.  Increased Nutrient Needs (kcal & pro) related to increased demand for nutrients and disease state as evidenced by cancer dx and pt undergoing tx for cancer.  Monitoring & Evaluation:   Goals:  weight maintenance/stabilization  adequate nutrition impact symptom management  pt to meet >/=75% estimated nutrition needs daily    Progress Towards Goals: Progressing    Nutrition Rx & Recommendations:  Diet: High Protein, Low Fat, High Fiber Diet, Lactose-free (or low-lactose if  tolerated)  Small, frequent meals/snacks may be easier to tolerate than 3 large daily meals.  Aim for 5-6 small meals per day (every 2-3 hours).  Include protein at all meals/snacks.  Include a variety of foods (as tolerated/allowed by diet).  Follow a Cancer Preventative Nutrition Pattern: colorful, plant-based, low-fat, avoid added sugars, limit alcohol, include high fiber foods, limit processed meats, limit red meat, choose lean protein sources, use low-fat cooking methods, balance calories with physical activity, avoid excessive weight gain throughout life.  Incorporate physical activity as able/allowed.  Stay hydrated by sipping fluids of choice/tolerance throughout the day.  Refer to Eating Hints book for other meal/snack ideas and symptom management.  Follow proper oral care; Try baking soda/salt water rinse recipe (mix 3/4 tsp salt + 1 tsp baking soda + 1 qt water; rinse with plain water after using) in Eating Hints book (pg 18).  Brush your teeth before/after meals & before bed.  For appetite loss: try powdered or liquid nutrition supplements; eating by the clock every 2-3 hours; set a timer to remind yourself to eat, keep snacks nearby; add extra protein & calories to your diet; drink liquids in between meals, choose liquids that add calories; eat a bedtime snack; eat soft, cool or frozen foods; eat a larger meal when you feel well & rested; only sip small amounts of liquids during meals (see pages 10-12 in your Eating Hints book).  For dry mouth: sip water throughout the day; try very sweet (or tart, as tolerated) drinks; chew gum or suck on hard candy, popsicles, & ice chips; eat easy to swallow foods (such as pureed cooked foods or soups); moisten food with sauce/gravy/salad dressing; do not drink beer, wine, or any type of alcohol; keep lips moist with lip balm; avoid tobacco products & second-hand smoke; try Biotene as needed (see pages 17-18 in your Eating Hints book).  Follow proper oral care; Try  baking soda/salt water rinse recipe (mix 3/4 tsp salt + 1 tsp baking soda + 1 qt water; rinse with pain water after using) in Eating Hints book (pg 18).  Brush your teeth before/after meals & before bed.  Weigh yourself regularly. If you notice weight loss, make an effort to increase your daily food/calorie intake. If you continue to notice loss after these efforts, reach out to your dietitian to establish a plan to stabilize weight.  Resume high protein smoothie for breakfast - see recipes provided, incorporate fruits and veggies into these  Try not to skip meals/snacks and have a well-balanced breakfast  Choose a lean (low-fat) protein source at each meal and snack: chicken, turkey, fish, seafood, Greek yogurt, nuts, nut butter, beans, lentils, legumes, etc. (See Protein Dense Foods handout previously provided)  Aim for 55-65 oz of fluid daily  Aim for 25-30 grams of fiber daily  Gradually increase your fiber intake to avoid GI upset, stay well hydrated as you are increasing your fiber intake    Follow Up Plan:  1/9 at 12pm by phone  Recommend Referral to Other Providers: none at this time

## 2024-11-11 NOTE — TELEPHONE ENCOUNTER
Medication: Armodafinil   PDMP  10/18/2024 07/17/2024 Xywav (Solution) 180.0 22 0.5 GM/1 ML [facility] [employer] Insurance 2 / 2 PA   1 [id number] 10/14/2024 09/09/2024 Armodafinil (Tablet) 30.0 30 150 MG [id number] Commercial Insurance 1 / 1 PA   1 [id number] 09/23/2024 07/17/2024 Xywav (Solution) 180.0 22 0.5 GM/1 ML [facility] Commercial Insurance 1 / 2 PA   1 [id number] 09/09/2024 09/09/2024 Armodafinil (Tablet) 30.0 30 150 MG [id number] Commercial Insurance 0 / 1 PA   1 [id number] 09/03/2024 07/17/2024 Xywav (Solution) 180.0 22 0.5 GM/1 ML [facility] Commercial Insurance 0 / 2 PA   1 [id number] 08/13/2024 05/07/2024 Armodafinil (Tablet) 30.0 30 150 MG [id number] Commercial Insurance 3 / 3 P  Active agreement on file -No

## 2024-11-11 NOTE — TELEPHONE ENCOUNTER
Late entry.  I spoke to patient on 11/8/2024 at approximately 1530 p.m.  I reviewed with patient her plan for surgery on 11/12/2024.  In short patient had issues with at home nicotine testing.  Her formal nicotine testing had resulted as negative.  Patient herself is a non-smoker.  Via multiple discussions with patient and plastic surgery team, plan for reconstruction following her bilateral mastectomy and right sentinel lymph node biopsy for right breast cancer.    Patient did note that her grandson had recent diagnosis of conjunctivitis.  Patient is asymptomatic and does not have current contact with her grandson.  Patient will contact clinic should she have any symptoms arise.  She otherwise feels well and is comfortable with the plan going forward.  Patient has contact information for both breast surgical oncology and plastic reconstructive surgery should any other questions arise prior to surgery.  All questions and concerns were addressed at the time of the phone call.  Patient was appreciative the phone call.    Cassandra L. Cardarelli MD FACS   Breast Surgical Oncology   [facility]

## 2024-11-12 ENCOUNTER — ANESTHESIA (OUTPATIENT)
Dept: PERIOP | Facility: HOSPITAL | Age: 49
End: 2024-11-12
Payer: COMMERCIAL

## 2024-11-12 ENCOUNTER — APPOINTMENT (OUTPATIENT)
Dept: RADIOLOGY | Facility: HOSPITAL | Age: 49
End: 2024-11-12
Payer: COMMERCIAL

## 2024-11-12 ENCOUNTER — PATIENT OUTREACH (OUTPATIENT)
Dept: CASE MANAGEMENT | Facility: OTHER | Age: 49
End: 2024-11-12

## 2024-11-12 ENCOUNTER — HOSPITAL ENCOUNTER (OUTPATIENT)
Dept: NUCLEAR MEDICINE | Facility: HOSPITAL | Age: 49
Discharge: HOME | End: 2024-11-12
Attending: STUDENT IN AN ORGANIZED HEALTH CARE EDUCATION/TRAINING PROGRAM
Payer: COMMERCIAL

## 2024-11-12 ENCOUNTER — HOSPITAL ENCOUNTER (OUTPATIENT)
Facility: HOSPITAL | Age: 49
Setting detail: OUTPATIENT SURGERY
Discharge: HOME | End: 2024-11-13
Attending: STUDENT IN AN ORGANIZED HEALTH CARE EDUCATION/TRAINING PROGRAM | Admitting: STUDENT IN AN ORGANIZED HEALTH CARE EDUCATION/TRAINING PROGRAM
Payer: COMMERCIAL

## 2024-11-12 DIAGNOSIS — Z17.0 MALIGNANT NEOPLASM OF CENTRAL PORTION OF RIGHT BREAST IN FEMALE, ESTROGEN RECEPTOR POSITIVE (HCC): Primary | ICD-10-CM

## 2024-11-12 DIAGNOSIS — Z17.0 MALIGNANT NEOPLASM OF CENTRAL PORTION OF RIGHT BREAST IN FEMALE, ESTROGEN RECEPTOR POSITIVE (HCC): ICD-10-CM

## 2024-11-12 DIAGNOSIS — C50.111 MALIGNANT NEOPLASM OF CENTRAL PORTION OF RIGHT BREAST IN FEMALE, ESTROGEN RECEPTOR POSITIVE (HCC): Primary | ICD-10-CM

## 2024-11-12 DIAGNOSIS — C50.111 MALIGNANT NEOPLASM OF CENTRAL PORTION OF RIGHT BREAST IN FEMALE, ESTROGEN RECEPTOR POSITIVE (HCC): ICD-10-CM

## 2024-11-12 PROCEDURE — 88342 IMHCHEM/IMCYTCHM 1ST ANTB: CPT | Performed by: STUDENT IN AN ORGANIZED HEALTH CARE EDUCATION/TRAINING PROGRAM

## 2024-11-12 PROCEDURE — C1781 MESH (IMPLANTABLE): HCPCS | Performed by: STUDENT IN AN ORGANIZED HEALTH CARE EDUCATION/TRAINING PROGRAM

## 2024-11-12 PROCEDURE — 15777 ACELLULAR DERM MATRIX IMPLT: CPT | Performed by: PLASTIC SURGERY

## 2024-11-12 PROCEDURE — 19307 MAST MOD RAD: CPT | Performed by: STUDENT IN AN ORGANIZED HEALTH CARE EDUCATION/TRAINING PROGRAM

## 2024-11-12 PROCEDURE — 19357 TISS XPNDR PLMT BRST RCNSTJ: CPT | Performed by: PLASTIC SURGERY

## 2024-11-12 PROCEDURE — 38900 IO MAP OF SENT LYMPH NODE: CPT | Performed by: STUDENT IN AN ORGANIZED HEALTH CARE EDUCATION/TRAINING PROGRAM

## 2024-11-12 PROCEDURE — 15777 ACELLULAR DERM MATRIX IMPLT: CPT

## 2024-11-12 PROCEDURE — 88341 IMHCHEM/IMCYTCHM EA ADD ANTB: CPT | Performed by: STUDENT IN AN ORGANIZED HEALTH CARE EDUCATION/TRAINING PROGRAM

## 2024-11-12 PROCEDURE — C1789 PROSTHESIS, BREAST, IMP: HCPCS | Performed by: STUDENT IN AN ORGANIZED HEALTH CARE EDUCATION/TRAINING PROGRAM

## 2024-11-12 PROCEDURE — A9541 TC99M SULFUR COLLOID: HCPCS

## 2024-11-12 PROCEDURE — 19357 TISS XPNDR PLMT BRST RCNSTJ: CPT

## 2024-11-12 PROCEDURE — 19303 MAST SIMPLE COMPLETE: CPT | Performed by: STUDENT IN AN ORGANIZED HEALTH CARE EDUCATION/TRAINING PROGRAM

## 2024-11-12 PROCEDURE — C9290 INJ, BUPIVACAINE LIPOSOME: HCPCS

## 2024-11-12 PROCEDURE — 38792 RA TRACER ID OF SENTINL NODE: CPT | Performed by: STUDENT IN AN ORGANIZED HEALTH CARE EDUCATION/TRAINING PROGRAM

## 2024-11-12 PROCEDURE — 88307 TISSUE EXAM BY PATHOLOGIST: CPT | Performed by: STUDENT IN AN ORGANIZED HEALTH CARE EDUCATION/TRAINING PROGRAM

## 2024-11-12 PROCEDURE — 38792 RA TRACER ID OF SENTINL NODE: CPT

## 2024-11-12 DEVICE — IMPLANTABLE DEVICE: Type: IMPLANTABLE DEVICE | Site: CHEST | Status: FUNCTIONAL

## 2024-11-12 DEVICE — BREAST TISSUE EXPANDER, SUTURE TABS, INTEGRAL INJECTION DOME, 300CC
Type: IMPLANTABLE DEVICE | Site: CHEST | Status: FUNCTIONAL
Brand: MENTOR ARTOURA PLUS, SMOOTH, HIGH PROFILE

## 2024-11-12 RX ORDER — HYDROMORPHONE HCL/PF 1 MG/ML
SYRINGE (ML) INJECTION AS NEEDED
Status: DISCONTINUED | OUTPATIENT
Start: 2024-11-12 | End: 2024-11-12

## 2024-11-12 RX ORDER — SENNOSIDES 8.6 MG
1 TABLET ORAL DAILY
Status: DISCONTINUED | OUTPATIENT
Start: 2024-11-12 | End: 2024-11-13 | Stop reason: HOSPADM

## 2024-11-12 RX ORDER — ACETAMINOPHEN 10 MG/ML
INJECTION, SOLUTION INTRAVENOUS AS NEEDED
Status: DISCONTINUED | OUTPATIENT
Start: 2024-11-12 | End: 2024-11-12

## 2024-11-12 RX ORDER — MAGNESIUM HYDROXIDE/ALUMINUM HYDROXICE/SIMETHICONE 120; 1200; 1200 MG/30ML; MG/30ML; MG/30ML
30 SUSPENSION ORAL EVERY 6 HOURS PRN
Status: DISCONTINUED | OUTPATIENT
Start: 2024-11-12 | End: 2024-11-13 | Stop reason: HOSPADM

## 2024-11-12 RX ORDER — DOCUSATE SODIUM 100 MG/1
100 CAPSULE, LIQUID FILLED ORAL 2 TIMES DAILY
Status: DISCONTINUED | OUTPATIENT
Start: 2024-11-12 | End: 2024-11-13 | Stop reason: HOSPADM

## 2024-11-12 RX ORDER — LIDOCAINE HYDROCHLORIDE 10 MG/ML
0.5 INJECTION, SOLUTION EPIDURAL; INFILTRATION; INTRACAUDAL; PERINEURAL ONCE AS NEEDED
Status: DISCONTINUED | OUTPATIENT
Start: 2024-11-12 | End: 2024-11-13 | Stop reason: HOSPADM

## 2024-11-12 RX ORDER — ACETAMINOPHEN 500 MG
1000 TABLET ORAL EVERY 6 HOURS PRN
Qty: 40 TABLET | Refills: 0 | OUTPATIENT
Start: 2024-11-12 | End: 2024-11-22

## 2024-11-12 RX ORDER — CYCLOBENZAPRINE HCL 10 MG
10 TABLET ORAL 3 TIMES DAILY PRN
Qty: 30 TABLET | Refills: 0 | OUTPATIENT
Start: 2024-11-12 | End: 2024-11-22

## 2024-11-12 RX ORDER — ENOXAPARIN SODIUM 100 MG/ML
40 INJECTION SUBCUTANEOUS DAILY
Status: DISCONTINUED | OUTPATIENT
Start: 2024-11-13 | End: 2024-11-13 | Stop reason: HOSPADM

## 2024-11-12 RX ORDER — ISOSULFAN BLUE 50 MG/5ML
INJECTION, SOLUTION SUBCUTANEOUS AS NEEDED
Status: DISCONTINUED | OUTPATIENT
Start: 2024-11-12 | End: 2024-11-12 | Stop reason: HOSPADM

## 2024-11-12 RX ORDER — MIDAZOLAM HYDROCHLORIDE 2 MG/2ML
INJECTION, SOLUTION INTRAMUSCULAR; INTRAVENOUS AS NEEDED
Status: DISCONTINUED | OUTPATIENT
Start: 2024-11-12 | End: 2024-11-12

## 2024-11-12 RX ORDER — EPHEDRINE SULFATE 50 MG/ML
INJECTION INTRAVENOUS AS NEEDED
Status: DISCONTINUED | OUTPATIENT
Start: 2024-11-12 | End: 2024-11-12

## 2024-11-12 RX ORDER — DEXAMETHASONE SODIUM PHOSPHATE 10 MG/ML
INJECTION, SOLUTION INTRAMUSCULAR; INTRAVENOUS AS NEEDED
Status: DISCONTINUED | OUTPATIENT
Start: 2024-11-12 | End: 2024-11-12

## 2024-11-12 RX ORDER — SODIUM CHLORIDE, SODIUM LACTATE, POTASSIUM CHLORIDE, CALCIUM CHLORIDE 600; 310; 30; 20 MG/100ML; MG/100ML; MG/100ML; MG/100ML
50 INJECTION, SOLUTION INTRAVENOUS CONTINUOUS
Status: DISCONTINUED | OUTPATIENT
Start: 2024-11-12 | End: 2024-11-13 | Stop reason: HOSPADM

## 2024-11-12 RX ORDER — LIDOCAINE HYDROCHLORIDE 10 MG/ML
INJECTION, SOLUTION EPIDURAL; INFILTRATION; INTRACAUDAL; PERINEURAL AS NEEDED
Status: DISCONTINUED | OUTPATIENT
Start: 2024-11-12 | End: 2024-11-12

## 2024-11-12 RX ORDER — GABAPENTIN 300 MG/1
300 CAPSULE ORAL 3 TIMES DAILY
Status: DISCONTINUED | OUTPATIENT
Start: 2024-11-12 | End: 2024-11-13 | Stop reason: HOSPADM

## 2024-11-12 RX ORDER — KETOROLAC TROMETHAMINE 30 MG/ML
INJECTION, SOLUTION INTRAMUSCULAR; INTRAVENOUS AS NEEDED
Status: DISCONTINUED | OUTPATIENT
Start: 2024-11-12 | End: 2024-11-12

## 2024-11-12 RX ORDER — ONDANSETRON 2 MG/ML
4 INJECTION INTRAMUSCULAR; INTRAVENOUS ONCE AS NEEDED
Status: DISCONTINUED | OUTPATIENT
Start: 2024-11-12 | End: 2024-11-12 | Stop reason: HOSPADM

## 2024-11-12 RX ORDER — OXYCODONE HYDROCHLORIDE 5 MG/1
5 TABLET ORAL EVERY 4 HOURS PRN
Qty: 10 TABLET | Refills: 0 | OUTPATIENT
Start: 2024-11-12

## 2024-11-12 RX ORDER — ACETAMINOPHEN 325 MG/1
975 TABLET ORAL EVERY 8 HOURS SCHEDULED
Status: DISCONTINUED | OUTPATIENT
Start: 2024-11-12 | End: 2024-11-13 | Stop reason: HOSPADM

## 2024-11-12 RX ORDER — ONDANSETRON 2 MG/ML
INJECTION INTRAMUSCULAR; INTRAVENOUS AS NEEDED
Status: DISCONTINUED | OUTPATIENT
Start: 2024-11-12 | End: 2024-11-12

## 2024-11-12 RX ORDER — BUPIVACAINE HYDROCHLORIDE 2.5 MG/ML
INJECTION, SOLUTION EPIDURAL; INFILTRATION; INTRACAUDAL AS NEEDED
Status: DISCONTINUED | OUTPATIENT
Start: 2024-11-12 | End: 2024-11-12 | Stop reason: HOSPADM

## 2024-11-12 RX ORDER — ALBUMIN HUMAN 50 G/1000ML
SOLUTION INTRAVENOUS CONTINUOUS PRN
Status: DISCONTINUED | OUTPATIENT
Start: 2024-11-12 | End: 2024-11-12

## 2024-11-12 RX ORDER — PROPOFOL 10 MG/ML
INJECTION, EMULSION INTRAVENOUS AS NEEDED
Status: DISCONTINUED | OUTPATIENT
Start: 2024-11-12 | End: 2024-11-12

## 2024-11-12 RX ORDER — CEFAZOLIN SODIUM 1 G/3ML
INJECTION, POWDER, FOR SOLUTION INTRAMUSCULAR; INTRAVENOUS AS NEEDED
Status: DISCONTINUED | OUTPATIENT
Start: 2024-11-12 | End: 2024-11-12

## 2024-11-12 RX ORDER — SODIUM CHLORIDE 9 MG/ML
INJECTION, SOLUTION INTRAVENOUS AS NEEDED
Status: DISCONTINUED | OUTPATIENT
Start: 2024-11-12 | End: 2024-11-12 | Stop reason: HOSPADM

## 2024-11-12 RX ORDER — OXYCODONE HYDROCHLORIDE 5 MG/1
5 TABLET ORAL EVERY 4 HOURS PRN
Status: DISCONTINUED | OUTPATIENT
Start: 2024-11-12 | End: 2024-11-13 | Stop reason: HOSPADM

## 2024-11-12 RX ORDER — PHENYLEPHRINE HCL IN 0.9% NACL 1 MG/10 ML
SYRINGE (ML) INTRAVENOUS AS NEEDED
Status: DISCONTINUED | OUTPATIENT
Start: 2024-11-12 | End: 2024-11-12

## 2024-11-12 RX ORDER — FENTANYL CITRATE/PF 50 MCG/ML
50 SYRINGE (ML) INJECTION
Status: DISCONTINUED | OUTPATIENT
Start: 2024-11-12 | End: 2024-11-12 | Stop reason: HOSPADM

## 2024-11-12 RX ORDER — GABAPENTIN 300 MG/1
300 CAPSULE ORAL 3 TIMES DAILY
Qty: 30 CAPSULE | Refills: 0 | OUTPATIENT
Start: 2024-11-12 | End: 2024-11-22

## 2024-11-12 RX ORDER — SULFAMETHOXAZOLE AND TRIMETHOPRIM 800; 160 MG/1; MG/1
1 TABLET ORAL EVERY 12 HOURS SCHEDULED
Qty: 14 TABLET | Refills: 0 | OUTPATIENT
Start: 2024-11-12 | End: 2024-11-19

## 2024-11-12 RX ORDER — CEFAZOLIN SODIUM 1 G/50ML
1000 SOLUTION INTRAVENOUS ONCE
Status: DISCONTINUED | OUTPATIENT
Start: 2024-11-12 | End: 2024-11-13 | Stop reason: HOSPADM

## 2024-11-12 RX ORDER — CLINDAMYCIN PHOSPHATE 900 MG/50ML
900 INJECTION, SOLUTION INTRAVENOUS EVERY 8 HOURS
Status: DISCONTINUED | OUTPATIENT
Start: 2024-11-12 | End: 2024-11-12 | Stop reason: ALTCHOICE

## 2024-11-12 RX ORDER — ESCITALOPRAM OXALATE 10 MG/1
10 TABLET ORAL
Status: DISCONTINUED | OUTPATIENT
Start: 2024-11-12 | End: 2024-11-13 | Stop reason: HOSPADM

## 2024-11-12 RX ORDER — CEFAZOLIN SODIUM 1 G/50ML
SOLUTION INTRAVENOUS AS NEEDED
Status: DISCONTINUED | OUTPATIENT
Start: 2024-11-12 | End: 2024-11-12

## 2024-11-12 RX ORDER — MAGNESIUM HYDROXIDE 1200 MG/15ML
LIQUID ORAL AS NEEDED
Status: DISCONTINUED | OUTPATIENT
Start: 2024-11-12 | End: 2024-11-12 | Stop reason: HOSPADM

## 2024-11-12 RX ORDER — CYCLOBENZAPRINE HCL 10 MG
10 TABLET ORAL 3 TIMES DAILY PRN
Status: DISCONTINUED | OUTPATIENT
Start: 2024-11-12 | End: 2024-11-13 | Stop reason: HOSPADM

## 2024-11-12 RX ORDER — ONDANSETRON 2 MG/ML
4 INJECTION INTRAMUSCULAR; INTRAVENOUS EVERY 6 HOURS PRN
Status: DISCONTINUED | OUTPATIENT
Start: 2024-11-12 | End: 2024-11-13 | Stop reason: HOSPADM

## 2024-11-12 RX ORDER — GLYCOPYRROLATE 0.2 MG/ML
INJECTION INTRAMUSCULAR; INTRAVENOUS AS NEEDED
Status: DISCONTINUED | OUTPATIENT
Start: 2024-11-12 | End: 2024-11-12

## 2024-11-12 RX ORDER — HYDROMORPHONE HCL/PF 1 MG/ML
0.5 SYRINGE (ML) INJECTION EVERY 4 HOURS PRN
Status: DISCONTINUED | OUTPATIENT
Start: 2024-11-12 | End: 2024-11-13 | Stop reason: HOSPADM

## 2024-11-12 RX ORDER — SUCCINYLCHOLINE/SOD CL,ISO/PF 100 MG/5ML
SYRINGE (ML) INTRAVENOUS AS NEEDED
Status: DISCONTINUED | OUTPATIENT
Start: 2024-11-12 | End: 2024-11-12

## 2024-11-12 RX ORDER — HYDROMORPHONE HCL IN WATER/PF 6 MG/30 ML
0.2 PATIENT CONTROLLED ANALGESIA SYRINGE INTRAVENOUS
Status: DISCONTINUED | OUTPATIENT
Start: 2024-11-12 | End: 2024-11-12 | Stop reason: HOSPADM

## 2024-11-12 RX ORDER — FENTANYL CITRATE 50 UG/ML
INJECTION, SOLUTION INTRAMUSCULAR; INTRAVENOUS AS NEEDED
Status: DISCONTINUED | OUTPATIENT
Start: 2024-11-12 | End: 2024-11-12

## 2024-11-12 RX ORDER — KETAMINE HYDROCHLORIDE 100 MG/ML
INJECTION, SOLUTION INTRAMUSCULAR; INTRAVENOUS AS NEEDED
Status: DISCONTINUED | OUTPATIENT
Start: 2024-11-12 | End: 2024-11-12

## 2024-11-12 RX ORDER — CEFAZOLIN SODIUM 1 G/50ML
1000 SOLUTION INTRAVENOUS EVERY 8 HOURS
Status: COMPLETED | OUTPATIENT
Start: 2024-11-12 | End: 2024-11-13

## 2024-11-12 RX ORDER — PROPOFOL 10 MG/ML
INJECTION, EMULSION INTRAVENOUS CONTINUOUS PRN
Status: DISCONTINUED | OUTPATIENT
Start: 2024-11-12 | End: 2024-11-12

## 2024-11-12 RX ORDER — ACETAMINOPHEN 325 MG/1
975 TABLET ORAL ONCE
Status: COMPLETED | OUTPATIENT
Start: 2024-11-12 | End: 2024-11-12

## 2024-11-12 RX ORDER — SODIUM CHLORIDE, SODIUM LACTATE, POTASSIUM CHLORIDE, CALCIUM CHLORIDE 600; 310; 30; 20 MG/100ML; MG/100ML; MG/100ML; MG/100ML
INJECTION, SOLUTION INTRAVENOUS CONTINUOUS PRN
Status: DISCONTINUED | OUTPATIENT
Start: 2024-11-12 | End: 2024-11-12

## 2024-11-12 RX ADMIN — CEFAZOLIN SODIUM 1000 MG: 1 SOLUTION INTRAVENOUS at 07:45

## 2024-11-12 RX ADMIN — KETAMINE HYDROCHLORIDE 30 MG: 100 INJECTION INTRAMUSCULAR; INTRAVENOUS at 08:15

## 2024-11-12 RX ADMIN — SODIUM CHLORIDE, SODIUM LACTATE, POTASSIUM CHLORIDE, AND CALCIUM CHLORIDE 50 ML/HR: .6; .31; .03; .02 INJECTION, SOLUTION INTRAVENOUS at 20:00

## 2024-11-12 RX ADMIN — ESCITALOPRAM OXALATE 10 MG: 10 TABLET ORAL at 17:04

## 2024-11-12 RX ADMIN — DEXMEDETOMIDINE 8 MCG: 100 INJECTION, SOLUTION INTRAVENOUS at 09:52

## 2024-11-12 RX ADMIN — EPHEDRINE SULFATE 10 MG: 50 INJECTION INTRAVENOUS at 07:35

## 2024-11-12 RX ADMIN — ACETAMINOPHEN 1000 MG: 10 INJECTION INTRAVENOUS at 09:47

## 2024-11-12 RX ADMIN — ONDANSETRON 4 MG: 2 INJECTION INTRAMUSCULAR; INTRAVENOUS at 07:58

## 2024-11-12 RX ADMIN — DEXMEDETOMIDINE 8 MCG: 100 INJECTION, SOLUTION INTRAVENOUS at 08:55

## 2024-11-12 RX ADMIN — CEFAZOLIN 1000 MG: 1 INJECTION, POWDER, FOR SOLUTION INTRAMUSCULAR; INTRAVENOUS at 07:49

## 2024-11-12 RX ADMIN — OXYCODONE HYDROCHLORIDE 5 MG: 5 TABLET ORAL at 15:05

## 2024-11-12 RX ADMIN — EPHEDRINE SULFATE 10 MG: 50 INJECTION INTRAVENOUS at 08:11

## 2024-11-12 RX ADMIN — LIDOCAINE HYDROCHLORIDE 60 MG: 10 INJECTION, SOLUTION EPIDURAL; INFILTRATION; INTRACAUDAL; PERINEURAL at 07:40

## 2024-11-12 RX ADMIN — Medication 200 MCG: at 12:50

## 2024-11-12 RX ADMIN — MIDAZOLAM 2 MG: 1 INJECTION INTRAMUSCULAR; INTRAVENOUS at 07:34

## 2024-11-12 RX ADMIN — GABAPENTIN 300 MG: 300 CAPSULE ORAL at 17:04

## 2024-11-12 RX ADMIN — ONDANSETRON 4 MG: 2 INJECTION INTRAMUSCULAR; INTRAVENOUS at 12:36

## 2024-11-12 RX ADMIN — KETOROLAC TROMETHAMINE 30 MG: 30 INJECTION, SOLUTION INTRAMUSCULAR; INTRAVENOUS at 12:35

## 2024-11-12 RX ADMIN — FENTANYL CITRATE 50 MCG: 50 INJECTION INTRAMUSCULAR; INTRAVENOUS at 07:45

## 2024-11-12 RX ADMIN — Medication 200 MCG: at 12:59

## 2024-11-12 RX ADMIN — DOCUSATE SODIUM 100 MG: 100 CAPSULE, LIQUID FILLED ORAL at 17:04

## 2024-11-12 RX ADMIN — KETAMINE HYDROCHLORIDE 20 MG: 100 INJECTION INTRAMUSCULAR; INTRAVENOUS at 07:40

## 2024-11-12 RX ADMIN — PHENYLEPHRINE HYDROCHLORIDE 20 MCG/MIN: 50 INJECTION INTRAVENOUS at 08:01

## 2024-11-12 RX ADMIN — Medication 100 MCG: at 10:18

## 2024-11-12 RX ADMIN — Medication 100 MCG: at 10:15

## 2024-11-12 RX ADMIN — GABAPENTIN 300 MG: 300 CAPSULE ORAL at 21:28

## 2024-11-12 RX ADMIN — DEXMEDETOMIDINE 8 MCG: 100 INJECTION, SOLUTION INTRAVENOUS at 13:17

## 2024-11-12 RX ADMIN — HYDROMORPHONE HYDROCHLORIDE 0.5 MG: 1 INJECTION, SOLUTION INTRAMUSCULAR; INTRAVENOUS; SUBCUTANEOUS at 09:04

## 2024-11-12 RX ADMIN — SENNOSIDES 8.6 MG: 8.6 TABLET, FILM COATED ORAL at 17:04

## 2024-11-12 RX ADMIN — GLYCOPYRROLATE 0.2 MG: 0.2 INJECTION INTRAMUSCULAR; INTRAVENOUS at 08:50

## 2024-11-12 RX ADMIN — ALBUMIN (HUMAN): 12.5 INJECTION, SOLUTION INTRAVENOUS at 08:10

## 2024-11-12 RX ADMIN — ACETAMINOPHEN 975 MG: 325 TABLET, FILM COATED ORAL at 17:54

## 2024-11-12 RX ADMIN — PROPOFOL 150 MG: 10 INJECTION, EMULSION INTRAVENOUS at 07:40

## 2024-11-12 RX ADMIN — ACETAMINOPHEN 975 MG: 325 TABLET, FILM COATED ORAL at 06:46

## 2024-11-12 RX ADMIN — CEFAZOLIN SODIUM 1000 MG: 1 SOLUTION INTRAVENOUS at 17:50

## 2024-11-12 RX ADMIN — Medication 200 MCG: at 12:40

## 2024-11-12 RX ADMIN — FENTANYL CITRATE 50 MCG: 50 INJECTION INTRAMUSCULAR; INTRAVENOUS at 08:41

## 2024-11-12 RX ADMIN — PROPOFOL 120 MCG/KG/MIN: 10 INJECTION, EMULSION INTRAVENOUS at 07:43

## 2024-11-12 RX ADMIN — DEXAMETHASONE SODIUM PHOSPHATE 10 MG: 10 INJECTION, SOLUTION INTRAMUSCULAR; INTRAVENOUS at 07:58

## 2024-11-12 RX ADMIN — PROPOFOL 50 MG: 10 INJECTION, EMULSION INTRAVENOUS at 07:55

## 2024-11-12 RX ADMIN — Medication 100 MG: at 07:40

## 2024-11-12 RX ADMIN — Medication 100 MCG: at 10:45

## 2024-11-12 RX ADMIN — DEXMEDETOMIDINE 12 MCG: 100 INJECTION, SOLUTION INTRAVENOUS at 07:49

## 2024-11-12 RX ADMIN — SODIUM CHLORIDE, SODIUM LACTATE, POTASSIUM CHLORIDE, AND CALCIUM CHLORIDE: .6; .31; .03; .02 INJECTION, SOLUTION INTRAVENOUS at 07:35

## 2024-11-12 RX ADMIN — FENTANYL CITRATE 50 MCG: 50 INJECTION INTRAMUSCULAR; INTRAVENOUS at 08:15

## 2024-11-12 RX ADMIN — FENTANYL CITRATE 50 MCG: 50 INJECTION INTRAMUSCULAR; INTRAVENOUS at 07:40

## 2024-11-12 RX ADMIN — CEFAZOLIN 2000 MG: 1 INJECTION, POWDER, FOR SOLUTION INTRAMUSCULAR; INTRAVENOUS at 11:43

## 2024-11-12 NOTE — ANESTHESIA POSTPROCEDURE EVALUATION
Post-Op Assessment Note    Last Filed PACU Vitals:  Vitals Value Taken Time   Temp 98.4 °F (36.9 °C) 11/12/24 1400   Pulse 78 11/12/24 1600   /59 11/12/24 1600   Resp 18 11/12/24 1600   SpO2 98 % 11/12/24 1600       Modified Mateo:  Activity: 2 (11/12/2024  2:00 PM)  Respiration: 2 (11/12/2024  2:00 PM)  Circulation: 2 (11/12/2024  2:00 PM)  Consciousness: 1 (11/12/2024  2:00 PM)  Oxygen Saturation: 1 (11/12/2024  2:00 PM)  Modified Mateo Score: 8 (11/12/2024  2:00 PM)

## 2024-11-12 NOTE — OP NOTE
OPERATIVE REPORT  PATIENT NAME: Jenny Pereyra    :  1975  MRN: [id number]  Pt Location: AN OR ROOM 03    SURGERY DATE: 2024    Surgeons and Role:  Panel 1:     * Cassandra Lynn Cardarelli, MD - Primary     * Irene Beth PA-C - Assisting     * Kelby Marmolejo MD - Assisting  Panel 2:     * Karthik Brito MD - Primary     * Emma Alejandro PA-C - Assisting     * Lina Claudio DO - Observing    Preop Diagnosis:  Malignant neoplasm of central portion of right breast in female, estrogen receptor positive (HCC) [C50.111, Z17.0]    Post-Op Diagnosis Codes:     * Malignant neoplasm of central portion of right breast in female, estrogen receptor positive (HCC) [C50.111, Z17.0]    Procedure(s):  Right - RIGHT SENTINEL LYMPH NODE MAPPING INCLUDING BLUE DYE INJECTION AND RADIOCOLLOID INJECTION. (INJECT AT 0730 BY DR CARDARELLI IN THE OR)  Bilateral - BILATERAL MASTECTOMY  3.   SENTINEL LYMPH NODE BIOPSY       Specimen(s):  ID Type Source Tests Collected by Time Destination   1 : Left breast, stitch marks; long lateral, short superior Tissue Breast, Left TISSUE EXAM Cassandra Lynn Cardarelli, MD 2024 0855    2 : Left breast lateral margin; stitch marks true margin Tissue Breast, Left TISSUE EXAM Cassandra Lynn Cardarelli, MD 2024 0900    3 : Right breast, stitch marks: long lateral, short superior Tissue Breast, Right TISSUE EXAM Cassandra Lynn Cardarelli, MD 2024 1001    4 : Right axillary sentinel lymph nodes Tissue Lymph Node, [place] TISSUE EXAM Cassandra Lynn Cardarelli, MD 2024 1011    5 : Right breast anterior medial margin; clip marks anterior margin Tissue Breast, Right TISSUE EXAM Cassandra Lynn Cardarelli, MD 2024 1041    6 : Right breast anterior lateral margin; clip marks anterior margin Tissue Breast, Right TISSUE EXAM Cassandra Lynn Cardarelli, MD 2024 1041        Estimated Blood Loss:   100 mL    Drains:  Closed/Suction Drain Left;Lateral;Superior  Chest Bulb 15 Fr. (Active)   Number of days: 0       Closed/Suction Drain Left;Lateral;Inferior Chest Bulb 15 Fr. (Active)   Number of days: 0       Urethral Catheter Non-latex 16 Fr. (Active)   Number of days: 0       Anesthesia Type:   General    Operative Indications:    Malignant neoplasm of central portion of right breast in female, estrogen receptor positive (HCC) [C50.111, Z17.0]      Operative Findings:  Normal fibrofatty breast tissue noted.   Small lymph nodes without gross extension     Procedure and Technique:  The patient was greeted in the preoperative area. Surgical site was confirmed and marked with the assistance of the patient.     Following a timeout for the injection confirming the site of the right breast.  The right breast was injected intradermally with technetium sulfur colloid in the fabian-areolar region and 2.5 cc of isosulfan blue was injected intradermally over the periareolar area.  These areas were vigorously massaged to facilitate identification of the sentinel lymph node (SLN).  The breasts and right axillary region were then prepped and draped in a sterile fashion.  I initiated a time-out, identifying the patient, the correct side and the above procedure.  All parties agreed with the time out..     The planned incision on the left breast was sharply incised encompassing the nipple areolar complex.  Thin flaps were then elevated using electrocautery starting superiorly and then continuing medially, inferiorly and finally laterally.  The tail of Mtz was then dissected away from the axilla proper leaving the breast tethered to the underlying musculature.  The breast was then removed from the muscles in a sub-facial plane. The breast was oriented with sutures (short=superior;  long=lateral). The breast was sent to pathology in formalin for permanent pathologic evaluation.  Meticulous hemostasis was maintained with electrocautery.  The wound was extensively irrigated and hemostasis was  achieved.    Attention was then turned to the right breast.  The planned incision on the right breast was sharply incised encompassing the nipple areolar complex.  Thin flaps were then elevated using electrocautery starting superiorly and then continuing medially, inferiorly and finally laterally.  The tail of Smith was then dissected away from the axilla proper leaving the breast tethered to the underlying musculature.  The breast was then removed from the muscles in a sub-facial plane. The breast was oriented with sutures (short=superior;  long=lateral). The breast was x-rayed in the operating room to confirm clip was removed.  Specimen was then sent to pathology in formalin for permanent pathologic evaluation.  Meticulous hemostasis was maintained with electrocautery.  The wound was extensively irrigated and hemostasis was achieved. With the axillary region exposed following removal of the breast, the axillary tissue was identified. The [employer] counter was used to help localize the SLN.  The sentinel lymph node was identified and removed with electrocautery.    The SLN was faintly blue and radioactive.  The lymph node was grossly normal and sent for permanent pathologic analysis. An additional radioactive only node was removed. There was an area of firm tissue without obvious lymph node present that was also sent. It is notable that the nodes were very small in nature. Meticulous hemostasis was maintained with electrocautery.     The case was then turned over to the plastic reconstructive team for tissue expander placement.    I was present for the entire breast surgical oncology portion of the case.    Complications:   None    Patient Disposition:  PACU     [place] Node Biopsy for Breast Cancer - Right  Operation performed with curative intent. Yes   Tracer(s) used to identify sentinel nodes in the upfront surgery (non-neoadjuvant) setting (select all that apply). Dye and Radioactive tracer   Tracer(s) used to  identify sentinel nodes in the neoadjuvant setting (select all that apply). N/A   All nodes (colored or non-colored) present at the end of a dye-filled lymphatic channel were removed. Yes   All significantly radioactive nodes were removed. Yes   All palpably suspicious nodes were removed. Yes   Biopsy-proven positive nodes marked with clips prior to chemotherapy were identified and removed. N/A              SIGNATURE: Cassandra Lynn Cardarelli, MD  DATE: November 12, 2024  TIME: 10:57 AM

## 2024-11-12 NOTE — DISCHARGE INSTR - AVS FIRST PAGE
Post-Op Discharge Instructions  Dr. Karthik Brito  [facility]  [address], [address], [place] [address]  [phone]    You may shower in 48 hours. Please shower with your back facing the water. Do not scrub incision sites.     Keep surgical bra on at all times, except to shower.  It is ok to remove bra while laying down at rest for short intervals for comfort  After 2 weeks, it is ok to switch to a sports bra.  It is recommended to use one that sara in the front.  No underwire bra for 6 weeks.  Use ABD pads or a form of padding for extra compression.    No strenuous activity or lifting over 10 lbs for a minimum of 4 weeks. No repetitive pushing, pulling or overhead arm motions.    Please record fluid from both of your drains daily and bring this to your first post-op appointment. After emptying the drain, be sure to squeeze the bulb as you reseal the cap.    Please lay on your back, with your head at an incline.   Follow up Friday 11/15  Your first post-op appointment is scheduled for 11/20/2024 @ 11AM at the [facility].    Take following medications with a checkmark ([x]) as prescribed, and do not take any pain medication on an empty stomach.  [x]Tylenol 500mg, every 6 hours as needed for pain control.  [x]Gabapentin 300mg, every 8 hours as needed for nerve pain.  [x]Flexeril 10mg, every 8 hours as needed for muscle spasms/pain.  [x]Oxycodone, 5mg, 1 tablet every 4 hours, as needed for severe/break-through  pain.  [x]Bactrim 800mg, 1 tablet every 12 hours. (Antibiotic)  Encourage taking probiotic supplements and foods rich with probiotics such as yogurt    Resume any prior medications at home unless otherwise instructed by Dr. Brito    You must be off all pain medications and have a clear field of vision before driving.    For the next 24 hours:  a. Do not sign any legal documents or operate machinery.  b. Have a responsible adult help you.  c. Take it  easy & rest.    Call Dr. Brito at the office [phone] if any:   a. Obvious bleeding, excessive swelling, or warmth at the site  b. Fever over 101.0°  c. Shortness of breath, severe calf or thigh pain.  d. Redness, odor, or pus at the wound. (Some oozing is normal from incision sites and may continue for several days)  e. Persistent vomiting or pain that is not relieved by your medication.

## 2024-11-12 NOTE — INTERVAL H&P NOTE
H&P reviewed. After examining the patient I find no changes in the patients condition since the H&P had been written. No interval changes in health.     Vitals:    11/12/24 0636   BP: 117/68   Pulse: (!) 54   Resp: 18   Temp: 98 °F (36.7 °C)   SpO2: 95%

## 2024-11-12 NOTE — PROGRESS NOTES
OSW called [facility]'s [facility] to verify that pt has been approved for service. Pt was approved and SOC will begin on 11/14. OSW sent an email to the inpatient case management team.

## 2024-11-12 NOTE — ANESTHESIA PREPROCEDURE EVALUATION
Procedure:  BILATERAL MASTECTOMY (Bilateral: Breast)  RIGHT SENTINEL LYMPH NODE MAPPING INCLUDING BLUE DYE INJECTION AND RADIOCOLLOID INJECTION, SENTINEL LYMPH NODE BIOPSY (INJECT AT 0730 BY DR CARDARELLI IN THE OR) (Right: Axilla)  POSSIBLE AXILLARY LYMPH NODE DISSECTION SHOULD LYMPH NODES NOT MAP (Right: Axilla)  BILATERAL IMMEDIATE BREAST RECONSTRUCTION WITH TISSUE EXPANDERS AND ADM (Bilateral: Breast)    Relevant Problems   ANESTHESIA   (+) PONV (postoperative nausea and vomiting)      CARDIO   (+) Chronic migraine without aura   (+) Other hyperlipidemia      GI/HEPATIC   (+) Gastroesophageal reflux disease without esophagitis      /RENAL   (+) Kidney stone on left side      GYN   (+) Malignant neoplasm of central portion of right breast in female, estrogen receptor positive (HCC)      MUSCULOSKELETAL   (+) DDD (degenerative disc disease), lumbar      NEURO/PSYCH   (+) Anxiety   (+) Chronic migraine without aura      PULMONARY   (+) Stage 1 mild COPD by GOLD classification ([facility])        Physical Exam    Airway    Mallampati score: II  TM Distance: >3 FB  Neck ROM: full     Dental       Cardiovascular  Cardiovascular exam normal    Pulmonary  Pulmonary exam normal     Other Findings  post-pubertal.      Anesthesia Plan  ASA Score- 2     Anesthesia Type- general with ASA Monitors.         Additional Monitors:     Airway Plan:            Plan Factors-Exercise tolerance (METS): >4 METS.    Chart reviewed.   Existing labs reviewed. Patient summary reviewed.    Patient is not a current smoker. Patient not instructed to abstain from smoking on day of procedure. Patient did not smoke on day of surgery.            Induction-     Postoperative Plan-     Perioperative Resuscitation Plan - Level 1 - Full Code.       Informed Consent- Anesthetic plan and risks discussed with patient and spouse.  I personally reviewed this patient with the CRNA. Discussed and agreed on the Anesthesia Plan with the CRNA..        Lab Results    Component Value Date    HGBA1C 5.5 04/06/2024       Lab Results   Component Value Date    K 3.9 10/14/2024     10/14/2024    CO2 27 10/14/2024    BUN 20 10/14/2024    CREATININE 0.82 10/14/2024    GLUF 77 10/14/2024    CALCIUM 8.9 10/14/2024    AST 15 10/14/2024    ALT 10 10/14/2024    ALKPHOS 34 10/14/2024    EGFR 85 10/14/2024       Lab Results   Component Value Date    WBC 7.95 10/14/2024    HGB 12.9 10/14/2024    HCT 39.3 10/14/2024    MCV 91 10/14/2024     10/14/2024

## 2024-11-12 NOTE — ANESTHESIA POSTPROCEDURE EVALUATION
Post-Op Assessment Note    CV Status:  Stable    Pain management: adequate       Mental Status:  Awake and sleepy   Hydration Status:  Euvolemic   PONV Controlled:  Controlled   Airway Patency:  Patent  Two or more mitigation strategies used for obstructive sleep apnea   Post Op Vitals Reviewed: Yes    No anethesia notable event occurred.    Staff: Anesthesiologist, CRNA           Last Filed PACU Vitals:  Vitals Value Taken Time   Temp     Pulse 94 11/12/24 1317   /59 11/12/24 1316   Resp     SpO2 97 % 11/12/24 1317   Vitals shown include unfiled device data.    Modified Mateo:  No data recorded

## 2024-11-13 VITALS
SYSTOLIC BLOOD PRESSURE: 131 MMHG | BODY MASS INDEX: 25.58 KG/M2 | OXYGEN SATURATION: 94 % | HEIGHT: 62 IN | HEART RATE: 65 BPM | DIASTOLIC BLOOD PRESSURE: 71 MMHG | TEMPERATURE: 98 F | RESPIRATION RATE: 18 BRPM | WEIGHT: 139 LBS

## 2024-11-13 PROCEDURE — 99024 POSTOP FOLLOW-UP VISIT: CPT | Performed by: STUDENT IN AN ORGANIZED HEALTH CARE EDUCATION/TRAINING PROGRAM

## 2024-11-13 PROCEDURE — 99024 POSTOP FOLLOW-UP VISIT: CPT | Performed by: PLASTIC SURGERY

## 2024-11-13 RX ORDER — GABAPENTIN 300 MG/1
300 CAPSULE ORAL 3 TIMES DAILY
Qty: 21 CAPSULE | Refills: 0 | Status: SHIPPED | OUTPATIENT
Start: 2024-11-13 | End: 2024-11-20

## 2024-11-13 RX ORDER — OXYCODONE HYDROCHLORIDE 5 MG/1
5 TABLET ORAL EVERY 4 HOURS PRN
Qty: 10 TABLET | Refills: 0 | Status: SHIPPED | OUTPATIENT
Start: 2024-11-13

## 2024-11-13 RX ORDER — SULFAMETHOXAZOLE AND TRIMETHOPRIM 800; 160 MG/1; MG/1
1 TABLET ORAL EVERY 12 HOURS SCHEDULED
Qty: 28 TABLET | Refills: 0 | Status: SHIPPED | OUTPATIENT
Start: 2024-11-13 | End: 2024-11-27

## 2024-11-13 RX ORDER — ACETAMINOPHEN 500 MG
500 TABLET ORAL EVERY 6 HOURS PRN
COMMUNITY
Start: 2024-11-13

## 2024-11-13 RX ORDER — CYCLOBENZAPRINE HCL 10 MG
10 TABLET ORAL 3 TIMES DAILY PRN
Qty: 21 TABLET | Refills: 0 | Status: SHIPPED | OUTPATIENT
Start: 2024-11-13 | End: 2024-11-20

## 2024-11-13 RX ADMIN — ENOXAPARIN SODIUM 40 MG: 40 INJECTION SUBCUTANEOUS at 08:18

## 2024-11-13 RX ADMIN — DOCUSATE SODIUM 100 MG: 100 CAPSULE, LIQUID FILLED ORAL at 08:19

## 2024-11-13 RX ADMIN — SENNOSIDES 8.6 MG: 8.6 TABLET, FILM COATED ORAL at 08:19

## 2024-11-13 RX ADMIN — CEFAZOLIN SODIUM 1000 MG: 1 SOLUTION INTRAVENOUS at 01:39

## 2024-11-13 RX ADMIN — GABAPENTIN 300 MG: 300 CAPSULE ORAL at 08:19

## 2024-11-13 RX ADMIN — ACETAMINOPHEN 975 MG: 325 TABLET, FILM COATED ORAL at 08:18

## 2024-11-13 RX ADMIN — ACETAMINOPHEN 975 MG: 325 TABLET, FILM COATED ORAL at 00:00

## 2024-11-13 NOTE — ASSESSMENT & PLAN NOTE
Status post bilateral mastectomy, sentinel lymph node biopsy, bilateral immediate breast reconstruction with tissue expanders on 11/12.   Patient is stable for discharge.   Outpatient follow up is scheduled.

## 2024-11-13 NOTE — PROGRESS NOTES
Progress Note - Oncology-Surgical   Name: Jenny Pereyra 49 y.o. female I MRN: [id number]  Unit/Bed#: OR POOL I Date of Admission: 11/12/2024   Date of Service: 11/13/2024 I Hospital Day: 0  Assessment & Plan  Malignant neoplasm of central portion of right breast in female, estrogen receptor positive (HCC)  Jenny Pereyra is a 49 y.o. female now status post bilateral mastectomy, sentinel lymph node biopsy, bilateral immediate breast reconstruction with tissue expanders.    -Regular diet  - As needed pain and nausea control  - Patient deemed safe and appropriate from discharge from our team as well as plastic surgery  - Patient to follow-up outpatient for postop evaluation    Please contact the SecureChat role for the Oncology-Surgical service with any questions/concerns.    Subjective   NAEON. AVSS on room air. Patient reports pain is controlled. Tolerating diet. Voiding spontaneously and passing flatus. Denies fever, chills, nausea, vomiting.     Objective :  Temp:  [97.8 °F (36.6 °C)-98.5 °F (36.9 °C)] 98.5 °F (36.9 °C)  HR:  [59-96] 65  BP: ()/(52-70) 103/57  Resp:  [17-20] 19  SpO2:  [93 %-98 %] 94 %  O2 Device: None (Room air)  FiO2 (%):  [2-6] 2    I/O         11/11 0701  11/12 0700 11/12 0701  11/13 0700    I.V. (mL/kg)  1400 (22.2)    IV Piggyback  250    Total Intake(mL/kg)  1650 (26.2)    Urine (mL/kg/hr)  500 (0.6)    Drains  130    Blood  100    Total Output  730    Net  +920          Unmeasured Urine Occurrence  1 x          Lines/Drains/Airways       Active Status       Name Placement date Placement time Site Days    Closed/Suction Drain Left;Lateral;Superior Chest Bulb 15 Fr. 11/12/24  0936  Chest  less than 1    Closed/Suction Drain Left;Lateral;Inferior Chest Bulb 15 Fr. 11/12/24  0938  Chest  less than 1    Closed/Suction Drain Right;Lateral;Superior Chest Bulb 15 Fr. 11/12/24  1124  Chest  less than 1    Closed/Suction Drain Right;Lateral;Inferior Chest Bulb 15 Fr. 11/12/24  1126  Chest   less than 1                  Physical Exam  General: NAD  HENT: NCAT MMM  Neck: supple, no JVD  CV: nl rate  Lungs: nl wob. No resp distress  Chest: Dressing in place with overlying support bra  ABD: Soft, nontender, nondistended  Extrem: No CCE  Neuro: AAOx3    VTE Pharmacologic Prophylaxis: Enoxaparin (Lovenox)  VTE Mechanical Prophylaxis: sequential compression device    Kelby Marmolejo MD  General Surgery Resident

## 2024-11-13 NOTE — ASSESSMENT & PLAN NOTE
Jenny Pereyra is a 49 y.o. female now status post bilateral mastectomy, sentinel lymph node biopsy, bilateral immediate breast reconstruction with tissue expanders.    -Regular diet  - As needed pain and nausea control  - Patient deemed safe and appropriate from discharge from our team as well as plastic surgery  - Patient to follow-up outpatient for postop evaluation

## 2024-11-13 NOTE — PROGRESS NOTES
Progress Note - Plastic Surgery   Name: Jenny Pereyra 49 y.o. female I MRN: [id number]  Unit/Bed#: OR POOL I Date of Admission: 11/12/2024   Date of Service: 11/13/2024 I Hospital Day: 0    Assessment & Plan  Malignant neoplasm of central portion of right breast in female, estrogen receptor positive (HCC)  Status post bilateral mastectomy, sentinel lymph node biopsy, bilateral immediate breast reconstruction with tissue expanders on 11/12.   Patient is stable for discharge.   Outpatient follow up is scheduled.     24 Hour Events : No acute events overnight,  Subjective : Patient seen sitting comfortably in her chair today with family at bedside. She has minimal pain. She has no complaints today and feels eager to go home. She is ambulating without difficulty. She is voiding and passing flatus. Denies fevers, chills, nausea, vomiting.     Objective :  Temp:  [97.7 °F (36.5 °C)-98.5 °F (36.9 °C)] 97.7 °F (36.5 °C)  HR:  [59-96] 77  BP: ()/(52-70) 136/68  Resp:  [17-20] 18  SpO2:  [93 %-98 %] 94 %  O2 Device: None (Room air)  FiO2 (%):  [2-6] 2     I/O         11/11 0701 11/12 0700 11/12 0701 11/13 0700 11/13 0701 11/14 0700    P.O.  300     I.V. (mL/kg)  1900 (30.2)     IV Piggyback  250     Total Intake(mL/kg)  2450 (38.9)     Urine (mL/kg/hr)  500 (0.3) 0 (0)    Drains  190 70    Blood  100     Total Output  790 70    Net  +1660 -70           Unmeasured Urine Occurrence  3 x 1 x          Lines/Drains/Airways       Active Status       Name Placement date Placement time Site Days    Closed/Suction Drain Left;Lateral;Superior Chest Bulb 15 Fr. 11/12/24  0936  Chest  1    Closed/Suction Drain Left;Lateral;Inferior Chest Bulb 15 Fr. 11/12/24  0938  Chest  1    Closed/Suction Drain Right;Lateral;Superior Chest Bulb 15 Fr. 11/12/24  1124  Chest  less than 1    Closed/Suction Drain Right;Lateral;Inferior Chest Bulb 15 Fr. 11/12/24  1126  Chest  less than 1                  Physical Exam  Constitutional:        "General: She is not in acute distress.     Appearance: Normal appearance. She is not ill-appearing.   Cardiovascular:      Rate and Rhythm: Normal rate.   Pulmonary:      Effort: Pulmonary effort is normal.   Chest:      Comments: B/l breast incisions clean and dry with mild surrounding ecchymosis. 2 GRACE drains on each side in place. Serosanguinous drainage from all drains.   Musculoskeletal:         General: Normal range of motion.   Skin:     General: Skin is warm and dry.   Neurological:      General: No focal deficit present.      Mental Status: She is alert and oriented to person, place, and time.   Psychiatric:         Mood and Affect: Mood normal.         Behavior: Behavior normal.           Lab Results: I have reviewed the following results:  CBC with diff:   Lab Results   Component Value Date    WBC 7.95 10/14/2024    HGB 12.9 10/14/2024    HCT 39.3 10/14/2024    MCV 91 10/14/2024     10/14/2024    RBC 4.34 10/14/2024    MCH 29.7 10/14/2024    MCHC 32.8 10/14/2024    RDW 12.3 10/14/2024    MPV 10.5 10/14/2024    NRBC 0 10/14/2024   ,   BMP/CMP:  Lab Results   Component Value Date    SODIUM 137 10/14/2024    SODIUM 144 01/15/2022    K 3.9 10/14/2024    K 3.8 01/15/2022     10/14/2024     (H) 01/15/2022    CO2 27 10/14/2024    CO2 23 01/15/2022    BUN 20 10/14/2024    BUN 18 01/15/2022    CREATININE 0.82 10/14/2024    CREATININE 0.87 01/15/2022    CALCIUM 8.9 10/14/2024    CALCIUM 9.9 01/15/2022    AST 15 10/14/2024    AST 16 01/15/2022    ALT 10 10/14/2024    ALT 27 01/15/2022    ALKPHOS 34 10/14/2024    ALKPHOS 77 01/15/2022    EGFR 85 10/14/2024    EGFR 89 09/12/2020   ,   Lipid Panel: No results found for: \"CHOL\",   Coags: No results found for: \"PT\", \"PTT\", \"INR\",   Blood Culture: No results found for: \"BLOODCX\",   Urinalysis:   Lab Results   Component Value Date    COLORU Light Yellow 06/24/2024    COLORU yellow 06/24/2024    CLARITYU Clear 06/24/2024    CLARITYU clear 06/24/2024    " "SPECGRAV 1.011 06/24/2024    PHUR 6.0 06/24/2024    LEUKOCYTESUR Small (A) 06/24/2024    LEUKOCYTESUR trace 06/24/2024    NITRITE Negative 06/24/2024    NITRITE negative 06/24/2024    GLUCOSEU Negative 06/24/2024    GLUCOSEU negative 06/24/2024    KETONESU Negative 06/24/2024    KETONESU negative 06/24/2024    BILIRUBINUR Negative 06/24/2024    BILIRUBINUR negative 06/24/2024    BLOODU Negative 06/24/2024    BLOODU negative 06/24/2024   ,   Urine Culture:   Lab Results   Component Value Date    URINECX No Growth <1000 cfu/mL 12/13/2023   ,   Wound Culure: No results found for: \"WOUNDCULT\"    Imaging Results Review: No pertinent imaging studies reviewed.  Other Study Results Review: No additional pertinent studies reviewed.    VTE Prophylaxis: VTE covered by:  enoxaparin, Subcutaneous, 40 mg at 11/13/24 0818     "

## 2024-11-14 ENCOUNTER — TELEPHONE (OUTPATIENT)
Age: 49
End: 2024-11-14

## 2024-11-14 ENCOUNTER — HOME CARE VISIT (OUTPATIENT)
Dept: HOME HEALTH SERVICES | Facility: HOME HEALTHCARE | Age: 49
End: 2024-11-14
Payer: COMMERCIAL

## 2024-11-14 VITALS
HEART RATE: 82 BPM | SYSTOLIC BLOOD PRESSURE: 128 MMHG | DIASTOLIC BLOOD PRESSURE: 70 MMHG | TEMPERATURE: 98.9 F | RESPIRATION RATE: 16 BRPM | OXYGEN SATURATION: 97 %

## 2024-11-14 PROCEDURE — 400013 VN SOC

## 2024-11-14 PROCEDURE — G0299 HHS/HOSPICE OF RN EA 15 MIN: HCPCS

## 2024-11-14 NOTE — TELEPHONE ENCOUNTER
Rec'd call from patient requesting to schedule a f/u for tomorrow. Surgery was on 11/12. Patient stated that she was discharged yesterday and was instructed by Dr. Brito to schedule a f/u to see him.    I was unable to find any availability for tomorrow. I assured patient she will be contacted asap to be scheduled.    Rossy, I was hoping you may be able to assist with getting patient scheduled?

## 2024-11-15 ENCOUNTER — OFFICE VISIT (OUTPATIENT)
Dept: PLASTIC SURGERY | Facility: CLINIC | Age: 49
End: 2024-11-15

## 2024-11-15 DIAGNOSIS — C50.111 MALIGNANT NEOPLASM OF CENTRAL PORTION OF RIGHT BREAST IN FEMALE, ESTROGEN RECEPTOR POSITIVE (HCC): Primary | ICD-10-CM

## 2024-11-15 DIAGNOSIS — Z17.0 MALIGNANT NEOPLASM OF CENTRAL PORTION OF RIGHT BREAST IN FEMALE, ESTROGEN RECEPTOR POSITIVE (HCC): Primary | ICD-10-CM

## 2024-11-15 PROCEDURE — 99024 POSTOP FOLLOW-UP VISIT: CPT | Performed by: PLASTIC SURGERY

## 2024-11-16 NOTE — OP NOTE
OPERATIVE REPORT  PATIENT NAME: Jenny Pereyra    :  1975  MRN: [id number]  Pt Location: AN OR ROOM 03    SURGERY DATE: 2024    Surgeons and Role:  Panel 1:     * Cassandra Lynn Cardarelli, MD - Primary     * Irene Beth PA-C - Assisting     * Kelby Marmolejo MD - Assisting  Panel 2:     * Karthik Brito MD - Primary     * Emma Alejandro PA-C - Assisting     * Lina Claudio DO - Observing    A qualified resident physician was not available. and A physician assistant was required during the procedure for retraction, tissue handling, dissection and suturing.      Preop Diagnosis:  Malignant neoplasm of central portion of right breast in female, estrogen receptor positive (HCC) [C50.111, Z17.0]    Post-Op Diagnosis Codes:     * Malignant neoplasm of central portion of right breast in female, estrogen receptor positive (HCC) [C50.111, Z17.0]    Procedure(s):  1) Bilateral - BILATERAL MASTECTOMY - dictated separately  2) Right - RIGHT SENTINEL LYMPH NODE MAPPING INCLUDING BLUE DYE INJECTION AND RADIOCOLLOID INJECTION. SENTINEL LYMPH NODE BIOPSY (INJECT AT 0730 BY DR CARDARELLI IN THE OR) - dictated separately.  3) Bilateral - BILATERAL IMMEDIATE BREAST RECONSTRUCTION WITH TISSUE EXPANDERS AND ADM (CPT# 01396-91)    Specimen(s):  ID Type Source Tests Collected by Time Destination   1 : Left breast, stitch marks; long lateral, short superior Tissue Breast, Left TISSUE EXAM Cassandra Lynn Cardarelli, MD 2024 0855    2 : Left breast lateral margin; stitch marks true margin Tissue Breast, Left TISSUE EXAM Cassandra Lynn Cardarelli, MD 2024 0900    3 : Right breast, stitch marks: long lateral, short superior Tissue Breast, Right TISSUE EXAM Cassandra Lynn Cardarelli, MD 2024 1001    4 : Right axillary sentinel lymph nodes Tissue Lymph Node, [place] TISSUE EXAM Cassandra Lynn Cardarelli, MD 2024 1011    5 : Right breast anterior medial margin; clip marks anterior margin  Tissue Breast, Right TISSUE EXAM Cassandra Lynn Cardarelli, MD 11/12/2024 1041    6 : Right breast anterior lateral margin; clip marks anterior margin Tissue Breast, Right TISSUE EXAM Cassandra Lynn Cardarelli, MD 11/12/2024 1041        Estimated Blood Loss:   100 mL    Drains:  Closed/Suction Drain Left;Lateral;Superior Chest Bulb 15 Fr. (Active)   Site Description Unable to view 11/13/24 0400   Dressing Status Clean;Dry;Intact 11/13/24 0400   Drainage Appearance Bloody 11/13/24 1001   Status To bulb suction 11/13/24 0400   Output (mL) 10 mL 11/13/24 1001   Number of days: 4       Closed/Suction Drain Left;Lateral;Inferior Chest Bulb 15 Fr. (Active)   Site Description Unable to view 11/13/24 0400   Dressing Status Clean;Dry;Intact 11/13/24 0400   Drainage Appearance Bloody 11/13/24 1001   Status To bulb suction 11/13/24 0400   Output (mL) 20 mL 11/13/24 1001   Number of days: 4       Closed/Suction Drain Right;Lateral;Superior Chest Bulb 15 Fr. (Active)   Site Description Unable to view 11/13/24 0400   Dressing Status Clean;Dry;Intact 11/13/24 0400   Drainage Appearance Bloody 11/13/24 1001   Status To bulb suction 11/13/24 0400   Output (mL) 10 mL 11/13/24 1001   Number of days: 4       Closed/Suction Drain Right;Lateral;Inferior Chest Bulb 15 Fr. (Active)   Site Description Unable to view 11/13/24 0400   Dressing Status Clean;Dry;Intact 11/13/24 0400   Drainage Appearance Bloody 11/13/24 1001   Status To bulb suction 11/13/24 0400   Output (mL) 30 mL 11/13/24 1001   Number of days: 4       [REMOVED] Urethral Catheter Non-latex 16 Fr. (Removed)   Number of days: 0       IMPLANTS:  RIGHT SIDE-  1) [place] Artoura PLUS, Smooth, Breast tissue expander - 300cc      REF# [id number]      SERIAL# [id number]    2) FlexHD Pliable PRE-XL- 23x26 cm      ITEM# [id number]      SERIAL# [id number]    LEFT SIDE -  1) [place] Art[place]ra PLUS, Smooth, Breast tissue expander - 300cc      REF# [id number]      SERIAL# [id number]    2) FlexHD  Pliable PRE-XL-23x26 cm      ITEM# [id number]      SERIAL# [id number]    Anesthesia Type:   General    Operative Indications:  Malignant neoplasm of central portion of right breast in female, estrogen receptor positive (HCC) [C50.111, Z17.0]    Jenny Pereyra is a 50 y/o female with a new diagnosis of ER+/DE+/HER2- invasive breast cancer of the right breast.  Patient has elected to undergo bilateral skin sparing mastectomy for treatment of her right breast cancer.  We reviewed her goals for reconstruction.  Patient has elected to undergo immediate breast reconstruction with tissue expanders at this time.  This will be followed by second stage reconstruction with breast implants.    Operative Findings:  Viable mastectomy skin flaps bilaterally.      Complications:   None    Procedure and Technique:  The patient was identified in the preoperative holding area.  The patient sternal notch and xiphoid were marked as well as her anatomic midline.  The patient's inframammary folds were marked as well.  A 15 cm long transverse ellipse was designed on the bilateral breasts centered on each nipple.  The height of each ellipse was such that it ran just off of the edge of the areola on each side.    The patient was then taken the operating room and placed on the operating room in the supine position.  The patient received appropriate weight-based antibiotics, SCDs were placed on her legs for DVT prophylaxis.  A surgical timeout was performed confirming the patient's identity and the procedure to be performed.  Smooth induction of general endotracheal anesthesia was achieved.  The patient's chest was then prepped and draped with Betadine in the usual sterile fashion.    Dr. Cardarelli and her team started work on the left chest, performing skin sparing mastectomy.  (Dictated separately).  Upon completion of the left mastectomy we began by reprepping the skin with Betadine and using clean towels to isolate the area.  Inferior and  superior mastectomy skin flaps appeared viable with no significant areas of concern.  Inspection of the breast pocket revealed intact medial breast border as well as an intact inframammary fold.  The breast pocket was then irrigated with copious sterile saline.  Meticulous hemostasis was achieved.  We then measured breast base with from within the breast pocket which demonstrated a base width of approximately 11 cm.  The breast pocket was then irrigated with an antibiotic solution containing Ancef and gentamicin.  This was allowed to sit in the pocket.  We then selected the [place] Rigoberto plus smooth 300cc breast tissue expander to fit the space.  We then changed our gloves.  We also selected the Flex HD pliable pre-XL 23 x 26 cm acellular dermal matrix.  The acellular dermal matrix was soaked in antibiotic irrigation for 5 minutes, wrung out and rinsed 1 more time and antibiotic irrigation.  The ADM was then wrung out once more and placed inside a sterile basin.  The tissue expander was then opened and rinsed in antibiotic solution.  Then using a sterile syringe and butterfly needle all of the air was removed from the expander.  We then trimmed the prefenestrated acellular dermal matrix and wrapped the expander such that the entire anterior surface was covered with the prefenestrated acellular dermal matrix.  The edges of the acellular dermal matrix were sewn together over the back of the device using 3-0 Vicryl sutures so that it would stay in place.  The the wrapped device was then placed back into its packaging with the antibiotic irrigation and covered.  The left breast pocket was inspected once more meticulous hemostasis achieved and then irrigated with dilute Betadine solution.  The surrounding skin was then wiped down with dilute Betadine solution.  We then changed our gloves.  Retractors were dipped in Betadine and used to lift the mastectomy skin flaps and expose the breast pocket.  The wrapped tissue  expander was then placed into the chest in the prepectoral plane and secured using 3-0 PDS along the medial and inferior most margins of the breast pocket using all 5 inferior tabs.  The superior tab was not used.  The mastectomy skin flaps were then laid over top of the secured expander and came together comfortably without significant tension.  The skin edges appeared healthy and it appeared that any significant trimming of the edges may create unnecessary tension in the closure.  No skin edges were trimmed at this time.  Two 15 [place] Radhames drains were then placed in the breast pocket exiting the left chest wall skin.  One drain was run along the inferior aspect of the pocket while the other drain was run into the axilla and along the superior aspect of the pocket. The drains were secured with 2-0 nylon sutures.  Pecs 1 and 2 blocks were performed using a mix of 10mL Exparel and 10mL 0.25% bupivacaine.  Skin was closed using 3-0 Vicryl sutures in the deep dermal layer in a simple interrupted fashion.  The superficial layer was then closed with a running 4-0 Monocryl STRATAFIX.  The skin surface was cleaned and skin glue applied to the incision.  The drains were placed to bulb suction and the skin was inspected.  The skin did not appear overly taut or stretched.  There was slight hyperemia of the skin of the lower mastectomy skin flap with 2-3 second cap refill present. Nitropaste was applied.  No volume was added to the expander.    Dr. Cardarelli then completed her right mastectomy and right sentinel lymph node biopsy. Again, we began by reprepping the skin with Betadine and using clean towels to isolate the area.  Inferior and superior mastectomy skin flaps appeared viable with no significant areas of concern.  Inspection of the breast pocket revealed intact medial breast border as well as an intact inframammary fold.  The breast pocket was then irrigated with copious sterile saline.  Meticulous hemostasis was  achieved.  We then measured breast base with from within the breast pocket which demonstrated a similar breast base width of approximately 11 cm.  The breast pocket was then irrigated with an antibiotic solution containing Ancef and gentamicin.  This was allowed to sit in the pocket.  We then selected the [place] Rigoberto plus smooth 300cc breast tissue expander to fit the space.  We then changed our gloves.  We also selected the Flex HD pliable pre-XL 23 x 26 cm acellular dermal matrix.  The ADM and expander were prepared in the exact same fashion as described on the left. The right breast pocket was then irrigated with dilute betadine and surrounding skin wiped down with betadine.  The retractors were dipped in betadine and the wrapped expander was secured to the chest using 3-0 PDS in the prepectoral plane along the medial and inferior most breast borders using the inferior 5 tabs.  The superior tab was not used. The kin was draped over the secured expander and the decision was made to excise no skin to avoid undue tension on the closure. Skin edges appeared healthy and bleeding.  Pecs 1 and 2 blocks were performed with a mix of 10mL Exparel and 10mL 0.25% bupivacaine. Two 15F pari drains were placed exiting the right chest wall skin. One  drain was draped along the inferior aspect of the breast pocket while the other drain was run into the axilla then along the superior aspect of the breast pocket. The drains were secured with 2-0 nylon sutures. Skin was closed using 3-0 Vicryl sutures in the deep dermal layer in a simple interrupted fashion.  The superficial layer was then closed with a running 4-0 Monocryl STRATAFIX.  The skin surface was cleaned and skin glue applied to the incision.  The drains were placed to bulb suction and the skin was inspected.  The skin did not appear overly taut or stretched. No significant discoloration.    The drains were dressed with Biopatches, gauze, and Tegaderm.  A surgical bra  was then placed on the patient.  The patient awoke from general anesthesia without complication.  Patient was delivered to the postanesthesia care unit in stable condition.  At the end of the case all counts were correct, no specimen sent from the plastic surgery portion of the case.  I was present and scrubbed for the entirety of the case.     Patient Disposition:  PACU         SIGNATURE: Karthik Brito MD  DATE: November 16, 2024  TIME: 1:47 PM

## 2024-11-18 ENCOUNTER — PATIENT OUTREACH (OUTPATIENT)
Dept: CASE MANAGEMENT | Facility: OTHER | Age: 49
End: 2024-11-18

## 2024-11-18 NOTE — ASSESSMENT & PLAN NOTE
Jenny Pereyra is a 48 y/o female with a diagnosis of right breast cancer who is POD#2 s/p  bilateral mastectomies, right sentinel lymph node biopsy and immediate bilateral breast reconstruction with tissue expanders.  Mild ecchymosis noted over the inferior mastectomy skin flap postoperatively. Applied DMSO to bilateral mastectomy skin flaps.     Skin color has improved since day of surgery.  Mild hyperemia of left breast skin  and lateral right breast skin all with appropriate cap refill. Small area of skin discoloration along lateral aspect of right breast incision - will continue to monitor.  May require excision and closure in clinic if does not improve.    - Continue to wear bra  - continue to empty and measure drains 3x daily  - DMSO to bilateral breast skin once daily after shower  - No heavy lifting or strenuous activity  - Up and about, ambulating at least 4x a day.  - f/u next week.

## 2024-11-18 NOTE — PROGRESS NOTES
OSW placed outreach TC to pt this afternoon. Pt states that her surgery went well. She is sore and very exhausted. She shared that her mother passed away this morning at 2 am. OSW expressed how sorry I am to heat that. She shared that Tuesday was her last good day and since Friday she had been unresponsive. She did have all of her arrangements planned. The pt states that it has been a tough few days, she hasn't been able to sleep and is expectedly feeling sore. She described some random burning and spots underneath her right side and left under her armpits.   She was prescribed gabapentin, however she hasn't been taking. She states that she thinks she will start taking it. She is happy that the surgery is done and that the cancer is gone. She has an upcoming consult with Dr. Xavier on 12/2. She has another post op with Dr. Brito and will discuss how much time she needs to be out of work. She doesn't want to have to complete the [facility] paperwork again and pay another fee.     She expressed that she is thinking of having a celebration of life for her mother. She is thinking of doing it at her home. She states that they have a very small family. She is going to speak to her  about it once she gets some sleep. She shared that he is concerned that she is not sleeping and is feeling stressed.  OSW offered to check in with her in a few weeks and she was appreciative.   OSW encouraged her to call with any needs.

## 2024-11-18 NOTE — PROGRESS NOTES
[facility]   Plastic and Reconstructive Surgery   [address] [place] [id number]       Assessment & Plan  Malignant neoplasm of central portion of right breast in female, estrogen receptor positive (HCC)    Jenny Pereyra is a 50 y/o female with a diagnosis of right breast cancer who is POD#2 s/p  bilateral mastectomies, right sentinel lymph node biopsy and immediate bilateral breast reconstruction with tissue expanders.  Mild ecchymosis noted over the inferior mastectomy skin flap postoperatively. Applied DMSO to bilateral mastectomy skin flaps.     Skin color has improved since day of surgery.  Mild hyperemia of left breast skin  and lateral right breast skin all with appropriate cap refill. Small area of skin discoloration along lateral aspect of right breast incision - will continue to monitor.  May require excision and closure in clinic if does not improve.    - Continue to wear bra  - continue to empty and measure drains 3x daily  - DMSO to bilateral breast skin once daily after shower  - No heavy lifting or strenuous activity  - Up and about, ambulating at least 4x a day.  - f/u next week.      History of Present Illness   Jenny Pereyra is a 49 y.o. female who is POD#2 s/p b/l mastectomies, right sentinel lymph node biopsy and immediate b/l breast reconstruction with tissue expanders. Patient notes that she is doing well overall. Pain is well controlled. Notes that she has had a small amount of drainage from around her right drain sites.  Most notably she feels tired.  Denies fevers, chills, chest pain or shortness of breath, drainage from incisions.     Review of Systems   Constitutional:  Positive for fatigue. Negative for activity change, appetite change, chills and fever.   HENT:  Negative for sore throat.    Respiratory:  Negative for chest tightness and shortness of breath.    Cardiovascular:  Negative for chest pain and leg swelling.   Gastrointestinal:  Negative for abdominal pain.    Musculoskeletal:         Noted soreness in the right axilla with upper extremity range of motion.   Skin:  Positive for color change.        Postoperative bruising of mastectomy skin flaps b/l   Neurological:  Negative for weakness.   Psychiatric/Behavioral:  Negative for confusion.        Physical Exam  Constitutional:       General: She is not in acute distress.     Appearance: Normal appearance. She is normal weight.   HENT:      Head: Normocephalic and atraumatic.   Eyes:      Extraocular Movements: Extraocular movements intact.   Cardiovascular:      Rate and Rhythm: Normal rate.   Pulmonary:      Effort: Pulmonary effort is normal. No respiratory distress.   Chest:          Comments: Bilateral chest incisions well approximated without dehiscence of drainage from incision.  No appreciable fullness or fluctuance bilaterally. Mild hyperemia of the left breast mastectomy skin flaps. 2-3s cap refill. Mild hyperemia of the right breast lateral skin flaps - 2-3s cap refill.  2cm area over the lateral aspect of the right chest incision with darker ecchymosis.  Bilateral drains SS.  Abdominal:      Palpations: Abdomen is soft.   Musculoskeletal:      Cervical back: Normal range of motion.      Comments: ROM normal within ROM restrictions   Skin:     General: Skin is warm.   Neurological:      General: No focal deficit present.      Mental Status: She is alert and oriented to person, place, and time.   Psychiatric:         Mood and Affect: Mood normal.         Behavior: Behavior normal.

## 2024-11-19 ENCOUNTER — PATIENT MESSAGE (OUTPATIENT)
Dept: PLASTIC SURGERY | Facility: CLINIC | Age: 49
End: 2024-11-19

## 2024-11-19 PROCEDURE — G0180 MD CERTIFICATION HHA PATIENT: HCPCS | Performed by: PLASTIC SURGERY

## 2024-11-19 PROCEDURE — 88341 IMHCHEM/IMCYTCHM EA ADD ANTB: CPT | Performed by: STUDENT IN AN ORGANIZED HEALTH CARE EDUCATION/TRAINING PROGRAM

## 2024-11-19 PROCEDURE — 88307 TISSUE EXAM BY PATHOLOGIST: CPT | Performed by: STUDENT IN AN ORGANIZED HEALTH CARE EDUCATION/TRAINING PROGRAM

## 2024-11-19 PROCEDURE — 88342 IMHCHEM/IMCYTCHM 1ST ANTB: CPT | Performed by: STUDENT IN AN ORGANIZED HEALTH CARE EDUCATION/TRAINING PROGRAM

## 2024-11-20 ENCOUNTER — TELEPHONE (OUTPATIENT)
Dept: SURGICAL ONCOLOGY | Facility: CLINIC | Age: 49
End: 2024-11-20

## 2024-11-20 ENCOUNTER — HOME CARE VISIT (OUTPATIENT)
Dept: HOME HEALTH SERVICES | Facility: HOME HEALTHCARE | Age: 49
End: 2024-11-20
Payer: COMMERCIAL

## 2024-11-20 ENCOUNTER — OFFICE VISIT (OUTPATIENT)
Dept: PLASTIC SURGERY | Facility: CLINIC | Age: 49
End: 2024-11-20

## 2024-11-20 DIAGNOSIS — C50.111 MALIGNANT NEOPLASM OF CENTRAL PORTION OF RIGHT BREAST IN FEMALE, ESTROGEN RECEPTOR POSITIVE (HCC): Primary | ICD-10-CM

## 2024-11-20 DIAGNOSIS — Z17.0 MALIGNANT NEOPLASM OF CENTRAL PORTION OF RIGHT BREAST IN FEMALE, ESTROGEN RECEPTOR POSITIVE (HCC): Primary | ICD-10-CM

## 2024-11-20 PROCEDURE — 99024 POSTOP FOLLOW-UP VISIT: CPT | Performed by: STUDENT IN AN ORGANIZED HEALTH CARE EDUCATION/TRAINING PROGRAM

## 2024-11-20 PROCEDURE — 99024 POSTOP FOLLOW-UP VISIT: CPT | Performed by: PLASTIC SURGERY

## 2024-11-20 NOTE — PROGRESS NOTES
Consultation - [unfilled]  [unfilled]    Assessment & Plan  Malignant neoplasm of central portion of right breast in female, estrogen receptor positive (HCC)  Jenny Pereyra is a 50 y/o female with a diagnosis of right breast cancer who is 1 week s/p bilateral mastectomies, right sentinel lymph node biopsy and immediate bilateral breast reconstruction with tissue expanders. Patient with mild hyperemia of skin immediately postop. Patient has been applying DMSO once daily.  Hyperemia of bilateral breast resolved. Good cap refill of all mastectomy skin.  Area of dark ecchymosis over lateral aspect of right chest incision smaller, soft without scabbing.  Patient noted some increased pain at surgical sites over the past two days after a significant amount of crying due to the loss of her mother. Her physical exam is reassuring. Patient is progressing well. 1 drain removed from each side.    -No strenuous activity  -No lifting more than 10lbs  -Up and about during the day, only in bed for sleep  -Continue to monitor and measure drain output.  -f/u 1 week    History of Present Illness   Jenny Pereyra is a 49 y.o. female who is 1 week  s/p bilateral mastectomies, right sentinel lymph node biopsy and immediate bilateral breast reconstruction with tissue expanders.  Patient denies fevers, chills, fullness or drainage from her incisions.  Jenny shared that her mother passed away two days prior to our visit. She notes that it has been an emotional couple of days and she has done a lot of crying understandably.  She notes that she has experienced a slight increase in pain at the inferior margin of her expanders over the last 2 days (6/10) that improves to 3/10 with medication.   She denies chest pain or shortness of breath.  She also notes that a neighboring dog ran onto their property and attacked her dog so she had to separate them.  She noted some pain/soreness with having to pull the dogs apart  and noted that her dog banged into her left chest.  Denies any new swelling or significant color changes.          Review of Systems   Constitutional:  Positive for fatigue. Negative for appetite change, chills and fever.   HENT:  Negative for sore throat.    Respiratory:  Negative for cough, chest tightness and shortness of breath.    Cardiovascular:  Negative for chest pain and leg swelling.   Gastrointestinal:  Negative for abdominal pain.   Musculoskeletal:  Negative for gait problem.        Patient notes moderate pain along inferior margin of the bilateral expanders. Patient notes pain is improved when bra is not on.    Skin:         She notes slight bruising over her left expander following her dog banging into it.   Neurological:  Negative for weakness and headaches.   Psychiatric/Behavioral:  Negative for confusion.      Physical Exam  Constitutional:       General: She is not in acute distress.     Appearance: She is normal weight.   HENT:      Head: Normocephalic and atraumatic.   Eyes:      Extraocular Movements: Extraocular movements intact.      Conjunctiva/sclera: Conjunctivae normal.   Cardiovascular:      Rate and Rhythm: Normal rate.   Pulmonary:      Effort: Pulmonary effort is normal. No respiratory distress.   Chest:          Comments: Bilateral chest incisions well approximated.  No appreciable dehiscence or drainage from incisions. Surrounding skin soft, no fluctuance. Little to no hyperemia bilaterally.  2-3 s cap refill in mastectomy skin bilaterally. Right chest incision with thin area of dark ecchymosis, soft, without scabbing.  Abdominal:      General: Abdomen is flat.      Palpations: Abdomen is soft.   Musculoskeletal:         General: No swelling or deformity.      Cervical back: Normal range of motion.   Skin:     General: Skin is warm.   Neurological:      General: No focal deficit present.      Mental Status: She is alert and oriented to person, place, and time.      Motor: No  weakness.   Psychiatric:         Mood and Affect: Mood normal.         Behavior: Behavior normal.         Thought Content: Thought content normal.

## 2024-11-20 NOTE — TELEPHONE ENCOUNTER
I contacted patient on 11/20/2024 @1300 to review her pathology. Patient is overall doing well. She is recovering appropriately. Her mother unfortunately passed away on Monday at 2 am and she has been working on the arrangements. She is trying to eat well and is using protein supplements. I recommended continue a well balanced diet, rest and to try to take it easy. She did note an incident with a dog and has some resultant bruising. She was evaluated by plastic surgery today and had 2 drains removed. She still 2 drains in place. We reviewed her pathology. All margins are negative. No indication for radiation. She has follow-up with medical oncology in early December.  I will see her next week for wound check.  Patient has contact information should any further questions arise.  All questions and concerns were addressed at the time of the phone call.  Patient was appreciative the phone call.      A. Left breast, mastectomy:  - Columnar cell change. Please see note 1.   - Usual ductal hyperplasia.   - Apocrine metaplasia.   - Unremarkable skin.  - Calcifications associated with apocrine metaplasia, columnar cell change and breast tissue with no significant pathologic abnormality.        B.Left breast lateral margin; excision:  - Benign breast tissue.      C. Right breast, mastectomy:  - Invasive carcinoma with mixed ductal and lobular features, modified Helton-Shaw grade II, (tubules=3, nuclear pleomorphism=2, mitoses=1), measuring up to 1.1 cm.   - All margins are negative for carcinoma.  - Atypical lobular hyperplasia (e-cadherin and p120 confirmatory).   - Biopsy site changes.   - Cluster of apocrine cysts.   - Unremarkable skin.   - Coil clip is identified.    - Calcifications associated with breast tissue with no significant pathologic abnormality.       D. Right axillary sentinel lymph nodes, excision:  - Five lymph nodes, negative for metastatic carcinoma (0/5).   - Cytokeratin immunostain, performed on  D1-5 is confirmatory.       E. Right breast, anterior medial margin; excision:  - Benign breast tissue.       F. Right breast anterior lateral margin; excision:  - Benign breast tissue.                Cancer Staging   Malignant neoplasm of central portion of right breast in female, estrogen receptor positive (HCC)  Staging form: Breast, AJCC 8th Edition  - Clinical stage from 10/14/2024: Stage IA (cT1b, cN0, cM0, G1, ER+, WV+, HER2-) - Signed by Cassandra Lynn Cardarelli, MD on 10/14/2024  Histopathologic type: Lobular carcinoma, NOS  Stage prefix: Initial diagnosis  Method of lymph node assessment: Clinical  Nuclear grade: G1  Histologic grading system: 3 grade system  - Pathologic stage from 11/20/2024: Stage IA (pT1c, pN0, cM0, G2, ER+, WV+, HER2-) - Signed by Cassandra Lynn Cardarelli, MD on 11/20/2024  Histopathologic type: Lobular carcinoma, NOS  Stage prefix: Initial diagnosis  Histologic grading system: 3 grade system  Laterality: Right  Lymph-vascular invasion (LVI): LVI not present (absent)/not identified

## 2024-11-21 ENCOUNTER — TELEPHONE (OUTPATIENT)
Age: 49
End: 2024-11-21

## 2024-11-21 ENCOUNTER — HOME CARE VISIT (OUTPATIENT)
Dept: HOME HEALTH SERVICES | Facility: HOME HEALTHCARE | Age: 49
End: 2024-11-21
Payer: COMMERCIAL

## 2024-11-21 ENCOUNTER — NUTRITION (OUTPATIENT)
Dept: NUTRITION | Facility: CLINIC | Age: 49
End: 2024-11-21

## 2024-11-21 DIAGNOSIS — Z71.3 NUTRITIONAL COUNSELING: Primary | ICD-10-CM

## 2024-11-21 PROCEDURE — G0299 HHS/HOSPICE OF RN EA 15 MIN: HCPCS

## 2024-11-21 NOTE — PATIENT INSTRUCTIONS
Nutrition Rx & Recommendations:  Diet: High Protein, Low Fat, High Fiber Diet, Lactose-free (or low-lactose if tolerated)  Small, frequent meals/snacks may be easier to tolerate than 3 large daily meals.  Aim for 5-6 small meals per day (every 2-3 hours).  Include protein at all meals/snacks.  Include a variety of foods (as tolerated/allowed by diet).  Follow a Cancer Preventative Nutrition Pattern: colorful, plant-based, low-fat, avoid added sugars, limit alcohol, include high fiber foods, limit processed meats, limit red meat, choose lean protein sources, use low-fat cooking methods, balance calories with physical activity, avoid excessive weight gain throughout life.  Incorporate physical activity as able/allowed.  Stay hydrated by sipping fluids of choice/tolerance throughout the day.  Refer to Eating Hints book for other meal/snack ideas and symptom management.  Follow proper oral care; Try baking soda/salt water rinse recipe (mix 3/4 tsp salt + 1 tsp baking soda + 1 qt water; rinse with plain water after using) in Eating Hints book (pg 18).  Brush your teeth before/after meals & before bed.  For appetite loss: try powdered or liquid nutrition supplements; eating by the clock every 2-3 hours; set a timer to remind yourself to eat, keep snacks nearby; add extra protein & calories to your diet; drink liquids in between meals, choose liquids that add calories; eat a bedtime snack; eat soft, cool or frozen foods; eat a larger meal when you feel well & rested; only sip small amounts of liquids during meals (see pages 10-12 in your Eating Hints book).  For dry mouth: sip water throughout the day; try very sweet (or tart, as tolerated) drinks; chew gum or suck on hard candy, popsicles, & ice chips; eat easy to swallow foods (such as pureed cooked foods or soups); moisten food with sauce/gravy/salad dressing; do not drink beer, wine, or any type of alcohol; keep lips moist with lip balm; avoid tobacco products &  second-hand smoke; try Biotene as needed (see pages 17-18 in your Eating Hints book).  Follow proper oral care; Try baking soda/salt water rinse recipe (mix 3/4 tsp salt + 1 tsp baking soda + 1 qt water; rinse with pain water after using) in Eating Hints book (pg 18).  Brush your teeth before/after meals & before bed.  Weigh yourself regularly. If you notice weight loss, make an effort to increase your daily food/calorie intake. If you continue to notice loss after these efforts, reach out to your dietitian to establish a plan to stabilize weight.  Resume high protein smoothie for breakfast - see recipes provided, incorporate fruits and veggies into these  Try not to skip meals/snacks and have a well-balanced breakfast  Choose a lean (low-fat) protein source at each meal and snack: chicken, turkey, fish, seafood, Greek yogurt, nuts, nut butter, beans, lentils, legumes, etc. (See Protein Dense Foods handout previously provided)  Aim for 55-65 oz of fluid daily  Aim for 25-30 grams of fiber daily  Gradually increase your fiber intake to avoid GI upset, stay well hydrated as you are increasing your fiber intake    Follow Up Plan: 1/9 at 12pm by phone  Recommend Referral to Other Providers: none at this time

## 2024-11-21 NOTE — TELEPHONE ENCOUNTER
Betsy HUERTA called asking if is okay for patient to take a shower .  [phone].  Instructed to shower with her back facing the water, no scrubbing, just gentle washing and patting herself dry.

## 2024-11-22 RX ORDER — GABAPENTIN 300 MG/1
300 CAPSULE ORAL DAILY
Qty: 21 CAPSULE | Refills: 0 | Status: CANCELLED | OUTPATIENT
Start: 2024-11-22 | End: 2024-11-29

## 2024-11-24 RX ORDER — GABAPENTIN 300 MG/1
300 CAPSULE ORAL DAILY
Qty: 21 CAPSULE | Refills: 0 | Status: CANCELLED | OUTPATIENT
Start: 2024-11-24 | End: 2024-12-01

## 2024-11-24 NOTE — ASSESSMENT & PLAN NOTE
Jenny Pereyra is a 48 y/o female with a diagnosis of right breast cancer who is 1 week s/p bilateral mastectomies, right sentinel lymph node biopsy and immediate bilateral breast reconstruction with tissue expanders. Patient with mild hyperemia of skin immediately postop. Patient has been applying DMSO once daily.  Hyperemia of bilateral breast resolved. Good cap refill of all mastectomy skin.  Area of dark ecchymosis over lateral aspect of right chest incision smaller, soft without scabbing.  Patient noted some increased pain at surgical sites over the past two days after a significant amount of crying due to the loss of her mother. Her physical exam is reassuring. Patient is progressing well. 1 drain removed from each side.    -No strenuous activity  -No lifting more than 10lbs  -Up and about during the day, only in bed for sleep  -Continue to monitor and measure drain output.  -f/u 1 week

## 2024-11-25 ENCOUNTER — HOME CARE VISIT (OUTPATIENT)
Dept: HOME HEALTH SERVICES | Facility: HOME HEALTHCARE | Age: 49
End: 2024-11-25
Payer: COMMERCIAL

## 2024-11-25 VITALS
TEMPERATURE: 98.3 F | DIASTOLIC BLOOD PRESSURE: 68 MMHG | SYSTOLIC BLOOD PRESSURE: 112 MMHG | HEART RATE: 76 BPM | RESPIRATION RATE: 14 BRPM | OXYGEN SATURATION: 97 %

## 2024-11-25 PROCEDURE — G0299 HHS/HOSPICE OF RN EA 15 MIN: HCPCS

## 2024-11-27 ENCOUNTER — OFFICE VISIT (OUTPATIENT)
Dept: PLASTIC SURGERY | Facility: CLINIC | Age: 49
End: 2024-11-27

## 2024-11-27 ENCOUNTER — OFFICE VISIT (OUTPATIENT)
Dept: SURGICAL ONCOLOGY | Facility: CLINIC | Age: 49
End: 2024-11-27

## 2024-11-27 VITALS
OXYGEN SATURATION: 98 % | SYSTOLIC BLOOD PRESSURE: 130 MMHG | DIASTOLIC BLOOD PRESSURE: 70 MMHG | WEIGHT: 140 LBS | TEMPERATURE: 98 F | HEIGHT: 62 IN | HEART RATE: 87 BPM | BODY MASS INDEX: 25.76 KG/M2

## 2024-11-27 DIAGNOSIS — Z90.13 STATUS POST BILATERAL MASTECTOMY: Primary | ICD-10-CM

## 2024-11-27 DIAGNOSIS — Z17.0 MALIGNANT NEOPLASM OF CENTRAL PORTION OF RIGHT BREAST IN FEMALE, ESTROGEN RECEPTOR POSITIVE (HCC): ICD-10-CM

## 2024-11-27 DIAGNOSIS — Z17.0 MALIGNANT NEOPLASM OF CENTRAL PORTION OF RIGHT BREAST IN FEMALE, ESTROGEN RECEPTOR POSITIVE (HCC): Primary | ICD-10-CM

## 2024-11-27 DIAGNOSIS — C50.111 MALIGNANT NEOPLASM OF CENTRAL PORTION OF RIGHT BREAST IN FEMALE, ESTROGEN RECEPTOR POSITIVE (HCC): ICD-10-CM

## 2024-11-27 DIAGNOSIS — Z13.71 BRCA GENE MUTATION NEGATIVE: ICD-10-CM

## 2024-11-27 DIAGNOSIS — C50.111 MALIGNANT NEOPLASM OF CENTRAL PORTION OF RIGHT BREAST IN FEMALE, ESTROGEN RECEPTOR POSITIVE (HCC): Primary | ICD-10-CM

## 2024-11-27 PROCEDURE — 99024 POSTOP FOLLOW-UP VISIT: CPT | Performed by: PLASTIC SURGERY

## 2024-11-27 PROCEDURE — 99024 POSTOP FOLLOW-UP VISIT: CPT | Performed by: STUDENT IN AN ORGANIZED HEALTH CARE EDUCATION/TRAINING PROGRAM

## 2024-11-27 RX ORDER — GABAPENTIN 300 MG/1
300 CAPSULE ORAL
Qty: 30 CAPSULE | Refills: 3 | Status: SHIPPED | OUTPATIENT
Start: 2024-11-27 | End: 2025-03-27

## 2024-11-27 NOTE — PROGRESS NOTES
Name: Jenny Pereyra      : 1975      MRN: [id number]  Encounter Provider: Cassandra Lynn Cardarelli, MD  Encounter Date: 2024   Encounter department: CAN[facility]ER [facility] [place]       Cancer Staging   Malignant neoplasm of central portion of right breast in female, estrogen receptor positive (HCC)  Staging form: Breast, AJCC 8th Edition  - Clinical stage from 10/14/2024: Stage IA (cT1b, cN0, cM0, G1, ER+, WV+, HER2-) - Signed by Cassandra Lynn Cardarelli, MD on 10/14/2024  - Pathologic stage from 2024: Stage IA (pT1c, pN0, cM0, G2, ER+, WV+, HER2-) - Signed by Cassandra Lynn Cardarelli, MD on 2024  :  Assessment & Plan  Status post bilateral mastectomy  Patient is recovering appropriately.  We reviewed her pathology in detail.  A copy of the pathology report was provided.  There are all negative margins as well as negative sentinel lymph nodes.  There is no indication for adjuvant radiation.    We reviewed the use of endocrine therapy.  Patient has follow-up with Dr. Xavier to discuss initiation of this therapy.    I reviewed the patient is she no longer needs screening breast imaging.  Her screening will be via clinical breast exam.  I will see patient in 6 months for clinical exam.       Malignant neoplasm of central portion of right breast in female, estrogen receptor positive (HCC)         BRCA gene mutation negative            Advance Care Planning/Advance Directives: Discussed disease status, cancer treatment plans and/or cancer treatment goals with the patient.    History of Present Illness   Chief Complaint   Patient presents with    Post-op     Current HPI:     Patient is now status post bilateral mastectomy with right sentinel lymph node biopsy.  She is recovering well.  She has had drains removed.  We reviewed her pathology over the phone.  She is here today for wound check.    PREVIOUS HISTORY:     Interval history: In the interim from her initial  consultation, patient has undergone a staging workup due to her unexplained weight loss.  Staging workup has been negative.  She also underwent a breast MRI.  There was an additional satellite lesion in proximity to the known cancer.  Patient continues to desire bilateral mastectomy, and thus additional biopsies were not performed.  Patient also was evaluated by plastic surgery who did not recommend immediate Mehnaz flap.  Patient was offered immediate reconstruction with expanders and plan for delayed Mehnaz flap, or flat closure.  Patient does not want implants.     HPI:  Jenny Pereyra is a 49 y.o. year old female who presents with following diagnosis of right breast cancer.  She reports prior to this she was feeling well.  She denies any breast symptoms including nipple discharge, pain, changes in the appearance of the breast or any other concerns.      Patient reports significant systemic illnesses to include unintentional weight loss (49 pound weight loss over approximately 6 to 12 months), significant night sweats, worsening daily fatigue, inability to climb a flight of stairs due to fatigue and shortness of breath, nocturnal accidental bowel movements, easy bruising as well as generalized not feeling well.     Patient expressed immediately upon arrival that she wanted a bilateral mastectomy and would not perform chemotherapy.     Oncology History   Oncology History   Malignant neoplasm of central portion of right breast in female, estrogen receptor positive (HCC)   10/8/2024 Biopsy    Right breast ultrasound-guided biopsy  3 o'clock, 4 cm from nipple (open coil)  Invasive breast carcinoma with ductal and lobular features  Grade 1  ER , [place] , HER2 0  Lymphovascular invasion not identified    Concordant. Unifocal / multifocal. SIZE. Concordant. Right malignancy appears unifocal; suspicious mass covers an area up to 6 mm. US right axilla negative. Left breast clear. Breast MRI should be considered given  lobular features within the carcinoma and overall appearance of the breast parenchyma (scattered with borderline heterogeneously dense components).     10/11/2024 Genomic Testing    Reflex MammaPrint/BluePrint UltraLow Risk (Luminal A)     10/14/2024 -  Cancer Staged    Staging form: Breast, AJCC 8th Edition  - Clinical stage from 10/14/2024: Stage IA (cT1b, cN0, cM0, G1, ER+, UT+, HER2-) - Signed by Cassandra Lynn Cardarelli, MD on 10/14/2024  Histopathologic type: Lobular carcinoma, NOS  Stage prefix: Initial diagnosis  Method of lymph node assessment: Clinical  Nuclear grade: G1  Histologic grading system: 3 grade system       10/16/2024 Genetic Testing    NEGATIVE: No Clinically Significant Variants Detected  [place] - A total of 59 genes were evaluated with RNA insight: APC, SUKHI, BAP1, BARD1, BMPR1A, BRCA1, BRCA2, BRIP1, CDH1, CDK4, CDKN1B, CDKN2A, CHEK2, DICER1, FH, FLCN, KIF1B, MAX, MEN1, MET, MLH1, MSH2, MSH6, MUTYH, NF1, NTHL1, PALB2, PMS2, POT1, PTEN, RAD51C, RAD51D, RB1, RET, SDHA, SDHAF2, SDHB, SDHC, SDHD, SMAD4, SMARCA4, STK11, WDIB005, TP53, TSC1, TSC2 and VHL (sequencing and deletion/duplication); AXIN2, CTNNA1, EGLN1, HOXB13, KIT, MITF, MSH3, PDGFRA, POLD1 and POLE (sequencing only); EPCAM and GREM1 (deletion/duplication only).     10/17/2024 Observation    Breast MRI IMPRESSION:  1.  Right breast 3:00 biopsy-proven invasive carcinoma measures up to 12 mm.  2.  Possible satellite lesion right breast 6:00 for which MRI guided biopsy is recommended if it would change clinical management.  3.  Otherwise, no MR evidence of contralateral left breast malignancy.  4.  No axillary or internal mammary adenopathy.     11/12/2024 Surgery    Right mastectomy with SLN biopsy (Dr. Cardarelli)  Invasive carcinoma with mixed ductal and lobular features  Grade 2  1.1 cm  Margins negative  0/5 Lymph nodes    Left simple mastectomy  Columnar cell change, usual ductal hyperplasia, apocrine metaplasia    Immediate  "reconstruction with tissue expanders (Dr. Brito)     11/20/2024 -  Cancer Staged    Staging form: Breast, AJCC 8th Edition  - Pathologic stage from 11/20/2024: Stage IA (pT1c, pN0, cM0, G2, ER+, W[place]+, HER2-) - Signed by Cassandra Lynn Cardarelli, MD on 11/20/2024  Histopathologic type: Lobular carcinoma, NOS  Stage prefix: Initial diagnosis  Histologic grading system: 3 grade system  Laterality: Right  Lymph-vascular invasion (LVI): LVI not present (absent)/not identified          Review of Systems : Patient reported no symptoms with the exception of irritation around drain insertion sites.     Objective   /70   Pulse 87   Temp 98 °F (36.7 °C)   Ht 5' 2\" (1.575 m)   Wt 63.5 kg (140 lb)   SpO2 98%   BMI 25.61 kg/m²       Physical Exam   Constitutional: General appearance: The Patient is well-developed and well-nourished who appears the stated age in no acute distress. Patient is pleasant and talkative.    Healthy appearing mastectomy incisions bilaterally.  Patient has bilateral drains in place with serosanguineous drainage.  There is no evidence of infection or wound breakdown.  There are tissue expanders in place.  There is some notable skin irritation surrounding the right drain entry site.    Pathology: I have reviewed pathology reports described above.        "

## 2024-11-29 ENCOUNTER — PATIENT OUTREACH (OUTPATIENT)
Dept: HEMATOLOGY ONCOLOGY | Facility: CLINIC | Age: 49
End: 2024-11-29

## 2024-11-29 NOTE — PROGRESS NOTES
Called to follow up with patient to make sure there are no new barriers to care. Patient mentioned she's doing OK that she has mild discomfort due to her expanders but, no pain currently.    Patient mentioned her eating and drinking are getting better.  No concerns at this time.    Patient did mentioned she lost her mother recently however she been doing ok otherwise. I did mention to patient she'd hear from me again soon as I will be reaching out to make sure everything is ok.    Patient was thankful for my call- patient has my direct number should she need anything.

## 2024-12-02 ENCOUNTER — CONSULT (OUTPATIENT)
Dept: HEMATOLOGY ONCOLOGY | Facility: CLINIC | Age: 49
End: 2024-12-02
Payer: COMMERCIAL

## 2024-12-02 ENCOUNTER — APPOINTMENT (OUTPATIENT)
Dept: LAB | Facility: CLINIC | Age: 49
End: 2024-12-02
Payer: COMMERCIAL

## 2024-12-02 VITALS
OXYGEN SATURATION: 96 % | WEIGHT: 141 LBS | HEIGHT: 62 IN | DIASTOLIC BLOOD PRESSURE: 76 MMHG | BODY MASS INDEX: 25.95 KG/M2 | HEART RATE: 72 BPM | TEMPERATURE: 97 F | SYSTOLIC BLOOD PRESSURE: 124 MMHG

## 2024-12-02 DIAGNOSIS — Z17.0 MALIGNANT NEOPLASM OF CENTRAL PORTION OF RIGHT BREAST IN FEMALE, ESTROGEN RECEPTOR POSITIVE (HCC): ICD-10-CM

## 2024-12-02 DIAGNOSIS — C50.111 MALIGNANT NEOPLASM OF CENTRAL PORTION OF RIGHT BREAST IN FEMALE, ESTROGEN RECEPTOR POSITIVE (HCC): ICD-10-CM

## 2024-12-02 LAB — ESTRADIOL SERPL-MCNC: 22.8 PG/ML

## 2024-12-02 PROCEDURE — 36415 COLL VENOUS BLD VENIPUNCTURE: CPT

## 2024-12-02 PROCEDURE — 83001 ASSAY OF GONADOTROPIN (FSH): CPT

## 2024-12-02 PROCEDURE — 82670 ASSAY OF TOTAL ESTRADIOL: CPT

## 2024-12-02 PROCEDURE — 99244 OFF/OP CNSLTJ NEW/EST MOD 40: CPT | Performed by: INTERNAL MEDICINE

## 2024-12-02 NOTE — PROGRESS NOTES
Oncology Consult Note  Jenny Pereyra 49 y.o. female MRN: [id number]  Unit/Bed#:  Encounter: [id number]      Presenting Complaint: Right-sided breast cancer    History of Presenting Illness: Jenny Pereyra is seen for initial consultation 12/2/2024 at the referral of Cardarelli, Cassandra L*   49-year-old  female with past medical history of hysterectomy and right oophorectomy, appendectomy, back surgery, nicotine dependence, GERD, nephrolithiasis, she had abnormal mammogram with subsequent biopsy on 10/8/2024 at 3:00 4 cm from the nipple showing invasive breast carcinoma with ductal and lobular features, grade 1, ER %, ME % positive and HER2 0, no evidence of LVI, unifocal, 1.1 cm    MammaPrint was luminal a, genetic test came back negative for deleterious mutation    She elected for bilateral mastectomies    Oncology History   Malignant neoplasm of central portion of right breast in female, estrogen receptor positive (HCC)   10/8/2024 Biopsy    Right breast ultrasound-guided biopsy  3 o'clock, 4 cm from nipple (open coil)  Invasive breast carcinoma with ductal and lobular features  Grade 1  ER , ME , HER2 0  Lymphovascular invasion not identified    Concordant. Unifocal / multifocal. SIZE. Concordant. Right malignancy appears unifocal; suspicious mass covers an area up to 6 mm. US right axilla negative. Left breast clear. Breast MRI should be considered given lobular features within the carcinoma and overall appearance of the breast parenchyma (scattered with borderline heterogeneously dense components).     10/11/2024 Genomic Testing    Reflex MammaPrint/BluePrint UltraLow Risk (Luminal A)     10/14/2024 -  Cancer Staged    Staging form: Breast, AJCC 8th Edition  - Clinical stage from 10/14/2024: Stage IA (cT1b, cN0, cM0, G1, ER+, ME+, HER2-) - Signed by Cassandra Lynn Cardarelli, MD on 10/14/2024  Histopathologic type: Lobular carcinoma, NOS  Stage prefix: Initial  diagnosis  Method of lymph node assessment: Clinical  Nuclear grade: G1  Histologic grading system: 3 grade system       10/16/2024 Genetic Testing    NEGATIVE: No Clinically Significant Variants Detected  [place] - A total of 59 genes were evaluated with RNA insight: APC, SUKHI, BAP1, BARD1, BMPR1A, BRCA1, BRCA2, BRIP1, CDH1, CDK4, CDKN1B, CDKN2A, CHEK2, DICER1, FH, FLCN, KIF1B, MAX, MEN1, MET, MLH1, MSH2, MSH6, MUTYH, NF1, NTHL1, PALB2, PMS2, POT1, PTEN, RAD51C, RAD51D, RB1, RET, SDHA, SDHAF2, SDHB, SDHC, SDHD, SMAD4, SMARCA4, STK11, RJLB897, TP53, TSC1, TSC2 and VHL (sequencing and deletion/duplication); AXIN2, CTNNA1, EGLN1, HOXB13, KIT, MITF, MSH3, PDGFRA, POLD1 and POLE (sequencing only); EPCAM and GREM1 (deletion/duplication only).     10/17/2024 Observation    Breast MRI IMPRESSION:  1.  Right breast 3:00 biopsy-proven invasive carcinoma measures up to 12 mm.  2.  Possible satellite lesion right breast 6:00 for which MRI guided biopsy is recommended if it would change clinical management.  3.  Otherwise, no MR evidence of contralateral left breast malignancy.  4.  No axillary or internal mammary adenopathy.     11/12/2024 Surgery    Right mastectomy with SLN biopsy (Dr. Cardarelli)  Invasive carcinoma with mixed ductal and lobular features  Grade 2  1.1 cm  Margins negative  0/5 Lymph nodes    Left simple mastectomy  Columnar cell change, usual ductal hyperplasia, apocrine metaplasia    Immediate reconstruction with tissue expanders (Dr. Brito)     11/20/2024 -  Cancer Staged    Staging form: Breast, AJCC 8th Edition  - Pathologic stage from 11/20/2024: Stage IA (pT1c, pN0, cM0, G2, ER+, MO+, HER2-) - Signed by Cassandra Lynn Cardarelli, MD on 11/20/2024  Histopathologic type: Lobular carcinoma, NOS  Stage prefix: Initial diagnosis  Histologic grading system: 3 grade system  Laterality: Right  Lymph-vascular invasion (LVI): LVI not present (absent)/not identified       Final pathology stage I (T1c, N0, grade 1)  primary 1.1 cm, left breast showed ADH    5 right negative lymph nodes  Review of Systems - As stated in the HPI otherwise the fourteen point review of systems was negative.    Past Medical History:   Diagnosis Date    Anxiety     Brain lipoma 2007    Breast cancer (HCC)     COPD (chronic obstructive pulmonary disease) (HCC)     GERD (gastroesophageal reflux disease)     Kidney stone     Motion sickness     PONV (postoperative nausea and vomiting)     Tremors of nervous system     essential       Social History     Socioeconomic History    Marital status: /Civil Union     Spouse name: None    Number of children: None    Years of education: None    Highest education level: None   Occupational History    None   Tobacco Use    Smoking status: Former     Current packs/day: 0.00     Average packs/day: 0.5 packs/day for 25.0 years (12.5 ttl pk-yrs)     Types: Cigarettes     Start date:      Quit date:      Years since quittin.9    Smokeless tobacco: Never   Vaping Use    Vaping status: Never Used   Substance and Sexual Activity    Alcohol use: Not Currently     Comment: I may drink once a month if that    Drug use: Never    Sexual activity: Yes   Other Topics Concern    None   Social History Narrative    None     Social Drivers of Health     Financial Resource Strain: Not on file   Food Insecurity: Not on file   Transportation Needs: No Transportation Needs (2024)    OASIS : Transportation     Lack of Transportation (Medical): No     Lack of Transportation (Non-Medical): No     Patient Unable or Declines to Respond: No   Physical Activity: Not on file   Stress: Not on file   Social Connections: Not on file   Intimate Partner Violence: Not on file   Housing Stability: Not on file       Family History   Problem Relation Age of Onset    Heart attack Mother     Heart disease Mother     Kidney failure Mother     Heart failure Mother     Diabetes Father     Alcohol abuse Father     Suicide Attempts  Brother     Thyroid disease Son     Breast cancer Maternal Aunt 47    Bone cancer Maternal Aunt     Cancer Maternal Aunt     No Known Problems Maternal Aunt     No Known Problems Paternal Aunt     Substance Abuse Paternal Uncle     Drug abuse Paternal Uncle     Breast cancer Maternal Grandmother         unknown age    Lung cancer Maternal Grandmother 75    Arthritis Maternal Grandmother     Cancer Maternal Grandmother     No Known Problems Maternal Grandfather     Diabetes Paternal Grandmother     Stroke Paternal Grandmother     No Known Problems Paternal Grandfather        Allergies   Allergen Reactions    Chlorhexidine Hives     Itching and red rash on body from CHG cloth (liquid skin cleanser okay)    Codeine Palpitations     Other reaction(s): Other (See Comments)  Other reaction(s): Irregular heart rate  Rash,vomiting, heart palpitations    Latex Rash    Penicillin V Rash and Vomiting         Current Outpatient Medications:     acetaminophen (TYLENOL) 500 mg tablet, Take 1 tablet (500 mg total) by mouth every 6 (six) hours as needed for mild pain, Disp: , Rfl:     ACIDOPHILUS LACTOBACILLUS PO, Take 1 tablet by mouth daily, Disp: , Rfl:     Armodafinil 150 MG tablet, Take 1 tablet (150 mg total) by mouth daily, Disp: 30 tablet, Rfl: 2    bisacodyl (DULCOLAX) 5 mg EC tablet, Take 2 tablets (10 mg total) by mouth in the morning (Patient taking differently: Take 10 mg by mouth if needed for constipation), Disp: 60 tablet, Rfl: 5    escitalopram (Lexapro) 10 mg tablet, Take 1 tablet (10 mg total) by mouth daily (Patient taking differently: Take 10 mg by mouth daily with lunch), Disp: 100 tablet, Rfl: 1    fenofibrate (TRICOR) 145 mg tablet, Take 1 tablet (145 mg total) by mouth daily, Disp: 90 tablet, Rfl: 1    gabapentin (NEURONTIN) 300 mg capsule, Take 1 capsule (300 mg total) by mouth daily at bedtime, Disp: 30 capsule, Rfl: 3    polyethylene glycol (MIRALAX) 17 g packet, Take 17 g by mouth daily (Patient taking  "differently: Take 17 g by mouth if needed (constipation) dissolve 8 ounces in liquid of choice), Disp: 510 g, Rfl: 5    tiotropium-olodaterol (Stiolto Respimat) 2.5-2.5 MCG/ACT inhaler, Inhale 2 puffs daily, Disp: 12 g, Rfl: 5      /76   Pulse 72   Temp (!) 97 °F (36.1 °C)   Ht 5' 2\" (1.575 m)   Wt 64 kg (141 lb)   SpO2 96%   BMI 25.79 kg/m²       General Appearance:    Alert, oriented        Eyes:    PERRL   Ears:    Normal external ear canals, both ears   Nose:   Nares normal, septum midline   Throat:   Mucosa moist. Pharynx without injection.    Neck:   Supple       Lungs:     Clear to auscultation bilaterally   Chest Wall:    No tenderness or deformity    Heart:    Regular rate and rhythm       Abdomen:     Soft, non-tender, bowel sounds +, no organomegaly           Extremities:   Extremities no cyanosis or edema       Skin:   no rash or icterus.    Lymph nodes:   Cervical, supraclavicular, and axillary nodes normal   Neurologic:   CNII-XII intact, normal strength, sensation and reflexes     Throughout               No results found for this or any previous visit (from the past 48 hours).      Mammo masectomy breast specimen (no charge)  Result Date: 11/15/2024  Narrative: Impression: 3 views of the surgical specimen were obtained.  The specimen contains 2 clips located eccentrically within each of the specimens.  An open coil marker is not discretely visualized.  No orientation was noted on the provided images.  This was not discussed with the surgeon at the time of surgery.    NM sentinal node inj only  Result Date: 11/12/2024  Narrative: SENTINEL NODE LYMPHOSCINTIGRAPHY INDICATION:  C50.111: Malignant neoplasm of central portion of right female breast Z17.0: Estrogen receptor positive status (ER+), localize sentinel node FINDINGS: 0.463 mCi Tc-99m filtered sulfur colloid (0.6 cc volume) was administered intradermally into the right breast by Dr. CASSANDRA LYNN CARDARELLI in the OR. No imaging was " performed.     Impression: Injection of 0.463 mCi Tc-99m filtered sulfur colloid  into the right breast in the OR. Workstation performed: [id number]     ECOG PS:0      Assessment and plan:  49-year-old  female with history of hysterectomy and unilateral right oophorectomy had abnormal mammogram on 8/2024 of the right breast, biopsy showed invasive ductal and lobular carcinoma grade 1 she elected for bilateral mastectomies, genetic tests came back negative, luminal a subtype stage I (T1c, N0, ER/AK positive, HER2 negative) with -5 axillary lymph nodes on the right side, left breast showed ADH    Status post bilateral expanders    Patient to be treated with hormonal therapy depends on the menopausal status    Will do FSH, estradiol level, if she is pre or perimenopausal she will be treated with tamoxifen otherwise if she is postmenopausal she will be treated with aromatase inhibitor such as letrozole or anastrozole for a total of 5 years    FSH and estradiol are in the postmenopausal range, patient to be treated with letrozole 2.5 mg p.o. daily for 5 years

## 2024-12-03 ENCOUNTER — PATIENT OUTREACH (OUTPATIENT)
Dept: CASE MANAGEMENT | Facility: OTHER | Age: 49
End: 2024-12-03

## 2024-12-03 ENCOUNTER — OFFICE VISIT (OUTPATIENT)
Dept: PLASTIC SURGERY | Facility: CLINIC | Age: 49
End: 2024-12-03

## 2024-12-03 DIAGNOSIS — C50.111 MALIGNANT NEOPLASM OF CENTRAL PORTION OF RIGHT BREAST IN FEMALE, ESTROGEN RECEPTOR POSITIVE (HCC): Primary | ICD-10-CM

## 2024-12-03 DIAGNOSIS — Z17.0 MALIGNANT NEOPLASM OF CENTRAL PORTION OF RIGHT BREAST IN FEMALE, ESTROGEN RECEPTOR POSITIVE (HCC): Primary | ICD-10-CM

## 2024-12-03 PROCEDURE — 99024 POSTOP FOLLOW-UP VISIT: CPT | Performed by: PLASTIC SURGERY

## 2024-12-03 NOTE — PROGRESS NOTES
OSW placed outreach TC to pt this day. She states that she is a little sore, but overall she is feeling better. She shared that she had her first fill today and she didn't feel it at all. OSW asked how long she will be out of work and she stated either until 12/9 or 12/10. She expressed that her bills are getting tight and she forgot to pay her electric bill. OSW educated on the [employer] and the [employer]. OSW offered to apply on her behalf for the [employer] fund. OSW explained that they require a yearly income. She was unsure, but states she will look it up and get back to this writer. OSW offered to send out the [employer] fund information and she was agreeable.   This writer also educated on compassion funds and encouraged her to email any household bills she needs assistance with. She thanked this writer and states that she will do so.   OSW offered to check in with her in a few weeks and she was appreciative. OSW encouraged her to reach out with any questions/concerns.

## 2024-12-04 ENCOUNTER — TELEPHONE (OUTPATIENT)
Age: 49
End: 2024-12-04

## 2024-12-04 ENCOUNTER — PATIENT OUTREACH (OUTPATIENT)
Dept: CASE MANAGEMENT | Facility: OTHER | Age: 49
End: 2024-12-04

## 2024-12-04 ENCOUNTER — HOME CARE VISIT (OUTPATIENT)
Dept: HOME HEALTH SERVICES | Facility: HOME HEALTHCARE | Age: 49
End: 2024-12-04
Payer: COMMERCIAL

## 2024-12-04 PROCEDURE — G0299 HHS/HOSPICE OF RN EA 15 MIN: HCPCS

## 2024-12-04 NOTE — TELEPHONE ENCOUNTER
Incoming call by Jessica Perkins:    Would like to ensure Dr. Fairbanks is aware that Xywav and Gabapentin being taken together can increase the risk of drowsiness.       Call back for confirmation: [phone]   Option 3 then option 4

## 2024-12-04 NOTE — TELEPHONE ENCOUNTER
Thank you for the notification, the patient currently is not taking her Xywav we are only monitoring her on gabapentin for the time being

## 2024-12-04 NOTE — PROGRESS NOTES
OSW received an email from the pt, which included the following bills: [employer], [employer] and her water bill. OSW responded and informed her that I need the entire statement for [employer] and [employer], as she only included a screenshot. OSW informed her that the water bill will be submitted, as all of the information is there. OSW will wait for her to resend the bills and then they will be submitted for the use of compassion funds as well.     OSW also emailed the application to [employer] [facility]. Once she send the needed information for the [employer] [facility] this writer will complete an application on her behalf. OSW will continue to follow.       ADDENDUM:  OSW exchanged numerous emails with the pt this day. OSW drafted a letter for the physician to sign, if pt chooses to apply for the [employer] foundation. OSW sent to Shasta Guan and asked if she would have Dr. Cardarelli sign. Once received this writer will email to the pt.     ADDENDUM:  OSW applied on behalf of the patient to the [employer] [facility]. OSW emailed the pt to inform her that the application was completed.     OSW also received the signed medical letter and emailed to the patient.

## 2024-12-05 NOTE — ASSESSMENT & PLAN NOTE
Jenny Pereyra is a 48 y/o female with a diagnosis of right breast cancer who is 3 weeks s/p bilateral mastectomies, right sentinel lymph node biopsy and immediate bilateral breast reconstruction with tissue expanders.  Patient is doing well overall. Drains removed last week.  We will begin weekly expander fills today.    -EXPANDER FILLS: 50ml added to each side  - RIGHT TOTAL VOLUME = 50mL  - LEFT TOTAL VOLUME = 50mL  -No strenuous activity or lifting more than 10 pounds   -Avoid reaching overhead  -Plan for weekly expander fills

## 2024-12-05 NOTE — PROGRESS NOTES
[facility]   Plastic and Reconstructive Surgery   [address] [place] [id number]       Assessment & Plan  Malignant neoplasm of central portion of right breast in female, estrogen receptor positive (HCC)    Jenny Pereyra is a 48 y/o female with a diagnosis of right breast cancer who is 3 weeks s/p bilateral mastectomies, right sentinel lymph node biopsy and immediate bilateral breast reconstruction with tissue expanders.  Patient is doing well overall. Drains removed last week.  We will begin weekly expander fills today.    -EXPANDER FILLS: 50ml added to each side  - RIGHT TOTAL VOLUME = 50mL  - LEFT TOTAL VOLUME = 50mL  -No strenuous activity or lifting more than 10 pounds   -Avoid reaching overhead  -Plan for weekly expander fills       History of Present Illness   Jenny Pereyra is a 49 y.o. female who is 3 weeks s/p bilateral mastectomy, right sentinel lymph node biopsy and immediate bilateral breast reconstruction with tissue expanders.  Patient notes that she is feeling well overall.  She attended a wedding reception over the weekend and got to dance with her grandson.  She noted some discomfort with certain movements so she took a break.  Denies fevers, chills, erythema, drainage from incisions. Patient feels as though she may have a small amount of fluid around her right expander.      Physical Exam   Alert, oriented, NAD   Breathing comfortably on room air  Bilateral chest with well approximated incisions.  Skin is warm and well perfused with 2s cap refill. No appreciable fluctuance, fullness or fluid wave.    Mild edema of the bilateral lateral chest wall adjacent to previous drains sites.  Improving.

## 2024-12-06 ENCOUNTER — TELEPHONE (OUTPATIENT)
Dept: HEMATOLOGY ONCOLOGY | Facility: CLINIC | Age: 49
End: 2024-12-06

## 2024-12-06 ENCOUNTER — RESULTS FOLLOW-UP (OUTPATIENT)
Dept: HEMATOLOGY ONCOLOGY | Facility: CLINIC | Age: 49
End: 2024-12-06

## 2024-12-06 LAB — FSH SERPL-ACNC: 107 MIU/ML

## 2024-12-06 RX ORDER — LETROZOLE 2.5 MG/1
2.5 TABLET, FILM COATED ORAL DAILY
Qty: 90 TABLET | Refills: 3 | Status: ON HOLD | OUTPATIENT
Start: 2024-12-06

## 2024-12-06 NOTE — ASSESSMENT & PLAN NOTE
Jenny Pereyra is a 50 y/o female with a diagnosis of right breast cancer who is 2 weeks s/p bilateral mastectomies, right sentinel lymph node biopsy and immediate bilateral breast reconstruction with tissue expanders. Patient progressing well overall. Drains removed today without issue due to low output - <25mL x 3 days. No signs or symptoms of infection. Skin is healthy.    - No strenuous activity or lifting more than 10lbs  - continue to wear surgical bra for compression  - monitor surgical sites for signs of infection  - f/u 1 week for first expander fill

## 2024-12-06 NOTE — TELEPHONE ENCOUNTER
Please call patient and reschedule 12/17 appointment for in 3-4 months per Dr. Xavier. No need for 12/17 appt. Message sent to pt via my chart by Dr. Xavier with plan.

## 2024-12-09 ENCOUNTER — PATIENT OUTREACH (OUTPATIENT)
Dept: CASE MANAGEMENT | Facility: OTHER | Age: 49
End: 2024-12-09

## 2024-12-09 NOTE — PROGRESS NOTES
OSW received a medical information document that pt is requesting this writer complete for [facility]. OSW completed the form and emailed back to the pt at [email or web address].

## 2024-12-10 ENCOUNTER — OFFICE VISIT (OUTPATIENT)
Dept: PLASTIC SURGERY | Facility: CLINIC | Age: 49
End: 2024-12-10

## 2024-12-10 DIAGNOSIS — C50.111 MALIGNANT NEOPLASM OF CENTRAL PORTION OF RIGHT BREAST IN FEMALE, ESTROGEN RECEPTOR POSITIVE (HCC): Primary | ICD-10-CM

## 2024-12-10 DIAGNOSIS — Z17.0 MALIGNANT NEOPLASM OF CENTRAL PORTION OF RIGHT BREAST IN FEMALE, ESTROGEN RECEPTOR POSITIVE (HCC): Primary | ICD-10-CM

## 2024-12-10 PROCEDURE — 99024 POSTOP FOLLOW-UP VISIT: CPT | Performed by: PLASTIC SURGERY

## 2024-12-10 NOTE — PROGRESS NOTES
Consultation - [unfilled]  [unfilled]    Assessment & Plan  Malignant neoplasm of central portion of right breast in female, estrogen receptor positive (HCC)    Jenny Pereyra is a 49 year old female who is now 4 weeks s/p bilateral mastectomy, R sentinel lymph node biopsy, and immediate bilateral breast reconstruction with tissue expanders.  Patient is doing well overall. No fevers or chills. Medial 4cm of left chest incision with small rim of maceration without dehiscence, drainage or erythema. NO signs of reported symptoms of infection. Will attempt to dry out with betadine and see again in 2 days to assess. If it remains macerated, may consider excision and closure in the office.  Will not fill today to avoid any confounding factors while observing incision.     - No strenuous activity  - Ok to shower, no scrubbing, pat dry.  Apply betadine prep stick to left chest incision. Allow incision open to air for an hour following application.  - OK for dry gauze over wound.  May wet the gauze to remove if it gets stuck  - contact office with any changes in the incision or signs of infection.      History of Present Illness   Jenny Pereyra is a 49 y.o. female who is 1 month s/p bilateral mastectomy, right sentinel lymph node biopsy and immediate bilateral breast reconstruction with tissue expanders.  Patient underwent her first fill last week without issue.  Patient notes that she still finds the expanders uncomfortable at times but they seem to be a bit less bothersome after the fill.  Denies fevers, chills, erythema or drainage from the incisions.       Physical Exam   Alert, oriented, NAD   Breathing comfortably on room air  R chest incision well approximated, skin with 2-3 s cap refill, no erythema, fluctuance or drainage from incision.  L chest incision well approximated. Medial 4 cm of incision with thing rim of white, macerated tissue at the edges, appears superficial. No wound  dehiscence. No drainage. No erythema. No fluctuance.

## 2024-12-10 NOTE — ASSESSMENT & PLAN NOTE
Jenny Pereyra is a 49 year old female who is now 4 weeks s/p bilateral mastectomy, R sentinel lymph node biopsy, and immediate bilateral breast reconstruction with tissue expanders.  Patient is doing well overall. No fevers or chills. Medial 4cm of left chest incision with small rim of maceration without dehiscence, drainage or erythema. NO signs of reported symptoms of infection. Will attempt to dry out with betadine and see again in 2 days to assess. If it remains macerated, may consider excision and closure in the office.  Will not fill today to avoid any confounding factors while observing incision.     - No strenuous activity

## 2024-12-11 ENCOUNTER — PATIENT OUTREACH (OUTPATIENT)
Dept: CASE MANAGEMENT | Facility: OTHER | Age: 49
End: 2024-12-11

## 2024-12-11 NOTE — PROGRESS NOTES
OSW received email from pt stating that she did npt receive the document for the [employer] fund that this writer completed for her. OSW resent the document to her this day.

## 2024-12-12 ENCOUNTER — OFFICE VISIT (OUTPATIENT)
Dept: PLASTIC SURGERY | Facility: CLINIC | Age: 49
End: 2024-12-12

## 2024-12-12 DIAGNOSIS — C50.111 MALIGNANT NEOPLASM OF CENTRAL PORTION OF RIGHT BREAST IN FEMALE, ESTROGEN RECEPTOR POSITIVE (HCC): Primary | ICD-10-CM

## 2024-12-12 DIAGNOSIS — Z17.0 MALIGNANT NEOPLASM OF CENTRAL PORTION OF RIGHT BREAST IN FEMALE, ESTROGEN RECEPTOR POSITIVE (HCC): Primary | ICD-10-CM

## 2024-12-12 PROCEDURE — 99024 POSTOP FOLLOW-UP VISIT: CPT | Performed by: PLASTIC SURGERY

## 2024-12-13 ENCOUNTER — TELEPHONE (OUTPATIENT)
Dept: PLASTIC SURGERY | Facility: CLINIC | Age: 49
End: 2024-12-13

## 2024-12-13 ENCOUNTER — PATIENT OUTREACH (OUTPATIENT)
Dept: CASE MANAGEMENT | Facility: OTHER | Age: 49
End: 2024-12-13

## 2024-12-13 NOTE — PROGRESS NOTES
OSW received an email from the pt regarding whether her bills were paid yet. OSW responded and stated that this writer received an email yesterday stating that the bills had been paid. Pt apologized for being pushy, however she is worried about her bills. She stated that yesterday she went to receive a fill and Dr. Brito was worried about an area of her skin. He cut it out and sutured it. She states that she has used up all of her PTO/Sick time, therefore whatever time she takes is unpaid.  Pt reports she will not receive another paycheck until after Zhou. OSW expressed that I am sorry that she had to take more unpaid time and that hopefully the [employer] [address] fund will come in quickly for her. OSW reassured her she is not being pushy asking about compassion funds paying her bills.

## 2024-12-14 ENCOUNTER — NURSE TRIAGE (OUTPATIENT)
Dept: OTHER | Facility: OTHER | Age: 49
End: 2024-12-14

## 2024-12-14 DIAGNOSIS — N61.0 CELLULITIS OF RIGHT BREAST: Primary | ICD-10-CM

## 2024-12-14 RX ORDER — SULFAMETHOXAZOLE AND TRIMETHOPRIM 800; 160 MG/1; MG/1
1 TABLET ORAL EVERY 12 HOURS SCHEDULED
Qty: 14 TABLET | Refills: 0 | Status: SHIPPED | OUTPATIENT
Start: 2024-12-14 | End: 2024-12-20

## 2024-12-14 NOTE — PROGRESS NOTES
PRS Note    Patient called  low grade temperatures. States having some mild discomfort with the right chest, but she went back full time to work yesterday which may attribute to some of her discomfort.     I spoke with her over the phone. No redness, no significant pain or notable swelling.    Does have some mild fibrinous drainage on her dressing which is likely from fibrinous areas of the incision. Photos reviewed.    Started on bactrim for 7 days.    Instructed patient to followup in ED if higher fevers, redness, pain, swelling.    Anthony Watts MD   [facility]astic and Reconstructive Surgery   [address] [place] [address], [address]   [place] [address]   Office: [phone]

## 2024-12-14 NOTE — TELEPHONE ENCOUNTER
"Reason for Disposition  • [1] Caller has URGENT question AND [2] triager unable to answer question    Answer Assessment - Initial Assessment Questions  1. SYMPTOM: \"What's the main symptom you're concerned about?\" (e.g., pain, fever, vomiting)      Fever, chills, drainage from right breast incision looks yellowish/bloody drainage, headache, breast feels hard and sore   2. ONSET: \"When did symptoms  start?\"      Yesterday   3. SURGERY: \"What surgery did you have?\"      Mastectomy 11/12  4. DATE of SURGERY: \"When was the surgery?\"       11/12/24  5. ANESTHESIA: \"What type of anesthesia did you have?\" (e.g., general, spinal, epidural, local)      General   6. DRAINS: \"Were any drains place in or around the wound?\" (e.g., Hemovac, Ant-Waddell, Penrose)      Had drains but since removed   7. PAIN: \"Is there any pain?\" If Yes, ask: \"How bad is it?\"  (Scale 1-10; or mild, moderate, severe)      Right breast aching 8/10   8. FEVER: \"Do you have a fever?\" If Yes, ask: \"What is your temperature, how was it measured, and when did it start?\"      Yes 100.2 last night   9. VOMITING: \"Is there any vomiting?\" If Yes, ask: \"How many times?\"      No  10. BLEEDING: \"Is there any bleeding?\" If Yes, ask: \"How much?\" and \"Where?\"        From right breast mixed with drainage   11. OTHER SYMPTOMS: \"Do you have any other symptoms?\" (e.g., drainage from wound, painful urination, constipation)    Protocols used: Post-Op Symptoms and Questions-Adult-    "

## 2024-12-15 ENCOUNTER — APPOINTMENT (EMERGENCY)
Dept: RADIOLOGY | Facility: HOSPITAL | Age: 49
DRG: 857 | End: 2024-12-15
Payer: COMMERCIAL

## 2024-12-15 ENCOUNTER — HOSPITAL ENCOUNTER (INPATIENT)
Facility: HOSPITAL | Age: 49
LOS: 5 days | Discharge: HOME | DRG: 857 | End: 2024-12-20
Attending: EMERGENCY MEDICINE | Admitting: FAMILY MEDICINE
Payer: COMMERCIAL

## 2024-12-15 DIAGNOSIS — G89.18 POST-OPERATIVE PAIN: Primary | ICD-10-CM

## 2024-12-15 DIAGNOSIS — C50.111 MALIGNANT NEOPLASM OF CENTRAL PORTION OF RIGHT BREAST IN FEMALE, ESTROGEN RECEPTOR POSITIVE (HCC): ICD-10-CM

## 2024-12-15 DIAGNOSIS — Z17.0 MALIGNANT NEOPLASM OF CENTRAL PORTION OF RIGHT BREAST IN FEMALE, ESTROGEN RECEPTOR POSITIVE (HCC): ICD-10-CM

## 2024-12-15 DIAGNOSIS — Z13.9 ENCOUNTER FOR SCREENING INVOLVING SOCIAL DETERMINANTS OF HEALTH (SDOH): ICD-10-CM

## 2024-12-15 DIAGNOSIS — N61.1 BREAST ABSCESS: ICD-10-CM

## 2024-12-15 DIAGNOSIS — L02.91 ABSCESS: ICD-10-CM

## 2024-12-15 PROBLEM — G56.02 CARPAL TUNNEL SYNDROME ON LEFT: Status: RESOLVED | Noted: 2024-07-11 | Resolved: 2024-12-15

## 2024-12-15 LAB
ALBUMIN SERPL BCG-MCNC: 4 G/DL (ref 3.5–5)
ALP SERPL-CCNC: 48 U/L (ref 34–104)
ALT SERPL W P-5'-P-CCNC: 24 U/L (ref 7–52)
ANION GAP SERPL CALCULATED.3IONS-SCNC: 8 MMOL/L (ref 4–13)
AST SERPL W P-5'-P-CCNC: 20 U/L (ref 13–39)
ATRIAL RATE: 86 BPM
BASOPHILS # BLD AUTO: 0.06 THOUSANDS/ΜL (ref 0–0.1)
BASOPHILS NFR BLD AUTO: 1 % (ref 0–1)
BILIRUB SERPL-MCNC: 0.32 MG/DL (ref 0.2–1)
BUN SERPL-MCNC: 14 MG/DL (ref 5–25)
CALCIUM SERPL-MCNC: 9.5 MG/DL (ref 8.4–10.2)
CHLORIDE SERPL-SCNC: 108 MMOL/L (ref 96–108)
CO2 SERPL-SCNC: 23 MMOL/L (ref 21–32)
CREAT SERPL-MCNC: 0.89 MG/DL (ref 0.6–1.3)
EOSINOPHIL # BLD AUTO: 0.19 THOUSAND/ΜL (ref 0–0.61)
EOSINOPHIL NFR BLD AUTO: 2 % (ref 0–6)
ERYTHROCYTE [DISTWIDTH] IN BLOOD BY AUTOMATED COUNT: 13 % (ref 11.6–15.1)
GFR SERPL CREATININE-BSD FRML MDRD: 77 ML/MIN/1.73SQ M
GLUCOSE SERPL-MCNC: 111 MG/DL (ref 65–140)
HCT VFR BLD AUTO: 38.3 % (ref 34.8–46.1)
HGB BLD-MCNC: 12.3 G/DL (ref 11.5–15.4)
IMM GRANULOCYTES # BLD AUTO: 0.08 THOUSAND/UL (ref 0–0.2)
IMM GRANULOCYTES NFR BLD AUTO: 1 % (ref 0–2)
LYMPHOCYTES # BLD AUTO: 1.6 THOUSANDS/ΜL (ref 0.6–4.47)
LYMPHOCYTES NFR BLD AUTO: 12 % (ref 14–44)
MCH RBC QN AUTO: 29.1 PG (ref 26.8–34.3)
MCHC RBC AUTO-ENTMCNC: 32.1 G/DL (ref 31.4–37.4)
MCV RBC AUTO: 91 FL (ref 82–98)
MONOCYTES # BLD AUTO: 0.99 THOUSAND/ΜL (ref 0.17–1.22)
MONOCYTES NFR BLD AUTO: 8 % (ref 4–12)
NEUTROPHILS # BLD AUTO: 10.05 THOUSANDS/ΜL (ref 1.85–7.62)
NEUTS SEG NFR BLD AUTO: 76 % (ref 43–75)
NRBC BLD AUTO-RTO: 0 /100 WBCS
P AXIS: 65 DEGREES
PLATELET # BLD AUTO: 252 THOUSANDS/UL (ref 149–390)
PLATELET # BLD AUTO: 264 THOUSANDS/UL (ref 149–390)
PMV BLD AUTO: 10 FL (ref 8.9–12.7)
PMV BLD AUTO: 9.8 FL (ref 8.9–12.7)
POTASSIUM SERPL-SCNC: 3.8 MMOL/L (ref 3.5–5.3)
PR INTERVAL: 132 MS
PROCALCITONIN SERPL-MCNC: 0.06 NG/ML
PROT SERPL-MCNC: 6.6 G/DL (ref 6.4–8.4)
QRS AXIS: 51 DEGREES
QRSD INTERVAL: 76 MS
QT INTERVAL: 340 MS
QTC INTERVAL: 407 MS
RBC # BLD AUTO: 4.23 MILLION/UL (ref 3.81–5.12)
SODIUM SERPL-SCNC: 139 MMOL/L (ref 135–147)
T WAVE AXIS: 9 DEGREES
VENTRICULAR RATE: 86 BPM
WBC # BLD AUTO: 12.97 THOUSAND/UL (ref 4.31–10.16)

## 2024-12-15 PROCEDURE — 93005 ELECTROCARDIOGRAM TRACING: CPT

## 2024-12-15 PROCEDURE — 99285 EMERGENCY DEPT VISIT HI MDM: CPT

## 2024-12-15 PROCEDURE — 87040 BLOOD CULTURE FOR BACTERIA: CPT | Performed by: FAMILY MEDICINE

## 2024-12-15 PROCEDURE — 99024 POSTOP FOLLOW-UP VISIT: CPT | Performed by: PLASTIC SURGERY

## 2024-12-15 PROCEDURE — NC001 PR NO CHARGE: Performed by: STUDENT IN AN ORGANIZED HEALTH CARE EDUCATION/TRAINING PROGRAM

## 2024-12-15 PROCEDURE — 85049 AUTOMATED PLATELET COUNT: CPT | Performed by: FAMILY MEDICINE

## 2024-12-15 PROCEDURE — 93010 ELECTROCARDIOGRAM REPORT: CPT | Performed by: INTERNAL MEDICINE

## 2024-12-15 PROCEDURE — 36415 COLL VENOUS BLD VENIPUNCTURE: CPT

## 2024-12-15 PROCEDURE — 84145 PROCALCITONIN (PCT): CPT

## 2024-12-15 PROCEDURE — 85025 COMPLETE CBC W/AUTO DIFF WBC: CPT

## 2024-12-15 PROCEDURE — 71260 CT THORAX DX C+: CPT

## 2024-12-15 PROCEDURE — 80053 COMPREHEN METABOLIC PANEL: CPT

## 2024-12-15 PROCEDURE — 96360 HYDRATION IV INFUSION INIT: CPT

## 2024-12-15 PROCEDURE — 99285 EMERGENCY DEPT VISIT HI MDM: CPT | Performed by: EMERGENCY MEDICINE

## 2024-12-15 PROCEDURE — 99223 1ST HOSP IP/OBS HIGH 75: CPT | Performed by: FAMILY MEDICINE

## 2024-12-15 RX ORDER — LETROZOLE 2.5 MG/1
2.5 TABLET, FILM COATED ORAL DAILY
Status: DISCONTINUED | OUTPATIENT
Start: 2024-12-16 | End: 2024-12-18

## 2024-12-15 RX ORDER — OXYCODONE HYDROCHLORIDE 5 MG/1
5 TABLET ORAL EVERY 6 HOURS PRN
Status: DISCONTINUED | OUTPATIENT
Start: 2024-12-15 | End: 2024-12-18

## 2024-12-15 RX ORDER — OXYCODONE HYDROCHLORIDE 5 MG/1
5 TABLET ORAL EVERY 6 HOURS PRN
Status: DISCONTINUED | OUTPATIENT
Start: 2024-12-15 | End: 2024-12-15

## 2024-12-15 RX ORDER — POLYETHYLENE GLYCOL 3350 17 G/17G
17 POWDER, FOR SOLUTION ORAL DAILY PRN
Status: DISCONTINUED | OUTPATIENT
Start: 2024-12-15 | End: 2024-12-20 | Stop reason: HOSPADM

## 2024-12-15 RX ORDER — ESCITALOPRAM OXALATE 10 MG/1
10 TABLET ORAL
Status: DISCONTINUED | OUTPATIENT
Start: 2024-12-16 | End: 2024-12-20 | Stop reason: HOSPADM

## 2024-12-15 RX ORDER — HEPARIN SODIUM 5000 [USP'U]/ML
5000 INJECTION, SOLUTION INTRAVENOUS; SUBCUTANEOUS EVERY 8 HOURS SCHEDULED
Status: DISCONTINUED | OUTPATIENT
Start: 2024-12-15 | End: 2024-12-20 | Stop reason: HOSPADM

## 2024-12-15 RX ORDER — MODAFINIL 100 MG/1
200 TABLET ORAL DAILY
Status: DISCONTINUED | OUTPATIENT
Start: 2024-12-16 | End: 2024-12-20 | Stop reason: HOSPADM

## 2024-12-15 RX ORDER — FENOFIBRATE 145 MG/1
145 TABLET, COATED ORAL DAILY
Status: DISCONTINUED | OUTPATIENT
Start: 2024-12-15 | End: 2024-12-20 | Stop reason: HOSPADM

## 2024-12-15 RX ORDER — HYDROMORPHONE HCL/PF 1 MG/ML
0.5 SYRINGE (ML) INJECTION EVERY 4 HOURS PRN
Status: DISCONTINUED | OUTPATIENT
Start: 2024-12-15 | End: 2024-12-18

## 2024-12-15 RX ORDER — ONDANSETRON 2 MG/ML
4 INJECTION INTRAMUSCULAR; INTRAVENOUS EVERY 6 HOURS PRN
Status: DISCONTINUED | OUTPATIENT
Start: 2024-12-15 | End: 2024-12-20 | Stop reason: HOSPADM

## 2024-12-15 RX ORDER — OXYCODONE HYDROCHLORIDE 10 MG/1
10 TABLET ORAL EVERY 4 HOURS PRN
Status: DISCONTINUED | OUTPATIENT
Start: 2024-12-15 | End: 2024-12-18

## 2024-12-15 RX ORDER — HYDROMORPHONE HCL/PF 1 MG/ML
0.5 SYRINGE (ML) INJECTION ONCE
Refills: 0 | Status: COMPLETED | OUTPATIENT
Start: 2024-12-15 | End: 2024-12-15

## 2024-12-15 RX ORDER — GINSENG 100 MG
1 CAPSULE ORAL 2 TIMES DAILY
Status: DISCONTINUED | OUTPATIENT
Start: 2024-12-15 | End: 2024-12-18

## 2024-12-15 RX ORDER — HYDROMORPHONE HCL/PF 1 MG/ML
0.5 SYRINGE (ML) INJECTION ONCE
Status: COMPLETED | OUTPATIENT
Start: 2024-12-15 | End: 2024-12-15

## 2024-12-15 RX ORDER — HYDROMORPHONE HCL/PF 1 MG/ML
0.5 SYRINGE (ML) INJECTION EVERY 4 HOURS PRN
Refills: 0 | Status: DISCONTINUED | OUTPATIENT
Start: 2024-12-15 | End: 2024-12-15

## 2024-12-15 RX ORDER — GABAPENTIN 300 MG/1
300 CAPSULE ORAL
Status: DISCONTINUED | OUTPATIENT
Start: 2024-12-15 | End: 2024-12-20 | Stop reason: HOSPADM

## 2024-12-15 RX ORDER — ACETAMINOPHEN 325 MG/1
650 TABLET ORAL EVERY 6 HOURS PRN
Status: DISCONTINUED | OUTPATIENT
Start: 2024-12-15 | End: 2024-12-20 | Stop reason: HOSPADM

## 2024-12-15 RX ADMIN — HYDROMORPHONE HYDROCHLORIDE 0.5 MG: 1 INJECTION, SOLUTION INTRAMUSCULAR; INTRAVENOUS; SUBCUTANEOUS at 19:37

## 2024-12-15 RX ADMIN — IOHEXOL 85 ML: 350 INJECTION, SOLUTION INTRAVENOUS at 11:42

## 2024-12-15 RX ADMIN — HYDROMORPHONE HYDROCHLORIDE 0.5 MG: 1 INJECTION, SOLUTION INTRAMUSCULAR; INTRAVENOUS; SUBCUTANEOUS at 17:45

## 2024-12-15 RX ADMIN — OXYCODONE HYDROCHLORIDE 10 MG: 10 TABLET ORAL at 22:31

## 2024-12-15 RX ADMIN — BACITRACIN ZINC 1 SMALL APPLICATION: 500 OINTMENT TOPICAL at 17:45

## 2024-12-15 RX ADMIN — HYDROMORPHONE HYDROCHLORIDE 0.5 MG: 1 INJECTION, SOLUTION INTRAMUSCULAR; INTRAVENOUS; SUBCUTANEOUS at 15:11

## 2024-12-15 RX ADMIN — HEPARIN SODIUM 5000 UNITS: 5000 INJECTION, SOLUTION INTRAVENOUS; SUBCUTANEOUS at 16:07

## 2024-12-15 RX ADMIN — DOXYCYCLINE 100 MG: 100 INJECTION, POWDER, LYOPHILIZED, FOR SOLUTION INTRAVENOUS at 15:13

## 2024-12-15 RX ADMIN — UMECLIDINIUM 1 PUFF: 62.5 AEROSOL, POWDER ORAL at 19:26

## 2024-12-15 RX ADMIN — HEPARIN SODIUM 5000 UNITS: 5000 INJECTION, SOLUTION INTRAVENOUS; SUBCUTANEOUS at 21:13

## 2024-12-15 RX ADMIN — GABAPENTIN 300 MG: 300 CAPSULE ORAL at 21:13

## 2024-12-15 RX ADMIN — SODIUM CHLORIDE 1000 ML: 0.9 INJECTION, SOLUTION INTRAVENOUS at 10:40

## 2024-12-15 RX ADMIN — FENOFIBRATE 145 MG: 145 TABLET ORAL at 16:07

## 2024-12-15 NOTE — H&P
H&P - Hospitalist   Name: Jenny Pereyra 49 y.o. female I MRN: [id number]  Unit/Bed#: ED 03 I Date of Admission: 12/15/2024   Date of Service: 12/15/2024 I Hospital Day: 0     Assessment & Plan  Essential tremor  Continue Gabapentin  Stage 1 mild COPD by GOLD classification (HCC)  Stiolto nonformulary, resume  Hypersomnia  PTA meds armodafinil 150 mg daily, nonformulary, substitute with modafinil.  Malignant neoplasm of central portion of right breast in female, estrogen receptor positive (HCC)   right breast cancer s/p bilateral mastectomies, right sentinel lymph node biopsy and immediate bilateral breast reconstruction with tissue expanders.   Continue Letrozole  Breast abscess  49-year-old female with right breast malignancy, s/p bilateral mastectomies, right sentinel lymph node biopsy and immediate bilateral breast reconstruction with tissue expanders one month ago 11/12/24 presented to ED with c/o right breast swelling, pain and foul smelling discharge from the wound.    Afebrile in ED. Mild leukocytosis at 12. However patient reports temp of 100.2 at home.   CT shows: Bilateral mastectomy with bilateral tissue expanders with 2.9 x 1.0 minimally peripheral enhancing fluid collection lateral to the right breast implant compatible with abscess.   Started on IV doxycycline  Consult plastic surgery- recommended IR drainage  Consult IR  NPO midnight  Obtain Blood culture x 2.        VTE Pharmacologic Prophylaxis:   High Risk (Score >/= 5) - Pharmacological DVT Prophylaxis Ordered: heparin. Sequential Compression Devices Ordered.  Code Status: Level 1 - Full Code       Anticipated Length of Stay: Patient will be admitted on an inpatient basis with an anticipated length of stay of greater than 2 midnights secondary to breast abscess, IV abx.    History of Present Illness   Chief Complaint: right breast pain    Jenny Pereyra is a 49-year-old female with right breast malignancy, s/p bilateral mastectomies, right  sentinel lymph node biopsy and immediate bilateral breast reconstruction with tissue expanders one month ago 11/12/24 presented to ED with c/o right breast swelling, pain and foul smelling discharge from the wound x 2 days.  CT chest reveals right breast abscess.  Patient afebrile.  Mild leukocytosis at 12.  Patient on exam denies any chest pain or dyspnea.  Has ongoing drainage from the right breast wound, tender to touch.  Patient reports she had 2 episodes of fever at home last 2 days.    Review of Systems   Constitutional:  Negative for chills and fever.   HENT:  Negative for ear pain and sore throat.    Eyes:  Negative for pain and visual disturbance.   Respiratory:  Negative for cough and shortness of breath.    Cardiovascular:  Negative for chest pain and palpitations.        Right breast pain, swelling   Gastrointestinal:  Negative for abdominal pain and vomiting.   Genitourinary:  Negative for dysuria and hematuria.   Musculoskeletal:  Negative for arthralgias and back pain.   Skin:  Negative for color change and rash.   Neurological:  Negative for seizures and syncope.   All other systems reviewed and are negative.      Historical Information   Past Medical History:   Diagnosis Date    Anxiety     Brain lipoma 2007    Breast cancer (HCC)     COPD (chronic obstructive pulmonary disease) (HCC)     GERD (gastroesophageal reflux disease)     Kidney stone     Motion sickness     PONV (postoperative nausea and vomiting)     Tremors of nervous system     essential     Past Surgical History:   Procedure Laterality Date    APPENDECTOMY      BLADDER SURGERY      BREAST BIOPSY Right 10/08/2024    300 4cmfn, open coil clip    CARPAL TUNNEL RELEASE Right 05/31/2024    COLONOSCOPY      FL RETROGRADE PYELOGRAM  12/15/2023    HYSTERECTOMY      age 29 still has lt ovary    IR RADIOFREQUENCY ABLATION      esophagus    LAMINECTOMY      twin, lumbar    LUMBAR FUSION      L5-s1    UT BX/EXC LYMPH NODE OPEN SUPERFICIAL Right  11/12/2024    Procedure: RIGHT SENTINEL LYMPH NODE MAPPING INCLUDING BLUE DYE INJECTION AND RADIOCOLLOID INJECTION, SENTINEL LYMPH NODE BIOPSY (INJECT AT 0730 BY DR CARDARELLI IN THE OR);  Surgeon: Cassandra Lynn Cardarelli, MD;  [facility];  Service: Surgical Oncology    MN CYSTO/URETERO W/LITHOTRIPSY &INDWELL STENT INSRT Left 12/15/2023    Procedure: CYSTO USCOPE W/ LASER, & STENT;  Surgeon: Jhonatan Fleming MD;  [facility];  Service: Urology    MN INJ RADIOACTIVE TRACER FOR ID OF SENTINEL NODE Right 11/12/2024    Procedure: RIGHT SENTINEL LYMPH NODE MAPPING INCLUDING BLUE DYE INJECTION AND RADIOCOLLOID INJECTION, SENTINEL LYMPH NODE BIOPSY (INJECT AT 0730 BY DR CARDARELLI IN THE OR);  Surgeon: Cassandra Lynn Cardarelli, MD;  [facility];  Service: Surgical Oncology    MN INTRAOP SENTINEL LYMPH NODE ID W/DYE INJECTION Right 11/12/2024    Procedure: RIGHT SENTINEL LYMPH NODE MAPPING INCLUDING BLUE DYE INJECTION AND RADIOCOLLOID INJECTION, SENTINEL LYMPH NODE BIOPSY (INJECT AT 0730 BY DR CARDARELLI IN THE OR);  Surgeon: Cassandra Lynn Cardarelli, MD;  [facility];  Service: Surgical Oncology    MN MASTECTOMY SIMPLE COMPLETE Bilateral 11/12/2024    Procedure: BILATERAL MASTECTOMY;  Surgeon: Cassandra Lynn Cardarelli, MD;  [facility];  Service: Surgical Oncology    MN NEUROPLASTY &/TRANSPOS MEDIAN NRV CARPAL TUNNE Right 05/31/2024    Procedure: RELEASE CARPAL TUNNEL;  Surgeon: Dorothea Mayo MD;  [facility];  Service: Orthopedics    MN NEUROPLASTY &/TRANSPOS MEDIAN NRV CARPAL TUNNE Left 07/12/2024    Procedure: RELEASE CARPAL TUNNEL;  Surgeon: Dorothea Mayo MD;  [facility];  Service: Orthopedics    MN NEUROPLASTY &/TRANSPOSITION ULNAR NERVE ELBOW Right 05/31/2024    Procedure: RELEASE CUBITAL TUNNEL POSSIBLE TRANSPOSITION AND ADIPOSE FLAP;  Surgeon: Dorothea Mayo MD;  [facility];  Service: Orthopedics    MN NEUROPLASTY  &/TRANSPOSITION ULNAR NERVE ELBOW Left 2024    Procedure: RELEASE CUBITAL TUNNEL;  Surgeon: Dorothea Mayo MD;  [facility];  Service: Orthopedics    CO TISSUE EXPANDER PLACEMENT BREAST RECONSTRUCTION Bilateral 2024    Procedure: BILATERAL IMMEDIATE BREAST RECONSTRUCTION WITH TISSUE EXPANDERS AND ADM;  Surgeon: Karthik Brito MD;  [facility];  Service: Plastics    TOTAL VAGINAL HYSTERECTOMY      AGE 29    TUBAL LIGATION      UPPER GASTROINTESTINAL ENDOSCOPY      US GUIDED BREAST BIOPSY RIGHT COMPLETE Right 10/08/2024     Social History     Tobacco Use    Smoking status: Former     Current packs/day: 0.00     Average packs/day: 0.5 packs/day for 25.0 years (12.5 ttl pk-yrs)     Types: Cigarettes     Start date:      Quit date:      Years since quittin.9    Smokeless tobacco: Never   Vaping Use    Vaping status: Never Used   Substance and Sexual Activity    Alcohol use: Not Currently     Comment: I may drink once a month if that    Drug use: Never    Sexual activity: Yes     E-Cigarette/Vaping    E-Cigarette Use Never User      E-Cigarette/Vaping Substances    Nicotine No     THC No     CBD No     Flavoring No     Other No     Unknown No      Family History   Problem Relation Age of Onset    Heart attack Mother     Heart disease Mother     Kidney failure Mother     Heart failure Mother     Diabetes Father     Alcohol abuse Father     Suicide Attempts Brother     Thyroid disease Son     Breast cancer Maternal Aunt 47    Bone cancer Maternal Aunt     Cancer Maternal Aunt     No Known Problems Maternal Aunt     No Known Problems Paternal Aunt     Substance Abuse Paternal Uncle     Drug abuse Paternal Uncle     Breast cancer Maternal Grandmother         unknown age    Lung cancer Maternal Grandmother 75    Arthritis Maternal Grandmother     Cancer Maternal Grandmother     No Known Problems Maternal Grandfather     Diabetes Paternal Grandmother     Stroke Paternal  Grandmother     No Known Problems Paternal Grandfather      Social History:  Marital Status: /Civil Union        Meds/Allergies   I have reviewed home medications using recent Epic encounter.  Prior to Admission medications    Medication Sig Start Date End Date Taking? Authorizing Provider   acetaminophen (TYLENOL) 500 mg tablet Take 1 tablet (500 mg total) by mouth every 6 (six) hours as needed for mild pain 11/13/24   Irene Beth PA-C   ACIDOPHILUS LACTOBACILLUS PO Take 1 tablet by mouth daily    Historical Provider, MD   Armodafinil 150 MG tablet Take 1 tablet (150 mg total) by mouth daily 11/11/24   John Fairbanks MD   bisacodyl (DULCOLAX) 5 mg EC tablet Take 2 tablets (10 mg total) by mouth in the morning  Patient taking differently: Take 10 mg by mouth if needed for constipation 9/30/24 3/29/25  Anthony Cochran MD   escitalopram (Lexapro) 10 mg tablet Take 1 tablet (10 mg total) by mouth daily  Patient taking differently: Take 10 mg by mouth daily with lunch 7/5/24   MOHSEN Moore   fenofibrate (TRICOR) 145 mg tablet Take 1 tablet (145 mg total) by mouth daily 6/25/24   MOHSEN Moore   gabapentin (NEURONTIN) 300 mg capsule Take 1 capsule (300 mg total) by mouth daily at bedtime 11/27/24 3/27/25  John Fairbanks MD   letrozole (FEMARA) 2.5 mg tablet Take 1 tablet (2.5 mg total) by mouth daily 12/6/24   Arsenio Xavier MD   polyethylene glycol (MIRALAX) 17 g packet Take 17 g by mouth daily  Patient taking differently: Take 17 g by mouth if needed (constipation) dissolve 8 ounces in liquid of choice 7/22/24 1/18/25  Anthony Cochran MD   sulfamethoxazole-trimethoprim (BACTRIM DS) 800-160 mg per tablet Take 1 tablet by mouth every 12 (twelve) hours for 7 days 12/14/24 12/21/24  Anthony Watts MD   tiotropium-olodaterol (Stiolto Respimat) 2.5-2.5 MCG/ACT inhaler Inhale 2 puffs daily 10/18/24   Keri Weber DO     Allergies   Allergen Reactions    Chlorhexidine Hives     Itching and  red rash on body from CHG cloth (liquid skin cleanser okay)    Codeine Palpitations     Other reaction(s): Other (See Comments)  Other reaction(s): Irregular heart rate  Rash,vomiting, heart palpitations    Latex Rash    Penicillin V Rash and Vomiting       Objective :  Temp:  [98.9 °F (37.2 °C)] 98.9 °F (37.2 °C)  HR:  [77-96] 79  BP: (104-126)/(60-68) 104/63  Resp:  [18] 18  SpO2:  [93 %-99 %] 93 %  O2 Device: None (Room air)    Physical Exam  Constitutional:       Appearance: She is well-developed.   HENT:      Head: Normocephalic and atraumatic.   Pulmonary:      Effort: Pulmonary effort is normal. No respiratory distress.      Breath sounds: Normal breath sounds.   Chest:      Comments: Right breast wound with purulent drainage noted.  Redness, swelling, tender.  Left breast wound healing well.  Musculoskeletal:      Cervical back: Normal range of motion.   Skin:     General: Skin is warm and dry.          Lines/Drains:            Lab Results: I have reviewed the following results:  Results from last 7 days   Lab Units 12/15/24  1039   WBC Thousand/uL 12.97*   HEMOGLOBIN g/dL 12.3   HEMATOCRIT % 38.3   PLATELETS Thousands/uL 264   SEGS PCT % 76*   LYMPHO PCT % 12*   MONO PCT % 8   EOS PCT % 2     Results from last 7 days   Lab Units 12/15/24  1039   SODIUM mmol/L 139   POTASSIUM mmol/L 3.8   CHLORIDE mmol/L 108   CO2 mmol/L 23   BUN mg/dL 14   CREATININE mg/dL 0.89   ANION GAP mmol/L 8   CALCIUM mg/dL 9.5   ALBUMIN g/dL 4.0   TOTAL BILIRUBIN mg/dL 0.32   ALK PHOS U/L 48   ALT U/L 24   AST U/L 20   GLUCOSE RANDOM mg/dL 111             Lab Results   Component Value Date    HGBA1C 5.5 04/06/2024    HGBA1C 5.3 03/29/2023    HGBA1C 5.5 12/11/2021     Results from last 7 days   Lab Units 12/15/24  1039   PROCALCITONIN ng/ml 0.06          ** Please Note: This note has been constructed using a voice recognition system. **

## 2024-12-15 NOTE — PROGRESS NOTES
RConsultation - Plastic Surgery   Name: Jenny Pereyra 49 y.o. female I MRN: [id number]  [facility] Date of Admission: 12/15/2024   Date of Service: 12/15/2024 I Hospital Day: 0  Consults  Physician Requesting Evaluation: Naheed Swartz MD   Reason for Evaluation / Principal Problem: Concern for infection of Right reconstructed breast     Assessment & Plan    Jenny Pereyra is a 49 year old female who is 1 month s/p bilateral mastectomy, R sentinel lymph node biopsy, and immediate bilateral breast reconstruction with tissue expanders. Patient is 3 days s/p excision and closure of her left breast incision.  Patient presents with erythema, swelling, elevated WBC to 12 and pain of the right reconstructed breast concerning for infection. CT demonstrates small fluid collection adjacent to right expander.  Patient is stable and not systemically ill.  Patient admitted to medicine service for IV antibiotics.  Discussed with patient that we will monitor her for the time being on IV antibiotics and assess her progress.  We will follow-up the results of the IR aspiration.  Discussed with the patient that if her condition does not improve or if it worsens we will have to discuss the possibility of taking her to the operating room to remove her tissue expander and washout the breast pocket.  We will discuss this further tomorrow.    -IV abx  -IR aspiration and culture of right breast fluid collection in the AM  -monitor WBC, fever curve and serial exams  -plastics following    History of Present Illness   Jenny Pereyra is a 49 y.o. female who is 1 month s/p bilateral mastectomy, R sentinel lymph node biopsy, and immediate bilateral breast reconstruction with tissue expanders. Patient is 3 days s/p excision and closure of her left breast incision due to a rim of unhealthy skin along edge of incision.  No evidence of infection in either breast at that time.  Patient notes that Friday was her first full day back at  "work and by the end of the day she began to note soreness in her right breast and right lateral chest wall as well as fatigue.  She noted that evening she experienced a temperature of 100.2 and intermittent chills.  Patient noted erythema of the right breast yesterday along with low-grade temperature of 99.6.  Patient called and spoke to the on-call plastic surgeon.  Patient did not wish to present to the emergency room at that time.  Patient was then prescribed Bactrim and given specific instructions to present to the emergency room for new or worsening signs of infection.  Patient notes that last night she experienced moderate chills and felt overall \"crummy\".  Today patient notes that she the redness was not significantly different but she was experiencing increased soreness and swelling in the right lateral chest wall and noticing drainage on her bra overlying the right reconstructed breast incision.  Patient decided present to the emergency room.  Patient has been afebrile since presentation patient denies any fevers this morning.  CT in the emergency room reveals 2.9 x 1 cm fluid collection lateral to the right breast tissue expander.    Review of Systems   Constitutional:  Positive for chills and fatigue.   Respiratory:  Negative for chest tightness and shortness of breath.    Cardiovascular:  Negative for chest pain.   Gastrointestinal:  Negative for abdominal pain.   Musculoskeletal: Negative.    Skin:         Patient notes erythema over the right reconstructed breast   Neurological:  Negative for weakness and light-headedness.   Psychiatric/Behavioral:  Negative for confusion.        Objective :  Temp:  [98.6 °F (37 °C)-98.9 °F (37.2 °C)] 98.6 °F (37 °C)  HR:  [70-96] 70  BP: (101-126)/(60-73) 101/73  Resp:  [17-18] 17  SpO2:  [93 %-99 %] 96 %  O2 Device: None (Room air)    Physical Exam   Patient is alert, oriented, no acute distress  Breathing comfortably on room air  Right reconstructed breast with " diffuse blanching erythema over the entirety of the breast skin.  Incision remains intact.  No drainage or purulence can be expressed from the incision.  Moderate fullness of the right lateral chest wall that is relatively firm.  Left reconstructed breast skin without erythema.  Medial aspect of the incision with eschar forming over the medial 2 cm of recently closed incision.  No appreciable drainage, fullness or fluctuance    Lab Results: I have reviewed the following results:CBC/BMP:   .     12/15/24  1039 12/15/24  1611   WBC 12.97*  --    HGB 12.3  --    HCT 38.3  --     252   SODIUM 139  --    K 3.8  --      --    CO2 23  --    BUN 14  --    CREATININE 0.89  --    GLUC 111  --       IMAGING:    CT Chest - Bilateral mastectomy with bilateral tissue expanders with 2.9 x 1.0 minimally peripheral enhancing fluid collection lateral to the right breast implant compatible with abscess.

## 2024-12-15 NOTE — ASSESSMENT & PLAN NOTE
right breast cancer s/p bilateral mastectomies, right sentinel lymph node biopsy and immediate bilateral breast reconstruction with tissue expanders.   Continue Letrozole

## 2024-12-15 NOTE — ASSESSMENT & PLAN NOTE
49-year-old female with right breast malignancy, s/p bilateral mastectomies, right sentinel lymph node biopsy and immediate bilateral breast reconstruction with tissue expanders one month ago 11/12/24 presented to ED with c/o right breast swelling, pain and foul smelling discharge from the wound.    Afebrile in ED. Mild leukocytosis at 12. However patient reports temp of 100.2 at home.   CT shows: Bilateral mastectomy with bilateral tissue expanders with 2.9 x 1.0 minimally peripheral enhancing fluid collection lateral to the right breast implant compatible with abscess.   Started on IV doxycycline  Consult plastic surgery- recommended IR drainage  Consult IR  NPO midnight  Obtain Blood culture x 2.

## 2024-12-15 NOTE — ED PROVIDER NOTES
Time reflects when diagnosis was documented in both MDM as applicable and the Disposition within this note       Time User Action Codes Description Comment    12/15/2024  2:35 PM Rafi Friedman Add [G89.18] Post-operative pain     12/15/2024  2:35 PM Rafi Friedman Add [L02.91] Abscess     12/15/2024  3:16 PM Naheed Swartz Add [C50.111,  Z17.0] Malignant neoplasm of central portion of right breast in female, estrogen receptor positive (HCC)     12/15/2024  3:16 PM Naheed Swartz Add [N61.1] Breast abscess     12/15/2024  3:59 PM Milagros Devries Add [Z13.9] Encounter for screening involving social determinants of health (SDoH)           ED Disposition       ED Disposition   Admit    Condition   Stable    Date/Time   Sun Dec 15, 2024  2:35 PM    Comment                  Assessment & Plan       Medical Decision Making  Patient is 49-year-old female presenting for swelling/pain/drainage from mastectomy site.  DDx: Surgical site infection, cellulitis, postoperative pain  Based on patient presentation and physical exam findings, primary concern is to rule out surgical site infection.  Plan for baseline lab work, Pro-[facility], CT chest.  CT chest concerning for abscess.  Discussed with Dr. Watts from plastic surgery, plans to contact IR for possible drainage.  IR states they will plan to drainage tomorrow.  After further discussion with Dr. Watts will plan for admission for IV antibiotics and pain control.  Discussed with patient who understands and agrees with plan.  Patient mid to medicine with IR consultation.    Problems Addressed:  Abscess: acute illness or injury  Post-operative pain: acute illness or injury    Amount and/or Complexity of Data Reviewed  Labs: ordered. Decision-making details documented in ED Course.  Radiology: ordered.    Risk  Prescription drug management.  Decision regarding hospitalization.        ED Course as of 12/15/24 1647   Sun Dec 15, 2024   1122 Procalcitonin: 0.06       Medications   modafinil (PROVIGIL)  tablet 200 mg (has no administration in time range)   escitalopram (LEXAPRO) tablet 10 mg (has no administration in time range)   fenofibrate (TRICOR) tablet 145 mg (has no administration in time range)   gabapentin (NEURONTIN) capsule 300 mg (has no administration in time range)   letrozole (FEMARA) tablet 2.5 mg (has no administration in time range)   umeclidinium 62.5 mcg/actuation inhaler AEPB 1 puff (has no administration in time range)     And   olodaterol HCl (STRIVERDI RESPIMAT) inhaler 2 puff (has no administration in time range)   polyethylene glycol (MIRALAX) packet 17 g (has no administration in time range)   acetaminophen (TYLENOL) tablet 650 mg (has no administration in time range)   ondansetron (ZOFRAN) injection 4 mg (has no administration in time range)   heparin (porcine) subcutaneous injection 5,000 Units (has no administration in time range)   doxycycline (VIBRAMYCIN) 100 mg in sodium chloride 0.9 % 100 mL IVPB (has no administration in time range)   HYDROmorphone (DILAUDID) injection 0.5 mg (has no administration in time range)   bacitracin topical ointment 1 small application (has no administration in time range)   sodium chloride 0.9 % bolus 1,000 mL (0 mL Intravenous Stopped 12/15/24 1140)   iohexol (OMNIPAQUE) 350 MG/ML injection (MULTI-DOSE) 85 mL (85 mL Intravenous Given 12/15/24 1142)   doxycycline (VIBRAMYCIN) 100 mg in sodium chloride 0.9 % 100 mL IVPB (100 mg Intravenous New Bag 12/15/24 1513)   HYDROmorphone (DILAUDID) injection 0.5 mg (0.5 mg Intravenous Given 12/15/24 1511)       ED Risk Strat Scores                                              History of Present Illness       Chief Complaint   Patient presents with    Post-op Problem     Pt reports double mastectomy in November. Reports pain, swelling, and drainage on Friday from right sides incision site.       Past Medical History:   Diagnosis Date    Anxiety     Brain lipoma 2007    Breast cancer (HCC)     COPD (chronic  obstructive pulmonary disease) (HCC)     GERD (gastroesophageal reflux disease)     Kidney stone     Motion sickness     PONV (postoperative nausea and vomiting)     Tremors of nervous system     essential      Past Surgical History:   Procedure Laterality Date    APPENDECTOMY      BLADDER SURGERY      BREAST BIOPSY Right 10/08/2024    300 4cmfn, open coil clip    CARPAL TUNNEL RELEASE Right 05/31/2024    COLONOSCOPY      FL RETROGRADE PYELOGRAM  12/15/2023    HYSTERECTOMY      age 29 still has lt ovary    IR RADIOFREQUENCY ABLATION      esophagus    LAMINECTOMY      twin, lumbar    LUMBAR FUSION      L5-s1    IL BX/EXC LYMPH NODE OPEN SUPERFICIAL Right 11/12/2024    Procedure: RIGHT SENTINEL LYMPH NODE MAPPING INCLUDING BLUE DYE INJECTION AND RADIOCOLLOID INJECTION, SENTINEL LYMPH NODE BIOPSY (INJECT AT 0730 BY DR CARDARELLI IN THE OR);  Surgeon: Cassandra Lynn Cardarelli, MD;  [facility];  Service: Surgical Oncology    IL CYSTO/URETERO W/LITHOTRIPSY &INDWELL STENT INSRT Left 12/15/2023    Procedure: CYSTO USCOPE W/ LASER, & STENT;  Surgeon: Jhonatan Fleming MD;  [facility];  Service: Urology    IL INJ RADIOACTIVE TRACER FOR ID OF SENTINEL NODE Right 11/12/2024    Procedure: RIGHT SENTINEL LYMPH NODE MAPPING INCLUDING BLUE DYE INJECTION AND RADIOCOLLOID INJECTION, SENTINEL LYMPH NODE BIOPSY (INJECT AT 0730 BY DR CARDARELLI IN THE OR);  Surgeon: Cassandra Lynn Cardarelli, MD;  [facility];  Service: Surgical Oncology    IL INTRAOP SENTINEL LYMPH NODE ID W/DYE INJECTION Right 11/12/2024    Procedure: RIGHT SENTINEL LYMPH NODE MAPPING INCLUDING BLUE DYE INJECTION AND RADIOCOLLOID INJECTION, SENTINEL LYMPH NODE BIOPSY (INJECT AT 0730 BY DR CARDARELLI IN THE OR);  Surgeon: Cassandra Lynn Cardarelli, MD;  [facility];  Service: Surgical Oncology    IL MASTECTOMY SIMPLE COMPLETE Bilateral 11/12/2024    Procedure: BILATERAL MASTECTOMY;  Surgeon: Cassandra Lynn Cardarelli, MD;   [facility];  Service: Surgical Oncology    IA NEUROPLASTY &/TRANSPOS MEDIAN NRV CARPAL TUNNE Right 05/31/2024    Procedure: RELEASE CARPAL TUNNEL;  Surgeon: Dorothea Mayo MD;  [facility];  Service: Orthopedics    IA NEUROPLASTY &/TRANSPOS MEDIAN NRV CARPAL TUNNE Left 07/12/2024    Procedure: RELEASE CARPAL TUNNEL;  Surgeon: Dorothea Mayo MD;  [facility];  Service: Orthopedics    IA NEUROPLASTY &/TRANSPOSITION ULNAR NERVE ELBOW Right 05/31/2024    Procedure: RELEASE CUBITAL TUNNEL POSSIBLE TRANSPOSITION AND ADIPOSE FLAP;  Surgeon: Dorothea Mayo MD;  [facility];  Service: Orthopedics    IA NEUROPLASTY &/TRANSPOSITION ULNAR NERVE ELBOW Left 07/12/2024    Procedure: RELEASE CUBITAL TUNNEL;  Surgeon: Dorothea Mayo MD;  [facility];  Service: Orthopedics    IA TISSUE EXPANDER PLACEMENT BREAST RECONSTRUCTION Bilateral 11/12/2024    Procedure: BILATERAL IMMEDIATE BREAST RECONSTRUCTION WITH TISSUE EXPANDERS AND ADM;  Surgeon: Karthik Brito MD;  [facility];  Service: Plastics    TOTAL VAGINAL HYSTERECTOMY      AGE 29    TUBAL LIGATION      UPPER GASTROINTESTINAL ENDOSCOPY      US GUIDED BREAST BIOPSY RIGHT COMPLETE Right 10/08/2024      Family History   Problem Relation Age of Onset    Heart attack Mother     Heart disease Mother     Kidney failure Mother     Heart failure Mother     Diabetes Father     Alcohol abuse Father     Suicide Attempts Brother     Thyroid disease Son     Breast cancer Maternal Aunt 47    Bone cancer Maternal Aunt     Cancer Maternal Aunt     No Known Problems Maternal Aunt     No Known Problems Paternal Aunt     Substance Abuse Paternal Uncle     Drug abuse Paternal Uncle     Breast cancer Maternal Grandmother         unknown age    Lung cancer Maternal Grandmother 75    Arthritis Maternal Grandmother     Cancer Maternal Grandmother     No Known Problems Maternal Grandfather     Diabetes Paternal Grandmother      Stroke Paternal Grandmother     No Known Problems Paternal Grandfather       Social History     Tobacco Use    Smoking status: Former     Current packs/day: 0.00     Average packs/day: 0.5 packs/day for 25.0 years (12.5 ttl pk-yrs)     Types: Cigarettes     Start date:      Quit date:      Years since quittin.9    Smokeless tobacco: Never   Vaping Use    Vaping status: Never Used   Substance Use Topics    Alcohol use: Not Currently     Comment: I may drink once a month if that    Drug use: Never      E-Cigarette/Vaping    E-Cigarette Use Never User       E-Cigarette/Vaping Substances    Nicotine No     THC No     CBD No     Flavoring No     Other No     Unknown No       I have reviewed and agree with the history as documented.     HPI  Patient is 49-year-old female presenting for concerns of increased pain swelling of incision site of right breast for the past several days, patient was placed on Bactrim for concerns of cellulitis yesterday.  Past medical history significant for mastectomy done on .  Patient had expanders in place and has been slowly increasing the size of the expanders.  Patient is also noticed some foul-smelling purulent discharge from right incision site as well.  Denies any shortness of breath nausea vomiting.  Patient does endorse fever/chills.  Review of Systems   Constitutional:  Positive for chills and fever.   HENT: Negative.     Eyes: Negative.    Respiratory: Negative.     Cardiovascular: Negative.    Gastrointestinal: Negative.    Endocrine: Negative.    Genitourinary: Negative.    Musculoskeletal: Negative.    Skin:         Concern for swelling/pain/drainage of right mastectomy incision site.   Allergic/Immunologic: Negative.    Neurological: Negative.    Hematological: Negative.    Psychiatric/Behavioral: Negative.     All other systems reviewed and are negative.          Objective       ED Triage Vitals [12/15/24 1019]   Temperature Pulse Blood Pressure Respirations  SpO2 Patient Position - Orthostatic VS   98.9 °F (37.2 °C) 96 126/60 18 99 % Sitting      Temp Source Heart Rate Source BP Location FiO2 (%) Pain Score    Oral Monitor Left arm -- 7      Vitals      Date and Time Temp Pulse SpO2 Resp BP Pain Score FACES Pain Rating User   12/15/24 1554 -- -- -- 17 -- -- -- AD   12/15/24 1553 98.6 °F (37 °C) 70 96 % -- 101/73 -- -- DII   12/15/24 1511 -- -- -- -- -- 8 -- HB   12/15/24 1400 -- 79 93 % -- 104/63 -- -- HB   12/15/24 1300 -- 77 95 % -- 104/68 -- -- HB   12/15/24 1200 -- 78 96 % -- 106/66 -- -- HB   12/15/24 1100 -- 80 93 % -- 105/64 -- -- HB   12/15/24 1019 98.9 °F (37.2 °C) 96 99 % 18 126/60 7 -- BL            Physical Exam  Vitals and nursing note reviewed.   Constitutional:       Appearance: Normal appearance. She is normal weight.   HENT:      Head: Normocephalic and atraumatic.      Right Ear: Tympanic membrane, ear canal and external ear normal.      Left Ear: Tympanic membrane, ear canal and external ear normal.      Nose: Nose normal.      Mouth/Throat:      Mouth: Mucous membranes are moist.      Pharynx: Oropharynx is clear.   Eyes:      Extraocular Movements: Extraocular movements intact.      Conjunctiva/sclera: Conjunctivae normal.      Pupils: Pupils are equal, round, and reactive to light.   Cardiovascular:      Rate and Rhythm: Normal rate and regular rhythm.      Pulses: Normal pulses.      Heart sounds: Normal heart sounds.   Pulmonary:      Effort: Pulmonary effort is normal.      Breath sounds: Normal breath sounds.   Abdominal:      General: Abdomen is flat. Bowel sounds are normal.      Palpations: Abdomen is soft.   Musculoskeletal:         General: Normal range of motion.      Cervical back: Normal range of motion and neck supple.   Skin:     Capillary Refill: Capillary refill takes less than 2 seconds.      Comments: Right side incision site concerning for erythema, mild tenderness to palpation, slight discharge noted from the incision site.   Left incision site C/D/I.  Unable to palpate any fluctuance or induration.   Neurological:      General: No focal deficit present.      Mental Status: She is alert and oriented to person, place, and time.   Psychiatric:         Mood and Affect: Mood normal.         Behavior: Behavior normal.         Thought Content: Thought content normal.         Judgment: Judgment normal.         Results Reviewed       Procedure Component Value Units Date/Time    Platelet count [[id number]]  (Normal) Collected: 12/15/24 1611    Lab Status: Final result Specimen: Blood from Arm, Left Updated: 12/15/24 1626     Platelets 252 Thousands/uL      MPV 10.0 fL     Blood culture [[id number]] Collected: 12/15/24 1611    Lab Status: In process Specimen: Blood from Arm, Left Updated: 12/15/24 1620    Blood culture [[id number]] Collected: 12/15/24 1611    Lab Status: In process Specimen: Blood from Hand, Left Updated: 12/15/24 1620    Procalcitonin [[id number]]  (Normal) Collected: 12/15/24 1039    Lab Status: Final result Specimen: Blood from Arm, Left Updated: 12/15/24 1121     Procalcitonin 0.06 ng/ml     Comprehensive metabolic panel [[id number]] Collected: 12/15/24 1039    Lab Status: Final result Specimen: Blood from Arm, Left Updated: 12/15/24 1113     Sodium 139 mmol/L      Potassium 3.8 mmol/L      Chloride 108 mmol/L      CO2 23 mmol/L      ANION GAP 8 mmol/L      BUN 14 mg/dL      Creatinine 0.89 mg/dL      Glucose 111 mg/dL      Calcium 9.5 mg/dL      AST 20 U/L      ALT 24 U/L      Alkaline Phosphatase 48 U/L      Total Protein 6.6 g/dL      Albumin 4.0 g/dL      Total Bilirubin 0.32 mg/dL      eGFR 77 ml/min/1.73sq m     Narrative:      National Kid[employer] guidelines for Chronic Kidney Disease (CKD):     Stage 1 with normal or high GFR (GFR > 90 mL/min/1.73 square meters)    Stage 2 Mild CKD (GFR = 60-89 mL/min/1.73 square meters)    Stage 3A Moderate CKD (GFR = 45-59 mL/min/1.73 square meters)    Stage 3B Moderate CKD  (GFR = 30-44 mL/min/1.73 square meters)    Stage 4 Severe CKD (GFR = 15-29 mL/min/1.73 square meters)    Stage 5 End Stage CKD (GFR <15 mL/min/1.73 square meters)  Note: GFR calculation is accurate only with a steady state creatinine    CBC and differential [[id number]]  (Abnormal) Collected: 12/15/24 1039    Lab Status: Final result Specimen: Blood from Arm, Left Updated: 12/15/24 1051     WBC 12.97 Thousand/uL      RBC 4.23 Million/uL      Hemoglobin 12.3 g/dL      Hematocrit 38.3 %      MCV 91 fL      MCH 29.1 pg      MCHC 32.1 g/dL      RDW 13.0 %      MPV 9.8 fL      Platelets 264 Thousands/uL      nRBC 0 /100 WBCs      Segmented % 76 %      Immature Grans % 1 %      Lymphocytes % 12 %      Monocytes % 8 %      Eosinophils Relative 2 %      Basophils Relative 1 %      Absolute Neutrophils 10.05 Thousands/µL      Absolute Immature Grans 0.08 Thousand/uL      Absolute Lymphocytes 1.60 Thousands/µL      Absolute Monocytes 0.99 Thousand/µL      Eosinophils Absolute 0.19 Thousand/µL      Basophils Absolute 0.06 Thousands/µL             CT chest with contrast   Final Interpretation by Bessy Goetz MD (12/15 1224)      Bilateral mastectomy with bilateral tissue expanders with 2.9 x 1.0 minimally peripheral enhancing fluid collection lateral to the right breast implant compatible with abscess.      The study was marked in EPIC for immediate notification.         Workstation performed: [id number]             Procedures    ED Medication and Procedure Management   Prior to Admission Medications   Prescriptions Last Dose Informant Patient Reported? Taking?   ACIDOPHILUS LACTOBACILLUS PO  Self Yes No   Sig: Take 1 tablet by mouth daily   Armodafinil 150 MG tablet   No No   Sig: Take 1 tablet (150 mg total) by mouth daily   acetaminophen (TYLENOL) 500 mg tablet   No No   Sig: Take 1 tablet (500 mg total) by mouth every 6 (six) hours as needed for mild pain   bisacodyl (DULCOLAX) 5 mg EC tablet  Self No No   Sig: Take 2  tablets (10 mg total) by mouth in the morning   Patient taking differently: Take 10 mg by mouth if needed for constipation   escitalopram (Lexapro) 10 mg tablet  Self No No   Sig: Take 1 tablet (10 mg total) by mouth daily   Patient taking differently: Take 10 mg by mouth daily with lunch   fenofibrate (TRICOR) 145 mg tablet  Self No No   Sig: Take 1 tablet (145 mg total) by mouth daily   gabapentin (NEURONTIN) 300 mg capsule   No No   Sig: Take 1 capsule (300 mg total) by mouth daily at bedtime   letrozole (FEMARA) 2.5 mg tablet   No No   Sig: Take 1 tablet (2.5 mg total) by mouth daily   polyethylene glycol (MIRALAX) 17 g packet  Self No No   Sig: Take 17 g by mouth daily   Patient taking differently: Take 17 g by mouth if needed (constipation) dissolve 8 ounces in liquid of choice   sulfamethoxazole-trimethoprim (BACTRIM DS) 800-160 mg per tablet   No No   Sig: Take 1 tablet by mouth every 12 (twelve) hours for 7 days   tiotropium-olodaterol (Stiolto Respimat) 2.5-2.5 MCG/ACT inhaler   No No   Sig: Inhale 2 puffs daily      Facility-Administered Medications: None     Current Discharge Medication List        CONTINUE these medications which have NOT CHANGED    Details   acetaminophen (TYLENOL) 500 mg tablet Take 1 tablet (500 mg total) by mouth every 6 (six) hours as needed for mild pain    Associated Diagnoses: Malignant neoplasm of central portion of right breast in female, estrogen receptor positive (HCC)      ACIDOPHILUS LACTOBACILLUS PO Take 1 tablet by mouth daily      Armodafinil 150 MG tablet Take 1 tablet (150 mg total) by mouth daily  Qty: 30 tablet, Refills: 2    Associated Diagnoses: Hypersomnia      bisacodyl (DULCOLAX) 5 mg EC tablet Take 2 tablets (10 mg total) by mouth in the morning  Qty: 60 tablet, Refills: 5    Associated Diagnoses: Abnormal stools; Incontinence of feces, unspecified fecal incontinence type      escitalopram (Lexapro) 10 mg tablet Take 1 tablet (10 mg total) by mouth daily  Qty:  100 tablet, Refills: 1    Associated Diagnoses: Anxiety      fenofibrate (TRICOR) 145 mg tablet Take 1 tablet (145 mg total) by mouth daily  Qty: 90 tablet, Refills: 1    Associated Diagnoses: Other hyperlipidemia      gabapentin (NEURONTIN) 300 mg capsule Take 1 capsule (300 mg total) by mouth daily at bedtime  Qty: 30 capsule, Refills: 3    Associated Diagnoses: Malignant neoplasm of central portion of right breast in female, estrogen receptor positive (HCC)      letrozole (FEMARA) 2.5 mg tablet Take 1 tablet (2.5 mg total) by mouth daily  Qty: 90 tablet, Refills: 3    Associated Diagnoses: Malignant neoplasm of central portion of right breast in female, estrogen receptor positive (HCC)      polyethylene glycol (MIRALAX) 17 g packet Take 17 g by mouth daily  Qty: 510 g, Refills: 5    Associated Diagnoses: Weight loss, abnormal; Abnormal stools      sulfamethoxazole-trimethoprim (BACTRIM DS) 800-160 mg per tablet Take 1 tablet by mouth every 12 (twelve) hours for 7 days  Qty: 14 tablet, Refills: 0    Associated Diagnoses: Cellulitis of right breast      tiotropium-olodaterol (Stiolto Respimat) 2.5-2.5 MCG/ACT inhaler Inhale 2 puffs daily  Qty: 12 g, Refills: 5    Associated Diagnoses: Chronic obstructive pulmonary disease, unspecified COPD type (HCC)           No discharge procedures on file.  ED SEPSIS DOCUMENTATION   Time reflects when diagnosis was documented in both MDM as applicable and the Disposition within this note       Time User Action Codes Description Comment    12/15/2024  2:35 PM Rafi Friedman Add [G89.18] Post-operative pain     12/15/2024  2:35 PM Rafi Friedman Add [L02.91] Abscess     12/15/2024  3:16 PM Naheed Swartz Add [C50.111,  Z17.0] Malignant neoplasm of central portion of right breast in female, estrogen receptor positive (HCC)     12/15/2024  3:16 PM Naheed Swartz Add [N61.1] Breast abscess     12/15/2024  3:59 PM Milagros Devries Add [Z13.9] Encounter for screening involving social determinants  of health ([facility])                  Rafi Friedman MD  12/15/24 3066

## 2024-12-15 NOTE — ED ATTENDING ATTESTATION
Final Diagnoses:     1. Post-operative pain    2. Abscess    3. Malignant neoplasm of central portion of right breast in female, estrogen receptor positive (HCC)    4. Breast abscess    5. Encounter for screening involving social determinants of health (SDoH)           I, Harman Fernandes MD, saw and evaluated the patient. All available labs and X-rays were ordered by me or the resident / non-physician and have been reviewed by myself. I discussed the patient with the resident / non-physician and agree with the resident's / non-physician practitioner's findings and plan as documented in the resident's / non-physician practicitioner's note, except where noted.   At this point, I agree with the current assessment done in the ED.   I was present during key portions of all procedures performed unless otherwise stated.     HPI:  NURSING TRIAGE:    This is a 49 y.o. female presenting for evaluation of postoperative problem.  The patient had double mastectomy done in November.  She currently has breast expanders in place.  The breast expander on the left side was removed several days ago.  The right side has been noticed to be having increased swelling tenderness pain redness.  There is foul smelling discharge from the right site.  The plastic surgeon called and Bactrim, the patient has had 3 doses so far.  She has had fevers, she has been also taking Advil around-the-clock last dose was this morning.  Because of all this came in for evaluation. Chief Complaint   Patient presents with    Post-op Problem     Pt reports double mastectomy in November. Reports pain, swelling, and drainage on Friday from right sides incision site.      PHYSICAL: ASSESSMENT + PLAN:   Pertinent: Bilateral breasts, neither has a nipple.  Horizontal incisions present.  No discharge from the left side.  No foul smell.  The right side has almost purulent discharge, yellow onto the dressing.  There is no obvious cellulitis.  There is a foul smell of  the odor.  It is slightly more swollen the right side than the left.  No axillary lymph nodes present.    General: VS reviewed  Appears in NAD  awake, alert.   Well-nourished, well-developed. Appears stated age.   Speaking normally in full sentences.   Head: Normocephalic, atraumatic  Eyes: EOM-I. No diplopia.   No hyphema.   No subconjunctival hemorrhages.  Symmetrical lids.   ENT: Atraumatic external nose and ears.    MMM  No malocclusion. No stridor. Normal phonation. No drooling. Normal swallowing.   Neck: No JVD.  CV: No pallor noted  Lungs:   No tachypnea  No respiratory distress  Abd: soft nt nd no rebound/guarding  MSK:   FROM spontaneously  Skin: Dry, intact.   Neuro: Awake, alert, GCS15, CN II-XII grossly intact.   Motor grossly intact.  Psychiatric/Behavioral: interacting normally; appropriate mood/affect.    Exam: deferred    Vitals:    12/19/24 0740 12/19/24 1541 12/19/24 2208 12/20/24 0749   BP: 96/64 95/62 (!) 86/51 108/65   Pulse: 62 69 70 74   Resp: 17 16 16    Temp: 98.3 °F (36.8 °C) 98.9 °F (37.2 °C) 98.9 °F (37.2 °C) 98.9 °F (37.2 °C)   TempSrc:       SpO2: 96% 94% 93% 97%   Weight:       Height:        Will do CT to evaluate for abscess, purulent collection.  Will do basic labs.  Will talk to plastic surgery  Offered pain medications, patient is comfortable for now     There are no obvious limitations to social determinants of care.   Nursing note reviewed.   Vitals reviewed.   Orders placed by myself and/or advanced practitioner / resident.    Previous chart was reviewed  History obtained from: Family and Patient  Language barrier: None  Limitations to the history obtained: None    Past Medical: Past Surgical:    has a past medical history of Anxiety, Brain lipoma (2007), Breast cancer (HCC), COPD (chronic obstructive pulmonary disease) (HCC), GERD (gastroesophageal reflux disease), Kidney stone, Motion sickness, PONV (postoperative nausea and vomiting), and Tremors of nervous system.  has a  past surgical history that includes Hysterectomy; Bladder surgery; Tubal ligation; Appendectomy; Colonoscopy; Upper gastrointestinal endoscopy; Total vaginal hysterectomy; IR radiofrequency ablation; pr cysto/uretero w/lithotripsy &indwell stent insrt (Left, 12/15/2023); [place] retrograde pyelogram (12/15/2023); pr neuroplasty &/transposition ulnar nerve elbow (Right, 2024); pr neuroplasty &/transpos median nrv carpal tunne (Right, 2024); Carpal tunnel release (Right, 2024); pr neuroplasty &/transposition ulnar nerve elbow (Left, 2024); pr neuroplasty &/transpos median nrv carpal tunne (Left, 2024); Breast biopsy (Right, 10/08/2024); US guided breast biopsy right complete (Right, 10/08/2024); Lumbar fusion; Laminectomy; pr mastectomy simple complete (Bilateral, 2024); pr bx/exc lymph node open superficial (Right, 2024); pr intraop sentinel lymph node id w/dye injection (Right, 2024); pr inj radioactive tracer for id of sentinel node (Right, 2024); pr tissue expander placement breast reconstruction (Bilateral, 2024); IR drainage tube placement (2024); pr removal tissue expander w/o insertion implant (Bilateral, 2024); Abdominal wall surgery (Bilateral, 2024); and Wound debridement (Bilateral, 2024).   Social: Cardiac (Echo/Cath)   Social History     Substance and Sexual Activity   Alcohol Use Not Currently    Comment: I may drink once a month if that     Social History     Tobacco Use   Smoking Status Former    Current packs/day: 0.00    Average packs/day: 0.5 packs/day for 25.0 years (12.5 ttl pk-yrs)    Types: Cigarettes    Start date:     Quit date:     Years since quittin.9   Smokeless Tobacco Never     Social History     Substance and Sexual Activity   Drug Use Never    No results found for this or any previous visit.    No results found for this or any previous visit.    No results found for this or any previous visit.      Labs: Imaging:   Labs Reviewed   CBC AND DIFFERENTIAL - Abnormal       Result Value Ref Range Status    WBC 12.97 (*) 4.31 - 10.16 Thousand/uL Final    RBC 4.23  3.81 - 5.12 Million/uL Final    Hemoglobin 12.3  11.5 - 15.4 g/dL Final    Hematocrit 38.3  34.8 - 46.1 % Final    MCV 91  82 - 98 fL Final    MCH 29.1  26.8 - 34.3 pg Final    MCHC 32.1  31.4 - 37.4 g/dL Final    RDW 13.0  11.6 - 15.1 % Final    MPV 9.8  8.9 - 12.7 fL Final    Platelets 264  149 - 390 Thousands/uL Final    nRBC 0  /100 WBCs Final    Segmented % 76 (*) 43 - 75 % Final    Immature Grans % 1  0 - 2 % Final    Lymphocytes % 12 (*) 14 - 44 % Final    Monocytes % 8  4 - 12 % Final    Eosinophils Relative 2  0 - 6 % Final    Basophils Relative 1  0 - 1 % Final    Absolute Neutrophils 10.05 (*) 1.85 - 7.62 Thousands/µL Final    Absolute Immature Grans 0.08  0.00 - 0.20 Thousand/uL Final    Absolute Lymphocytes 1.60  0.60 - 4.47 Thousands/µL Final    Absolute Monocytes 0.99  0.17 - 1.22 Thousand/µL Final    Eosinophils Absolute 0.19  0.00 - 0.61 Thousand/µL Final    Basophils Absolute 0.06  0.00 - 0.10 Thousands/µL Final   PROCALCITONIN TEST - Normal    Procalcitonin 0.06  <=0.25 ng/ml Final    Comment: Suspected Lower Respiratory Tract Infection (LRTI):  - LESS than or EQUAL to 0.25 ng/mL:   low likelihood for bacterial LRTI; antibiotics DISCOURAGED.  - GREATER than 0.25 ng/mL:   increased likelihood for bacterial LRTI; antibiotics ENCOURAGED.    Suspected Sepsis:  - Strongly consider initiating antibiotics in ALL UNSTABLE patients.  - LESS than or EQUAL to 0.5 ng/mL:   low likelihood for bacterial sepsis; antibiotics DISCOURAGED.  - GREATER than 0.5 ng/mL:   increased likelihood for bacterial sepsis; antibiotics ENCOURAGED.  - GREATER than 2 ng/mL:   high risk for severe sepsis / septic shock; antibiotics strongly ENCOURAGED.    Decisions on antibiotic use should not be based solely on Procalcitonin (PCT) levels. If PCT is low but uncertainty  exists with stopping antibiotics, repeat PCT in 6-24 hours to confirm the low level. If antibiotics are administered (regardless if initial PCT was high or low), repeat PCT every 1-2 days to consider early antibiotic cessation (when GREATER than 80% decrease from the peak OR when PCT drops below designated cutoffs, whichever comes first), so long as the infection is NOT one that typically requires prolonged treatment durations (e.g., bone/joint infections, endocarditis, Staph. aureus bacteremia).    Situations of FALSE-POSITIVE Procalcitonin values:  1) Newborns < 72 hours old  2) Massive stress from severe trauma / burns, major surgery, acute pancreatitis, cardiogenic / hemorrhagic shock, sickle cell crisis, or other organ perfusion abnormalities  3) Malaria and some Candidal infections  4) Treatment with agents that stimulate cytokines (e.g., OKT3, anti-lymphocyte globulins, alemtuzumab, IL-2, granulocyte transfusion [NOT GCSFs])  5) Chronic renal disease causes elevated baseline levels (consider GREATER than 0.75 ng/mL as an abnormal cut-off); initiating HD/CRRT may cause transient decreases  6) Paraneoplastic syndromes from medullary thyroid or SCLC, some forms of vasculitis, and acute nuqfr-dl-ofic disease    Situations of FALSE-NEGATIVE Procalcitonin values:  1) Too early in clinical course for PCT to have reached its peak (may repeat in 6-24 hours to confirm low level)  2) Localized infection WITHOUT systemic (SIRS / sepsis) response (e.g., an abscess, osteomyelitis, cystitis)  3) Mycobacteria (e.g., Tuberculosis, MAC)  4) Cystic fibrosis exacerbations     COMPREHENSIVE METABOLIC PANEL    Sodium 139  135 - 147 mmol/L Final    Potassium 3.8  3.5 - 5.3 mmol/L Final    Chloride 108  96 - 108 mmol/L Final    CO2 23  21 - 32 mmol/L Final    ANION GAP 8  4 - 13 mmol/L Final    BUN 14  5 - 25 mg/dL Final    Creatinine 0.89  0.60 - 1.30 mg/dL Final    Comment: Standardized to IDMS reference method    Glucose 111  65  - 140 mg/dL Final    Comment: If the patient is fasting, the ADA then defines impaired fasting glucose as > 100 mg/dL and diabetes as > or equal to 123 mg/dL.    Calcium 9.5  8.4 - 10.2 mg/dL Final    AST 20  13 - 39 U/L Final    ALT 24  7 - 52 U/L Final    Comment: Specimen collection should occur prior to Sulfasalazine administration due to the potential for falsely depressed results.     Alkaline Phosphatase 48  34 - 104 U/L Final    Total Protein 6.6  6.4 - 8.4 g/dL Final    Albumin 4.0  3.5 - 5.0 g/dL Final    Total Bilirubin 0.32  0.20 - 1.00 mg/dL Final    Comment: Use of this assay is not recommended for patients undergoing treatment with eltrombopag due to the potential for falsely elevated results.  N-acetyl-p-benzoquinone imine (metabolite of Acetaminophen) will generate erroneously low results in samples for patients that have taken an overdose of Acetaminophen.    eGFR 77  ml/min/1.73sq m Final    Narrative:     National Kidney Disease Foundation guidelines for Chronic Kidney Disease (CKD):     Stage 1 with normal or high GFR (GFR > 90 mL/min/1.73 square meters)    Stage 2 Mild CKD (GFR = 60-89 mL/min/1.73 square meters)    Stage 3A Moderate CKD (GFR = 45-59 mL/min/1.73 square meters)    Stage 3B Moderate CKD (GFR = 30-44 mL/min/1.73 square meters)    Stage 4 Severe CKD (GFR = 15-29 mL/min/1.73 square meters)    Stage 5 End Stage CKD (GFR <15 mL/min/1.73 square meters)  Note: GFR calculation is accurate only with a steady state creatinine    IR drainage tube placement   Final Result   Impression:   Ultrasound-guided placement of a 8.5 Maltese Gomez-Watkins drainage catheter into a right collection adjacent to the tissue expander, and 50 mL of cloudy serous fluid was aspirated and a specimen sent to the lab as described above.            Workstation performed: [id number]         CT chest with contrast   Final Result      Bilateral mastectomy with bilateral tissue expanders with 2.9 x 1.0 minimally  peripheral enhancing fluid collection lateral to the right breast implant compatible with abscess.      The study was marked in EPIC for immediate notification.         Workstation performed: [id number]         IR drainage tube check/change/reposition/reinsertion/upsize    (Results Pending)      Medications: Code Status:   Medications   sodium chloride 0.9 % bolus 1,000 mL (0 mL Intravenous Stopped 12/15/24 1140)   iohexol (OMNIPAQUE) 350 MG/ML injection (MULTI-DOSE) 85 mL (85 mL Intravenous Given 12/15/24 1142)   doxycycline (VIBRAMYCIN) 100 mg in sodium chloride 0.9 % 100 mL IVPB (0 mg Intravenous Stopped 12/15/24 2230)   HYDROmorphone (DILAUDID) injection 0.5 mg (0.5 mg Intravenous Given 12/15/24 1511)   HYDROmorphone (DILAUDID) injection 0.5 mg (0.5 mg Intravenous Given 12/15/24 1937)   bupivacaine liposomal (EXPAREL) 1.3 % injection 20 mL ( Infiltration Canceled Entry 12/18/24 2156)    Code Status: Prior  Advance Directive and Living Will:      Power of :    POLST:       Orders Placed This Encounter   Procedures    Blood culture    Blood culture    Anaerobic culture and Gram stain    Body fluid culture and Gram stain    Anaerobic culture and Gram stain    Culture, tissue and Gram stain    CT chest with contrast    IR drainage tube check/change/reposition/reinsertion/upsize    IR drainage tube placement    CBC and differential    Comprehensive metabolic panel    Procalcitonin    Basic metabolic panel    CBC and differential    Platelet count    Protime-INR    Basic metabolic panel    CBC and differential    CBC and differential    Basic metabolic panel    CBC and differential    Comprehensive metabolic panel    Magnesium    Discharge Diet    Notify admitting physician    Notify admitting physician on arrival    Activity as tolerated    Call provider for:  severe uncontrolled pain    Inpatient Consult to IR    Inpatient consult to Case Management    Inpatient consult to Acute Pain Service    Inpatient  consult to Infectious Diseases    ECG 12 lead    INPATIENT ADMISSION    Discharge patient     Time reflects when diagnosis was documented in both MDM as applicable and the Disposition within this note       Time User Action Codes Description Comment    12/15/2024  2:35 PM Rafi Friedman Add [G89.18] Post-operative pain     12/15/2024  2:35 PM Rafi Friedman Add [L02.91] Abscess     12/15/2024  3:16 PM Naheed Swartz Add [C50.111,  Z17.0] Malignant neoplasm of central portion of right breast in female, estrogen receptor positive (HCC)     12/15/2024  3:16 PM Naheed Swartz Add [N61.1] Breast abscess     12/15/2024  3:59 PM Milagros Devries Add [Z13.9] Encounter for screening involving social determinants of health (SDoH)     12/18/2024  4:56 PM Alcira Hannah Modify [N61.1] Breast abscess           ED Disposition       ED Disposition   Admit    Condition   Stable    Date/Time   Sun Dec 15, 2024  2:35 PM    Comment                  Follow-up Information       Follow up With Specialties Details Why Contact Info    [facility], Nurse Practitioner Schedule an appointment as soon as possible for a visit in 1 week(s)  [address]  [facility] [address] [phone]      Karthik Brito MD Plastic Surgery Follow up Office should call you to schedule outpatient follow-up to be seen within 1 week, if you do not hear anything within 24 hours of discharge, please call the office [address] [facility] [address]  [facility] [address] [phone]            Discharge Medication List as of 12/20/2024  9:55 AM        START taking these medications    Details   amoxicillin-clavulanate (AUGMENTIN) 875-125 mg per tablet Take 1 tablet by mouth every 12 (twelve) hours for 8 days, Starting Fri 12/20/2024, Until Sat 12/28/2024, Normal      oxyCODONE (ROXICODONE) 5 immediate release tablet Take 1 tablet (5 mg total) by mouth every 4 (four) hours as needed for moderate pain for up to 10 days Max Daily  Amount: 30 mg, Starting Fri 12/20/2024, Until Mon 12/30/2024 at 2359, Normal           CONTINUE these medications which have CHANGED    Details   doxycycline hyclate (VIBRAMYCIN) 100 mg capsule Take 1 capsule (100 mg total) by mouth every 12 (twelve) hours for 8 days, Starting Fri 12/20/2024, Until Sat 12/28/2024, Normal           CONTINUE these medications which have NOT CHANGED    Details   acetaminophen (TYLENOL) 500 mg tablet Take 1 tablet (500 mg total) by mouth every 6 (six) hours as needed for mild pain, Starting Wed 11/13/2024, OTC      ACIDOPHILUS LACTOBACILLUS PO Take 1 tablet by mouth daily, Historical Med      Armodafinil 150 MG tablet Take 1 tablet (150 mg total) by mouth daily, Starting Mon 11/11/2024, Normal      escitalopram (Lexapro) 10 mg tablet Take 1 tablet (10 mg total) by mouth daily, Starting Fri 7/5/2024, Normal      fenofibrate (TRICOR) 145 mg tablet Take 1 tablet (145 mg total) by mouth daily, Starting Tue 6/25/2024, Normal      gabapentin (NEURONTIN) 300 mg capsule Take 1 capsule (300 mg total) by mouth daily at bedtime, Starting Wed 11/27/2024, Until Thu 3/27/2025, Normal      letrozole (FEMARA) 2.5 mg tablet Take 1 tablet (2.5 mg total) by mouth daily, Starting Fri 12/6/2024, Normal      tiotropium-olodaterol (Stiolto Respimat) 2.5-2.5 MCG/ACT inhaler Inhale 2 puffs daily, Starting Fri 10/18/2024, Normal      bisacodyl (DULCOLAX) 5 mg EC tablet Take 2 tablets (10 mg total) by mouth in the morning, Starting Mon 9/30/2024, Until Sat 3/29/2025, Normal      polyethylene glycol (MIRALAX) 17 g packet Take 17 g by mouth daily, Starting Mon 7/22/2024, Until Sat 1/18/2025, Normal           STOP taking these medications       sulfamethoxazole-trimethoprim (BACTRIM DS) 800-160 mg per tablet Comments:   Reason for Stopping:             Outpatient Discharge Orders   IR drainage tube check/change/reposition/reinsertion/upsize   Standing Status: Future Standing Exp. Date: 12/16/28     Discharge Diet  "    Activity as tolerated     Call provider for:  severe uncontrolled pain     Prior to Admission Medications   Prescriptions Last Dose Informant Patient Reported? Taking?   ACIDOPHILUS LACTOBACILLUS PO 12/15/2024 Self Yes Yes   Sig: Take 1 tablet by mouth daily   Armodafinil 150 MG tablet 12/15/2024  No Yes   Sig: Take 1 tablet (150 mg total) by mouth daily   acetaminophen (TYLENOL) 500 mg tablet 12/15/2024  No Yes   Sig: Take 1 tablet (500 mg total) by mouth every 6 (six) hours as needed for mild pain   bisacodyl (DULCOLAX) 5 mg EC tablet Not Taking Self No No   Sig: Take 2 tablets (10 mg total) by mouth in the morning   Patient not taking: Reported on 12/15/2024   escitalopram (Lexapro) 10 mg tablet 12/14/2024 Self No Yes   Sig: Take 1 tablet (10 mg total) by mouth daily   Patient taking differently: Take 10 mg by mouth daily with lunch   fenofibrate (TRICOR) 145 mg tablet 12/15/2024 Self No Yes   Sig: Take 1 tablet (145 mg total) by mouth daily   gabapentin (NEURONTIN) 300 mg capsule 12/14/2024  No Yes   Sig: Take 1 capsule (300 mg total) by mouth daily at bedtime   letrozole (FEMARA) 2.5 mg tablet 12/14/2024  No Yes   Sig: Take 1 tablet (2.5 mg total) by mouth daily   polyethylene glycol (MIRALAX) 17 g packet Not Taking Self No No   Sig: Take 17 g by mouth daily   Patient not taking: Reported on 12/15/2024   sulfamethoxazole-trimethoprim (BACTRIM DS) 800-160 mg per tablet 12/15/2024  No Yes   Sig: Take 1 tablet by mouth every 12 (twelve) hours for 7 days   tiotropium-olodaterol (Stiolto Respimat) 2.5-2.5 MCG/ACT inhaler 12/14/2024  No Yes   Sig: Inhale 2 puffs daily      Facility-Administered Medications: None                        Portions of the record may have been created with voice recognition software. Occasional wrong word or \"sound a like\" substitutions may have occurred due to the inherent limitations of voice recognition software. Read the chart carefully and recognize, using context, where " substitutions have occurred.    Electronically signed by:  Harman Fernandes

## 2024-12-16 ENCOUNTER — APPOINTMENT (INPATIENT)
Dept: RADIOLOGY | Facility: HOSPITAL | Age: 49
DRG: 857 | End: 2024-12-16
Attending: STUDENT IN AN ORGANIZED HEALTH CARE EDUCATION/TRAINING PROGRAM
Payer: COMMERCIAL

## 2024-12-16 LAB
ANION GAP SERPL CALCULATED.3IONS-SCNC: 7 MMOL/L (ref 4–13)
BASOPHILS # BLD AUTO: 0.05 THOUSANDS/ΜL (ref 0–0.1)
BASOPHILS NFR BLD AUTO: 1 % (ref 0–1)
BUN SERPL-MCNC: 10 MG/DL (ref 5–25)
CALCIUM SERPL-MCNC: 9.3 MG/DL (ref 8.4–10.2)
CHLORIDE SERPL-SCNC: 106 MMOL/L (ref 96–108)
CO2 SERPL-SCNC: 25 MMOL/L (ref 21–32)
CREAT SERPL-MCNC: 0.75 MG/DL (ref 0.6–1.3)
EOSINOPHIL # BLD AUTO: 0.29 THOUSAND/ΜL (ref 0–0.61)
EOSINOPHIL NFR BLD AUTO: 3 % (ref 0–6)
ERYTHROCYTE [DISTWIDTH] IN BLOOD BY AUTOMATED COUNT: 13 % (ref 11.6–15.1)
GFR SERPL CREATININE-BSD FRML MDRD: 95 ML/MIN/1.73SQ M
GLUCOSE SERPL-MCNC: 90 MG/DL (ref 65–140)
HCT VFR BLD AUTO: 35.2 % (ref 34.8–46.1)
HGB BLD-MCNC: 11.3 G/DL (ref 11.5–15.4)
IMM GRANULOCYTES # BLD AUTO: 0.03 THOUSAND/UL (ref 0–0.2)
IMM GRANULOCYTES NFR BLD AUTO: 0 % (ref 0–2)
INR PPP: 1.01 (ref 0.85–1.19)
LYMPHOCYTES # BLD AUTO: 2.12 THOUSANDS/ΜL (ref 0.6–4.47)
LYMPHOCYTES NFR BLD AUTO: 22 % (ref 14–44)
MCH RBC QN AUTO: 29.4 PG (ref 26.8–34.3)
MCHC RBC AUTO-ENTMCNC: 32.1 G/DL (ref 31.4–37.4)
MCV RBC AUTO: 91 FL (ref 82–98)
MONOCYTES # BLD AUTO: 0.67 THOUSAND/ΜL (ref 0.17–1.22)
MONOCYTES NFR BLD AUTO: 7 % (ref 4–12)
NEUTROPHILS # BLD AUTO: 6.57 THOUSANDS/ΜL (ref 1.85–7.62)
NEUTS SEG NFR BLD AUTO: 67 % (ref 43–75)
NRBC BLD AUTO-RTO: 0 /100 WBCS
PLATELET # BLD AUTO: 242 THOUSANDS/UL (ref 149–390)
PMV BLD AUTO: 10.4 FL (ref 8.9–12.7)
POTASSIUM SERPL-SCNC: 4 MMOL/L (ref 3.5–5.3)
PROTHROMBIN TIME: 13.6 SECONDS (ref 12.3–15)
RBC # BLD AUTO: 3.85 MILLION/UL (ref 3.81–5.12)
SODIUM SERPL-SCNC: 138 MMOL/L (ref 135–147)
WBC # BLD AUTO: 9.73 THOUSAND/UL (ref 4.31–10.16)

## 2024-12-16 PROCEDURE — 85610 PROTHROMBIN TIME: CPT | Performed by: STUDENT IN AN ORGANIZED HEALTH CARE EDUCATION/TRAINING PROGRAM

## 2024-12-16 PROCEDURE — 87070 CULTURE OTHR SPECIMN AEROBIC: CPT | Performed by: INTERNAL MEDICINE

## 2024-12-16 PROCEDURE — 80048 BASIC METABOLIC PNL TOTAL CA: CPT | Performed by: FAMILY MEDICINE

## 2024-12-16 PROCEDURE — 99232 SBSQ HOSP IP/OBS MODERATE 35: CPT | Performed by: NURSE PRACTITIONER

## 2024-12-16 PROCEDURE — 87205 SMEAR GRAM STAIN: CPT | Performed by: INTERNAL MEDICINE

## 2024-12-16 PROCEDURE — 0H9U3ZZ DRAINAGE OF LEFT BREAST, PERCUTANEOUS APPROACH: ICD-10-PCS | Performed by: INTERNAL MEDICINE

## 2024-12-16 PROCEDURE — 87075 CULTR BACTERIA EXCEPT BLOOD: CPT | Performed by: INTERNAL MEDICINE

## 2024-12-16 PROCEDURE — 76080 X-RAY EXAM OF FISTULA: CPT

## 2024-12-16 PROCEDURE — C1769 GUIDE WIRE: HCPCS

## 2024-12-16 PROCEDURE — 85025 COMPLETE CBC W/AUTO DIFF WBC: CPT | Performed by: FAMILY MEDICINE

## 2024-12-16 PROCEDURE — 99024 POSTOP FOLLOW-UP VISIT: CPT | Performed by: PLASTIC SURGERY

## 2024-12-16 PROCEDURE — 10030 IMG GID FLU COLL DRG SFT TIS: CPT | Performed by: INTERNAL MEDICINE

## 2024-12-16 PROCEDURE — 87076 CULTURE ANAEROBE IDENT EACH: CPT | Performed by: INTERNAL MEDICINE

## 2024-12-16 PROCEDURE — 87181 SC STD AGAR DILUTION PER AGT: CPT | Performed by: INTERNAL MEDICINE

## 2024-12-16 PROCEDURE — C1729 CATH, DRAINAGE: HCPCS

## 2024-12-16 PROCEDURE — 49424 ASSESS CYST CONTRAST INJECT: CPT

## 2024-12-16 RX ORDER — SODIUM CHLORIDE 9 MG/ML
10 INJECTION, SOLUTION INTRAMUSCULAR; INTRAVENOUS; SUBCUTANEOUS DAILY
Qty: 900 ML | Refills: 0 | Status: SHIPPED | OUTPATIENT
Start: 2024-12-16 | End: 2024-12-17

## 2024-12-16 RX ORDER — DOXYCYCLINE 100 MG/1
100 CAPSULE ORAL EVERY 12 HOURS SCHEDULED
Status: DISCONTINUED | OUTPATIENT
Start: 2024-12-16 | End: 2024-12-17

## 2024-12-16 RX ORDER — ALPRAZOLAM 0.25 MG/1
0.25 TABLET ORAL
Status: DISCONTINUED | OUTPATIENT
Start: 2024-12-16 | End: 2024-12-20 | Stop reason: HOSPADM

## 2024-12-16 RX ORDER — SACCHAROMYCES BOULARDII 250 MG
250 CAPSULE ORAL 2 TIMES DAILY
Status: DISCONTINUED | OUTPATIENT
Start: 2024-12-16 | End: 2024-12-20 | Stop reason: HOSPADM

## 2024-12-16 RX ADMIN — BACITRACIN ZINC 1 SMALL APPLICATION: 500 OINTMENT TOPICAL at 08:26

## 2024-12-16 RX ADMIN — LETROZOLE 2.5 MG: 2.5 TABLET ORAL at 08:27

## 2024-12-16 RX ADMIN — OXYCODONE HYDROCHLORIDE 10 MG: 10 TABLET ORAL at 17:28

## 2024-12-16 RX ADMIN — OLODATEROL RESPIMAT INHALATION SPRAY 2 PUFF: 2.5 SPRAY, METERED RESPIRATORY (INHALATION) at 08:27

## 2024-12-16 RX ADMIN — DOXYCYCLINE 100 MG: 100 INJECTION, POWDER, LYOPHILIZED, FOR SOLUTION INTRAVENOUS at 02:13

## 2024-12-16 RX ADMIN — BACITRACIN ZINC 1 SMALL APPLICATION: 500 OINTMENT TOPICAL at 17:26

## 2024-12-16 RX ADMIN — OXYCODONE HYDROCHLORIDE 10 MG: 10 TABLET ORAL at 02:21

## 2024-12-16 RX ADMIN — DOXYCYCLINE 100 MG: 100 CAPSULE ORAL at 12:43

## 2024-12-16 RX ADMIN — HYDROMORPHONE HYDROCHLORIDE 0.5 MG: 1 INJECTION, SOLUTION INTRAMUSCULAR; INTRAVENOUS; SUBCUTANEOUS at 15:28

## 2024-12-16 RX ADMIN — FENOFIBRATE 145 MG: 145 TABLET ORAL at 08:26

## 2024-12-16 RX ADMIN — DOXYCYCLINE 100 MG: 100 CAPSULE ORAL at 23:00

## 2024-12-16 RX ADMIN — ESCITALOPRAM OXALATE 10 MG: 10 TABLET ORAL at 12:43

## 2024-12-16 RX ADMIN — UMECLIDINIUM 1 PUFF: 62.5 AEROSOL, POWDER ORAL at 08:27

## 2024-12-16 RX ADMIN — OXYCODONE HYDROCHLORIDE 10 MG: 10 TABLET ORAL at 12:47

## 2024-12-16 RX ADMIN — MODAFINIL 200 MG: 100 TABLET ORAL at 08:26

## 2024-12-16 RX ADMIN — GABAPENTIN 300 MG: 300 CAPSULE ORAL at 21:26

## 2024-12-16 RX ADMIN — Medication 250 MG: at 17:25

## 2024-12-16 RX ADMIN — HEPARIN SODIUM 5000 UNITS: 5000 INJECTION, SOLUTION INTRAVENOUS; SUBCUTANEOUS at 21:26

## 2024-12-16 NOTE — SEDATION DOCUMENTATION
Drainage tube placement performed by Dr. Lou. Patient tolerated well.  40 mL of cloudy yellow fluid removed and sent for cultures.  Kareen Brooks.

## 2024-12-16 NOTE — PROGRESS NOTES
Progress Note - Plastic Surgery   Name: Jenny Pereyra 49 y.o. female I MRN: [id number]  Unit/Bed#: [facility] 906-01 I Date of Admission: 12/15/2024   Date of Service: 12/16/2024 I Hospital Day: 1    Assessment & Plan  Breast abscess  -Patient reported to the ED 12/15 with concerns of right breast erythema and fatigue. Chest CT showed fluid collection adjacent to right breast tissue expander. She is currently admitted to [facility] for IV antibiotics and work up.  Pt afebrile, stable VS, WBC 9.73    -Plan for IR aspiration of fluid collection. Would appreciate cultures at that time as well for future antibiotic tailoring.  -Continue to monitor WBC and fever trends.  -Continue IV antibiotics  -Encouraged OOB and ambulation  -Will continue to follow  Malignant neoplasm of central portion of right breast in female, estrogen receptor positive (HCC)  Jenny Pereyra is a 49 year old female who is 1 month s/p bilateral mastectomy, R sentinel lymph node biopsy, and immediate bilateral breast reconstruction with tissue expanders. Patient is 3 days s/p excision and closure of her left breast incision.       Subjective   Patient laying comfortably in bed, states she continues to feel fatigued. Was walking the hallway earlier today.    Objective :  Temp:  [98.6 °F (37 °C)-100.3 °F (37.9 °C)] 100.3 °F (37.9 °C)  HR:  [70-82] 74  BP: ()/(59-73) 94/59  Resp:  [16-18] 16  SpO2:  [93 %-96 %] 93 %  O2 Device: None (Room air)    I/O         12/14 0701  12/15 0700 12/15 0701  12/16 0700 12/16 0701  12/17 0700    P.O.  200     IV Piggyback  1000     Total Intake(mL/kg)  1200 (18.2)     Urine (mL/kg/hr)  750     Total Output  750     Net  +450                    Physical Exam  Vitals:    12/16/24 0732   BP: 94/59   Pulse: 74   Resp: 16   Temp: 100.3 °F (37.9 °C)   SpO2: 93%     Constitutional:AAOx3, well-developed, no distress. Pt is cooperative and pleasant.  Cardiovascular: Normal rate, regular rhythm.  Pulmonary/Chest: Effort normal and  breath sounds normal. No respiratory distress.  Breast: Right breast with diffuse blanching erythema, improved firmness to lateral portion. Incision intact without drainage or purulent material appreciated. Left breast incision in tact with prolene sutures in place. No signs of infection or fluid accumulation. Medial incisional epidermolysis, stable.  Psychiatric: Has appropriate behavior and thought process.   Extremities: Full ROM, no gross abnormalities.      Lab Results: I have reviewed the following results:  Recent Labs     12/15/24  1039 12/15/24  1611 12/16/24  0455   WBC 12.97*  --  9.73   HGB 12.3  --  11.3*   HCT 38.3  --  35.2      < > 242   SODIUM 139  --  138   K 3.8  --  4.0     --  106   CO2 23  --  25   BUN 14  --  10   CREATININE 0.89  --  0.75   GLUC 111  --  90   AST 20  --   --    ALT 24  --   --    ALB 4.0  --   --    TBILI 0.32  --   --    ALKPHOS 48  --   --    INR  --   --  1.01    < > = values in this interval not displayed.       Imaging: CT chest   Bilateral mastectomy with bilateral tissue expanders with 2.9 x 1.0 minimally peripheral enhancing fluid collection lateral to the right breast implant compatible with abscess.     VTE Pharmacologic Prophylaxis: VTE covered by:  heparin (porcine), Subcutaneous, 5,000 Units at 12/15/24 2113     VTE Mechanical Prophylaxis: sequential compression device    Emma Alejandro PA-C  Plastic and Reconstructive Surgery

## 2024-12-16 NOTE — CONSULTS
e-Consult (IPC)  - Interventional Radiology  Jenny Pereyra 49 y.o. female MRN: [id number]  Unit/Bed#: [facility] 906-01 Encounter: [id number]          Interventional Radiology has been consulted to evaluate Jenny Pereyra    We were consulted by plastics concerning this patient.    Inpatient Consult to IR  Consult performed by: Josep Medellin MD  Consult ordered by: Harman Fernandes MD        12/15/24    Assessment/Recommendation:   49 yof pmh right breast cancer s/p recent mastectomy.    She p/w worsening pain/drainage along the right breast surgical scar.    CT shows a small collection along the lateral margin of the right tissue expander.    IR consulted for drainage.    We'll plan on aspiration vs drain placement tomorrow pending IR scheduling availability.    She does not need to be NPO. Checking AM INR.    5-10 minutes, >50% of the total time devoted to medical consultative verbal/EMR discussion between providers. Written report will be generated in the EMR.     Thank you for allowing Interventional Radiology to participate in the care of Jenny Pereyra. Please don't hesitate to call or TigerText us with any questions.     Josep Medellin MD

## 2024-12-16 NOTE — ASSESSMENT & PLAN NOTE
-Patient reported to the ED 12/15 with concerns of right breast erythema and fatigue. Chest CT showed fluid collection adjacent to right breast tissue expander. She is currently admitted to [facility] for IV antibiotics and work up.  Pt afebrile, stable VS, WBC 9.73    -Plan for IR aspiration of fluid collection. Would appreciate cultures at that time as well for future antibiotic tailoring.  -Continue to monitor WBC and fever trends.  -Continue IV antibiotics  -Encouraged OOB and ambulation  -Will continue to follow

## 2024-12-16 NOTE — PLAN OF CARE
Problem: PAIN - ADULT  Goal: Verbalizes/displays adequate comfort level or baseline comfort level  Description: Interventions:  - Encourage patient to monitor pain and request assistance  - Assess pain using appropriate pain scale  - Administer analgesics based on type and severity of pain and evaluate response  - Implement non-pharmacological measures as appropriate and evaluate response  - Consider cultural and social influences on pain and pain management  - Notify physician/advanced practitioner if interventions unsuccessful or patient reports new pain  Outcome: Progressing     Problem: INFECTION - ADULT  Goal: Absence or prevention of progression during hospitalization  Description: INTERVENTIONS:  - Assess and monitor for signs and symptoms of infection  - Monitor lab/diagnostic results  - Monitor all insertion sites, i.e. indwelling lines, tubes, and drains  - Monitor endotracheal if appropriate and nasal secretions for changes in amount and color  - [place] appropriate cooling/warming therapies per order  - Administer medications as ordered  - Instruct and encourage patient and family to use good hand hygiene technique  - Identify and instruct in appropriate isolation precautions for identified infection/condition  Outcome: Progressing     Problem: DISCHARGE PLANNING  Goal: Discharge to home or other facility with appropriate resources  Description: INTERVENTIONS:  - Identify barriers to discharge w/patient and caregiver  - Arrange for needed discharge resources and transportation as appropriate  - Identify discharge learning needs (meds, wound care, etc.)  - Arrange for interpretive services to assist at discharge as needed  - Refer to Case Management Department for coordinating discharge planning if the patient needs post-hospital services based on physician/advanced practitioner order or complex needs related to functional status, cognitive ability, or social support system  Outcome: Progressing

## 2024-12-16 NOTE — UTILIZATION REVIEW
NOTIFICATION OF INPATIENT ADMISSION   AUTHORIZATION REQUEST   SERVICING FACILITY:   Replaced by [facility]  Address: [address] [place] [address]  Tax ID: [id number]  NPI: [id number] ATTENDING PROVIDER:  Attending Name and NPI#: Victoriano Isabel Md [[id number]]  Address: [address] [place] [address]  Phone: [phone]   ADMISSION INFORMATION:  Place of Service: Inpatient [facility]  Place of Service Code: 21  Inpatient Admission Date/Time: 12/15/24  2:39 PM  Discharge Date/Time: No discharge date for patient encounter.  Admitting Diagnosis Code/Description:  Abscess [L02.91]  Breast abscess [N61.1]  Post-operative pain [G89.18]  Post-operative complication [T81.9XXA]  Malignant neoplasm of central portion of right breast in female, estrogen receptor positive (HCC) [C50.111, Z17.0]     UTILIZATION REVIEW CONTACT:  Epifanio Sanchez, Utilization   Network Utilization Review Department  Phone: [phone]  Fax: [phone]  Email: [email or web address]  Contact for approvals/pending authorizations, clinical reviews, and discharge.     PHYSICIAN ADVISORY SERVICES:  Medical Necessity Denial & Tzwt-sq-Htnc Review  Phone: [phone]  Fax: [phone]  Email: [email or web address]     DISCHARGE SUPPORT TEAM:  For Patients Discharge Needs & Updates  Phone: [phone] opt. 2 Fax: [phone]  Email: [email or web address]

## 2024-12-16 NOTE — PROGRESS NOTES
Progress Note - Hospitalist   Name: Jenny Pereyra 49 y.o. female I MRN: [id number]  [facility] I Date of Admission: 12/15/2024   Date of Service: 12/16/2024 I Hospital Day: 1    Assessment & Plan  Breast abscess  49-year-old female with right breast malignancy, s/p bilateral mastectomies, right sentinel lymph node biopsy and immediate bilateral breast reconstruction with tissue expanders one month ago 11/12/24 presented to ED with c/o right breast swelling, pain and foul smelling discharge from the wound.    Afebrile in ED. Mild leukocytosis at 12. However patient reports temp of 100.2 at home.   CT shows: Bilateral mastectomy with bilateral tissue expanders with 2.9 x 1.0 minimally peripheral enhancing fluid collection lateral to the right breast implant compatible with abscess.   Started on IV doxycycline, will continue this today (12/16/24)  Consult plastic surgery- reviewed plan, recommended IR drainage  Consulted IR and they will try to drain today (12/16/24) and patient did not require NPO status therefor she had breakfast.   Obtain Blood culture x 2 which are still pending. Fortunately, she is systemically well appearing.     Anxiety  Associated with hospitalization and recent surgery. This may be impacting sleep. Add Xanax to as needed night regimen to help her get consecutive hours of sleep. Also close the door once night care is done and provided the patient with a sleep kit.   Essential tremor  Continue Gabapentin  Stage 1 mild COPD by GOLD classification (HCC)  Stiolto nonformulary, resume  Hypersomnia  PTA meds armodafinil 150 mg daily, nonformulary, substitute with modafinil.  Malignant neoplasm of central portion of right breast in female, estrogen receptor positive (HCC)   right breast cancer s/p bilateral mastectomies, right sentinel lymph node biopsy and immediate bilateral breast reconstruction with tissue expanders.   Continue Letrozole    VTE Pharmacologic Prophylaxis:   Moderate  Risk (Score 3-4) - Pharmacological DVT Prophylaxis Ordered: heparin.    Mobility:   Basic Mobility Inpatient Raw Score: 24  JH-HLM Goal: 8: Walk 250 feet or more  JH-HLM Achieved: 8: Walk 250 feet ot more  JH-HLM Goal achieved. Continue to encourage appropriate mobility.    Patient Centered Rounds: I performed bedside rounds with nursing staff today.   Discussions with Specialists or Other Care Team Provider: Primary RN     Education and Discussions with Family / Patient: Patient declined call to .     Current Length of Stay: 1 day(s)  Current Patient Status: Inpatient   Certification Statement: The patient will continue to require additional inpatient hospital stay due to right breast abscess   Discharge Plan: Anticipate discharge in 24-48 hrs to home.    Code Status: Level 1 - Full Code    Subjective   Did not sleep well last night and has pain described as burning to her right breast area. This was after the dressing was changed. No fever or chills and ate all of her breakfast without difficulty.     Objective :  Temp:  [98.6 °F (37 °C)-100.3 °F (37.9 °C)] 100.3 °F (37.9 °C)  HR:  [70-96] 74  BP: ()/(59-73) 94/59  Resp:  [16-18] 16  SpO2:  [93 %-99 %] 93 %  O2 Device: None (Room air)    Body mass index is 26.53 kg/m².     Input and Output Summary (last 24 hours):     Intake/Output Summary (Last 24 hours) at 12/16/2024 1002  Last data filed at 12/16/2024 0511  Gross per 24 hour   Intake 1200 ml   Output 750 ml   Net 450 ml       Physical Exam  Constitutional:       General: She is not in acute distress.     Appearance: She is not ill-appearing.   HENT:      Nose: Nose normal.      Mouth/Throat:      Mouth: Mucous membranes are moist.   Cardiovascular:      Rate and Rhythm: Normal rate and regular rhythm.      Pulses: Normal pulses.      Heart sounds: Normal heart sounds.   Pulmonary:      Effort: Pulmonary effort is normal.      Breath sounds: Normal breath sounds.   Abdominal:      General:  Abdomen is flat.      Palpations: Abdomen is soft.   Musculoskeletal:         General: No swelling. Normal range of motion.   Skin:     General: Skin is warm and dry.   Neurological:      General: No focal deficit present.      Mental Status: She is alert and oriented to person, place, and time.   Psychiatric:         Mood and Affect: Mood normal.         Behavior: Behavior normal.           Lines/Drains:              Lab Results: I have reviewed the following results:   Results from last 7 days   Lab Units 12/16/24  0455   WBC Thousand/uL 9.73   HEMOGLOBIN g/dL 11.3*   HEMATOCRIT % 35.2   PLATELETS Thousands/uL 242   SEGS PCT % 67   LYMPHO PCT % 22   MONO PCT % 7   EOS PCT % 3     Results from last 7 days   Lab Units 12/16/24  0455 12/15/24  1039   SODIUM mmol/L 138 139   POTASSIUM mmol/L 4.0 3.8   CHLORIDE mmol/L 106 108   CO2 mmol/L 25 23   BUN mg/dL 10 14   CREATININE mg/dL 0.75 0.89   ANION GAP mmol/L 7 8   CALCIUM mg/dL 9.3 9.5   ALBUMIN g/dL  --  4.0   TOTAL BILIRUBIN mg/dL  --  0.32   ALK PHOS U/L  --  48   ALT U/L  --  24   AST U/L  --  20   GLUCOSE RANDOM mg/dL 90 111     Results from last 7 days   Lab Units 12/16/24  0455   INR  1.01             Results from last 7 days   Lab Units 12/15/24  1039   PROCALCITONIN ng/ml 0.06       Recent Cultures (last 7 days):   Results from last 7 days   Lab Units 12/15/24  1611   BLOOD CULTURE  Received in Microbiology Lab. Culture in Progress.  Received in Microbiology Lab. Culture in Progress.       Imaging Results Review: I reviewed radiology reports from this admission including: CT chest.  Other Study Results Review: No additional pertinent studies reviewed.    Last 24 Hours Medication List:     Current Facility-Administered Medications:     acetaminophen (TYLENOL) tablet 650 mg, Q6H PRN    ALPRAZolam (XANAX) tablet 0.25 mg, HS PRN    bacitracin topical ointment 1 small application, BID    doxycycline hyclate (VIBRAMYCIN) capsule 100 mg, Q12H URVASHI    escitalopram  (LEXAPRO) tablet 10 mg, Daily With Lunch    fenofibrate (TRICOR) tablet 145 mg, Daily    gabapentin (NEURONTIN) capsule 300 mg, HS    heparin (porcine) subcutaneous injection 5,000 Units, Q8H URVASHI **AND** [COMPLETED] Platelet count, Once    HYDROmorphone (DILAUDID) injection 0.5 mg, Q4H PRN    letrozole (FEMARA) tablet 2.5 mg, Daily    modafinil (PROVIGIL) tablet 200 mg, Daily    umeclidinium 62.5 mcg/actuation inhaler AEPB 1 puff, Daily **AND** olodaterol HCl (STRIVERDI RESPIMAT) inhaler 2 puff, Daily    ondansetron (ZOFRAN) injection 4 mg, Q6H PRN    oxyCODONE (ROXICODONE) immediate release tablet 10 mg, Q4H PRN    oxyCODONE (ROXICODONE) IR tablet 5 mg, Q6H PRN    polyethylene glycol (MIRALAX) packet 17 g, Daily PRN    Administrative Statements   Today, Patient Was Seen By: MOHSEN Lock      **Please Note: This note may have been constructed using a voice recognition system.**

## 2024-12-16 NOTE — ASSESSMENT & PLAN NOTE
49-year-old female with right breast malignancy, s/p bilateral mastectomies, right sentinel lymph node biopsy and immediate bilateral breast reconstruction with tissue expanders one month ago 11/12/24 presented to ED with c/o right breast swelling, pain and foul smelling discharge from the wound.    Afebrile in ED. Mild leukocytosis at 12. However patient reports temp of 100.2 at home.   CT shows: Bilateral mastectomy with bilateral tissue expanders with 2.9 x 1.0 minimally peripheral enhancing fluid collection lateral to the right breast implant compatible with abscess.   Started on IV doxycycline, will continue this today (12/16/24)  Consult plastic surgery- reviewed plan, recommended IR drainage  Consulted IR and they will try to drain today (12/16/24) and patient did not require NPO status therefor she had breakfast.   Obtain Blood culture x 2 which are still pending. Fortunately, she is systemically well appearing.

## 2024-12-16 NOTE — PLAN OF CARE
Problem: PAIN - ADULT  Goal: Verbalizes/displays adequate comfort level or baseline comfort level  Description: Interventions:  - Encourage patient to monitor pain and request assistance  - Assess pain using appropriate pain scale  - Administer analgesics based on type and severity of pain and evaluate response  - Implement non-pharmacological measures as appropriate and evaluate response  - Consider cultural and social influences on pain and pain management  - Notify physician/advanced practitioner if interventions unsuccessful or patient reports new pain  12/15/2024 1907 by Emma Najera RN  Outcome: Progressing  12/15/2024 1907 by Emma Najera RN  Outcome: Progressing     Problem: INFECTION - ADULT  Goal: Absence or prevention of progression during hospitalization  Description: INTERVENTIONS:  - Assess and monitor for signs and symptoms of infection  - Monitor lab/diagnostic results  - Monitor all insertion sites, i.e. indwelling lines, tubes, and drains  - Monitor endotracheal if appropriate and nasal secretions for changes in amount and color  - [place] appropriate cooling/warming therapies per order  - Administer medications as ordered  - Instruct and encourage patient and family to use good hand hygiene technique  - Identify and instruct in appropriate isolation precautions for identified infection/condition  12/15/2024 1907 by Emma Najera RN  Outcome: Progressing  12/15/2024 1907 by Emma Najera RN  Outcome: Progressing

## 2024-12-16 NOTE — CASE MANAGEMENT
Case Management Assessment & Discharge Planning Note    Patient name Jenny Pereyra  Location [facility] 906/[facility] 906-01 MRN [id number]  : 1975 Date 2024       Current Admission Date: 12/15/2024  Current Admission Diagnosis:Breast abscess   Patient Active Problem List    Diagnosis Date Noted Date Diagnosed    Breast abscess 12/15/2024     Malignant neoplasm of central portion of right breast in female, estrogen receptor positive (HCC) 10/11/2024     Diverticulosis 2024     Other hemorrhoids 2024     Cubital tunnel syndrome, left 2024     Gross hematuria 2024     Other hyperlipidemia 2023     Excessive daytime sleepiness 2023     Hypersomnia 2023     PONV (postoperative nausea and vomiting)      Gastroesophageal reflux disease without esophagitis 10/25/2022     Lung nodule 2022     Umbilical hernia without obstruction and without gangrene 2022     Kidney stone on left side 2022     Essential tremor 2022     Insomnia 2022     Brain lipoma 2018     Stage 1 mild COPD by GOLD classification ([facility]) 10/23/2017     DDD (degenerative disc disease), lumbar 09/15/2016     B12 deficiency 2015     Anxiety 2015     Chronic migraine without aura 2015     Chronic interstitial cystitis 2014       LOS (days): 1  Geometric Mean LOS (GMLOS) (days):   Days to GMLOS:     OBJECTIVE:    Risk of Unplanned Readmission Score: 12.6         Current admission status: Inpatient  Referral Reason: Other (Post acute needs)    Preferred Pharmacy:   [facility] - [place], [place] - [address]  [address]  [place] [address]  Phone: [phone] Fax: [phone]    [facility] - [place] - [facility] [address] [facility]  [address]  [place] [address]  Phone: [phone] Fax: [phone]    [facility]  [place]  [address] [place] [address]  St,  First Floor [place] [facility] [address]  Phone: [phone] Fax: [phone]    [facility] - [place] - [address]  [address] [address]  Phone: [phone] Fax: [phone]    Primary Care Provider: MOHSEN Moore    Primary Insurance: CAPITAL  Secondary Insurance:     ASSESSMENT:  Active Health Care Proxies       Morgan Pereyra Health Care Representative - Spouse   Primary Phone: [phone] (Mobile)                           Readmission Root Cause  30 Day Readmission: No    Patient Information  Admitted from:: Home  Mental Status: Alert  During Assessment patient was accompanied by: Not accompanied during assessment  Assessment information provided by:: Patient  Primary Caregiver: Self  Support Systems: Self, Spouse/significant other  County of Residence: [place]  What city do you live in?: [place]  Home entry access options. Select all that apply.: Stairs  Number of steps to enter home.: 8  Type of Current Residence: 2 story home  Upon entering residence, is there a bedroom on the main floor (no further steps)?: No  A bedroom is located on the following floor levels of residence (select all that apply):: 2nd Floor  Upon entering residence, is there a bathroom on the main floor (no further steps)?: No  Indicate which floors of current residence have a bathroom (select all the apply):: 2nd Floor  Number of steps to 2nd floor from main floor: One Flight  Living Arrangements: Lives w/ Spouse/significant other  Is patient a ?: No    Activities of Daily Living Prior to Admission  Functional Status: Independent  Completes ADLs independently?: Yes  Ambulates independently?: Yes  Does patient use assisted devices?: No  Does patient currently own DME?: No  Does patient have a history of Outpatient Therapy (PT/OT)?: Yes  Does the patient have a history of Short-Term Rehab?: No  Does patient have a history of HHC?: Yes ([facility])  Does patient currently have HHC?: No         Patient Information Continued  Income Source: Employed  Does patient have prescription coverage?: Yes  Does patient receive dialysis treatments?: No  Does patient have a history of substance abuse?: No  Does patient have a history of Mental Health Diagnosis?: Yes (anxiety)  Is patient receiving treatment for mental health?: Yes (PCP manages meds)  Has patient received inpatient treatment related to mental health in the last 2 years?: No         Means of Transportation  Means of Transport to [facility]:: Drives Self          DISCHARGE DETAILS:    Discharge planning discussed with:: Pt  Freedom of Choice: Yes  Comments - Freedom of Choice: Discussed FOC  CM contacted family/caregiver?: No- see comments (Pt alert and oriented)  Were Treatment Team discharge recommendations reviewed with patient/caregiver?: Yes  Did patient/caregiver verbalize understanding of patient care needs?: Yes  Were patient/caregiver advised of the risks associated with not following Treatment Team discharge recommendations?: Yes                             Treatment Team Recommendation: Home  Discharge Destination Plan:: Home             CM met with pt to complete assessment and explain CM role.  Pt resides with her  in a 2 story home, has 8 THERESE.  Pt's bedroom and bathroom is on 2nd floor.  Pt is independent with all ADL's.  Pt has no DME.  Pt reports previous outpatient and HHC through  [facility].  Pt denies any STR.  Pt reports anxiety, PCP manages meds, no inpatient admissions.  Pt is employed and drives.  CM will continue to follow for discharge planning needs.

## 2024-12-16 NOTE — UTILIZATION REVIEW
Initial Clinical Review    Admission: Date/Time/Statement:   Admission Orders (From admission, onward)       Ordered        12/15/24 1439  INPATIENT ADMISSION  Once                          Orders Placed This Encounter   Procedures    INPATIENT ADMISSION     Standing Status:   Standing     Number of Occurrences:   1     Level of Care:   Med Surg [16]     Estimated length of stay:   More than 2 Midnights     Certification:   I certify that inpatient services are medically necessary for this patient for a duration of greater than two midnights. See H&P and MD Progress Notes for additional information about the patient's course of treatment.     ED Arrival Information       Expected   -    Arrival   12/15/2024 10:13    Acuity   Urgent              Means of arrival   Walk-In    Escorted by   Family Member    Service   Hospitalist    Admission type   Emergency              Arrival complaint   post op pain             Chief Complaint   Patient presents with    Post-op Problem     Pt reports double mastectomy in November. Reports pain, swelling, and drainage on Friday from right sides incision site.       Initial Presentation: 49 y.o. female w.hx breast ca s/p bilat mastectomy w/reconstruction a month ago, copd, gerd, brain lipoma, kidney stones, essential tremors to ED from home admitted as inpatient due to breast abscess. Presented with R breast swelling, pain, and 2 days of foul smelling discharge from the wound. CT chest showed R breast abscess. Afebrile now but had fever 100.2 at home x 2 days, wbc 12. Exam confirms R breast swelling/redness/tenderness, purulent wound drainage. L breast healing well. IV doxycycline in progress with analgesics, blood cultures sent, plastic surgery and IR consulted, IR drainage recommended. NPO at midnight.     Anticipated Length of Stay/Certification Statement: Patient will be admitted on an inpatient basis with an anticipated length of stay of greater than 2 midnights secondary to  breast abscess, IV abx.     Per plastics: abscess adjacent to R tissue expander. If condition unimproved or worsened, will need to possibly remove the tissue expander and washout the breast pocket. Needs IR aspiration and culture R breast in AM, monitor wbc, fever curve, continue IV antbx. Exam: Right reconstructed breast with diffuse blanching erythema over the entirety of the breast skin.  Incision remains intact.  No drainage or purulence can be expressed from the incision.  Moderate fullness of the right lateral chest wall that is relatively firm. Left reconstructed breast skin without erythema.  Medial aspect of the incision with eschar forming over the medial 2 cm of recently closed incision.  No appreciable drainage, fullness or fluctuance  Per IR: CT shows small collection along lateral R tissue expander margin. Plan to aspirate vs. Place drain tomorrow. Check INR in am. Does not need to be NPO.    Date: 1/16   Day 2: Temp 100.3 this am. c/o burning pain R breast after prior dressing change. Used 3 doses IV dilaudid yesterday afternoon/evening and 2 doses PO oxycodone 10mg since yesterday. planning for IR drainage today @1445. blood cultures remain pending. Plastics continues to follow. Remains on IV doxy.     ED Treatment-Medication Administration from 12/15/2024 1013 to 12/15/2024 1549         Date/Time Order Dose Route Action     12/15/2024 1040 sodium chloride 0.9 % bolus 1,000 mL 1,000 mL Intravenous New Bag     12/15/2024 1142 iohexol (OMNIPAQUE) 350 MG/ML injection (MULTI-DOSE) 85 mL 85 mL Intravenous Given     12/15/2024 1513 doxycycline (VIBRAMYCIN) 100 mg in sodium chloride 0.9 % 100 mL IVPB 100 mg Intravenous New Bag     12/15/2024 1511 HYDROmorphone (DILAUDID) injection 0.5 mg 0.5 mg Intravenous Given            Scheduled Medications:  bacitracin, 1 small application, Topical, BID  doxycycline hyclate, 100 mg, Oral, Q12H URVASHI  escitalopram, 10 mg, Oral, Daily With Lunch  fenofibrate, 145 mg, Oral,  Daily  gabapentin, 300 mg, Oral, HS  heparin (porcine), 5,000 Units, Subcutaneous, Q8H URVASHI  letrozole, 2.5 mg, Oral, Daily  modafinil, 200 mg, Oral, Daily  umeclidinium, 1 puff, Inhalation, Daily   And  olodaterol HCl, 2 puff, Inhalation, Daily      Continuous IV Infusions: none     PRN Meds:  acetaminophen, 650 mg, Oral, Q6H PRN  ALPRAZolam, 0.25 mg, Oral, HS PRN  HYDROmorphone, 0.5 mg, Intravenous, Q4H PRN x 3 12/15  ondansetron, 4 mg, Intravenous, Q6H PRN  oxyCODONE, 10 mg, Oral, Q4H PRN x 1 12/15, x 1 12/16  oxyCODONE, 5 mg, Oral, Q6H PRN  polyethylene glycol, 17 g, Oral, Daily PRN      ED Triage Vitals [12/15/24 1019]   Temperature Pulse Respirations Blood Pressure SpO2 Pain Score   98.9 °F (37.2 °C) 96 18 126/60 99 % 7     Weight (last 2 days)       Date/Time Weight    12/15/24 1554 65.8 (145.06)            Vital Signs (last 3 days)       Date/Time Temp Pulse Resp BP MAP (mmHg) SpO2 O2 Device Patient Position - Orthostatic VS Umm Coma Scale Score Pain    12/16/24 0735 -- -- -- -- -- -- None (Room air) -- 15 4    12/16/24 07:32:41 100.3 °F (37.9 °C) 74 16 94/59 71 93 % -- -- -- --    12/16/24 0221 -- -- -- -- -- -- -- -- -- 7    12/15/24 2231 -- -- -- -- -- -- -- -- -- 7    12/15/24 22:29:21 99.5 °F (37.5 °C) 82 18 95/59 71 93 % -- -- -- --    12/15/24 2148 -- -- -- -- -- -- None (Room air) -- 15 --    12/15/24 1937 -- -- -- -- -- -- -- -- -- 7    12/15/24 1908 -- -- -- -- -- -- -- -- -- 2    12/15/24 1745 -- -- -- -- -- -- -- -- -- 7    12/15/24 1615 -- -- -- -- -- -- None (Room air) -- 15 --    12/15/24 1554 -- -- 17 -- -- -- None (Room air) Lying -- --    12/15/24 15:53:53 98.6 °F (37 °C) 70 -- 101/73 82 96 % -- -- -- --    12/15/24 1511 -- -- -- -- -- -- -- -- -- 8    12/15/24 1400 -- 79 -- 104/63 79 93 % -- -- -- --    12/15/24 1300 -- 77 -- 104/68 81 95 % -- -- -- --    12/15/24 1200 -- 78 -- 106/66 81 96 % -- -- -- --    12/15/24 1100 -- 80 -- 105/64 79 93 % -- -- -- --    12/15/24 1048 -- -- -- --  -- -- -- -- 15 --    12/15/24 1045 -- -- -- -- -- -- None (Room air) -- -- --    12/15/24 1019 98.9 °F (37.2 °C) 96 18 126/60 86 99 % None (Room air) Sitting -- 7              Pertinent Labs/Diagnostic Test Results:   Radiology:  CT chest with contrast   Final Interpretation by Bessy Goetz MD (12/15 1224)      Bilateral mastectomy with bilateral tissue expanders with 2.9 x 1.0 minimally peripheral enhancing fluid collection lateral to the right breast implant compatible with abscess.      The study was marked in EPIC for immediate notification.         Workstation performed: [id number]         IR aspiration only    (Results Pending)     Cardiology:  ECG 12 lead   Final Result by Yonis Mujica MD (12/15 8115)   Normal sinus rhythm   Normal ECG   When compared with ECG of 28-Oct-2024 14:42,   Vent. rate has increased by  29 bpm   Inverted T waves have replaced nonspecific T wave abnormality in Inferior    leads   Confirmed by Yonis Mujica ([id number]) on 12/15/2024 11:26:46 PM                Results from last 7 days   Lab Units 12/16/24  0455 12/15/24  1611 12/15/24  1039   WBC Thousand/uL 9.73  --  12.97*   HEMOGLOBIN g/dL 11.3*  --  12.3   HEMATOCRIT % 35.2  --  38.3   PLATELETS Thousands/uL 242 252 264   TOTAL NEUT ABS Thousands/µL 6.57  --  10.05*         Results from last 7 days   Lab Units 12/16/24  0455 12/15/24  1039   SODIUM mmol/L 138 139   POTASSIUM mmol/L 4.0 3.8   CHLORIDE mmol/L 106 108   CO2 mmol/L 25 23   ANION GAP mmol/L 7 8   BUN mg/dL 10 14   CREATININE mg/dL 0.75 0.89   EGFR ml/min/1.73sq m 95 77   CALCIUM mg/dL 9.3 9.5     Results from last 7 days   Lab Units 12/15/24  1039   AST U/L 20   ALT U/L 24   ALK PHOS U/L 48   TOTAL PROTEIN g/dL 6.6   ALBUMIN g/dL 4.0   TOTAL BILIRUBIN mg/dL 0.32         Results from last 7 days   Lab Units 12/16/24  0455 12/15/24  1039   GLUCOSE RANDOM mg/dL 90 111           Results from last 7 days   Lab Units 12/16/24  0455   PROTIME seconds 13.6   INR  1.01          Results from last 7 days   Lab Units 12/15/24  1039   PROCALCITONIN ng/ml 0.06           Results from last 7 days   Lab Units 12/15/24  1611   BLOOD CULTURE  Received in Microbiology Lab. Culture in Progress.  Received in Microbiology Lab. Culture in Progress.         Past Medical History:   Diagnosis Date    Anxiety     Brain lipoma 2007    Breast cancer (HCC)     COPD (chronic obstructive pulmonary disease) (HCC)     GERD (gastroesophageal reflux disease)     Kidney stone     Motion sickness     PONV (postoperative nausea and vomiting)     Tremors of nervous system     essential     Present on Admission:   Breast abscess   Essential tremor   Stage 1 mild COPD by GOLD classification (HCC)   Hypersomnia   Anxiety      Admitting Diagnosis: Abscess [L02.91]  Breast abscess [N61.1]  Post-operative pain [G89.18]  Post-operative complication [T81.9XXA]  Malignant neoplasm of central portion of right breast in female, estrogen receptor positive (HCC) [C50.111, Z17.0]  Age/Sex: 49 y.o. female    Network Utilization Review Department  ATTENTION: Please call with any questions or concerns to [phone] and carefully listen to the prompts so that you are directed to the right person. All voicemails are confidential.   For Discharge needs, contact Care Management DC Support Team at [phone] opt. 2  Send all requests for admission clinical reviews, approved or denied determinations and any other requests to dedicated fax number below belonging to the campus where the patient is receiving treatment. List of dedicated fax numbers for the Facilities:  FACILITY NAME UR FAX NUMBER   ADMISSION DENIALS (Administrative/Medical Necessity) [phone]   DISCHARGE SUPPORT TEAM (NETWORK) [phone]   PARENT CHILD HEALTH (Maternity/NICU/Pediatrics) [phone]   [facility] [phone]   [facility] [phone]   [facility] [phone]   [facility]  [facility] [phone]   [facility] [phone]   [facility] [phone]   [facility] [phone]   [facility] [phone]   [facility] [phone]   [facility] [phone]   [facility] [phone]   [facility] [phone]

## 2024-12-16 NOTE — ASSESSMENT & PLAN NOTE
Associated with hospitalization and recent surgery. This may be impacting sleep. Add Xanax to as needed night regimen to help her get consecutive hours of sleep. Also close the door once night care is done and provided the patient with a sleep kit.

## 2024-12-16 NOTE — ASSESSMENT & PLAN NOTE
Jenny Pereyra is a 49 year old female who is 1 month s/p bilateral mastectomy, R sentinel lymph node biopsy, and immediate bilateral breast reconstruction with tissue expanders. Patient is 3 days s/p excision and closure of her left breast incision.

## 2024-12-16 NOTE — BRIEF OP NOTE (RAD/CATH)
INTERVENTIONAL RADIOLOGY PROCEDURE NOTE    Date: 12/16/2024    Procedure: Right breast drainage catheter placement    Preoperative diagnosis:   1. Post-operative pain    2. Abscess    3. Malignant neoplasm of central portion of right breast in female, estrogen receptor positive (HCC)    4. Breast abscess    5. Encounter for screening involving social determinants of health (SDoH)         Postoperative diagnosis: Same.    Surgeon: Bry Lou MD     Assistant: None. No qualified resident was available.    Blood loss: 2 mL    Specimens: Cloudy serous fluid sent to the laboratory for culture     Findings: Ultrasound-guided placement of a 8.5 Dominican dexter shrestha drainage catheter with 50 mL of cloudy serous fluid aspirated.    Complications: None immediate.    Anesthesia: local    Recommendations:  - Maintain suction bulb drainage  - Flush daily with 10 mL saline  - We will plan for tube check in 1 week

## 2024-12-17 ENCOUNTER — PATIENT MESSAGE (OUTPATIENT)
Dept: PLASTIC SURGERY | Facility: CLINIC | Age: 49
End: 2024-12-17

## 2024-12-17 ENCOUNTER — TELEPHONE (OUTPATIENT)
Dept: PLASTIC SURGERY | Facility: CLINIC | Age: 49
End: 2024-12-17

## 2024-12-17 ENCOUNTER — PATIENT OUTREACH (OUTPATIENT)
Dept: CASE MANAGEMENT | Facility: OTHER | Age: 49
End: 2024-12-17

## 2024-12-17 PROCEDURE — 99231 SBSQ HOSP IP/OBS SF/LOW 25: CPT | Performed by: NURSE PRACTITIONER

## 2024-12-17 PROCEDURE — NC001 PR NO CHARGE: Performed by: NURSE PRACTITIONER

## 2024-12-17 PROCEDURE — 99024 POSTOP FOLLOW-UP VISIT: CPT | Performed by: PLASTIC SURGERY

## 2024-12-17 PROCEDURE — 99239 HOSP IP/OBS DSCHRG MGMT >30: CPT | Performed by: NURSE PRACTITIONER

## 2024-12-17 RX ORDER — DOXYCYCLINE 100 MG/1
100 CAPSULE ORAL EVERY 12 HOURS SCHEDULED
Qty: 12 CAPSULE | Refills: 0 | Status: SHIPPED | OUTPATIENT
Start: 2024-12-17 | End: 2024-12-20

## 2024-12-17 RX ADMIN — Medication 250 MG: at 17:46

## 2024-12-17 RX ADMIN — Medication 250 MG: at 08:27

## 2024-12-17 RX ADMIN — MODAFINIL 200 MG: 100 TABLET ORAL at 08:26

## 2024-12-17 RX ADMIN — BACITRACIN ZINC 1 SMALL APPLICATION: 500 OINTMENT TOPICAL at 17:46

## 2024-12-17 RX ADMIN — ESCITALOPRAM OXALATE 10 MG: 10 TABLET ORAL at 11:39

## 2024-12-17 RX ADMIN — UMECLIDINIUM 1 PUFF: 62.5 AEROSOL, POWDER ORAL at 08:26

## 2024-12-17 RX ADMIN — ACETAMINOPHEN 650 MG: 325 TABLET, FILM COATED ORAL at 06:04

## 2024-12-17 RX ADMIN — HEPARIN SODIUM 5000 UNITS: 5000 INJECTION, SOLUTION INTRAVENOUS; SUBCUTANEOUS at 05:09

## 2024-12-17 RX ADMIN — OLODATEROL RESPIMAT INHALATION SPRAY 2 PUFF: 2.5 SPRAY, METERED RESPIRATORY (INHALATION) at 08:26

## 2024-12-17 RX ADMIN — HEPARIN SODIUM 5000 UNITS: 5000 INJECTION, SOLUTION INTRAVENOUS; SUBCUTANEOUS at 13:47

## 2024-12-17 RX ADMIN — BACITRACIN ZINC 1 SMALL APPLICATION: 500 OINTMENT TOPICAL at 08:26

## 2024-12-17 RX ADMIN — DOXYCYCLINE 100 MG: 100 CAPSULE ORAL at 09:31

## 2024-12-17 RX ADMIN — HEPARIN SODIUM 5000 UNITS: 5000 INJECTION, SOLUTION INTRAVENOUS; SUBCUTANEOUS at 21:44

## 2024-12-17 RX ADMIN — DOXYCYCLINE 100 MG: 100 INJECTION, POWDER, LYOPHILIZED, FOR SOLUTION INTRAVENOUS at 21:44

## 2024-12-17 RX ADMIN — GABAPENTIN 300 MG: 300 CAPSULE ORAL at 21:44

## 2024-12-17 RX ADMIN — LETROZOLE 2.5 MG: 2.5 TABLET ORAL at 08:26

## 2024-12-17 RX ADMIN — FENOFIBRATE 145 MG: 145 TABLET ORAL at 08:26

## 2024-12-17 NOTE — ASSESSMENT & PLAN NOTE
49-year-old female with right breast malignancy, s/p bilateral mastectomies, right sentinel lymph node biopsy and immediate bilateral breast reconstruction with tissue expanders one month ago 11/12/24 presented to ED with c/o right breast swelling, pain and foul smelling discharge from the wound.    Afebrile in ED. Mild leukocytosis at 12. Temp of 100.2 at home.   CT shows: Bilateral mastectomy with bilateral tissue expanders with 2.9 x 1.0 minimally peripheral enhancing fluid collection lateral to the right breast implant compatible with abscess.   Plastic surgery recommended IR drainage, this was done 12/16.  50 cc of cloudy serous fluid was aspirated and drained GRACE drain was left in place.  Discussed with plastic surgery Dr Brito, he does not want drain flushed.  Orders were updated, nurse made aware.  Fluid cultures pending, blood cultures negative to date.  Okay for discharge home today, discussed with plastic surgery.  Patient will discharge with drain in place and follow-up with plastic surgery Dr. Brito in the office within 1 week.

## 2024-12-17 NOTE — ASSESSMENT & PLAN NOTE
Associated with hospitalization and recent surgery.   Xanax as needed ordered on admission, continue

## 2024-12-17 NOTE — PROGRESS NOTES
Progress Note - Hospitalist   Name: Jenny Pereyra 49 y.o. female I MRN: [id number]  Unit/Bed#: [facility] 906-01 I Date of Admission: 12/15/2024   Date of Service: 12/17/2024 I Hospital Day: 2     Assessment & Plan  Breast abscess  49-year-old female with right breast malignancy, s/p bilateral mastectomies, right sentinel lymph node biopsy and immediate bilateral breast reconstruction with tissue expanders one month ago 11/12/24 presented to ED with c/o right breast swelling, pain and foul smelling discharge from the wound.    Afebrile in ED. Mild leukocytosis at 12. Temp of 100.2 at home.   CT shows: Bilateral mastectomy with bilateral tissue expanders with 2.9 x 1.0 minimally peripheral enhancing fluid collection lateral to the right breast implant compatible with abscess.   Plastic surgery recommended IR drainage, this was done 12/16.  50 cc of cloudy serous fluid was aspirated and drained GRACE drain was left in place.  Discussed with plastic surgery Dr Brito, he does not want drain flushed.  Orders were updated, nurse made aware.  Fluid cultures pending, blood cultures negative to date.  Discharge home when okay with plastic surgery  PRN Pain control    Anxiety  Associated with hospitalization and recent surgery.   Xanax as needed ordered on admission, continue  Essential tremor  Continue Gabapentin  Stage 1 mild COPD by GOLD classification (HCC)  Stiolto nonformulary, resume  Hypersomnia  PTA meds armodafinil 150 mg daily, nonformulary, substitute with modafinil.  Malignant neoplasm of central portion of right breast in female, estrogen receptor positive (HCC)   right breast cancer s/p bilateral mastectomies, right sentinel lymph node biopsy and immediate bilateral breast reconstruction with tissue expanders.   Continue Letrozole    VTE Pharmacologic Prophylaxis:   Moderate Risk (Score 3-4) - Pharmacological DVT Prophylaxis Ordered: heparin.    Mobility:   Basic Mobility Inpatient Raw Score: 24  [facility] Goal: 8: Walk  250 feet or more  JH-HLM Achieved: 8: Walk 250 feet ot more  JH-HLM Goal achieved. Continue to encourage appropriate mobility.    Patient Centered Rounds: I performed bedside rounds with nursing staff today.   Discussions with Specialists or Other Care Team Provider: nursing, case management, plastic surgery Dr. Brito    Education and Discussions with Family / Patient: Patient declined call to .     Current Length of Stay: 2 day(s)  Current Patient Status: Inpatient   Certification Statement: The patient will continue to require additional inpatient hospital stay due to pending cultures, plastic surgery clearance   Discharge Plan: Anticipate discharge in 24-48 hrs to home.    Code Status: Level 1 - Full Code    Subjective   No complaints. Drain with some cloudy output. No fevers, chills.     Objective :  Temp:  [98.7 °F (37.1 °C)-99.6 °F (37.6 °C)] 99 °F (37.2 °C)  HR:  [68-85] 70  BP: ()/(59-67) 93/61  Resp:  [14-16] 14  SpO2:  [91 %-95 %] 95 %  O2 Device: None (Room air)    Body mass index is 26.53 kg/m².     Input and Output Summary (last 24 hours):     Intake/Output Summary (Last 24 hours) at 12/17/2024 1121  Last data filed at 12/17/2024 0510  Gross per 24 hour   Intake 10 ml   Output 1470 ml   Net -1460 ml       Physical Exam  Vitals and nursing note reviewed.   Constitutional:       General: She is not in acute distress.  Cardiovascular:      Rate and Rhythm: Normal rate.   Pulmonary:      Breath sounds: Normal breath sounds.   Abdominal:      Tenderness: There is no abdominal tenderness.   Musculoskeletal:         General: No swelling.   Skin:     General: Skin is warm.      Comments: Right breast with right GRACE drain with cloudy drainage in bulb   Neurological:      Mental Status: She is alert and oriented to person, place, and time. Mental status is at baseline.   Psychiatric:         Mood and Affect: Mood normal.           Lines/Drains:  Lines/Drains/Airways       Active Status        Name Placement date Placement time Site Days    Abscess Drain Breast 12/16/24  1645  Breast  less than 1                            Lab Results: I have reviewed the following results:   Results from last 7 days   Lab Units 12/16/24  0455   WBC Thousand/uL 9.73   HEMOGLOBIN g/dL 11.3*   HEMATOCRIT % 35.2   PLATELETS Thousands/uL 242   SEGS PCT % 67   LYMPHO PCT % 22   MONO PCT % 7   EOS PCT % 3     Results from last 7 days   Lab Units 12/16/24  0455 12/15/24  1039   SODIUM mmol/L 138 139   POTASSIUM mmol/L 4.0 3.8   CHLORIDE mmol/L 106 108   CO2 mmol/L 25 23   BUN mg/dL 10 14   CREATININE mg/dL 0.75 0.89   ANION GAP mmol/L 7 8   CALCIUM mg/dL 9.3 9.5   ALBUMIN g/dL  --  4.0   TOTAL BILIRUBIN mg/dL  --  0.32   ALK PHOS U/L  --  48   ALT U/L  --  24   AST U/L  --  20   GLUCOSE RANDOM mg/dL 90 111     Results from last 7 days   Lab Units 12/16/24  0455   INR  1.01             Results from last 7 days   Lab Units 12/15/24  1039   PROCALCITONIN ng/ml 0.06       Recent Cultures (last 7 days):   Results from last 7 days   Lab Units 12/16/24  1648 12/15/24  1611   BLOOD CULTURE   --  No Growth at 24 hrs.  No Growth at 24 hrs.   GRAM STAIN RESULT  2+ Polys  No bacteria seen  --        Imaging Results Review: No pertinent imaging studies reviewed.  Other Study Results Review: No additional pertinent studies reviewed.    Last 24 Hours Medication List:     Current Facility-Administered Medications:     acetaminophen (TYLENOL) tablet 650 mg, Q6H PRN    ALPRAZolam (XANAX) tablet 0.25 mg, HS PRN    bacitracin topical ointment 1 small application, BID    doxycycline hyclate (VIBRAMYCIN) capsule 100 mg, Q12H URVASHI    escitalopram (LEXAPRO) tablet 10 mg, Daily With Lunch    fenofibrate (TRICOR) tablet 145 mg, Daily    gabapentin (NEURONTIN) capsule 300 mg, HS    heparin (porcine) subcutaneous injection 5,000 Units, Q8H URVASHI **AND** [COMPLETED] Platelet count, Once    HYDROmorphone (DILAUDID) injection 0.5 mg, Q4H PRN    letrozole  (FEMARA) tablet 2.5 mg, Daily    modafinil (PROVIGIL) tablet 200 mg, Daily    umeclidinium 62.5 mcg/actuation inhaler AEPB 1 puff, Daily **AND** olodaterol HCl (STRIVERDI RESPIMAT) inhaler 2 puff, Daily    ondansetron (ZOFRAN) injection 4 mg, Q6H PRN    oxyCODONE (ROXICODONE) immediate release tablet 10 mg, Q4H PRN    oxyCODONE (ROXICODONE) IR tablet 5 mg, Q6H PRN    polyethylene glycol (MIRALAX) packet 17 g, Daily PRN    saccharomyces boulardii (FLORASTOR) capsule 250 mg, BID    Administrative Statements   Today, Patient Was Seen By: MOHSEN Forman      **Please Note: This note may have been constructed using a voice recognition system.**

## 2024-12-17 NOTE — DISCHARGE SUMMARY
Discharge Summary - Hospitalist   Name: Jenny Pereyra 49 y.o. female I MRN: [id number]  Unit/Bed#: [facility] 906-01 I Date of Admission: 12/15/2024   Date of Service: 12/17/2024 I Hospital Day: 2     Assessment & Plan  Breast abscess  49-year-old female with right breast malignancy, s/p bilateral mastectomies, right sentinel lymph node biopsy and immediate bilateral breast reconstruction with tissue expanders one month ago 11/12/24 presented to ED with c/o right breast swelling, pain and foul smelling discharge from the wound.    Afebrile in ED. Mild leukocytosis at 12. Temp of 100.2 at home.   CT shows: Bilateral mastectomy with bilateral tissue expanders with 2.9 x 1.0 minimally peripheral enhancing fluid collection lateral to the right breast implant compatible with abscess.   Plastic surgery recommended IR drainage, this was done 12/16.  50 cc of cloudy serous fluid was aspirated and drained GRACE drain was left in place.  Discussed with plastic surgery Dr Brito, he does not want drain flushed.  Orders were updated, nurse made aware.  Fluid cultures pending, blood cultures negative to date.  Okay for discharge home today, discussed with plastic surgery.  Patient will discharge with drain in place and follow-up with plastic surgery Dr. Brito in the office within 1 week.    Anxiety  Associated with hospitalization and recent surgery.   Supportive cares  Essential tremor  Continue Gabapentin  Stage 1 mild COPD by GOLD classification (HCC)  Stiolto nonformulary, resume  Hypersomnia  PTA meds armodafinil 150 mg daily, nonformulary, substitute with modafinil.  Malignant neoplasm of central portion of right breast in female, estrogen receptor positive (HCC)   right breast cancer s/p bilateral mastectomies, right sentinel lymph node biopsy and immediate bilateral breast reconstruction with tissue expanders.   Continue Letrozole     Medical Problems       Resolved Problems  Date Reviewed: 12/16/2024   None       Discharging  Physician / Practitioner: MOHSEN Forman  PCP: MOHSEN Moore  Admission Date:   Admission Orders (From admission, onward)       Ordered        12/15/24 1439  INPATIENT ADMISSION  Once                          Discharge Date: 12/17/24    Consultations During Hospital Stay:  Plastic surgery  IR    Procedures Performed:   IR drainage of breast abscess and GRACE drain placement 12/16    Significant Findings / Test Results:   CT shows: Bilateral mastectomy with bilateral tissue expanders with 2.9 x 1.0 minimally peripheral enhancing fluid collection lateral to the right breast implant compatible with abscess.   Wound culture negative to date  Blood culture negative to date    Incidental Findings:   none       Test Results Pending at Discharge (will require follow up):   Endpoints of blood cultures and wound culture     Outpatient Tests Requested:  Follow-up with PCP as needed  Follow-up with plastic surgery within 1 week of discharge    Complications:  none    Reason for Admission: Breast abscess    Hospital Course:   Jenny Pereyra is a 49 y.o. female patient who originally presented to the hospital on 12/15/2024 due to right breast swelling, pain and foul-smelling discharge from wound.  Patient with a history of right breast malignancy status post bilateral mastectomy, right sentinel lymph node biopsy and immediate bilateral breast reconstruction with tissue expanders 11/12 by plastic surgery.  Patient was seen by plastic surgery who recommended IR drainage, this was done 12/16, fluid was sent for culture and GRACE drain was left in place.    Discussed with plastic surgery, drain should not be flushed.  Can discharge home with drain in place.  Will follow-up with plastic surgery in the office.  Cultures remain pending, will discharge on doxycycline to complete a 7-day course.          Please see above list of diagnoses and related plan for additional information.     Condition at Discharge:  stable    Discharge Day Visit / Exam:   * Please refer to separate progress note for these details *    Discussion with Family: Patient declined call to .     Discharge instructions/Information to patient and family:   See after visit summary for information provided to patient and family.      Provisions for Follow-Up Care:  See after visit summary for information related to follow-up care and any pertinent home health orders.      Mobility at time of Discharge:   Basic Mobility Inpatient Raw Score: 24  JH-HLM Goal: 8: Walk 250 feet or more  JH-HLM Achieved: 8: Walk 250 feet ot more  HLM Goal achieved. Continue to encourage appropriate mobility.     Disposition:   Home    Planned Readmission: no    Discharge Medications:  See after visit summary for reconciled discharge medications provided to patient and/or family.      Administrative Statements   Discharge Statement:  I have spent a total time of 40 minutes in caring for this patient on the day of the visit/encounter. .    **Please Note: This note may have been constructed using a voice recognition system**

## 2024-12-17 NOTE — PROGRESS NOTES
Progress Note - Plastic Surgery   Name: Jenny Pereyra 49 y.o. female I MRN: [id number]  [facility] I Date of Admission: 12/15/2024   Date of Service: 12/17/2024 I Hospital Day: 2     Assessment & Plan  Breast abscess  Jenny Pereyra is a 49-year-old female who is 1 month status post bilateral mastectomy, right sentinel lymph node biopsy, and immediate bilateral breast reconstruction with tissue expanders.  Patient admitted with right breast cellulitis, elevated WBC, subjective fevers and noted fluid collection adjacent to R tissue expander on CT. WBC normalized yesterday. Patient underwent IR aspiration and drain placement of the right breast with 50mL fluid drained. Patient afebrile with significant improvement in right breast erythema.  Incision is dry without maceration or appreciable dehiscence.  No bacteria seen on gram stain.      -OK for patient to d/c with drain in place on oral abx  -Plan for office follow up later this week with plastic surgery  -DO NOT flush drain  -will follow/up cultures    Discussed again with the patient that we will continue to monitor her closely.  Discussed that even though her symptoms have improved enough for discharge, If her symptoms and exam do not completely improve or if worsens we will have to discuss removal of her expander, washout, and flat closure over a drain.  Patient expresses understanding.     24 Hour Events : No acute events overnight  Subjective : Patient notes that her pain is improved. She still feels fatigued.    Objective :  Temp:  [98.7 °F (37.1 °C)-99.6 °F (37.6 °C)] 99 °F (37.2 °C)  HR:  [68-85] 70  BP: ()/(59-67) 93/61  Resp:  [14-16] 14  SpO2:  [91 %-95 %] 95 %  O2 Device: None (Room air)    Physical Exam  Alert, oriented, NAD  Breathing comfortably on room air  R breast skin with mild erythema - significantly improved.  Incision fibrinous but dry, firm, no appreciable dehiscence. NO drainage expressed from incision. Drains is SS  Left  breast skin healthy without erythema, incision well approximated. 2cm of darkened skin along incision at medial aspect of incision. No drainage or fluctuance appreciated.

## 2024-12-17 NOTE — QUICK NOTE
Received a message from patient's RN, who was contacted by Karthik Brito from Plastics- patient has some trouble with her breast expander he is going to keep her for OR tomorrow. Order for d/c discontinued, patient placed NPO after midnight.

## 2024-12-17 NOTE — ASSESSMENT & PLAN NOTE
Jenny Pereyra is a 49-year-old female who is 1 month status post bilateral mastectomy, right sentinel lymph node biopsy, and immediate bilateral breast reconstruction with tissue expanders.  Patient admitted with right breast cellulitis, elevated WBC, subjective fevers and noted fluid collection adjacent to R tissue expander on CT. WBC normalized yesterday. Patient underwent IR aspiration and drain placement of the right breast with 50mL fluid drained. Patient afebrile with significant improvement in right breast erythema.  Incision is dry without maceration or appreciable dehiscence.  No bacteria seen on gram stain.      -OK for patient to d/c with drain in place on oral abx  -Plan for office follow up later this week with plastic surgery  -DO NOT flush drain  -will follow/up cultures    Discussed again with the patient that we will continue to monitor her closely.  Discussed that even though her symptoms have improved enough for discharge, If her symptoms and exam do not completely improve or if worsens we will have to discuss removal of her expander, washout, and flat closure over a drain.  Patient expresses understanding.

## 2024-12-17 NOTE — DISCHARGE INSTR - AVS FIRST PAGE
Jenny,    Do not flush the drain.  Can empty 2 times per day or as needed, please ensure that the GRACE drain is compressed at all times.  Please call plastic surgery for any questions or concerns.  You should follow-up with them within the week for further discussion and reevaluation.  Complete antibiotics as prescribed.  Please return to the ED for fevers rater than 101.    MOHSEN Wang  [facility] Internal Medicine

## 2024-12-17 NOTE — PLAN OF CARE
Problem: PAIN - ADULT  Goal: Verbalizes/displays adequate comfort level or baseline comfort level  Description: Interventions:  - Encourage patient to monitor pain and request assistance  - Assess pain using appropriate pain scale  - Administer analgesics based on type and severity of pain and evaluate response  - Implement non-pharmacological measures as appropriate and evaluate response  - Consider cultural and social influences on pain and pain management  - Notify physician/advanced practitioner if interventions unsuccessful or patient reports new pain  Outcome: Progressing     Problem: INFECTION - ADULT  Goal: Absence or prevention of progression during hospitalization  Description: INTERVENTIONS:  - Assess and monitor for signs and symptoms of infection  - Monitor lab/diagnostic results  - Monitor all insertion sites, i.e. indwelling lines, tubes, and drains  - Monitor endotracheal if appropriate and nasal secretions for changes in amount and color  - Atkinson appropriate cooling/warming therapies per order  - Administer medications as ordered  - Instruct and encourage patient and family to use good hand hygiene technique  - Identify and instruct in appropriate isolation precautions for identified infection/condition  Outcome: Progressing  Goal: Absence of fever/infection during neutropenic period  Description: INTERVENTIONS:  - Monitor WBC    Outcome: Progressing     Problem: SAFETY ADULT  Goal: Patient will remain free of falls  Description: INTERVENTIONS:  - Educate patient/family on patient safety including physical limitations  - Instruct patient to call for assistance with activity   - Consult OT/PT to assist with strengthening/mobility   - Keep Call bell within reach  - Keep bed low and locked with side rails adjusted as appropriate  - Keep care items and personal belongings within reach  - Initiate and maintain comfort rounds  - Make Fall Risk Sign visible to staff  - Offer Toileting every 2 Hours,  in advance of need  - Apply yellow socks and bracelet for high fall risk patients  - Consider moving patient to room near nurses station  Outcome: Progressing  Goal: Maintain or return to baseline ADL function  Description: INTERVENTIONS:  -  Assess patient's ability to carry out ADLs; assess patient's baseline for ADL function and identify physical deficits which impact ability to perform ADLs (bathing, care of mouth/teeth, toileting, grooming, dressing, etc.)  - Assess/evaluate cause of self-care deficits   - Assess range of motion  - Assess patient's mobility; develop plan if impaired  - Assess patient's need for assistive devices and provide as appropriate  - Encourage maximum independence but intervene and supervise when necessary  - Involve family in performance of ADLs  - Assess for home care needs following discharge   - Consider OT consult to assist with ADL evaluation and planning for discharge  - Provide patient education as appropriate  Outcome: Progressing  Goal: Maintains/Returns to pre admission functional level  Description: INTERVENTIONS:  - Perform AM-PAC 6 Click Basic Mobility/ Daily Activity assessment daily.  - Set and communicate daily mobility goal to care team and patient/family/caregiver.   - Collaborate with rehabilitation services on mobility goals if consulted  - Perform Range of Motion 3 times a day.  - Reposition patient every 2 hours.  - Dangle patient 3 times a day  - Stand patient 3 times a day  - Ambulate patient 3 times a day  - Out of bed to chair 3 times a day   - Out of bed for meals 3 times a day  - Out of bed for toileting  - Record patient progress and toleration of activity level   Outcome: Progressing     Problem: DISCHARGE PLANNING  Goal: Discharge to home or other facility with appropriate resources  Description: INTERVENTIONS:  - Identify barriers to discharge w/patient and caregiver  - Arrange for needed discharge resources and transportation as appropriate  - Identify  discharge learning needs (meds, wound care, etc.)  - Arrange for interpretive services to assist at discharge as needed  - Refer to Case Management Department for coordinating discharge planning if the patient needs post-hospital services based on physician/advanced practitioner order or complex needs related to functional status, cognitive ability, or social support system  Outcome: Progressing     Problem: Knowledge Deficit  Goal: Patient/family/caregiver demonstrates understanding of disease process, treatment plan, medications, and discharge instructions  Description: Complete learning assessment and assess knowledge base.  Interventions:  - Provide teaching at level of understanding  - Provide teaching via preferred learning methods  Outcome: Progressing

## 2024-12-17 NOTE — PROGRESS NOTES
Progress Note -Interventional Radiology  Jenny Pereyra 49 y.o. female MRN: [id number]  Unit/Bed#: [facility] 906-01 Encounter: [id number]    Assessment/Plan:    49-year-old female with a history of right breast cancer status post bilateral mastectomy approximately 1 month ago.  She presented to emergency department 12/15 for complaints of right breast erythema and fatigue.  CT of the chest was performed with findings of fluid collection adjacent to right breast tissue expander.  She presented to interventional radiology 12/16/2024 for aspiration of fluid collection and drain placement.    Status post 8.5 Latvian Gomez right chest wall drain placement 12/16/2024 with approximately 50 mL of cloudy serous fluid aspirated  Right chest GRACE drain output-GRACE bulb with small amount of milky white fluid.  Adjusted to being on GRACE and evaluated patency of GRACE drain.  GRACE drain currently maintaining suction.  Right chest GRACE drain site-soft, nontender.  No redness or drainage noted.  Culture results pending  Afebrile, appears hemodynamically stable  Continue with flush orders-10 mL normal saline daily  Plan for tube check in 1 week- orders in place  Please record input/output amounts and drainage       characteristics   Please notify IR for leaking around catheter, catheter damage, drain not maintaining suction, or possible infection at insertion site  Please place a new IR consult if your patient has less than 10 mL of drainage/day for 2 days for a tube check/possible removal.      Medical Problems       Problem List       * (Principal) Breast abscess    Lung nodule    Overview Signed 8/24/2022  5:38 PM by MOHSEN Moore   5/20/22: several tiny nodules are noted Lower lobes larger 5mm left lower lobe          Umbilical hernia without obstruction and without gangrene    Kidney stone on left side    Essential tremor    Insomnia    Anxiety    Overview Signed 10/25/2022  8:25 AM by MOHSEN Moore   Last Assessment &  Plan:   Formatting of this note might be different from the original.  - c/w lexapro         Gastroesophageal reflux disease without esophagitis    PONV (postoperative nausea and vomiting)    B12 deficiency    Overview Signed 1/16/2023  9:21 AM by John Fairbanks MD   Last Assessment & Plan:   Formatting of this note might be different from the original.  Continue B12 shot monthly.         Brain lipoma    Overview Signed 1/16/2023  9:21 AM by John Fairbanks MD   Formatting of this note might be different from the original.  Seen in MRI brain 2/5/2018         Chronic interstitial cystitis    Chronic migraine without aura    Overview Signed 1/16/2023  9:21 AM by John Fairbanks MD   Last Assessment & Plan:   Formatting of this note might be different from the original.  Stable  - c/w nortriptyline         Stage 1 mild COPD by GOLD classification (HCC)    Overview Signed 1/16/2023  9:21 AM by John Fairbanks MD   Last Assessment & Plan:   Formatting of this note might be different from the original.  Stable  - does not use any inhalers  - no SOB or cough         DDD (degenerative disc disease), lumbar    Excessive daytime sleepiness    Hypersomnia    Other hyperlipidemia    Gross hematuria    Cubital tunnel syndrome, left    Diverticulosis    Other hemorrhoids    Malignant neoplasm of central portion of right breast in female, estrogen receptor positive (HCC)             Subjective: Jenny Pereyra is a 49-year-old female with a history of right breast cancer status post bilateral mastectomy approximately 1 month ago.  She presented to emergency department 12/15 for complaints of right breast erythema and fatigue.  CT of the chest was performed with findings of fluid collection adjacent to right breast tissue expander.    On evaluation this morning, patient ambulating in room and cardenas without difficulty.  Currently completed a shower.  She reported difficulty with keeping suction to GRACE drain.  Reports that tubing came  "apart overnight.  She currently denies pain or discomfort.  She is resting comfortably in chair.    Objective:    Vitals:  BP 93/61   Pulse 70   Temp 99 °F (37.2 °C)   Resp 14   Ht 5' 2\" (1.575 m)   Wt 65.8 kg (145 lb 1 oz)   SpO2 95%   BMI 26.53 kg/m²   Body mass index is 26.53 kg/m².  Weight (last 2 days)       Date/Time Weight    12/15/24 1554 65.8 (145.06)            I/Os:    Intake/Output Summary (Last 24 hours) at 12/17/2024 0932  Last data filed at 12/17/2024 0510  Gross per 24 hour   Intake 10 ml   Output 1470 ml   Net -1460 ml       Invasive Devices       Peripheral Intravenous Line  Duration             Peripheral IV 12/16/24 Dorsal (posterior);Left Forearm 1 day              Drain  Duration             Abscess Drain Breast <1 day                    Physical Exam  Vitals and nursing note reviewed.   Constitutional:       General: She is not in acute distress.     Appearance: She is well-developed.   Cardiovascular:      Rate and Rhythm: Normal rate and regular rhythm.      Heart sounds: Normal heart sounds.   Pulmonary:      Effort: Pulmonary effort is normal. No respiratory distress.      Breath sounds: Normal breath sounds.   Abdominal:      Palpations: Abdomen is soft.      Tenderness: There is no abdominal tenderness.   Musculoskeletal:         General: Normal range of motion.   Skin:     General: Skin is warm and dry.      Capillary Refill: Capillary refill takes less than 2 seconds.      Comments: Right chest drain GRACE to bulb suction with a small amount of milky white output.   Neurological:      Mental Status: She is alert. Mental status is at baseline.   Psychiatric:         Mood and Affect: Mood normal.       Lab Results and Cultures:   CBC with diff:   Lab Results   Component Value Date    WBC 9.73 12/16/2024    HGB 11.3 (L) 12/16/2024    HCT 35.2 12/16/2024    MCV 91 12/16/2024     12/16/2024    RBC 3.85 12/16/2024    MCH 29.4 12/16/2024    MCHC 32.1 12/16/2024    RDW 13.0 " "12/16/2024    MPV 10.4 12/16/2024    NRBC 0 12/16/2024      BMP/CMP:  Lab Results   Component Value Date    K 4.0 12/16/2024    K 3.8 01/15/2022     12/16/2024     (H) 01/15/2022    CO2 25 12/16/2024    CO2 23 01/15/2022    BUN 10 12/16/2024    BUN 18 01/15/2022    CREATININE 0.75 12/16/2024    CREATININE 0.87 01/15/2022    CALCIUM 9.3 12/16/2024    CALCIUM 9.9 01/15/2022    AST 20 12/15/2024    AST 16 01/15/2022    ALT 24 12/15/2024    ALT 27 01/15/2022    ALKPHOS 48 12/15/2024    ALKPHOS 77 01/15/2022    EGFR 95 12/16/2024    EGFR 89 09/12/2020   ,     Coags:   Lab Results   Component Value Date    INR 1.01 12/16/2024   ,   Results from last 7 days   Lab Units 12/16/24  0455   INR  1.01        Lipid Panel: No results found for: \"CHOL\"  Lab Results   Component Value Date    HDL 74 04/06/2024     Lab Results   Component Value Date    HDL 74 04/06/2024     Lab Results   Component Value Date    LDLCALC 69 04/06/2024     Lab Results   Component Value Date    TRIG 139 04/06/2024       HgbA1c:   Lab Results   Component Value Date    HGBA1C 5.5 04/06/2024    HGBA1C 5.3 03/29/2023    HGBA1C 5.5 12/11/2021       Blood Culture:   Lab Results   Component Value Date    BLOODCX No Growth at 24 hrs. 12/15/2024    BLOODCX No Growth at 24 hrs. 12/15/2024   ,   Urinalysis:   Lab Results   Component Value Date    COLORU Light Yellow 06/24/2024    COLORU yellow 06/24/2024    CLARITYU Clear 06/24/2024    CLARITYU clear 06/24/2024    SPECGRAV 1.011 06/24/2024    PHUR 6.0 06/24/2024    LEUKOCYTESUR Small (A) 06/24/2024    LEUKOCYTESUR trace 06/24/2024    NITRITE Negative 06/24/2024    NITRITE negative 06/24/2024    GLUCOSEU Negative 06/24/2024    GLUCOSEU negative 06/24/2024    KETONESU Negative 06/24/2024    KETONESU negative 06/24/2024    BILIRUBINUR Negative 06/24/2024    BILIRUBINUR negative 06/24/2024    BLOODU Negative 06/24/2024    BLOODU negative 06/24/2024   ,   Urine Culture:   Lab Results   Component Value Date    " "URINECX No Growth <1000 cfu/mL 12/13/2023   ,   Wound Culure:  No results found for: \"WOUNDCULT\"    Thank you for allowing me to participate in the care of Jenny Pereyra. Please don't hesitate to call, text, email, or TigerText with any questions.     This text is generated with voice recognition software. There may be translation, syntax, or grammatical errors. If you have any questions, please contact the dictating provider.    MOHSEN Murcia  "

## 2024-12-17 NOTE — ASSESSMENT & PLAN NOTE
49-year-old female with right breast malignancy, s/p bilateral mastectomies, right sentinel lymph node biopsy and immediate bilateral breast reconstruction with tissue expanders one month ago 11/12/24 presented to ED with c/o right breast swelling, pain and foul smelling discharge from the wound.    Afebrile in ED. Mild leukocytosis at 12. Temp of 100.2 at home.   CT shows: Bilateral mastectomy with bilateral tissue expanders with 2.9 x 1.0 minimally peripheral enhancing fluid collection lateral to the right breast implant compatible with abscess.   Plastic surgery recommended IR drainage, this was done 12/16.  50 cc of cloudy serous fluid was aspirated and drained GRACE drain was left in place.  Discussed with plastic surgery Dr Brito, he does not want drain flushed.  Orders were updated, nurse made aware.  Fluid cultures pending, blood cultures negative to date.  Discharge home when okay with plastic surgery  PRN Pain control

## 2024-12-18 ENCOUNTER — ANESTHESIA (INPATIENT)
Dept: PERIOP | Facility: HOSPITAL | Age: 49
DRG: 857 | End: 2024-12-18
Payer: COMMERCIAL

## 2024-12-18 ENCOUNTER — ANESTHESIA EVENT (INPATIENT)
Dept: PERIOP | Facility: HOSPITAL | Age: 49
DRG: 857 | End: 2024-12-18
Payer: COMMERCIAL

## 2024-12-18 ENCOUNTER — PREP FOR PROCEDURE (OUTPATIENT)
Dept: PLASTIC SURGERY | Facility: CLINIC | Age: 49
End: 2024-12-18

## 2024-12-18 DIAGNOSIS — N61.1 BREAST ABSCESS: Primary | ICD-10-CM

## 2024-12-18 LAB
ANION GAP SERPL CALCULATED.3IONS-SCNC: 9 MMOL/L (ref 4–13)
BASOPHILS # BLD AUTO: 0.07 THOUSANDS/ΜL (ref 0–0.1)
BASOPHILS NFR BLD AUTO: 1 % (ref 0–1)
BUN SERPL-MCNC: 16 MG/DL (ref 5–25)
CALCIUM SERPL-MCNC: 9.5 MG/DL (ref 8.4–10.2)
CHLORIDE SERPL-SCNC: 107 MMOL/L (ref 96–108)
CO2 SERPL-SCNC: 24 MMOL/L (ref 21–32)
CREAT SERPL-MCNC: 0.67 MG/DL (ref 0.6–1.3)
EOSINOPHIL # BLD AUTO: 0.35 THOUSAND/ΜL (ref 0–0.61)
EOSINOPHIL NFR BLD AUTO: 5 % (ref 0–6)
ERYTHROCYTE [DISTWIDTH] IN BLOOD BY AUTOMATED COUNT: 12.6 % (ref 11.6–15.1)
GFR SERPL CREATININE-BSD FRML MDRD: 105 ML/MIN/1.73SQ M
GLUCOSE SERPL-MCNC: 87 MG/DL (ref 65–140)
HCT VFR BLD AUTO: 36.7 % (ref 34.8–46.1)
HGB BLD-MCNC: 11.9 G/DL (ref 11.5–15.4)
IMM GRANULOCYTES # BLD AUTO: 0.04 THOUSAND/UL (ref 0–0.2)
IMM GRANULOCYTES NFR BLD AUTO: 1 % (ref 0–2)
LYMPHOCYTES # BLD AUTO: 1.84 THOUSANDS/ΜL (ref 0.6–4.47)
LYMPHOCYTES NFR BLD AUTO: 28 % (ref 14–44)
MCH RBC QN AUTO: 29.1 PG (ref 26.8–34.3)
MCHC RBC AUTO-ENTMCNC: 32.4 G/DL (ref 31.4–37.4)
MCV RBC AUTO: 90 FL (ref 82–98)
MONOCYTES # BLD AUTO: 0.55 THOUSAND/ΜL (ref 0.17–1.22)
MONOCYTES NFR BLD AUTO: 8 % (ref 4–12)
NEUTROPHILS # BLD AUTO: 3.68 THOUSANDS/ΜL (ref 1.85–7.62)
NEUTS SEG NFR BLD AUTO: 57 % (ref 43–75)
NRBC BLD AUTO-RTO: 0 /100 WBCS
PLATELET # BLD AUTO: 305 THOUSANDS/UL (ref 149–390)
PMV BLD AUTO: 10.4 FL (ref 8.9–12.7)
POTASSIUM SERPL-SCNC: 3.9 MMOL/L (ref 3.5–5.3)
RBC # BLD AUTO: 4.09 MILLION/UL (ref 3.81–5.12)
SODIUM SERPL-SCNC: 140 MMOL/L (ref 135–147)
WBC # BLD AUTO: 6.53 THOUSAND/UL (ref 4.31–10.16)

## 2024-12-18 PROCEDURE — 80048 BASIC METABOLIC PNL TOTAL CA: CPT | Performed by: FAMILY MEDICINE

## 2024-12-18 PROCEDURE — 87070 CULTURE OTHR SPECIMN AEROBIC: CPT | Performed by: PLASTIC SURGERY

## 2024-12-18 PROCEDURE — 87075 CULTR BACTERIA EXCEPT BLOOD: CPT | Performed by: PLASTIC SURGERY

## 2024-12-18 PROCEDURE — 99024 POSTOP FOLLOW-UP VISIT: CPT | Performed by: PLASTIC SURGERY

## 2024-12-18 PROCEDURE — 0HPU0NZ REMOVAL OF TISSUE EXPANDER FROM LEFT BREAST, OPEN APPROACH: ICD-10-PCS | Performed by: PLASTIC SURGERY

## 2024-12-18 PROCEDURE — 87076 CULTURE ANAEROBE IDENT EACH: CPT | Performed by: PLASTIC SURGERY

## 2024-12-18 PROCEDURE — C9290 INJ, BUPIVACAINE LIPOSOME: HCPCS | Performed by: PLASTIC SURGERY

## 2024-12-18 PROCEDURE — 0HPT0NZ REMOVAL OF TISSUE EXPANDER FROM RIGHT BREAST, OPEN APPROACH: ICD-10-PCS | Performed by: PLASTIC SURGERY

## 2024-12-18 PROCEDURE — 88304 TISSUE EXAM BY PATHOLOGIST: CPT | Performed by: PATHOLOGY

## 2024-12-18 PROCEDURE — 11971 RMVL TIS XPNDR WO INSJ IMPLT: CPT | Performed by: PLASTIC SURGERY

## 2024-12-18 PROCEDURE — 87186 SC STD MICRODIL/AGAR DIL: CPT | Performed by: PLASTIC SURGERY

## 2024-12-18 PROCEDURE — 99232 SBSQ HOSP IP/OBS MODERATE 35: CPT | Performed by: NURSE PRACTITIONER

## 2024-12-18 PROCEDURE — 87077 CULTURE AEROBIC IDENTIFY: CPT | Performed by: PLASTIC SURGERY

## 2024-12-18 PROCEDURE — 87176 TISSUE HOMOGENIZATION CULTR: CPT | Performed by: PLASTIC SURGERY

## 2024-12-18 PROCEDURE — 88302 TISSUE EXAM BY PATHOLOGIST: CPT | Performed by: PATHOLOGY

## 2024-12-18 PROCEDURE — 87185 SC STD ENZYME DETCJ PER NZM: CPT | Performed by: PLASTIC SURGERY

## 2024-12-18 PROCEDURE — 87205 SMEAR GRAM STAIN: CPT | Performed by: PLASTIC SURGERY

## 2024-12-18 PROCEDURE — 87147 CULTURE TYPE IMMUNOLOGIC: CPT | Performed by: PLASTIC SURGERY

## 2024-12-18 PROCEDURE — 85025 COMPLETE CBC W/AUTO DIFF WBC: CPT | Performed by: FAMILY MEDICINE

## 2024-12-18 PROCEDURE — 88305 TISSUE EXAM BY PATHOLOGIST: CPT | Performed by: PATHOLOGY

## 2024-12-18 RX ORDER — FENTANYL CITRATE/PF 50 MCG/ML
25 SYRINGE (ML) INJECTION
Status: DISCONTINUED | OUTPATIENT
Start: 2024-12-18 | End: 2024-12-18

## 2024-12-18 RX ORDER — OXYCODONE HYDROCHLORIDE 10 MG/1
10 TABLET ORAL EVERY 4 HOURS PRN
Refills: 0 | Status: DISCONTINUED | OUTPATIENT
Start: 2024-12-18 | End: 2024-12-20 | Stop reason: HOSPADM

## 2024-12-18 RX ORDER — SODIUM CHLORIDE, SODIUM LACTATE, POTASSIUM CHLORIDE, CALCIUM CHLORIDE 600; 310; 30; 20 MG/100ML; MG/100ML; MG/100ML; MG/100ML
INJECTION, SOLUTION INTRAVENOUS CONTINUOUS PRN
Status: DISCONTINUED | OUTPATIENT
Start: 2024-12-18 | End: 2024-12-18

## 2024-12-18 RX ORDER — PROPOFOL 10 MG/ML
INJECTION, EMULSION INTRAVENOUS AS NEEDED
Status: DISCONTINUED | OUTPATIENT
Start: 2024-12-18 | End: 2024-12-18

## 2024-12-18 RX ORDER — PROPOFOL 10 MG/ML
INJECTION, EMULSION INTRAVENOUS CONTINUOUS PRN
Status: DISCONTINUED | OUTPATIENT
Start: 2024-12-18 | End: 2024-12-18

## 2024-12-18 RX ORDER — METHADONE HYDROCHLORIDE 10 MG/ML
INJECTION, SOLUTION INTRAMUSCULAR; INTRAVENOUS; SUBCUTANEOUS AS NEEDED
Status: DISCONTINUED | OUTPATIENT
Start: 2024-12-18 | End: 2024-12-18

## 2024-12-18 RX ORDER — DIPHENHYDRAMINE HYDROCHLORIDE 50 MG/ML
12.5 INJECTION INTRAMUSCULAR; INTRAVENOUS ONCE AS NEEDED
Status: DISCONTINUED | OUTPATIENT
Start: 2024-12-18 | End: 2024-12-18 | Stop reason: HOSPADM

## 2024-12-18 RX ORDER — CEFAZOLIN SODIUM 1 G/50ML
SOLUTION INTRAVENOUS AS NEEDED
Status: DISCONTINUED | OUTPATIENT
Start: 2024-12-18 | End: 2024-12-18

## 2024-12-18 RX ORDER — FENTANYL CITRATE 50 UG/ML
INJECTION, SOLUTION INTRAMUSCULAR; INTRAVENOUS AS NEEDED
Status: DISCONTINUED | OUTPATIENT
Start: 2024-12-18 | End: 2024-12-18

## 2024-12-18 RX ORDER — HYDROMORPHONE HCL/PF 1 MG/ML
1 SYRINGE (ML) INJECTION
Status: DISCONTINUED | OUTPATIENT
Start: 2024-12-18 | End: 2024-12-18 | Stop reason: HOSPADM

## 2024-12-18 RX ORDER — ONDANSETRON 2 MG/ML
INJECTION INTRAMUSCULAR; INTRAVENOUS AS NEEDED
Status: DISCONTINUED | OUTPATIENT
Start: 2024-12-18 | End: 2024-12-18

## 2024-12-18 RX ORDER — KETAMINE HCL IN NACL, ISO-OSM 100MG/10ML
SYRINGE (ML) INJECTION AS NEEDED
Status: DISCONTINUED | OUTPATIENT
Start: 2024-12-18 | End: 2024-12-18

## 2024-12-18 RX ORDER — MIDAZOLAM HYDROCHLORIDE 2 MG/2ML
INJECTION, SOLUTION INTRAMUSCULAR; INTRAVENOUS AS NEEDED
Status: DISCONTINUED | OUTPATIENT
Start: 2024-12-18 | End: 2024-12-18

## 2024-12-18 RX ORDER — HYDROMORPHONE HYDROCHLORIDE 2 MG/ML
INJECTION, SOLUTION INTRAMUSCULAR; INTRAVENOUS; SUBCUTANEOUS AS NEEDED
Status: DISCONTINUED | OUTPATIENT
Start: 2024-12-18 | End: 2024-12-18

## 2024-12-18 RX ORDER — LIDOCAINE HYDROCHLORIDE 20 MG/ML
INJECTION, SOLUTION EPIDURAL; INFILTRATION; INTRACAUDAL; PERINEURAL AS NEEDED
Status: DISCONTINUED | OUTPATIENT
Start: 2024-12-18 | End: 2024-12-18

## 2024-12-18 RX ORDER — SCOLOPAMINE TRANSDERMAL SYSTEM 1 MG/1
1 PATCH, EXTENDED RELEASE TRANSDERMAL
Status: DISCONTINUED | OUTPATIENT
Start: 2024-12-18 | End: 2024-12-20 | Stop reason: HOSPADM

## 2024-12-18 RX ORDER — BUPIVACAINE HYDROCHLORIDE 2.5 MG/ML
INJECTION, SOLUTION EPIDURAL; INFILTRATION; INTRACAUDAL AS NEEDED
Status: DISCONTINUED | OUTPATIENT
Start: 2024-12-18 | End: 2024-12-18 | Stop reason: HOSPADM

## 2024-12-18 RX ORDER — ONDANSETRON 2 MG/ML
4 INJECTION INTRAMUSCULAR; INTRAVENOUS ONCE AS NEEDED
Status: DISCONTINUED | OUTPATIENT
Start: 2024-12-18 | End: 2024-12-18 | Stop reason: HOSPADM

## 2024-12-18 RX ORDER — FENTANYL CITRATE/PF 50 MCG/ML
50 SYRINGE (ML) INJECTION
Refills: 0 | Status: DISCONTINUED | OUTPATIENT
Start: 2024-12-18 | End: 2024-12-18 | Stop reason: HOSPADM

## 2024-12-18 RX ORDER — DEXAMETHASONE SODIUM PHOSPHATE 10 MG/ML
INJECTION, SOLUTION INTRAMUSCULAR; INTRAVENOUS AS NEEDED
Status: DISCONTINUED | OUTPATIENT
Start: 2024-12-18 | End: 2024-12-18

## 2024-12-18 RX ORDER — MAGNESIUM HYDROXIDE 1200 MG/15ML
LIQUID ORAL AS NEEDED
Status: DISCONTINUED | OUTPATIENT
Start: 2024-12-18 | End: 2024-12-18 | Stop reason: HOSPADM

## 2024-12-18 RX ORDER — HYDROMORPHONE HCL/PF 1 MG/ML
0.5 SYRINGE (ML) INJECTION
Status: DISCONTINUED | OUTPATIENT
Start: 2024-12-18 | End: 2024-12-18

## 2024-12-18 RX ORDER — SODIUM CHLORIDE, SODIUM LACTATE, POTASSIUM CHLORIDE, CALCIUM CHLORIDE 600; 310; 30; 20 MG/100ML; MG/100ML; MG/100ML; MG/100ML
100 INJECTION, SOLUTION INTRAVENOUS CONTINUOUS
Status: DISCONTINUED | OUTPATIENT
Start: 2024-12-18 | End: 2024-12-20 | Stop reason: HOSPADM

## 2024-12-18 RX ORDER — OXYCODONE HYDROCHLORIDE 5 MG/1
5 TABLET ORAL EVERY 4 HOURS PRN
Refills: 0 | Status: DISCONTINUED | OUTPATIENT
Start: 2024-12-18 | End: 2024-12-20 | Stop reason: HOSPADM

## 2024-12-18 RX ORDER — ACETAMINOPHEN 10 MG/ML
INJECTION, SOLUTION INTRAVENOUS AS NEEDED
Status: DISCONTINUED | OUTPATIENT
Start: 2024-12-18 | End: 2024-12-18

## 2024-12-18 RX ORDER — HYDROMORPHONE HCL/PF 1 MG/ML
0.5 SYRINGE (ML) INJECTION
Status: DISCONTINUED | OUTPATIENT
Start: 2024-12-18 | End: 2024-12-20 | Stop reason: HOSPADM

## 2024-12-18 RX ADMIN — PROPOFOL 50 MG: 10 INJECTION, EMULSION INTRAVENOUS at 16:36

## 2024-12-18 RX ADMIN — OLODATEROL RESPIMAT INHALATION SPRAY 2 PUFF: 2.5 SPRAY, METERED RESPIRATORY (INHALATION) at 08:28

## 2024-12-18 RX ADMIN — DEXAMETHASONE SODIUM PHOSPHATE 10 MG: 10 INJECTION, SOLUTION INTRAMUSCULAR; INTRAVENOUS at 15:33

## 2024-12-18 RX ADMIN — FENTANYL CITRATE 50 MCG: 50 INJECTION INTRAMUSCULAR; INTRAVENOUS at 16:36

## 2024-12-18 RX ADMIN — PROPOFOL 150 MG: 10 INJECTION, EMULSION INTRAVENOUS at 15:27

## 2024-12-18 RX ADMIN — SODIUM CHLORIDE, SODIUM LACTATE, POTASSIUM CHLORIDE, AND CALCIUM CHLORIDE: .6; .31; .03; .02 INJECTION, SOLUTION INTRAVENOUS at 17:30

## 2024-12-18 RX ADMIN — FENOFIBRATE 145 MG: 145 TABLET ORAL at 08:28

## 2024-12-18 RX ADMIN — ACETAMINOPHEN 1000 MG: 1000 INJECTION, SOLUTION INTRAVENOUS at 15:44

## 2024-12-18 RX ADMIN — SODIUM CHLORIDE, SODIUM LACTATE, POTASSIUM CHLORIDE, AND CALCIUM CHLORIDE: .6; .31; .03; .02 INJECTION, SOLUTION INTRAVENOUS at 15:15

## 2024-12-18 RX ADMIN — LIDOCAINE HYDROCHLORIDE 80 MG: 20 INJECTION, SOLUTION EPIDURAL; INFILTRATION; INTRACAUDAL; PERINEURAL at 15:27

## 2024-12-18 RX ADMIN — HYDROMORPHONE HYDROCHLORIDE 1 MG: 2 INJECTION, SOLUTION INTRAMUSCULAR; INTRAVENOUS; SUBCUTANEOUS at 16:40

## 2024-12-18 RX ADMIN — DOXYCYCLINE 100 MG: 100 INJECTION, POWDER, LYOPHILIZED, FOR SOLUTION INTRAVENOUS at 22:19

## 2024-12-18 RX ADMIN — UMECLIDINIUM 1 PUFF: 62.5 AEROSOL, POWDER ORAL at 08:28

## 2024-12-18 RX ADMIN — Medication 10 MG: at 18:10

## 2024-12-18 RX ADMIN — LETROZOLE 2.5 MG: 2.5 TABLET ORAL at 08:29

## 2024-12-18 RX ADMIN — HYDROMORPHONE HYDROCHLORIDE 0.5 MG: 2 INJECTION, SOLUTION INTRAMUSCULAR; INTRAVENOUS; SUBCUTANEOUS at 19:20

## 2024-12-18 RX ADMIN — METHADONE HYDROCHLORIDE 5 MG: 10 INJECTION, SOLUTION INTRAMUSCULAR; INTRAVENOUS; SUBCUTANEOUS at 17:44

## 2024-12-18 RX ADMIN — FENTANYL CITRATE 50 MCG: 50 INJECTION INTRAMUSCULAR; INTRAVENOUS at 19:35

## 2024-12-18 RX ADMIN — HYDROMORPHONE HYDROCHLORIDE 1 MG: 1 INJECTION, SOLUTION INTRAMUSCULAR; INTRAVENOUS; SUBCUTANEOUS at 19:59

## 2024-12-18 RX ADMIN — PROPOFOL 120 MCG/KG/MIN: 10 INJECTION, EMULSION INTRAVENOUS at 15:29

## 2024-12-18 RX ADMIN — FENTANYL CITRATE 25 MCG: 50 INJECTION INTRAMUSCULAR; INTRAVENOUS at 16:21

## 2024-12-18 RX ADMIN — GABAPENTIN 300 MG: 300 CAPSULE ORAL at 22:20

## 2024-12-18 RX ADMIN — ONDANSETRON 4 MG: 2 INJECTION INTRAMUSCULAR; INTRAVENOUS at 15:33

## 2024-12-18 RX ADMIN — FENTANYL CITRATE 25 MCG: 50 INJECTION INTRAMUSCULAR; INTRAVENOUS at 15:38

## 2024-12-18 RX ADMIN — FENTANYL CITRATE 50 MCG: 50 INJECTION INTRAMUSCULAR; INTRAVENOUS at 19:46

## 2024-12-18 RX ADMIN — SODIUM CHLORIDE, SODIUM LACTATE, POTASSIUM CHLORIDE, AND CALCIUM CHLORIDE 100 ML/HR: .6; .31; .03; .02 INJECTION, SOLUTION INTRAVENOUS at 22:20

## 2024-12-18 RX ADMIN — MIDAZOLAM 2 MG: 1 INJECTION INTRAMUSCULAR; INTRAVENOUS at 15:17

## 2024-12-18 RX ADMIN — Medication 20 MG: at 17:22

## 2024-12-18 RX ADMIN — FENTANYL CITRATE 50 MCG: 50 INJECTION INTRAMUSCULAR; INTRAVENOUS at 15:59

## 2024-12-18 RX ADMIN — PHENYLEPHRINE HYDROCHLORIDE 30 MCG/MIN: 10 INJECTION INTRAVENOUS at 15:29

## 2024-12-18 RX ADMIN — FENTANYL CITRATE 25 MCG: 50 INJECTION INTRAMUSCULAR; INTRAVENOUS at 16:19

## 2024-12-18 RX ADMIN — FENTANYL CITRATE 25 MCG: 50 INJECTION INTRAMUSCULAR; INTRAVENOUS at 15:27

## 2024-12-18 RX ADMIN — DOXYCYCLINE 100 MG: 100 INJECTION, POWDER, LYOPHILIZED, FOR SOLUTION INTRAVENOUS at 11:40

## 2024-12-18 RX ADMIN — SCOPALAMINE 1 PATCH: 1 PATCH, EXTENDED RELEASE TRANSDERMAL at 21:55

## 2024-12-18 RX ADMIN — CEFAZOLIN SODIUM 1000 MG: 1 SOLUTION INTRAVENOUS at 15:31

## 2024-12-18 NOTE — PROGRESS NOTES
Progress Note - Hospitalist   Name: Jenny Pereyra 49 y.o. female I MRN: [id number]  Unit/Bed#: [facility] 906-01 I Date of Admission: 12/15/2024   Date of Service: 12/18/2024 I Hospital Day: 3     Assessment & Plan  Breast abscess  49-year-old female with right breast malignancy, s/p bilateral mastectomies, right sentinel lymph node biopsy and immediate bilateral breast reconstruction with tissue expanders one month ago 11/12/24 presented to ED with c/o right breast swelling, pain and foul smelling discharge from the wound.    Afebrile in ED. Mild leukocytosis at 12. Temp of 100.2 at home.   CT shows: Bilateral mastectomy with bilateral tissue expanders with 2.9 x 1.0 minimally peripheral enhancing fluid collection lateral to the right breast implant compatible with abscess.   Plastic surgery recommended IR drainage, this was done 12/16.  50 cc of cloudy serous fluid was aspirated and drained GRACE drain was left in place. Patient was deemed stable for d/c home with drain however then had dehiscence of her right chest incision following a shower and decision was made to proceed with removal of right expander and elective removal of left so she can be symmetric.   N.p.o. for OR 12/18  Continue IV doxycycline pending final cultures, negative to date  Blood cultures negative to date  Fluid cultures pending, blood cultures negative to date.  As needed pain control    Anxiety  Associated with hospitalization and recent surgery.   Supportive cares  As needed Xanax for in house  Essential tremor  Continue Gabapentin  Stage 1 mild COPD by GOLD classification (HCC)  Stiolto nonformulary, resume  Hypersomnia  PTA meds armodafinil 150 mg daily, nonformulary, substitute with modafinil.  Malignant neoplasm of central portion of right breast in female, estrogen receptor positive (HCC)   right breast cancer s/p bilateral mastectomies, right sentinel lymph node biopsy and immediate bilateral breast reconstruction with tissue expanders.    Continue Letrozole    VTE Pharmacologic Prophylaxis:   Moderate Risk (Score 3-4) - Pharmacological DVT Prophylaxis Contraindicated. Sequential Compression Devices Ordered.    Mobility:   Basic Mobility Inpatient Raw Score: 24  JH-HLM Goal: 8: Walk 250 feet or more  JH-HLM Achieved: 8: Walk 250 feet ot more  JH-HLM Goal achieved. Continue to encourage appropriate mobility.    Patient Centered Rounds: I performed bedside rounds with nursing staff today.   Discussions with Specialists or Other Care Team Provider: nursing, case management, plastic surgery attending     Education and Discussions with Family / Patient: Patient declined call to .     Current Length of Stay: 3 day(s)    Current Patient Status: Inpatient     Certification Statement: The patient will continue to require additional inpatient hospital stay due to OR today    Discharge Plan: Anticipate discharge in 48 hrs to home.    Code Status: Level 1 - Full Code    Subjective   No complaints. Agreeable for OR today with Dr. Brito.     Objective :  Temp:  [97.5 °F (36.4 °C)-98.6 °F (37 °C)] 98.6 °F (37 °C)  HR:  [65-71] 67  BP: ()/(67-71) 97/67  Resp:  [18] 18  SpO2:  [91 %-98 %] 91 %  O2 Device: None (Room air)    Body mass index is 26.53 kg/m².     Input and Output Summary (last 24 hours):     Intake/Output Summary (Last 24 hours) at 12/18/2024 0832  Last data filed at 12/17/2024 1845  Gross per 24 hour   Intake --   Output 710 ml   Net -710 ml       Physical Exam  Vitals and nursing note reviewed.   Constitutional:       General: She is not in acute distress.  Cardiovascular:      Rate and Rhythm: Normal rate.   Pulmonary:      Breath sounds: Normal breath sounds.   Abdominal:      Tenderness: There is no abdominal tenderness.   Musculoskeletal:         General: No swelling.   Skin:     General: Skin is warm.   Neurological:      Mental Status: She is alert and oriented to person, place, and time. Mental status is at baseline.    Psychiatric:         Mood and Affect: Mood normal.           Lines/Drains:  Lines/Drains/Airways       Active Status       Name Placement date Placement time Site Days    Abscess Drain Breast 12/16/24  1645  Breast  1                            Lab Results: I have reviewed the following results:   Results from last 7 days   Lab Units 12/18/24  0439   WBC Thousand/uL 6.53   HEMOGLOBIN g/dL 11.9   HEMATOCRIT % 36.7   PLATELETS Thousands/uL 305   SEGS PCT % 57   LYMPHO PCT % 28   MONO PCT % 8   EOS PCT % 5     Results from last 7 days   Lab Units 12/18/24  0439 12/16/24  0455 12/15/24  1039   SODIUM mmol/L 140   < > 139   POTASSIUM mmol/L 3.9   < > 3.8   CHLORIDE mmol/L 107   < > 108   CO2 mmol/L 24   < > 23   BUN mg/dL 16   < > 14   CREATININE mg/dL 0.67   < > 0.89   ANION GAP mmol/L 9   < > 8   CALCIUM mg/dL 9.5   < > 9.5   ALBUMIN g/dL  --   --  4.0   TOTAL BILIRUBIN mg/dL  --   --  0.32   ALK PHOS U/L  --   --  48   ALT U/L  --   --  24   AST U/L  --   --  20   GLUCOSE RANDOM mg/dL 87   < > 111    < > = values in this interval not displayed.     Results from last 7 days   Lab Units 12/16/24  0455   INR  1.01             Results from last 7 days   Lab Units 12/15/24  1039   PROCALCITONIN ng/ml 0.06       Recent Cultures (last 7 days):   Results from last 7 days   Lab Units 12/16/24  1648 12/15/24  1611   BLOOD CULTURE   --  No Growth at 48 hrs.  No Growth at 48 hrs.   GRAM STAIN RESULT  2+ Polys  No bacteria seen  --    BODY FLUID CULTURE, STERILE  No growth  --          Other Study Results Review: No additional pertinent studies reviewed.    Last 24 Hours Medication List:     Current Facility-Administered Medications:     acetaminophen (TYLENOL) tablet 650 mg, Q6H PRN    ALPRAZolam (XANAX) tablet 0.25 mg, HS PRN    bacitracin topical ointment 1 small application, BID    doxycycline (VIBRAMYCIN) 100 mg in sodium chloride 0.9 % 100 mL IVPB, Q12H, Last Rate: 100 mg (12/17/24 2144)    escitalopram (LEXAPRO) tablet  10 mg, Daily With Lunch    fenofibrate (TRICOR) tablet 145 mg, Daily    gabapentin (NEURONTIN) capsule 300 mg, HS    heparin (porcine) subcutaneous injection 5,000 Units, Q8H URVASHI **AND** [COMPLETED] Platelet count, Once    HYDROmorphone (DILAUDID) injection 0.5 mg, Q4H PRN    letrozole (FEMARA) tablet 2.5 mg, Daily    modafinil (PROVIGIL) tablet 200 mg, Daily    umeclidinium 62.5 mcg/actuation inhaler AEPB 1 puff, Daily **AND** olodaterol HCl (STRIVERDI RESPIMAT) inhaler 2 puff, Daily    ondansetron (ZOFRAN) injection 4 mg, Q6H PRN    oxyCODONE (ROXICODONE) immediate release tablet 10 mg, Q4H PRN    oxyCODONE (ROXICODONE) IR tablet 5 mg, Q6H PRN    polyethylene glycol (MIRALAX) packet 17 g, Daily PRN    saccharomyces boulardii (FLORASTOR) capsule 250 mg, BID    Administrative Statements   Today, Patient Was Seen By: MOHSEN Forman      **Please Note: This note may have been constructed using a voice recognition system.**

## 2024-12-18 NOTE — PROGRESS NOTES
Progress Note - Plastic Surgery   Name: Jenny Pereyra 49 y.o. female I MRN: [id number]  Unit/Bed#: [facility] 906-01 I Date of Admission: 12/15/2024   Date of Service: 12/17/2024 I Hospital Day: 2     Assessment & Plan    Rounded on patient prior to discharge. Patient noted that after she took a shower this afternoon she noticed that a portion of her right chest incision appears to open.  She also noticed an odor.  On exam there is a 1cm area of dehiscence over the lateral aspect of the right chest incision.  Very thin/transparent layer of slough at base of dehiscence.  Drain no longer holding suction. The incision is communicating with the outside environment.  Expander exposed.  Patient is not systemically ill.     Discussed with the patient that  we will have to proceed with removing the expander as previously discussed. I reviewed with the patient that I do not think it would be safe to place another expander at this time given her current infection and communication of the pocket with the outside environment.  Patient is in agreement and notes she would not feel comfortable having another expander placed at this time.  She also notes that she would like to have her left expander out at this time so that she can be symmetric.     Area of wound dehiscence painted with betadine and dressed with xeroform, gauze and tegaderm    -plan for OR tomorrow   -NPO at midnight  -continue abx

## 2024-12-18 NOTE — ANESTHESIA PREPROCEDURE EVALUATION
Procedure:  bilateral tissue expanders (Bilateral: Breast)  CLOSURE WITH DRAIN PLACEMENT (Bilateral: Breast)  DEBRIDEMENT WOUND (WASH OUT) (Bilateral: Breast)    Relevant Problems   ANESTHESIA   (+) PONV (postoperative nausea and vomiting)      CARDIO   (+) Chronic migraine without aura   (+) Other hemorrhoids   (+) Other hyperlipidemia      GI/HEPATIC   (+) Gastroesophageal reflux disease without esophagitis      /RENAL   (+) Kidney stone on left side      GYN   (+) Malignant neoplasm of central portion of right breast in female, estrogen receptor positive (HCC)      MUSCULOSKELETAL   (+) DDD (degenerative disc disease), lumbar      NEURO/PSYCH   (+) Anxiety   (+) Chronic migraine without aura      PULMONARY   (+) Stage 1 mild COPD by GOLD classification (HCC)      Obstetrics/Gynecology   (+) Breast abscess   S/p  bilateral mastectomy, right sentinel lymph node biopsy, and immediate bilateral breast reconstruction with tissue expanders 1 month ago c/b R breast cellulitis.      Access: 22G PIV LFA         Anesthesia Plan  ASA Score- 3     Anesthesia Type- general with ASA Monitors.         Additional Monitors:     Airway Plan: ETT.           Plan Factors-Exercise tolerance (METS): >4 METS.    Chart reviewed.  Imaging results reviewed. Existing labs reviewed. Patient summary reviewed.                  Induction- intravenous.    Postoperative Plan- Plan for postoperative opioid use. Planned trial extubation    Perioperative Resuscitation Plan - Level 1 - Full Code.       Informed Consent- Anesthetic plan and risks discussed with patient.  I personally reviewed this patient with the CRNA. Discussed and agreed on the Anesthesia Plan with the CRNA..

## 2024-12-18 NOTE — ASSESSMENT & PLAN NOTE
49-year-old female with right breast malignancy, s/p bilateral mastectomies, right sentinel lymph node biopsy and immediate bilateral breast reconstruction with tissue expanders one month ago 11/12/24 presented to ED with c/o right breast swelling, pain and foul smelling discharge from the wound.    Afebrile in ED. Mild leukocytosis at 12. Temp of 100.2 at home.   CT shows: Bilateral mastectomy with bilateral tissue expanders with 2.9 x 1.0 minimally peripheral enhancing fluid collection lateral to the right breast implant compatible with abscess.   Plastic surgery recommended IR drainage, this was done 12/16.  50 cc of cloudy serous fluid was aspirated and drained GRACE drain was left in place. Patient was deemed stable for d/c home with drain however then had dehiscence of her right chest incision following a shower and decision was made to proceed with removal of right expander and elective removal of left so she can be symmetric.   N.p.o. for OR 12/18  Continue IV doxycycline pending final cultures, negative to date  Blood cultures negative to date  Fluid cultures pending, blood cultures negative to date.  As needed pain control

## 2024-12-18 NOTE — ASSESSMENT & PLAN NOTE
Associated with hospitalization and recent surgery.   Supportive cares  As needed Xanax for in house

## 2024-12-18 NOTE — ASSESSMENT & PLAN NOTE
Jenny Pereyra is a 49-year-old female who is 1 month status post bilateral mastectomy, right sentinel lymph node biopsy, and immediate bilateral breast reconstruction with tissue expanders.  Patient admitted with right breast cellulitis, elevated WBC, subjective fevers and noted fluid collection adjacent to R tissue expander on CT. WBC normalized yesterday. Patient underwent IR aspiration and drain placement of the right breast with 50mL fluid drained. Patient with improving erythema yesterday then after a shower, experienced a small dehiscence of her right chest incision with exposure of the underlying implant. Patient is stable with no signs of systemic illness.    Reviewed with the patient that we will have to proceed with removing the expander as previously discussed for safety as she was already fighting and infection and now the implant has been exposed to the outside environment. I reviewed with the patient that I do not think it would be safe to place another expander at this time given her current infection and communication of the pocket with the outside environment. Patient, again notes that she is in agreement and notes she would not feel comfortable having another expander placed at this time. She affirms that she would like to have her left expander out at this time so that she can be symmetric. We did discuss that our primary goal is to clear this infection and get her to heal.  Discussed that we can revisit the idea of delayed reconstruction at a later time once she has had time to heal completely.  Patient expresses understanding.     Discussed with the patient that we will remove the expander from both sides, aggressively irrigate and clean the breast pockets then close her chest flat with at least one drain in each side.  The risks of surgery include but are not limited to bleeding, infection, asymmetry, contour deformity, skin necrosis, open wound, seroma, need for additional procedures.  Patient  acknowledges and understands the goals and risks of surgery.  Patient elects to proceed with bilateral expander removal, washout and flap closure. She will likely discharge later today or tomorrow morning and complete a course of oral antibiotics.      -NPO for OR today  -Continue abx  -keep occlusive dressing in place

## 2024-12-18 NOTE — PROGRESS NOTES
Progress Note - Plastic Surgery   Name: Jenny Pereyra 49 y.o. female I MRN: [id number]  [facility] I Date of Admission: 12/15/2024   Date of Service: 12/18/2024 I Hospital Day: 3     Assessment & Plan  Breast abscess  Jenny Pereyra is a 49-year-old female who is 1 month status post bilateral mastectomy, right sentinel lymph node biopsy, and immediate bilateral breast reconstruction with tissue expanders.  Patient admitted with right breast cellulitis, elevated WBC, subjective fevers and noted fluid collection adjacent to R tissue expander on CT. WBC normalized yesterday. Patient underwent IR aspiration and drain placement of the right breast with 50mL fluid drained. Patient with improving erythema yesterday then after a shower, experienced a small dehiscence of her right chest incision with exposure of the underlying implant. Patient is stable with no signs of systemic illness.    Reviewed with the patient that we will have to proceed with removing the expander as previously discussed for safety as she was already fighting and infection and now the implant has been exposed to the outside environment. I reviewed with the patient that I do not think it would be safe to place another expander at this time given her current infection and communication of the pocket with the outside environment. Patient, again notes that she is in agreement and notes she would not feel comfortable having another expander placed at this time. She affirms that she would like to have her left expander out at this time so that she can be symmetric. We did discuss that our primary goal is to clear this infection and get her to heal.  Discussed that we can revisit the idea of delayed reconstruction at a later time once she has had time to heal completely.  Patient expresses understanding.     Discussed with the patient that we will remove the expander from both sides, aggressively irrigate and clean the breast pockets then close  her chest flat with at least one drain in each side.  The risks of surgery include but are not limited to bleeding, infection, asymmetry, contour deformity, skin necrosis, open wound, seroma, need for additional procedures.  Patient acknowledges and understands the goals and risks of surgery.  Patient elects to proceed with bilateral expander removal, washout and flap closure. She will likely discharge later today or tomorrow morning and complete a course of oral antibiotics.      -NPO for OR today  -Continue abx  -keep occlusive dressing in place    24 Hour Events : No acute events overnight.  Patient has remained afebrile with stable vital signs.    Subjective : Patient notes that she slept well. Pain well controlled.     Objective :  Temp:  [97.5 °F (36.4 °C)-98.6 °F (37 °C)] 98.6 °F (37 °C)  HR:  [65-71] 67  BP: ()/(67-71) 97/67  Resp:  [18] 18  SpO2:  [91 %-98 %] 91 %  O2 Device: None (Room air)    Physical Exam  Alert, oriented, NAD  Breathing comfortably on room air  R breast with mild erythema - occlusive dressing in place.  Cloudy fluid in drain.  L breast skin soft without erythema.  Stable darkened skin along medial edge of incision.

## 2024-12-18 NOTE — ANESTHESIA PREPROCEDURE EVALUATION
Procedure:  bilateral tissue expanders (Bilateral: Breast)  CLOSURE WITH DRAIN PLACEMENT (Bilateral: Breast)  DEBRIDEMENT WOUND (WASH OUT) (Bilateral: Breast)    Relevant Problems   ANESTHESIA   (+) PONV (postoperative nausea and vomiting)      CARDIO   (+) Chronic migraine without aura   (+) Other hemorrhoids   (+) Other hyperlipidemia      GI/HEPATIC   (+) Gastroesophageal reflux disease without esophagitis      /RENAL   (+) Kidney stone on left side      GYN   (+) Malignant neoplasm of central portion of right breast in female, estrogen receptor positive (HCC)      MUSCULOSKELETAL   (+) DDD (degenerative disc disease), lumbar      NEURO/PSYCH   (+) Anxiety   (+) Chronic migraine without aura      PULMONARY   (+) Stage 1 mild COPD by GOLD classification ([facility])        Physical Exam    Airway    Mallampati score: II  TM Distance: >3 FB  Neck ROM: full     Dental   No notable dental hx     Cardiovascular      Pulmonary      Other Findings  post-pubertal.      Anesthesia Plan  ASA Score- 2     Anesthesia Type- general with ASA Monitors.         Additional Monitors:     Airway Plan: LMA.    Comment: Propofol infusion.       Plan Factors-Exercise tolerance (METS): >4 METS.    Chart reviewed. EKG reviewed.  Existing labs reviewed. Patient summary reviewed.    Patient is not a current smoker.              Induction- intravenous.    Postoperative Plan- Plan for postoperative opioid use. Planned trial extubation    Perioperative Resuscitation Plan - Level 1 - Full Code.       Informed Consent- Anesthetic plan and risks discussed with patient.  I personally reviewed this patient with the CRNA. Discussed and agreed on the Anesthesia Plan with the CRNA..

## 2024-12-19 LAB
ANION GAP SERPL CALCULATED.3IONS-SCNC: 8 MMOL/L (ref 4–13)
BACTERIA SPEC BFLD CULT: NO GROWTH
BASOPHILS # BLD AUTO: 0.05 THOUSANDS/ΜL (ref 0–0.1)
BASOPHILS NFR BLD AUTO: 1 % (ref 0–1)
BUN SERPL-MCNC: 12 MG/DL (ref 5–25)
CALCIUM SERPL-MCNC: 8.1 MG/DL (ref 8.4–10.2)
CHLORIDE SERPL-SCNC: 104 MMOL/L (ref 96–108)
CO2 SERPL-SCNC: 24 MMOL/L (ref 21–32)
CREAT SERPL-MCNC: 0.5 MG/DL (ref 0.6–1.3)
EOSINOPHIL # BLD AUTO: 0.1 THOUSAND/ΜL (ref 0–0.61)
EOSINOPHIL NFR BLD AUTO: 1 % (ref 0–6)
ERYTHROCYTE [DISTWIDTH] IN BLOOD BY AUTOMATED COUNT: 12.7 % (ref 11.6–15.1)
GFR SERPL CREATININE-BSD FRML MDRD: 115 ML/MIN/1.73SQ M
GLUCOSE SERPL-MCNC: 76 MG/DL (ref 65–140)
GRAM STN SPEC: NORMAL
GRAM STN SPEC: NORMAL
HCT VFR BLD AUTO: 30.1 % (ref 34.8–46.1)
HGB BLD-MCNC: 9.5 G/DL (ref 11.5–15.4)
IMM GRANULOCYTES # BLD AUTO: 0.05 THOUSAND/UL (ref 0–0.2)
IMM GRANULOCYTES NFR BLD AUTO: 1 % (ref 0–2)
LYMPHOCYTES # BLD AUTO: 1.92 THOUSANDS/ΜL (ref 0.6–4.47)
LYMPHOCYTES NFR BLD AUTO: 22 % (ref 14–44)
MCH RBC QN AUTO: 29.1 PG (ref 26.8–34.3)
MCHC RBC AUTO-ENTMCNC: 31.6 G/DL (ref 31.4–37.4)
MCV RBC AUTO: 92 FL (ref 82–98)
MONOCYTES # BLD AUTO: 0.61 THOUSAND/ΜL (ref 0.17–1.22)
MONOCYTES NFR BLD AUTO: 7 % (ref 4–12)
NEUTROPHILS # BLD AUTO: 6 THOUSANDS/ΜL (ref 1.85–7.62)
NEUTS SEG NFR BLD AUTO: 68 % (ref 43–75)
NRBC BLD AUTO-RTO: 0 /100 WBCS
PLATELET # BLD AUTO: 273 THOUSANDS/UL (ref 149–390)
PMV BLD AUTO: 10.1 FL (ref 8.9–12.7)
POTASSIUM SERPL-SCNC: 4.3 MMOL/L (ref 3.5–5.3)
RBC # BLD AUTO: 3.26 MILLION/UL (ref 3.81–5.12)
SODIUM SERPL-SCNC: 136 MMOL/L (ref 135–147)
WBC # BLD AUTO: 8.73 THOUSAND/UL (ref 4.31–10.16)

## 2024-12-19 PROCEDURE — 99232 SBSQ HOSP IP/OBS MODERATE 35: CPT | Performed by: FAMILY MEDICINE

## 2024-12-19 PROCEDURE — 99024 POSTOP FOLLOW-UP VISIT: CPT | Performed by: PLASTIC SURGERY

## 2024-12-19 PROCEDURE — 85025 COMPLETE CBC W/AUTO DIFF WBC: CPT | Performed by: PLASTIC SURGERY

## 2024-12-19 PROCEDURE — 99255 IP/OBS CONSLTJ NEW/EST HI 80: CPT | Performed by: ANESTHESIOLOGY

## 2024-12-19 PROCEDURE — 80048 BASIC METABOLIC PNL TOTAL CA: CPT | Performed by: PLASTIC SURGERY

## 2024-12-19 PROCEDURE — 99254 IP/OBS CNSLTJ NEW/EST MOD 60: CPT | Performed by: INTERNAL MEDICINE

## 2024-12-19 RX ORDER — DOXYCYCLINE 100 MG/1
100 CAPSULE ORAL EVERY 12 HOURS SCHEDULED
Status: DISCONTINUED | OUTPATIENT
Start: 2024-12-19 | End: 2024-12-19

## 2024-12-19 RX ORDER — IBUPROFEN 400 MG/1
400 TABLET, FILM COATED ORAL EVERY 6 HOURS SCHEDULED
Status: DISCONTINUED | OUTPATIENT
Start: 2024-12-19 | End: 2024-12-19

## 2024-12-19 RX ORDER — IBUPROFEN 400 MG/1
400 TABLET, FILM COATED ORAL EVERY 6 HOURS PRN
Status: DISCONTINUED | OUTPATIENT
Start: 2024-12-19 | End: 2024-12-20 | Stop reason: HOSPADM

## 2024-12-19 RX ORDER — KETOROLAC TROMETHAMINE 10 MG/1
10 TABLET, FILM COATED ORAL EVERY 6 HOURS PRN
Status: CANCELLED | OUTPATIENT
Start: 2024-12-19

## 2024-12-19 RX ORDER — DOXYCYCLINE 100 MG/1
100 CAPSULE ORAL EVERY 12 HOURS SCHEDULED
Status: DISCONTINUED | OUTPATIENT
Start: 2024-12-19 | End: 2024-12-20 | Stop reason: HOSPADM

## 2024-12-19 RX ORDER — CEFAZOLIN SODIUM 2 G/50ML
2000 SOLUTION INTRAVENOUS EVERY 8 HOURS
Status: DISCONTINUED | OUTPATIENT
Start: 2024-12-19 | End: 2024-12-19

## 2024-12-19 RX ORDER — METRONIDAZOLE 500 MG/1
500 TABLET ORAL EVERY 12 HOURS SCHEDULED
Status: DISCONTINUED | OUTPATIENT
Start: 2024-12-19 | End: 2024-12-19

## 2024-12-19 RX ADMIN — DOXYCYCLINE 100 MG: 100 CAPSULE ORAL at 21:11

## 2024-12-19 RX ADMIN — FENOFIBRATE 145 MG: 145 TABLET ORAL at 08:01

## 2024-12-19 RX ADMIN — AMOXICILLIN AND CLAVULANATE POTASSIUM 1 TABLET: 875; 125 TABLET, COATED ORAL at 21:11

## 2024-12-19 RX ADMIN — DOXYCYCLINE 100 MG: 100 CAPSULE ORAL at 12:27

## 2024-12-19 RX ADMIN — OXYCODONE HYDROCHLORIDE 10 MG: 10 TABLET ORAL at 03:29

## 2024-12-19 RX ADMIN — AMOXICILLIN AND CLAVULANATE POTASSIUM 1 TABLET: 875; 125 TABLET, COATED ORAL at 14:28

## 2024-12-19 RX ADMIN — Medication 250 MG: at 17:59

## 2024-12-19 RX ADMIN — OXYCODONE HYDROCHLORIDE 10 MG: 10 TABLET ORAL at 14:30

## 2024-12-19 RX ADMIN — ESCITALOPRAM OXALATE 10 MG: 10 TABLET ORAL at 12:27

## 2024-12-19 RX ADMIN — UMECLIDINIUM 1 PUFF: 62.5 AEROSOL, POWDER ORAL at 08:02

## 2024-12-19 RX ADMIN — METRONIDAZOLE 500 MG: 500 TABLET ORAL at 10:05

## 2024-12-19 RX ADMIN — OLODATEROL RESPIMAT INHALATION SPRAY 2 PUFF: 2.5 SPRAY, METERED RESPIRATORY (INHALATION) at 08:02

## 2024-12-19 RX ADMIN — MODAFINIL 200 MG: 100 TABLET ORAL at 08:02

## 2024-12-19 RX ADMIN — SODIUM CHLORIDE, SODIUM LACTATE, POTASSIUM CHLORIDE, AND CALCIUM CHLORIDE 100 ML/HR: .6; .31; .03; .02 INJECTION, SOLUTION INTRAVENOUS at 18:10

## 2024-12-19 RX ADMIN — Medication 250 MG: at 08:02

## 2024-12-19 RX ADMIN — OXYCODONE HYDROCHLORIDE 10 MG: 10 TABLET ORAL at 18:05

## 2024-12-19 RX ADMIN — CEFAZOLIN SODIUM 2000 MG: 2 SOLUTION INTRAVENOUS at 10:04

## 2024-12-19 RX ADMIN — GABAPENTIN 300 MG: 300 CAPSULE ORAL at 21:11

## 2024-12-19 RX ADMIN — HYDROMORPHONE HYDROCHLORIDE 0.5 MG: 1 INJECTION, SOLUTION INTRAMUSCULAR; INTRAVENOUS; SUBCUTANEOUS at 06:57

## 2024-12-19 RX ADMIN — SODIUM CHLORIDE, SODIUM LACTATE, POTASSIUM CHLORIDE, AND CALCIUM CHLORIDE 100 ML/HR: .6; .31; .03; .02 INJECTION, SOLUTION INTRAVENOUS at 08:10

## 2024-12-19 RX ADMIN — OXYCODONE HYDROCHLORIDE 10 MG: 10 TABLET ORAL at 08:02

## 2024-12-19 NOTE — ASSESSMENT & PLAN NOTE
Diagnosed via right breast biopsy on 10/8/2024.  On letrozole.  Underwent surgical intervention with reconstruction complicated by breast abscess.

## 2024-12-19 NOTE — ASSESSMENT & PLAN NOTE
S/p bilateral mastectomy with right sentinel LN biopsy for right breast malignancy with immediate bilateral breast reconstruction and tissue expander placement on 11/12/2024.  Hospitalized on 12/15 for abscess seen on imaging.  Management per primary team and on IV antibiotics.  APS consulted for postop pain management.  Currently on 5/10mg po oxycodone Q4hr PRN and 0.5mg IV dilaudid Q3hr PRN.  Pain is well controlled on current regimen, but pt does not like narcotics.   Will add 400mg po ibuprofen Q6hr PRN. Discussed w/ primary and sx teams.  Recommend alternating between tylenol and ibuprofen.  If pain worsens despite ibuprofen, consider switching to toradol.

## 2024-12-19 NOTE — ANESTHESIA POSTPROCEDURE EVALUATION
Post-Op Assessment Note    Last Filed PACU Vitals:  Vitals Value Taken Time   Temp 97.4 °F (36.3 °C) 12/18/24 1925   Pulse 73 12/18/24 2037   /59 12/18/24 2030   Resp 18 12/18/24 2037   SpO2 94 % 12/18/24 2037   Vitals shown include unfiled device data.    Modified Mateo:  Activity: 2 (12/18/2024  8:35 PM)  Respiration: 2 (12/18/2024  8:35 PM)  Circulation: 2 (12/18/2024  8:35 PM)  Consciousness: 1 (12/18/2024  8:35 PM)  Oxygen Saturation: 1 (12/18/2024  8:35 PM)  Modified Mateo Score: 8 (12/18/2024  8:35 PM)

## 2024-12-19 NOTE — PROGRESS NOTES
Progress Note - Plastic Surgery   Name: Jenny Pereyra 49 y.o. female I MRN: [id number]  Unit/Bed#: [facility] 906-01 I Date of Admission: 12/15/2024   Date of Service: 12/19/2024 I Hospital Day: 4     Assessment & Plan  Breast abscess  Jenny Pereyra is a 49-year-old female who is 1 month status post bilateral mastectomy, right sentinel lymph node biopsy, and immediate bilateral breast reconstruction with tissue expanders.  Postoperative course complicated by infection right reconstructed breast with partial wound dehiscence and exposure of tissue expander.  Patient is now POD#1 s/p removal of bilateral tissue expanders with washout and drain placement.  Patient is doing well overall. Experiencing moderate postoperative pain.  Systolic blood pressures in the high 90s - without clinical symptoms. Hgb 9.5 this AM from 11.9 yesterday.  No signs of acute bleeding, likely from OR blood loss and fluid administration.  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      -Regular diet  -Drains to bulb suction  -Maintain binder on chest  -Appreciate ID recommendations   - Augmentin/Doxy PO x 10 days from OR  -Tylenol and oxycodone for pain, IV dilaudid for breakthrough pain, appreciate pain recommendations  -Monitor vitals  -Will monitor patient overnight given history of penicillin allergy. Plan for d/c in AM  -DVT ppx    24 Hour Events : No acute events overnight. Placed on 1L NC overnight while sleeping after surgery.  Subjective : Patient notes that she is doing well overall this morning. She is tired and sore at her surgical sites.    Objective :  Temp:  [97.4 °F (36.3 °C)-98.5 °F (36.9 °C)] 98.3 °F (36.8 °C)  HR:  [55-86] 62  BP: ()/(54-79) 96/64  Resp:  [8-21] 17  SpO2:  [90 %-96 %] 96 %  O2 Device: Nasal cannula  Nasal Cannula O2 Flow Rate (L/min):  [1 L/min-4 L/min] 1 L/min    Physical Exam  Alert, oriented, NAD  Breathing comfortably on 1LNC  Bilateral chest incisions well  approximated  Bilateral chest skin with 2-3s cap refill  No appreciable ecchymoses or fullness.  Drains SS    Output by Drain (mL) 12/17/24 0701 - 12/17/24 1900 12/17/24 1901 - 12/18/24 0700 12/18/24 0701 - 12/18/24 1900 12/18/24 1901 - 12/19/24 0700 12/19/24 0701 - 12/19/24 1052   Closed/Suction Drain Inferior;Lateral;Left Breast 15 Fr.    25    Closed/Suction Drain Inferior;Lateral;Right Breast Bulb 15 Fr.    25          Lab Results: I have reviewed the following results:CBC/BMP:   .     12/19/24  0442   WBC 8.73   HGB 9.5*   HCT 30.1*      SODIUM 136   K 4.3      CO2 24   BUN 12   CREATININE 0.50*   GLUC 76              VTE Pharmacologic Prophylaxis: VTE covered by:  [Held by provider] heparin (porcine), Subcutaneous, 5,000 Units at 12/17/24 3419

## 2024-12-19 NOTE — PROGRESS NOTES
Progress Note - Hospitalist   Name: Jenny Pereyra 49 y.o. female I MRN: [id number]  Unit/Bed#: [facility] 906-01 I Date of Admission: 12/15/2024   Date of Service: 12/19/2024 I Hospital Day: 4    Assessment & Plan  Breast abscess  49-year-old female with right breast malignancy, s/p bilateral mastectomies, right sentinel lymph node biopsy and immediate bilateral breast reconstruction with tissue expanders one month ago 11/12/24 presented to ED with c/o right breast swelling, pain and foul smelling discharge from the wound.    CT shows: Bilateral mastectomy with bilateral tissue expanders with 2.9 x 1.0 minimally peripheral enhancing fluid collection lateral to the right breast implant compatible with abscess.   Plastic surgery recommended IR drainage, this was done 12/16.  50 cc of cloudy serous fluid was aspirated and drained GRACE drain was left in place. Patient was deemed stable for d/c home with drain however then had dehiscence of her right chest incision following a shower and decision was made to proceed with removal of right expander and elective removal of left so she can be symmetric.   OR 12/18 I&D with removal of tissue expanders   ID consulted  Augmentin and doxy, will monitor overnight as patient had reported PCN allergy  F/U with wound culture    Anxiety  Associated with hospitalization and recent surgery.   Supportive cares  As needed Xanax for in house  Essential tremor  Continue Gabapentin  Stage 1 mild COPD by GOLD classification (HCC)  Stiolto nonformulary, resume  Hypersomnia  PTA meds armodafinil 150 mg daily, nonformulary, substitute with modafinil.  Malignant neoplasm of central portion of right breast in female, estrogen receptor positive (HCC)  Right breast cancer s/p bilateral mastectomies, right sentinel lymph node biopsy and immediate bilateral breast reconstruction with tissue expanders.   Continue Letrozole    VTE Pharmacologic Prophylaxis:   Moderate Risk (Score 3-4) - Pharmacological DVT  Prophylaxis Contraindicated. Sequential Compression Devices Ordered.    Mobility:   Basic Mobility Inpatient Raw Score: 24  JH-HLM Goal: 8: Walk 250 feet or more  JH-HLM Achieved: 8: Walk 250 feet ot more  JH-HLM Goal achieved. Continue to encourage appropriate mobility.    Patient Centered Rounds: I performed bedside rounds with nursing staff today.   Discussions with Specialists or Other Care Team Provider: ID, Plastics, APS    Education and Discussions with Family / Patient: Patient declined call to .     Current Length of Stay: 4 day(s)  Current Patient Status: Inpatient   Certification Statement: The patient will continue to require additional inpatient hospital stay due to Pending culture  Discharge Plan: Anticipate discharge tomorrow to home.    Code Status: Level 1 - Full Code    Subjective   This is a very pleasant 49 y.o. female who was seen today at bedside. Patient has no new complaints. Patient is not in any acute distress.       Objective :  Temp:  [97.4 °F (36.3 °C)-98.5 °F (36.9 °C)] 98.3 °F (36.8 °C)  HR:  [55-86] 62  BP: ()/(54-79) 96/64  Resp:  [8-21] 17  SpO2:  [90 %-96 %] 96 %  O2 Device: Nasal cannula  Nasal Cannula O2 Flow Rate (L/min):  [1 L/min-4 L/min] 1 L/min    Body mass index is 26.53 kg/m².     Input and Output Summary (last 24 hours):     Intake/Output Summary (Last 24 hours) at 12/19/2024 1435  Last data filed at 12/19/2024 0300  Gross per 24 hour   Intake 1300 ml   Output 2260 ml   Net -960 ml       Physical Exam  Vitals and nursing note reviewed.   Constitutional:       General: She is not in acute distress.  Cardiovascular:      Rate and Rhythm: Normal rate.   Pulmonary:      Breath sounds: Normal breath sounds.   Abdominal:      Tenderness: There is no abdominal tenderness.   Musculoskeletal:         General: Tenderness present. No swelling.   Skin:     General: Skin is warm.   Neurological:      Mental Status: She is alert and oriented to person, place, and  time. Mental status is at baseline.   Psychiatric:         Mood and Affect: Mood normal.           Lines/Drains:  Lines/Drains/Airways       Active Status       Name Placement date Placement time Site Days    Closed/Suction Drain Inferior;Lateral;Left Breast 15 Fr. 12/18/24  1722  Breast  less than 1    Closed/Suction Drain Inferior;Lateral;Right Breast Bulb 15 Fr. 12/18/24  1904  Breast  less than 1                            Lab Results: I have reviewed the following results:   Results from last 7 days   Lab Units 12/19/24  0442   WBC Thousand/uL 8.73   HEMOGLOBIN g/dL 9.5*   HEMATOCRIT % 30.1*   PLATELETS Thousands/uL 273   SEGS PCT % 68   LYMPHO PCT % 22   MONO PCT % 7   EOS PCT % 1     Results from last 7 days   Lab Units 12/19/24  0442 12/16/24  0455 12/15/24  1039   SODIUM mmol/L 136   < > 139   POTASSIUM mmol/L 4.3   < > 3.8   CHLORIDE mmol/L 104   < > 108   CO2 mmol/L 24   < > 23   BUN mg/dL 12   < > 14   CREATININE mg/dL 0.50*   < > 0.89   ANION GAP mmol/L 8   < > 8   CALCIUM mg/dL 8.1*   < > 9.5   ALBUMIN g/dL  --   --  4.0   TOTAL BILIRUBIN mg/dL  --   --  0.32   ALK PHOS U/L  --   --  48   ALT U/L  --   --  24   AST U/L  --   --  20   GLUCOSE RANDOM mg/dL 76   < > 111    < > = values in this interval not displayed.     Results from last 7 days   Lab Units 12/16/24  0455   INR  1.01             Results from last 7 days   Lab Units 12/15/24  1039   PROCALCITONIN ng/ml 0.06       Recent Cultures (last 7 days):   Results from last 7 days   Lab Units 12/18/24  1805 12/16/24  1648 12/15/24  1611   BLOOD CULTURE   --   --  No Growth at 72 hrs.  No Growth at 72 hrs.   GRAM STAIN RESULT  No Polys or Bacteria seen 2+ Polys  No bacteria seen  --    BODY FLUID CULTURE, STERILE   --  No growth  --        Imaging Results Review: I reviewed radiology reports from this admission including: CT chest.  Other Study Results Review: No additional pertinent studies reviewed.    Last 24 Hours Medication List:     Current  Facility-Administered Medications:     acetaminophen (TYLENOL) tablet 650 mg, Q6H PRN    ALPRAZolam (XANAX) tablet 0.25 mg, HS PRN    amoxicillin-clavulanate (AUGMENTIN) 875-125 mg per tablet 1 tablet, Q12H URVASHI    [Transfer Hold] ceFAZolin (ANCEF) 1,000 mg, gentamicin (GARAMYCIN) 80 mg in sodium chloride 0.9 % 1,000 mL OR irrigation, Once    doxycycline hyclate (VIBRAMYCIN) capsule 100 mg, Q12H URVASHI    escitalopram (LEXAPRO) tablet 10 mg, Daily With Lunch    fenofibrate (TRICOR) tablet 145 mg, Daily    gabapentin (NEURONTIN) capsule 300 mg, HS    [Held by provider] heparin (porcine) subcutaneous injection 5,000 Units, Q8H URVASHI **AND** [COMPLETED] Platelet count, Once    HYDROmorphone (DILAUDID) injection 0.5 mg, Q3H PRN    ibuprofen (MOTRIN) tablet 400 mg, Q6H PRN    lactated ringers infusion, Continuous, Last Rate: 100 mL/hr (12/19/24 0810)    modafinil (PROVIGIL) tablet 200 mg, Daily    umeclidinium 62.5 mcg/actuation inhaler AEPB 1 puff, Daily **AND** olodaterol HCl (STRIVERDI RESPIMAT) inhaler 2 puff, Daily    ondansetron (ZOFRAN) injection 4 mg, Q6H PRN    oxyCODONE (ROXICODONE) immediate release tablet 10 mg, Q4H PRN    oxyCODONE (ROXICODONE) IR tablet 5 mg, Q4H PRN    polyethylene glycol (MIRALAX) packet 17 g, Daily PRN    saccharomyces boulardii (FLORASTOR) capsule 250 mg, BID    scopolamine (TRANSDERM-SCOP) 1 mg/3 days TD 72 hr patch 1 patch, Q72H    Administrative Statements   Today, Patient Was Seen By: Reinaldo Abbasi MD  I have spent a total time of 40 minutes in caring for this patient on the day of the visit/encounter including Diagnostic results, Risks and benefits of tx options, Patient and family education, and Communicating with other healthcare professionals .    **Please Note: This note may have been constructed using a voice recognition system.**

## 2024-12-19 NOTE — CONSULTS
Consultation - Acute Pain   Name: Jenny Pereyra 49 y.o. female I MRN: [id number]  Unit/Bed#: [facility] 906-01 I Date of Admission: 12/15/2024   Date of Service: 12/19/2024 I Hospital Day: 4       Inpatient consult to Acute Pain Service     Date/Time  12/19/2024 9:28 AM     Performed by  Karine Bolaños, DO   Authorized by  Karthik Brito MD         Physician Requesting Evaluation: Reinaldo Abbasi MD   Reason for Evaluation / Principal Problem: Post-op pain control    Assessment & Plan  Breast abscess  S/p bilateral mastectomy with right sentinel LN biopsy for right breast malignancy with immediate bilateral breast reconstruction and tissue expander placement on 11/12/2024.  Hospitalized on 12/15 for abscess seen on imaging.  Management per primary team and on IV antibiotics.  APS consulted for postop pain management.  Currently on 5/10mg po oxycodone Q4hr PRN and 0.5mg IV dilaudid Q3hr PRN.  Pain is well controlled on current regimen, but pt does not like narcotics.   Will add 400mg po ibuprofen Q6hr PRN. Discussed w/ primary and sx teams.  Recommend alternating between tylenol and ibuprofen.  If pain worsens despite ibuprofen, consider switching to toradol.  Malignant neoplasm of central portion of right breast in female, estrogen receptor positive (HCC)  Diagnosed via right breast biopsy on 10/8/2024.  On letrozole.  Underwent surgical intervention with reconstruction complicated by breast abscess.  I have discussed the above management plan in detail with the primary service.       APS will sign off at this time. Thank you for the consult. All opioids and other analgesics to be written at discretion of primary team. Please contact Acute Pain Service - via SecureChat from [phone] with additional questions or concerns. See SecureChat or Opzi for additional contacts and after hours information.    History of Present Illness    HPI: Jenny Pereyra is a 49 y.o. year old female with PMHx including right breast cancer  initially diagnosed 10/2024.  She has since underwent bilateral mastectomy, right sentinel lymph node biopsy, and immediate bilateral breast reconstruction with tissue expanders all performed on 11/12/2024.  She presented to [facility] 2/2 foul smelling drainage and breast pain 2/2 breast abscess requiring IV antibiotics and surgical drainage.  Initial plan was for discharge on 12/17, but was complicated by wound dehiscence for which she returned to the OR on 12/18 for removal of tissue expanders.  Acute pain service was consulted for postop pain control.    She was laying comfortably in bed and on her phone. Her pain is well controlled but she is not one to want to use pain meds. We discussed that as each day passes, the pain should continue to improve. She has no complaints other than that she wants to get home.     Current pain location(s): Pain Score: 7  Pain Location/Orientation: Location: Chest  Pain Scale: Pain Assessment Tool: 0-10  Current Analgesic regimen: She is not on any scheduled pain medication.  Has 5/10 mg p.o. oxycodone every 4 hours as needed for moderate/severe pain and 0.5 mg IV Dilaudid every 3 hours as needed for breakthrough pain.    Pain History: Utilized 60 OME over the last 24 hours.  Pain Management Physician: Is not on pain medication outpatient.  Should any medication be continued at discharge regarding opioids, this will be determined and is at the discretion of the primary team at discharge.    I have reviewed the patient's controlled substance dispensing history in the Prescription Drug Monitoring Program in compliance with the [employer] regulations before prescribing any controlled substances.     Review of Systems   Constitutional:  Negative for chills, fatigue and fever.   Respiratory:  Negative for cough and shortness of breath.    Gastrointestinal:  Negative for abdominal pain, diarrhea, nausea and vomiting.   Genitourinary:  Negative for dysuria.   Musculoskeletal:         Soreness at  surgical site   Neurological:  Negative for light-headedness and headaches.     I have reviewed the patient's PMH, PSH, Social History, Family History, Meds, and Allergies    Objective :  Temp:  [97.4 °F (36.3 °C)-98.5 °F (36.9 °C)] 98.3 °F (36.8 °C)  HR:  [55-86] 62  BP: ()/(54-79) 96/64  Resp:  [8-21] 17  SpO2:  [90 %-96 %] 96 %  O2 Device: Nasal cannula  Nasal Cannula O2 Flow Rate (L/min):  [1 L/min-4 L/min] 1 L/min    Physical Exam  Vitals reviewed.   Constitutional:       General: She is not in acute distress.     Appearance: Normal appearance. She is well-developed. She is not ill-appearing.   HENT:      Head: Normocephalic and atraumatic.   Eyes:      General: No scleral icterus.     Conjunctiva/sclera: Conjunctivae normal.   Cardiovascular:      Rate and Rhythm: Normal rate and regular rhythm.      Pulses: Normal pulses.      Heart sounds: Normal heart sounds.   Pulmonary:      Effort: Pulmonary effort is normal. No respiratory distress.      Breath sounds: Normal breath sounds. No wheezing or rales.   Chest:      Chest wall: No tenderness.   Abdominal:      General: Bowel sounds are normal. There is no distension.      Palpations: Abdomen is soft.      Tenderness: There is no abdominal tenderness.   Musculoskeletal:         General: No tenderness.   Skin:     General: Skin is warm.   Neurological:      Mental Status: She is alert.   Psychiatric:         Mood and Affect: Mood normal.         Behavior: Behavior normal.         Thought Content: Thought content normal.          Lab Results: I have reviewed the following results:  Estimated Creatinine Clearance: 123.8 mL/min (A) (by C-G formula based on SCr of 0.5 mg/dL (L)).  Lab Results   Component Value Date    WBC 8.73 12/19/2024    HGB 9.5 (L) 12/19/2024    HCT 30.1 (L) 12/19/2024     12/19/2024         Component Value Date/Time    K 4.3 12/19/2024 0442    K 3.8 01/15/2022 0833     12/19/2024 0442     (H) 01/15/2022 0833    CO2 24  12/19/2024 0442    CO2 23 01/15/2022 0833    BUN 12 12/19/2024 0442    BUN 18 01/15/2022 0833    CREATININE 0.50 (L) 12/19/2024 0442    CREATININE 0.87 01/15/2022 0833         Component Value Date/Time    CALCIUM 8.1 (L) 12/19/2024 0442    CALCIUM 9.9 01/15/2022 0833    ALKPHOS 48 12/15/2024 1039    ALKPHOS 77 01/15/2022 0833    AST 20 12/15/2024 1039    AST 16 01/15/2022 0833    ALT 24 12/15/2024 1039    ALT 27 01/15/2022 0833    TP 6.6 12/15/2024 1039    TP 6.4 01/15/2022 0833    ALB 4.0 12/15/2024 1039    ALB 3.6 01/15/2022 0833       Imaging Results Review: No pertinent imaging studies reviewed.  Other Study Results Review: No additional pertinent studies reviewed.

## 2024-12-19 NOTE — PLAN OF CARE
Problem: PAIN - ADULT  Goal: Verbalizes/displays adequate comfort level or baseline comfort level  Description: Interventions:  - Encourage patient to monitor pain and request assistance  - Assess pain using appropriate pain scale  - Administer analgesics based on type and severity of pain and evaluate response  - Implement non-pharmacological measures as appropriate and evaluate response  - Consider cultural and social influences on pain and pain management  - Notify physician/advanced practitioner if interventions unsuccessful or patient reports new pain  Outcome: Progressing     Problem: INFECTION - ADULT  Goal: Absence or prevention of progression during hospitalization  Description: INTERVENTIONS:  - Assess and monitor for signs and symptoms of infection  - Monitor lab/diagnostic results  - Monitor all insertion sites, i.e. indwelling lines, tubes, and drains  - Monitor endotracheal if appropriate and nasal secretions for changes in amount and color  - [place] appropriate cooling/warming therapies per order  - Administer medications as ordered  - Instruct and encourage patient and family to use good hand hygiene technique  - Identify and instruct in appropriate isolation precautions for identified infection/condition  Outcome: Progressing  Goal: Absence of fever/infection during neutropenic period  Description: INTERVENTIONS:  - Monitor WBC    Outcome: Progressing     Problem: SAFETY ADULT  Goal: Patient will remain free of falls  Description: INTERVENTIONS:  - Educate patient/family on patient safety including physical limitations  - Instruct patient to call for assistance with activity   - Consult OT/PT to assist with strengthening/mobility   - Keep Call bell within reach  - Keep bed low and locked with side rails adjusted as appropriate  - Keep care items and personal belongings within reach  - Initiate and maintain comfort rounds  - Make Fall Risk Sign visible to staff  - Apply yellow socks and bracelet  for high fall risk patients  - Consider moving patient to room near nurses station  Outcome: Progressing  Goal: Maintain or return to baseline ADL function  Description: INTERVENTIONS:  -  Assess patient's ability to carry out ADLs; assess patient's baseline for ADL function and identify physical deficits which impact ability to perform ADLs (bathing, care of mouth/teeth, toileting, grooming, dressing, etc.)  - Assess/evaluate cause of self-care deficits   - Assess range of motion  - Assess patient's mobility; develop plan if impaired  - Assess patient's need for assistive devices and provide as appropriate  - Encourage maximum independence but intervene and supervise when necessary  - Involve family in performance of ADLs  - Assess for home care needs following discharge   - Consider OT consult to assist with ADL evaluation and planning for discharge  - Provide patient education as appropriate  Outcome: Progressing  Goal: Maintains/Returns to pre admission functional level  Description: INTERVENTIONS:  - Perform AM-PAC 6 Click Basic Mobility/ Daily Activity assessment daily.  - Set and communicate daily mobility goal to care team and patient/family/caregiver.   - Collaborate with rehabilitation services on mobility goals if consulted  - Out of bed for toileting  - Record patient progress and toleration of activity level   Outcome: Progressing     Problem: DISCHARGE PLANNING  Goal: Discharge to home or other facility with appropriate resources  Description: INTERVENTIONS:  - Identify barriers to discharge w/patient and caregiver  - Arrange for needed discharge resources and transportation as appropriate  - Identify discharge learning needs (meds, wound care, etc.)  - Arrange for interpretive services to assist at discharge as needed  - Refer to Case Management Department for coordinating discharge planning if the patient needs post-hospital services based on physician/advanced practitioner order or complex needs  related to functional status, cognitive ability, or social support system  Outcome: Progressing     Problem: Knowledge Deficit  Goal: Patient/family/caregiver demonstrates understanding of disease process, treatment plan, medications, and discharge instructions  Description: Complete learning assessment and assess knowledge base.  Interventions:  - Provide teaching at level of understanding  - Provide teaching via preferred learning methods  Outcome: Progressing

## 2024-12-19 NOTE — ASSESSMENT & PLAN NOTE
Jenny Pereyra is a 49-year-old female who is 1 month status post bilateral mastectomy, right sentinel lymph node biopsy, and immediate bilateral breast reconstruction with tissue expanders.  Postoperative course complicated by infection right reconstructed breast with partial wound dehiscence and exposure of tissue expander.  Patient is now POD#1 s/p removal of bilateral tissue expanders with washout and drain placement.  Patient is doing well overall. Experiencing moderate postoperative pain.  Systolic blood pressures in the high 90s - without clinical symptoms. Hgb 9.5 this AM from 11.9 yesterday.  No signs of acute bleeding, likely from OR blood loss and fluid administration.  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      -Regular diet  -Drains to bulb suction  -Maintain binder on chest  -Appreciate ID recommendations   - Augmentin/Doxy PO x 10 days from OR  -Tylenol and oxycodone for pain, IV dilaudid for breakthrough pain, appreciate pain recommendations  -Monitor vitals  -Will monitor patient overnight given history of penicillin allergy. Plan for d/c in AM  -DVT ppx

## 2024-12-19 NOTE — ASSESSMENT & PLAN NOTE
49-year-old female with right breast malignancy, s/p bilateral mastectomies, right sentinel lymph node biopsy and immediate bilateral breast reconstruction with tissue expanders one month ago 11/12/24 presented to ED with c/o right breast swelling, pain and foul smelling discharge from the wound.    CT shows: Bilateral mastectomy with bilateral tissue expanders with 2.9 x 1.0 minimally peripheral enhancing fluid collection lateral to the right breast implant compatible with abscess.   Plastic surgery recommended IR drainage, this was done 12/16.  50 cc of cloudy serous fluid was aspirated and drained GRACE drain was left in place. Patient was deemed stable for d/c home with drain however then had dehiscence of her right chest incision following a shower and decision was made to proceed with removal of right expander and elective removal of left so she can be symmetric.   OR 12/18 I&D with removal of tissue expanders   ID consulted  Augmentin and doxy, will monitor overnight as patient had reported PCN allergy  F/U with wound culture

## 2024-12-19 NOTE — INTERIM OP NOTE
removal bilateral tissue expanders, CLOSURE WITH DRAIN PLACEMENT, DEBRIDEMENT WOUND (WASH OUT)  Postoperative Note  PATIENT NAME: Jenny Pereyra  : 1975  MRN: [id number]  BE OR ROOM 08    Surgery Date: 2024    Preop Diagnosis:  Breast abscess [N61.1]    Post-Op Diagnosis Codes:     * Breast abscess [N61.1]    Procedure(s) (LRB):  removal bilateral tissue expanders (Bilateral)  CLOSURE WITH DRAIN PLACEMENT (Bilateral)  DEBRIDEMENT WOUND (WASH OUT) (Bilateral)    Surgeons and Role:     * Karthik Brito MD - Primary    Specimens:  ID Type Source Tests Collected by Time Destination   1 : SKIN LEFT BREAST. LONG LATERAL, SHORT SUPERIOR, 2 TAILS MEDIAL Tissue Breast, Left TISSUE EXAM Karthik Brito MD 2024 1655    2 : ADDITIONAL SUPERIOR MARGIN SKIN LEFT BREAST Tissue Breast, Left TISSUE EXAM Karthik Brito MD 2024 1658    3 : right breast wound tissue Tissue Breast, Right ANAEROBIC CULTURE AND GRAM STAIN, CULTURE, TISSUE AND GRAM STAIN, TISSUE EXAM Karthik Brito MD 2024 1805    4 : SKIN RIGHT BREAST. LONG LATERAL, SHORT SUPERIOR, 2 TAILS MEDIAL Tissue Breast, Right TISSUE EXAM Karthik Brito MD 2024 1830        Estimated Blood Loss:   Minimal    Anesthesia Type:   General     Findings:   Murky straw colored drainage in right breast drain.  1cm opening over lateral aspect of right breast incision.    Complications:   {Post Op Complications:72001}      SIGNATURE: Karthik Brito MD   DATE: 2024   TIME: 8:00 PM

## 2024-12-19 NOTE — ANESTHESIA POSTPROCEDURE EVALUATION
Post-Op Assessment Note    CV Status:  Stable  Pain Score: 5    Pain management: satisfactory to patient       Mental Status:  Awake and alert   Hydration Status:  Stable   PONV Controlled:  None   Airway Patency:  Patent     Post Op Vitals Reviewed: Yes    No anethesia notable event occurred.    Staff: CRNA           Last Filed PACU Vitals:  Vitals Value Taken Time   Temp 97.4 °F (36.3 °C) 12/18/24 1925   Pulse 90 12/18/24 1927   /79 12/18/24 1925   Resp 38 12/18/24 1927   SpO2 96 % 12/18/24 1927   Vitals shown include unfiled device data.    Modified Mateo:  No data recorded

## 2024-12-19 NOTE — CONSULTS
Consultation - Infectious Disease   Name: Jenny Pereyra 49 y.o. female I MRN: [id number]  [facility] I Date of Admission: 12/15/2024   Date of Service: 12/19/2024 I Hospital Day: 4       Inpatient consult to Infectious Diseases     Date/Time  12/19/2024 9:25 AM     Performed by  Garth Bass MD   Authorized by  Reinaldo Abbasi MD           Physician Requesting Evaluation: Reinaldo Abbasi MD   Reason for Evaluation / Principal Problem: Breast abscess    Assessment & Plan  Breast abscess  In setting of b/l mastectomy with spacer implants 1 month ago, found to have a right sided fluid collection adjacent to the spacer implant. At this time, has had drainage with IR (12/16) and spacer removal with plastic surgery (12/17). She has been improving on IV doxycycline which was started on admission.     Blood cultures remain negative at 72 hours  IR cultures grew Peptoniphilus and Finegoldia, which are common skin anaerobic carley  OR cultures are pending    Plan  Discussed with patient regarding her history of penicillin allergy which she has reportedly taken augmentin in the past, given that she had a mild reaction as a child it is reasonable to administer Augmentin for staph/strep/anaerobic coverage and monitor closely for adverse reactions  Will start po Augmentin 875-125 mg BID for 10 days through 12/28  Above cultures may be impacted by home use of Bactrim, and so will continue doxycycline for MRSA coverage but switch to oral, 100 mg BID for 10 days through 12/28  If patient is discharged, she will need outpatient follow up to monitor finalization of culture data  Recommendations discussed with primary team    Malignant neoplasm of central portion of right breast in female, estrogen receptor positive (HCC)  Outpatient follow up with heme/onc planned      Antibiotics:  Augmentin    History of Present Illness   Jenny Pereyra is a 49 y.o. year old female with hx of recent b/l mastectomy 1 month ago with  spacer implantation who presented to [facility] on 12/15 for fevers, generalized malaise, and redness of the right chest wall/breast surgical site. Pt stated that on 12/14 she had called her surgeon for advice as she had noticed the redness and was given po bactrim which she completed 3 doses. However, on the day of presentation she noticed purulent and foul smelling fluid leaking from the right breast which prompted her to visit the ED. On admission, she was found to have a fluid collection 3cm lateral to the right breast spacer implant. IR was consulted and she underwent drainage which revealed purulent fluid. She improved with antibiotics and was planned on being discharged however there was found to be wound dehiscence on the right breast and as such she proceeded to the OR with plastic surgery for removal of the spacer implant.     Patient has a reported penicillin allergy. She states that she developed a rash as a child after receiving penicillin. Denies anaphylactic reaction. She has since had courses of augmentin without issue. Unable to verify this in the chart.       A complete review of systems is negative other than that noted in the HPI.    I have reviewed the patient's PMH, PSH, Social History, Family History, Meds, and Allergies    Objective :  Temp:  [97.4 °F (36.3 °C)-98.5 °F (36.9 °C)] 98.3 °F (36.8 °C)  HR:  [55-86] 62  BP: ()/(54-79) 96/64  Resp:  [8-21] 17  SpO2:  [90 %-96 %] 96 %  O2 Device: Nasal cannula  Nasal Cannula O2 Flow Rate (L/min):  [1 L/min-4 L/min] 1 L/min    General:  No acute distress  Psychiatric:  Awake and alert  Pulmonary:  Normal respiratory excursion without accessory muscle use  Abdomen:  Soft, nontender  Extremities:  No edema  Skin:  b/l mastectomy sites without erythema or drainage.       Lab Results: I have reviewed the following results:  Results from last 7 days   Lab Units 12/19/24  0442 12/18/24  0439 12/16/24  0455   WBC Thousand/uL 8.73 6.53 9.73   HEMOGLOBIN g/dL  9.5* 11.9 11.3*   PLATELETS Thousands/uL 273 305 242     Results from last 7 days   Lab Units 12/19/24  0442 12/18/24  0439 12/16/24  0455 12/15/24  1039   SODIUM mmol/L 136 140 138 139   POTASSIUM mmol/L 4.3 3.9 4.0 3.8   CHLORIDE mmol/L 104 107 106 108   CO2 mmol/L 24 24 25 23   BUN mg/dL 12 16 10 14   CREATININE mg/dL 0.50* 0.67 0.75 0.89   EGFR ml/min/1.73sq m 115 105 95 77   CALCIUM mg/dL 8.1* 9.5 9.3 9.5   AST U/L  --   --   --  20   ALT U/L  --   --   --  24   ALK PHOS U/L  --   --   --  48   ALBUMIN g/dL  --   --   --  4.0     Results from last 7 days   Lab Units 12/18/24  1805 12/16/24  1648 12/15/24  1611   BLOOD CULTURE   --   --  No Growth at 72 hrs.  No Growth at 72 hrs.   GRAM STAIN RESULT  No Polys or Bacteria seen 2+ Polys  No bacteria seen  --    BODY FLUID CULTURE, STERILE   --  No growth  --      Results from last 7 days   Lab Units 12/15/24  1039   PROCALCITONIN ng/ml 0.06

## 2024-12-19 NOTE — PLAN OF CARE
Problem: PAIN - ADULT  Goal: Verbalizes/displays adequate comfort level or baseline comfort level  Description: Interventions:  - Encourage patient to monitor pain and request assistance  - Assess pain using appropriate pain scale  - Administer analgesics based on type and severity of pain and evaluate response  - Implement non-pharmacological measures as appropriate and evaluate response  - Consider cultural and social influences on pain and pain management  - Notify physician/advanced practitioner if interventions unsuccessful or patient reports new pain  Outcome: Progressing     Problem: INFECTION - ADULT  Goal: Absence or prevention of progression during hospitalization  Description: INTERVENTIONS:  - Assess and monitor for signs and symptoms of infection  - Monitor lab/diagnostic results  - Monitor all insertion sites, i.e. indwelling lines, tubes, and drains  - Monitor endotracheal if appropriate and nasal secretions for changes in amount and color  - [place] appropriate cooling/warming therapies per order  - Administer medications as ordered  - Instruct and encourage patient and family to use good hand hygiene technique  - Identify and instruct in appropriate isolation precautions for identified infection/condition  Outcome: Progressing  Goal: Absence of fever/infection during neutropenic period  Description: INTERVENTIONS:  - Monitor WBC    Outcome: Progressing     Problem: SAFETY ADULT  Goal: Patient will remain free of falls  Description: INTERVENTIONS:  - Educate patient/family on patient safety including physical limitations  - Instruct patient to call for assistance with activity   - Consult OT/PT to assist with strengthening/mobility   - Keep Call bell within reach  - Keep bed low and locked with side rails adjusted as appropriate  - Keep care items and personal belongings within reach  - Initiate and maintain comfort rounds  - Make Fall Risk Sign visible to staff  - Offer Toileting every  Hours,  in advance of need  - Initiate/Maintain alarm  - Obtain necessary fall risk management equipment:   - Apply yellow socks and bracelet for high fall risk patients  - Consider moving patient to room near nurses station  Outcome: Progressing  Goal: Maintain or return to baseline ADL function  Description: INTERVENTIONS:  -  Assess patient's ability to carry out ADLs; assess patient's baseline for ADL function and identify physical deficits which impact ability to perform ADLs (bathing, care of mouth/teeth, toileting, grooming, dressing, etc.)  - Assess/evaluate cause of self-care deficits   - Assess range of motion  - Assess patient's mobility; develop plan if impaired  - Assess patient's need for assistive devices and provide as appropriate  - Encourage maximum independence but intervene and supervise when necessary  - Involve family in performance of ADLs  - Assess for home care needs following discharge   - Consider OT consult to assist with ADL evaluation and planning for discharge  - Provide patient education as appropriate  Outcome: Progressing  Goal: Maintains/Returns to pre admission functional level  Description: INTERVENTIONS:  - Perform AM-PAC 6 Click Basic Mobility/ Daily Activity assessment daily.  - Set and communicate daily mobility goal to care team and patient/family/caregiver.   - Collaborate with rehabilitation services on mobility goals if consulted  - Perform Range of Motion  times a day.  - Reposition patient every  hours.  - Dangle patient  times a day  - Stand patient  times a day  - Ambulate patient  times a day  - Out of bed to chair  times a day   - Out of bed for meals  times a day  - Out of bed for toileting  - Record patient progress and toleration of activity level   Outcome: Progressing     Problem: DISCHARGE PLANNING  Goal: Discharge to home or other facility with appropriate resources  Description: INTERVENTIONS:  - Identify barriers to discharge w/patient and caregiver  - Arrange for  needed discharge resources and transportation as appropriate  - Identify discharge learning needs (meds, wound care, etc.)  - Arrange for interpretive services to assist at discharge as needed  - Refer to Case Management Department for coordinating discharge planning if the patient needs post-hospital services based on physician/advanced practitioner order or complex needs related to functional status, cognitive ability, or social support system  Outcome: Progressing     Problem: Knowledge Deficit  Goal: Patient/family/caregiver demonstrates understanding of disease process, treatment plan, medications, and discharge instructions  Description: Complete learning assessment and assess knowledge base.  Interventions:  - Provide teaching at level of understanding  - Provide teaching via preferred learning methods  Outcome: Progressing

## 2024-12-19 NOTE — ASSESSMENT & PLAN NOTE
In setting of b/l mastectomy with spacer implants 1 month ago, found to have a right sided fluid collection adjacent to the spacer implant. At this time, has had drainage with IR (12/16) and spacer removal with plastic surgery (12/17). She has been improving on IV doxycycline which was started on admission.     Blood cultures remain negative at 72 hours  IR cultures grew Peptoniphilus and Finegoldia, which are common skin anaerobic carley  OR cultures are pending    Plan  Discussed with patient regarding her history of penicillin allergy which she has reportedly taken augmentin in the past, given that she had a mild reaction as a child it is reasonable to administer Augmentin for staph/strep/anaerobic coverage and monitor closely for adverse reactions  Will start po Augmentin 875-125 mg BID for 10 days through 12/28  Above cultures may be impacted by home use of Bactrim, and so will continue doxycycline for MRSA coverage but switch to oral, 100 mg BID for 10 days through 12/28  If patient is discharged, she will need outpatient follow up to monitor finalization of culture data  Recommendations discussed with primary team

## 2024-12-20 VITALS
HEART RATE: 74 BPM | OXYGEN SATURATION: 97 % | HEIGHT: 62 IN | RESPIRATION RATE: 16 BRPM | TEMPERATURE: 98.9 F | BODY MASS INDEX: 26.69 KG/M2 | WEIGHT: 145.06 LBS | DIASTOLIC BLOOD PRESSURE: 65 MMHG | SYSTOLIC BLOOD PRESSURE: 108 MMHG

## 2024-12-20 PROBLEM — Z88.0 HISTORY OF PENICILLIN ALLERGY: Status: ACTIVE | Noted: 2024-12-20

## 2024-12-20 LAB
ALBUMIN SERPL BCG-MCNC: 3.5 G/DL (ref 3.5–5)
ALP SERPL-CCNC: 42 U/L (ref 34–104)
ALT SERPL W P-5'-P-CCNC: 22 U/L (ref 7–52)
ANION GAP SERPL CALCULATED.3IONS-SCNC: 9 MMOL/L (ref 4–13)
AST SERPL W P-5'-P-CCNC: 24 U/L (ref 13–39)
BACTERIA BLD CULT: NORMAL
BACTERIA BLD CULT: NORMAL
BACTERIA SPEC ANAEROBE CULT: ABNORMAL
BACTERIA SPEC ANAEROBE CULT: ABNORMAL
BASOPHILS # BLD AUTO: 0.05 THOUSANDS/ΜL (ref 0–0.1)
BASOPHILS NFR BLD AUTO: 1 % (ref 0–1)
BILIRUB SERPL-MCNC: 0.25 MG/DL (ref 0.2–1)
BUN SERPL-MCNC: 19 MG/DL (ref 5–25)
CALCIUM SERPL-MCNC: 9 MG/DL (ref 8.4–10.2)
CHLORIDE SERPL-SCNC: 104 MMOL/L (ref 96–108)
CO2 SERPL-SCNC: 27 MMOL/L (ref 21–32)
CREAT SERPL-MCNC: 0.75 MG/DL (ref 0.6–1.3)
EOSINOPHIL # BLD AUTO: 0.25 THOUSAND/ΜL (ref 0–0.61)
EOSINOPHIL NFR BLD AUTO: 3 % (ref 0–6)
ERYTHROCYTE [DISTWIDTH] IN BLOOD BY AUTOMATED COUNT: 13 % (ref 11.6–15.1)
GFR SERPL CREATININE-BSD FRML MDRD: 95 ML/MIN/1.73SQ M
GLUCOSE SERPL-MCNC: 113 MG/DL (ref 65–140)
HCT VFR BLD AUTO: 32.7 % (ref 34.8–46.1)
HGB BLD-MCNC: 10.5 G/DL (ref 11.5–15.4)
IMM GRANULOCYTES # BLD AUTO: 0.03 THOUSAND/UL (ref 0–0.2)
IMM GRANULOCYTES NFR BLD AUTO: 0 % (ref 0–2)
LYMPHOCYTES # BLD AUTO: 2.17 THOUSANDS/ΜL (ref 0.6–4.47)
LYMPHOCYTES NFR BLD AUTO: 28 % (ref 14–44)
MAGNESIUM SERPL-MCNC: 1.7 MG/DL (ref 1.9–2.7)
MCH RBC QN AUTO: 29.1 PG (ref 26.8–34.3)
MCHC RBC AUTO-ENTMCNC: 32.1 G/DL (ref 31.4–37.4)
MCV RBC AUTO: 91 FL (ref 82–98)
MONOCYTES # BLD AUTO: 0.56 THOUSAND/ΜL (ref 0.17–1.22)
MONOCYTES NFR BLD AUTO: 7 % (ref 4–12)
NEUTROPHILS # BLD AUTO: 4.84 THOUSANDS/ΜL (ref 1.85–7.62)
NEUTS SEG NFR BLD AUTO: 61 % (ref 43–75)
NRBC BLD AUTO-RTO: 0 /100 WBCS
PLATELET # BLD AUTO: 290 THOUSANDS/UL (ref 149–390)
PMV BLD AUTO: 10.1 FL (ref 8.9–12.7)
POTASSIUM SERPL-SCNC: 3.8 MMOL/L (ref 3.5–5.3)
PROT SERPL-MCNC: 5.3 G/DL (ref 6.4–8.4)
RBC # BLD AUTO: 3.61 MILLION/UL (ref 3.81–5.12)
SODIUM SERPL-SCNC: 140 MMOL/L (ref 135–147)
WBC # BLD AUTO: 7.9 THOUSAND/UL (ref 4.31–10.16)

## 2024-12-20 PROCEDURE — 83735 ASSAY OF MAGNESIUM: CPT | Performed by: FAMILY MEDICINE

## 2024-12-20 PROCEDURE — 99232 SBSQ HOSP IP/OBS MODERATE 35: CPT | Performed by: INTERNAL MEDICINE

## 2024-12-20 PROCEDURE — 99024 POSTOP FOLLOW-UP VISIT: CPT | Performed by: PLASTIC SURGERY

## 2024-12-20 PROCEDURE — 99239 HOSP IP/OBS DSCHRG MGMT >30: CPT | Performed by: FAMILY MEDICINE

## 2024-12-20 PROCEDURE — 85025 COMPLETE CBC W/AUTO DIFF WBC: CPT | Performed by: FAMILY MEDICINE

## 2024-12-20 PROCEDURE — 80053 COMPREHEN METABOLIC PANEL: CPT | Performed by: FAMILY MEDICINE

## 2024-12-20 RX ORDER — OXYCODONE HYDROCHLORIDE 5 MG/1
5 TABLET ORAL EVERY 4 HOURS PRN
Qty: 20 TABLET | Refills: 0 | Status: SHIPPED | OUTPATIENT
Start: 2024-12-20 | End: 2024-12-24

## 2024-12-20 RX ORDER — DOXYCYCLINE 100 MG/1
100 CAPSULE ORAL EVERY 12 HOURS SCHEDULED
Qty: 16 CAPSULE | Refills: 0 | Status: SHIPPED | OUTPATIENT
Start: 2024-12-20 | End: 2024-12-21

## 2024-12-20 RX ADMIN — Medication 250 MG: at 07:57

## 2024-12-20 RX ADMIN — AMOXICILLIN AND CLAVULANATE POTASSIUM 1 TABLET: 875; 125 TABLET, COATED ORAL at 07:57

## 2024-12-20 RX ADMIN — OLODATEROL RESPIMAT INHALATION SPRAY 2 PUFF: 2.5 SPRAY, METERED RESPIRATORY (INHALATION) at 07:57

## 2024-12-20 RX ADMIN — FENOFIBRATE 145 MG: 145 TABLET ORAL at 07:57

## 2024-12-20 RX ADMIN — MODAFINIL 200 MG: 100 TABLET ORAL at 07:57

## 2024-12-20 RX ADMIN — UMECLIDINIUM 1 PUFF: 62.5 AEROSOL, POWDER ORAL at 07:57

## 2024-12-20 RX ADMIN — DOXYCYCLINE 100 MG: 100 CAPSULE ORAL at 07:57

## 2024-12-20 NOTE — PROGRESS NOTES
Progress Note - Infectious Disease   Name: Jenny Pereyra 49 y.o. female I MRN: [id number]  Unit/Bed#: [facility] 906-01 I Date of Admission: 12/15/2024   Date of Service: 12/20/2024 I Hospital Day: 5     Assessment & Plan  Breast abscess  In setting of b/l mastectomy with spacer implants 1 month ago, found to have a right sided fluid collection adjacent to the spacer implant. Status post drainage with IR (12/16) and spacer removal with plastic surgery (12/18). IR culture grew Peptoniphilus harei and Finegoldia magna, OR cultures in process. Blood cultures negative and patient remains systemically well.  -Continue PO Augmentin 875 mg BID for Strep and anaerobe coverage, PO Doxycycline 100 mg BID for Staph coverage  -Plan for 10 days of antibiotic from tissue expander removal through 12/28  -Follow up OR cultures to further narrow antibiotics as able  -Close Plastic surgery follow up  History of penicillin allergy  Patient reports a rash as a child.  However she reports tolerating Augmentin as an adult without issue.  She has tolerated Augmentin this admission thus she is no longer allergic to penicillin and we can remove this allergy from her list.  Malignant neoplasm of central portion of right breast in female, estrogen receptor positive (HCC)  Outpatient follow up with heme/onc planned  Stage 1 mild COPD by GOLD classification (HCC)  Not in current exacerbation.    I have discussed the above management plan in detail with the primary service.   They agree with plan to continue oral Augmentin and doxycycline, follow-up lower cultures.  Stable for discharge from ID perspective.    Antibiotics:  Augmentin: 2  Doxycycline    Subjective   Patient has no fever, chills, sweats; no nausea, vomiting. Tolerated Augmentin without issues.    Objective :  Temp:  [98.9 °F (37.2 °C)] 98.9 °F (37.2 °C)  HR:  [69-70] 70  BP: (86-95)/(51-62) 86/51  Resp:  [16] 16  SpO2:  [93 %-94 %] 93 %    General:  No acute distress  Psychiatric:   Awake and alert  Pulmonary:  Normal respiratory excursion without accessory muscle use  Abdomen:  Soft, nontender  Extremities:  No edema  Skin:  No rashes        Lab Results: I have reviewed the following results:  Results from last 7 days   Lab Units 12/20/24 0446 12/19/24 0442 12/18/24  0439   WBC Thousand/uL 7.90 8.73 6.53   HEMOGLOBIN g/dL 10.5* 9.5* 11.9   PLATELETS Thousands/uL 290 273 305     Results from last 7 days   Lab Units 12/20/24  0446 12/19/24  0442 12/18/24  0439 12/16/24  0455 12/15/24  1039   SODIUM mmol/L 140 136 140   < > 139   POTASSIUM mmol/L 3.8 4.3 3.9   < > 3.8   CHLORIDE mmol/L 104 104 107   < > 108   CO2 mmol/L 27 24 24   < > 23   BUN mg/dL 19 12 16   < > 14   CREATININE mg/dL 0.75 0.50* 0.67   < > 0.89   EGFR ml/min/1.73sq m 95 115 105   < > 77   CALCIUM mg/dL 9.0 8.1* 9.5   < > 9.5   AST U/L 24  --   --   --  20   ALT U/L 22  --   --   --  24   ALK PHOS U/L 42  --   --   --  48   ALBUMIN g/dL 3.5  --   --   --  4.0    < > = values in this interval not displayed.     Results from last 7 days   Lab Units 12/18/24  1805 12/16/24  1648 12/15/24  1611   BLOOD CULTURE   --   --  No Growth After 4 Days.  No Growth After 4 Days.   GRAM STAIN RESULT  No Polys or Bacteria seen 2+ Polys  No bacteria seen  --    BODY FLUID CULTURE, STERILE   --  No growth  --      Results from last 7 days   Lab Units 12/15/24  1039   PROCALCITONIN ng/ml 0.06                 Imaging Results Review: No pertinent imaging studies reviewed.  Other Study Results Review: No additional pertinent studies reviewed.

## 2024-12-20 NOTE — ASSESSMENT & PLAN NOTE
In setting of b/l mastectomy with spacer implants 1 month ago, found to have a right sided fluid collection adjacent to the spacer implant. Status post drainage with IR (12/16) and spacer removal with plastic surgery (12/18). IR culture grew Peptoniphilus harei and Finegoldia magna, OR cultures in process. Blood cultures negative and patient remains systemically well.  -Continue PO Augmentin 875 mg BID for Strep and anaerobe coverage, PO Doxycycline 100 mg BID for Staph coverage  -Plan for 10 days of antibiotic from tissue expander removal through 12/28  -Follow up OR cultures to further narrow antibiotics as able  -Close Plastic surgery follow up

## 2024-12-20 NOTE — ASSESSMENT & PLAN NOTE
Jenny Pereyra is a 49-year-old female who is 1 month status post bilateral mastectomy, right sentinel lymph node biopsy, and immediate bilateral breast reconstruction with tissue expanders.  Postoperative course complicated by infection right reconstructed breast with partial wound dehiscence and exposure of tissue expander.  Patient is now POD#2 s/p removal of bilateral tissue expanders with washout and drain placement.  Patient is doing well overall. Pain improved with addition of NSAIDs.  Systolic blood pressures in the high 90s - without clinical symptoms. Patient feels well. Hgb 10.5 this AM.  No signs of acute bleeding.  R chest erythema and edema improving. Drains benign. 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 Awaiting OR cultures.  Patient tolerated Augmentin without allergic reaction. Afebrile, WBC normal.      -Regular diet  -Drains to bulb suction  -Maintain chest binder  -Appreciate ID recommendations   - Augmentin/Doxy PO x 10 days from OR  -Will f/u OR cx and narrow abx if appropriate  -Tylenol, ibuprofen, oxycodone for pain, appreciate pain recommendations  -Monitor vitals  -OK for d/c to home with drains and PO abx  -F/u next week with plastic surgery, call with any concerns

## 2024-12-20 NOTE — ASSESSMENT & PLAN NOTE
Patient reports a rash as a child.  However she reports tolerating Augmentin as an adult without issue.  She has tolerated Augmentin this admission thus she is no longer allergic to penicillin and we can remove this allergy from her list.

## 2024-12-20 NOTE — ASSESSMENT & PLAN NOTE
49-year-old female with right breast malignancy, s/p bilateral mastectomies, right sentinel lymph node biopsy and immediate bilateral breast reconstruction with tissue expanders one month ago 11/12/24 presented to ED with c/o right breast swelling, pain and foul smelling discharge from the wound.    CT shows: Bilateral mastectomy with bilateral tissue expanders with 2.9 x 1.0 minimally peripheral enhancing fluid collection lateral to the right breast implant compatible with abscess.   Plastic surgery recommended IR drainage, this was done 12/16.  50 cc of cloudy serous fluid was aspirated and drained GRACE drain was left in place. Patient was deemed stable for d/c home with drain however then had dehiscence of her right chest incision following a shower and decision was made to proceed with removal of right expander and elective removal of left so she can be symmetric.   OR 12/18 I&D with removal of tissue expanders   ID consulted  Patient tolerated Augmentin and doxycycline.  Penicillin allergy removed from her chart  F/U with wound culture

## 2024-12-20 NOTE — PROGRESS NOTES
Progress Note - Plastic Surgery   Name: Jenny Pereyra 49 y.o. female I MRN: [id number]  [facility] Date of Admission: 12/15/2024   Date of Service: 12/20/2024 I Hospital Day: 5     Assessment & Plan  Breast abscess  Jenny Pereyra is a 49-year-old female who is 1 month status post bilateral mastectomy, right sentinel lymph node biopsy, and immediate bilateral breast reconstruction with tissue expanders.  Postoperative course complicated by infection right reconstructed breast with partial wound dehiscence and exposure of tissue expander.  Patient is now POD#2 s/p removal of bilateral tissue expanders with washout and drain placement.  Patient is doing well overall. Pain improved with addition of NSAIDs.  Systolic blood pressures in the high 90s - without clinical symptoms. Patient feels well. Hgb 10.5 this AM.  No signs of acute bleeding.  R chest erythema and edema improving. Drains benign. 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 Awaiting OR cultures.  Patient tolerated Augmentin without allergic reaction. Afebrile, WBC normal.      -Regular diet  -Drains to bulb suction  -Maintain chest binder  -Appreciate ID recommendations   - Augmentin/Doxy PO x 10 days from OR  -Will f/u OR cx and narrow abx if appropriate  -Tylenol, ibuprofen, oxycodone for pain, appreciate pain recommendations  -Monitor vitals  -OK for d/c to home with drains and PO abx  -F/u next week with plastic surgery, call with any concerns    24 Hour Events : No acute events overnight. Patient stable  Subjective : Patient notes significant improvement in her pain.  She also notes having more energy this morning.    Objective :  Temp:  [98.9 °F (37.2 °C)] 98.9 °F (37.2 °C)  HR:  [70-74] 74  BP: ()/(51-65) 108/65  Resp:  [16] 16  SpO2:  [93 %-97 %] 97 %    Physical Exam  Alert, oriented, NAD  Breathing comfortably on room air  Bilateral chest incisions well approximated.   Left chest without erythema,  fullness or fluctuance. Drain SS  R chest with improved erythema, right lateral chest soft with significant improvement in edema. Minimally TTP Drain SS        Lab Results: I have reviewed the following results:CBC/BMP:   .     12/20/24  0446   WBC 7.90   HGB 10.5*   HCT 32.7*      SODIUM 140   K 3.8      CO2 27   BUN 19   CREATININE 0.75   GLUC 113   MG 1.7*

## 2024-12-20 NOTE — DISCHARGE SUMMARY
Discharge Summary - Hospitalist   Name: Jenny Pereyra 49 y.o. female I MRN: [id number]  [facility] I Date of Admission: 12/15/2024   Date of Service: 12/20/2024 I Hospital Day: 5     Assessment & Plan  Breast abscess  49-year-old female with right breast malignancy, s/p bilateral mastectomies, right sentinel lymph node biopsy and immediate bilateral breast reconstruction with tissue expanders one month ago 11/12/24 presented to ED with c/o right breast swelling, pain and foul smelling discharge from the wound.    CT shows: Bilateral mastectomy with bilateral tissue expanders with 2.9 x 1.0 minimally peripheral enhancing fluid collection lateral to the right breast implant compatible with abscess.   Plastic surgery recommended IR drainage, this was done 12/16.  50 cc of cloudy serous fluid was aspirated and drained GRACE drain was left in place. Patient was deemed stable for d/c home with drain however then had dehiscence of her right chest incision following a shower and decision was made to proceed with removal of right expander and elective removal of left so she can be symmetric.   OR 12/18 I&D with removal of tissue expanders   ID consulted  Patient tolerated Augmentin and doxycycline.  Penicillin allergy removed from her chart  F/U with wound culture    Anxiety  Associated with hospitalization and recent surgery.   Supportive cares  As needed Xanax for in house  Essential tremor  Continue Gabapentin  Stage 1 mild COPD by GOLD classification (HCC)  Stiolto nonformulary, resume  Hypersomnia  PTA meds armodafinil 150 mg daily, nonformulary, substitute with modafinil.  Malignant neoplasm of central portion of right breast in female, estrogen receptor positive (HCC)  Right breast cancer s/p bilateral mastectomies, right sentinel lymph node biopsy and immediate bilateral breast reconstruction with tissue expanders.   Continue Letrozole  History of penicillin allergy  Patient is not allergic to penicillin,  removed from allergy list     Medical Problems       Resolved Problems  Date Reviewed: 12/16/2024   None       Discharging Physician / Practitioner: Reinaldo Abbasi MD  PCP: MOHSEN Moore  Admission Date:   Admission Orders (From admission, onward)       Ordered        12/15/24 1439  INPATIENT ADMISSION  Once                          Discharge Date: 12/20/24    Consultations During Hospital Stay:  Acute pain  Infectious disease  Interventional radiology    Procedures Performed:   IR drainage of abscess  Plastic surgery removal of tissue expander    Significant Findings / Test Results:   Bilateral mastectomy with bilateral tissue expanders with 2.9 x 1.0 minimally peripheral enhancing fluid collection lateral to the right breast implant compatible with abscess.     Incidental Findings:   As above   I reviewed the above mentioned incidental findings with the patient and/or family and they expressed understanding.    Test Results Pending at Discharge (will require follow up):   Wound culture     Outpatient Tests Requested:  None    Complications:  None    Reason for Admission: Breast abscess    Hospital Course:   Jenny Pereyra is a 49 y.o. female patient who originally presented to the hospital on 12/15/2024 due to breast abscess.  Patient had interventional radiology placed drain for breast abscess.  Patient went to the OR on December 18 with plastic surgery to remove tissue expander.  Patient tolerated procedures very well.  Patient started on antibiotics by infectious disease.  Patient will need a total of 10 days of antibiotics, both doxycycline and Augmentin through December 28.        Please see above list of diagnoses and related plan for additional information.     Condition at Discharge: stable    Discharge Day Visit / Exam:   Subjective: Is a very pleasant 49-year-old female who was seen and evaluated today at bedside.  Patient denies any acute complaints at this time.  Vitals: Blood Pressure:  "108/65 (12/20/24 0749)  Pulse: 74 (12/20/24 0749)  Temperature: 98.9 °F (37.2 °C) (12/20/24 0749)  Temp Source: Oral (12/15/24 1554)  Respirations: 16 (12/19/24 2208)  Height: 5' 2\" (157.5 cm) (12/15/24 1554)  Weight - Scale: 65.8 kg (145 lb 1 oz) (12/15/24 1554)  SpO2: 97 % (12/20/24 0749)  Physical Exam  Vitals and nursing note reviewed.   Constitutional:       General: She is not in acute distress.  Cardiovascular:      Rate and Rhythm: Normal rate.   Pulmonary:      Breath sounds: Normal breath sounds.   Abdominal:      Tenderness: There is no abdominal tenderness.   Musculoskeletal:         General: Tenderness present. No swelling.   Skin:     General: Skin is warm.   Neurological:      Mental Status: She is alert and oriented to person, place, and time. Mental status is at baseline.   Psychiatric:         Mood and Affect: Mood normal.          Discussion with Family: Patient declined call to .     Discharge instructions/Information to patient and family:   See after visit summary for information provided to patient and family.      Provisions for Follow-Up Care:  See after visit summary for information related to follow-up care and any pertinent home health orders.      Mobility at time of Discharge:   Basic Mobility Inpatient Raw Score: 24  JH-HLM Goal: 8: Walk 250 feet or more  JH-HLM Achieved: 7: Walk 25 feet or more  HLM Goal NOT achieved. Continue to encourage mobility in post discharge setting.     Disposition:   Home    Planned Readmission: None    Discharge Medications:  See after visit summary for reconciled discharge medications provided to patient and/or family.      Administrative Statements   Discharge Statement:  I have spent a total time of 45 minutes in caring for this patient on the day of the visit/encounter. >30 minutes of time was spent on: Diagnostic results, Prognosis, Patient and family education, Documenting in the medical record, and Communicating with other healthcare " professionals .    **Please Note: This note may have been constructed using a voice recognition system**

## 2024-12-21 ENCOUNTER — TELEPHONE (OUTPATIENT)
Dept: INFECTIOUS DISEASES | Facility: CLINIC | Age: 49
End: 2024-12-21

## 2024-12-21 ENCOUNTER — TELEPHONE (OUTPATIENT)
Dept: OTHER | Facility: HOSPITAL | Age: 49
End: 2024-12-21

## 2024-12-21 DIAGNOSIS — T85.79XA: Primary | ICD-10-CM

## 2024-12-21 LAB
BACTERIA SPEC ANAEROBE CULT: ABNORMAL
BACTERIA TISS AEROBE CULT: ABNORMAL
GRAM STN SPEC: ABNORMAL

## 2024-12-21 RX ORDER — LINEZOLID 600 MG/1
600 TABLET, FILM COATED ORAL EVERY 12 HOURS SCHEDULED
Qty: 20 TABLET | Refills: 0 | Status: SHIPPED | OUTPATIENT
Start: 2024-12-21 | End: 2024-12-31

## 2024-12-21 RX ORDER — SULFAMETHOXAZOLE AND TRIMETHOPRIM 800; 160 MG/1; MG/1
1 TABLET ORAL EVERY 12 HOURS SCHEDULED
Qty: 20 TABLET | Refills: 0 | Status: SHIPPED | OUTPATIENT
Start: 2024-12-21 | End: 2024-12-24

## 2024-12-21 NOTE — TELEMEDICINE
Called Jenny to inform her that her OR cultures have resulted.  The OR cultures show Staph epidermidis and bacteroides. The staph epidermidis shows resistance to doxycycline.  The results were reviewed with infectious disease and it was recommended that we discontinue doxycycline and start bactrim DS BID for 10 days.    Informed Jenny to stop taking the doxycycline and start taking bactrim.  She should continue taking augmentin.  She will take the bactrim for a total of 10 days.     Prescription sent to her pharmacy of choice - [facility].

## 2024-12-21 NOTE — TELEPHONE ENCOUNTER
Alerted by patient's surgical team that the OR cultures have updated with growth of Staphylococcus epidermidis that is resistant to doxycycline and to Bactrim.  Recommend patient complete her course of Augmentin through 12/28 as originally planned.  Instructed patient to stop doxycycline and instead start oral linezolid 600 mg twice daily x 10 days.  This was prescribed to patient by her surgical team.  I called patient myself and discussed this plan with her.  I explained that while she is on escitalopram, the risk of serotonin syndrome is very low (Gerda AD, Fabi S, Samuels H, Nuzhat M, Macie SS. Association of Linezolid With Risk of Serotonin Syndrome in Patients Receiving Antidepressants. MARGO Netw Open. 2022;5(12):x3746402. doi:10.1001/jamanetworkopen.2022.60183 ).  She is also on tiotropium inhaler which has a possible interaction with linezolid to raise blood pressure.  However as there are no other oral antibiotic options to treat the Staphylococcus and patient does not wish to return to hospital for IV antibiotic unless absolutely necessary, we will proceed with linezolid and monitoring of symptoms. I have asked her surgery team to check her blood pressure at upcoming visit on Tuesday.    Luis Montgomery MD

## 2024-12-21 NOTE — TELEMEDICINE
Reached out to maria t again to discuss a change in her antibiotic regimen.  Was contacted by infectious disease to correct their previous recommendation of bactrim.  They recommend linezolid 600 BID x 10 days in place of bactrim.    FINAL ABX REGIMEN  -complete augmentin  -Start 10 days of Linezolid.    Discussed with Maria T that there is a very low risk of Serotonin syndrome while taking Linezolid with escitalopram    Gerda AD, Fabi S, Arvind H, Nuzhat M, Macie SS. Association of Linezolid With Risk of Serotonin Syndrome in Patients Receiving Antidepressants. MARGO Netw Open. 2022;5(12):g9229626. doi:10.1001/jamanetworkopen.2022.79787     Discussed with Maria T.  She expressed understanding and is in agreement.     Infectious disease will call her tomorrow to review the new antibiotic.

## 2024-12-22 NOTE — UTILIZATION REVIEW
Laura Roper, Utilization Review Department sent the discharge & disposition to the Payer 12/22/24 10:41 AM.

## 2024-12-23 ENCOUNTER — PATIENT OUTREACH (OUTPATIENT)
Dept: HEMATOLOGY ONCOLOGY | Facility: CLINIC | Age: 49
End: 2024-12-23

## 2024-12-23 NOTE — PROGRESS NOTES
Called and LVM for patient mentioning that I was calling to check in with her to make sure all is well. Mentioned that if patient did have any questions and or concerns to please feel free to reach out to me.

## 2024-12-23 NOTE — UTILIZATION REVIEW
Continued Stay Review    Date: 12/19-12/20/24                   Patient Class: Inpatient  Current Level of Care: Med Surg    HPI:49 y.o. female initially admitted on 12/15/24.    12/19 Plastic Surgery:  POD#1 s/p removal of bilateral tissue expanders with washout and drain placement.  Experiencing moderate postoperative pain.  Systolic blood pressures in the high 90s - without clinical symptoms. Hgb 9.5 this AM from 11.9 yesterday.  No signs of acute bleeding, likely from OR blood loss and fluid administration.  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  Plan:  Regular diet. Drains to bulb suction. Maintain binder on chest. Appreciate ID recommendations.  Tylenol and oxycodone for pain, IV dilaudid for breakthrough pain, appreciate APS recommendations. Infectious Disease consult:  Right breast abscess with tissue expander; status post IR aspiration on 12/16, I&D with expander removal on 12/18.  IR culture grew Peptoniphilus harei and Finegoldia magna, OR cultures pending.   Recommendation:  The most likely bacteria involved in surgical wound infection with tissue expander includes skin carley such as Staphylococcus aureus, coagulase-negative staphylococci and streptococci.  The IR culture from aspiration on 12/16 has grown only anaerobic bacteria, though patient was taking Bactrim several days prior to arrival which could have impacted culture results including suppressing growth of staph.  The OR cultures obtained on 12/18 may be more accurate.   For now we will start patient on PO Augmentin to cover the anaerobes from IR culture along with streptococci.  She does have a penicillin allergy listed as a rash, however we did confirm with patient that this was as a child only and she has tolerated Augmentin as an adult.  As long as she has no reaction after today's dose of Augmentin we can remove her penicillin allergy from the chart.  We will also continue doxycycline 100 mg twice daily  for ongoing staph (including MRSA) coverage.  However we can change this from IV to PO  to minimize chance of vein sclerosis.  Follow-up lower cultures to further adjust antibiotics.  As the tissue expander was removed and there has been good source control with debridement recommend 10-day course of antibiotic from the OR through 12/28/2024. Acute Pain Service consult:  Postop pain management.   Currently on 5/10mg po oxycodone Q4hr PRN and 0.5mg IV dilaudid Q3hr PRN.  Pain is well controlled on current regimen, but pt does not like narcotics. Will add 400mg po ibuprofen Q6hr PRN. Discussed w/ primary and sx teams.  Recommend alternating between tylenol and ibuprofen. If pain worsens despite ibuprofen, consider switching to Toradol.     12/20 Plastic Surgery:  Patient is now POD#2 s/p removal of bilateral tissue expanders with washout and drain placement.  Pain improved .  R chest erythema and edema improving. Drains benign. Awaiting OR cultures. Patient tolerated Augmentin without allergic reaction. Afebrile, WBC normal. OK for discharge to home and PO antibiotics.     12/20 Discharged to home/self care.     Medications:   Scheduled Medications:    Current Facility-Administered Medications:     Scheduled:       amoxicillin-clavulanate (AUGMENTIN) 875-125 mg per tablet 1 tablet, Q12H URVASHI    doxycycline hyclate (VIBRAMYCIN) capsule 100 mg, Q12H URVASHI    escitalopram (LEXAPRO) tablet 10 mg, Daily With Lunch    fenofibrate (TRICOR) tablet 145 mg, Daily    gabapentin (NEURONTIN) capsule 300 mg, HS    [Held by provider] heparin (porcine) subcutaneous injection 5,000 Units, Q8H URVASHI **AND** [COMPLETED] Platelet count, Once    modafinil (PROVIGIL) tablet 200 mg, Daily    umeclidinium 62.5 mcg/actuation inhaler AEPB 1 puff, Daily **AND** olodaterol HCl (STRIVERDI RESPIMAT) inhaler 2 puff, Daily    saccharomyces boulardii (FLORASTOR) capsule 250 mg, BID    scopolamine (TRANSDERM-SCOP) 1 mg/3 days TD 72 hr patch 1 patch, Q72H      amoxicillin-clavulanate (AUGMENTIN) 875-125 mg per tablet 1 tablet  Dose: 1 tablet  Freq: Every 12 hours scheduled Route: PO  Start: 12/19/24 1400 End: 12/20/24 1229    ceFAZolin (ANCEF) IVPB (premix in dextrose) 2,000 mg 50 mL  Dose: 2,000 mg  Freq: Every 8 hours Route: IV  Last Dose: 2,000 mg (12/19/24 1004)  Start: 12/19/24 1000 End: 12/19/24 1116     doxycycline hyclate (VIBRAMYCIN) capsule 100 mg  Dose: 100 mg  Freq: Every 12 hours scheduled Route: PO  Start: 12/19/24 1300 End: 12/20/24 1229    doxycycline (VIBRAMYCIN) 100 mg in sodium chloride 0.9 % 100 mL IVPB  Dose: 100 mg  Freq: Every 12 hours Route: IV  Last Dose: 100 mg (12/18/24 2219)  Start: 12/17/24 2200 End: 12/19/24 0906    metroNIDAZOLE (FLAGYL) tablet 500 mg x 1 dose  Dose: 500 mg  Freq: Every 12 hours scheduled Route: PO  Start: 12/19/24 0945 End: 12/19/24 1116    Continuous infusion:      lactated ringers infusion  Rate: 100 mL/hr Dose: 100 mL/hr  Freq: Continuous Route: IV  Indications of Use: IV Hydration  Last Dose: Stopped (12/20/24 0400)  Start: 12/18/24 1930 End: 12/20/24 1229      PRN:        acetaminophen (TYLENOL) tablet 650 mg, Q6H PRN    ALPRAZolam (XANAX) tablet 0.25 mg, HS PRN   HYDROmorphone (DILAUDID) injection 0.5 mg, Q3H PRN x 1 dose 12/19    ibuprofen (MOTRIN) tablet 400 mg, Q6H PRN    ondansetron (ZOFRAN) injection 4 mg, Q6H PRN    oxyCODONE (ROXICODONE) immediate release tablet 10 mg, Q4H PRN x 4 doses 12/19     oxyCODONE (ROXICODONE) IR tablet 5 mg, Q4H PRN    polyethylene glycol (MIRALAX) packet 17 g, Daily PRN      Vital Signs (last 3 days) before discharge       Date/Time Temp Pulse Resp BP MAP (mmHg) SpO2 Calculated FIO2 (%) - Nasal Cannula O2 Flow Rate (L/min) Nasal Cannula O2 Flow Rate (L/min) O2 Device Mum Coma Scale Score Pain    12/20/24 07:49:21 98.9 °F (37.2 °C) 74 -- 108/65 79 97 % -- -- -- -- -- --    12/19/24 22:08:11 98.9 °F (37.2 °C) 70 16 86/51 63 93 % -- -- -- -- -- --    12/19/24 2114 -- -- -- -- --  -- -- -- -- -- 15 No Pain    12/19/24 1805 -- -- -- -- -- -- -- -- -- -- -- 8    12/19/24 15:41:31 98.9 °F (37.2 °C) 69 16 95/62 73 94 % -- -- -- -- -- --    12/19/24 1430 -- -- -- -- -- -- -- -- -- -- -- 8    12/19/24 0802 -- -- -- -- -- -- -- -- -- -- -- 7    12/19/24 07:40:46 98.3 °F (36.8 °C) 62 17 96/64 75 96 % -- -- -- -- -- --    12/19/24 0657 -- -- -- -- -- -- -- -- -- -- -- 9    12/19/24 0329 -- -- -- -- -- -- -- -- -- -- -- 10 - Worst Possible Pain    12/19/24 03:16:35 -- 55 -- 99/62 74 95 % -- -- -- -- -- --    12/19/24 0108 -- -- -- 98/66 -- -- -- -- -- -- -- --    12/19/24 01:03:01 98.3 °F (36.8 °C) 58 -- 81/54 63 94 % -- -- -- -- -- --    12/18/24 2200 -- -- -- -- -- -- 24 -- 1 L/min Nasal cannula -- 4    12/18/24 20:54:42 98.5 °F (36.9 °C) 66 -- 105/68 80 95 % -- -- -- -- -- --    12/18/24 2035 -- 70 12 -- -- 96 % 36 -- 4 L/min Nasal cannula -- --    12/18/24 2030 -- 68 14 101/59 75 90 % 36 -- 4 L/min Nasal cannula -- --    12/18/24 2015 -- 74 12 99/58 74 90 % 36 -- 4 L/min Nasal cannula -- --    12/18/24 2000 -- 76 10 109/69 84 94 % 36 -- 4 L/min Nasal cannula -- 5    12/18/24 1959 -- 68 8 -- -- 95 % -- -- -- -- -- 5    12/18/24 1946 -- 79 17 103/67 79 96 % -- -- -- -- -- 8    12/18/24 1945 -- 72 21 103/67 79 94 % 36 -- 4 L/min Nasal cannula -- --    12/18/24 1935 -- -- -- -- -- -- -- -- -- -- -- 10 - Worst Possible Pain    12/18/24 1930 -- 84 15 134/77 100 95 % 36 -- 4 L/min Nasal cannula -- --    12/18/24 1925 97.4 °F (36.3 °C) 86 20 137/79 100 94 % -- 6 L/min -- Simple mask 15 No Pain    12/18/24 1446 -- -- -- -- -- -- -- -- -- -- -- No Pain    12/18/24 0834 -- -- -- -- -- -- -- -- -- -- 15 --    12/18/24 08:22:07 98.6 °F (37 °C) 67 18 97/67 77 91 % -- -- -- -- -- No Pain    12/17/24 22:25:33 97.5 °F (36.4 °C) 65 18 112/71 85 98 % -- -- -- -- -- --    12/17/24 2006 -- -- -- -- -- 95 % -- -- -- None (Room air) 15 No Pain    12/17/24 15:19:41 98.3 °F (36.8 °C) 71 18 108/67 81 91 % -- -- -- -- -- --     12/17/24 0900 -- -- -- -- -- -- -- -- -- -- 15 7    12/17/24 07:19:29 99 °F (37.2 °C) 70 14 93/61 72 95 % -- -- -- -- -- --    12/17/24 0604 -- -- -- -- -- -- -- -- -- -- -- 7    12/17/24 05:10:22 98.7 °F (37.1 °C) 68 -- -- -- 91 % -- -- -- -- -- --              Pertinent Labs/Diagnostic Results:       Radiology:    IR drainage tube placement - 12/16/24 4:50 PM   Final Interpretation by Bry Lou MD (12/17 5379)   Impression:   Ultrasound-guided placement of a 8.5 Urdu Gomez-Watkins drainage catheter into a right collection adjacent to the tissue expander, and 50 mL of cloudy serous fluid was aspirated and a specimen sent to the lab as described above.            Workstation performed: [id number]         CT chest with contrast   Final Interpretation by Bessy Goetz MD (12/15 2790)      Bilateral mastectomy with bilateral tissue expanders with 2.9 x 1.0 minimally peripheral enhancing fluid collection lateral to the right breast implant compatible with abscess.      The study was marked in EPIC for immediate notification.         Workstation performed: [id number]         IR drainage tube check/change/reposition/reinsertion/upsize    (Results Pending)     Cardiology:      ECG 12 lead   Final Result by Yonis Mujica MD (12/15 0371)   Normal sinus rhythm   Normal ECG   When compared with ECG of 28-Oct-2024 14:42,   Vent. rate has increased by  29 bpm   Inverted T waves have replaced nonspecific T wave abnormality in Inferior    leads   Confirmed by Yonis Mujica ([id number]) on 12/15/2024 11:26:46 PM            Results from last 7 days   Lab Units 12/20/24  0446 12/19/24  0442 12/18/24  0439   WBC Thousand/uL 7.90 8.73 6.53   HEMOGLOBIN g/dL 10.5* 9.5* 11.9   HEMATOCRIT % 32.7* 30.1* 36.7   PLATELETS Thousands/uL 290 273 305   TOTAL NEUT ABS Thousands/µL 4.84 6.00 3.68         Results from last 7 days   Lab Units 12/20/24  0446 12/19/24  0442 12/18/24  0439   SODIUM mmol/L 140 136 140   POTASSIUM mmol/L  3.8 4.3 3.9   CHLORIDE mmol/L 104 104 107   CO2 mmol/L 27 24 24   ANION GAP mmol/L 9 8 9   BUN mg/dL 19 12 16   CREATININE mg/dL 0.75 0.50* 0.67   EGFR ml/min/1.73sq m 95 115 105   CALCIUM mg/dL 9.0 8.1* 9.5   MAGNESIUM mg/dL 1.7*  --   --      Results from last 7 days   Lab Units 12/20/24  0446   AST U/L 24   ALT U/L 22   ALK PHOS U/L 42   TOTAL PROTEIN g/dL 5.3*   ALBUMIN g/dL 3.5   TOTAL BILIRUBIN mg/dL 0.25         Results from last 7 days   Lab Units 12/20/24  0446 12/19/24  0442 12/18/24  0439   GLUCOSE RANDOM mg/dL 113 76 87           Anaerobic culture and Gram stain    Status: Final result         Specimen Information: Other; Body Fluid     Anaerobic Culture 1+ Growth of Peptoniphilus harei group Abnormal    1+ Growth of Finegoldia magna Abnormal           Specimen Collected: 12/16/24 16:48 Last Resulted: 12/20/24 12:39             Results from last 7 days   Lab Units 12/18/24  1805   GRAM STAIN RESULT  No Polys or Bacteria seen   BODY FLUID CULTURE, STERILE   --                    Network Utilization Review Department  ATTENTION: Please call with any questions or concerns to [phone] and carefully listen to the prompts so that you are directed to the right person. All voicemails are confidential.   For Discharge needs, contact Care Management DC Support Team at [phone] opt. 2  Send all requests for admission clinical reviews, approved or denied determinations and any other requests to dedicated fax number below belonging to the campus where the patient is receiving treatment. List of dedicated fax numbers for the Facilities:  FACILITY NAME UR FAX NUMBER   ADMISSION DENIALS (Administrative/Medical Necessity) [phone]   DISCHARGE SUPPORT TEAM (NETWORK) [phone]   PARENT CHILD HEALTH (Maternity/NICU/Pediatrics) [phone]   [facility] [phone]   [facility] [phone]   [facility] [phone]   [facility]  [facility] [phone]   Count includes the [facility] [phone]   [facility] [phone]   [facility] [phone]   [facility] [address] [facility] [phone]   [facility] [phone]   [facility] [phone]   [facility] [phone]   [facility] [phone]

## 2024-12-24 ENCOUNTER — OFFICE VISIT (OUTPATIENT)
Dept: PLASTIC SURGERY | Facility: CLINIC | Age: 49
End: 2024-12-24

## 2024-12-24 ENCOUNTER — PATIENT OUTREACH (OUTPATIENT)
Dept: CASE MANAGEMENT | Facility: OTHER | Age: 49
End: 2024-12-24

## 2024-12-24 ENCOUNTER — OFFICE VISIT (OUTPATIENT)
Dept: INTERNAL MEDICINE CLINIC | Facility: OTHER | Age: 49
End: 2024-12-24
Payer: COMMERCIAL

## 2024-12-24 VITALS
BODY MASS INDEX: 26.54 KG/M2 | OXYGEN SATURATION: 97 % | TEMPERATURE: 98.4 F | DIASTOLIC BLOOD PRESSURE: 64 MMHG | HEART RATE: 71 BPM | HEIGHT: 62 IN | RESPIRATION RATE: 18 BRPM | SYSTOLIC BLOOD PRESSURE: 102 MMHG | WEIGHT: 144.2 LBS

## 2024-12-24 DIAGNOSIS — N61.1 BREAST ABSCESS: ICD-10-CM

## 2024-12-24 DIAGNOSIS — E78.49 OTHER HYPERLIPIDEMIA: ICD-10-CM

## 2024-12-24 DIAGNOSIS — Z17.0 MALIGNANT NEOPLASM OF CENTRAL PORTION OF RIGHT BREAST IN FEMALE, ESTROGEN RECEPTOR POSITIVE (HCC): Primary | ICD-10-CM

## 2024-12-24 DIAGNOSIS — C50.111 MALIGNANT NEOPLASM OF CENTRAL PORTION OF RIGHT BREAST IN FEMALE, ESTROGEN RECEPTOR POSITIVE (HCC): Primary | ICD-10-CM

## 2024-12-24 DIAGNOSIS — E83.42 HYPOMAGNESEMIA: Primary | ICD-10-CM

## 2024-12-24 PROCEDURE — 99496 TRANSJ CARE MGMT HIGH F2F 7D: CPT | Performed by: INTERNAL MEDICINE

## 2024-12-24 PROCEDURE — 99024 POSTOP FOLLOW-UP VISIT: CPT | Performed by: PLASTIC SURGERY

## 2024-12-24 RX ORDER — FENOFIBRATE 145 MG/1
145 TABLET, COATED ORAL DAILY
Qty: 100 TABLET | Refills: 1 | Status: SHIPPED | OUTPATIENT
Start: 2024-12-24

## 2024-12-24 NOTE — ASSESSMENT & PLAN NOTE
Orders:  •  fenofibrate (TRICOR) 145 mg tablet; Take 1 tablet (145 mg total) by mouth daily  •  Lipid panel; Future

## 2024-12-24 NOTE — ASSESSMENT & PLAN NOTE
Continue linezolid and Augmentin until completion.  Continue follow-up with plastic surgery.  Orders:  •  CBC; Future

## 2024-12-24 NOTE — PROGRESS NOTES
[facility]   Plastic and Reconstructive Surgery   [address] [place] [address]       Assessment & Plan    Jenny Pereyra is a 49-year-old female who is 6 weeks status post bilateral mastectomy, right sentinel lymph node biopsy, and immediate bilateral breast reconstruction with tissue expanders.  Postoperative course complicated by infection right reconstructed breast with partial wound dehiscence and exposure of tissue expander.  Patient is now 1 week s/p removal of right tissue expander, washout and closure.  Patient elected to have her left tissue expander removed at that time as well.  Cultures of aspirate from the right breast grew peptoniphilus harei and Finegoldia magna.  Tissue culture from the right breast sent from the OR grew Staph epidermidis and bacteroides.  Patient originally taking PO doxycycline and augmentin. Following OR cultures doxycycline discontinued and linezolid started per ID due to drug resistance.     Overall patient is doing well.  Patient has been afebrile, minimal pain, comfortable and notes that she is physically feeling the best she has in a long time. Infection resolving.    Discussed with the patient that moving forward, once she has healed we can discuss options for revision of flat closure versus delayed breast reconstruction.  In the meantime she would like to make and appointment with the BRA clinic.    -Drains removed  -Continue to wear binder on chest for compression   -Complete 10 day course of augmentin  -Complete 10 day course of linezolid  -monitor for new or worsening signs of infection  -Referral to BRA clinic  -f/u 1 week for incision check    History of Present Illness   Jenny was discharged from the hospital on 12/20.  Patient notes that she is doing well. Denies fevers, chills, redness or swelling at her chest surgical sites. Patient notes that pain is minimal and she is physically feeling better than she has in over a month. Notes that due to supply  issues at the pharmacy she started taking her linezolid last night.      Alert, oriented, NAD  Breathing comfortably on room air  Bilateral chest incisions well approximated. Edges healthy.  No appreciable drainage or fullness.    Faint erythema over R chest superior skin flap.  2s cap refill present in bilateral chest skin.  Drains with minimal SS output b/l  Lateral chest wall tissue soft, supple on R - significantly improved  L lateral chest wall tissue soft and supple.  No appreciable ecchymosis

## 2024-12-24 NOTE — PROGRESS NOTES
Transition of Care Visit  Name: Jenny Pereyra      : 1975      MRN: [id number]  Encounter Provider: Nelson Akhtar MD  Encounter Date: 2024   Encounter department: [facility] [place]    Assessment & Plan  Hypomagnesemia    Orders:  •  Magnesium 400 MG CAPS; Take 1 capsule (400 mg total) by mouth in the morning  •  Magnesium; Future    Breast abscess  Continue linezolid and Augmentin until completion.  Continue follow-up with plastic surgery.  Orders:  •  CBC; Future    Other hyperlipidemia    Orders:  •  fenofibrate (TRICOR) 145 mg tablet; Take 1 tablet (145 mg total) by mouth daily  •  Lipid panel; Future         History of Present Illness     Transitional Care Management Review:   Jenny Pereyra is a 49 y.o. female here for TCM follow up.     During the TCM phone call patient stated:  TCM Call     Date and time call was made  2024  9:52 AM    Hospital care reviewed  Records reviewed    Patient was hospitialized at  [facility]    Date of Admission  12/15/24    Date of discharge  24    Diagnosis  breast abscess    Disposition  Home    Current Symptoms  Fatigue      TCM Call     Post hospital issues  None    Scheduled for follow up?  Yes    Did you obtain your prescribed medications  Yes    Do you need help managing your prescriptions or medications  No    Is transportation to your appointment needed  No    I have advised the patient to call PCP with any new or worsening symptoms  Jamie Price CMA        49-year-old female is seen today for transition of care.  Hospitalization and discharge summary reviewed today.  She has a pertinent past medical history of right breast malignancy status post bilateral mastectomy with right sentinel lymph node biopsy and immediate bilateral breast reconstruction with tissue expanders on 2024 who presents to the emergency department with right breast swelling, pain, and foul-smelling discharge from a  "wound.  She underwent CT imaging which showed peripherally enhancing fluid collection lateral to the right breast implant compatible with an abscess.  Plastic surgery was consulted and recommended IR drainage which was done on 12/16/2024.  She was taken to the OR on 12/18/2024 and underwent I&D with removal of tissue expanders.  Infectious disease was consulted during hospitalization and she was placed on Augmentin and linezolide, tolerated well. She is to continue Augmentin through 12/28/2024 and linezolide through 1/1/2025.   Her blood pressure was at the lower range throughout hospitalization. Se has occasional lightheadedness and dizziness, which has improved.       Review of Systems   Constitutional:  Negative for activity change, appetite change, chills, diaphoresis, fatigue and fever.   HENT:  Negative for congestion, postnasal drip, rhinorrhea, sinus pressure, sinus pain, sneezing and sore throat.    Eyes:  Negative for visual disturbance.   Respiratory:  Negative for apnea, cough, choking, chest tightness, shortness of breath and wheezing.    Cardiovascular:  Negative for chest pain, palpitations and leg swelling.   Gastrointestinal:  Negative for abdominal distention, abdominal pain, anal bleeding, blood in stool, constipation, diarrhea, nausea and vomiting.   Endocrine: Negative for cold intolerance and heat intolerance.   Genitourinary:  Negative for difficulty urinating, dysuria and hematuria.   Musculoskeletal: Negative.    Skin: Negative.    Neurological:  Negative for dizziness, weakness, light-headedness, numbness and headaches.   Hematological:  Negative for adenopathy.   Psychiatric/Behavioral:  Negative for agitation, sleep disturbance and suicidal ideas.    All other systems reviewed and are negative.    Objective   /64 (BP Location: Left arm) Comment: her cuff  Pulse 71   Temp 98.4 °F (36.9 °C) (Temporal)   Resp 18   Ht 5' 2\" (1.575 m)   Wt 65.4 kg (144 lb 3.2 oz)   SpO2 97%   BMI " 26.37 kg/m²     Physical Exam  Vitals and nursing note reviewed.   Constitutional:       General: She is not in acute distress.     Appearance: She is well-developed. She is not diaphoretic.   HENT:      Head: Normocephalic and atraumatic.   Eyes:      General:         Right eye: No discharge.         Left eye: No discharge.      Conjunctiva/sclera: Conjunctivae normal.      Pupils: Pupils are equal, round, and reactive to light.   Neck:      Thyroid: No thyromegaly.      Vascular: No JVD.   Cardiovascular:      Rate and Rhythm: Normal rate and regular rhythm.      Heart sounds: Normal heart sounds. No murmur heard.     No friction rub. No gallop.   Pulmonary:      Effort: Pulmonary effort is normal. No respiratory distress.      Breath sounds: Normal breath sounds. No wheezing or rales.   Chest:      Chest wall: No tenderness.   Breasts:     Right: Absent.      Left: Absent.       Abdominal:      General: There is no distension.      Palpations: Abdomen is soft.      Tenderness: There is no abdominal tenderness.   Musculoskeletal:         General: No tenderness or deformity. Normal range of motion.      Cervical back: Normal range of motion and neck supple.   Lymphadenopathy:      Cervical: No cervical adenopathy.   Skin:     General: Skin is warm and dry.      Coloration: Skin is not pale.      Findings: No erythema or rash.   Neurological:      Mental Status: She is alert and oriented to person, place, and time.      Cranial Nerves: No cranial nerve deficit.      Coordination: Coordination normal.   Psychiatric:         Behavior: Behavior normal.         Thought Content: Thought content normal.         Judgment: Judgment normal.       Medications have been reviewed by provider in current encounter

## 2024-12-24 NOTE — PROGRESS NOTES
OSW placed outreach TC to pt this day. She was recently hospitalized for 5 days. She stated that she has never spent that long in the hospital. She reports that she is feeling much better. She has been on antibiotics and will return to work on Monday. She has had some GI concerns with the medications. She is thankful that her manager has been so supportive of her over the past few months.   We spoke about her pursuing the reconstruction in the future, however she is unsure at this time. She is scared, but expressed that she doesn't like being flat. OSW allowed time for her to express her feelings and offered support. She would also like her body to heal, as well as to heal emotionally.   OSW wished her a happy holiday and offered to check back in with her. She was appreciative of the outreach.

## 2024-12-26 PROCEDURE — 88304 TISSUE EXAM BY PATHOLOGIST: CPT | Performed by: PATHOLOGY

## 2024-12-26 PROCEDURE — 88302 TISSUE EXAM BY PATHOLOGIST: CPT | Performed by: PATHOLOGY

## 2024-12-26 PROCEDURE — 88305 TISSUE EXAM BY PATHOLOGIST: CPT | Performed by: PATHOLOGY

## 2024-12-26 NOTE — OP NOTE
OPERATIVE REPORT  PATIENT NAME: Jenny Pereyra    :  1975  MRN: [id number]  Pt Location: [facility] OR ROOM 08    SURGERY DATE: 2024    Surgeons and Role:     * Karthik Brito MD - Primary    Preop Diagnosis:  Breast abscess [N61.1]    Post-Op Diagnosis Codes:     * Breast abscess [N61.1]    Procedure(s):  Bilateral - removal bilateral tissue expanders  Bilateral - CLOSURE WITH DRAIN PLACEMENT  Bilateral - DEBRIDEMENT WOUND (WASH OUT)    Specimen(s):  ID Type Source Tests Collected by Time Destination   1 : SKIN LEFT BREAST. LONG LATERAL, SHORT SUPERIOR, 2 TAILS MEDIAL Tissue Breast, Left TISSUE EXAM Karthik Brito MD 2024 1655    2 : ADDITIONAL SUPERIOR MARGIN SKIN LEFT BREAST Tissue Breast, Left TISSUE EXAM Karthik Brito MD 2024 1658    3 : right breast wound tissue Tissue Breast, Right ANAEROBIC CULTURE AND GRAM STAIN, CULTURE, TISSUE AND GRAM STAIN, TISSUE EXAM Karthik Brito MD 2024 1805    4 : SKIN RIGHT BREAST. LONG LATERAL, SHORT SUPERIOR, 2 TAILS MEDIAL Tissue Breast, Right TISSUE EXAM Karthik Brito MD 2024 1830        Estimated Blood Loss:   Minimal    Drains:  Closed/Suction Drain Inferior;Lateral;Left Breast 15 Fr. (Active)   Site Description Unable to view 24 0300   Dressing Status Clean;Dry;Intact 24 0300   Drainage Appearance Serosanguineous 24 0300   Status To bulb suction 24 0300   Output (mL) 15 mL 24 1501   Number of days: 7       Closed/Suction Drain Inferior;Lateral;Right Breast Bulb 15 Fr. (Active)   Site Description Unable to view 24 0300   Dressing Status Clean;Dry;Intact 24 0300   Drainage Appearance Serosanguineous 24 0300   Status To bulb suction 24 0300   Output (mL) 10 mL 24 1501   Number of days: 7       [REMOVED] Abscess Drain Breast (Removed)   Site Description Healing 24   Dressing Status Clean;Dry;Intact 24   Drainage Appearance Milky;Thick 24 1845    Output (mL) 10 mL 12/19/24 0300   Number of days: 2       Anesthesia Type:   General    Operative Indications:  Breast abscess [N61.1]  Jenny Pereyra is a 49-year-old female who is 1 month status post bilateral mastectomy, right sentinel lymph node biopsy, and immediate bilateral breast reconstruction with tissue expanders.  Patient admitted with right breast cellulitis, elevated WBC, subjective fevers and noted fluid collection adjacent to R tissue expander on CT. WBC normalized yesterday. Patient underwent IR aspiration and drain placement of the right breast with 50mL fluid drained. Patient with improving erythema yesterday then after a shower, experienced a small dehiscence of her right chest incision with exposure of the underlying implant.  Given infection of right breast in addition to exposure of the R expander, plan to proceed with removal of right expander, washout and closure.  Patient wishes to have her left expander removed as well.     Operative Findings:  Murky straw colored drainage in right breast drain.  1cm full thickness opening over lateral aspect of right breast incision. R breast without gross purulence appreciated though tissue edematous with indurated subcutaneous fat. ADM in right breast very easily  from overlying skin flaps with minimal blunt dissection.  ADM appeared a bit ragged and thinned out though intact.  Tissue expander intact.  L breast with moderate adherence of the ADM to overlying skin flaps and underlying pectoralis muscle. No appreciable purulence or seroma. Expander intact.    Skin edges of right breast incision appeared tenuous.  Skin edges over medial aspect of left breast incision with dark discoloration.       Complications:   None    Procedure and Technique:  The patient was identified in the preoperative holding area. The patient was then brought to the operating room and placed on the operating room table in the supine position.  SCDs were place on  bilateral lower extremities. Patient received appropriate weight based antibiosis with 1g IV cefazolin.  A surgical timeout was performed confirming the patient's identity, surgery to be performed and laterality.  Smooth induction of general anesthesia was achieved and an LMA was placed.    The IR drain in the right breast was removed without complication.  The drain contained approximately 20mL of thick, murky, yellow/straw colored malodorous drainage. Drain was placed anterior to the expander between the expander and overlying skin.   The bilateral chest was prepped with betadine and draped in the usual sterile fashion.      The right breast was covered with a sterile towel and I began working on the left breast.  Using a #10 scalpel, the left breast incision was opened along it's entire length and any remaining sutures removed.  A small amount of serous fluid was encountered.  No appreciable purulence.  ADM intact overlying the expander.  Using a combination of blunt dissection and monopolar cautery, the mastectomy skin flaps were dissected off of the underlying ADM/expander. Then with retraction of the mastectomy skin flaps, under direct visualization the sutures holding the expander tabs in place were cut with scissors.  Upon removing the sutures a piece of the lateral most tab was cut.  This piece was identified and removed from the left chest.  The piece was matched to the tab on the expander showing no other material was missing.  Then using monopolar cautery and steady traction on the expander the ADM/Expander was dissected off of the underlying pectoralis major muscle.  There was significant adhesion of the ADM to the underlying pectoralis major.  Once the device and ADM were dissected free, the ADM was split and removed from the expander the expander inspected.  The expander was found to be intact.   The breast pocket was inspected - no pieces of ADM remained.  The breast pocket was then irrigated with 1L  of sterile saline.  Meticulous hemostasis was achieved.  The breast pocket was irrigated once more with sterile saline.  A 2cm area along the edge of the superior mastectomy skin flap over the medial aspect appeared dark in color. To remove this area of tenuous skin and the previous scar a 5mm rim of skin was sharply excised from the superior and inferior skin edges as one single specimen. Marked - short stitch superior, long stitch lateral, two tails medial. And additional 1cm margin of skin was removed from the lateral most aspect of the superior skin flap to remove additional scar - sent as additional margin. This was sent to pathology for permanent as left breast skin. The fresh skin edges were viable and bleeding.  A 15F pari drain was placed in the breast pocket and made to exit the skin of the lateral chest. This was secured with a 2-0 nylon. The incision was closed with 3-0 monocryl in a simple interrupted fashion in the deep dermis and a running 4-0 monocryl in a subcuticular fashion.     I then turned my attention to the right breast.  Inspection of the right breast incision revealed a 1cm area of full thickness dehiscence at the lateral aspect of the incision.  The remainder of the incision was opened using a #10 scalpel.  No significant fluid was encountered upon opening the incision as there was a drain in this space and an open area of the incision for egress.  Directly beneath the skin incision was a 3x3cm area of separation between the ADM and overlying skin flaps.  The ADM appeared a bit ragged and thin as compared to the left side. Using blunt dissection only, the overlying mastectomy skin flaps were easily dissected free from the ADM covered expander. There was mild edema and induration of the tissue underlying the mastectomy skin flaps.  Once the ADM/expander was freed from the overlying skin flaps the sutures holding it in place were released with scissors under direct visualization. Then using  traction, blunt dissection and very little monopolar electrocautery the deep surface of the expander was dissected free from the underlying pectoralis major muscle. The ADM was then removed from the expander.  The expander was inspected and found to be intact. A sample of subcutaneous tissue was removed from the deep surface of the superior mastectomy skin flap and sent for culture. The breast pocket was then irrigated with 2L of sterile saline using pulse lavage.  Meticulous hemostasis was achieved.  The breast pocket was then irrigated with dilute betadine solution.  Finally, the pocket was then irrigated with sterile saline once more.  The pocket was inspected and no ADM was found. Meticulous hemostasis was achieved.  A 15F pari drain was placed in the breast pocket and brought out of the lateral chest wall skin and secured with a 2-0 nylon suture. A 5mm rim of skin was sharply excised from the edges of the superior and inferior mastectomy skin flaps to removed unhealthy tissue and the previous scar as one single specimen. Marked - short stitch superior, long stitch lateral, two tails medial. This was sent to pathology for permanent as right breast skin. The incision was closed with 3-0 monocryl in a simple interrupted fashion in the deep dermis and a running 4-0 monocryl in a subcuticular fashion.     At the end of the case the patient awoke without complication. All counts were correct, three specimens were sent (LEFT and RIGHT breast skin, Left breast skin additional margin, AND RIGHT breast wound tissue).  I was present and scrubbed for the entirety of the case.  The patient was delivered to the PACU in stable condition.       Patient Disposition:  PACU

## 2024-12-29 NOTE — PROGRESS NOTES
"[facility]   Plastic and Reconstructive Surgery   [address] [place] [address]       Assessment & Plan  Malignant neoplasm of central portion of right breast in female, estrogen receptor positive (HCC)  Jenny Pereyra is a 49 year old female who is 4 weeks s/p bilateral mastectomy, R sentinel lymph node biopsy, and immediate bilateral breast reconstruction with tissue expanders.  Concern for viability of skin edges at medial 4cm of left breast incision. No fevers or chills. The condition of the left breast incision has not improved.  Underwent excision and closure today in the office.    -Continue to monitor left breast incision for signs of infection.   -do not get left breast wet for 24 hrs. Do not submerge incision.   -Call with any skin changes, fevers, chills, or drainage from the incision.  -Dress with bacitracin and clean dry gauze.  -F/u early next week for incision check.        Physical Exam   Alert, oriented, NAD  Breathing comfortably on room air  Left breast skin without erythema.  Medial 4cm of incision with macerated white skin along edges with scab at medial most aspect and at l central aspect. No purulence. Minimal clear drainage  Right breast, incision well-approximated with some skin glue still in place. Stable 1cm scab over lateral aspect of incision.     Skin excision    Date/Time: 12/12/2024 9:45 AM    Performed by: Karthik Brito MD  Authorized by: Karthik Brito MD  Universal Protocol:  procedure performed by consultantConsent: Verbal consent obtained. Written consent obtained.  Risks and benefits: risks, benefits and alternatives were discussed  Consent given by: patient  Time out: Immediately prior to procedure a \"time out\" was called to verify the correct patient, procedure, equipment, support staff and site/side marked as required.  Patient understanding: patient states understanding of the procedure being performed  Patient consent: the patient's understanding of the " procedure matches consent given  Patient identity confirmed: verbally with patient    The edges of the incision cleaned with alcohol swabs and then were anesthetized with 1% lidocaine with 1 100,000 epinephrine.  This was allowed to set in for approximately 10 minutes.  We then the left breast was prepped with povidone iodine and draped with sterile blue towels.  A 5 mm rim of skin was sharply excised from the superior and inferior edges of the incision along the medial most 4 cm.  This removed the unhealthy skin in question.  Small volume serous egress was observed.  The skin edges were then reapproximated with 4-0 Monocryl sutures in the deep dermis and 4-0 Prolene sutures in a simple interrupted horizontal mattress.  The patient tolerated the procedure well.  There were no specimen sent.  There were no complications.

## 2024-12-29 NOTE — ASSESSMENT & PLAN NOTE
Jenny Pereyra is a 49 year old female who is 4 weeks s/p bilateral mastectomy, R sentinel lymph node biopsy, and immediate bilateral breast reconstruction with tissue expanders.  Concern for viability of skin edges at medial 4cm of left breast incision. No fevers or chills. The condition of the left breast incision has not improved.  Underwent excision and closure today in the office.    -Continue to monitor left breast incision for signs of infection.   -do not get left breast wet for 24 hrs. Do not submerge incision.   -Call with any skin changes, fevers, chills, or drainage from the incision.  -Dress with bacitracin and clean dry gauze.  -F/u early next week for incision check.

## 2024-12-30 ENCOUNTER — TELEPHONE (OUTPATIENT)
Dept: OBGYN CLINIC | Facility: OTHER | Age: 49
End: 2024-12-30

## 2024-12-30 NOTE — PROGRESS NOTES
Outpatient Oncology Nutrition Consultation   Type of Consult: Follow Up  Care Location: Telephone Call  Nutrition Assessment:   Oncology Diagnosis & Treatments: Breast Cancer   S/p bilateral mastectomy 11/12/24  Started Letrozole/Femara in December 2024  Oncology History   Malignant neoplasm of central portion of right breast in female, estrogen receptor positive (HCC)   10/8/2024 Biopsy    Right breast ultrasound-guided biopsy  3 o'clock, 4 cm from nipple (open coil)  Invasive breast carcinoma with ductal and lobular features  Grade 1  ER , VT , HER2 0  Lymphovascular invasion not identified    Concordant. Unifocal / multifocal. SIZE. Concordant. Right malignancy appears unifocal; suspicious mass covers an area up to 6 mm. US right axilla negative. Left breast clear. Breast MRI should be considered given lobular features within the carcinoma and overall appearance of the breast parenchyma (scattered with borderline heterogeneously dense components).     10/11/2024 Genomic Testing    Reflex MammaPrint/BluePrint UltraLow Risk (Luminal A)     10/14/2024 -  Cancer Staged    Staging form: Breast, AJCC 8th Edition  - Clinical stage from 10/14/2024: Stage IA (cT1b, cN0, cM0, G1, ER+, VT+, HER2-) - Signed by Cassandra Lynn Cardarelli, MD on 10/14/2024  Histopathologic type: Lobular carcinoma, NOS  Stage prefix: Initial diagnosis  Method of lymph node assessment: Clinical  Nuclear grade: G1  Histologic grading system: 3 grade system       10/16/2024 Genetic Testing    NEGATIVE: No Clinically Significant Variants Detected  Ambry - A total of 59 genes were evaluated with RNA insight: APC, SUKHI, BAP1, BARD1, BMPR1A, BRCA1, BRCA2, BRIP1, CDH1, CDK4, CDKN1B, CDKN2A, CHEK2, DICER1, FH, FLCN, KIF1B, MAX, MEN1, MET, MLH1, MSH2, MSH6, MUTYH, NF1, NTHL1, PALB2, PMS2, POT1, PTEN, RAD51C, RAD51D, RB1, RET, SDHA, SDHAF2, SDHB, SDHC, SDHD, SMAD4, SMARCA4, STK11, ZHRI913, TP53, TSC1, TSC2 and VHL (sequencing and  deletion/duplication); AXIN2, CTNNA1, EGLN1, HOXB13, KIT, MITF, MSH3, PDGFRA, POLD1 and POLE (sequencing only); EPCAM and GREM1 (deletion/duplication only).     10/17/2024 Observation    Breast MRI IMPRESSION:  1.  Right breast 3:00 biopsy-proven invasive carcinoma measures up to 12 mm.  2.  Possible satellite lesion right breast 6:00 for which MRI guided biopsy is recommended if it would change clinical management.  3.  Otherwise, no MR evidence of contralateral left breast malignancy.  4.  No axillary or internal mammary adenopathy.     11/12/2024 Surgery    Right mastectomy with SLN biopsy (Dr. Cardarelli)  Invasive carcinoma with mixed ductal and lobular features  Grade 2  1.1 cm  Margins negative  0/5 Lymph nodes    Left simple mastectomy  Columnar cell change, usual ductal hyperplasia, apocrine metaplasia    Immediate reconstruction with tissue expanders (Dr. Brito)     11/20/2024 -  Cancer Staged    Staging form: Breast, AJCC 8th Edition  - Pathologic stage from 11/20/2024: Stage IA (pT1c, pN0, cM0, G2, ER+, FL+, HER2-) - Signed by Cassandra Lynn Cardarelli, MD on 11/20/2024  Histopathologic type: Lobular carcinoma, NOS  Stage prefix: Initial diagnosis  Histologic grading system: 3 grade system  Laterality: Right  Lymph-vascular invasion (LVI): LVI not present (absent)/not identified         Past Medical & Surgical Hx:   Patient Active Problem List   Diagnosis   • Lung nodule   • Umbilical hernia without obstruction and without gangrene   • Kidney stone on left side   • Essential tremor   • Insomnia   • Anxiety   • Gastroesophageal reflux disease without esophagitis   • PONV (postoperative nausea and vomiting)   • B12 deficiency   • Brain lipoma   • Chronic interstitial cystitis   • Chronic migraine without aura   • Stage 1 mild COPD by GOLD classification (HCC)   • DDD (degenerative disc disease), lumbar   • Excessive daytime sleepiness   • Hypersomnia   • Other hyperlipidemia   • Gross hematuria   •  Cubital tunnel syndrome, left   • Diverticulosis   • Other hemorrhoids   • Malignant neoplasm of central portion of right breast in female, estrogen receptor positive (HCC)   • Breast abscess   • History of penicillin allergy     Past Medical History:   Diagnosis Date   • Anxiety    • Brain lipoma 2007   • Breast cancer (HCC)    • COPD (chronic obstructive pulmonary disease) (HCC)    • GERD (gastroesophageal reflux disease)    • Kidney stone    • Motion sickness    • PONV (postoperative nausea and vomiting)    • Tremors of nervous system     essential     Past Surgical History:   Procedure Laterality Date   • ABDOMINAL WALL SURGERY Bilateral 12/18/2024    Procedure: CLOSURE WITH DRAIN PLACEMENT;  Surgeon: Karthik Brito MD;  [facility];  Service: Plastics   • APPENDECTOMY     • BLADDER SURGERY     • BREAST BIOPSY Right 10/08/2024    300 4cmfn, open coil clip   • CARPAL TUNNEL RELEASE Right 05/31/2024   • COLONOSCOPY     • FL RETROGRADE PYELOGRAM  12/15/2023   • HYSTERECTOMY      age 29 still has lt ovary   • IR DRAINAGE TUBE PLACEMENT  12/16/2024   • IR RADIOFREQUENCY ABLATION      esophagus   • LAMINECTOMY      twin, lumbar   • LUMBAR FUSION      L5-s1   • KS BX/EXC LYMPH NODE OPEN SUPERFICIAL Right 11/12/2024    Procedure: RIGHT SENTINEL LYMPH NODE MAPPING INCLUDING BLUE DYE INJECTION AND RADIOCOLLOID INJECTION, SENTINEL LYMPH NODE BIOPSY (INJECT AT 0730 BY DR CARDARELLI IN THE OR);  Surgeon: Cassandra Lynn Cardarelli, MD;  [facility];  Service: Surgical Oncology   • KS CYSTO/URETERO W/LITHOTRIPSY &INDWELL STENT INSRT Left 12/15/2023    Procedure: CYSTO USCOPE W/ LASER, & STENT;  Surgeon: Jhonatan Fleming MD;  [facility];  Service: Urology   • KS INJ RADIOACTIVE TRACER FOR ID OF SENTINEL NODE Right 11/12/2024    Procedure: RIGHT SENTINEL LYMPH NODE MAPPING INCLUDING BLUE DYE INJECTION AND RADIOCOLLOID INJECTION, SENTINEL LYMPH NODE BIOPSY (INJECT AT 0730 BY DR CARDARELLI IN THE  OR);  Surgeon: Cassandra Lynn Cardarelli, MD;  [facility];  Service: Surgical Oncology   • KY INTRAOP SENTINEL LYMPH NODE ID W/DYE INJECTION Right 11/12/2024    Procedure: RIGHT SENTINEL LYMPH NODE MAPPING INCLUDING BLUE DYE INJECTION AND RADIOCOLLOID INJECTION, SENTINEL LYMPH NODE BIOPSY (INJECT AT 0730 BY DR CARDARELLI IN THE OR);  Surgeon: Cassandra Lynn Cardarelli, MD;  [facility];  Service: Surgical Oncology   • KY MASTECTOMY SIMPLE COMPLETE Bilateral 11/12/2024    Procedure: BILATERAL MASTECTOMY;  Surgeon: Cassandra Lynn Cardarelli, MD;  [facility];  Service: Surgical Oncology   • KY NEUROPLASTY &/TRANSPOS MEDIAN NRV CARPAL TUNNE Right 05/31/2024    Procedure: RELEASE CARPAL TUNNEL;  Surgeon: Dorothea Mayo MD;  [facility];  Service: Orthopedics   • KY NEUROPLASTY &/TRANSPOS MEDIAN NRV CARPAL TUNNE Left 07/12/2024    Procedure: RELEASE CARPAL TUNNEL;  Surgeon: Dorothea Mayo MD;  [facility];  Service: Orthopedics   • KY NEUROPLASTY &/TRANSPOSITION ULNAR NERVE ELBOW Right 05/31/2024    Procedure: RELEASE CUBITAL TUNNEL POSSIBLE TRANSPOSITION AND ADIPOSE FLAP;  Surgeon: Dorothea Mayo MD;  [facility];  Service: Orthopedics   • KY NEUROPLASTY &/TRANSPOSITION ULNAR NERVE ELBOW Left 07/12/2024    Procedure: RELEASE CUBITAL TUNNEL;  Surgeon: Dorothea Mayo MD;  [facility];  Service: Orthopedics   • KY REMOVAL TISSUE EXPANDER W/O INSERTION IMPLANT Bilateral 12/18/2024    Procedure: removal bilateral tissue expanders;  Surgeon: Karthik Brito MD;  [facility];  Service: Plastics   • KY TISSUE EXPANDER PLACEMENT BREAST RECONSTRUCTION Bilateral 11/12/2024    Procedure: BILATERAL IMMEDIATE BREAST RECONSTRUCTION WITH TISSUE EXPANDERS AND ADM;  Surgeon: Karthik Brito MD;  [facility];  Service: Plastics   • TOTAL VAGINAL HYSTERECTOMY      AGE 29   • TUBAL LIGATION     • UPPER GASTROINTESTINAL ENDOSCOPY     • US  GUIDED BREAST BIOPSY RIGHT COMPLETE Right 10/08/2024   • WOUND DEBRIDEMENT Bilateral 12/18/2024    Procedure: DEBRIDEMENT WOUND (WASH OUT);  Surgeon: Karthik Brito MD;  [facility];  Service: Plastics     Review of Medications:   Vitamins, Supplements and Herbals: No, pt denies taking supplements    Current Outpatient Medications:   •  acetaminophen (TYLENOL) 500 mg tablet, Take 1 tablet (500 mg total) by mouth every 6 (six) hours as needed for mild pain, Disp: , Rfl:   •  ACIDOPHILUS LACTOBACILLUS PO, Take 1 tablet by mouth daily, Disp: , Rfl:   •  escitalopram (Lexapro) 10 mg tablet, Take 1 tablet (10 mg total) by mouth daily, Disp: 100 tablet, Rfl: 1  •  fenofibrate (TRICOR) 145 mg tablet, Take 1 tablet (145 mg total) by mouth daily, Disp: 100 tablet, Rfl: 1  •  gabapentin (NEURONTIN) 300 mg capsule, Take 1 capsule (300 mg total) by mouth daily at bedtime, Disp: 30 capsule, Rfl: 3  •  letrozole (FEMARA) 2.5 mg tablet, Take 1 tablet (2.5 mg total) by mouth daily, Disp: 90 tablet, Rfl: 3  •  Magnesium 400 MG CAPS, Take 1 capsule (400 mg total) by mouth in the morning, Disp: , Rfl:   •  tiotropium-olodaterol (Stiolto Respimat) 2.5-2.5 MCG/ACT inhaler, Inhale 2 puffs daily, Disp: 12 g, Rfl: 5    Most Recent Lab Results:   Lab Results   Component Value Date    WBC 7.90 12/20/2024    NEUTROABS 4.84 12/20/2024    IRON 86 04/06/2024    TIBC 426 04/06/2024    FERRITIN 70 04/06/2024    CHOLESTEROL 171 04/06/2024    TRIG 139 04/06/2024    HDL 74 04/06/2024    LDLCALC 69 04/06/2024    ALT 22 12/20/2024    AST 24 12/20/2024    ALB 3.5 12/20/2024    SODIUM 140 12/20/2024    SODIUM 136 12/19/2024    K 3.8 12/20/2024    K 4.3 12/19/2024     12/20/2024    BUN 19 12/20/2024    BUN 12 12/19/2024    CREATININE 0.75 12/20/2024    CREATININE 0.50 (L) 12/19/2024    EGFR 95 12/20/2024    TSH 2.90 12/11/2021    GLUF 77 10/14/2024    GLUF 92 04/06/2024    GLUC 113 12/20/2024    HGBA1C 5.5 04/06/2024    HGBA1C 5.3  "03/29/2023    HGBA1C 5.5 12/11/2021    CALCIUM 9.0 12/20/2024    MG 1.7 (L) 12/20/2024     Anthropometric Measurements:   Height: 62\"  Ht Readings from Last 1 Encounters:   12/24/24 5' 2\" (1.575 m)     Wt Readings from Last 20 Encounters:   12/24/24 65.4 kg (144 lb 3.2 oz)   12/15/24 65.8 kg (145 lb 1 oz)   12/02/24 64 kg (141 lb)   11/27/24 63.5 kg (140 lb)   11/12/24 63 kg (139 lb)   10/29/24 63 kg (139 lb)   10/28/24 63 kg (139 lb)   10/23/24 63 kg (139 lb)   10/22/24 64 kg (141 lb)   10/16/24 64 kg (141 lb)   10/14/24 64.9 kg (143 lb)   10/10/24 64.9 kg (143 lb)   10/08/24 63.5 kg (140 lb)   09/26/24 63.5 kg (140 lb)   07/29/24 63.5 kg (140 lb)   07/22/24 63.8 kg (140 lb 9.6 oz)   07/12/24 64.4 kg (142 lb)   06/24/24 65.4 kg (144 lb 3.2 oz)   06/10/24 64.4 kg (142 lb)   05/31/24 64.6 kg (142 lb 6.4 oz)     Weight History:   Usual Weight: unsure, was 188# 2 yr ago  Comments: wt has been fluctuating since COVID (unintended wt gain, followed by intentional loss, gained wt again, then lost)    Oncology Nutrition-Anthropometrics    Flowsheet Row Nutrition from 1/9/2025 in [facility] Nutrition from 11/21/2024 in [facility]   Patient [age] (years): 49 years 49 years   Patient (female) height (in): 62 in 62 in   Current Weight to be used for anthropometric calculations (lbs) 144.2 lbs 139 lbs   Current Weight to be used for anthropometric calculations (kg) 65.5 kg 63.2 kg   BMI: 26.4 25.4   IBW female: 110 lbs 110 lbs   IBW (kg) female: 50 kg 50 kg   IBW % (female) 131.1 % 126.4 %   Adjusted BW (female): 118.6 lbs 117.3 lbs   Adjusted BW kg (female): 53.9 kg 53.3 kg   % weight change after 1 month: 3 % -2.8 %   Weight change after 1 month (lbs) 4.2 lbs -4 lbs   % weight change after 6 months: -- -5.4 %   Weight change after 6 months (lbs) -- -8 lbs        Nutrition-Focused Physical Findings: n/a due to telephone " call    Food/Nutrition-Related History & Client/Social History:    Current Nutrition Impact Symptoms:  [] Nausea  [x] Reduced Appetite - improving  [] Acid Reflux    [] Vomiting  [] Unintended Wt Loss - hx [] Malabsorption    [] Diarrhea - hx nocturnal diarrhea (pt unaware until she wakes up), no recent episodes  -has seen GI, cause is unknown at this time [] Unintended Wt Gain  [] Dumping Syndrome    [] Constipation  [] Thick Mucous/Secretions  [] Abdominal Pain    [] Dysgeusia (Altered Taste)  [] Xerostomia (Dry Mouth)  [] Gas    [] Dysosmia (Altered Smell)  [] Thrush  [] Difficulty Chewing    [] Oral Mucositis (Sore Mouth)  [] Fatigue  [] Hyperglycemia   Lab Results   Component Value Date    HGBA1C 5.5 04/06/2024      [] Odynophagia  [] Esophagitis  [] Other:    [] Dysphagia  [] Early Satiety  [] No Problems Eating      Food Allergies & Intolerances: yes: lactose intolerance (can tolerate certain cheeses and Greek yogurt, cannot have milk or ice cream)    Current Diet: Regular Diet, No Restrictions  States she doesn't like a lot of fruits and veggies d/t texture, likes bananas and grapes.  Willing to eat more F&V in smoothie form.  Nutrition Route: PO  Activity level: ADL's & walks the dog BID, gets some SOB  Social: limited finances right now, so does not have a lot of fresh food at home    24 Hr Diet Recall:   Breakfast: protein shake or a bowl of Cocoa yesika with almond milk; just got oatmeal to start having at breakfast  Snack: none  Lunch: leftovers from dinner or protein shake or nothing  Snack: none but sometimes will have small bag of Cheese Its  Dinner: (6-7pm) 9 oz rotisserie chicken, 1/2 c mashed potatoes  Snack: does not eat after 7pm, has been going to bed around 11pm    Beverages: water (16.9 oz x5-6+), light [place] iced tea (a couple swallows with meals), protein shake (QD), hot tea with creamer and honey (sometimes at night)  Supplements:   [employer] - caused immediate BM and abd  cramping, discontinued these  JBN protein powder mixed with fruit, yogurt, and almond milk - 1 daily at either breakfast or lunch    Oncology Nutrition-Estimated Needs    Flowsheet Row Nutrition from 10/31/2024 in [facility]   Weight type used Adjusted weight   Weight in kilograms (kg) used for estimated needs 53.3 kg   Energy needs formula:  25-30 kcal/kg   Energy needs based on 25 kcal/k kcal   Energy needs based on 30 kcal/k kcal   Protein needs formula: 1.2-1.5 g/kg   Protein needs based on 1.2 g/k g   Protein needs based on 1.5 g/kg 80 g   Fluid needs formula: 30-35 mL/kg   Fluid needs based on 30 mL/kg 1596 mL   Fluid needs in ounces 54 oz   Fluid needs based on 35 mL/kg 1862 mL   Fluid needs in ounces 63 oz         Discussion & Intervention:   Jenny was evaluated today for an RD follow up regarding unintended weight loss.  Jenny  is s/p bilateral mastectomy on 24 .  She is doing well today.  She is recovering from surgery and a recent infection.  Her appetite and wt are improving.  She would like to improve the quality of her diet to promote healing and her overall health.   Reviewed 24 hour recall, which revealed an suboptimal po intake, and discussed ways to increase energy intake, increase protein intake, and optimize nutrient intake to promote healing.  Also reviewed the importance of wt management throughout the healing process and the role of a cancer preventative diet and high protein & low fat diet  in managing wt and overall health.  Based on today's assessment, discussion included: MNT for: healing, reduced appetite, adequate hydration & fluid choices, eating smaller more frequent meals every 2-3 hours (5-6 small meals/day), and utilizing oral nutrition supplements.   Moving forward, Jenny was encouraged to increase kcal and protein intakes and was encouraged to transition to a cancer-preventative lifestyle.  Materials Provided:  not applicable  All questions and concerns addressed during today’s visit.  Jenny has RD contact information.    Nutrition Diagnosis:   Increased Nutrient Needs (kcal & pro) related to increased demand for nutrients and disease state as evidenced by recovering from cancer tx.  Monitoring & Evaluation:   Goals:  weight maintenance/stabilization  adequate nutrition impact symptom management  pt to meet >/=75% estimated nutrition needs daily    Progress Towards Goals: Progressing    Nutrition Rx & Recommendations:  Diet: High Protein, Low Fat, High Fiber Diet, Lactose-free (or low-lactose if tolerated)  Small, frequent meals/snacks may be easier to tolerate than 3 large daily meals.  Aim for 5-6 small meals per day (every 2-3 hours).  Include protein at all meals/snacks.  Include a variety of foods (as tolerated/allowed by diet).  Follow a Cancer Preventative Nutrition Pattern: colorful, plant-based, low-fat, avoid added sugars, limit alcohol, include high fiber foods, limit processed meats, limit red meat, choose lean protein sources, use low-fat cooking methods, balance calories with physical activity, avoid excessive weight gain throughout life.  Incorporate physical activity as able/allowed.  Stay hydrated by sipping fluids of choice/tolerance throughout the day.  Refer to Eating Hints book for other meal/snack ideas and symptom management.  For appetite loss: try powdered or liquid nutrition supplements; eating by the clock every 2-3 hours; set a timer to remind yourself to eat, keep snacks nearby; add extra protein & calories to your diet; drink liquids in between meals, choose liquids that add calories; eat a bedtime snack; eat soft, cool or frozen foods; eat a larger meal when you feel well & rested; only sip small amounts of liquids during meals (see pages 10-12 in your Eating Hints book).  Weigh yourself regularly. If you notice weight loss, make an effort to increase your daily food/calorie intake. If you  continue to notice loss after these efforts, reach out to your dietitian to establish a plan to stabilize weight.  Continue high protein smoothie daily - incorporate fruits and veggies into these  Try not to skip meals/snacks and have a well-balanced breakfast  Choose a lean (low-fat) protein source at each meal and snack: chicken, turkey, fish, seafood, Greek yogurt, nuts, nut butter, beans, lentils, legumes, etc. (See Protein Dense Foods handout previously provided)  Increase fruit and vegetable intake - choose produce you enjoy, add flavor as needed  Aim for 55-65 oz of fluid daily  Aim for 25-30 grams of fiber daily  Gradually increase your fiber intake to avoid GI upset, stay well hydrated as you are increasing your fiber intake    Follow Up Plan:  2/20 at 1pm by phone  Recommend Referral to Other Providers: none at this time

## 2025-01-02 ENCOUNTER — TELEPHONE (OUTPATIENT)
Dept: PLASTIC SURGERY | Facility: CLINIC | Age: 50
End: 2025-01-02

## 2025-01-02 ENCOUNTER — CLINICAL SUPPORT (OUTPATIENT)
Dept: OBGYN CLINIC | Facility: OTHER | Age: 50
End: 2025-01-02

## 2025-01-02 ENCOUNTER — TELEPHONE (OUTPATIENT)
Dept: OBGYN CLINIC | Facility: OTHER | Age: 50
End: 2025-01-02

## 2025-01-02 DIAGNOSIS — C50.111 MALIGNANT NEOPLASM OF CENTRAL PORTION OF RIGHT BREAST IN FEMALE, ESTROGEN RECEPTOR POSITIVE (HCC): Primary | ICD-10-CM

## 2025-01-02 DIAGNOSIS — Z17.0 MALIGNANT NEOPLASM OF CENTRAL PORTION OF RIGHT BREAST IN FEMALE, ESTROGEN RECEPTOR POSITIVE (HCC): Primary | ICD-10-CM

## 2025-01-02 NOTE — TELEPHONE ENCOUNTER
Lvm for pt to come in today to [facility] for post op and possible suture removal. Held 2:30 for pt this afternoon

## 2025-01-02 NOTE — PROGRESS NOTES
Mastectomy Bra Fitting Order Details    Jenny SALAZAR [facility]  1975  [id number]    Reason For Visit  Mastectomy bra and prosthesis     Precautions   History of breast cancer     Subjective  Jenny underwent a bilateral mastectomy with expanders. States that she had an infection and both expanders were removed. She is undecided at this time if she will pursue expanders in the future.     States that she was wearing a 38B cup pre surgery.     Surgery Type: Mastectomy    Lymph node removal yes    Date of surgery 11/12/2024    Surgical side bilateral    Objective  Jenny's incision is healing well. She is wearing a compression garment currently around chest wall.     Assessment  Band - 16 x 2 = 32 + 4 = 36     Plan  Ship to home. Jenny was instructed on the wear and care of her products.     Order Details   Shipped on 1/2/2025  Kaci  36C off white x 1   Mona medium nude x 2   Valetta black size 10 x 1   Valetta dark rapp size 10 x 1  Valetta white size 10 x 1  ORDER:  Kaci 36 C farida nude x 1  Kaci 36 C mocha x 1  Natura light style 400 size 8 x 2

## 2025-01-03 ENCOUNTER — PATIENT OUTREACH (OUTPATIENT)
Dept: CASE MANAGEMENT | Facility: OTHER | Age: 50
End: 2025-01-03

## 2025-01-03 NOTE — PROGRESS NOTES
OSW received an email from pt this morning asking if she can receive assistance with compassion funds to assist with her sary bill. OSW encouraged her to send it over to this writer and I will then submit for approval.     ADDENDUM:  OSW received an email confirming that the pts request has been approved. OSW sent an email informing patient of same.

## 2025-01-06 ENCOUNTER — TELEPHONE (OUTPATIENT)
Dept: OBGYN CLINIC | Facility: OTHER | Age: 50
End: 2025-01-06

## 2025-01-09 ENCOUNTER — NUTRITION (OUTPATIENT)
Dept: NUTRITION | Facility: CLINIC | Age: 50
End: 2025-01-09

## 2025-01-09 ENCOUNTER — OFFICE VISIT (OUTPATIENT)
Age: 50
End: 2025-01-09

## 2025-01-09 DIAGNOSIS — Z71.3 NUTRITIONAL COUNSELING: Primary | ICD-10-CM

## 2025-01-09 DIAGNOSIS — Z17.0 MALIGNANT NEOPLASM OF CENTRAL PORTION OF RIGHT BREAST IN FEMALE, ESTROGEN RECEPTOR POSITIVE (HCC): Primary | ICD-10-CM

## 2025-01-09 DIAGNOSIS — C50.111 MALIGNANT NEOPLASM OF CENTRAL PORTION OF RIGHT BREAST IN FEMALE, ESTROGEN RECEPTOR POSITIVE (HCC): Primary | ICD-10-CM

## 2025-01-09 PROCEDURE — 99024 POSTOP FOLLOW-UP VISIT: CPT | Performed by: PLASTIC SURGERY

## 2025-01-09 NOTE — PATIENT INSTRUCTIONS
Nutrition Rx & Recommendations:  Diet: High Protein, Low Fat, High Fiber Diet, Lactose-free (or low-lactose if tolerated)  Small, frequent meals/snacks may be easier to tolerate than 3 large daily meals.  Aim for 5-6 small meals per day (every 2-3 hours).  Include protein at all meals/snacks.  Include a variety of foods (as tolerated/allowed by diet).  Follow a Cancer Preventative Nutrition Pattern: colorful, plant-based, low-fat, avoid added sugars, limit alcohol, include high fiber foods, limit processed meats, limit red meat, choose lean protein sources, use low-fat cooking methods, balance calories with physical activity, avoid excessive weight gain throughout life.  Incorporate physical activity as able/allowed.  Stay hydrated by sipping fluids of choice/tolerance throughout the day.  Refer to Eating Hints book for other meal/snack ideas and symptom management.  For appetite loss: try powdered or liquid nutrition supplements; eating by the clock every 2-3 hours; set a timer to remind yourself to eat, keep snacks nearby; add extra protein & calories to your diet; drink liquids in between meals, choose liquids that add calories; eat a bedtime snack; eat soft, cool or frozen foods; eat a larger meal when you feel well & rested; only sip small amounts of liquids during meals (see pages 10-12 in your Eating Hints book).  Weigh yourself regularly. If you notice weight loss, make an effort to increase your daily food/calorie intake. If you continue to notice loss after these efforts, reach out to your dietitian to establish a plan to stabilize weight.  Continue high protein smoothie daily - incorporate fruits and veggies into these  Try not to skip meals/snacks and have a well-balanced breakfast  Choose a lean (low-fat) protein source at each meal and snack: chicken, turkey, fish, seafood, Greek yogurt, nuts, nut butter, beans, lentils, legumes, etc. (See Protein Dense Foods handout previously provided)  Increase  fruit and vegetable intake - choose produce you enjoy, add flavor as needed  Aim for 55-65 oz of fluid daily  Aim for 25-30 grams of fiber daily  Gradually increase your fiber intake to avoid GI upset, stay well hydrated as you are increasing your fiber intake    Follow Up Plan: 2/20 at 1pm by phone  Recommend Referral to Other Providers: none at this time

## 2025-01-09 NOTE — PROGRESS NOTES
[facility]   Plastic and Reconstructive Surgery   [address] [place] [address]       CLINIC NOTE      Assessment & Plan  Malignant neoplasm of central portion of right breast in female, estrogen receptor positive (HCC)  Jenny Pereyra is a 49-year-old female who is 8 weeks status post bilateral mastectomy, right sentinel lymph node biopsy, and immediate bilateral breast reconstruction with tissue expanders.  Postoperative course complicated by infection right reconstructed breast with partial wound dehiscence and exposure of tissue expander.  Patient is now 3 weeks s/p removal of bilateral tissue expanders, washout and closure. Patient has completed 10 day course of augmentin and linezolid.      Patient doing well overall. B/l chest incisions well healed. No signs or symptoms of infection.  Patient notes that she's not entirely happy with being flat but is still nervous about the risks involved with additional surgery.  Discussed with the patient that there is no pressure to make a decision in this moment.  Patient has met with the [facility] clinic and will have items delivered within the next week.      - No range of motion restrictions  - Compression x 1 more week for total of 4 weeks  - May begin scar massage in 1 week  - No heavy lifting > 20 lbs  - F/u 1 month or sooner if any questions or concerns arise     Chief Complaint:  3 weeks s/p removal of bilateral breast tissue expanders due to infection/exposure of R expander    Interval History:  Patient is now 3 weeks s/p removal of bilateral tissue expanders, washout and closure.  Jenny notes that she is doing well overall.  She is physically comfortable overall, noting some tightness in her axilla. Denies fevers, chills, redness or drainage from surgical sites.  She notes that she is still feeling fatigued but this is something she was experiencing prior to surgery.      Physical Exam   Alert, oriented, NAD  Breathing comfortably on room air  Bilateral  transverse chest incisions well approximated without dehiscence, no appreciable erythema surrounding skin.  R chest with significant improvement in edema and  contour - incisions sitting nearly flat  L chest flat medially with a small amount of excess tissue over lateral chest wall.

## 2025-01-09 NOTE — ASSESSMENT & PLAN NOTE
Jenny Pereyra is a 49-year-old female who is 8 weeks status post bilateral mastectomy, right sentinel lymph node biopsy, and immediate bilateral breast reconstruction with tissue expanders.  Postoperative course complicated by infection right reconstructed breast with partial wound dehiscence and exposure of tissue expander.  Patient is now 3 weeks s/p removal of bilateral tissue expanders, washout and closure. Patient has completed 10 day course of augmentin and linezolid.      Patient doing well overall. B/l chest incisions well healed. No signs or symptoms of infection.  Patient notes that she's not entirely happy with being flat but is still nervous about the risks involved with additional surgery.  Discussed with the patient that there is no pressure to make a decision in this moment.  Patient has met with the BRA clinic and will have items delivered within the next week.      - No range of motion restrictions  - Compression x 1 more week for total of 4 weeks  - May begin scar massage in 1 week  - No heavy lifting > 20 lbs  - F/u 1 month or sooner if any questions or concerns arise

## 2025-01-13 ENCOUNTER — PATIENT OUTREACH (OUTPATIENT)
Dept: HEMATOLOGY ONCOLOGY | Facility: CLINIC | Age: 50
End: 2025-01-13

## 2025-01-13 NOTE — PROGRESS NOTES
Called and LVM for patient mentioning that I was calling to follow up with her to make sure all is well. Mentioned that if patient should have any questions,concerns or any new barriers to care. Mentioned that I was calling from my direct line and she can contact me at her convenience.

## 2025-01-24 DIAGNOSIS — F41.9 ANXIETY: ICD-10-CM

## 2025-01-24 DIAGNOSIS — Z17.0 MALIGNANT NEOPLASM OF CENTRAL PORTION OF RIGHT BREAST IN FEMALE, ESTROGEN RECEPTOR POSITIVE (HCC): ICD-10-CM

## 2025-01-24 DIAGNOSIS — C50.111 MALIGNANT NEOPLASM OF CENTRAL PORTION OF RIGHT BREAST IN FEMALE, ESTROGEN RECEPTOR POSITIVE (HCC): ICD-10-CM

## 2025-01-24 RX ORDER — LETROZOLE 2.5 MG/1
2.5 TABLET, FILM COATED ORAL DAILY
Qty: 90 TABLET | Refills: 3 | Status: SHIPPED | OUTPATIENT
Start: 2025-01-24

## 2025-01-24 RX ORDER — ESCITALOPRAM OXALATE 10 MG/1
10 TABLET ORAL DAILY
Qty: 90 TABLET | Refills: 1 | Status: SHIPPED | OUTPATIENT
Start: 2025-01-24

## 2025-01-25 ENCOUNTER — APPOINTMENT (OUTPATIENT)
Dept: LAB | Facility: IMAGING CENTER | Age: 50
End: 2025-01-25
Payer: COMMERCIAL

## 2025-01-25 DIAGNOSIS — E83.42 HYPOMAGNESEMIA: ICD-10-CM

## 2025-01-25 DIAGNOSIS — E78.49 OTHER HYPERLIPIDEMIA: ICD-10-CM

## 2025-01-25 DIAGNOSIS — N61.1 BREAST ABSCESS: ICD-10-CM

## 2025-01-25 LAB
ERYTHROCYTE [DISTWIDTH] IN BLOOD BY AUTOMATED COUNT: 13.3 % (ref 11.6–15.1)
HCT VFR BLD AUTO: 43.6 % (ref 34.8–46.1)
HGB BLD-MCNC: 14.1 G/DL (ref 11.5–15.4)
MAGNESIUM SERPL-MCNC: 2 MG/DL (ref 1.9–2.7)
MCH RBC QN AUTO: 29.9 PG (ref 26.8–34.3)
MCHC RBC AUTO-ENTMCNC: 32.3 G/DL (ref 31.4–37.4)
MCV RBC AUTO: 92 FL (ref 82–98)
PLATELET # BLD AUTO: 358 THOUSANDS/UL (ref 149–390)
PMV BLD AUTO: 10.6 FL (ref 8.9–12.7)
RBC # BLD AUTO: 4.72 MILLION/UL (ref 3.81–5.12)
WBC # BLD AUTO: 9.16 THOUSAND/UL (ref 4.31–10.16)

## 2025-01-25 PROCEDURE — 36415 COLL VENOUS BLD VENIPUNCTURE: CPT

## 2025-01-25 PROCEDURE — 83735 ASSAY OF MAGNESIUM: CPT

## 2025-01-25 PROCEDURE — 85027 COMPLETE CBC AUTOMATED: CPT

## 2025-01-27 ENCOUNTER — PATIENT OUTREACH (OUTPATIENT)
Dept: CASE MANAGEMENT | Facility: OTHER | Age: 50
End: 2025-01-27

## 2025-01-27 NOTE — PROGRESS NOTES
OSW emailed the pt this day to check in and see how she is doing. OSW encouraged her to reach out with any needs.

## 2025-01-28 ENCOUNTER — RESULTS FOLLOW-UP (OUTPATIENT)
Dept: INTERNAL MEDICINE CLINIC | Facility: OTHER | Age: 50
End: 2025-01-28

## 2025-02-05 ENCOUNTER — TELEPHONE (OUTPATIENT)
Age: 50
End: 2025-02-05

## 2025-02-05 NOTE — TELEPHONE ENCOUNTER
Pt calling us back regarding potential snow storm tomorrow, moving her appt to afternoon    Checked w/Khadra, she said we can move her to 4pm, still at [facility] still with Dr Brito. Pt agreed, Khadra will move appt

## 2025-02-10 NOTE — PROGRESS NOTES
Outpatient Oncology Nutrition Consultation   Type of Consult: Follow Up  Care Location: Telephone Call  Nutrition Assessment:   Oncology Diagnosis & Treatments: Breast Cancer  S/p bilateral mastectomy 11/12/24  Started Letrozole/Femara in December 2024  Planning to have reconstructive surgery  Oncology History   Malignant neoplasm of central portion of right breast in female, estrogen receptor positive (HCC)   10/8/2024 Biopsy    Right breast ultrasound-guided biopsy  3 o'clock, 4 cm from nipple (open coil)  Invasive breast carcinoma with ductal and lobular features  Grade 1  ER , DE , HER2 0  Lymphovascular invasion not identified    Concordant. Unifocal / multifocal. SIZE. Concordant. Right malignancy appears unifocal; suspicious mass covers an area up to 6 mm. US right axilla negative. Left breast clear. Breast MRI should be considered given lobular features within the carcinoma and overall appearance of the breast parenchyma (scattered with borderline heterogeneously dense components).     10/11/2024 Genomic Testing    Reflex MammaPrint/BluePrint UltraLow Risk (Luminal A)     10/14/2024 -  Cancer Staged    Staging form: Breast, AJCC 8th Edition  - Clinical stage from 10/14/2024: Stage IA (cT1b, cN0, cM0, G1, ER+, DE+, HER2-) - Signed by Cassandra Lynn Cardarelli, MD on 10/14/2024  Histopathologic type: Lobular carcinoma, NOS  Stage prefix: Initial diagnosis  Method of lymph node assessment: Clinical  Nuclear grade: G1  Histologic grading system: 3 grade system       10/16/2024 Genetic Testing    NEGATIVE: No Clinically Significant Variants Detected  [place] - A total of 59 genes were evaluated with RNA insight: APC, SUKHI, BAP1, BARD1, BMPR1A, BRCA1, BRCA2, BRIP1, CDH1, CDK4, CDKN1B, CDKN2A, CHEK2, DICER1, FH, FLCN, KIF1B, MAX, MEN1, MET, MLH1, MSH2, MSH6, MUTYH, NF1, NTHL1, PALB2, PMS2, POT1, PTEN, RAD51C, RAD51D, RB1, RET, SDHA, SDHAF2, SDHB, SDHC, SDHD, SMAD4, SMARCA4, STK11, RLHV582, TP53, TSC1, TSC2  and VHL (sequencing and deletion/duplication); AXIN2, CTNNA1, EGLN1, HOXB13, KIT, MITF, MSH3, PDGFRA, POLD1 and POLE (sequencing only); EPCAM and GREM1 (deletion/duplication only).     10/17/2024 Observation    Breast MRI IMPRESSION:  1.  Right breast 3:00 biopsy-proven invasive carcinoma measures up to 12 mm.  2.  Possible satellite lesion right breast 6:00 for which MRI guided biopsy is recommended if it would change clinical management.  3.  Otherwise, no MR evidence of contralateral left breast malignancy.  4.  No axillary or internal mammary adenopathy.     11/12/2024 Surgery    Right mastectomy with SLN biopsy (Dr. Cardarelli)  Invasive carcinoma with mixed ductal and lobular features  Grade 2  1.1 cm  Margins negative  0/5 Lymph nodes    Left simple mastectomy  Columnar cell change, usual ductal hyperplasia, apocrine metaplasia    Immediate reconstruction with tissue expanders (Dr. Brito)     11/20/2024 -  Cancer Staged    Staging form: Breast, AJCC 8th Edition  - Pathologic stage from 11/20/2024: Stage IA (pT1c, pN0, cM0, G2, ER+, WI+, HER2-) - Signed by Cassandra Lynn Cardarelli, MD on 11/20/2024  Histopathologic type: Lobular carcinoma, NOS  Stage prefix: Initial diagnosis  Histologic grading system: 3 grade system  Laterality: Right  Lymph-vascular invasion (LVI): LVI not present (absent)/not identified         Past Medical & Surgical Hx:   Patient Active Problem List   Diagnosis   • Lung nodule   • Umbilical hernia without obstruction and without gangrene   • Kidney stone on left side   • Essential tremor   • Insomnia   • Anxiety   • Gastroesophageal reflux disease without esophagitis   • PONV (postoperative nausea and vomiting)   • B12 deficiency   • Brain lipoma   • Chronic interstitial cystitis   • Chronic migraine without aura   • Stage 1 mild COPD by GOLD classification (HCC)   • DDD (degenerative disc disease), lumbar   • Excessive daytime sleepiness   • Hypersomnia   • Other hyperlipidemia   •  Gross hematuria   • Cubital tunnel syndrome, left   • Diverticulosis   • Other hemorrhoids   • Malignant neoplasm of central portion of right breast in female, estrogen receptor positive (HCC)   • Breast abscess   • History of penicillin allergy     Past Medical History:   Diagnosis Date   • Anxiety    • Brain lipoma 2007   • Breast cancer (HCC)    • COPD (chronic obstructive pulmonary disease) (HCC)    • GERD (gastroesophageal reflux disease)    • Kidney stone    • Motion sickness    • PONV (postoperative nausea and vomiting)    • Tremors of nervous system     essential     Past Surgical History:   Procedure Laterality Date   • ABDOMINAL WALL SURGERY Bilateral 12/18/2024    Procedure: CLOSURE WITH DRAIN PLACEMENT;  Surgeon: Karthik Brito MD;  [facility];  Service: Plastics   • APPENDECTOMY     • BLADDER SURGERY     • BREAST BIOPSY Right 10/08/2024    300 4cmfn, open coil clip   • CARPAL TUNNEL RELEASE Right 05/31/2024   • COLONOSCOPY     • FL RETROGRADE PYELOGRAM  12/15/2023   • HYSTERECTOMY      age 29 still has lt ovary   • IR DRAINAGE TUBE PLACEMENT  12/16/2024   • IR RADIOFREQUENCY ABLATION      esophagus   • LAMINECTOMY      twin, lumbar   • LUMBAR FUSION      L5-s1   • VT BX/EXC LYMPH NODE OPEN SUPERFICIAL Right 11/12/2024    Procedure: RIGHT SENTINEL LYMPH NODE MAPPING INCLUDING BLUE DYE INJECTION AND RADIOCOLLOID INJECTION, SENTINEL LYMPH NODE BIOPSY (INJECT AT 0730 BY DR CARDARELLI IN THE OR);  Surgeon: Cassandra Lynn Cardarelli, MD;  [facility];  Service: Surgical Oncology   • VT CYSTO/URETERO W/LITHOTRIPSY &INDWELL STENT INSRT Left 12/15/2023    Procedure: CYSTO USCOPE W/ LASER, & STENT;  Surgeon: Jhonatan Fleming MD;  [facility];  Service: Urology   • VT INJ RADIOACTIVE TRACER FOR ID OF SENTINEL NODE Right 11/12/2024    Procedure: RIGHT SENTINEL LYMPH NODE MAPPING INCLUDING BLUE DYE INJECTION AND RADIOCOLLOID INJECTION, SENTINEL LYMPH NODE BIOPSY (INJECT AT 0730 BY   CARDARELLI IN THE OR);  Surgeon: Cassandra Lynn Cardarelli, MD;  [facility];  Service: Surgical Oncology   • NH INTRAOP SENTINEL LYMPH NODE ID W/DYE INJECTION Right 11/12/2024    Procedure: RIGHT SENTINEL LYMPH NODE MAPPING INCLUDING BLUE DYE INJECTION AND RADIOCOLLOID INJECTION, SENTINEL LYMPH NODE BIOPSY (INJECT AT 0730 BY DR CARDARELLI IN THE OR);  Surgeon: Cassandra Lynn Cardarelli, MD;  [facility];  Service: Surgical Oncology   • NH MASTECTOMY SIMPLE COMPLETE Bilateral 11/12/2024    Procedure: BILATERAL MASTECTOMY;  Surgeon: Cassandra Lynn Cardarelli, MD;  [facility];  Service: Surgical Oncology   • NH NEUROPLASTY &/TRANSPOS MEDIAN NRV CARPAL TUNNE Right 05/31/2024    Procedure: RELEASE CARPAL TUNNEL;  Surgeon: Dorothea Mayo MD;  [facility];  Service: Orthopedics   • NH NEUROPLASTY &/TRANSPOS MEDIAN NRV CARPAL TUNNE Left 07/12/2024    Procedure: RELEASE CARPAL TUNNEL;  Surgeon: Dorothea Mayo MD;  [facility];  Service: Orthopedics   • NH NEUROPLASTY &/TRANSPOSITION ULNAR NERVE ELBOW Right 05/31/2024    Procedure: RELEASE CUBITAL TUNNEL POSSIBLE TRANSPOSITION AND ADIPOSE FLAP;  Surgeon: Dorothea Mayo MD;  [facility];  Service: Orthopedics   • NH NEUROPLASTY &/TRANSPOSITION ULNAR NERVE ELBOW Left 07/12/2024    Procedure: RELEASE CUBITAL TUNNEL;  Surgeon: Dorothea Mayo MD;  [facility];  Service: Orthopedics   • NH REMOVAL TISSUE EXPANDER W/O INSERTION IMPLANT Bilateral 12/18/2024    Procedure: removal bilateral tissue expanders;  Surgeon: Karthik Brito MD;  [facility];  Service: Plastics   • NH TISSUE EXPANDER PLACEMENT BREAST RECONSTRUCTION Bilateral 11/12/2024    Procedure: BILATERAL IMMEDIATE BREAST RECONSTRUCTION WITH TISSUE EXPANDERS AND ADM;  Surgeon: Karthik Brito MD;  [facility];  Service: Plastics   • TOTAL VAGINAL HYSTERECTOMY      AGE 29   • TUBAL LIGATION     • UPPER GASTROINTESTINAL  ENDOSCOPY     • US GUIDED BREAST BIOPSY RIGHT COMPLETE Right 10/08/2024   • WOUND DEBRIDEMENT Bilateral 12/18/2024    Procedure: DEBRIDEMENT WOUND (WASH OUT);  Surgeon: Karthik Brito MD;  [facility];  Service: Plastics     Review of Medications:   Vitamins, Supplements and Herbals: No, pt denies taking supplements    Current Outpatient Medications:   •  acetaminophen (TYLENOL) 500 mg tablet, Take 1 tablet (500 mg total) by mouth every 6 (six) hours as needed for mild pain, Disp: , Rfl:   •  ACIDOPHILUS LACTOBACILLUS PO, Take 1 tablet by mouth daily, Disp: , Rfl:   •  Armodafinil 150 MG tablet, TAKE ONE TABLET BY MOUTH DAILY, Disp: 30 tablet, Rfl: 2  •  escitalopram (Lexapro) 10 mg tablet, Take 1 tablet (10 mg total) by mouth daily, Disp: 90 tablet, Rfl: 1  •  fenofibrate (TRICOR) 145 mg tablet, Take 1 tablet (145 mg total) by mouth daily, Disp: 100 tablet, Rfl: 1  •  gabapentin (NEURONTIN) 300 mg capsule, Take 1 capsule (300 mg total) by mouth daily at bedtime, Disp: 30 capsule, Rfl: 3  •  letrozole (FEMARA) 2.5 mg tablet, Take 1 tablet (2.5 mg total) by mouth daily, Disp: 90 tablet, Rfl: 3  •  Magnesium 400 MG CAPS, Take 1 capsule (400 mg total) by mouth in the morning, Disp: , Rfl:   •  tiotropium-olodaterol (Stiolto Respimat) 2.5-2.5 MCG/ACT inhaler, Inhale 2 puffs daily, Disp: 12 g, Rfl: 5    Most Recent Lab Results:   Lab Results   Component Value Date    WBC 9.16 01/25/2025    NEUTROABS 4.84 12/20/2024    IRON 86 04/06/2024    TIBC 426 04/06/2024    FERRITIN 70 04/06/2024    CHOLESTEROL 171 04/06/2024    TRIG 139 04/06/2024    HDL 74 04/06/2024    LDLCALC 69 04/06/2024    ALT 22 12/20/2024    AST 24 12/20/2024    ALB 3.5 12/20/2024    SODIUM 140 12/20/2024    SODIUM 136 12/19/2024    K 3.8 12/20/2024    K 4.3 12/19/2024     12/20/2024    BUN 19 12/20/2024    BUN 12 12/19/2024    CREATININE 0.75 12/20/2024    CREATININE 0.50 (L) 12/19/2024    EGFR 95 12/20/2024    TSH 2.90 12/11/2021    GLUF  "77 10/14/2024    GLUF 92 04/06/2024    GLUC 113 12/20/2024    HGBA1C 5.5 04/06/2024    HGBA1C 5.3 03/29/2023    HGBA1C 5.5 12/11/2021    CALCIUM 9.0 12/20/2024    MG 2.0 01/25/2025     Anthropometric Measurements:   Height: 62\"  Ht Readings from Last 1 Encounters:   12/24/24 5' 2\" (1.575 m)     Wt Readings from Last 20 Encounters:   12/24/24 65.4 kg (144 lb 3.2 oz)   12/15/24 65.8 kg (145 lb 1 oz)   12/02/24 64 kg (141 lb)   11/27/24 63.5 kg (140 lb)   11/12/24 63 kg (139 lb)   10/29/24 63 kg (139 lb)   10/28/24 63 kg (139 lb)   10/23/24 63 kg (139 lb)   10/22/24 64 kg (141 lb)   10/16/24 64 kg (141 lb)   10/14/24 64.9 kg (143 lb)   10/10/24 64.9 kg (143 lb)   10/08/24 63.5 kg (140 lb)   09/26/24 63.5 kg (140 lb)   07/29/24 63.5 kg (140 lb)   07/22/24 63.8 kg (140 lb 9.6 oz)   07/12/24 64.4 kg (142 lb)   06/24/24 65.4 kg (144 lb 3.2 oz)   06/10/24 64.4 kg (142 lb)   05/31/24 64.6 kg (142 lb 6.4 oz)     Weight History:   Usual Weight: unsure, was 188# 2 yr ago  Comments: wt has been fluctuating since COVID (unintended wt gain, followed by intentional loss, gained wt again, then lost).     Oncology Nutrition-Anthropometrics    Flowsheet Row Nutrition from 2/20/2025 in [facility] Nutrition from 1/9/2025 in [facility]   Patient [age] (years): 49 years 49 years   Patient (female) height (in): 62 in 62 in   Current Weight to be used for anthropometric calculations (lbs) 144.2 lbs 144.2 lbs   Current Weight to be used for anthropometric calculations (kg) 65.5 kg 65.5 kg   BMI: 26.4 26.4   IBW female: 110 lbs 110 lbs   IBW (kg) female: 50 kg 50 kg   IBW % (female) 131.1 % 131.1 %   Adjusted BW (female): 118.6 lbs 118.6 lbs   Adjusted BW kg (female): 53.9 kg 53.9 kg   % weight change after 1 month: -- 3 %   Weight change after 1 month (lbs) -- 4.2 lbs        Nutrition-Focused Physical Findings: n/a due to telephone " call    Food/Nutrition-Related History & Client/Social History:    Current Nutrition Impact Symptoms:  [x] Nausea - after eating, has been eating smaller portions throughout the day [x] Reduced Appetite - comes and goes d/t emotional/mental state  [] Acid Reflux    [] Vomiting  [] Unintended Wt Loss - hx, no new wt to assess today, pt monitoring wt at home and reports stability  [] Malabsorption    [] Diarrhea - hx nocturnal diarrhea (pt unaware until she wakes up), no recent episodes  -has seen GI, cause is unknown at this time [] Unintended Wt Gain  [] Dumping Syndrome    [] Constipation  [] Thick Mucous/Secretions  [] Abdominal Pain    [] Dysgeusia (Altered Taste)  [] Xerostomia (Dry Mouth)  [] Gas    [] Dysosmia (Altered Smell)  [] Thrush  [] Difficulty Chewing    [] Oral Mucositis (Sore Mouth)  [x] Fatigue  [] Hyperglycemia   Lab Results   Component Value Date    HGBA1C 5.5 04/06/2024      [] Odynophagia  [] Esophagitis  [] Other:    [] Dysphagia  [] Early Satiety  [] No Problems Eating      Food Allergies & Intolerances: yes: lactose intolerance (can tolerate certain cheeses and Greek yogurt, cannot have milk or ice cream)    Current Diet: Regular Diet, No Restrictions  States she doesn't like a lot of fruits and veggies d/t texture, likes bananas and grapes.  Willing to eat more F&V in smoothie form.  Nutrition Route: PO  Activity level: ADL's & walks the dog BID    24 Hr Diet Recall: brother is now living with her who has been doing some of the cooking  Breakfast: 1 pkt instant oatmeal made with water  Snack: cheese and crackers  Lunch: 1 ring bologna sandwich with mustard on white bread  Snack: candy or cookies (sugar cravings after lunch)  Dinner: brats, mac and cheese  Snack: none d/t late dinner, does not usually eat after dinner    Beverages: water (16.9 oz x5-6+), light [place] iced tea (a couple swallows with meals), protein shake (occasionally), hot tea with creamer and honey  (occasionally)  Supplements:   Fairlife [employer] - caused immediate BM and abd cramping, discontinued these  JBN protein powder mixed with fruit, Oikos yogurt, and almond milk - none lately d/t lack of time    Oncology Nutrition-Estimated Needs    Flowsheet Row Nutrition from 10/31/2024 in [facility]   Weight type used Adjusted weight   Weight in kilograms (kg) used for estimated needs 53.3 kg   Energy needs formula:  25-30 kcal/kg   Energy needs based on 25 kcal/k kcal   Energy needs based on 30 kcal/k kcal   Protein needs formula: 1.2-1.5 g/kg   Protein needs based on 1.2 g/k g   Protein needs based on 1.5 g/kg 80 g   Fluid needs formula: 30-35 mL/kg   Fluid needs based on 30 mL/kg 1596 mL   Fluid needs in ounces 54 oz   Fluid needs based on 35 mL/kg 1862 mL   Fluid needs in ounces 63 oz         Discussion & Intervention:   Jenny was evaluated today for an RD follow up regarding unintended weight loss.  Jenny  is s/p bilateral mastectomy on 24 .  She is doing ok today but has been struggling with body imagine and hormonal changes.  She is planning to try reconstruction again.  She has some nausea after eating and has been eating less.  Her appetite comes and goes.  She has been eating more frequently throughout the day which seems to be working as she reports wt stability at home.  She has sugar cravings after lunch, so we discussed increasing her protein and fiber intake earlier in the day.  She would like to try to incorporate her protein smoothie again.    Reviewed 24 hour recall, which revealed an suboptimal po intake, and discussed ways to increase energy intake, increase protein intake, and optimize nutrient intake to promote healing.  Also reviewed the importance of wt management throughout the healing process and the role of a cancer preventative diet and high protein & low fat diet  in managing wt and overall health.  Based on  today's assessment, discussion included: MNT for: healing, reduced appetite, sugar cravings, choosing a variety of colorful, plant-based foods to support a cancer preventative diet, adequate hydration & fluid choices, eating smaller more frequent meals every 2-3 hours (5-6 small meals/day), and utilizing oral nutrition supplements.   Moving forward, Jenny was encouraged to transition to a cancer-preventative lifestyle.  Materials Provided: not applicable  All questions and concerns addressed during today’s visit.  Jenny has RD contact information.    Nutrition Diagnosis:   Increased Nutrient Needs (kcal & pro) related to increased demand for nutrients and disease state as evidenced by recovering from cancer tx.  Monitoring & Evaluation:   Goals:  weight maintenance/stabilization  adequate nutrition impact symptom management  pt to meet >/=75% estimated nutrition needs daily    Progress Towards Goals: Progressing    Nutrition Rx & Recommendations:  Diet: High Protein, Low Fat, High Fiber Diet, Lactose-free (or low-lactose if tolerated)  Small, frequent meals/snacks may be easier to tolerate than 3 large daily meals.  Aim for 5-6 small meals per day (every 2-3 hours).  Include protein at all meals/snacks.  Include a variety of foods (as tolerated/allowed by diet).  Follow a Cancer Preventative Nutrition Pattern: colorful, plant-based, low-fat, avoid added sugars, limit alcohol, include high fiber foods, limit processed meats, limit red meat, choose lean protein sources, use low-fat cooking methods, balance calories with physical activity, avoid excessive weight gain throughout life.  Incorporate physical activity as able/allowed.  Stay hydrated by sipping fluids of choice/tolerance throughout the day.  Refer to Eating Hints book for other meal/snack ideas and symptom management.  For appetite loss: try powdered or liquid nutrition supplements; eating by the clock every 2-3 hours; set a timer to remind yourself to eat,  keep snacks nearby; add extra protein & calories to your diet; drink liquids in between meals, choose liquids that add calories; eat a bedtime snack; eat soft, cool or frozen foods; eat a larger meal when you feel well & rested; only sip small amounts of liquids during meals (see pages 10-12 in your Eating Hints book).  Weigh yourself regularly. If you notice weight loss, make an effort to increase your daily food/calorie intake. If you continue to notice loss after these efforts, reach out to your dietitian to establish a plan to stabilize weight.  Resume high protein smoothie daily in the AM - incorporate fruits and veggies into these  Try not to skip meals/snacks and have a well-balanced breakfast  Choose a lean (low-fat) protein source at each meal and snack: chicken, turkey, fish, seafood, Greek yogurt, nuts, nut butter, beans, lentils, legumes, etc. (See Protein Dense Foods handout previously provided)  Try Fairlife milk (lactose-free, high protein)  Increase fruit and vegetable intake - choose produce you enjoy, add flavor as needed  Aim for 55-65 oz of fluid daily  Aim for 25-30 grams of fiber daily  Gradually increase your fiber intake to avoid GI upset, stay well hydrated as you are increasing your fiber intake    Follow Up Plan:  3/27 at 11am by phone  Recommend Referral to Other Providers: none at this time

## 2025-02-11 ENCOUNTER — OFFICE VISIT (OUTPATIENT)
Dept: PLASTIC SURGERY | Facility: CLINIC | Age: 50
End: 2025-02-11

## 2025-02-11 ENCOUNTER — TELEPHONE (OUTPATIENT)
Age: 50
End: 2025-02-11

## 2025-02-11 ENCOUNTER — PATIENT OUTREACH (OUTPATIENT)
Dept: HEMATOLOGY ONCOLOGY | Facility: CLINIC | Age: 50
End: 2025-02-11

## 2025-02-11 DIAGNOSIS — Z17.0 MALIGNANT NEOPLASM OF CENTRAL PORTION OF RIGHT BREAST IN FEMALE, ESTROGEN RECEPTOR POSITIVE (HCC): Primary | ICD-10-CM

## 2025-02-11 DIAGNOSIS — C50.111 MALIGNANT NEOPLASM OF CENTRAL PORTION OF RIGHT BREAST IN FEMALE, ESTROGEN RECEPTOR POSITIVE (HCC): Primary | ICD-10-CM

## 2025-02-11 PROCEDURE — 99024 POSTOP FOLLOW-UP VISIT: CPT | Performed by: PLASTIC SURGERY

## 2025-02-11 NOTE — TELEPHONE ENCOUNTER
LVM for pt regarding appt on 03/06 with Dr Brito in our [place] office. Informed pt that Dr Brito will be in surgery that morning so we will need to r/s this appt to the afternoon of 03/06. Informed pt that new appt is at 1 pm same date same location and advise to callback to r/s this appt if needed.

## 2025-02-11 NOTE — PROGRESS NOTES
I reached out and spoke with Jenny to follow up and to review for any new changes in barriers to care and offer supportive services as needed.     Patient mentioned since she had an infection and her expanders were removed that she wanted to know if she should keep her appointment with Dr.Cardarelli on 5/22. I reached out to RN Sarah Dr.Cardarelli's nurse she mentioned patient should KEEP this appointment. Shasta mentioned if patient wanted to PUSH OUT the appointment to June/July that would be ok.  Called and spoke with patient to make her aware- patient is going to keep her appointment for 5/22/25 with Dr.Cardarelli.    Barriers noted previously and outcome of interventions;  Last few times I outreached patient for follow up she did not answer my call. Last time I spoke with patient in November she mentioned she mother passing away. Currently patient concerned about her appearance. Patient had had an infection and had her expanders removed due to this.    Reviewed for any new barriers as noted below.    Are you having any side effects from your treatment? NO.    Are you eating and drinking normally? YES.    Have you been experiencing any uncontrolled pain related to your cancer diagnosis?        NO, patient mentioned mild pain but nothing uncontrolled.    If not already established, have needs changed for a palliative care referral? NO.    Do you have a good support system? YES.    Are you interested in any support groups? N/A.    How are you doing with transportation to your appointments?         Patient drives herself to and from her appointment.    Do you have any questions or concerns regarding your treatment plan? NO.    Any new financial concerns for your household or medical bills? NO.    Do you know when your upcoming appointments are? YES- patient is my chart active.    Future Appointments   Date Time Provider Department [place]   2/12/2025  9:00 AM MARTELL ESPOSITO NOR 1  SADIE ESPOSITO [facility]   2/19/2025  9:20 AM  Keri Weber DO PULM [facility]   2/20/2025  1:00 [facility]   3/6/2025  8:30 AM Karthik Brito MD [facility]   3/24/2025  4:30 PM Nelson Akhtar MD [facility]   4/8/2025  8:00 AM Arsenio Xavier MD [facility] ONC [facility]   5/22/2025  9:15 AM Cassandra Lynn Cardarelli, MD SURG ONC [facility]          Based on individual needs I will follow up with them in 4/6 weeks. I have provided my direct contact information and welcome them to contact me if their needs as discussed above change. They were appreciative for the call.

## 2025-02-12 ENCOUNTER — HOSPITAL ENCOUNTER (OUTPATIENT)
Dept: RADIOLOGY | Age: 50
Discharge: HOME | End: 2025-02-12
Payer: COMMERCIAL

## 2025-02-12 DIAGNOSIS — J44.9 CHRONIC OBSTRUCTIVE PULMONARY DISEASE, UNSPECIFIED COPD TYPE (HCC): ICD-10-CM

## 2025-02-12 PROCEDURE — 71250 CT THORAX DX C-: CPT

## 2025-02-19 DIAGNOSIS — G47.10 HYPERSOMNIA: ICD-10-CM

## 2025-02-19 NOTE — TELEPHONE ENCOUNTER
Last office visit 10/23/2024  Next office visit 7/17/2025    Dr. Fairbanks,     Please review RX request and sign if agreeable,  Thank you.     Armodafinil discontinued 12/15/2025. Not seen in chart.     Call to patient who states she was in the hospital then and had staph infection post reconstruction of bilat mastectomy.  States med was not on hospital formulary but she did resume once she was discharged and actually took last one today.     Patient states she will be going thru surgery again. And would rather wait till that is complete before coming for follow up visit.     Follow up appointment with Dr. Fairbanks scheduled for 7/17/2025 in the [place] office. Patient will reschedule if needed.

## 2025-02-20 ENCOUNTER — NUTRITION (OUTPATIENT)
Dept: NUTRITION | Facility: CLINIC | Age: 50
End: 2025-02-20

## 2025-02-20 DIAGNOSIS — Z71.3 NUTRITIONAL COUNSELING: Primary | ICD-10-CM

## 2025-02-20 RX ORDER — ARMODAFINIL 150 MG/1
150 TABLET ORAL DAILY
Qty: 30 TABLET | Refills: 2 | Status: SHIPPED | OUTPATIENT
Start: 2025-02-20

## 2025-02-20 NOTE — PATIENT INSTRUCTIONS
Nutrition Rx & Recommendations:  Diet: High Protein, Low Fat, High Fiber Diet, Lactose-free (or low-lactose if tolerated)  Small, frequent meals/snacks may be easier to tolerate than 3 large daily meals.  Aim for 5-6 small meals per day (every 2-3 hours).  Include protein at all meals/snacks.  Include a variety of foods (as tolerated/allowed by diet).  Follow a Cancer Preventative Nutrition Pattern: colorful, plant-based, low-fat, avoid added sugars, limit alcohol, include high fiber foods, limit processed meats, limit red meat, choose lean protein sources, use low-fat cooking methods, balance calories with physical activity, avoid excessive weight gain throughout life.  Incorporate physical activity as able/allowed.  Stay hydrated by sipping fluids of choice/tolerance throughout the day.  Refer to Eating Hints book for other meal/snack ideas and symptom management.  For appetite loss: try powdered or liquid nutrition supplements; eating by the clock every 2-3 hours; set a timer to remind yourself to eat, keep snacks nearby; add extra protein & calories to your diet; drink liquids in between meals, choose liquids that add calories; eat a bedtime snack; eat soft, cool or frozen foods; eat a larger meal when you feel well & rested; only sip small amounts of liquids during meals (see pages 10-12 in your Eating Hints book).  Weigh yourself regularly. If you notice weight loss, make an effort to increase your daily food/calorie intake. If you continue to notice loss after these efforts, reach out to your dietitian to establish a plan to stabilize weight.  Resume high protein smoothie daily in the AM - incorporate fruits and veggies into these  Try not to skip meals/snacks and have a well-balanced breakfast  Choose a lean (low-fat) protein source at each meal and snack: chicken, turkey, fish, seafood, Greek yogurt, nuts, nut butter, beans, lentils, legumes, etc. (See Protein Dense Foods handout previously provided)  Try  Fairlife milk (lactose-free, high protein)  Increase fruit and vegetable intake - choose produce you enjoy, add flavor as needed  Aim for 55-65 oz of fluid daily  Aim for 25-30 grams of fiber daily  Gradually increase your fiber intake to avoid GI upset, stay well hydrated as you are increasing your fiber intake    Follow Up Plan: 3/27 at 11am by phone  Recommend Referral to Other Providers: none at this time

## 2025-02-21 ENCOUNTER — TELEPHONE (OUTPATIENT)
Dept: SURGICAL ONCOLOGY | Facility: CLINIC | Age: 50
End: 2025-02-21

## 2025-02-21 NOTE — TELEPHONE ENCOUNTER
Called the patient to re-time/reschedule her appointment with Dr. Cardarelli on 5/22/2025 due to a change in the provider's schedule. There was no answer so a message was left with the main office number provided.    When the patient returns the phone call, she should be offered an afternoon follow-up appointment time slot.

## 2025-02-28 ENCOUNTER — TELEPHONE (OUTPATIENT)
Dept: NUTRITION | Facility: CLINIC | Age: 50
End: 2025-02-28

## 2025-02-28 NOTE — TELEPHONE ENCOUNTER
Phone call placed to pt to discuss her follow up.  Spoke with Jenny today and explained that I need to reschedule her appt because I will be out of the office that day.  Appt rescheduled to 4/3 at 11am

## 2025-03-03 ENCOUNTER — TELEPHONE (OUTPATIENT)
Dept: HEMATOLOGY ONCOLOGY | Facility: CLINIC | Age: 50
End: 2025-03-03

## 2025-03-03 ENCOUNTER — TELEPHONE (OUTPATIENT)
Age: 50
End: 2025-03-03

## 2025-03-03 DIAGNOSIS — C50.111 MALIGNANT NEOPLASM OF CENTRAL PORTION OF RIGHT BREAST IN FEMALE, ESTROGEN RECEPTOR POSITIVE (HCC): ICD-10-CM

## 2025-03-03 DIAGNOSIS — Z17.0 MALIGNANT NEOPLASM OF CENTRAL PORTION OF RIGHT BREAST IN FEMALE, ESTROGEN RECEPTOR POSITIVE (HCC): ICD-10-CM

## 2025-03-03 NOTE — TELEPHONE ENCOUNTER
Called patient back to inform her I received her request for FMLA and asked that she upload the forms into [employer].

## 2025-03-03 NOTE — TELEPHONE ENCOUNTER
Pt calling due to getting intermittent FMLA from Dr. Xavier due to her taking Letrozole and having side effects while working and needing to leave. Pt has FMLA thru the plastic surgeon Dr. Brito which they said she would need it from  for this. She works for [employer] and was experiencing side effects on 2/25/25 such as headache for over a week, nausea, body aches and went home and was given an occurrence for it.  She has been off the medication for a week and is to start taking it tomorrow,     Please call pt back and FMLA can be uploaded to her my chart. Thank you

## 2025-03-04 RX ORDER — GABAPENTIN 300 MG/1
CAPSULE ORAL
Qty: 90 CAPSULE | Refills: 0 | OUTPATIENT
Start: 2025-03-04

## 2025-03-04 NOTE — TELEPHONE ENCOUNTER
Requested medication(s) are due for refill today: Yes  Patient has already received a courtesy refill: No  Other reason request has been forwarded to provider: Please advise

## 2025-03-05 NOTE — TELEPHONE ENCOUNTER
Per chart, patient was instructed by Dr. Gross(covering for Dr. Zaragoza who is out of office) on 3/4/25 to hold off on gabapentin since it was causing headaches.    Patient has follow up with Dr. Zaragoza scheduled on 7/17/25.

## 2025-03-07 ENCOUNTER — OFFICE VISIT (OUTPATIENT)
Dept: PLASTIC SURGERY | Facility: CLINIC | Age: 50
End: 2025-03-07
Payer: COMMERCIAL

## 2025-03-07 DIAGNOSIS — C50.111 MALIGNANT NEOPLASM OF CENTRAL PORTION OF RIGHT BREAST IN FEMALE, ESTROGEN RECEPTOR POSITIVE (HCC): Primary | ICD-10-CM

## 2025-03-07 DIAGNOSIS — Z17.0 MALIGNANT NEOPLASM OF CENTRAL PORTION OF RIGHT BREAST IN FEMALE, ESTROGEN RECEPTOR POSITIVE (HCC): Primary | ICD-10-CM

## 2025-03-07 PROCEDURE — 99214 OFFICE O/P EST MOD 30 MIN: CPT | Performed by: PLASTIC SURGERY

## 2025-03-07 NOTE — H&P (VIEW-ONLY)
[facility]   Plastic and Reconstructive Surgery   [address] [place] [id number]       CLINIC NOTE      Assessment & Plan  Malignant neoplasm of central portion of right breast in female, estrogen receptor positive (HCC)  Jenny Pereyra is a 49-year-old female with a history of right breast cancer who underwent bilateral mastectomies, right sentinel node biopsy, and immediate breast reconstruction with bilateral prepectoral tissue expander placement.  Patient's postoperative course was ultimately complicated by left chest wound dehiscence status post excision closure, right tissue expander infection and dehiscence requiring removal of bilateral expanders and 10-day course of antibiotics on 12/18/2024.  Patient is now 2-1/2 months status post removal of tissue expanders.  Patient is healed well without complication.  No evidence of infection, fluid collection, or wound complications.  Patient presents to discuss options for breast reconstruction at this time.          She is doing well from her original surgery and infection. The plan is to try expanders on both sides. Given her smaller chest size and reduced skin availability, the smallest expander, a 250 cc expander, will be used. This does not mean her breasts will only be 250 cc large; overexpansion is possible if the skin allows. The pocket can sometimes be enlarged when replacing the expander with an implant. The goal is to protect the skin while achieving the desired breast size. It is likely that her breasts will be slightly smaller than before. Delayed reconstruction is more challenging, but it is very reasonable to attempt it. The initial procedure was performed above the muscle. However, due to previous complications, the plan will be to place the expanders underneath the muscle.  This will allow us to have an additional layer of healthy vascularized tissue between the expander and the outside world as well as limit the amount of acellular dermal  matrix is being used.  I believe it is possible that during her last reconstruction her body may have had a poor reaction to the acellular dermal matrix.  She will have at least 1 drain on each side possibly 2.  She will have a transverse incision across each breast closed with absorbable sutures.  We will not use glue.  It will be dressed with Steri-Strips.  She will stay 23 hours and go home in the morning.  The skin will be expanded as much as safely possible. If necessary, fat grafting can be considered to make adjustments down the line. She will be contacted to schedule the procedure at her convenience. Consent can be obtained on the day of surgery.    The drains would stay in until outputs decrease to <25 mL per day for 2 days, and this is usually 2-3 weeks but can be longer. The patient would then return to clinic on a weekly basis to have the tissue expanders filled to the desired volume and then a subsequent 4 to 6 week recovery period. The expanders would then be exchanged to permanent implants in a second procedure as an outpatient.  This procedure typically take 1-2 hours and often does not require a drain. We also discussed that occasionally, a permanent implant can be placed at the time of mastectomy but this is a minority of cases. Advantages of implant based reconstruction are shorter surgery, shorter hospital stay, shorter recovery, no need for donor site, no risk of microvascular complications, and more customizable size. Risks were discussed including bleeding, infection (ranging from superficial skin infection to prosthetic infection requiring explantation), injury to surrounding structures, poor scarring, wound non-healing, mastectomy skin flap necrosis, need for prolonged wound care, delay of adjuvant therapy, implant exposure necessitating explantation, chronic pain/muscle spasms, numbness, need for further unanticipated surgery, seroma, capsular contracture, cosmetic deformity, asymmetry,  malposition, animation deformity, silent rupture (for silicone implants), device failure, breast implant associated anaplastic large cell lymphoma (HELEN-ALCL), VTE, stroke, MI, and death. Each of these risks were discussed and explained to the patient. If the patient chooses silicone implants, we discussed that the FDA recommends imaging in the form of ultrasound/MRI 5-6 years following the initial placement of the implants and then every 2-3 years after that to assess for silent rupture.        I have spent a total time of 30 minutes in caring for this patient on the day of the visit/encounter including Risks and benefits of tx options, Impressions, and Documenting in the medical record.    Interval History:  Patient presents today to discuss proceeding with delayed breast reconstruction.  Patient notes that she is doing well overall but has experienced some headaches and aches that she attributes to letrozole.  She has been working with her medical oncologist who discontinued the medication for a week and when she resumed it the symptoms were significantly improved.  Patient denies any fevers, chills, skin changes or infections.  Patient denies any recent illnesses or hospitalizations.  She expresses that she would like to be as close to her original breast size as possible.  Patient is not smoking or taking any blood thinning medications.            Physical Exam   Patient is alert, oriented, no acute distress  Breathing comfortably on room air, normal work of breathing  Bilateral chest incisions are well-healed.  Small amount of excess skin at the medial lateral aspect of the incisions left greater than right.  Mastectomy skin flaps are healthy and well-perfused.  There is good 2-second capillary refill.  There is no appreciable fluid collections or erythema.  Base width is approximately 10.5 cm bilaterally  Physical Exam

## 2025-03-07 NOTE — TELEPHONE ENCOUNTER
Called the patient for the second time to discuss the same. The patient accepted the new appointment time offered at 3:15 and she verified appointment details. All of the patient's questions were answered at this time.

## 2025-03-07 NOTE — ASSESSMENT & PLAN NOTE
Jenny Pereyra is a 49-year-old female with a history of right breast cancer who underwent bilateral mastectomies, right sentinel node biopsy, and immediate breast reconstruction with bilateral prepectoral tissue expander placement.  Patient's postoperative course was ultimately complicated by left chest wound dehiscence status post excision closure, right tissue expander infection and dehiscence requiring removal of bilateral expanders and 10-day course of antibiotics on 12/18/2024.  Patient is now 2-1/2 months status post removal of tissue expanders.  Patient is healed well without complication.  No evidence of infection, fluid collection, or wound complications.  Patient presents to discuss options for breast reconstruction at this time.          She is doing well from her original surgery and infection. The plan is to try expanders on both sides. Given her smaller chest size and reduced skin availability, the smallest expander, a 250 cc expander, will be used. This does not mean her breasts will only be 250 cc large; overexpansion is possible if the skin allows. The pocket can sometimes be enlarged when replacing the expander with an implant. The goal is to protect the skin while achieving the desired breast size. It is likely that her breasts will be slightly smaller than before. Delayed reconstruction is more challenging, but it is very reasonable to attempt it. The initial procedure was performed above the muscle. However, due to previous complications, the plan will be to place the expanders underneath the muscle.  This will allow us to have an additional layer of healthy vascularized tissue between the expander and the outside world as well as limit the amount of acellular dermal matrix is being used.  I believe it is possible that during her last reconstruction her body may have had a poor reaction to the acellular dermal matrix.  She will have at least 1 drain on each side possibly 2.  She will have a  transverse incision across each breast closed with absorbable sutures.  We will not use glue.  It will be dressed with Steri-Strips.  She will stay 23 hours and go home in the morning.  The skin will be expanded as much as safely possible. If necessary, fat grafting can be considered to make adjustments down the line. She will be contacted to schedule the procedure at her convenience. Consent can be obtained on the day of surgery.    The drains would stay in until outputs decrease to <25 mL per day for 2 days, and this is usually 2-3 weeks but can be longer. The patient would then return to clinic on a weekly basis to have the tissue expanders filled to the desired volume and then a subsequent 4 to 6 week recovery period. The expanders would then be exchanged to permanent implants in a second procedure as an outpatient.  This procedure typically take 1-2 hours and often does not require a drain. We also discussed that occasionally, a permanent implant can be placed at the time of mastectomy but this is a minority of cases. Advantages of implant based reconstruction are shorter surgery, shorter hospital stay, shorter recovery, no need for donor site, no risk of microvascular complications, and more customizable size. Risks were discussed including bleeding, infection (ranging from superficial skin infection to prosthetic infection requiring explantation), injury to surrounding structures, poor scarring, wound non-healing, mastectomy skin flap necrosis, need for prolonged wound care, delay of adjuvant therapy, implant exposure necessitating explantation, chronic pain/muscle spasms, numbness, need for further unanticipated surgery, seroma, capsular contracture, cosmetic deformity, asymmetry, malposition, animation deformity, silent rupture (for silicone implants), device failure, breast implant associated anaplastic large cell lymphoma (HELEN-ALCL), VTE, stroke, MI, and death. Each of these risks were discussed and  explained to the patient. If the patient chooses silicone implants, we discussed that the FDA recommends imaging in the form of ultrasound/MRI 5-6 years following the initial placement of the implants and then every 2-3 years after that to assess for silent rupture.

## 2025-03-07 NOTE — PROGRESS NOTES
[facility]   Plastic and Reconstructive Surgery   [address] [place] [id number]       CLINIC NOTE      Assessment & Plan  Malignant neoplasm of central portion of right breast in female, estrogen receptor positive (HCC)  Jenny Pereyra is a 49-year-old female with a history of right breast cancer who underwent bilateral mastectomies, right sentinel node biopsy, and immediate breast reconstruction with bilateral prepectoral tissue expander placement.  Patient's postoperative course was ultimately complicated by left chest wound dehiscence status post excision closure, right tissue expander infection and dehiscence requiring removal of bilateral expanders and 10-day course of antibiotics on 12/18/2024.  Patient is now 2-1/2 months status post removal of tissue expanders.  Patient is healed well without complication.  No evidence of infection, fluid collection, or wound complications.  Patient presents to discuss options for breast reconstruction at this time.          She is doing well from her original surgery and infection. The plan is to try expanders on both sides. Given her smaller chest size and reduced skin availability, the smallest expander, a 250 cc expander, will be used. This does not mean her breasts will only be 250 cc large; overexpansion is possible if the skin allows. The pocket can sometimes be enlarged when replacing the expander with an implant. The goal is to protect the skin while achieving the desired breast size. It is likely that her breasts will be slightly smaller than before. Delayed reconstruction is more challenging, but it is very reasonable to attempt it. The initial procedure was performed above the muscle. However, due to previous complications, the plan will be to place the expanders underneath the muscle.  This will allow us to have an additional layer of healthy vascularized tissue between the expander and the outside world as well as limit the amount of acellular dermal  matrix is being used.  I believe it is possible that during her last reconstruction her body may have had a poor reaction to the acellular dermal matrix.  She will have at least 1 drain on each side possibly 2.  She will have a transverse incision across each breast closed with absorbable sutures.  We will not use glue.  It will be dressed with Steri-Strips.  She will stay 23 hours and go home in the morning.  The skin will be expanded as much as safely possible. If necessary, fat grafting can be considered to make adjustments down the line. She will be contacted to schedule the procedure at her convenience. Consent can be obtained on the day of surgery.    The drains would stay in until outputs decrease to <25 mL per day for 2 days, and this is usually 2-3 weeks but can be longer. The patient would then return to clinic on a weekly basis to have the tissue expanders filled to the desired volume and then a subsequent 4 to 6 week recovery period. The expanders would then be exchanged to permanent implants in a second procedure as an outpatient.  This procedure typically take 1-2 hours and often does not require a drain. We also discussed that occasionally, a permanent implant can be placed at the time of mastectomy but this is a minority of cases. Advantages of implant based reconstruction are shorter surgery, shorter hospital stay, shorter recovery, no need for donor site, no risk of microvascular complications, and more customizable size. Risks were discussed including bleeding, infection (ranging from superficial skin infection to prosthetic infection requiring explantation), injury to surrounding structures, poor scarring, wound non-healing, mastectomy skin flap necrosis, need for prolonged wound care, delay of adjuvant therapy, implant exposure necessitating explantation, chronic pain/muscle spasms, numbness, need for further unanticipated surgery, seroma, capsular contracture, cosmetic deformity, asymmetry,  malposition, animation deformity, silent rupture (for silicone implants), device failure, breast implant associated anaplastic large cell lymphoma (HELEN-ALCL), VTE, stroke, MI, and death. Each of these risks were discussed and explained to the patient. If the patient chooses silicone implants, we discussed that the FDA recommends imaging in the form of ultrasound/MRI 5-6 years following the initial placement of the implants and then every 2-3 years after that to assess for silent rupture.        I have spent a total time of 30 minutes in caring for this patient on the day of the visit/encounter including Risks and benefits of tx options, Impressions, and Documenting in the medical record.    Interval History:  Patient presents today to discuss proceeding with delayed breast reconstruction.  Patient notes that she is doing well overall but has experienced some headaches and aches that she attributes to letrozole.  She has been working with her medical oncologist who discontinued the medication for a week and when she resumed it the symptoms were significantly improved.  Patient denies any fevers, chills, skin changes or infections.  Patient denies any recent illnesses or hospitalizations.  She expresses that she would like to be as close to her original breast size as possible.  Patient is not smoking or taking any blood thinning medications.            Physical Exam   Patient is alert, oriented, no acute distress  Breathing comfortably on room air, normal work of breathing  Bilateral chest incisions are well-healed.  Small amount of excess skin at the medial lateral aspect of the incisions left greater than right.  Mastectomy skin flaps are healthy and well-perfused.  There is good 2-second capillary refill.  There is no appreciable fluid collections or erythema.  Base width is approximately 10.5 cm bilaterally  Physical Exam

## 2025-03-10 ENCOUNTER — PREP FOR PROCEDURE (OUTPATIENT)
Dept: PLASTIC SURGERY | Facility: CLINIC | Age: 50
End: 2025-03-10

## 2025-03-10 DIAGNOSIS — Z17.0 MALIGNANT NEOPLASM OF CENTRAL PORTION OF RIGHT BREAST IN FEMALE, ESTROGEN RECEPTOR POSITIVE (HCC): Primary | ICD-10-CM

## 2025-03-10 DIAGNOSIS — C50.111 MALIGNANT NEOPLASM OF CENTRAL PORTION OF RIGHT BREAST IN FEMALE, ESTROGEN RECEPTOR POSITIVE (HCC): Primary | ICD-10-CM

## 2025-03-11 ENCOUNTER — PATIENT OUTREACH (OUTPATIENT)
Dept: HEMATOLOGY ONCOLOGY | Facility: CLINIC | Age: 50
End: 2025-03-11

## 2025-03-11 ENCOUNTER — TELEPHONE (OUTPATIENT)
Dept: OTHER | Facility: HOSPITAL | Age: 50
End: 2025-03-11

## 2025-03-11 NOTE — PROGRESS NOTES
Patient with questions following our appointment -   -Will the expander be placed under the muscle or over?  ---Reviewed with the patient that I would plan to place the expander under the muscle given the previous complications we had with her skin and to reduce the amount of ADM used    - Patient asking whether or not she would be staying overnight after surgery.    --I discussed with maria t that since we are planning to place the expanders under the muscle she may be more uncomfortable than her first surgery and may want to stay overnight for pain control.  We can plan for a 23 hour stay.

## 2025-03-11 NOTE — PROGRESS NOTES
I reached out and spoke with Jenny to follow up and to review for any new changes in barriers to care and offer supportive services as needed.     Barriers noted previously and outcome of interventions;  Previously patient concerned about her appearance after she had an infection in her breasts and had expanders removed.  Currently patient is scheduled for surgery on 3/28. Patient did not mention any concerns or questions at this time.    Reviewed for any new barriers as noted below.    Are you having any side effects from your treatment? NO.    Are you eating and drinking normally? YES.    Have you been experiencing any uncontrolled pain related to your cancer diagnosis? NO.    If not already established, have needs changed for a palliative care referral? NO.    Do you have a good support system? YES.    Are you interested in any support groups? N/A    How are you doing with transportation to your appointments? Patient currently drives. She mentioned that after her surgery she might not be able to drive but she has her  to help her get to and from her appointments.    Do you have any questions or concerns regarding your treatment plan? No    Any new financial concerns for your household or medical bills? NO. She mentioned possibly after her surgery on 3/28.    Do you know when your upcoming appointments are? YES, patient keeps track of all her appointments via my-chart.    Future Appointments   Date Time Provider Department Center   3/24/2025  4:15 PM Nelson Akhtar MD [facility]   4/3/2025 11:00 AM Hawa Russo RD [facility]   4/4/2025  1:00 PM Apryl Valle PA-C [facility]   4/8/2025  8:00 AM Rhonda Barnett PA-C [facility]   5/22/2025  3:15 PM Cassandra Lynn Cardarelli, MD SURG ON[facility]   7/17/2025  9:00 AM John Fairbanks MD [facility]          Based on individual needs I will follow up with them in 4/5 weeks. I have provided my  direct contact information and welcome them to contact me if their needs as discussed above change. They were appreciative for the call.

## 2025-03-14 ENCOUNTER — TELEPHONE (OUTPATIENT)
Dept: ANESTHESIOLOGY | Facility: CLINIC | Age: 50
End: 2025-03-14

## 2025-03-15 ENCOUNTER — APPOINTMENT (OUTPATIENT)
Dept: LAB | Facility: IMAGING CENTER | Age: 50
End: 2025-03-15
Payer: COMMERCIAL

## 2025-03-15 DIAGNOSIS — Z17.0 MALIGNANT NEOPLASM OF CENTRAL PORTION OF RIGHT BREAST IN FEMALE, ESTROGEN RECEPTOR POSITIVE (HCC): ICD-10-CM

## 2025-03-15 DIAGNOSIS — C50.111 MALIGNANT NEOPLASM OF CENTRAL PORTION OF RIGHT BREAST IN FEMALE, ESTROGEN RECEPTOR POSITIVE (HCC): ICD-10-CM

## 2025-03-15 LAB
ANION GAP SERPL CALCULATED.3IONS-SCNC: 7 MMOL/L (ref 4–13)
BASOPHILS # BLD AUTO: 0.09 THOUSANDS/ÂΜL (ref 0–0.1)
BASOPHILS NFR BLD AUTO: 1 % (ref 0–1)
BUN SERPL-MCNC: 19 MG/DL (ref 5–25)
CALCIUM SERPL-MCNC: 10.1 MG/DL (ref 8.4–10.2)
CHLORIDE SERPL-SCNC: 106 MMOL/L (ref 96–108)
CHOLEST SERPL-MCNC: 182 MG/DL (ref ?–200)
CO2 SERPL-SCNC: 28 MMOL/L (ref 21–32)
CREAT SERPL-MCNC: 0.81 MG/DL (ref 0.6–1.3)
EOSINOPHIL # BLD AUTO: 0.23 THOUSAND/ÂΜL (ref 0–0.61)
EOSINOPHIL NFR BLD AUTO: 2 % (ref 0–6)
ERYTHROCYTE [DISTWIDTH] IN BLOOD BY AUTOMATED COUNT: 12.4 % (ref 11.6–15.1)
GFR SERPL CREATININE-BSD FRML MDRD: 86 ML/MIN/1.73SQ M
GLUCOSE P FAST SERPL-MCNC: 82 MG/DL (ref 65–99)
HCT VFR BLD AUTO: 46.1 % (ref 34.8–46.1)
HDLC SERPL-MCNC: 83 MG/DL
HGB BLD-MCNC: 14.7 G/DL (ref 11.5–15.4)
IMM GRANULOCYTES # BLD AUTO: 0.03 THOUSAND/UL (ref 0–0.2)
IMM GRANULOCYTES NFR BLD AUTO: 0 % (ref 0–2)
LDLC SERPL CALC-MCNC: 80 MG/DL (ref 0–100)
LYMPHOCYTES # BLD AUTO: 2.34 THOUSANDS/ÂΜL (ref 0.6–4.47)
LYMPHOCYTES NFR BLD AUTO: 23 % (ref 14–44)
MCH RBC QN AUTO: 29.2 PG (ref 26.8–34.3)
MCHC RBC AUTO-ENTMCNC: 31.9 G/DL (ref 31.4–37.4)
MCV RBC AUTO: 92 FL (ref 82–98)
MONOCYTES # BLD AUTO: 0.47 THOUSAND/ÂΜL (ref 0.17–1.22)
MONOCYTES NFR BLD AUTO: 5 % (ref 4–12)
NEUTROPHILS # BLD AUTO: 7.1 THOUSANDS/ÂΜL (ref 1.85–7.62)
NEUTS SEG NFR BLD AUTO: 69 % (ref 43–75)
NONHDLC SERPL-MCNC: 99 MG/DL
NRBC BLD AUTO-RTO: 0 /100 WBCS
PLATELET # BLD AUTO: 314 THOUSANDS/UL (ref 149–390)
PMV BLD AUTO: 10.5 FL (ref 8.9–12.7)
POTASSIUM SERPL-SCNC: 4.6 MMOL/L (ref 3.5–5.3)
RBC # BLD AUTO: 5.03 MILLION/UL (ref 3.81–5.12)
SODIUM SERPL-SCNC: 141 MMOL/L (ref 135–147)
TRIGL SERPL-MCNC: 97 MG/DL (ref ?–150)
WBC # BLD AUTO: 10.26 THOUSAND/UL (ref 4.31–10.16)

## 2025-03-15 PROCEDURE — 80048 BASIC METABOLIC PNL TOTAL CA: CPT

## 2025-03-15 PROCEDURE — 85025 COMPLETE CBC W/AUTO DIFF WBC: CPT

## 2025-03-15 PROCEDURE — 36415 COLL VENOUS BLD VENIPUNCTURE: CPT

## 2025-03-17 ENCOUNTER — TELEMEDICINE (OUTPATIENT)
Dept: ANESTHESIOLOGY | Facility: CLINIC | Age: 50
End: 2025-03-17
Payer: COMMERCIAL

## 2025-03-17 VITALS — HEART RATE: 78 BPM | DIASTOLIC BLOOD PRESSURE: 82 MMHG | SYSTOLIC BLOOD PRESSURE: 134 MMHG

## 2025-03-17 DIAGNOSIS — Z17.0 MALIGNANT NEOPLASM OF CENTRAL PORTION OF RIGHT BREAST IN FEMALE, ESTROGEN RECEPTOR POSITIVE (HCC): ICD-10-CM

## 2025-03-17 DIAGNOSIS — C50.111 MALIGNANT NEOPLASM OF CENTRAL PORTION OF RIGHT BREAST IN FEMALE, ESTROGEN RECEPTOR POSITIVE (HCC): ICD-10-CM

## 2025-03-17 DIAGNOSIS — R11.2 PONV (POSTOPERATIVE NAUSEA AND VOMITING): Primary | ICD-10-CM

## 2025-03-17 DIAGNOSIS — J44.9 STAGE 1 MILD COPD BY GOLD CLASSIFICATION (HCC): ICD-10-CM

## 2025-03-17 DIAGNOSIS — Z98.890 PONV (POSTOPERATIVE NAUSEA AND VOMITING): Primary | ICD-10-CM

## 2025-03-17 PROCEDURE — 99212 OFFICE O/P EST SF 10 MIN: CPT | Performed by: NURSE PRACTITIONER

## 2025-03-17 NOTE — PROGRESS NOTES
Brief Visit  Name: Jenny Pereyra      : 1975      MRN: [id number]  Encounter Provider: MOHSEN Mistry  Encounter Date: 3/17/2025   Encounter department: [facility]  :  ASSESSMENT   49 year old female referred to SOC for pre-surgery optimization & BEST program   Other consult concerns include: BEST   She is scheduled on     ase: [id number] Date/Time: 25 1500   Procedure: BILATERAL TISSUE EXPANDER PLACEMENT (Bilateral: Breast)   Anesthesia type: General   Diagnosis: Malignant neoplasm of central portion of right breast in female, estrogen receptor positive (HCC) [C50.111, Z17.0]   Pre-op diagnosis: Malignant neoplasm of central portion of right breast in female, estrogen receptor positive (HCC) [C50.111, Z17.0]   Location: BE OR ROOM 08 / [facility]   Surgeons: Karthik Brito MD     LAST ANESTHESIA   NO CONCERNS   Complains of postop nausea vomiting  Assessment & Plan  Malignant neoplasm of central portion of right breast in female, estrogen receptor positive (HCC)  Seen for surgical optimization  Seen for best program    At risk for post-op DANIEL -no  At risk for post-op SSI-no  BEST   Breathing- instructed to exercise lungs prior to surgery via deep breathing exercises  Eat- discussed increasing protein intake prior to surgery   Sleep/stress- encouraged 8-10 sleep @ night, stress reduction, avoid sick contacts and handwashing   Train- encouraged to remain active    Orders:    Ambulatory referral to surgical optimization    PONV (postoperative nausea and vomiting)  Requesting antinausea medications  Stage 1 mild COPD by GOLD classification ([facility])  EX SMOKER  You have symptoms of COPD and you are DAILY LIFE -no  MILD COPD  No concerns today    Maylin Morocho, RN to Niru Leonard MA   History of Present Illness   HPI  Jenny  is a 49-year-old female who was referred to SOC for presurgery optimization.  I spoke with Marleen over the  telephone.  We did not connect via video.  She was pleasant and a pleasure to care for.  She admits to being in well health today.  Denies any new developments in her health.       Self-care patient.  Has support from her brother and .  She was offered a live visit and declined as she cannot take any more days off.  She has no PTO to use.  She prefers to telephone for convenience.  Encounter provider MOHSEN Mistry    The Patient is located at Home and in the following state in which I hold an active license [place].    The patient was identified by name and date of birth. Jenny Pereyra was informed that this is a telemedicine visit and that the visit is being conducted through Telephone.  My office door was closed. No one else was in the room.  She acknowledged consent and understanding of privacy and security of the platform. The patient has agreed to participate and understands they can discontinue the visit at any time.    I have spent a total time of 20 minutes in caring for this patient on the day of the visit/encounter including Instructions for management, Patient and family education, Importance of tx compliance, Risk factor reductions, and Impressions, not including the time spent for establishing the audio/video connection.

## 2025-03-17 NOTE — PROGRESS NOTES
[facility] ([facility])  CONSULT    Patient has the ability to take their vital signs at home YES  Allergies reviewed today   PMH reviewed today   Medications reviewed today   Nutrition Assessment Score:  14  METS: 9.25  DX SLEEP APNEA; NO:SCORE 1     As always we discussed having your BEST surgery, and BEST recovery.  Surgery goals reviewed today.      Breathing exercises   Patient was encouraged to begin lung exercises today.  This could be accomplished through deep breathing and cough exercises.  Patient was taught how to use an incentive spirometer.  Return demonstration provided.    Eating/nutrition   Encouraged patient to increase oral protein intake prior to surgery.  This can be accomplished by consuming chicken, fish, tuna fish, cottage cheese, cheese, eggs, Greek yogurt, and protein shakes as needed.  I encouraged use of protein shakes such ENLIVE.  I also recommended making your own protein shakes with protein powder.   Sleep/Stress management  Patient was encouraged to rest their body prior to surgery.  Encouraged attempting to get 8 hours of sleep at night.  Avoid stress.  Avoid sick contacts.  Encouraged to find a relaxing hobby such as reading, meditation, listening to music.  Training exercises  Patient was encouraged to remain active as possible.  Today bilateral lower extremity generic exercises were taught for muscle strengthening and balance.  All exercises to be done sitting down.

## 2025-03-17 NOTE — ASSESSMENT & PLAN NOTE
Seen for surgical optimization  Seen for best program    At risk for post-op DANIEL -no  At risk for post-op SSI-no  BEST   Breathing- instructed to exercise lungs prior to surgery via deep breathing exercises  Eat- discussed increasing protein intake prior to surgery   Sleep/stress- encouraged 8-10 sleep @ night, stress reduction, avoid sick contacts and handwashing   Train- encouraged to remain active    Orders:    Ambulatory referral to surgical optimization

## 2025-03-19 ENCOUNTER — PATIENT OUTREACH (OUTPATIENT)
Dept: CASE MANAGEMENT | Facility: OTHER | Age: 50
End: 2025-03-19

## 2025-03-19 NOTE — PRE-PROCEDURE INSTRUCTIONS
Pre-Surgery Instructions:   Medication Instructions    ACIDOPHILUS LACTOBACILLUS PO Stop taking 7 days prior to surgery.    Armodafinil 150 MG tablet Stop taking 3 days prior to surgery.    fenofibrate (TRICOR) 145 mg tablet Take day of surgery.    gabapentin (NEURONTIN) 300 mg capsule Take day of surgery.    letrozole (FEMARA) 2.5 mg tablet Stop taking 1 day prior to surgery.    tiotropium-olodaterol (Stiolto Respimat) 2.5-2.5 MCG/ACT inhaler Uses PRN- OK to take day of surgery    Medication instructions for day of surgery reviewed. Please take all instructed medications with only a sip of water.       You will receive a call one business day prior to surgery with an arrival time and hospital directions. If your surgery is scheduled on a Monday, the hospital will be calling you on the Friday prior to your surgery. If you have not heard from anyone by 8pm, please call the hospital supervisor through the hospital  at [phone]. ([place] [phone] or [place] [phone]).    Do not eat or drink anything after midnight the night before your surgery, including candy, mints, lifesavers, or chewing gum. Do not drink alcohol 24hrs before your surgery. Try not to smoke at least 24hrs before your surgery.       Follow the pre surgery showering instructions as listed in the “My Surgical Experience Booklet” or otherwise provided by your surgeon's office. Do not use a blade to shave the surgical area 1 week before surgery. It is okay to use a clean electric clippers up to 24 hours before surgery. Do not apply any lotions, creams, including makeup, cologne, deodorant, or perfumes after showering on the day of your surgery. Do not use dry shampoo, hair spray, hair gel, or any type of hair products.     No contact lenses, eye make-up, or artificial eyelashes. Remove nail polish, including gel polish, and any artificial, gel, or acrylic nails if possible. Remove all jewelry including rings and body piercing  jewelry.     Wear causal clothing that is easy to take on and off. Consider your type of surgery.    Keep any valuables, jewelry, piercings at home. Please bring any specially ordered equipment (sling, braces) if indicated.    Arrange for a responsible person to drive you to and from the hospital on the day of your surgery. Please confirm the visitor policy for the day of your procedure when you receive your phone call with an arrival time.     Call the surgeon's office with any new illnesses, exposures, or additional questions prior to surgery.    Please reference your “My Surgical Experience Booklet” for additional information to prepare for your upcoming surgery.

## 2025-03-19 NOTE — PROGRESS NOTES
OSW received an email from the pt stating that she will be having her reconstruction on 3/28. OSW responded and thanked her for the update. OSW will check in with her after she has had her surgery. OSW will continue to follow.

## 2025-03-24 ENCOUNTER — OFFICE VISIT (OUTPATIENT)
Dept: INTERNAL MEDICINE CLINIC | Facility: OTHER | Age: 50
End: 2025-03-24
Payer: COMMERCIAL

## 2025-03-24 VITALS
SYSTOLIC BLOOD PRESSURE: 132 MMHG | HEIGHT: 62 IN | BODY MASS INDEX: 26.65 KG/M2 | OXYGEN SATURATION: 98 % | RESPIRATION RATE: 18 BRPM | HEART RATE: 67 BPM | WEIGHT: 144.8 LBS | DIASTOLIC BLOOD PRESSURE: 80 MMHG | TEMPERATURE: 99.1 F

## 2025-03-24 DIAGNOSIS — Z17.0 MALIGNANT NEOPLASM OF CENTRAL PORTION OF RIGHT BREAST IN FEMALE, ESTROGEN RECEPTOR POSITIVE (HCC): ICD-10-CM

## 2025-03-24 DIAGNOSIS — R91.1 LUNG NODULE: ICD-10-CM

## 2025-03-24 DIAGNOSIS — G47.00 INSOMNIA, UNSPECIFIED TYPE: ICD-10-CM

## 2025-03-24 DIAGNOSIS — G25.0 ESSENTIAL TREMOR: ICD-10-CM

## 2025-03-24 DIAGNOSIS — D72.829 LEUKOCYTOSIS, UNSPECIFIED TYPE: ICD-10-CM

## 2025-03-24 DIAGNOSIS — J44.9 STAGE 1 MILD COPD BY GOLD CLASSIFICATION (HCC): ICD-10-CM

## 2025-03-24 DIAGNOSIS — K21.9 GASTROESOPHAGEAL REFLUX DISEASE WITHOUT ESOPHAGITIS: ICD-10-CM

## 2025-03-24 DIAGNOSIS — F41.9 ANXIETY: Primary | ICD-10-CM

## 2025-03-24 DIAGNOSIS — G43.711 INTRACTABLE CHRONIC MIGRAINE WITHOUT AURA AND WITH STATUS MIGRAINOSUS: ICD-10-CM

## 2025-03-24 DIAGNOSIS — C50.111 MALIGNANT NEOPLASM OF CENTRAL PORTION OF RIGHT BREAST IN FEMALE, ESTROGEN RECEPTOR POSITIVE (HCC): ICD-10-CM

## 2025-03-24 PROBLEM — N61.1 BREAST ABSCESS: Status: RESOLVED | Noted: 2024-12-15 | Resolved: 2025-03-24

## 2025-03-24 PROCEDURE — 99214 OFFICE O/P EST MOD 30 MIN: CPT | Performed by: INTERNAL MEDICINE

## 2025-03-24 RX ORDER — SUMATRIPTAN 50 MG/1
50 TABLET, FILM COATED ORAL ONCE AS NEEDED
Qty: 9 TABLET | Refills: 0 | Status: SHIPPED | OUTPATIENT
Start: 2025-03-24

## 2025-03-24 NOTE — PROGRESS NOTES
Name: Jenny Pereyra      : 1975      MRN: [id number]  Encounter Provider: Nelson Akhtar MD  Encounter Date: 3/24/2025   Encounter department: [facility]  :  Assessment & Plan  Stage 1 mild COPD by GOLD classification (HCC)  Stable, continue to monitor clinically. Continue Stiolto.        Essential tremor  Continue gabapentin. Continue to monitor clinically.        Lung nodule  Stable, continue to monitor.        Anxiety  Stable, continue Lexapro.        Malignant neoplasm of central portion of right breast in female, estrogen receptor positive (HCC)  Continue follow up with hematology. Continue Femara for now although she will be discussing side effects (intractable headaches) with her oncologist.        Intractable chronic migraine without aura and with status migrainosus  Will prescribe sumatriptan as needed for breakthrough headaches.  Orders:  •  SUMAtriptan (Imitrex) 50 mg tablet; Take 1 tablet (50 mg total) by mouth once as needed for migraine for up to 1 dose    Insomnia, unspecified type  Continue follow up with sleep medicine. Continue gabapentin.        Gastroesophageal reflux disease without esophagitis  Continue OTC antacid as needed.        Leukocytosis, unspecified type  Will recheck CBC in 1 month.  Orders:  •  CBC and differential; Future          Depression Screening and Follow-up Plan: Patient was screened for depression during today's encounter. They screened negative with a PHQ-2 score of 0.        History of Present Illness   Jenny Pereyra is seen today for follow up of chronic conditions.   Recent laboratory studies reviewed today with the patient.   she has been compliant with her medication regimen.   She has been experiencing intractable headaches and arthralgias/myalgias since 2 months. She was started on Femara in 2024. Headaches have gotten worse and intarctable since  switching Femara from morning to evening. The headaches do  not feel like migraines. She takes tylenol/nsaids for headaches.    she has no complaints or concerns at this time.       Headache  Migraine  This is a chronic problem. The problem has been gradually worsening since onset. Pertinent negatives include no abdominal pain, coughing, diarrhea, dizziness, fever, nausea, numbness, rhinorrhea, sinus pressure, sore throat, vomiting or weakness.   Anxiety  Presents for follow-up visit. Patient reports no chest pain, dizziness, nausea, palpitations, shortness of breath or suicidal ideas. Symptoms occur rarely. The patient sleeps 6 hours per night. The quality of sleep is good. Nighttime awakenings: none.     Compliance with medications is %.     Review of Systems   Constitutional:  Negative for activity change, appetite change, chills, diaphoresis, fatigue and fever.   HENT:  Negative for congestion, postnasal drip, rhinorrhea, sinus pressure, sinus pain, sneezing and sore throat.    Eyes:  Negative for visual disturbance.   Respiratory:  Negative for apnea, cough, choking, chest tightness, shortness of breath and wheezing.    Cardiovascular:  Negative for chest pain, palpitations and leg swelling.   Gastrointestinal:  Negative for abdominal distention, abdominal pain, anal bleeding, blood in stool, constipation, diarrhea, nausea and vomiting.   Endocrine: Negative for cold intolerance and heat intolerance.   Genitourinary:  Negative for difficulty urinating, dysuria and hematuria.   Musculoskeletal: Negative.    Skin: Negative.    Neurological:  Positive for headaches. Negative for dizziness, weakness, light-headedness and numbness.   Hematological:  Negative for adenopathy.   Psychiatric/Behavioral:  Negative for agitation, sleep disturbance and suicidal ideas.    All other systems reviewed and are negative.      Objective   /80 (BP Location: Left arm, Patient Position: Sitting, Cuff Size: Standard)   Pulse 67   Temp 99.1 °F (37.3 °C) (Temporal)   Resp 18    "Ht 5' 2\" (1.575 m)   Wt 65.7 kg (144 lb 12.8 oz)   SpO2 98%   BMI 26.48 kg/m²      Physical Exam  Vitals and nursing note reviewed.   Constitutional:       General: She is not in acute distress.     Appearance: She is well-developed. She is not diaphoretic.   HENT:      Head: Normocephalic and atraumatic.   Eyes:      General:         Right eye: No discharge.         Left eye: No discharge.      Conjunctiva/sclera: Conjunctivae normal.      Pupils: Pupils are equal, round, and reactive to light.   Neck:      Thyroid: No thyromegaly.      Vascular: No JVD.   Cardiovascular:      Rate and Rhythm: Normal rate and regular rhythm.      Heart sounds: Normal heart sounds. No murmur heard.     No friction rub. No gallop.   Pulmonary:      Effort: Pulmonary effort is normal. No respiratory distress.      Breath sounds: Normal breath sounds. No wheezing or rales.   Chest:      Chest wall: No tenderness.   Abdominal:      General: There is no distension.      Palpations: Abdomen is soft.      Tenderness: There is no abdominal tenderness.   Musculoskeletal:         General: No tenderness or deformity. Normal range of motion.      Cervical back: Normal range of motion and neck supple.   Lymphadenopathy:      Cervical: No cervical adenopathy.   Skin:     General: Skin is warm and dry.      Coloration: Skin is not pale.      Findings: No erythema or rash.   Neurological:      Mental Status: She is alert and oriented to person, place, and time.      Cranial Nerves: No cranial nerve deficit.      Coordination: Coordination normal.   Psychiatric:         Behavior: Behavior normal.         Thought Content: Thought content normal.         Judgment: Judgment normal.         "

## 2025-03-24 NOTE — ASSESSMENT & PLAN NOTE
Continue follow up with hematology. Continue Femara for now although she will be discussing side effects (intractable headaches) with her oncologist.

## 2025-03-24 NOTE — ASSESSMENT & PLAN NOTE
Will prescribe sumatriptan as needed for breakthrough headaches.  Orders:  •  SUMAtriptan (Imitrex) 50 mg tablet; Take 1 tablet (50 mg total) by mouth once as needed for migraine for up to 1 dose

## 2025-03-25 ENCOUNTER — TELEPHONE (OUTPATIENT)
Dept: PLASTIC SURGERY | Facility: CLINIC | Age: 50
End: 2025-03-25

## 2025-03-25 NOTE — TELEPHONE ENCOUNTER
Lvm for patient to for med hold before sx. Advised patient to stop taking Letrozole 1 day prior to surgery and to call if she has any questions or concerns.

## 2025-03-27 ENCOUNTER — ANESTHESIA EVENT (OUTPATIENT)
Dept: PERIOP | Facility: HOSPITAL | Age: 50
End: 2025-03-27
Payer: COMMERCIAL

## 2025-03-28 ENCOUNTER — ANESTHESIA (OUTPATIENT)
Dept: PERIOP | Facility: HOSPITAL | Age: 50
End: 2025-03-28
Payer: COMMERCIAL

## 2025-03-28 ENCOUNTER — HOSPITAL ENCOUNTER (OUTPATIENT)
Facility: HOSPITAL | Age: 50
Setting detail: OUTPATIENT SURGERY
Discharge: HOME | End: 2025-03-29
Attending: PLASTIC SURGERY | Admitting: PLASTIC SURGERY
Payer: COMMERCIAL

## 2025-03-28 DIAGNOSIS — C50.111 MALIGNANT NEOPLASM OF CENTRAL PORTION OF RIGHT BREAST IN FEMALE, ESTROGEN RECEPTOR POSITIVE (HCC): Primary | ICD-10-CM

## 2025-03-28 DIAGNOSIS — Z17.0 MALIGNANT NEOPLASM OF CENTRAL PORTION OF RIGHT BREAST IN FEMALE, ESTROGEN RECEPTOR POSITIVE (HCC): Primary | ICD-10-CM

## 2025-03-28 PROCEDURE — 88302 TISSUE EXAM BY PATHOLOGIST: CPT | Performed by: PATHOLOGY

## 2025-03-28 PROCEDURE — 19357 TISS XPNDR PLMT BRST RCNSTJ: CPT | Performed by: PLASTIC SURGERY

## 2025-03-28 PROCEDURE — 19357 TISS XPNDR PLMT BRST RCNSTJ: CPT

## 2025-03-28 PROCEDURE — 15777 ACELLULAR DERM MATRIX IMPLT: CPT

## 2025-03-28 PROCEDURE — C1789 PROSTHESIS, BREAST, IMP: HCPCS | Performed by: PLASTIC SURGERY

## 2025-03-28 PROCEDURE — 15777 ACELLULAR DERM MATRIX IMPLT: CPT | Performed by: PLASTIC SURGERY

## 2025-03-28 DEVICE — BREAST TISSUE EXPANDER, SUTURE TABS, INTEGRAL INJECTION DOME, 250CC
Type: IMPLANTABLE DEVICE | Site: BREAST | Status: FUNCTIONAL
Brand: MENTOR CPX4 PLUS, SMOOTH, TALL HEIGHT

## 2025-03-28 DEVICE — IMPLANTABLE DEVICE: Type: IMPLANTABLE DEVICE | Site: BREAST | Status: FUNCTIONAL

## 2025-03-28 RX ORDER — PROPOFOL 10 MG/ML
INJECTION, EMULSION INTRAVENOUS CONTINUOUS PRN
Status: DISCONTINUED | OUTPATIENT
Start: 2025-03-28 | End: 2025-03-28

## 2025-03-28 RX ORDER — HYDROMORPHONE HCL/PF 1 MG/ML
SYRINGE (ML) INJECTION AS NEEDED
Status: DISCONTINUED | OUTPATIENT
Start: 2025-03-28 | End: 2025-03-28

## 2025-03-28 RX ORDER — FENTANYL CITRATE 50 UG/ML
INJECTION, SOLUTION INTRAMUSCULAR; INTRAVENOUS AS NEEDED
Status: DISCONTINUED | OUTPATIENT
Start: 2025-03-28 | End: 2025-03-28

## 2025-03-28 RX ORDER — FENTANYL CITRATE/PF 50 MCG/ML
25 SYRINGE (ML) INJECTION
Status: DISCONTINUED | OUTPATIENT
Start: 2025-03-28 | End: 2025-03-28 | Stop reason: HOSPADM

## 2025-03-28 RX ORDER — LIDOCAINE HYDROCHLORIDE 10 MG/ML
INJECTION, SOLUTION EPIDURAL; INFILTRATION; INTRACAUDAL; PERINEURAL AS NEEDED
Status: DISCONTINUED | OUTPATIENT
Start: 2025-03-28 | End: 2025-03-28

## 2025-03-28 RX ORDER — OXYCODONE HYDROCHLORIDE 5 MG/1
5 TABLET ORAL EVERY 4 HOURS PRN
Refills: 0 | Status: DISCONTINUED | OUTPATIENT
Start: 2025-03-28 | End: 2025-03-29 | Stop reason: HOSPADM

## 2025-03-28 RX ORDER — ESCITALOPRAM OXALATE 10 MG/1
10 TABLET ORAL DAILY
Status: DISCONTINUED | OUTPATIENT
Start: 2025-03-29 | End: 2025-03-29 | Stop reason: HOSPADM

## 2025-03-28 RX ORDER — ONDANSETRON 2 MG/ML
4 INJECTION INTRAMUSCULAR; INTRAVENOUS EVERY 6 HOURS PRN
Status: DISCONTINUED | OUTPATIENT
Start: 2025-03-28 | End: 2025-03-29 | Stop reason: HOSPADM

## 2025-03-28 RX ORDER — BUPIVACAINE HYDROCHLORIDE 2.5 MG/ML
INJECTION, SOLUTION EPIDURAL; INFILTRATION; INTRACAUDAL; PERINEURAL AS NEEDED
Status: DISCONTINUED | OUTPATIENT
Start: 2025-03-28 | End: 2025-03-28 | Stop reason: HOSPADM

## 2025-03-28 RX ORDER — OXYCODONE HYDROCHLORIDE 10 MG/1
10 TABLET ORAL EVERY 4 HOURS PRN
Refills: 0 | Status: COMPLETED | OUTPATIENT
Start: 2025-03-28 | End: 2025-03-28

## 2025-03-28 RX ORDER — MIDAZOLAM HYDROCHLORIDE 2 MG/2ML
INJECTION, SOLUTION INTRAMUSCULAR; INTRAVENOUS AS NEEDED
Status: DISCONTINUED | OUTPATIENT
Start: 2025-03-28 | End: 2025-03-28

## 2025-03-28 RX ORDER — SCOPOLAMINE 1 MG/3D
1 PATCH, EXTENDED RELEASE TRANSDERMAL
Status: DISCONTINUED | OUTPATIENT
Start: 2025-03-28 | End: 2025-03-28 | Stop reason: HOSPADM

## 2025-03-28 RX ORDER — SODIUM CHLORIDE, SODIUM LACTATE, POTASSIUM CHLORIDE, CALCIUM CHLORIDE 600; 310; 30; 20 MG/100ML; MG/100ML; MG/100ML; MG/100ML
125 INJECTION, SOLUTION INTRAVENOUS CONTINUOUS
Status: DISCONTINUED | OUTPATIENT
Start: 2025-03-28 | End: 2025-03-29 | Stop reason: HOSPADM

## 2025-03-28 RX ORDER — DOCUSATE SODIUM 100 MG/1
100 CAPSULE, LIQUID FILLED ORAL 2 TIMES DAILY
Status: DISCONTINUED | OUTPATIENT
Start: 2025-03-28 | End: 2025-03-29 | Stop reason: HOSPADM

## 2025-03-28 RX ORDER — HYDROMORPHONE HCL/PF 1 MG/ML
0.5 SYRINGE (ML) INJECTION EVERY 4 HOURS PRN
Status: DISCONTINUED | OUTPATIENT
Start: 2025-03-28 | End: 2025-03-29 | Stop reason: HOSPADM

## 2025-03-28 RX ORDER — OXYCODONE HYDROCHLORIDE 10 MG/1
10 TABLET ORAL EVERY 4 HOURS PRN
Refills: 0 | Status: DISCONTINUED | OUTPATIENT
Start: 2025-03-28 | End: 2025-03-29 | Stop reason: HOSPADM

## 2025-03-28 RX ORDER — GABAPENTIN 300 MG/1
300 CAPSULE ORAL 3 TIMES DAILY PRN
Status: DISCONTINUED | OUTPATIENT
Start: 2025-03-28 | End: 2025-03-29 | Stop reason: HOSPADM

## 2025-03-28 RX ORDER — ONDANSETRON 2 MG/ML
INJECTION INTRAMUSCULAR; INTRAVENOUS AS NEEDED
Status: DISCONTINUED | OUTPATIENT
Start: 2025-03-28 | End: 2025-03-28

## 2025-03-28 RX ORDER — ENOXAPARIN SODIUM 100 MG/ML
40 INJECTION SUBCUTANEOUS DAILY
Status: DISCONTINUED | OUTPATIENT
Start: 2025-03-29 | End: 2025-03-29 | Stop reason: HOSPADM

## 2025-03-28 RX ORDER — SODIUM CHLORIDE, SODIUM LACTATE, POTASSIUM CHLORIDE, CALCIUM CHLORIDE 600; 310; 30; 20 MG/100ML; MG/100ML; MG/100ML; MG/100ML
100 INJECTION, SOLUTION INTRAVENOUS CONTINUOUS
Status: DISCONTINUED | OUTPATIENT
Start: 2025-03-28 | End: 2025-03-29 | Stop reason: HOSPADM

## 2025-03-28 RX ORDER — FAMOTIDINE 20 MG/1
20 TABLET, FILM COATED ORAL 2 TIMES DAILY
Status: DISCONTINUED | OUTPATIENT
Start: 2025-03-28 | End: 2025-03-29 | Stop reason: HOSPADM

## 2025-03-28 RX ORDER — ACETAMINOPHEN 325 MG/1
975 TABLET ORAL EVERY 6 HOURS PRN
Status: DISCONTINUED | OUTPATIENT
Start: 2025-03-28 | End: 2025-03-29 | Stop reason: HOSPADM

## 2025-03-28 RX ORDER — ROCURONIUM BROMIDE 10 MG/ML
INJECTION, SOLUTION INTRAVENOUS AS NEEDED
Status: DISCONTINUED | OUTPATIENT
Start: 2025-03-28 | End: 2025-03-28

## 2025-03-28 RX ORDER — SENNOSIDES 8.6 MG
1 TABLET ORAL DAILY
Status: DISCONTINUED | OUTPATIENT
Start: 2025-03-29 | End: 2025-03-29 | Stop reason: HOSPADM

## 2025-03-28 RX ORDER — ONDANSETRON 2 MG/ML
4 INJECTION INTRAMUSCULAR; INTRAVENOUS ONCE AS NEEDED
Status: DISCONTINUED | OUTPATIENT
Start: 2025-03-28 | End: 2025-03-28 | Stop reason: HOSPADM

## 2025-03-28 RX ORDER — FAMOTIDINE 10 MG/ML
20 INJECTION, SOLUTION INTRAVENOUS 2 TIMES DAILY
Status: DISCONTINUED | OUTPATIENT
Start: 2025-03-28 | End: 2025-03-29 | Stop reason: HOSPADM

## 2025-03-28 RX ORDER — ACETAMINOPHEN 10 MG/ML
INJECTION, SOLUTION INTRAVENOUS AS NEEDED
Status: DISCONTINUED | OUTPATIENT
Start: 2025-03-28 | End: 2025-03-28

## 2025-03-28 RX ORDER — CYCLOBENZAPRINE HCL 10 MG
10 TABLET ORAL 3 TIMES DAILY PRN
Status: DISCONTINUED | OUTPATIENT
Start: 2025-03-28 | End: 2025-03-29 | Stop reason: HOSPADM

## 2025-03-28 RX ORDER — CEFAZOLIN SODIUM 2 G/50ML
2000 SOLUTION INTRAVENOUS ONCE
Status: COMPLETED | OUTPATIENT
Start: 2025-03-28 | End: 2025-03-28

## 2025-03-28 RX ORDER — LIDOCAINE HYDROCHLORIDE 10 MG/ML
0.5 INJECTION, SOLUTION EPIDURAL; INFILTRATION; INTRACAUDAL; PERINEURAL ONCE AS NEEDED
Status: DISCONTINUED | OUTPATIENT
Start: 2025-03-28 | End: 2025-03-28 | Stop reason: HOSPADM

## 2025-03-28 RX ORDER — PROPOFOL 10 MG/ML
INJECTION, EMULSION INTRAVENOUS AS NEEDED
Status: DISCONTINUED | OUTPATIENT
Start: 2025-03-28 | End: 2025-03-28

## 2025-03-28 RX ORDER — MAGNESIUM HYDROXIDE/ALUMINUM HYDROXICE/SIMETHICONE 120; 1200; 1200 MG/30ML; MG/30ML; MG/30ML
30 SUSPENSION ORAL EVERY 6 HOURS PRN
Status: DISCONTINUED | OUTPATIENT
Start: 2025-03-28 | End: 2025-03-29 | Stop reason: HOSPADM

## 2025-03-28 RX ORDER — SUMATRIPTAN 50 MG/1
50 TABLET, FILM COATED ORAL ONCE AS NEEDED
Status: DISCONTINUED | OUTPATIENT
Start: 2025-03-28 | End: 2025-03-29 | Stop reason: HOSPADM

## 2025-03-28 RX ORDER — CEFAZOLIN SODIUM 1 G/50ML
1000 SOLUTION INTRAVENOUS EVERY 8 HOURS
Status: COMPLETED | OUTPATIENT
Start: 2025-03-29 | End: 2025-03-29

## 2025-03-28 RX ORDER — DEXAMETHASONE SODIUM PHOSPHATE 10 MG/ML
INJECTION, SOLUTION INTRAMUSCULAR; INTRAVENOUS AS NEEDED
Status: DISCONTINUED | OUTPATIENT
Start: 2025-03-28 | End: 2025-03-28

## 2025-03-28 RX ADMIN — SCOPOLAMINE 1 PATCH: 1.5 PATCH, EXTENDED RELEASE TRANSDERMAL at 13:35

## 2025-03-28 RX ADMIN — HYDROMORPHONE HYDROCHLORIDE 0.5 MG: 1 INJECTION, SOLUTION INTRAMUSCULAR; INTRAVENOUS; SUBCUTANEOUS at 22:38

## 2025-03-28 RX ADMIN — PROPOFOL 180 MG: 10 INJECTION, EMULSION INTRAVENOUS at 14:13

## 2025-03-28 RX ADMIN — SODIUM CHLORIDE, SODIUM LACTATE, POTASSIUM CHLORIDE, AND CALCIUM CHLORIDE 125 ML/HR: .6; .31; .03; .02 INJECTION, SOLUTION INTRAVENOUS at 11:05

## 2025-03-28 RX ADMIN — FENTANYL CITRATE 25 MCG: 50 INJECTION INTRAMUSCULAR; INTRAVENOUS at 19:24

## 2025-03-28 RX ADMIN — MIDAZOLAM 2 MG: 1 INJECTION INTRAMUSCULAR; INTRAVENOUS at 14:06

## 2025-03-28 RX ADMIN — FENTANYL CITRATE 100 MCG: 50 INJECTION INTRAMUSCULAR; INTRAVENOUS at 14:13

## 2025-03-28 RX ADMIN — ROCURONIUM BROMIDE 50 MG: 10 INJECTION, SOLUTION INTRAVENOUS at 14:15

## 2025-03-28 RX ADMIN — PROPOFOL 50 MCG/KG/MIN: 10 INJECTION, EMULSION INTRAVENOUS at 14:19

## 2025-03-28 RX ADMIN — HYDROMORPHONE HYDROCHLORIDE 0.5 MG: 1 INJECTION, SOLUTION INTRAMUSCULAR; INTRAVENOUS; SUBCUTANEOUS at 18:31

## 2025-03-28 RX ADMIN — FENTANYL CITRATE 50 MCG: 50 INJECTION INTRAMUSCULAR; INTRAVENOUS at 17:39

## 2025-03-28 RX ADMIN — ONDANSETRON 4 MG: 2 INJECTION INTRAMUSCULAR; INTRAVENOUS at 16:35

## 2025-03-28 RX ADMIN — FENTANYL CITRATE 50 MCG: 50 INJECTION INTRAMUSCULAR; INTRAVENOUS at 15:17

## 2025-03-28 RX ADMIN — ROCURONIUM BROMIDE 20 MG: 10 INJECTION, SOLUTION INTRAVENOUS at 15:09

## 2025-03-28 RX ADMIN — FAMOTIDINE 20 MG: 20 TABLET, FILM COATED ORAL at 21:57

## 2025-03-28 RX ADMIN — DEXAMETHASONE SODIUM PHOSPHATE 10 MG: 10 INJECTION, SOLUTION INTRAMUSCULAR; INTRAVENOUS at 14:15

## 2025-03-28 RX ADMIN — CEFAZOLIN SODIUM 2000 MG: 2 SOLUTION INTRAVENOUS at 18:17

## 2025-03-28 RX ADMIN — SCOPOLAMINE 1 PATCH: 1.5 PATCH, EXTENDED RELEASE TRANSDERMAL at 14:07

## 2025-03-28 RX ADMIN — FENTANYL CITRATE 25 MCG: 50 INJECTION INTRAMUSCULAR; INTRAVENOUS at 18:55

## 2025-03-28 RX ADMIN — OXYCODONE HYDROCHLORIDE 10 MG: 10 TABLET ORAL at 19:59

## 2025-03-28 RX ADMIN — LIDOCAINE HYDROCHLORIDE 50 MG: 10 INJECTION, SOLUTION EPIDURAL; INFILTRATION; INTRACAUDAL; PERINEURAL at 14:13

## 2025-03-28 RX ADMIN — CEFAZOLIN SODIUM 2000 MG: 2 SOLUTION INTRAVENOUS at 14:17

## 2025-03-28 RX ADMIN — SUGAMMADEX 150 MG: 100 INJECTION, SOLUTION INTRAVENOUS at 18:31

## 2025-03-28 RX ADMIN — DOCUSATE SODIUM 100 MG: 100 CAPSULE, LIQUID FILLED ORAL at 21:57

## 2025-03-28 RX ADMIN — FENTANYL CITRATE 25 MCG: 50 INJECTION INTRAMUSCULAR; INTRAVENOUS at 18:50

## 2025-03-28 RX ADMIN — ACETAMINOPHEN 1000 MG: 10 INJECTION INTRAVENOUS at 16:13

## 2025-03-28 RX ADMIN — SODIUM CHLORIDE, SODIUM LACTATE, POTASSIUM CHLORIDE, AND CALCIUM CHLORIDE 100 ML/HR: .6; .31; .03; .02 INJECTION, SOLUTION INTRAVENOUS at 20:13

## 2025-03-28 RX ADMIN — HYDROMORPHONE HYDROCHLORIDE 0.5 MG: 1 INJECTION, SOLUTION INTRAMUSCULAR; INTRAVENOUS; SUBCUTANEOUS at 14:59

## 2025-03-28 NOTE — INTERVAL H&P NOTE
H&P reviewed. After examining the patient I find no changes in the patients condition since the H&P had been written.  Jenny notes no changes in her condition from the last time we met.  Patient denies any fevers, chills, recent illnesses or hospitalizations.  Patient feels well overall.  Vitals:    03/28/25 1010   BP: 127/76   Pulse: 64   Temp: 98 °F (36.7 °C)   SpO2: 94%     Alert, oriented, no acute distress  Breathing comfortably on room air, lungs clear to auscultation bilaterally  Normal rate, heart sounds normal  Bilateral chest with well-healed transverse incisions.  Skin is healthy and intact.  Skin is well-perfused with 2-second cap refill bilaterally.  Mild excess tissue at the lateral and medial edges of each incision.  Left breast has significantly more excess than right.    Assessment and plan: Patient presents today for delayed breast reconstruction with tissue expanders bilaterally.  Reviewed the plan to place bilateral submuscular tissue expanders.  Discussed the risks of the procedure which include but are not limited to bleeding, infection, infection of the skin or device, device failure, asymmetry, scarring, skin loss, open wound, need for additional procedures.  Patient acknowledges the nature of the procedures as well as the associated risks.  Patient wishes to proceed with surgery

## 2025-03-28 NOTE — ANESTHESIA POSTPROCEDURE EVALUATION
Post-Op Assessment Note    CV Status:  Stable  Pain Score: 0    Pain management: adequate       Mental Status:  Alert and awake   Hydration Status:  Euvolemic   PONV Controlled:  None   Airway Patency:  Patent  Two or more mitigation strategies used for obstructive sleep apnea   Post Op Vitals Reviewed: Yes    No anethesia notable event occurred.    Staff: Anesthesiologist, CRNA   Comments: report given to RN; VSS; 6l/min facemask for transport          Last Filed PACU Vitals:  Vitals Value Taken Time   Temp     Pulse 74 03/28/25 1846   /88 03/28/25 1845   Resp 22 03/28/25 1846   SpO2 98 % 03/28/25 1846   Vitals shown include unfiled device data.

## 2025-03-28 NOTE — DISCHARGE INSTR - AVS FIRST PAGE
Post-Op Discharge Instructions  Dr. Karthik Brito  [facility]  [address], [place] [address]  [phone]    You may shower in 48 hours. Please shower with your back facing the water. Do not scrub incision sites.     Your steri strip dressings should stay in place until seen in the office.   Keep surgical bra on at all times, except to shower.  It is ok to remove bra while laying down at rest for short intervals for comfort  After 2 weeks, it is ok to switch to a sports bra.  It is recommended to use one that sara in the front.  No underwire bra for 6 weeks.  Use ABD pads or a form of padding for extra compression.    No strenuous activity or lifting over 10 lbs for a minimum of 4 weeks. No repetitive pushing, pulling or overhead arm motions.    While you should avoid strenuous activity you should be out of bed and moving around.  Please ambulate at least 4 times throughout the day.  Try to only be in bed when its time to sleep.    Please record fluid from both of your drains daily and bring this to your first post-op appointment. After emptying the drain, be sure to squeeze the bulb as you reseal the cap.    Please lay on your back, with your head at an incline.     Your first post-op appointment is scheduled for 4/3/2025 @ 1:00pm at the [facility].    Take following medications with a checkmark ([x]) as prescribed, and do not take any pain medication on an empty stomach.  [x]Tylenol 1000mg, every 6 hours as needed for pain control.  [x]Gabapentin 300mg, every 8 hours as needed for nerve pain.  [x]Flexeril 10mg, every 8 hours as needed for muscle spasms/pain.  [x]Oxycodone, 5mg, 1 tablet every 4 hours, as needed for severe/break-through  pain.  [x]Keflex, 500mg, 1 tablet every 12 hours. (Antibiotic)  Please do not take any vitamins, minerals, ibuprofen, hormonal drugs or immunologic drugs for 7 days post-op.    Resume any prior medications at  home unless otherwise instructed by Dr. Brito    You must be off all pain medications and have a clear field of vision before driving.    For the next 24 hours:  a. Do not sign any legal documents or operate machinery.  b. Have a responsible adult help you.  c. Take it easy & rest.    Call Dr. Brito at the office [phone] if any:   a. Obvious bleeding, excessive swelling, or warmth at the site  b. Fever over 101.0°  c. Shortness of breath, severe calf or thigh pain.  d. Redness, odor, or pus at the wound. (Some oozing is normal from incision sites and may continue for several days)  e. Persistent vomiting or pain that is not relieved by your medication.

## 2025-03-28 NOTE — ANESTHESIA PREPROCEDURE EVALUATION
"Procedure:  BILATERAL TISSUE EXPANDER PLACEMENT (Bilateral: Breast)    Relevant Problems   CARDIO   (+) Chronic migraine without aura   (+) Other hemorrhoids   (+) Other hyperlipidemia      GI/HEPATIC   (+) Gastroesophageal reflux disease without esophagitis      /RENAL   (+) Kidney stone on left side      GYN   (+) Malignant neoplasm of central portion of right breast in female, estrogen receptor positive (HCC)      NEURO/PSYCH   (+) Anxiety   (+) Chronic interstitial cystitis   (+) Chronic migraine without aura      PULMONARY   (+) Stage 1 mild COPD by GOLD classification (HCC)     Prev mac3 gr2av    Recent labs personally reviewed:  Lab Results   Component Value Date    WBC 10.26 (H) 03/15/2025    HGB 14.7 03/15/2025     03/15/2025     Lab Results   Component Value Date    K 4.6 03/15/2025    BUN 19 03/15/2025    CREATININE 0.81 03/15/2025     No results found for: \"PTT\"   Lab Results   Component Value Date    INR 1.01 12/16/2024       Lab Results   Component Value Date    HGBA1C 5.5 04/06/2024              Physical Exam    Airway    Mallampati score: II  TM Distance: >3 FB  Neck ROM: full     Dental   No notable dental hx     Cardiovascular      Pulmonary  Pulmonary exam normal     Other Findings  post-pubertal.      Anesthesia Plan  ASA Score- 2     Anesthesia Type- general with ASA Monitors.         Additional Monitors:     Airway Plan: ETT.           Plan Factors-Exercise tolerance (METS): >4 METS.    Chart reviewed.   Existing labs reviewed. Patient summary reviewed.    Patient is not a current smoker.              Induction- intravenous.    Postoperative Plan-         Informed Consent- Anesthetic plan and risks discussed with patient.  I personally reviewed this patient with the CRNA. Discussed and agreed on the Anesthesia Plan with the CRNA..      NPO Status:  No vitals data found for the desired time range.        "

## 2025-03-29 ENCOUNTER — NURSE TRIAGE (OUTPATIENT)
Dept: OTHER | Facility: OTHER | Age: 50
End: 2025-03-29

## 2025-03-29 VITALS
RESPIRATION RATE: 20 BRPM | HEART RATE: 65 BPM | SYSTOLIC BLOOD PRESSURE: 120 MMHG | WEIGHT: 142 LBS | BODY MASS INDEX: 26.13 KG/M2 | OXYGEN SATURATION: 92 % | TEMPERATURE: 98.3 F | DIASTOLIC BLOOD PRESSURE: 75 MMHG | HEIGHT: 62 IN

## 2025-03-29 DIAGNOSIS — R11.0 NAUSEA: Primary | ICD-10-CM

## 2025-03-29 RX ORDER — GABAPENTIN 300 MG/1
300 CAPSULE ORAL 3 TIMES DAILY PRN
Qty: 21 CAPSULE | Refills: 0 | Status: SHIPPED | OUTPATIENT
Start: 2025-03-29 | End: 2025-04-05

## 2025-03-29 RX ORDER — ACETAMINOPHEN 500 MG
500 TABLET ORAL EVERY 6 HOURS PRN
Qty: 28 TABLET | Refills: 0 | Status: SHIPPED | OUTPATIENT
Start: 2025-03-29 | End: 2025-04-05

## 2025-03-29 RX ORDER — ONDANSETRON 4 MG/1
4 TABLET, FILM COATED ORAL EVERY 8 HOURS PRN
Qty: 20 TABLET | Refills: 0 | Status: SHIPPED | OUTPATIENT
Start: 2025-03-29

## 2025-03-29 RX ORDER — OXYCODONE HYDROCHLORIDE 5 MG/1
5 TABLET ORAL EVERY 4 HOURS PRN
Qty: 10 TABLET | Refills: 0 | Status: SHIPPED | OUTPATIENT
Start: 2025-03-29 | End: 2025-04-08

## 2025-03-29 RX ORDER — CEPHALEXIN 500 MG/1
500 CAPSULE ORAL EVERY 12 HOURS SCHEDULED
Qty: 20 CAPSULE | Refills: 0 | Status: SHIPPED | OUTPATIENT
Start: 2025-03-29 | End: 2025-04-08

## 2025-03-29 RX ORDER — CYCLOBENZAPRINE HCL 10 MG
10 TABLET ORAL 3 TIMES DAILY PRN
Qty: 21 TABLET | Refills: 0 | Status: SHIPPED | OUTPATIENT
Start: 2025-03-29 | End: 2025-04-05

## 2025-03-29 RX ADMIN — OXYCODONE HYDROCHLORIDE 10 MG: 10 TABLET ORAL at 11:22

## 2025-03-29 RX ADMIN — OXYCODONE HYDROCHLORIDE 10 MG: 10 TABLET ORAL at 06:43

## 2025-03-29 RX ADMIN — ESCITALOPRAM OXALATE 10 MG: 10 TABLET ORAL at 08:29

## 2025-03-29 RX ADMIN — HYDROMORPHONE HYDROCHLORIDE 0.5 MG: 1 INJECTION, SOLUTION INTRAMUSCULAR; INTRAVENOUS; SUBCUTANEOUS at 04:52

## 2025-03-29 RX ADMIN — HYDROMORPHONE HYDROCHLORIDE 0.5 MG: 1 INJECTION, SOLUTION INTRAMUSCULAR; INTRAVENOUS; SUBCUTANEOUS at 12:23

## 2025-03-29 RX ADMIN — DOCUSATE SODIUM 100 MG: 100 CAPSULE, LIQUID FILLED ORAL at 08:29

## 2025-03-29 RX ADMIN — SENNOSIDES 8.6 MG: 8.6 TABLET, FILM COATED ORAL at 08:29

## 2025-03-29 RX ADMIN — CEFAZOLIN SODIUM 1000 MG: 1 SOLUTION INTRAVENOUS at 08:29

## 2025-03-29 RX ADMIN — OXYCODONE HYDROCHLORIDE 10 MG: 10 TABLET ORAL at 01:05

## 2025-03-29 RX ADMIN — FAMOTIDINE 20 MG: 20 TABLET, FILM COATED ORAL at 08:29

## 2025-03-29 RX ADMIN — ENOXAPARIN SODIUM 40 MG: 40 INJECTION SUBCUTANEOUS at 08:29

## 2025-03-29 RX ADMIN — CEFAZOLIN SODIUM 1000 MG: 1 SOLUTION INTRAVENOUS at 01:06

## 2025-03-29 RX ADMIN — HYDROMORPHONE HYDROCHLORIDE 0.5 MG: 1 INJECTION, SOLUTION INTRAMUSCULAR; INTRAVENOUS; SUBCUTANEOUS at 08:29

## 2025-03-29 NOTE — ANESTHESIA POSTPROCEDURE EVALUATION
Post-Op Assessment Note            No anethesia notable event occurred.    Staff: Anesthesiologist           Last Filed PACU Vitals:  Vitals Value Taken Time   Temp 97.1 °F (36.2 °C) 03/28/25 1845   Pulse 68 03/28/25 1930   /73 03/28/25 1930   Resp 20 03/28/25 1930   SpO2 94 % 03/28/25 1930       Modified Mateo:     Vitals Value Taken Time   Activity 2 03/28/25 1930   Respiration 2 03/28/25 1930   Circulation 2 03/28/25 1930   Consciousness 2 03/28/25 1930   Oxygen Saturation 1 03/28/25 1930     Modified Mateo Score: 9

## 2025-03-29 NOTE — TELEPHONE ENCOUNTER
Regarding: Had surgery & every time I get up to move around I get nauseous  ----- Message from Estela DODGE sent at 3/29/2025  4:27 PM EDT -----  '' I had surgery yesterday and was discharge from the hospital today. I every time I get up to move around. I get nauseous and I'm asking if something can be call into the pharmacy for the nausea.''

## 2025-03-29 NOTE — TELEPHONE ENCOUNTER
"FOLLOW UP: N/A    REASON FOR CONVERSATION: Nausea    SYMPTOMS: Nausea since discharged home (today) post op breast surgery     OTHER: On call provider to call in zofran.  Patient advised, home care reviewed.  Patient verbalized understanding.    DISPOSITION: Home Care,  Now      Reason for Disposition   Unexplained nausea    Answer Assessment - Initial Assessment Questions  1. NAUSEA SEVERITY: \"How bad is the nausea?\" (e.g., mild, moderate, severe; dehydration, weight loss)        Every time she moves/stands    2. ONSET: \"When did the nausea begin?\"        Since arriving home from surgery    3. VOMITING: \"Any vomiting?\" If Yes, ask: \"How many times today?\"        Has not vomited    4. RECURRENT SYMPTOM: \"Have you had nausea before?\" If Yes, ask: \"When was the last time?\" \"What happened that time?\"        Hx of nausea after surgery    5. CAUSE: \"What do you think is causing the nausea?\"        Breast sx yesterday    Protocols used: Nausea-Adult-AH    "

## 2025-03-29 NOTE — DISCHARGE SUMMARY
Discharge Summary - Plastic Surgery   Name: Jenny Pereyra 49 y.o. female I MRN: [id number]  Unit/Bed#: [facility] 828-01 I Date of Admission: 3/28/2025   Date of Service: 3/29/2025 I Hospital Day: 0    Admission Date: 3/28/2025 0958  Discharge Date: 03/29/25  Admitting Diagnosis: Malignant neoplasm of central portion of right breast in female, estrogen receptor positive (HCC) [C50.111, Z17.0]  Discharge Diagnosis:   Medical Problems       Resolved Problems  Date Reviewed: 3/24/2025   None         HPI:     Pt is s/p delayed reconstruction with bilateral tissue expanders on 3/28 by Dr. Brito. She has expected post-op pain.     Procedures Performed: No orders of the defined types were placed in this encounter.    PE:    AAOx3  Bilateral breast flaps are well perfused. Minor bruising & swelling. All drains with serosanguinous drainage.       Summary of Hospital Course:     Significant Findings, Care, Treatment and Services Provided:     Complications: none    Condition at Discharge: stable       Discharge instructions/Information to patient and family:   See After Visit Summary (AVS) for information provided to patient and family.      Provisions for Follow-Up Care:  See after visit summary for information related to follow-up care and any pertinent home health orders.      PCP: MOHSEN Moore    Disposition: Home    Planned Readmission: No     Discharge Medications:  See after visit summary for reconciled discharge medications provided to patient and family.      Discharge Statement:  I have spent a total time of 20 minutes in caring for this patient on the day of the visit/encounter. .

## 2025-03-31 ENCOUNTER — PATIENT OUTREACH (OUTPATIENT)
Dept: CASE MANAGEMENT | Facility: OTHER | Age: 50
End: 2025-03-31

## 2025-03-31 NOTE — PROGRESS NOTES
OSW placed outreach TC to pt this day to check in with her after her reconstruction. OSW received her VM. Detailed VM was provided.

## 2025-03-31 NOTE — TELEPHONE ENCOUNTER
Spoke with patient. Nausea has resolved after patient was able to take Zofran. Patient denies any other signs of infection. Will call if she experiences any changes.

## 2025-04-02 PROCEDURE — 88302 TISSUE EXAM BY PATHOLOGIST: CPT | Performed by: PATHOLOGY

## 2025-04-03 ENCOUNTER — TELEPHONE (OUTPATIENT)
Dept: NUTRITION | Facility: CLINIC | Age: 50
End: 2025-04-03

## 2025-04-03 ENCOUNTER — OFFICE VISIT (OUTPATIENT)
Age: 50
End: 2025-04-03

## 2025-04-03 DIAGNOSIS — Z17.0 MALIGNANT NEOPLASM OF CENTRAL PORTION OF RIGHT BREAST IN FEMALE, ESTROGEN RECEPTOR POSITIVE (HCC): Primary | ICD-10-CM

## 2025-04-03 DIAGNOSIS — C50.111 MALIGNANT NEOPLASM OF CENTRAL PORTION OF RIGHT BREAST IN FEMALE, ESTROGEN RECEPTOR POSITIVE (HCC): Primary | ICD-10-CM

## 2025-04-03 PROCEDURE — 99024 POSTOP FOLLOW-UP VISIT: CPT | Performed by: PLASTIC SURGERY

## 2025-04-03 NOTE — TELEPHONE ENCOUNTER
Jenny was scheduled for an RD phone follow up today (4/3/2025) and was unable to attend her appointment (no answer).  A call was placed and a voice message was left encouraging her to call back and reschedule her appointment at a more convenient time for her. RD contact information was provided.

## 2025-04-04 ENCOUNTER — TELEPHONE (OUTPATIENT)
Dept: NUTRITION | Facility: CLINIC | Age: 50
End: 2025-04-04

## 2025-04-04 NOTE — TELEPHONE ENCOUNTER
Voice message received from pt returning my call regarding her missed appt yesterday.  Called her back and spoke with Jenny today.  RD phone follow up rescheduled to 4/9 at 1pm.

## 2025-04-04 NOTE — PROGRESS NOTES
Name: Jenny Pereyra      : 1975      MRN: [id number]  Encounter Provider: Rhonda Barnett PA-C  Encounter Date: 2025   Encounter department: 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  Genetic test came back negative for deleterious mutation.    2024 bilateral mastectomy    24 FSH, estradiol level to assess menopausal status, did identify post-menopausal status.     2024  Letrozole 2.5mg po daily rx.  HA and bone aches.  Will discontinue and try exemestane.    Reviewed that if this is poorly tolerated, we have the option of trying the third and final aromatase inhibitor, tamoxifen or observing off systemic treatment.    Orders:  •  exemestane (AROMASIN) 25 MG tablet; Take 1 tablet (25 mg total) by mouth daily  •  DXA bone density spine hip and pelvis; Future    Long term (current) use of aromatase inhibitors             Return in about 3 months (around 2025) for Rhonda Barnett or Dr. Xavier, [facility].    History of Present Illness   No chief complaint on file.    Pertinent Medical History     25: Jenny Pereyra is a 48 y/o female seen for initial consultation 2024 at the referral of Cardarelli, Cassandra, re: Stage IA (cT1b, cN0, cM0, G1, ER+, KY+, HER2-, G1), Right sided Breast Cancer.    Past medical history of hysterectomy and right oophorectomy, appendectomy, back surgery, nicotine dependence, GERD, nephrolithiasis.    24 Abnormal screening mammogram     10/8/2024 Bx- at 3:00 4 cm from the nipple   - Invasive breast carcinoma with ductal and lobular features.     24 bilateral  mastectomy  left breast -no significant pathologic abnormality     Right breast- Invasive breast carcinoma with ductal and lobular features, grade 1,   ER %, CT % positive and HER2 0, no evidence of LVI, unifocal, 1.1 cm    MammaPrint was luminal A    10/16/24 germline genetic testing negative    History of hysterectomy and unilateral right oophorectomy.    12/2/24 FSH, estradiol level to assess menopausal status, did identify post-menopausal status.     12/2024  Letrozole 2.5mg po daily rx    4/8/25  Letrozole 2.5mg po daily rx.  She was tolerating well up until a few weeks ago.  She started having daily headaches.  She also has had bone aches.  She held letrozole for a week.  Headaches did improve bone aches also improved somewhat.  She also found relief with Imitrex.  She denies having these headaches prior to the letrozole.        Review of Systems        Objective   There were no vitals taken for this visit.    Physical Exam    Labs: I have reviewed the following labs:  Results for orders placed or performed during the hospital encounter of 03/28/25   Tissue Exam   Result Value Ref Range    Case Report       Surgical Pathology Report                         Case: [id number]                                  Authorizing Provider:  Karthik Brito MD        Collected:           03/28/2025 6892              Ordering Location:     [facility]      Received:            03/28/2025 1618                                     Hospital Operating Room                                                      Pathologist:           Ashley Martinez MD                                                         Specimens:   A) - Soft Tissue, Other, RIGHT BREAST SKIN                                                          B) - Soft Tissue, Other, LEFT BREAST SKIN                                                  Final Diagnosis       A. Skin, right breast (excision):  - Benign skin with prior procedural site  "changes    B. Skin, left breast (excision):  - Benign skin with prior procedural site changes    Interpretation performed at [facility], [address] [place] [address], [place] [address]        Additional Information       All reported additional testing was performed with appropriately reactive controls.  These tests were developed and their performance characteristics determined by [facility] or appropriate performing facility, though some tests may be performed on tissues which have not been validated for performance characteristics (such as staining performed on alcohol exposed cell blocks and decalcified tissues).  Results should be interpreted with caution and in the context of the patients’ clinical condition. These tests may not be cleared or approved by the U.S. Food and Drug Administration, though the FDA has determined that such clearance or approval is not necessary. These tests are used for clinical purposes and they should not be regarded as investigational or for research. This laboratory has been approved by CLIA 88, designated as a high-complexity laboratory and is qualified to perform these tests.  .      Gross Description       A. The specimen is received in formalin, labeled with the patient's name and hospital number, and is designated \" right breast skin.\"  The specimen consists of a tan ellipse of skin with a long stitch the lateral position and short stitch at the superior position, and 2 tails medial.  The specimen measures 10.0 cm medial to lateral, up to 0.4 cm from superior to inferior, and has a depth up to 0.7 cm.  The superior suture is arbitrarily designated 12:00 and the 2 tails that the medial is arbitrarily designated 3:00, and the lateral long sutures arbitrarily designated 9:00.  The specimen is inked per protocol; 12-3 o'clock-yellow, 3-6 o'clock-green, 6-9 o'clock-orange, 9-12 o'clock-blue, and skin surface tips red.  The specimen is sectioned revealing tan fibrous " "tissue.  There is no well-defined mass.  Representative sections are submitted between sponges.  Total of 1 cassette.  B. The specimen is received in formalin, labeled with the patient's name and hospital number, and is designated \"left breast skin\".   The superior sutures arbitrarily designated 12:00, the lateral long sutures arbitrarily designated 3:00, and the 2 tail medial margin is arbitrarily designated left breast skin.  The specimen consists of a tan ellipse of skin with a long suture designating the lateral position, short stitch designated the superior position, and 2 tails the medial position.  The tan ellipse of skin measures 11.0 cm medial to lateral, up to 0.4 cm superior to inferior, and cut to a greatest depth of 0.6 cm.  The superior sutures arbitrarily designated with 12:00, the long lateral sutures arbitrarily designated 3:00, and the 2 tails medial position is designated the 9 o'clock position.  The specimen is inked per protocol; 12-3 o'clock-yellow, 3-6 o'clock-green, 6-9 o'clock-orange, 9-12 o'clock-blue, and skin surface tips inked red.  The skin surface has a well-healed scar.  Sections reveal tan fibrous tissue, but no well-defined lesion.  Representative sections are submitted between sponges.  Total of 1 cassette.  Note: The estimated total formalin fixation time based upon information provided by the submitting clinician and the standard processing schedule is over 72 hours.  TStevens      Clinical Information       LONG STITCH LATERAL, SHORT STITCH SUPERIOR, 2 TAILS MEDIAL           Administrative Statements     Administrative Statements   Encounter provider Rhonda Barnett PA-C    The Patient is located at Home and in the following state in which I hold an active license [place].    The patient was identified by name and date of birth. Jenny Pereyra was informed that this is a telemedicine visit and that the visit is being conducted through the Epic Embedded platform. She agrees " to proceed..  My office door was closed. No one else was in the room.  She acknowledged consent and understanding of privacy and security of the video platform. The patient has agreed to participate and understands they can discontinue the visit at any time.    I have spent a total time of 20 minutes in caring for this patient on the day of the visit/encounter including Risks and benefits of tx options, Instructions for management, Patient and family education, Risk factor reductions, Impressions, Counseling / Coordination of care, Documenting in the medical record, Reviewing/placing orders in the medical record (including tests, medications, and/or procedures), and Obtaining or reviewing history  , not including the time spent for establishing the audio/video connection.

## 2025-04-04 NOTE — PROGRESS NOTES
Outpatient Oncology Nutrition Consultation   Type of Consult: Follow Up  Care Location: Telephone Call  Nutrition Assessment:   Oncology Diagnosis & Treatments: Breast Cancer  S/p bilateral mastectomy 11/12/24  S/p reconstructive surgery 3/28/25  Started Letrozole/Femara in December 2024, changed to Aromasin 4/8/25 (pt reports she will be starting new med next week)    Oncology History   Malignant neoplasm of central portion of right breast in female, estrogen receptor positive (HCC)   10/8/2024 Biopsy    Right breast ultrasound-guided biopsy  3 o'clock, 4 cm from nipple (open coil)  Invasive breast carcinoma with ductal and lobular features  Grade 1  ER , OK , HER2 0  Lymphovascular invasion not identified    Concordant. Unifocal / multifocal. SIZE. Concordant. Right malignancy appears unifocal; suspicious mass covers an area up to 6 mm. US right axilla negative. Left breast clear. Breast MRI should be considered given lobular features within the carcinoma and overall appearance of the breast parenchyma (scattered with borderline heterogeneously dense components).     10/11/2024 Genomic Testing    Reflex MammaPrint/BluePrint UltraLow Risk (Luminal A)     10/14/2024 -  Cancer Staged    Staging form: Breast, AJCC 8th Edition  - Clinical stage from 10/14/2024: Stage IA (cT1b, cN0, cM0, G1, ER+, OK+, HER2-) - Signed by Cassandra Lynn Cardarelli, MD on 10/14/2024  Histopathologic type: Lobular carcinoma, NOS  Stage prefix: Initial diagnosis  Method of lymph node assessment: Clinical  Nuclear grade: G1  Histologic grading system: 3 grade system       10/16/2024 Genetic Testing    NEGATIVE: No Clinically Significant Variants Detected  [place] - A total of 59 genes were evaluated with RNA insight: APC, SUKHI, BAP1, BARD1, BMPR1A, BRCA1, BRCA2, BRIP1, CDH1, CDK4, CDKN1B, CDKN2A, CHEK2, DICER1, FH, FLCN, KIF1B, MAX, MEN1, MET, MLH1, MSH2, MSH6, MUTYH, NF1, NTHL1, PALB2, PMS2, POT1, PTEN, RAD51C, RAD51D, RB1, RET,  SDHA, SDHAF2, SDHB, SDHC, SDHD, SMAD4, SMARCA4, STK11, TERQ378, TP53, TSC1, TSC2 and VHL (sequencing and deletion/duplication); AXIN2, CTNNA1, EGLN1, HOXB13, KIT, MITF, MSH3, PDGFRA, POLD1 and POLE (sequencing only); EPCAM and GREM1 (deletion/duplication only).     10/17/2024 Observation    Breast MRI IMPRESSION:  1.  Right breast 3:00 biopsy-proven invasive carcinoma measures up to 12 mm.  2.  Possible satellite lesion right breast 6:00 for which MRI guided biopsy is recommended if it would change clinical management.  3.  Otherwise, no MR evidence of contralateral left breast malignancy.  4.  No axillary or internal mammary adenopathy.     11/12/2024 Surgery    Right mastectomy with SLN biopsy (Dr. Cardarelli)  Invasive carcinoma with mixed ductal and lobular features  Grade 2  1.1 cm  Margins negative  0/5 Lymph nodes    Left simple mastectomy  Columnar cell change, usual ductal hyperplasia, apocrine metaplasia    Immediate reconstruction with tissue expanders (Dr. Brito)     11/20/2024 -  Cancer Staged    Staging form: Breast, AJCC 8th Edition  - Pathologic stage from 11/20/2024: Stage IA (pT1c, pN0, cM0, G2, ER+, WI+, HER2-) - Signed by Cassandra Lynn Cardarelli, MD on 11/20/2024  Histopathologic type: Lobular carcinoma, NOS  Stage prefix: Initial diagnosis  Histologic grading system: 3 grade system  Laterality: Right  Lymph-vascular invasion (LVI): LVI not present (absent)/not identified         Past Medical & Surgical Hx:   Patient Active Problem List   Diagnosis   • Lung nodule   • Umbilical hernia without obstruction and without gangrene   • Kidney stone on left side   • Essential tremor   • Insomnia   • Anxiety   • Gastroesophageal reflux disease without esophagitis   • B12 deficiency   • Brain lipoma   • Chronic interstitial cystitis   • Chronic migraine without aura   • Stage 1 mild COPD by GOLD classification (HCC)   • DDD (degenerative disc disease), lumbar   • Excessive daytime sleepiness   •  Hypersomnia   • Other hyperlipidemia   • Gross hematuria   • Cubital tunnel syndrome, left   • Diverticulosis   • Other hemorrhoids   • Malignant neoplasm of central portion of right breast in female, estrogen receptor positive (HCC)   • History of penicillin allergy     Past Medical History:   Diagnosis Date   • Anxiety    • Brain lipoma 2007   • Breast cancer (HCC)    • Cancer (HCC) 10/10/24   • COPD (chronic obstructive pulmonary disease) (HCC)    • Fatigue 2022   • GERD (gastroesophageal reflux disease)    • Kidney stone    • Motion sickness    • PONV (postoperative nausea and vomiting)    • Sleep difficulties 2020   • Tremors of nervous system     essential     Past Surgical History:   Procedure Laterality Date   • ABDOMINAL WALL SURGERY Bilateral 12/18/2024    Procedure: CLOSURE WITH DRAIN PLACEMENT;  Surgeon: Karthik Brito MD;  [facility];  Service: Plastics   • ADENOIDECTOMY     • APPENDECTOMY     • BACK SURGERY     • BLADDER SURGERY     • BREAST BIOPSY Right 10/08/2024    300 4cmfn, open coil clip   • CARPAL TUNNEL RELEASE Right 05/31/2024   • COLONOSCOPY     • FL RETROGRADE PYELOGRAM  12/15/2023   • HYSTERECTOMY      age 29 still has lt ovary   • IR DRAINAGE TUBE PLACEMENT  12/16/2024   • IR RADIOFREQUENCY ABLATION      esophagus   • LAMINECTOMY      twin, lumbar   • LUMBAR FUSION      L5-s1   • CA BX/EXC LYMPH NODE OPEN SUPERFICIAL Right 11/12/2024    Procedure: RIGHT SENTINEL LYMPH NODE MAPPING INCLUDING BLUE DYE INJECTION AND RADIOCOLLOID INJECTION, SENTINEL LYMPH NODE BIOPSY (INJECT AT 0730 BY DR CARDARELLI IN THE OR);  Surgeon: Cassandra Lynn Cardarelli, MD;  [facility];  Service: Surgical Oncology   • CA CYSTO/URETERO W/LITHOTRIPSY &INDWELL STENT INSRT Left 12/15/2023    Procedure: CYSTO USCOPE W/ LASER, & STENT;  Surgeon: Jhonatan Fleming MD;  [facility];  Service: Urology   • CA INJ RADIOACTIVE TRACER FOR ID OF SENTINEL NODE Right 11/12/2024    Procedure: RIGHT  SENTINEL LYMPH NODE MAPPING INCLUDING BLUE DYE INJECTION AND RADIOCOLLOID INJECTION, SENTINEL LYMPH NODE BIOPSY (INJECT AT 0730 BY DR CARDARELLI IN THE OR);  Surgeon: Cassandra Lynn Cardarelli, MD;  [facility];  Service: Surgical Oncology   • NJ INTRAOP SENTINEL LYMPH NODE ID W/DYE INJECTION Right 11/12/2024    Procedure: RIGHT SENTINEL LYMPH NODE MAPPING INCLUDING BLUE DYE INJECTION AND RADIOCOLLOID INJECTION, SENTINEL LYMPH NODE BIOPSY (INJECT AT 0730 BY DR CARDARELLI IN THE OR);  Surgeon: Cassandra Lynn Cardarelli, MD;  [facility];  Service: Surgical Oncology   • NJ MASTECTOMY SIMPLE COMPLETE Bilateral 11/12/2024    Procedure: BILATERAL MASTECTOMY;  Surgeon: Cassandra Lynn Cardarelli, MD;  [facility];  Service: Surgical Oncology   • NJ NEUROPLASTY &/TRANSPOS MEDIAN NRV CARPAL TUNNE Right 05/31/2024    Procedure: RELEASE CARPAL TUNNEL;  Surgeon: Dorothea Mayo MD;  [facility];  Service: Orthopedics   • NJ NEUROPLASTY &/TRANSPOS MEDIAN NRV CARPAL TUNNE Left 07/12/2024    Procedure: RELEASE CARPAL TUNNEL;  Surgeon: Dorothea Mayo MD;  [facility];  Service: Orthopedics   • NJ NEUROPLASTY &/TRANSPOSITION ULNAR NERVE ELBOW Right 05/31/2024    Procedure: RELEASE CUBITAL TUNNEL POSSIBLE TRANSPOSITION AND ADIPOSE FLAP;  Surgeon: Dorothea Mayo MD;  [facility];  Service: Orthopedics   • NJ NEUROPLASTY &/TRANSPOSITION ULNAR NERVE ELBOW Left 07/12/2024    Procedure: RELEASE CUBITAL TUNNEL;  Surgeon: Dorothea Mayo MD;  [facility];  Service: Orthopedics   • NJ REMOVAL TISSUE EXPANDER W/O INSERTION IMPLANT Bilateral 12/18/2024    Procedure: removal bilateral tissue expanders;  Surgeon: Karthik Brito MD;  [facility];  Service: Plastics   • NJ TISSUE EXPANDER PLACEMENT BREAST RECONSTRUCTION Bilateral 11/12/2024    Procedure: BILATERAL IMMEDIATE BREAST RECONSTRUCTION WITH TISSUE EXPANDERS AND ADM;  Surgeon: Karthik Brito MD;   [facility];  Service: Plastics   • NM TISSUE EXPANDER PLACEMENT BREAST RECONSTRUCTION Bilateral 3/28/2025    Procedure: BILATERAL TISSUE EXPANDER PLACEMENT;  Surgeon: Karthik Brito MD;  [facility];  Service: Plastics   • SPINE SURGERY     • TOTAL VAGINAL HYSTERECTOMY      AGE 29   • TUBAL LIGATION     • UPPER GASTROINTESTINAL ENDOSCOPY     • US GUIDED BREAST BIOPSY RIGHT COMPLETE Right 10/08/2024   • WOUND DEBRIDEMENT Bilateral 12/18/2024    Procedure: DEBRIDEMENT WOUND (WASH OUT);  Surgeon: Karthik Brito MD;  [facility];  Service: Plastics     Review of Medications:   Vitamins, Supplements and Herbals: No, pt denies taking supplements    Current Outpatient Medications:   •  ACIDOPHILUS LACTOBACILLUS PO, Take 1 tablet by mouth daily, Disp: , Rfl:   •  Armodafinil 150 MG tablet, TAKE ONE TABLET BY MOUTH DAILY, Disp: 30 tablet, Rfl: 2  •  cyclobenzaprine (FLEXERIL) 10 mg tablet, Take 1 tablet (10 mg total) by mouth 3 (three) times a day as needed for muscle spasms for up to 7 days, Disp: 21 tablet, Rfl: 0  •  escitalopram (Lexapro) 10 mg tablet, Take 1 tablet (10 mg total) by mouth daily (Patient taking differently: Take 10 mg by mouth daily with lunch), Disp: 90 tablet, Rfl: 1  •  exemestane (AROMASIN) 25 MG tablet, Take 1 tablet (25 mg total) by mouth daily, Disp: 90 tablet, Rfl: 3  •  fenofibrate (TRICOR) 145 mg tablet, Take 1 tablet (145 mg total) by mouth daily, Disp: 100 tablet, Rfl: 1  •  gabapentin (NEURONTIN) 300 mg capsule, Take 1 capsule (300 mg total) by mouth 3 (three) times a day as needed (nerve pain) for up to 7 days, Disp: 21 capsule, Rfl: 0  •  ondansetron (ZOFRAN) 4 mg tablet, Take 1 tablet (4 mg total) by mouth every 8 (eight) hours as needed for nausea or vomiting, Disp: 20 tablet, Rfl: 0  •  SUMAtriptan (Imitrex) 50 mg tablet, Take 1 tablet (50 mg total) by mouth once as needed for migraine for up to 1 dose, Disp: 9 tablet, Rfl: 0  •  tiotropium-olodaterol  "(Stiolto Respimat) 2.5-2.5 MCG/ACT inhaler, Inhale 2 puffs daily, Disp: 12 g, Rfl: 5    Most Recent Lab Results:   Lab Results   Component Value Date    WBC 10.26 (H) 03/15/2025    NEUTROABS 7.10 03/15/2025    IRON 86 04/06/2024    TIBC 426 04/06/2024    FERRITIN 70 04/06/2024    CHOLESTEROL 182 03/15/2025    TRIG 97 03/15/2025    HDL 83 03/15/2025    LDLCALC 80 03/15/2025    ALT 22 12/20/2024    AST 24 12/20/2024    ALB 3.5 12/20/2024    SODIUM 141 03/15/2025    SODIUM 140 12/20/2024    K 4.6 03/15/2025    K 3.8 12/20/2024     03/15/2025    BUN 19 03/15/2025    BUN 19 12/20/2024    CREATININE 0.81 03/15/2025    CREATININE 0.75 12/20/2024    EGFR 86 03/15/2025    TSH 2.90 12/11/2021    GLUF 82 03/15/2025    GLUF 77 10/14/2024    GLUC 113 12/20/2024    HGBA1C 5.5 04/06/2024    HGBA1C 5.3 03/29/2023    HGBA1C 5.5 12/11/2021    CALCIUM 10.1 03/15/2025    MG 2.0 01/25/2025     Anthropometric Measurements:   Height: 62\"  Ht Readings from Last 1 Encounters:   03/28/25 5' 2\" (1.575 m)     Wt Readings from Last 20 Encounters:   03/28/25 64.4 kg (142 lb)   03/24/25 65.7 kg (144 lb 12.8 oz)   12/24/24 65.4 kg (144 lb 3.2 oz)   12/15/24 65.8 kg (145 lb 1 oz)   12/02/24 64 kg (141 lb)   11/27/24 63.5 kg (140 lb)   11/12/24 63 kg (139 lb)   10/29/24 63 kg (139 lb)   10/28/24 63 kg (139 lb)   10/23/24 63 kg (139 lb)   10/22/24 64 kg (141 lb)   10/16/24 64 kg (141 lb)   10/14/24 64.9 kg (143 lb)   10/10/24 64.9 kg (143 lb)   10/08/24 63.5 kg (140 lb)   09/26/24 63.5 kg (140 lb)   07/29/24 63.5 kg (140 lb)   07/22/24 63.8 kg (140 lb 9.6 oz)   07/12/24 64.4 kg (142 lb)   06/24/24 65.4 kg (144 lb 3.2 oz)     Weight History:   Usual Weight: unsure, was 188# 2 yr ago  Comments: wt has been fluctuating since COVID (unintended wt gain, followed by intentional loss, gained wt again, then lost).     Oncology Nutrition-Anthropometrics    Flowsheet Row Nutrition from 4/9/2025 in [facility] " Nutrition from 2/20/2025 in [facility]   Patient [age] (years): 49 years 49 years   Patient (female) height (in): 62 in 62 in   Current Weight to be used for anthropometric calculations (lbs) 142 lbs 144.2 lbs   Current Weight to be used for anthropometric calculations (kg) 64.5 kg 65.5 kg   BMI: 26 26.4   IBW female: 110 lbs 110 lbs   IBW (kg) female: 50 kg 50 kg   IBW % (female) 129.1 % 131.1 %   Adjusted BW (female): 118 lbs 118.6 lbs   Adjusted BW kg (female): 53.6 kg 53.9 kg   % weight change after 3 months: -1.5 % --   Weight change after 3 months (lbs) -2.2 lbs --        Nutrition-Focused Physical Findings: n/a due to telephone call    Food/Nutrition-Related History & Client/Social History:    Current Nutrition Impact Symptoms:  [] Nausea  [x] Reduced Appetite - comes and goes d/t emotional/mental state  [] Acid Reflux    [] Vomiting  [] Unintended Wt Loss - hx, no new wt to assess today, pt monitoring wt at home and reports stability  [] Malabsorption    [] Diarrhea  [] Unintended Wt Gain  [] Dumping Syndrome    [] Constipation  [] Thick Mucous/Secretions  [] Abdominal Pain    [] Dysgeusia (Altered Taste)  [] Xerostomia (Dry Mouth)  [] Gas    [] Dysosmia (Altered Smell)  [] Thrush  [] Difficulty Chewing    [] Oral Mucositis (Sore Mouth)  [] Fatigue  [] Hyperglycemia   Lab Results   Component Value Date    HGBA1C 5.5 04/06/2024      [] Odynophagia  [] Esophagitis  [] Other:    [] Dysphagia  [] Early Satiety  [] No Problems Eating      Food Allergies & Intolerances: yes: lactose intolerance (can tolerate certain cheeses and Greek yogurt, cannot have milk or ice cream)    Current Diet: Regular Diet, No Restrictions  States she doesn't like a lot of fruits and veggies d/t texture, likes bananas and grapes.  Willing to eat more F&V in smoothie form.  Nutrition Route: PO  Activity level: ADL's & walks the dog BID    24 Hr Diet Recall:   Breakfast: cinnamon raisin bagel  with butter with Joshfire chocolate milk (loves eggs but they cause stomach cramps, discussed trying egg whites)  Snack: none today, was at an appt; yesterday: apple with caramel  Lunch: 4 chicken tenders, air fried  Snack: none yesterday, not craving sugar as much after lunch  Dinner: chipped steak soup (potatoes, peas, steak)  Snack: apple with caramel    Beverages: water (16.9 oz x5-6+), light [place] iced tea (a couple swallows after meals), protein shake (occasionally), Fairlife milk  Supplements:   Joshfire Core Power - caused immediate BM and abd cramping, discontinued these  JBN protein powder mixed with and Fairlife milk - 2-3x/week    Oncology Nutrition-Estimated Needs    Flowsheet Row Nutrition from 10/31/2024 in [facility] include 234 beds at the [facility]   Weight type used Adjusted weight   Weight in kilograms (kg) used for estimated needs 53.3 kg   Energy needs formula:  25-30 kcal/kg   Energy needs based on 25 kcal/k kcal   Energy needs based on 30 kcal/k kcal   Protein needs formula: 1.2-1.5 g/kg   Protein needs based on 1.2 g/k g   Protein needs based on 1.5 g/kg 80 g   Fluid needs formula: 30-35 mL/kg   Fluid needs based on 30 mL/kg 1596 mL   Fluid needs in ounces 54 oz   Fluid needs based on 35 mL/kg 1862 mL   Fluid needs in ounces 63 oz         Discussion & Intervention:   Jenny was evaluated today for an RD follow up regarding nutrition impact sx management and cancer-preventative eating.  Jenny  is currently recovering from surgery for breast cancer .  She is doing ok today but is concerned that she may be developing another infection.  She was referred to IR today by her surgeon.  She reports wt stability and that her appetite continues to come and go.  She is hydrating well.  She is not craving sugar after lunch as much as before.  We discussed the importance of adequate nutrition, especially protein in her case, to support healing.   Reviewed 24 hour recall,  which revealed an suboptimal po intake, and discussed ways to increase protein intake and optimize nutrient intake to promote healing.  Also reviewed the importance of wt management throughout the healing process and the role of a cancer preventative diet and high protein & low fat diet  in managing wt and overall health.  Based on today's assessment, discussion included: MNT for: healing, choosing a variety of colorful, plant-based foods to support a cancer preventative diet, adequate hydration & fluid choices, and utilizing oral nutrition supplements.   Moving forward, Jenny was encouraged to transition to a cancer-preventative lifestyle and increase her protein intake.  Materials Provided: not applicable  All questions and concerns addressed during today’s visit.  Jenny has RD contact information.    Nutrition Diagnosis:   Increased Nutrient Needs (kcal & pro) related to increased demand for nutrients and disease state as evidenced by recovering from cancer tx.  Monitoring & Evaluation:   Goals:  weight maintenance/stabilization  adequate nutrition impact symptom management  pt to meet >/=75% estimated nutrition needs daily    Progress Towards Goals: Progressing    Nutrition Rx & Recommendations:  Diet: High Protein, Low Fat, High Fiber Diet, Lactose-free (or low-lactose if tolerated)  Small, frequent meals/snacks may be easier to tolerate than 3 large daily meals.  Aim for 5-6 small meals per day (every 2-3 hours).  Include protein at all meals/snacks.  Follow a Cancer Preventative Nutrition Pattern: colorful, plant-based, low-fat, avoid added sugars, limit alcohol, include high fiber foods, limit processed meats, limit red meat, choose lean protein sources, use low-fat cooking methods, balance calories with physical activity, avoid excessive weight gain throughout life.  Incorporate physical activity as able/allowed.  Stay hydrated by sipping fluids of choice/tolerance throughout the day.  Refer to Eating Hints  book for other meal/snack ideas and symptom management.  For appetite loss: try powdered or liquid nutrition supplements; eating by the clock every 2-3 hours; set a timer to remind yourself to eat, keep snacks nearby; add extra protein & calories to your diet; drink liquids in between meals, choose liquids that add calories; eat a bedtime snack; eat soft, cool or frozen foods; eat a larger meal when you feel well & rested; only sip small amounts of liquids during meals (see pages 10-12 in your Eating Hints book).  Weigh yourself regularly. If you notice weight loss, make an effort to increase your daily food/calorie intake. If you continue to notice loss after these efforts, reach out to your dietitian to establish a plan to stabilize weight.  Increase protein intake at breakfast and snacks  Examples: high protein cereal, oatmeal made with Fairlife milk/fruit/peanut butter, protein shake (JBN + milk), Greek yogurt with granola and fruit, egg whites (as tolerated), nuts (as tolerated), trail mix, etc.  Choose a lean (low-fat) protein source at each meal and snack: chicken, turkey, fish, seafood, Greek yogurt, nuts, nut butter, beans, lentils, legumes, etc. (See Protein Dense Foods handout previously provided)  Increase fruit and vegetable intake - choose produce you enjoy, add flavor as needed  Aim for 55-65 oz of fluid daily  Aim for 25-30 grams of fiber daily  Gradually increase your fiber intake to avoid GI upset, stay well hydrated as you are increasing your fiber intake    Follow Up Plan:  5/2 at 10am by phone  Recommend Referral to Other Providers: none at this time

## 2025-04-08 ENCOUNTER — TELEMEDICINE (OUTPATIENT)
Dept: HEMATOLOGY ONCOLOGY | Facility: CLINIC | Age: 50
End: 2025-04-08
Payer: COMMERCIAL

## 2025-04-08 DIAGNOSIS — Z79.811 LONG TERM (CURRENT) USE OF AROMATASE INHIBITORS: ICD-10-CM

## 2025-04-08 DIAGNOSIS — Z17.0 MALIGNANT NEOPLASM OF CENTRAL PORTION OF RIGHT BREAST IN FEMALE, ESTROGEN RECEPTOR POSITIVE (HCC): Primary | ICD-10-CM

## 2025-04-08 DIAGNOSIS — C50.111 MALIGNANT NEOPLASM OF CENTRAL PORTION OF RIGHT BREAST IN FEMALE, ESTROGEN RECEPTOR POSITIVE (HCC): Primary | ICD-10-CM

## 2025-04-08 PROCEDURE — 99214 OFFICE O/P EST MOD 30 MIN: CPT | Performed by: PHYSICIAN ASSISTANT

## 2025-04-08 RX ORDER — EXEMESTANE 25 MG/1
25 TABLET ORAL DAILY
Qty: 90 TABLET | Refills: 3 | Status: SHIPPED | OUTPATIENT
Start: 2025-04-08

## 2025-04-08 NOTE — ASSESSMENT & PLAN NOTE
Stage I Right sided invasive breast carcinoma with ductal and lobular features, grade 1, ER %, MO % positive and HER2 0, no evidence of LVI, unifocal, 1.1 cm, 5 negative lymph nodes dx 10/2024     MammaPrint was luminal a.  Genetic test came back negative for deleterious mutation.    11/2024 bilateral mastectomy    12/2/24 FSH, estradiol level to assess menopausal status, did identify post-menopausal status.     12/2024  Letrozole 2.5mg po daily rx.  HA and bone aches.  Will discontinue and try exemestane.    Reviewed that if this is poorly tolerated, we have the option of trying the third and final aromatase inhibitor, tamoxifen or observing off systemic treatment.    Orders:  •  exemestane (AROMASIN) 25 MG tablet; Take 1 tablet (25 mg total) by mouth daily  •  DXA bone density spine hip and pelvis; Future

## 2025-04-09 ENCOUNTER — TELEPHONE (OUTPATIENT)
Age: 50
End: 2025-04-09

## 2025-04-09 ENCOUNTER — NUTRITION (OUTPATIENT)
Dept: NUTRITION | Facility: CLINIC | Age: 50
End: 2025-04-09

## 2025-04-09 ENCOUNTER — OFFICE VISIT (OUTPATIENT)
Dept: PLASTIC SURGERY | Facility: CLINIC | Age: 50
End: 2025-04-09

## 2025-04-09 DIAGNOSIS — Z17.0 MALIGNANT NEOPLASM OF CENTRAL PORTION OF RIGHT BREAST IN FEMALE, ESTROGEN RECEPTOR POSITIVE (HCC): Primary | ICD-10-CM

## 2025-04-09 DIAGNOSIS — Z71.3 NUTRITIONAL COUNSELING: Primary | ICD-10-CM

## 2025-04-09 DIAGNOSIS — N64.89 SEROMA OF BREAST: Primary | ICD-10-CM

## 2025-04-09 DIAGNOSIS — C50.111 MALIGNANT NEOPLASM OF CENTRAL PORTION OF RIGHT BREAST IN FEMALE, ESTROGEN RECEPTOR POSITIVE (HCC): Primary | ICD-10-CM

## 2025-04-09 DIAGNOSIS — C50.111 MALIGNANT NEOPLASM OF CENTRAL PORTION OF RIGHT BREAST IN FEMALE, ESTROGEN RECEPTOR POSITIVE (HCC): ICD-10-CM

## 2025-04-09 DIAGNOSIS — Z17.0 MALIGNANT NEOPLASM OF CENTRAL PORTION OF RIGHT BREAST IN FEMALE, ESTROGEN RECEPTOR POSITIVE (HCC): ICD-10-CM

## 2025-04-09 PROCEDURE — 99024 POSTOP FOLLOW-UP VISIT: CPT

## 2025-04-09 RX ORDER — CYCLOBENZAPRINE HCL 10 MG
10 TABLET ORAL 3 TIMES DAILY PRN
Qty: 21 TABLET | Refills: 0 | Status: SHIPPED | OUTPATIENT
Start: 2025-04-09 | End: 2025-04-16

## 2025-04-09 NOTE — PROGRESS NOTES
[facility]  [address], [address], [place] [address]  [phone]    Patient Identification: Jenny Pereyra is a 49 y.o. female     History of Present Illness: The patient is a 49 y.o.  year-old female  who presents to the office for a post-op visit. Patient is 12 days s/p Bilateral Tissue Expander Placement - Bilateral  on 3/28/2025 by Dr. Brito.  Patient is doing well at this time. States her left breast is larger compared to her right. The left breast drain has been putting out only 5-10cc of dark red blood. Right breast drain outputting 30-40cc daily, serosanguinous fluid. She denies fevers, chills or signs of infection. Admits to soreness, occasionally taking flexeril as needed.   Patient has no complaints at this time.    Past Medical History:   Diagnosis Date    Anxiety     Brain lipoma 2007    Breast cancer (HCC)     Cancer (HCC) 10/10/24    COPD (chronic obstructive pulmonary disease) (HCC)     Fatigue 2022    GERD (gastroesophageal reflux disease)     Kidney stone     Motion sickness     PONV (postoperative nausea and vomiting)     Sleep difficulties 2020    Tremors of nervous system     essential        Review of Systems  Constitutional: Denies fevers, chills or pain.  Skin: Denies any warmth, erythema, edema or mucopurulent drainage.     Physical Exam    R Breast: Surgical incision is clean, dry, and intact. Skin perfusion is intact. Expected amount of post-operative edema. There are no signs of infection, obvious hematoma, seroma or wound dehiscence. Drain are patent with sanguineous fluid in bulb.    L Breast: Surgical incision is clean, dry and intact. Skin perfusion is intact. Breast is larger compared to right. Remains soft, possible underlying fluid.  No overlying skin changes. Drain with minimal dark fluid in bulb.      Assessment and Plan:  The patient is an 49 y.o.  year-old female who presents to the office for post-op visit. Patient is 12 days  s/p Bilateral Tissue Expander Placement - Bilateral  on 3/28/2025 by Dr. Brito.      -At today's visit discussed plan of care with Dr. Brito. 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 Would like to assess if there is fluid to the left breast to be aspirated.  -Order placed to IR. Pt is to call us if she does not receive a call for scheduling by tomorrow.  -Continue to record right and left breast drain outputs daily. Wear bra at all times. Following activity restrictions.   -The patient is to return in 1 week for TE check and drain check. Call the office with any concerns or signs of infection.   -The patient is to call the office with any questions or concerns. All of the patient's questions were answered at this time and they agree with the plan of care.      Emma Alejandro PA-C  [facility]

## 2025-04-09 NOTE — OP NOTE
OPERATIVE REPORT  PATIENT NAME: Jenny Pereyra    :  1975  MRN: [id number]  Pt Location: [facility] OR ROOM 06    SURGERY DATE: 3/28/2025    Surgeons and Role:     * Karthik Brito MD - Primary     * Emma Alejandro PA-C - Assisting    A qualified resident physician was not available. A physician assistant was required during the procedure for retraction, tissue handling, dissection and suturing.      Preop Diagnosis:  Malignant neoplasm of central portion of right breast in female, estrogen receptor positive (HCC) [C50.111, Z17.0]    Post-Op Diagnosis Codes:     * Malignant neoplasm of central portion of right breast in female, estrogen receptor positive (HCC) [C50.111, Z17.0]    Procedure(s):  1) Bilateral - Delayed breast reconstruction with placement of subpectoral tissue expanders  2) Bilateral - Acellular dermal matrix sling    IMPLANTS:  FLEXHD PLIABLE SHAPED PERF 9 X 15CM 0.7-1.7 THIN XSM   Serial#: [id number]  - This was cut in half.  One half was used in each chest.    LEFT CHEST:  [place] CPX4 Plus, Tall height, 250mL  Model#: [id number]   Lot#: [id number]   ID#: [id number]   [id number]     RIGHT CHEST:  [place] CPX4 Plus, Tall height, 250mL  Model#: [id number]   Lot#: [id number]   ID#: [id number]   [id number]     Specimen(s):  ID Type Source Tests Collected by Time Destination   1 : RIGHT BREAST SKIN Tissue Soft Tissue, Other TISSUE EXAM Karthik Brito MD 3/28/2025 1454    2 : LEFT BREAST SKIN Tissue Soft Tissue, Other TISSUE EXAM Karthik Brito MD 3/28/2025 1518        Estimated Blood Loss:   Minimal    Drains:  Closed/Suction Drain Right Breast Bulb 15 Fr. (Active)   Site Description Unable to view 25 030   Dressing Status Clean;Dry;Intact 25 0301   Drainage Appearance Serosanguineous 25 030   Status To bulb suction 25 0301   Output (mL) 10 mL 25 0501   Number of days: 12       Closed/Suction Drain Right Breast Bulb 15 Fr. (Active)   Site Description Unable  to view 03/29/25 0301   Dressing Status Clean;Dry;Intact 03/29/25 0301   Drainage Appearance Serosanguineous 03/29/25 0301   Status To bulb suction 03/29/25 0301   Output (mL) 30 mL 03/29/25 0501   Number of days: 12       Closed/Suction Drain Left Breast Bulb 15 Fr. (Active)   Site Description Unable to view 03/29/25 0301   Dressing Status Clean;Dry;Intact 03/29/25 0301   Drainage Appearance Serosanguineous 03/29/25 0301   Status To bulb suction 03/29/25 0301   Output (mL) 40 mL 03/29/25 0501   Number of days: 12       Closed/Suction Drain Left Breast Bulb 15 Fr. (Active)   Site Description Unable to view 03/29/25 0301   Dressing Status Clean;Dry;Intact 03/29/25 0301   Drainage Appearance Serosanguineous 03/29/25 0301   Status To bulb suction 03/29/25 0301   Output (mL) 10 mL 03/29/25 0501   Number of days: 12       Anesthesia Type:   General    Operative Indications:  Malignant neoplasm of central portion of right breast in female, estrogen receptor positive (HCC) [C50.111, Z17.0]    Jenny Pereyra is a 49-year-old female with a history of right breast cancer who underwent bilateral mastectomies, right sentinel node biopsy, and immediate breast reconstruction with bilateral prepectoral tissue expander placement.  Patient's postoperative course was ultimately complicated by left chest wound dehiscence status post excision closure, right tissue expander infection and dehiscence requiring removal of bilateral expanders and 10-day course of antibiotics on 12/18/2024.  Patient is now 3 months status post removal of tissue expanders.  Plan for delayed breast reconstruction with placement of bilateral subpectoral tissue expanders.    Operative Findings:  Bilateral chest skin intact, healthy, and well-perfused.  Mastectomy skin flaps elevated off of underlying pectoralis muscle.  Pectoralis muscle intact and healthy.      Complications:   None    Procedure and Technique:  Patient was identified in the preoperative holding  area.  Patient's midline and inframammary folds were marked.  Patient was taken back to the operating room where she was placed on the operating room table in the supine position.  A surgical timeout was performed confirming the patient's identity, the procedures to be performed, and the laterality of these procedures.  Patient then received appropriate weight-based antibiosis and SCDs were placed bilateral lower extremities.  Smooth induction of general endotracheal anesthesia was achieved.  The bilateral chest was prepped and draped in usual sterile fashion.    The previous chest scars were outlined with approximately 2 to 3 mm healthy tissue on either side.  We began on the right side.  The 11 cm scar was sharply incised with a 15 blade.  Using monopolar cautery the remainder of the scar was excised without complication.  Bovie electrocautery was used to dissect down through the remainder of the skin and subcutaneous tissue to identify the pectoralis muscle.  Using gentle retraction and monopolar electrocautery the superior and inferior mastectomy skin flaps were gently elevated off of the surface of the pectoralis major muscle.  This was carried out across the entire footprint of the breast.  Meticulous hemostasis was achieved.  This pocket was then irrigated with copious sterile saline.  At this point the mastectomy skin flaps appeared well-perfused with bright red bleeding at skin edges 2 to 3-second capillary refill.  At this point the lateral edge of the pectoralis muscle was identified.  Using monopolar electrocautery a subpectoral plane was created being sure to leave the pectoralis minor down on the chest.  With was created to extend approximately 2 cm from midline.  The pocket also extended approximately 2 to 3 cm below the inferior insertion of the pectoralis muscle lifting a portion of the anterior rectus sheath.  Meticulous hemostasis was achieved.  The pocket was then irrigated with copious sterile  saline.  The right chest was then packed with a moist lap pad and it was turned to the left chest.  In a similar fashion the left chest scar with incised and an 11 cm segment of scar was excised with a 15 blade and monopolar cautery.  In a similar fashion monopolar cautery was used to dissect down to the muscle and elevate the mastectomy skin flaps off of the pectoralis major muscle.  In a similar fashion the lateral edge of the pectoralis muscle was identified and a subpectoral plane was created in a similar fashion.  These pockets were then irrigated with copious sterile saline and meticulous hemostasis was achieved.  This was packed with a moist lap pad and attention was then turned to the right chest.  The right chest was then irrigated again with copious saline.  This was then irrigated with Betadine and both the pectoral and sectoral pocket.  The pocket was then irrigated with 500 cc of Irrisept.  The skin wiped down with Betadine.  At this point a [place] CPX 4 plus tall height 250 cc expander was selected.  Using a butterfly needle the air was removed from the expander.  The expander was then irrigated with Irrisept.  The expander was carefully placed in the subpectoral plane.  The inferior most tab was secured using 2-0 Prolene suture attaching it to perichondrium of the rib.  The 2 additional lateral tabs were also secured in a similar fashion.  The superiormost tab was also carried in a similar fashion.  A small portion of the expander protruded from the lateral aspect of the pectoralis muscle.  A 15 [place] Radhames was placed in the subcutaneous pocket as well as an additional drain in the subpectoral pocket.  The subpectoral drain was time to exit posteriorly on the lateral chest wall skin all the subcutaneously was made it anteriorly on the left chest wall.  The secured with 2-0 nylon sutures.  A 4 cm wide by 10 cm long piece of pliable, shaped, perforated thin flexed knee was cut and approximated to the  lateral edge of the pectoralis muscle using a 3-0 Vicryl suture in a simple running fashion.  The other edge of the Flex HD was then secured to the chest wall/serratus fascia in a similar fashion with a 3-0 Vicryl in a simple running fashion.  The skin was then closed with 3-0 Vicryl sutures in a simple interrupted fashion within the deep dermal plane.  This was followed by a running subcuticular 4-0 Monocryl STRATAFIX.    The left chest was then irrigated again with copious saline.  This was then irrigated with Betadine and both the pectoral and sectoral pocket.  The pocket was then irrigated with 500 cc of Irrisept.  The skin wiped down with Betadine.  At this point a [place] CPX 4 plus tall height 250 cc expander was selected.  Using a butterfly needle the air was removed from the expander.  The expander was then irrigated with Irrisept.  The expander was carefully placed in the subpectoral plane.  The inferior most tab was secured using 2-0 Prolene suture attaching it to perichondrium of the rib.  The 2 additional lateral tabs were also secured in a similar fashion.  The superiormost tab was also carried in a similar fashion.  A small portion of the expander protruded from the lateral aspect of the pectoralis muscle.  A 15 [place] Radhames was placed in the subcutaneous pocket as well as an additional drain in the subpectoral pocket.  The subpectoral drain was time to exit posteriorly on the lateral chest wall skin all the subcutaneously was made it anteriorly on the left chest wall.  The secured with 2-0 nylon sutures.  A 4 cm wide by 10 cm long piece of pliable, shaped, perforated thin flexed knee was cut and approximated to the lateral edge of the pectoralis muscle using a 3-0 Vicryl suture in a simple running fashion.  The other edge of the Flex HD was then secured to the chest wall/serratus fascia in a similar fashion with a 3-0 Vicryl in a simple running fashion.  The skin was then closed with 3-0 Vicryl  sutures in a simple interrupted fashion within the deep dermal plane.  This was followed by a running subcuticular 4-0 Monocryl STRATAFIX.    At this point the chest was cleaned.  The wounds were then dressed with half-inch Steri-Strips.  The patient was placed in a surgical bra.  Patient awoke from surgery without complication.  The patient did appear well.  Right breast scar and left breast scar were sent as specimen.  At the end of the case all counts were correct.  I was present and scrubbed for the entire case.  The patient was delivered to the PACU in stable condition.         Patient Disposition:  PACU              SIGNATURE: Karthik Brito MD  DATE: April 9, 2025  TIME: 10:56 AM

## 2025-04-09 NOTE — PATIENT INSTRUCTIONS
Nutrition Rx & Recommendations:  Diet: High Protein, Low Fat, High Fiber Diet, Lactose-free (or low-lactose if tolerated)  Small, frequent meals/snacks may be easier to tolerate than 3 large daily meals.  Aim for 5-6 small meals per day (every 2-3 hours).  Include protein at all meals/snacks.  Follow a Cancer Preventative Nutrition Pattern: colorful, plant-based, low-fat, avoid added sugars, limit alcohol, include high fiber foods, limit processed meats, limit red meat, choose lean protein sources, use low-fat cooking methods, balance calories with physical activity, avoid excessive weight gain throughout life.  Incorporate physical activity as able/allowed.  Stay hydrated by sipping fluids of choice/tolerance throughout the day.  Refer to Eating Hints book for other meal/snack ideas and symptom management.  For appetite loss: try powdered or liquid nutrition supplements; eating by the clock every 2-3 hours; set a timer to remind yourself to eat, keep snacks nearby; add extra protein & calories to your diet; drink liquids in between meals, choose liquids that add calories; eat a bedtime snack; eat soft, cool or frozen foods; eat a larger meal when you feel well & rested; only sip small amounts of liquids during meals (see pages 10-12 in your Eating Hints book).  Weigh yourself regularly. If you notice weight loss, make an effort to increase your daily food/calorie intake. If you continue to notice loss after these efforts, reach out to your dietitian to establish a plan to stabilize weight.  Increase protein intake at breakfast and snacks  Examples: high protein cereal, oatmeal made with Fairlife milk/fruit/peanut butter, protein shake (JBN + milk), Greek yogurt with granola and fruit, egg whites (as tolerated), nuts (as tolerated), trail mix, etc.  Choose a lean (low-fat) protein source at each meal and snack: chicken, turkey, fish, seafood, Greek yogurt, nuts, nut butter, beans, lentils, legumes, etc. (See Protein  Dense Foods handout previously provided)  Increase fruit and vegetable intake - choose produce you enjoy, add flavor as needed  Aim for 55-65 oz of fluid daily  Aim for 25-30 grams of fiber daily  Gradually increase your fiber intake to avoid GI upset, stay well hydrated as you are increasing your fiber intake    Follow Up Plan: 5/2 at 10am by phone  Recommend Referral to Other Providers: none at this time

## 2025-04-09 NOTE — TELEPHONE ENCOUNTER
Patient called asking if she should be on Antibiotics for the next couple of days because she was referred to get testing done and she was told she can't schedule yet and has to wait until they receive and review the referral.  Please call patient back as soon as you can to advise.  Thank you

## 2025-04-10 ENCOUNTER — PATIENT OUTREACH (OUTPATIENT)
Dept: CASE MANAGEMENT | Facility: OTHER | Age: 50
End: 2025-04-10

## 2025-04-10 NOTE — PROGRESS NOTES
OSW received a email from the pt regarding a referral that was placed for her for IR. 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 Pt is concerned because she hasn't heard from them yet. OSW expressed that unfortunately I am unable to expedite the referral. She is expectedly concerned that she will get another infection, as she states that the left side is very sore the right side isn't too bad.   OSW encouraged her to follow up with plastics if she hasn't heard from IR today.

## 2025-04-11 ENCOUNTER — HOSPITAL ENCOUNTER (OUTPATIENT)
Dept: INTERVENTIONAL RADIOLOGY/VASCULAR | Facility: HOSPITAL | Age: 50
End: 2025-04-11
Attending: RADIOLOGY
Payer: COMMERCIAL

## 2025-04-11 DIAGNOSIS — C50.111 MALIGNANT NEOPLASM OF CENTRAL PORTION OF RIGHT BREAST IN FEMALE, ESTROGEN RECEPTOR POSITIVE (HCC): ICD-10-CM

## 2025-04-11 DIAGNOSIS — Z17.0 MALIGNANT NEOPLASM OF CENTRAL PORTION OF RIGHT BREAST IN FEMALE, ESTROGEN RECEPTOR POSITIVE (HCC): ICD-10-CM

## 2025-04-11 PROCEDURE — 87075 CULTR BACTERIA EXCEPT BLOOD: CPT | Performed by: PLASTIC SURGERY

## 2025-04-11 PROCEDURE — 87205 SMEAR GRAM STAIN: CPT | Performed by: PLASTIC SURGERY

## 2025-04-11 PROCEDURE — C1729 CATH, DRAINAGE: HCPCS | Performed by: PHYSICIAN ASSISTANT

## 2025-04-11 PROCEDURE — 10030 IMG GID FLU COLL DRG SFT TIS: CPT

## 2025-04-11 PROCEDURE — 10160 PNXR ASPIR ABSC HMTMA BULLA: CPT | Performed by: PHYSICIAN ASSISTANT

## 2025-04-11 PROCEDURE — 87070 CULTURE OTHR SPECIMN AEROBIC: CPT | Performed by: PLASTIC SURGERY

## 2025-04-11 PROCEDURE — 76942 ECHO GUIDE FOR BIOPSY: CPT | Performed by: PHYSICIAN ASSISTANT

## 2025-04-11 RX ORDER — LIDOCAINE WITH 8.4% SOD BICARB 0.9%(10ML)
SYRINGE (ML) INJECTION AS NEEDED
Status: COMPLETED | OUTPATIENT
Start: 2025-04-11 | End: 2025-04-11

## 2025-04-11 RX ADMIN — Medication 10 ML: at 08:38

## 2025-04-11 NOTE — DISCHARGE INSTRUCTIONS
[facility]  Interventional Radiology  [phone]                                                                                                                   Abscess/fluid collection Aspiration      WHAT YOU NEED TO KNOW:     Today you underwent a fluid collection/abscess aspiration. Usually the fluid is sent to the lab for culture.    You may have some pain associated with the puncture site.     The Procedure is performed with Local anesthesia (Lidocaine) and sometimes with local and IV Sedation.                               After you go Home:    Home care  These tips can help your wound heal:  The wound may drain for the first 2 days. Cover the wound with a clean dry dressing. Change the dressing if it becomes soaked with blood or pus.  If a gauze packing was placed inside the abscess pocket, you may be told to remove it yourself. You may do this in the shower. Once the packing is removed, you should wash the area in the shower, or clean the area as directed by your provider. Continue to do this until the skin opening has closed. Make sure you wash your hands after changing the packing or cleaning the wound.  If you were prescribed antibiotics, take them as directed until they are all gone.  You may use acetaminophen or ibuprofen to control pain, unless another pain medicine was prescribed. If you have liver disease or ever had a stomach ulcer, talk with your doctor before using these medicines.  Follow-up care  Follow up with your healthcare provider, or as advised. If a gauze packing was put in your wound, it should be removed in 1 to 2 days. Check your wound every day for any signs that the infection is getting worse. The signs are listed below.  When to seek medical advice  Call your healthcare provider right away if any of these occur:  Increasing redness or swelling  Red streaks in the skin leading away from the wound  Increasing local pain or swelling  Continued pus  draining from the wound 2 days after treatment  Fever of 100.4ºF (38ºC) or higher, or as directed by your healthcare provider  Abscess  returns when you are at home

## 2025-04-11 NOTE — BRIEF OP NOTE (RAD/CATH)
IR ASPIRATION ONLY Procedure Note    PATIENT NAME: Jenny Pereyra  : 1975  MRN: [id number]    Pre-op Diagnosis:   1. Malignant neoplasm of central portion of right breast in female, estrogen receptor positive (HCC)      Post-op Diagnosis:   1. Malignant neoplasm of central portion of right breast in female, estrogen receptor positive (HCC)        Provider:   Karthik Hoffmann PA-C    Estimated Blood Loss: none    Findings: Left chest wall fluid collection, inferior to tissue expander, aspiration yielding approx 7 cc of fluid, surgical drain appears to traverse the collection, this may be partially occluded. Fluid has the appearance of old blood    Specimens: collected and sent    Complications:  none immediate    Anesthesia: local    Karthik Hoffmann PA-C     Date: 2025  Time: 9:12 AM

## 2025-04-13 LAB
BACTERIA SPEC ANAEROBE CULT: NO GROWTH
BACTERIA SPEC BFLD CULT: NO GROWTH
GRAM STN SPEC: NORMAL
GRAM STN SPEC: NORMAL

## 2025-04-14 DIAGNOSIS — Z17.0 MALIGNANT NEOPLASM OF CENTRAL PORTION OF RIGHT BREAST IN FEMALE, ESTROGEN RECEPTOR POSITIVE (HCC): ICD-10-CM

## 2025-04-14 DIAGNOSIS — C50.111 MALIGNANT NEOPLASM OF CENTRAL PORTION OF RIGHT BREAST IN FEMALE, ESTROGEN RECEPTOR POSITIVE (HCC): ICD-10-CM

## 2025-04-14 RX ORDER — GABAPENTIN 300 MG/1
300 CAPSULE ORAL
Qty: 90 CAPSULE | Refills: 1 | Status: SHIPPED | OUTPATIENT
Start: 2025-04-14 | End: 2025-10-11

## 2025-04-15 ENCOUNTER — PATIENT OUTREACH (OUTPATIENT)
Dept: CASE MANAGEMENT | Facility: OTHER | Age: 50
End: 2025-04-15

## 2025-04-15 DIAGNOSIS — Z17.0 MALIGNANT NEOPLASM OF CENTRAL PORTION OF RIGHT BREAST IN FEMALE, ESTROGEN RECEPTOR POSITIVE (HCC): ICD-10-CM

## 2025-04-15 DIAGNOSIS — C50.111 MALIGNANT NEOPLASM OF CENTRAL PORTION OF RIGHT BREAST IN FEMALE, ESTROGEN RECEPTOR POSITIVE (HCC): ICD-10-CM

## 2025-04-15 RX ORDER — GABAPENTIN 300 MG/1
300 CAPSULE ORAL
Qty: 90 CAPSULE | Refills: 0 | OUTPATIENT
Start: 2025-04-15 | End: 2025-10-12

## 2025-04-15 NOTE — PROGRESS NOTES
OSW received an email from the pt regarding possible assistance for her bills. Pt is currently out of work since she has recently had her reconstruction. OSW explained that I can present to the team, however we are very low with our funding at this time.   OSW will continue to follow.

## 2025-04-15 NOTE — TELEPHONE ENCOUNTER
gabapentin (NEURONTIN) 300 mg capsule , was sent to the [facility] on 4/14/25 with a qty of 90 capsules with 1 refills.

## 2025-04-16 ENCOUNTER — PATIENT OUTREACH (OUTPATIENT)
Dept: CASE MANAGEMENT | Facility: OTHER | Age: 50
End: 2025-04-16

## 2025-04-16 NOTE — PROGRESS NOTES
Pt responded to this writer's email regarding assistance with bills and funding being low. She stated to save the funds for another patient in need.

## 2025-04-17 ENCOUNTER — PATIENT OUTREACH (OUTPATIENT)
Dept: HEMATOLOGY ONCOLOGY | Facility: CLINIC | Age: 50
End: 2025-04-17

## 2025-04-17 ENCOUNTER — OFFICE VISIT (OUTPATIENT)
Age: 50
End: 2025-04-17

## 2025-04-17 DIAGNOSIS — Z17.0 MALIGNANT NEOPLASM OF CENTRAL PORTION OF RIGHT BREAST IN FEMALE, ESTROGEN RECEPTOR POSITIVE (HCC): Primary | ICD-10-CM

## 2025-04-17 DIAGNOSIS — C50.111 MALIGNANT NEOPLASM OF CENTRAL PORTION OF RIGHT BREAST IN FEMALE, ESTROGEN RECEPTOR POSITIVE (HCC): Primary | ICD-10-CM

## 2025-04-17 PROCEDURE — 99024 POSTOP FOLLOW-UP VISIT: CPT

## 2025-04-17 NOTE — PROGRESS NOTES
I reached out and spoke with Jenny to follow up and to review for any new changes in barriers to care and offer supportive services as needed.     Barriers noted previously and outcome of interventions;  Previously no barriers and currently no new barriers.  Patient had an appointment earlier this morning with the plastic surgeon.  Jenny mentioned that all is going well, she has no pain, no issues with eating or drinking and currently able to drive to and from appointments but also has help from her .  Patient did not have any additional questions or concerns at this time.  Patient was thankful for my call and has my contact information should she need anything additional.    Reviewed for any new barriers as noted below.    Are you having any side effects from your treatment?No    Are you eating and drinking normally? yes    Have you been experiencing any uncontrolled pain related to your cancer diagnosis? No    If not already established, have needs changed for a palliative care referral? no    Do you have a good support system? Yes     Are you interested in any support groups? Not at this time.    How are you doing with transportation to your appointments? Patient is currently still driving and also has her  to take her to and from appointments. Patient has no issues with transportation at this time.    Do you have any questions or concerns regarding your treatment plan? No    Any new financial concerns for your household or medical bills? No    Do you know when your upcoming appointments are? yes  Future Appointments   Date Time Provider Department Center   4/22/2025  9:30 AM MARTELL MOURA [facility]   4/25/2025  2:00 PM MD IRLANDA [facility]   5/1/2025  9:30 AM Emma Alejandro PA-C [facility]   5/2/2025 10:00 [facility]   5/8/2025  9:00 AM Emma Alejandro PA-C [facility]   5/15/2025  8:30 AM Emma Alejandro PA-C  [facility]   5/22/2025  3:15 PM Cassandra Lynn Cardarelli, MD [facility]G [facility]   7/15/2025  7:30 AM Rhonda Barnett PA-C [facility]   7/17/2025  9:00 AM John Fairbanks MD [facility]   10/6/2025  4:15 PM Nelson Akhtar MD [facility]          Based on individual needs I will follow up with them in 4/5 weeks. I have provided my direct contact information and welcome them to contact me if their needs as discussed above change. They were appreciative for the call.

## 2025-04-17 NOTE — PROGRESS NOTES
[facility] Plastic and Reconstructive Surgery  [address], [address], [place] [address]  [phone]    Patient Identification: Jenny Pereyra is a 49 y.o. female     History of Present Illness: The patient is a 49 y.o.  year-old female  who presents to the office for a post-op visit. Patient is 20 days s/p Bilateral Tissue Expander Placement - Bilateral  on 3/28/2025 by Dr. Brito.  Patient is doing well at this time. Went for IR aspiration of left breast seroma on 4/11/2025. 7cc of dark fluid was collected. Fluid   Patient states the left breast drain has been putting out only 5-10cc of dark red blood. Right breast drain outputting 35cc daily, serosanguinous fluid. She denies fevers, chills or signs of infection. Wearing bra and following activity restrictions.   Patient has no complaints at this time.    Past Medical History:   Diagnosis Date    Anxiety     Brain lipoma 2007    Breast cancer (HCC)     Cancer (HCC) 10/10/24    COPD (chronic obstructive pulmonary disease) (HCC)     Fatigue 2022    GERD (gastroesophageal reflux disease)     Kidney stone     Motion sickness     PONV (postoperative nausea and vomiting)     Sleep difficulties 2020    Tremors of nervous system     essential        Review of Systems  Constitutional: Denies fevers, chills or pain.  Skin: Denies any warmth, erythema, edema or mucopurulent drainage.     Physical Exam    R Breast: Surgical incision is clean, dry, and intact. Skin perfusion is intact. Minimal post-operative edema. There are no signs of infection, obvious hematoma, seroma or wound dehiscence. Drain patent with sanguineous fluid in bulb.    L Breast: Surgical incision is clean, dry and intact. Skin perfusion is intact. Breast edema subsided, similar in size to right breast. Remains soft.  No overlying skin changes. Drain with minimal dark fluid in bulb.      Assessment and Plan:  The patient is an 49 y.o.  year-old female who presents to the office for post-op  visit. Patient is 20 days s/p Bilateral Tissue Expander Placement - Bilateral  on 3/28/2025 by Dr. Brito.      -At today's visit left breast drain was removed. Covered with bacitracin and a band-aid.  -Continue to record right breast drain outputs daily. Wear bra at all times. Following activity restrictions.   -The patient is to return in 1 week for drain check and possible first fill. Call the office with any concerns or signs of infection.   -The patient is to call the office with any questions or concerns. All of the patient's questions were answered at this time and they agree with the plan of care.      Emma Alejandro PA-C  [facility] Plastic and Reconstructive Surgery

## 2025-04-22 ENCOUNTER — HOSPITAL ENCOUNTER (OUTPATIENT)
Dept: RADIOLOGY | Facility: IMAGING CENTER | Age: 50
Discharge: HOME | End: 2025-04-22
Payer: COMMERCIAL

## 2025-04-22 DIAGNOSIS — Z17.0 MALIGNANT NEOPLASM OF CENTRAL PORTION OF RIGHT BREAST IN FEMALE, ESTROGEN RECEPTOR POSITIVE (HCC): ICD-10-CM

## 2025-04-22 DIAGNOSIS — C50.111 MALIGNANT NEOPLASM OF CENTRAL PORTION OF RIGHT BREAST IN FEMALE, ESTROGEN RECEPTOR POSITIVE (HCC): ICD-10-CM

## 2025-04-22 PROCEDURE — 77080 DXA BONE DENSITY AXIAL: CPT

## 2025-04-22 NOTE — PROGRESS NOTES
Outpatient Oncology Nutrition Consultation   Type of Consult: Follow Up  Care Location: Telephone Call  Nutrition Assessment:   Oncology Diagnosis & Treatments: Breast Cancer  S/p bilateral mastectomy 11/12/24  S/p reconstructive surgery 3/28/25  Started Letrozole/Femara in December 2024, changed to Aromasin (started 4/18/25 per pt)    Oncology History   Malignant neoplasm of central portion of right breast in female, estrogen receptor positive (HCC)   10/8/2024 Biopsy    Right breast ultrasound-guided biopsy  3 o'clock, 4 cm from nipple (open coil)  Invasive breast carcinoma with ductal and lobular features  Grade 1  ER , DE , HER2 0  Lymphovascular invasion not identified    Concordant. Unifocal / multifocal. SIZE. Concordant. Right malignancy appears unifocal; suspicious mass covers an area up to 6 mm. US right axilla negative. Left breast clear. Breast MRI should be considered given lobular features within the carcinoma and overall appearance of the breast parenchyma (scattered with borderline heterogeneously dense components).     10/11/2024 Genomic Testing    Reflex MammaPrint/BluePrint UltraLow Risk (Luminal A)     10/14/2024 -  Cancer Staged    Staging form: Breast, AJCC 8th Edition  - Clinical stage from 10/14/2024: Stage IA (cT1b, cN0, cM0, G1, ER+, DE+, HER2-) - Signed by Cassandra Lynn Cardarelli, MD on 10/14/2024  Histopathologic type: Lobular carcinoma, NOS  Stage prefix: Initial diagnosis  Method of lymph node assessment: Clinical  Nuclear grade: G1  Histologic grading system: 3 grade system       10/16/2024 Genetic Testing    NEGATIVE: No Clinically Significant Variants Detected  [facility] - A total of 59 genes were evaluated with RNA insight: APC, SUKHI, BAP1, BARD1, BMPR1A, BRCA1, BRCA2, BRIP1, CDH1, CDK4, CDKN1B, CDKN2A, CHEK2, DICER1, FH, FLCN, KIF1B, MAX, MEN1, MET, MLH1, MSH2, MSH6, MUTYH, NF1, NTHL1, PALB2, PMS2, POT1, PTEN, RAD51C, RAD51D, RB1, RET, SDHA, SDHAF2, SDHB, SDHC, SDHD,  SMAD4, SMARCA4, STK11, ECEX409, TP53, TSC1, TSC2 and VHL (sequencing and deletion/duplication); AXIN2, CTNNA1, EGLN1, HOXB13, KIT, MITF, MSH3, PDGFRA, POLD1 and POLE (sequencing only); EPCAM and GREM1 (deletion/duplication only).     10/17/2024 Observation    Breast MRI IMPRESSION:  1.  Right breast 3:00 biopsy-proven invasive carcinoma measures up to 12 mm.  2.  Possible satellite lesion right breast 6:00 for which MRI guided biopsy is recommended if it would change clinical management.  3.  Otherwise, no MR evidence of contralateral left breast malignancy.  4.  No axillary or internal mammary adenopathy.     11/12/2024 Surgery    Right mastectomy with SLN biopsy (Dr. Cardarelli)  Invasive carcinoma with mixed ductal and lobular features  Grade 2  1.1 cm  Margins negative  0/5 Lymph nodes    Left simple mastectomy  Columnar cell change, usual ductal hyperplasia, apocrine metaplasia    Immediate reconstruction with tissue expanders (Dr. Brito)     11/20/2024 -  Cancer Staged    Staging form: Breast, AJCC 8th Edition  - Pathologic stage from 11/20/2024: Stage IA (pT1c, pN0, cM0, G2, ER+, GA+, HER2-) - Signed by Cassandra Lynn Cardarelli, MD on 11/20/2024  Histopathologic type: Lobular carcinoma, NOS  Stage prefix: Initial diagnosis  Histologic grading system: 3 grade system  Laterality: Right  Lymph-vascular invasion (LVI): LVI not present (absent)/not identified         Past Medical & Surgical Hx:   Patient Active Problem List   Diagnosis   • Lung nodule   • Umbilical hernia without obstruction and without gangrene   • Kidney stone on left side   • Essential tremor   • Insomnia   • Anxiety   • Gastroesophageal reflux disease without esophagitis   • B12 deficiency   • Brain lipoma   • Chronic interstitial cystitis   • Chronic migraine without aura   • Stage 1 mild COPD by GOLD classification (HCC)   • DDD (degenerative disc disease), lumbar   • Excessive daytime sleepiness   • Hypersomnia   • Other hyperlipidemia    • Gross hematuria   • Cubital tunnel syndrome, left   • Diverticulosis   • Other hemorrhoids   • Malignant neoplasm of central portion of right breast in female, estrogen receptor positive (HCC)   • History of penicillin allergy     Past Medical History:   Diagnosis Date   • Anxiety    • Brain lipoma 2007   • Breast cancer (HCC)    • Cancer (HCC) 10/10/24   • COPD (chronic obstructive pulmonary disease) (HCC)    • Fatigue 2022   • GERD (gastroesophageal reflux disease)    • Kidney stone    • Motion sickness    • PONV (postoperative nausea and vomiting)    • Sleep difficulties 2020   • Tremors of nervous system     essential     Past Surgical History:   Procedure Laterality Date   • ABDOMINAL WALL SURGERY Bilateral 12/18/2024    Procedure: CLOSURE WITH DRAIN PLACEMENT;  Surgeon: Karthik Brito MD;  [facility];  Service: Plastics   • ADENOIDECTOMY     • APPENDECTOMY     • BACK SURGERY     • BLADDER SURGERY     • BREAST BIOPSY Right 10/08/2024    300 4cmfn, open coil clip   • CARPAL TUNNEL RELEASE Right 05/31/2024   • COLONOSCOPY     • FL RETROGRADE PYELOGRAM  12/15/2023   • HYSTERECTOMY      age 29 still has lt ovary   • IR ASPIRATION ONLY  4/11/2025   • IR DRAINAGE TUBE PLACEMENT  12/16/2024   • IR RADIOFREQUENCY ABLATION      esophagus   • LAMINECTOMY      twin, lumbar   • LUMBAR FUSION      L5-s1   • MI BX/EXC LYMPH NODE OPEN SUPERFICIAL Right 11/12/2024    Procedure: RIGHT SENTINEL LYMPH NODE MAPPING INCLUDING BLUE DYE INJECTION AND RADIOCOLLOID INJECTION, SENTINEL LYMPH NODE BIOPSY (INJECT AT 0730 BY DR CARDARELLI IN THE OR);  Surgeon: Cassandra Lynn Cardarelli, MD;  [facility];  Service: Surgical Oncology   • MI CYSTO/URETERO W/LITHOTRIPSY &INDWELL STENT INSRT Left 12/15/2023    Procedure: CYSTO USCOPE W/ LASER, & STENT;  Surgeon: Jhonatan Fleming MD;  [facility];  Service: Urology   • MI INJ RADIOACTIVE TRACER FOR ID OF SENTINEL NODE Right 11/12/2024    Procedure: RIGHT  SENTINEL LYMPH NODE MAPPING INCLUDING BLUE DYE INJECTION AND RADIOCOLLOID INJECTION, SENTINEL LYMPH NODE BIOPSY (INJECT AT 0730 BY DR CARDARELLI IN THE OR);  Surgeon: Cassandra Lynn Cardarelli, MD;  [facility];  Service: Surgical Oncology   • UT INTRAOP SENTINEL LYMPH NODE ID W/DYE INJECTION Right 11/12/2024    Procedure: RIGHT SENTINEL LYMPH NODE MAPPING INCLUDING BLUE DYE INJECTION AND RADIOCOLLOID INJECTION, SENTINEL LYMPH NODE BIOPSY (INJECT AT 0730 BY DR CARDARELLI IN THE OR);  Surgeon: Cassandra Lynn Cardarelli, MD;  [facility];  Service: Surgical Oncology   • UT MASTECTOMY SIMPLE COMPLETE Bilateral 11/12/2024    Procedure: BILATERAL MASTECTOMY;  Surgeon: Cassandra Lynn Cardarelli, MD;  [facility];  Service: Surgical Oncology   • UT NEUROPLASTY &/TRANSPOS MEDIAN NRV CARPAL TUNNE Right 05/31/2024    Procedure: RELEASE CARPAL TUNNEL;  Surgeon: Dorothea Mayo MD;  [facility];  Service: Orthopedics   • UT NEUROPLASTY &/TRANSPOS MEDIAN NRV CARPAL TUNNE Left 07/12/2024    Procedure: RELEASE CARPAL TUNNEL;  Surgeon: Dorothea Mayo MD;  [facility];  Service: Orthopedics   • UT NEUROPLASTY &/TRANSPOSITION ULNAR NERVE ELBOW Right 05/31/2024    Procedure: RELEASE CUBITAL TUNNEL POSSIBLE TRANSPOSITION AND ADIPOSE FLAP;  Surgeon: Dorothea Mayo MD;  [facility];  Service: Orthopedics   • UT NEUROPLASTY &/TRANSPOSITION ULNAR NERVE ELBOW Left 07/12/2024    Procedure: RELEASE CUBITAL TUNNEL;  Surgeon: Dorothea Mayo MD;  [facility];  Service: Orthopedics   • UT REMOVAL TISSUE EXPANDER W/O INSERTION IMPLANT Bilateral 12/18/2024    Procedure: removal bilateral tissue expanders;  Surgeon: Karthik Brito MD;  [facility];  Service: Plastics   • UT TISSUE EXPANDER PLACEMENT BREAST RECONSTRUCTION Bilateral 11/12/2024    Procedure: BILATERAL IMMEDIATE BREAST RECONSTRUCTION WITH TISSUE EXPANDERS AND ADM;  Surgeon: Karthik Brito MD;   [facility];  Service: Plastics   • ME TISSUE EXPANDER PLACEMENT BREAST RECONSTRUCTION Bilateral 3/28/2025    Procedure: BILATERAL TISSUE EXPANDER PLACEMENT;  Surgeon: Karthik Brito MD;  [facility];  Service: Plastics   • SPINE SURGERY     • TOTAL VAGINAL HYSTERECTOMY      AGE 29   • TUBAL LIGATION     • UPPER GASTROINTESTINAL ENDOSCOPY     • US GUIDED BREAST BIOPSY RIGHT COMPLETE Right 10/08/2024   • WOUND DEBRIDEMENT Bilateral 12/18/2024    Procedure: DEBRIDEMENT WOUND (WASH OUT);  Surgeon: Karthik Brito MD;  [facility];  Service: Plastics     Review of Medications:   Vitamins, Supplements and Herbals: No, pt denies taking supplements    Current Outpatient Medications:   •  ACIDOPHILUS LACTOBACILLUS PO, Take 1 tablet by mouth daily, Disp: , Rfl:   •  Armodafinil 150 MG tablet, TAKE ONE TABLET BY MOUTH DAILY, Disp: 30 tablet, Rfl: 2  •  cyclobenzaprine (FLEXERIL) 10 mg tablet, Take 1 tablet (10 mg total) by mouth 3 (three) times a day as needed for muscle spasms for up to 7 days, Disp: 21 tablet, Rfl: 0  •  escitalopram (Lexapro) 10 mg tablet, Take 1 tablet (10 mg total) by mouth daily (Patient taking differently: Take 10 mg by mouth daily with lunch), Disp: 90 tablet, Rfl: 1  •  exemestane (AROMASIN) 25 MG tablet, Take 1 tablet (25 mg total) by mouth daily, Disp: 90 tablet, Rfl: 3  •  fenofibrate (TRICOR) 145 mg tablet, Take 1 tablet (145 mg total) by mouth daily, Disp: 100 tablet, Rfl: 1  •  gabapentin (NEURONTIN) 300 mg capsule, Take 1 capsule (300 mg total) by mouth daily at bedtime, Disp: 90 capsule, Rfl: 1  •  ondansetron (ZOFRAN) 4 mg tablet, Take 1 tablet (4 mg total) by mouth every 8 (eight) hours as needed for nausea or vomiting, Disp: 20 tablet, Rfl: 0  •  SUMAtriptan (Imitrex) 50 mg tablet, Take 1 tablet (50 mg total) by mouth once as needed for migraine for up to 1 dose, Disp: 9 tablet, Rfl: 0  •  tiotropium-olodaterol (Stiolto Respimat) 2.5-2.5 MCG/ACT inhaler,  "Inhale 2 puffs daily, Disp: 12 g, Rfl: 5    Most Recent Lab Results:   Lab Results   Component Value Date    WBC 10.26 (H) 03/15/2025    NEUTROABS 7.10 03/15/2025    IRON 86 04/06/2024    TIBC 426 04/06/2024    FERRITIN 70 04/06/2024    CHOLESTEROL 182 03/15/2025    TRIG 97 03/15/2025    HDL 83 03/15/2025    LDLCALC 80 03/15/2025    ALT 22 12/20/2024    AST 24 12/20/2024    ALB 3.5 12/20/2024    SODIUM 141 03/15/2025    SODIUM 140 12/20/2024    K 4.6 03/15/2025    K 3.8 12/20/2024     03/15/2025    BUN 19 03/15/2025    BUN 19 12/20/2024    CREATININE 0.81 03/15/2025    CREATININE 0.75 12/20/2024    EGFR 86 03/15/2025    TSH 2.90 12/11/2021    GLUF 82 03/15/2025    GLUF 77 10/14/2024    GLUC 113 12/20/2024    HGBA1C 5.5 04/06/2024    HGBA1C 5.3 03/29/2023    HGBA1C 5.5 12/11/2021    CALCIUM 10.1 03/15/2025    MG 2.0 01/25/2025     Anthropometric Measurements:   Height: 61\"  Ht Readings from Last 1 Encounters:   03/28/25 5' 2\" (1.575 m)     Wt Readings from Last 20 Encounters:   03/28/25 64.4 kg (142 lb)   03/24/25 65.7 kg (144 lb 12.8 oz)   12/24/24 65.4 kg (144 lb 3.2 oz)   12/15/24 65.8 kg (145 lb 1 oz)   12/02/24 64 kg (141 lb)   11/27/24 63.5 kg (140 lb)   11/12/24 63 kg (139 lb)   10/29/24 63 kg (139 lb)   10/28/24 63 kg (139 lb)   10/23/24 63 kg (139 lb)   10/22/24 64 kg (141 lb)   10/16/24 64 kg (141 lb)   10/14/24 64.9 kg (143 lb)   10/10/24 64.9 kg (143 lb)   10/08/24 63.5 kg (140 lb)   09/26/24 63.5 kg (140 lb)   07/29/24 63.5 kg (140 lb)   07/22/24 63.8 kg (140 lb 9.6 oz)   07/12/24 64.4 kg (142 lb)   06/24/24 65.4 kg (144 lb 3.2 oz)     Weight History:   Usual Weight: unsure, was 188# 2 yr ago  Home Weights: (5/1/25) 148#  Comments: wt has been fluctuating since COVID (unintended wt gain, followed by intentional loss, gained wt again, then lost).     Oncology Nutrition-Anthropometrics    Flowsheet Row Nutrition from 5/2/2025 in [facility] Nutrition from " [facility]   Patient [age] (years): 49 years 49 years   Patient (female) height (in): 61 in 62 in   Current Weight to be used for anthropometric calculations (lbs) 148 lbs 142 lbs   Current Weight to be used for anthropometric calculations (kg) 67.3 kg 64.5 kg   BMI: 28 26   IBW female: 105 lbs 110 lbs   IBW (kg) female: 47.7 kg 50 kg   IBW % (female) 141 % 129.1 %   Adjusted BW (female): 115.8 lbs 118 lbs   Adjusted BW kg (female): 52.6 kg 53.6 kg   % weight change after 3 months: -- -1.5 %   Weight change after 3 months (lbs) -- -2.2 lbs   % weight change after 6 months: 6.5 % --   Weight change after 6 months (lbs) 9 lbs --        Nutrition-Focused Physical Findings: n/a due to telephone call    Food/Nutrition-Related History & Client/Social History:    Current Nutrition Impact Symptoms:  [] Nausea  [] Reduced Appetite  [] Acid Reflux    [] Vomiting  [] Unintended Wt Loss - hx [] Malabsorption    [] Diarrhea  [x] Unintended Wt Gain - since starting Aromasin per pt  -denies fluid retention but rings feel tight [] Dumping Syndrome    [] Constipation  [] Thick Mucous/Secretions  [] Abdominal Pain    [] Dysgeusia (Altered Taste)  [] Xerostomia (Dry Mouth)  [] Gas    [] Dysosmia (Altered Smell)  [] Thrush  [] Difficulty Chewing    [] Oral Mucositis (Sore Mouth)  [] Fatigue  [] Hyperglycemia   Lab Results   Component Value Date    HGBA1C 5.5 04/06/2024      [] Odynophagia  [] Esophagitis  [] Other:    [] Dysphagia  [] Early Satiety  [] No Problems Eating      Food Allergies & Intolerances: yes: lactose intolerance (can tolerate certain cheeses and Greek yogurt, cannot have milk or ice cream)    Current Diet: Regular Diet, No Restrictions  States she doesn't like a lot of fruits and veggies d/t texture, likes bananas and grapes.  Willing to eat more F&V in smoothie form.  Nutrition Route: PO  Activity level: ADL's & walks the dog BID (~15-30 min per walk); physical  activity is restricted d/t medical status, planning to go to PT    24 Hr Diet Recall: has been measuring portions and avoiding sweets d/t weight gain  Breakfast: protein shake and white toast with butter or bagel with butter or raisin brain  Snack:  c ector (measures portion)  Lunch: none d/t pain  Snack: a couple cashews  Dinner: 1 cup of garlic butter flank steak bites with potatoes, no veggie  Snack: stopped eating after 7pm d/t wt gain; but did have ice cream last night    Beverages: water (16.9 oz x5-6+), light [place] iced tea (a couple swallows after meals), protein shake (QD)  Supplements:   Wall[employer]ass Nutrition Plan Shake (11.5 oz, 150 kcal, 30 g protein) - QD  JBN protein powder mixed with and [employer]Compass milk - none lately    Oncology Nutrition-Estimated Needs    Flowsheet Row Nutrition from 2025 in [facility] Nutrition from 10/31/2024 in [facility]   Weight type used Adjusted weight Adjusted weight   Weight in kilograms (kg) used for estimated needs 52.6 kg 53.3 kg   Energy needs formula:  25-30 kcal/kg 25-30 kcal/kg   Energy needs based on 25 kcal/k kcal 1333 kcal   Energy needs based on 30 kcal/k kcal 1600 kcal   Protein needs formula: 1-1.2 g/kg 1.2-1.5 g/kg   Protein needs based on 1 g/kg 53 g --   Protein needs based on 1.2 g/k g --   Protein needs based on 1.2 g/kg: -- 64 g   Protein needs based on 1.5 g/kg -- 80 g   Fluid needs formula: 25-30 mL/kg 30-35 mL/kg   Fluid needs based on 25 mL/kg 1325 mL --   Fluid needs in ounces 45 oz --   Fluid needs based on 30 mL/kg 1590 mL --   Fluid needs in ounces 54 oz --   Fluid needs based on 30 mL/kg -- 1596 mL   Fluid needs in ounces -- 54 oz   Fluid needs based on 35 mL/kg -- 1862 mL   Fluid needs in ounces -- 63 oz         Discussion & Intervention:   Jenny was evaluated today for an RD follow up regarding nutrition impact sx management and  cancer-preventative eating.  Jenny  is currently recovering from surgery for breast cancer .  She is doing ok today but reports unintended wt gain since starting Aromasin. Today, we focused on ways to stabilize her weight and the importance of transitioning to a cancer preventative eating pattern.  Jenny would like to start exercising again, once medically able, and will be seeing PT in the near future; we discussed the fitness centers at [facility].   Reviewed 24 hour recall, which revealed an suboptimal po intake, and discussed ways to transition to a cancer preventative diet and balance energy intake to promote a healthy body weight.  Also reviewed the importance of wt management throughout the healing process and the role of a cancer preventative diet in managing wt and overall health.  Based on today's assessment, discussion included: MNT for: healing, unintended wt gain, choosing a variety of colorful, plant-based foods to support a cancer preventative diet, adequate hydration & fluid choices, and utilizing oral nutrition supplements.   Moving forward, Jenny was encouraged to transition to a cancer-preventative lifestyle.  Small and achievable goals were encouraged.  Materials Provided: not applicable  All questions and concerns addressed during today’s visit.  Jenny has RD contact information.    Nutrition Diagnosis:   Unintended Weight Gain related to medication side effects as evidenced by pt reports of wt gain since starting aromasin.  Monitoring & Evaluation:   Goals:  weight maintenance/stabilization  transition to a cancer preventative eating pattern    Progress Towards Goals: New Goal(s) Added    Nutrition Rx & Recommendations:  Diet: High Protein, Low-Fat, Colorful, Plant Based Diet;  4-5 servings of vegetables. 2-3 servings of fruits per day.  choose high fiber carbohydrates: fruits, vegetables, beans, legumes, whole grains. Aim for >25 grams of fiber daily.  Avoid sugar-sweetened beverages. Water is the  best choice. Regular Unsweetened Green Tea - >3 cups per day may be helpful. Stay hydrated by sipping fluids of choice/tolerance throughout the day.  Focus on healthy fats from whole foods: nuts, seeds, avocado, fish.    >8 oz fish per week.  Limit red meat, avoid processed meats.  Avoid greasy, fried foods.  Limit dining out.  Maintain a healthy weight with a BMI between 18.5-24.9.  Avoid weight gain if overweight.  Small, frequent meals/snacks may be easier to tolerate than 3 large daily meals.  Aim for 5-6 small meals per day (every 2-3 hours).  Include protein at all meals/snacks.  Include a variety of foods (as tolerated/allowed by diet).  Follow a Cancer Preventative Nutrition Pattern: colorful, plant-based, low-fat, avoid added sugars, limit alcohol, include high fiber foods, limit processed meats, limit red meat, choose lean protein sources, use low-fat cooking methods, balance calories with physical activity, avoid excessive weight gain throughout life.  Incorporate physical activity as able/allowed.  Aim to have a lean protein source at all meals and snacks  Examples: high protein cereal, oatmeal made with Fairlife milk/fruit/peanut butter, protein shake (JBN + milk), Greek yogurt with granola and fruit, egg whites (as tolerated), nuts (as tolerated), trail mix, etc.  Choose a lean (low-fat) protein source at each meal and snack: chicken, turkey, fish, seafood, Greek yogurt, nuts, nut butter, beans, lentils, legumes, etc. (See Protein Dense Foods handout previously provided)  Increase fruit and vegetable intake - choose produce you enjoy, add flavor as needed    Personal goals:  Fruits: have 1 serving of fruit each day (goal will be to work up to 2-3 servings per day)  Vegetables: have 1 serving of non-starchy veggies each day (goal will be to work up to 5 servings per day)  Continue to eat starchy veggies (corn, peas, potatoes), monitor portion sizes    Follow Up Plan:  5/5 at 11am by phone  then MNT or  Weight Management, try to attend an oncology nutrition class (the June class focuses on weight loss after cancer treatment)  Recommend Referral to Other Providers: none at this time

## 2025-04-24 NOTE — PROGRESS NOTES
[facility]   Plastic and Reconstructive Surgery   [address] [place] [address]       CLINIC NOTE      Assessment & Plan  Malignant neoplasm of central portion of right breast in female, estrogen receptor positive (HCC)    Patient is 4 weeks s/p bilateral tissue expander placement.  Patient is doing well overall.  Incisions are healing well.  Skin appears healthy.  NO signs of infection.  Right breast drain removed.  Patient underwent first expansion today.    RIGHT BREAST EXPANDER FILL:  OR =  0mL  4/25 =   50mL  ---------------------  TOTAL= 50/250mL    LEFT BREAST EXPANDER FILL:  OR =  0mL  4/25 =   50mL  ---------------------  TOTAL= 50/250mL    -continue to wear bra  -monitor for signs of infection  -no strenuous activity   -return 1 week for next fill      Interval History:  Jenny notes that she is doing well overall.  She does note moderate discomfort with expansion but is tolerable with the current oral pain regimen.  Patient denies any fevers, chills, erythema or drainage from her incisions.      Physical Exam   Alert, oriented, no acute distress  Breathing comfortably on room air, symmetric chest rise  Bilateral chest incisions well-approximated.  No appreciable erythema or drainage.  No appreciable fluctuance.  Skin appears viable and healthy with 2 to 3-second capillary refill.  Right breast draining serosanguineous-removed    Procedure: Bilateral Tissue Expander Fill  Under sterile conditions the bilateral tissue expanders were percutaneously accessed without difficulty.    50 ml of NS was injected into the right  50 ml of NS was injected into the left.  Patient tolerated procedure well.     A ''time out'' was initiated prior to procedure. Non-OR time Out:  Time out was initiated under direction and supervision of provider Emma Alejandro PA-C  Correct patient identity - Yes  Correct side and site - Yes  Procedure matches verbalized consent -Yes  Correct patient position - Yes  Availability  of correct implants and any special equipment or special requirements - Yes  Time out occurred prior to procedure start - Yes     Total TE volume:   Right: 50/250 ml   Left: 50/250 ml

## 2025-04-25 ENCOUNTER — OFFICE VISIT (OUTPATIENT)
Dept: PLASTIC SURGERY | Facility: CLINIC | Age: 50
End: 2025-04-25

## 2025-04-25 DIAGNOSIS — Z17.0 MALIGNANT NEOPLASM OF CENTRAL PORTION OF RIGHT BREAST IN FEMALE, ESTROGEN RECEPTOR POSITIVE (HCC): Primary | ICD-10-CM

## 2025-04-25 DIAGNOSIS — C50.111 MALIGNANT NEOPLASM OF CENTRAL PORTION OF RIGHT BREAST IN FEMALE, ESTROGEN RECEPTOR POSITIVE (HCC): Primary | ICD-10-CM

## 2025-04-25 PROCEDURE — 99024 POSTOP FOLLOW-UP VISIT: CPT | Performed by: PLASTIC SURGERY

## 2025-04-30 NOTE — PROGRESS NOTES
68F with hx of HLD, GERD, vertigo, osteoporosis, rheumatoid and osteoarthritis who presents for right retinal eye detachment surgery.      Right eye retinal detachment surgery  - regarding pre-op evaluation, patient has no cardiovascular or pulmonary symptoms.  Reports she had a unremarkable chemical stress test and TTE last year.  Denies any symptoms when doing her ADLs.  Able to go up/down stairs.  Labs and EKG reviewed.  Patient is a low risk patient for a low risk surgery.  No medical contraindications for surgery.  Can proceed as planned  - post-op care as per ophtho Consultation - [unfilled]  [unfilled]    Assessment & Plan  Malignant neoplasm of central portion of right breast in female, estrogen receptor positive (HCC)    Jenny Pereyra is a 48 y/o female with a diagnosis of right breast cancer who is 2 weeks s/p bilateral mastectomies, right sentinel lymph node biopsy and immediate bilateral breast reconstruction with tissue expanders. Patient progressing well overall. Drains removed today without issue due to low output - <25mL x 3 days. No signs or symptoms of infection. Skin is healthy.    - No strenuous activity or lifting more than 10lbs  - continue to wear surgical bra for compression  - monitor surgical sites for signs of infection  - f/u 1 week for first expander fill      History of Present Illness   Jenny Pereyra is a 49 y.o. female who is 2 weeks s/p bilateral mastectomy, right sentinel lymph node biopsy, and immediate breast reconstruction with tissue expanders.  Patient notes that she is doing well overall.  Pain well controlled. Notes some soreness at her drains sites, the right being more uncomfortable than the left. Denies fevers, chills, redness or drainage from the incisions.      Physical Exam   Alert, oriented, NAD  Breathing comfortably on room air  Bilateral chest incisions well approximated. No appreciable erythema, or drainage from the incisions.  Skin is warm with 2-3s cap refill. No appreciable fluctuance. Drains SS

## 2025-05-01 ENCOUNTER — OFFICE VISIT (OUTPATIENT)
Age: 50
End: 2025-05-01

## 2025-05-01 DIAGNOSIS — Z98.890 S/P BREAST RECONSTRUCTION, BILATERAL: Primary | ICD-10-CM

## 2025-05-01 PROCEDURE — 99024 POSTOP FOLLOW-UP VISIT: CPT

## 2025-05-01 NOTE — PROGRESS NOTES
[facility] Plastic and Reconstructive Surgery  [address], [address], [place] [address]  [phone]    Patient Identification: Jenny Pereyra is a 49 y.o. female     History of Present Illness: The patient is a 49 y.o.  year-old female  who presents to the office for a post-op visit. Patient is 34 days s/p Bilateral Tissue Expander Placement - Bilateral  on 3/28/2025 by Dr. Brito.    [place] CPX4 Plus, Tall Height 250cc, no intraoperative fill.    Patient is doing well at this time. Denies fevers, chills, signs of infection or pain. States she has restrictions in ROM in her upper extremities. She is interested in physical therapy. She continues to wear compression bra at all times. Tolerated last fill well, states she was sore for 1-2 days.    Past Medical History:   Diagnosis Date    Anxiety     Brain lipoma 2007    Breast cancer (HCC)     Cancer (HCC) 10/10/24    COPD (chronic obstructive pulmonary disease) (HCC)     Fatigue 2022    GERD (gastroesophageal reflux disease)     Kidney stone     Motion sickness     PONV (postoperative nausea and vomiting)     Sleep difficulties 2020    Tremors of nervous system     essential          Review of Systems  Constitutional: Denies fevers, chills or pain.  Skin: Denies any warmth, erythema, edema or mucopurulent drainage.     Physical Exam    Breast: Surgical incisions are clean, dry, and intact. Skin perfusion is intact. There are no signs of infection, obvious hematoma, seroma or wound dehiscence. Tissue expanders in place, in tact.    Procedure: Bilateral Tissue Expander Fill  Under sterile conditions the bilateral tissue expanders were percutaneously accessed without difficulty.    50 ml of NS was injected into the right  50 ml of NS was injected into the left.  Patient tolerated procedure well.     A ''time out'' was initiated prior to procedure. Non-OR time Out:  Time out was initiated under direction and supervision of provider Emma Alejandro  MATHEW  Correct patient identity - Yes  Correct side and site - Yes  Procedure matches verbalized consent -Yes  Correct patient position - Yes  Availability of correct implants and any special equipment or special requirements - Yes  Time out occurred prior to procedure start - Yes     Total TE volume:   Right: 100/250 ml   Left: 100/250 ml     Assessment and Plan:  The patient is an 49 y.o.  year-old female who presents to the office for a post-op visit. Patient is 34 days s/p Bilateral Tissue Expander Placement - Bilateral  on 3/28/2025 by Dr. Brito      -At today's visit bilateral tissue expanders were filled to 100/250cc. Patient tolerated well.  -Continue wearing surgical compression bra at all times. Patient is to refrain from exercise/repetitive arm movements for 4-6 weeks post-op. Sleep on back at an incline. No submerging in water (pools, baths, hottubs, etc.) until 8 weeks post-op.  -May apply Aquaphor daily to incision sites.  -PT referral placed.  -The patient is to return in 1 week for TE fill.  -The patient is to call the office with any questions or concerns. All of the patient's questions were answered at this time and they agree with the plan of care.      Emma Alejandro PA-C  [facility] Plastic and Reconstructive Surgery

## 2025-05-02 ENCOUNTER — NUTRITION (OUTPATIENT)
Dept: NUTRITION | Facility: CLINIC | Age: 50
End: 2025-05-02

## 2025-05-02 DIAGNOSIS — Z71.3 NUTRITIONAL COUNSELING: Primary | ICD-10-CM

## 2025-05-02 NOTE — PATIENT INSTRUCTIONS
Nutrition Rx & Recommendations:  Diet: High Protein, Low-Fat, Colorful, Plant Based Diet;  4-5 servings of vegetables. 2-3 servings of fruits per day.  choose high fiber carbohydrates: fruits, vegetables, beans, legumes, whole grains. Aim for >25 grams of fiber daily.  Avoid sugar-sweetened beverages. Water is the best choice. Regular Unsweetened Green Tea - >3 cups per day may be helpful. Stay hydrated by sipping fluids of choice/tolerance throughout the day.  Focus on healthy fats from whole foods: nuts, seeds, avocado, fish.    >8 oz fish per week.  Limit red meat, avoid processed meats.  Avoid greasy, fried foods.  Limit dining out.  Maintain a healthy weight with a BMI between 18.5-24.9.  Avoid weight gain if overweight.  Small, frequent meals/snacks may be easier to tolerate than 3 large daily meals.  Aim for 5-6 small meals per day (every 2-3 hours).  Include protein at all meals/snacks.  Include a variety of foods (as tolerated/allowed by diet).  Follow a Cancer Preventative Nutrition Pattern: colorful, plant-based, low-fat, avoid added sugars, limit alcohol, include high fiber foods, limit processed meats, limit red meat, choose lean protein sources, use low-fat cooking methods, balance calories with physical activity, avoid excessive weight gain throughout life.  Incorporate physical activity as able/allowed.  Aim to have a lean protein source at all meals and snacks  Examples: high protein cereal, oatmeal made with Fairlife milk/fruit/peanut butter, protein shake (JBN + milk), Greek yogurt with granola and fruit, egg whites (as tolerated), nuts (as tolerated), trail mix, etc.  Choose a lean (low-fat) protein source at each meal and snack: chicken, turkey, fish, seafood, Greek yogurt, nuts, nut butter, beans, lentils, legumes, etc. (See Protein Dense Foods handout previously provided)  Increase fruit and vegetable intake - choose produce you enjoy, add flavor as needed    Personal goals:  Fruits: have 1  serving of fruit each day (goal will be to work up to 2-3 servings per day)  Vegetables: have 1 serving of non-starchy veggies each day (goal will be to work up to 5 servings per day)  Continue to eat starchy veggies (corn, peas, potatoes), monitor portion sizes    Follow Up Plan: 5/5 at 11am by phone then MNT or Weight Management, try to attend an oncology nutrition class (the June class focuses on weight loss after cancer treatment)  Recommend Referral to Other Providers: none at this time

## 2025-05-06 ENCOUNTER — TELEPHONE (OUTPATIENT)
Dept: HEMATOLOGY ONCOLOGY | Facility: CLINIC | Age: 50
End: 2025-05-06

## 2025-05-06 NOTE — TELEPHONE ENCOUNTER
----- Message from Rhonda Barnett PA-C sent at 4/8/2025  8:24 AM EDT -----  Please call patient.      D/c'd letrozole and started exemestane.  Should be on it for about 2 weeks.  Doing ok so far?    She should have 3 month f/u scheduled.

## 2025-05-06 NOTE — TELEPHONE ENCOUNTER
Pt denies and swelling or SOB, she does have a chest cold. Pt advised to follow up with PCP if she starts with any swelling/SOB. Pt instructed to hold Aromasin x2 weeks and will re evaluate her symptoms at that time. Pt verbalized understanding and was agreeable to above plan.

## 2025-05-06 NOTE — TELEPHONE ENCOUNTER
Hold Aromasin.  Any edema or SOB with weight gain?  If so, needs to f/u with PCP.  If no, will reassess in 2 weeks.

## 2025-05-06 NOTE — TELEPHONE ENCOUNTER
Spoke with patient, she reports headaches have resolved. She still has back and shin pain, with new onset of weight gain. Pt reports an 8 pound weight gain since starting the medication right before tony.     Rhonda please review and advise. She is scheduled to see Dr. Alejo on 7/15

## 2025-05-08 ENCOUNTER — OFFICE VISIT (OUTPATIENT)
Age: 50
End: 2025-05-08

## 2025-05-08 ENCOUNTER — TELEPHONE (OUTPATIENT)
Age: 50
End: 2025-05-08

## 2025-05-08 DIAGNOSIS — Z98.890 S/P BREAST RECONSTRUCTION, BILATERAL: Primary | ICD-10-CM

## 2025-05-08 PROCEDURE — 99024 POSTOP FOLLOW-UP VISIT: CPT

## 2025-05-08 NOTE — TELEPHONE ENCOUNTER
Patient calling in to report has an appointment with Dr. Cardarelli on 5/22.  She is asking for reschedule her follow up appointment for closer to the end of the year because she does not have an PTO to use.  She reports will take the occurrence if Dr. Cardarelli wants her to keep this appointment.  She is asking for a call back with explanation or a new appointment option.  She thanked me.

## 2025-05-08 NOTE — TELEPHONE ENCOUNTER
Called the patient back to further discuss. The patient declined current breast issues and has been following very closely with plastic surgery after her recent expander placement in March. She did report issues with HR and not being able to get time off of work easily for her multiple appointments. Discussed that given her recent benign pathology and her upcoming follow up appointment with medical oncology, it was reasonable to delay her appointment with Dr. Cardarelli. Instructed the patient to keep her medical oncology follow up as scheduled and in-person to ensure that a clinical exam could be performed. The patient verbalized understanding and was in agreement with this plan. She accepted a new appointment with Dr. Cardarelli on 12/18 at 3:45 and verified appointment details. The patient was very appreciative of the call back.

## 2025-05-08 NOTE — PROGRESS NOTES
[facility] Plastic and Reconstructive Surgery  [address], [address], [place] [address]  [phone]    Patient Identification: Jenny Pereyra is a 49 y.o. female     History of Present Illness: The patient is a 49 y.o.  year-old female  who presents to the office for a post-op visit. Patient is 41 days s/p Bilateral Tissue Expander Placement - Bilateral  on 3/28/2025 by Dr. Brito.    [place] CPX4 Plus, Tall Height 250cc, no intraoperative fill.    Patient is doing well at this time. Denies fevers, chills, signs of infection or pain. Patient states she have her dog a bath over the weekend and is feeling soreness in her chest. She has questions today regarding her TE fill size and its correlation to implants/bra cup size.    Past Medical History:   Diagnosis Date    Anxiety     Brain lipoma 2007    Breast cancer (HCC)     Cancer (HCC) 10/10/24    COPD (chronic obstructive pulmonary disease) (HCC)     Fatigue 2022    GERD (gastroesophageal reflux disease)     Kidney stone     Motion sickness     PONV (postoperative nausea and vomiting)     Sleep difficulties 2020    Tremors of nervous system     essential        Review of Systems  Constitutional: Denies fevers, chills or pain.  Skin: Denies any warmth, erythema, edema or mucopurulent drainage.     Physical Exam    Breast: Surgical incisions are clean, dry, and intact. Skin perfusion is intact. There are no signs of infection, obvious hematoma, seroma or wound dehiscence. Tissue expanders in place, in tact.    Procedure: Bilateral Tissue Expander Fill  Under sterile conditions the bilateral tissue expanders were percutaneously accessed without difficulty.    50 ml of NS was injected into the right  50 ml of NS was injected into the left.  Patient tolerated procedure well.     A ''time out'' was initiated prior to procedure. Non-OR time Out:  Time out was initiated under direction and supervision of provider Emma Alejandro PA-C  Correct patient  identity - Yes  Correct side and site - Yes  Procedure matches verbalized consent -Yes  Correct patient position - Yes  Availability of correct implants and any special equipment or special requirements - Yes  Time out occurred prior to procedure start - Yes     Total TE volume:   Right: 150/250 ml   Left: 150/250 ml     Assessment and Plan:  The patient is an 49 y.o.  year-old female who presents to the office for a post-op visit. Patient is 41 days s/p Bilateral Tissue Expander Placement - Bilateral  on 3/28/2025 by Dr. Brito    -At today's visit bilateral tissue expanders were filled to 150/250cc. Dr. Brito was present and discussed TE size and plan for TE fills every 2 weeks. Goal is to fill to a size that is to the patient's satisfaction and is also safe. All questions answered, she agrees with plan.  -Continue wearing surgical compression bra at all times.  No submerging in water (pools, baths, hottubs, etc.) until 8 weeks post-op.  -May apply Aquaphor daily to incision sites.  -The patient is to return in 2 weeks for TE fill.  -The patient is to call the office with any questions or concerns. All of the patient's questions were answered at this time and they agree with the plan of care.      Emma Alejandro PA-C  [facility] Plastic and Reconstructive Surgery

## 2025-05-15 ENCOUNTER — PATIENT OUTREACH (OUTPATIENT)
Dept: HEMATOLOGY ONCOLOGY | Facility: CLINIC | Age: 50
End: 2025-05-15

## 2025-05-15 NOTE — PROGRESS NOTES
I reached out and spoke with Jenny to follow up and to review for any new changes in barriers to care and offer supportive services as needed.     Barriers noted previously and outcome of interventions;  Patient mentioned she needs an extension on her FMLA and she was having issues uploading the paper work into my chart. Mentioned to patient that she could email me that paper work and I will forward to the medical oncology office to be completed.  Jenny also mentioned she has been off of the exemestane 25MG for going on her second week now due to weight gain. Patient mentioned that she has lost about 1 lbs and has not gained any additional weight at this time. Jenny wanted to know if she should be going back on the exemestane and if not when should she start back up.  Jenny mentioned that she seen  and MATHEW Barnett in the past.  Jenny wishes to not continue her care with  this is why she has an additional appointment to establish care with .  FMLA paper work has been printed and given to 's KOLBY Lopez.  Forms are also uploaded into patient's chart.  Patient was thankful for my call and mentioned that other than the above concerns all is well.  Jenny did not have any additional questions or concerns for me at this time.    Reviewed for any new barriers as noted below.    Are you having any side effects from your treatment?No    Are you eating and drinking normally? yes    Have you been experiencing any uncontrolled pain related to your cancer diagnosis? No    If not already established, have needs changed for a palliative care referral? no    Do you have a good support system? Yes     Are you interested in any support groups? Not at this time.    How are you doing with transportation to your appointments? Patient is still able to drive to and from her appointments. She also has help from her  as well.    Do you have any questions or concerns regarding  your treatment plan? No    Any new financial concerns for your household or medical bills? No.    Do you know when your upcoming appointments are? yes  Future Appointments   Date Time Provider Department Center   5/22/2025 10:00 AM Emma Alejandro PA-C [facility]   6/6/2025 11:00 AM Hawa Russo RD [facility]   6/11/2025  4:30 PM Karol Dupont, PT [facility] PT [facility]   7/15/2025  8:00 AM Rafi Collins MD [facility]   7/17/2025  9:00 AM John Fairbanks MD [facility]   10/6/2025  4:15 PM Nelson Akhtar MD [facility]   12/18/2025  3:45 PM Cassandra Lynn Cardarelli, MD SURG ONC [facility]On[facility]          Based on individual needs I will follow up with them in 4/5 weeks. I have provided my direct contact information and welcome them to contact me if their needs as discussed above change. They were appreciative for the call.

## 2025-05-16 ENCOUNTER — TELEPHONE (OUTPATIENT)
Dept: HEMATOLOGY ONCOLOGY | Facility: CLINIC | Age: 50
End: 2025-05-16

## 2025-05-16 NOTE — PROGRESS NOTES
Spoke with patient, she will continue to hold her medication. She is agreeable to sooner appointment.     CLERICAL TEAM: Can patients SANTOS to Dr. Valdez be moved up any earlier?

## 2025-05-16 NOTE — TELEPHONE ENCOUNTER
Called pt - LEFT     Clinical requesting earlier appt for pt    Was able to schedule pt sooner    Left pt details with request of callback to confirm or decline appt    Left Hope Line number

## 2025-05-18 NOTE — ASSESSMENT & PLAN NOTE
Jenny Pereyra is a 49-year-old female with a history of right breast cancer who underwent bilateral mastectomies, right sentinel node biopsy, and immediate breast reconstruction with bilateral prepectoral tissue expander placement.  Patient's postoperative course was ultimately complicated by left chest wound dehiscence status post excision closure, right tissue expander infection and dehiscence requiring removal of bilateral expanders on 12/18/2024.     Patient is now 1 week s/p placement of bilateral submuscular 250cc tissue expanders.  Patient doing well postoperatively.  No signs of infection, hematoma or seroma.  Drains with elevated output - continue.    -continue to wear surgical bra  -continue to empty and measure drains  -may shower - allow water to hit your back and run over the front.  -f/u 1 week for incision and drain check      LEFT BREAST EXPANDER FILL:  OR =  0mL  ---------------------  TOTAL= 0/250mL    RIGHT BREAST EXPANDER FILL:  OR =  0mL  ---------------------  TOTAL= 0/250mL

## 2025-05-18 NOTE — PROGRESS NOTES
[facility]   Plastic and Reconstructive Surgery   [address] [place] [address]       CLINIC NOTE      Assessment & Plan  Malignant neoplasm of central portion of right breast in female, estrogen receptor positive (HCC)  Jenny Pereyra is a 49-year-old female with a history of right breast cancer who underwent bilateral mastectomies, right sentinel node biopsy, and immediate breast reconstruction with bilateral prepectoral tissue expander placement.  Patient's postoperative course was ultimately complicated by left chest wound dehiscence status post excision closure, right tissue expander infection and dehiscence requiring removal of bilateral expanders on 12/18/2024.     Patient is now 1 week s/p placement of bilateral submuscular 250cc tissue expanders.  Patient doing well postoperatively.  No signs of infection, hematoma or seroma.  Drains with elevated output - continue.    -continue to wear surgical bra  -continue to empty and measure drains  -may shower - allow water to hit your back and run over the front.  -f/u 1 week for incision and drain check      LEFT BREAST EXPANDER FILL:  OR =  0mL  ---------------------  TOTAL= 0/250mL    RIGHT BREAST EXPANDER FILL:  OR =  0mL  ---------------------  TOTAL= 0/250mL      Interval History:  Jenny notes that she is doing well overall.  She does not some discomfort and tightness of the bilateral chest.  Denies fevers, chills, increased swelling or drainage from the incisions.       Physical Exam   Alert, oriented, NAD  Breathing comfortably on room air  Bilateral reconstructed breasts with steri strips  in place, no drainage or indications of of dehiscence.  Skin flaps are healthy, no erythema, 2s cap refill.   Drains SS bilaterally.

## 2025-05-22 ENCOUNTER — OFFICE VISIT (OUTPATIENT)
Age: 50
End: 2025-05-22

## 2025-05-22 DIAGNOSIS — Z98.890 S/P BREAST RECONSTRUCTION, BILATERAL: Primary | ICD-10-CM

## 2025-05-22 DIAGNOSIS — Z17.0 MALIGNANT NEOPLASM OF CENTRAL PORTION OF RIGHT BREAST IN FEMALE, ESTROGEN RECEPTOR POSITIVE (HCC): ICD-10-CM

## 2025-05-22 DIAGNOSIS — C50.111 MALIGNANT NEOPLASM OF CENTRAL PORTION OF RIGHT BREAST IN FEMALE, ESTROGEN RECEPTOR POSITIVE (HCC): ICD-10-CM

## 2025-05-22 PROCEDURE — 99024 POSTOP FOLLOW-UP VISIT: CPT

## 2025-05-22 RX ORDER — GABAPENTIN 300 MG/1
300 CAPSULE ORAL 3 TIMES DAILY PRN
Qty: 90 CAPSULE | Refills: 0 | Status: SHIPPED | OUTPATIENT
Start: 2025-05-22 | End: 2025-06-21

## 2025-05-22 NOTE — PROGRESS NOTES
[facility] Plastic and Reconstructive Surgery  [address], [address], [place] [address]  [phone]    Patient Identification: Jenny Pereyra is a 49 y.o. female     History of Present Illness: The patient is a 49 y.o.  year-old female  who presents to the office for a post-op visit. Patient is 55 days s/p Bilateral Tissue Expander Placement - Bilateral  on 3/28/2025 by Dr. Brito.    [place] CPX4 Plus, Tall Height 250cc, no intraoperative fill.    Patient is doing well at this time. Denies fevers, chills, signs of infection or pain. Reports feelings of nerve regeneration that causes discomfort during her work day.     Past Medical History:   Diagnosis Date    Anxiety     Brain lipoma 2007    Breast cancer (HCC)     Cancer (HCC) 10/10/24    COPD (chronic obstructive pulmonary disease) (HCC)     Fatigue 2022    GERD (gastroesophageal reflux disease)     Kidney stone     Motion sickness     PONV (postoperative nausea and vomiting)     Sleep difficulties 2020    Tremors of nervous system     essential        Review of Systems  Constitutional: Denies fevers, chills or pain.  Skin: Denies any warmth, erythema, edema or mucopurulent drainage.     Physical Exam    Breast: Surgical incisions are clean, dry, and intact. Skin perfusion is intact. There are no signs of infection, obvious hematoma, seroma or wound dehiscence. Tissue expanders in place, in tact.    Procedure: Bilateral Tissue Expander Fill  Under sterile conditions the bilateral tissue expanders were percutaneously accessed without difficulty.    50 ml of NS was injected into the right  50 ml of NS was injected into the left.  Patient tolerated procedure well.     A ''time out'' was initiated prior to procedure. Non-OR time Out:  Time out was initiated under direction and supervision of provider Emma Alejandro PA-C  Correct patient identity - Yes  Correct side and site - Yes  Procedure matches verbalized consent -Yes  Correct patient position -  Yes  Availability of correct implants and any special equipment or special requirements - Yes  Time out occurred prior to procedure start - Yes     Total TE volume:   Right: 200/250 ml   Left: 200/250 ml     Assessment and Plan:  The patient is an 49 y.o.  year-old female who presents to the office for a post-op visit. Patient is 55 days s/p Bilateral Tissue Expander Placement - Bilateral  on 3/28/2025 by Dr. Brito    -At today's visit bilateral tissue expanders were filled to 200/250cc.  -Continue wearing surgical compression bra at all times.  No submerging in water (pools, baths, hottubs, etc.) until 8 weeks post-op.  -May apply Aquaphor daily to incision sites.  -Prescribing gabapentin TID PRN for nerve pain  -The patient is to return in 1 week for TE fill, if patient feels skin is too tight, can reschedule for following week for TE fill.  -The patient is to call the office with any questions or concerns. All of the patient's questions were answered at this time and they agree with the plan of care.      Emma Alejandro PA-C  [facility] Plastic and Reconstructive Surgery

## 2025-05-26 DIAGNOSIS — G47.10 HYPERSOMNIA: ICD-10-CM

## 2025-05-27 NOTE — PROGRESS NOTES
Outpatient Oncology Nutrition Consultation   Type of Consult: Follow Up  Care Location: Telephone Call  Nutrition Assessment:   Oncology Diagnosis & Treatments: Breast Cancer  S/p bilateral mastectomy 11/12/24  S/p reconstructive surgery 3/28/25  Started Letrozole/Femara in December 2024, changed to Aromasin (started 4/18/25 per pt); Aromasin is currently on hold since May 2025 d/t wt gain    Oncology History   Malignant neoplasm of central portion of right breast in female, estrogen receptor positive (HCC)   10/8/2024 Biopsy    Right breast ultrasound-guided biopsy  3 o'clock, 4 cm from nipple (open coil)  Invasive breast carcinoma with ductal and lobular features  Grade 1  ER , [place] , HER2 0  Lymphovascular invasion not identified    Concordant. Unifocal / multifocal. SIZE. Concordant. Right malignancy appears unifocal; suspicious mass covers an area up to 6 mm. US right axilla negative. Left breast clear. Breast MRI should be considered given lobular features within the carcinoma and overall appearance of the breast parenchyma (scattered with borderline heterogeneously dense components).     10/11/2024 Genomic Testing    Reflex MammaPrint/BluePrint UltraLow Risk (Luminal A)     10/14/2024 -  Cancer Staged    Staging form: Breast, AJCC 8th Edition  - Clinical stage from 10/14/2024: Stage IA (cT1b, cN0, cM0, G1, ER+, [place]+, HER2-) - Signed by Cassandra Lynn Cardarelli, MD on 10/14/2024  Histopathologic type: Lobular carcinoma, NOS  Stage prefix: Initial diagnosis  Method of lymph node assessment: Clinical  Nuclear grade: G1  Histologic grading system: 3 grade system       10/16/2024 Genetic Testing    NEGATIVE: No Clinically Significant Variants Detected  Ambry - A total of 59 genes were evaluated with RNA insight: APC, SUKHI, BAP1, BARD1, BMPR1A, BRCA1, BRCA2, BRIP1, CDH1, CDK4, CDKN1B, CDKN2A, CHEK2, DICER1, FH, FLCN, KIF1B, MAX, MEN1, MET, MLH1, MSH2, MSH6, MUTYH, NF1, NTHL1, PALB2, PMS2, POT1, PTEN,  RAD51C, RAD51D, RB1, RET, SDHA, SDHAF2, SDHB, SDHC, SDHD, SMAD4, SMARCA4, STK11, YUBK078, TP53, TSC1, TSC2 and VHL (sequencing and deletion/duplication); AXIN2, CTNNA1, EGLN1, HOXB13, KIT, MITF, MSH3, PDGFRA, POLD1 and POLE (sequencing only); EPCAM and GREM1 (deletion/duplication only).     10/17/2024 Observation    Breast MRI IMPRESSION:  1.  Right breast 3:00 biopsy-proven invasive carcinoma measures up to 12 mm.  2.  Possible satellite lesion right breast 6:00 for which MRI guided biopsy is recommended if it would change clinical management.  3.  Otherwise, no MR evidence of contralateral left breast malignancy.  4.  No axillary or internal mammary adenopathy.     11/12/2024 Surgery    Right mastectomy with SLN biopsy (Dr. Cardarelli)  Invasive carcinoma with mixed ductal and lobular features  Grade 2  1.1 cm  Margins negative  0/5 Lymph nodes    Left simple mastectomy  Columnar cell change, usual ductal hyperplasia, apocrine metaplasia    Immediate reconstruction with tissue expanders (Dr. Brito)     11/20/2024 -  Cancer Staged    Staging form: Breast, AJCC 8th Edition  - Pathologic stage from 11/20/2024: Stage IA (pT1c, pN0, cM0, G2, ER+, [place]+, HER2-) - Signed by Cassandra Lynn Cardarelli, MD on 11/20/2024  Histopathologic type: Lobular carcinoma, NOS  Stage prefix: Initial diagnosis  Histologic grading system: 3 grade system  Laterality: Right  Lymph-vascular invasion (LVI): LVI not present (absent)/not identified       12/2024 -  Hormone Therapy    Letrozole 12/2024 - 4/2025; discontinued due to headaches and bone pain  Exemestane 4/2025 - 5/2025; ~HOLD~ RECOMMENDED due to weight gain and worsening bone pain  Dr. Xavier       Past Medical & Surgical Hx:   Patient Active Problem List   Diagnosis   • Lung nodule   • Umbilical hernia without obstruction and without gangrene   • Kidney stone on left side   • Essential tremor   • Insomnia   • Anxiety   • Gastroesophageal reflux disease without esophagitis   • B12  deficiency   • Brain lipoma   • Chronic interstitial cystitis   • Chronic migraine without aura   • Stage 1 mild COPD by GOLD classification (Formerly [facility])   • DDD (degenerative disc disease), lumbar   • Excessive daytime sleepiness   • Hypersomnia   • Other hyperlipidemia   • Gross hematuria   • Cubital tunnel syndrome, left   • Diverticulosis   • Other hemorrhoids   • Malignant neoplasm of central portion of right breast in female, estrogen receptor positive (HCC)   • History of penicillin allergy     Past Medical History:   Diagnosis Date   • Anxiety    • Brain lipoma 2007   • Breast cancer (HCC)    • Cancer (HCC) 10/10/24   • COPD (chronic obstructive pulmonary disease) (Formerly [facility])    • Fatigue 2022   • GERD (gastroesophageal reflux disease)    • Kidney stone    • Motion sickness    • PONV (postoperative nausea and vomiting)    • Sleep difficulties 2020   • Tremors of nervous system     essential     Past Surgical History:   Procedure Laterality Date   • ABDOMINAL WALL SURGERY Bilateral 12/18/2024    Procedure: CLOSURE WITH DRAIN PLACEMENT;  Surgeon: Karthik Brito MD;  [facility];  Service: Plastics   • ADENOIDECTOMY     • APPENDECTOMY     • BACK SURGERY     • BLADDER SURGERY     • BREAST BIOPSY Right 10/08/2024    300 4cmfn, open coil clip   • CARPAL TUNNEL RELEASE Right 05/31/2024   • COLONOSCOPY     • FL RETROGRADE PYELOGRAM  12/15/2023   • HYSTERECTOMY      age 29 still has lt ovary   • IR ASPIRATION ONLY  4/11/2025   • IR DRAINAGE TUBE PLACEMENT  12/16/2024   • IR RADIOFREQUENCY ABLATION      esophagus   • LAMINECTOMY      twin, lumbar   • LUMBAR FUSION      L5-s1   • ME BX/EXC LYMPH NODE OPEN SUPERFICIAL Right 11/12/2024    Procedure: RIGHT SENTINEL LYMPH NODE MAPPING INCLUDING BLUE DYE INJECTION AND RADIOCOLLOID INJECTION, SENTINEL LYMPH NODE BIOPSY (INJECT AT 0730 BY DR CARDARELLI IN THE OR);  Surgeon: Cassandra Lynn Cardarelli, MD;  [facility];  Service: Surgical Oncology   • ME CYSTO/URETERO  W/LITHOTRIPSY &INDWELL STENT INSRT Left 12/15/2023    Procedure: CYSTO USCOPE W/ LASER, & STENT;  Surgeon: Jhonatan Fleming MD;  [facility];  Service: Urology   • MT INJ RADIOACTIVE TRACER FOR ID OF SENTINEL NODE Right 11/12/2024    Procedure: RIGHT SENTINEL LYMPH NODE MAPPING INCLUDING BLUE DYE INJECTION AND RADIOCOLLOID INJECTION, SENTINEL LYMPH NODE BIOPSY (INJECT AT 0730 BY DR CARDARELLI IN THE OR);  Surgeon: Cassandra Lynn Cardarelli, MD;  [facility];  Service: Surgical Oncology   • MT INTRAOP SENTINEL LYMPH NODE ID W/DYE INJECTION Right 11/12/2024    Procedure: RIGHT SENTINEL LYMPH NODE MAPPING INCLUDING BLUE DYE INJECTION AND RADIOCOLLOID INJECTION, SENTINEL LYMPH NODE BIOPSY (INJECT AT 0730 BY DR CARDARELLI IN THE OR);  Surgeon: Cassandra Lynn Cardarelli, MD;  [facility];  Service: Surgical Oncology   • MT MASTECTOMY SIMPLE COMPLETE Bilateral 11/12/2024    Procedure: BILATERAL MASTECTOMY;  Surgeon: Cassandra Lynn Cardarelli, MD;  [facility];  Service: Surgical Oncology   • MT NEUROPLASTY &/TRANSPOS MEDIAN NRV CARPAL TUNNE Right 05/31/2024    Procedure: RELEASE CARPAL TUNNEL;  Surgeon: Dorothea Mayo MD;  [facility];  Service: Orthopedics   • MT NEUROPLASTY &/TRANSPOS MEDIAN NRV CARPAL TUNNE Left 07/12/2024    Procedure: RELEASE CARPAL TUNNEL;  Surgeon: Dorothea Mayo MD;  [facility];  Service: Orthopedics   • MT NEUROPLASTY &/TRANSPOSITION ULNAR NERVE ELBOW Right 05/31/2024    Procedure: RELEASE CUBITAL TUNNEL POSSIBLE TRANSPOSITION AND ADIPOSE FLAP;  Surgeon: Dorothea Mayo MD;  [facility];  Service: Orthopedics   • MT NEUROPLASTY &/TRANSPOSITION ULNAR NERVE ELBOW Left 07/12/2024    Procedure: RELEASE CUBITAL TUNNEL;  Surgeon: Dorothea Mayo MD;  [facility];  Service: Orthopedics   • MT REMOVAL TISSUE EXPANDER W/O INSERTION IMPLANT Bilateral 12/18/2024    Procedure: removal bilateral tissue expanders;   Surgeon: Karthik Brito MD;  [facility];  Service: Plastics   • MI TISSUE EXPANDER PLACEMENT BREAST RECONSTRUCTION Bilateral 11/12/2024    Procedure: BILATERAL IMMEDIATE BREAST RECONSTRUCTION WITH TISSUE EXPANDERS AND ADM;  Surgeon: Karthik Brito MD;  [facility];  Service: Plastics   • MI TISSUE EXPANDER PLACEMENT BREAST RECONSTRUCTION Bilateral 3/28/2025    Procedure: BILATERAL TISSUE EXPANDER PLACEMENT;  Surgeon: Karthik Brito MD;  [facility];  Service: Plastics   • SPINE SURGERY     • TOTAL VAGINAL HYSTERECTOMY      AGE 29   • TUBAL LIGATION     • UPPER GASTROINTESTINAL ENDOSCOPY     • US GUIDED BREAST BIOPSY RIGHT COMPLETE Right 10/08/2024   • WOUND DEBRIDEMENT Bilateral 12/18/2024    Procedure: DEBRIDEMENT WOUND (WASH OUT);  Surgeon: Karthik Brito MD;  [facility];  Service: Plastics     Review of Medications:   Vitamins, Supplements and Herbals: No, pt denies taking supplements    Current Outpatient Medications:   •  ACIDOPHILUS LACTOBACILLUS PO, Take 1 tablet by mouth daily, Disp: , Rfl:   •  Armodafinil 150 MG tablet, Take 1 tablet (150 mg total) by mouth daily, Disp: 30 tablet, Rfl: 2  •  cyclobenzaprine (FLEXERIL) 10 mg tablet, Take 1 tablet (10 mg total) by mouth 3 (three) times a day as needed for muscle spasms for up to 7 days, Disp: 21 tablet, Rfl: 0  •  escitalopram (Lexapro) 10 mg tablet, Take 1 tablet (10 mg total) by mouth daily (Patient taking differently: Take 10 mg by mouth daily with lunch), Disp: 90 tablet, Rfl: 1  •  exemestane (AROMASIN) 25 MG tablet, Take 1 tablet (25 mg total) by mouth daily, Disp: 90 tablet, Rfl: 3  •  fenofibrate (TRICOR) 145 mg tablet, Take 1 tablet (145 mg total) by mouth daily, Disp: 100 tablet, Rfl: 1  •  gabapentin (NEURONTIN) 300 mg capsule, Take 1 capsule (300 mg total) by mouth 3 (three) times a day as needed (nerve pain), Disp: 90 capsule, Rfl: 0  •  ondansetron (ZOFRAN) 4 mg tablet, Take 1 tablet (4 mg total) by  "mouth every 8 (eight) hours as needed for nausea or vomiting, Disp: 20 tablet, Rfl: 0  •  SUMAtriptan (Imitrex) 50 mg tablet, Take 1 tablet (50 mg total) by mouth once as needed for migraine for up to 1 dose, Disp: 9 tablet, Rfl: 0  •  tiotropium-olodaterol (Stiolto Respimat) 2.5-2.5 MCG/ACT inhaler, Inhale 2 puffs daily, Disp: 12 g, Rfl: 5    Most Recent Lab Results:   Lab Results   Component Value Date    WBC 10.26 (H) 03/15/2025    NEUTROABS 7.10 03/15/2025    IRON 86 04/06/2024    TIBC 426 04/06/2024    FERRITIN 70 04/06/2024    CHOLESTEROL 182 03/15/2025    TRIG 97 03/15/2025    HDL 83 03/15/2025    LDLCALC 80 03/15/2025    ALT 22 12/20/2024    AST 24 12/20/2024    ALB 3.5 12/20/2024    SODIUM 141 03/15/2025    SODIUM 140 12/20/2024    K 4.6 03/15/2025    K 3.8 12/20/2024     03/15/2025    BUN 19 03/15/2025    BUN 19 12/20/2024    CREATININE 0.81 03/15/2025    CREATININE 0.75 12/20/2024    EGFR 86 03/15/2025    TSH 2.90 12/11/2021    GLUF 82 03/15/2025    GLUF 77 10/14/2024    GLUC 113 12/20/2024    HGBA1C 5.5 04/06/2024    HGBA1C 5.3 03/29/2023    HGBA1C 5.5 12/11/2021    CALCIUM 10.1 03/15/2025    MG 2.0 01/25/2025     Anthropometric Measurements:   Height: 61\"  Ht Readings from Last 1 Encounters:   03/28/25 5' 2\" (1.575 m)     Wt Readings from Last 20 Encounters:   03/28/25 64.4 kg (142 lb)   03/24/25 65.7 kg (144 lb 12.8 oz)   12/24/24 65.4 kg (144 lb 3.2 oz)   12/15/24 65.8 kg (145 lb 1 oz)   12/02/24 64 kg (141 lb)   11/27/24 63.5 kg (140 lb)   11/12/24 63 kg (139 lb)   10/29/24 63 kg (139 lb)   10/28/24 63 kg (139 lb)   10/23/24 63 kg (139 lb)   10/22/24 64 kg (141 lb)   10/16/24 64 kg (141 lb)   10/14/24 64.9 kg (143 lb)   10/10/24 64.9 kg (143 lb)   10/08/24 63.5 kg (140 lb)   09/26/24 63.5 kg (140 lb)   07/29/24 63.5 kg (140 lb)   07/22/24 63.8 kg (140 lb 9.6 oz)   07/12/24 64.4 kg (142 lb)   06/24/24 65.4 kg (144 lb 3.2 oz)     Weight History:   Usual Weight: unsure, was 188# 2 yr ago  Home " Weights: (5/1/25) 148#, (6/6/25) 148#  Comments: wt has been fluctuating since COVID (unintended wt gain, followed by intentional loss, gained wt again, then lost).   Goal wt: 135-140#    Oncology Nutrition-Anthropometrics    Flowsheet Row Nutrition from 6/6/2025 in [facility] Nutrition from 5/2/2025 in [facility]   Patient [age] (years): 49 years 49 years   Patient (female) height (in): 61 in 61 in   Current Weight to be used for anthropometric calculations (lbs) 148 lbs 148 lbs   Current Weight to be used for anthropometric calculations (kg) 67.3 kg 67.3 kg   BMI: 28 28   IBW female: 105 lbs 105 lbs   IBW (kg) female: 47.7 kg 47.7 kg   IBW % (female) 141 % 141 %   Adjusted BW (female): 115.8 lbs 115.8 lbs   Adjusted BW kg (female): 52.6 kg 52.6 kg   % weight change after 1 month: 0 % --   Weight change after 1 month (lbs) 0 lbs --   % weight change after 6 months: 5 % 6.5 %   Weight change after 6 months (lbs) 7 lbs 9 lbs        Nutrition-Focused Physical Findings: n/a due to telephone call    Food/Nutrition-Related History & Client/Social History:    Current Nutrition Impact Symptoms:  [] Nausea  [] Reduced Appetite  [] Acid Reflux    [] Vomiting  [] Unintended Wt Loss - hx [] Malabsorption    [] Diarrhea  [x] Unintended Wt Gain - since starting Aromasin per pt, stable since last visit [] Dumping Syndrome    [x] Constipation - mild over the last week  -follows with GI for bowels [] Thick Mucous/Secretions  [] Abdominal Pain    [] Dysgeusia (Altered Taste)  [] Xerostomia (Dry Mouth)  [] Gas    [] Dysosmia (Altered Smell)  [] Thrush  [] Difficulty Chewing    [] Oral Mucositis (Sore Mouth)  [] Fatigue  [] Hyperglycemia   Lab Results   Component Value Date    HGBA1C 5.5 04/06/2024      [] Odynophagia  [] Esophagitis  [] Other:    [] Dysphagia  [] Early Satiety  [] No Problems Eating      Food Allergies & Intolerances: yes:  lactose intolerance (can tolerate certain cheeses and Greek yogurt, cannot have milk or ice cream)    Current Diet: Regular Diet, No Restrictions  States she doesn't like a lot of fruits and veggies d/t texture, likes bananas and grapes.  Willing to eat more F&V in smoothie form.  Nutrition Route: PO  Activity level: ADL's & walks the dog BID (~15-30 min per walk); planning to go to PT after surgery.    Usual Day Diet Recall: now eating more F&V, continues to measure portions and avoid sweets  Breakfast: protein shake and a banana  Snack:  c ector (measures portion)  Lunch: (limited time) leftovers from dinner or uncrustables sandwich; this week: hamburger casserole: beef, white rice, peppers, tomato sauce  Snack: carrots with ranch dip  Dinner: hamburger casserole or ham/mashed potatoes/corn/croissant roll or steak/potato/cauliflower  Snack: avoids eating after dinner, occasionally has a bowl of ice cream    Beverages: water (16.9 oz x5-6+), light [place] iced tea (a couple swallows after meals), protein shake (QD)  Supplements:   Fairlife Nutrition Plan Shake (11.5 oz, 150 kcal, 30 g protein) - QD  JBN protein powder mixed with and [employer] milk - none lately    Oncology Nutrition-Estimated Needs    Flowsheet Row Nutrition from 2025 in [facility] Nutrition from 10/31/2024 in [facility]   Weight type used Adjusted weight Adjusted weight   Weight in kilograms (kg) used for estimated needs 52.6 kg 53.3 kg   Energy needs formula:  25-30 kcal/kg 25-30 kcal/kg   Energy needs based on 25 kcal/k kcal 1333 kcal   Energy needs based on 30 kcal/k kcal 1600 kcal   Protein needs formula: 1-1.2 g/kg 1.2-1.5 g/kg   Protein needs based on 1 g/kg 53 g --   Protein needs based on 1.2 g/k g --   Protein needs based on 1.2 g/kg: -- 64 g   Protein needs based on 1.5 g/kg -- 80 g   Fluid needs formula: 25-30 mL/kg  30-35 mL/kg   Fluid needs based on 25 mL/kg 1325 mL --   Fluid needs in ounces 45 oz --   Fluid needs based on 30 mL/kg 1590 mL --   Fluid needs in ounces 54 oz --   Fluid needs based on 30 mL/kg -- 1596 mL   Fluid needs in ounces -- 54 oz   Fluid needs based on 35 mL/kg -- 1862 mL   Fluid needs in ounces -- 63 oz         Discussion & Intervention:   Jenny was evaluated today for an RD follow up regarding weight management and cancer-preventative eating.  Jenny is currently recovering from surgery for breast cancer.  She is doing well today.  Since last visit, her Aromasin is on hold d/t unintended wt gain.  Her wt is now stable but she would like to lose wt.  Her goal wt is to get back to ~135-140#.  She met her nutrition goals: increased F&V intake.  She is still planning to see PT to discuss an exercise plan.  She wants to work on being more consistent with her diet changes to support a healthy wt.   Reviewed 24 hour recall, which revealed an suboptimal po intake, and discussed ways to transition to a cancer preventative diet and balance energy intake to promote a healthy body weight.  Also reviewed the importance of wt management throughout the healing process and the role of a cancer preventative diet in managing wt and overall health.  Based on today's assessment, discussion included: MNT for: healing, unintended wt gain, weight management strategies, choosing a variety of colorful, plant-based foods to support a cancer preventative diet, adequate hydration & fluid choices, utilizing oral nutrition supplements, and balancing energy intake with physical activity.   Moving forward, Jenny was encouraged to transition to a cancer-preventative lifestyle.  Small and achievable goals were encouraged.  Materials Provided: not applicable  All questions and concerns addressed during today’s visit.  Jenny has RD contact information.    Nutrition Diagnosis:   Unintended Weight Gain related to medication side effects as  evidenced by pt reports of wt gain since starting aromasin.  Monitoring & Evaluation:   Goals:  weight loss of 1-2# per week  transition to a cancer preventative eating pattern    Progress Towards Goals: Progressing and New Goal(s) Added    Nutrition Rx & Recommendations:  Diet: High Protein, Low-Fat, Colorful, Plant Based Diet;  4-5 servings of vegetables. 2-3 servings of fruits per day.  choose high fiber carbohydrates: fruits, vegetables, beans, legumes, whole grains. Aim for >25 grams of fiber daily.  Avoid sugar-sweetened beverages. Water is the best choice. Regular Unsweetened Green Tea - >3 cups per day may be helpful. Stay hydrated by sipping fluids of choice/tolerance throughout the day.  Focus on healthy fats from whole foods: nuts, seeds, avocado, fish.    >8 oz fish per week.  Limit red meat, avoid processed meats.  Avoid greasy, fried foods.  Limit dining out.  Maintain a healthy weight with a BMI between 18.5-24.9.  Avoid weight gain if overweight.  Small, frequent meals/snacks may be easier to tolerate than 3 large daily meals.  Aim for 5-6 small meals per day (every 2-3 hours).  Include protein at all meals/snacks.  Include a variety of foods (as tolerated/allowed by diet).  Follow a Cancer Preventative Nutrition Pattern: colorful, plant-based, low-fat, avoid added sugars, limit alcohol, include high fiber foods, limit processed meats, limit red meat, choose lean protein sources, use low-fat cooking methods, balance calories with physical activity, avoid excessive weight gain throughout life.  Incorporate physical activity as able/allowed.  Aim to have a lean protein source at all meals and snacks  Examples: high protein cereal, oatmeal made with Fairlife milk/fruit/peanut butter, protein shake (JBN + milk), Greek yogurt with granola and fruit, egg whites (as tolerated), nuts (as tolerated), trail mix, etc.  Choose a lean (low-fat) protein source at each meal and snack: chicken, turkey, fish,  seafood, Greek yogurt, nuts, nut butter, beans, lentils, legumes, etc. (See Protein Dense Foods handout previously provided)  Increase fruit and vegetable intake - choose produce you enjoy, add flavor as needed    Personal goals:  Work on being consistent with eating plan; incorporate meal planning once per week  Choose lean (low-fat) protein sources - you could incorporate more beans and lentils  Fruits: work up to 2-3 servings per day  Vegetables: work up to 5 servings of non-starchy veggies each day  Continue to eat starchy veggies (corn, peas, potatoes), monitor portion sizes    Follow Up Plan: MNT or Weight Management as desired, try to attend an oncology nutrition class (the June class focuses on weight loss after cancer treatment)  Recommend Referral to Other Providers: none at this time

## 2025-05-28 ENCOUNTER — PATIENT OUTREACH (OUTPATIENT)
Dept: CASE MANAGEMENT | Facility: OTHER | Age: 50
End: 2025-05-28

## 2025-05-28 RX ORDER — ARMODAFINIL 150 MG/1
150 TABLET ORAL DAILY
Qty: 30 TABLET | Refills: 2 | Status: SHIPPED | OUTPATIENT
Start: 2025-05-28

## 2025-05-29 ENCOUNTER — OFFICE VISIT (OUTPATIENT)
Age: 50
End: 2025-05-29

## 2025-05-29 DIAGNOSIS — C50.111 MALIGNANT NEOPLASM OF CENTRAL PORTION OF RIGHT BREAST IN FEMALE, ESTROGEN RECEPTOR POSITIVE (HCC): ICD-10-CM

## 2025-05-29 DIAGNOSIS — Z98.890 S/P BREAST RECONSTRUCTION, BILATERAL: Primary | ICD-10-CM

## 2025-05-29 DIAGNOSIS — Z17.0 MALIGNANT NEOPLASM OF CENTRAL PORTION OF RIGHT BREAST IN FEMALE, ESTROGEN RECEPTOR POSITIVE (HCC): ICD-10-CM

## 2025-05-29 PROCEDURE — 99024 POSTOP FOLLOW-UP VISIT: CPT

## 2025-05-29 NOTE — PROGRESS NOTES
[facility]  [address], [address], [place], [place] [address] [phone]    Patient Identification: Jenny Pereyra is a 49 y.o. female     History of Present Illness: The patient is a 49 y.o.  year-old female  who presents to the office for a post-op visit. Patient is 62 days s/p Bilateral Tissue Expander Placement - Bilateral  on 3/28/2025 by Dr. Brito.    [place] CPX4 Plus, Tall Height 250cc, no intraoperative fill.    Patient is doing well at this time. Denies fevers, chills, signs of infection or pain. Reports feelings of nerve regeneration that causes discomfort during her work day, will take gabapentin PRN. States after last fill she took flexeril for relief.    Past Medical History:   Diagnosis Date    Anxiety     Brain lipoma 2007    Breast cancer (HCC)     Cancer (HCC) 10/10/24    COPD (chronic obstructive pulmonary disease) (HCC)     Fatigue 2022    GERD (gastroesophageal reflux disease)     Kidney stone     Motion sickness     PONV (postoperative nausea and vomiting)     Sleep difficulties 2020    Tremors of nervous system     essential        Review of Systems  Constitutional: Denies fevers, chills or pain.  Skin: Denies any warmth, erythema, edema or mucopurulent drainage.     Physical Exam    Breast: Surgical incisions are clean, dry, and intact. Skin perfusion is intact. There are no signs of infection, obvious hematoma, seroma or wound dehiscence. Tissue expanders in place, in tact.    Procedure: Bilateral Tissue Expander Fill  Under sterile conditions the bilateral tissue expanders were percutaneously accessed without difficulty.    50 ml of NS was injected into the right  50 ml of NS was injected into the left.  Patient tolerated procedure well.     A ''time out'' was initiated prior to procedure. Non-OR time Out:  Time out was initiated under direction and supervision of provider Emma Alejandro PA-C  Correct patient identity - Yes  Correct side and  site - Yes  Procedure matches verbalized consent -Yes  Correct patient position - Yes  Availability of correct implants and any special equipment or special requirements - Yes  Time out occurred prior to procedure start - Yes     Total TE volume:   Right: 250/250 ml   Left: 250/250 ml     Assessment and Plan:  The patient is an 49 y.o.  year-old female who presents to the office for a post-op visit. Patient is 62 days s/p Bilateral Tissue Expander Placement - Bilateral  on 3/28/2025 by Dr. Brito    -At today's visit bilateral tissue expanders were filled to 250/250cc. Dr. Brito was present and examined the patient.  -Continue wearing surgical compression bra at all times.  No submerging in water (pools, baths, hottubs, etc.) until 8 weeks post-op.  -May apply Aquaphor daily to incision sites.  -Continue gabapentin TID PRN for nerve pain  -The patient is to return in 2-4 weeks for pre-op visit with Dr. Brito, tissue expander to implant exchange.   -The patient is to call the office with any questions or concerns. All of the patient's questions were answered at this time and they agree with the plan of care.      Emma Alejandro PA-C  [facility]

## 2025-06-03 ENCOUNTER — PREP FOR PROCEDURE (OUTPATIENT)
Dept: PLASTIC SURGERY | Facility: CLINIC | Age: 50
End: 2025-06-03

## 2025-06-03 DIAGNOSIS — C50.111 MALIGNANT NEOPLASM OF CENTRAL PORTION OF RIGHT BREAST IN FEMALE, ESTROGEN RECEPTOR POSITIVE (HCC): ICD-10-CM

## 2025-06-03 DIAGNOSIS — Z17.0 MALIGNANT NEOPLASM OF CENTRAL PORTION OF RIGHT BREAST IN FEMALE, ESTROGEN RECEPTOR POSITIVE (HCC): ICD-10-CM

## 2025-06-03 DIAGNOSIS — Z98.890 S/P BREAST RECONSTRUCTION, BILATERAL: Primary | ICD-10-CM

## 2025-06-06 ENCOUNTER — NUTRITION (OUTPATIENT)
Dept: NUTRITION | Facility: CLINIC | Age: 50
End: 2025-06-06

## 2025-06-06 DIAGNOSIS — Z71.3 NUTRITIONAL COUNSELING: Primary | ICD-10-CM

## 2025-06-06 NOTE — PATIENT INSTRUCTIONS
Nutrition Rx & Recommendations:  Diet: High Protein, Low-Fat, Colorful, Plant Based Diet;  4-5 servings of vegetables. 2-3 servings of fruits per day.  choose high fiber carbohydrates: fruits, vegetables, beans, legumes, whole grains. Aim for >25 grams of fiber daily.  Avoid sugar-sweetened beverages. Water is the best choice. Regular Unsweetened Green Tea - >3 cups per day may be helpful. Stay hydrated by sipping fluids of choice/tolerance throughout the day.  Focus on healthy fats from whole foods: nuts, seeds, avocado, fish.    >8 oz fish per week.  Limit red meat, avoid processed meats.  Avoid greasy, fried foods.  Limit dining out.  Maintain a healthy weight with a BMI between 18.5-24.9.  Avoid weight gain if overweight.  Small, frequent meals/snacks may be easier to tolerate than 3 large daily meals.  Aim for 5-6 small meals per day (every 2-3 hours).  Include protein at all meals/snacks.  Include a variety of foods (as tolerated/allowed by diet).  Follow a Cancer Preventative Nutrition Pattern: colorful, plant-based, low-fat, avoid added sugars, limit alcohol, include high fiber foods, limit processed meats, limit red meat, choose lean protein sources, use low-fat cooking methods, balance calories with physical activity, avoid excessive weight gain throughout life.  Incorporate physical activity as able/allowed.  Aim to have a lean protein source at all meals and snacks  Examples: high protein cereal, oatmeal made with [employer]life milk/fruit/peanut butter, protein shake (JBN + milk), Greek yogurt with granola and fruit, egg whites (as tolerated), nuts (as tolerated), trail mix, etc.  Choose a lean (low-fat) protein source at each meal and snack: chicken, turkey, fish, seafood, Greek yogurt, nuts, nut butter, beans, lentils, legumes, etc. (See Protein Dense Foods handout previously provided)  Increase fruit and vegetable intake - choose produce you enjoy, add flavor as needed    Personal goals:  Work on being  consistent with eating plan; incorporate meal planning once per week  Choose lean (low-fat) protein sources - you could incorporate more beans and lentils  Fruits: work up to 2-3 servings per day  Vegetables: work up to 5 servings of non-starchy veggies each day  Continue to eat starchy veggies (corn, peas, potatoes), monitor portion sizes    Follow Up Plan: MNT or Weight Management as desired, try to attend an oncology nutrition class (the June class focuses on weight loss after cancer treatment)  Recommend Referral to Other Providers: none at this time

## 2025-06-09 ENCOUNTER — TELEPHONE (OUTPATIENT)
Age: 50
End: 2025-06-09

## 2025-06-09 DIAGNOSIS — C50.111 MALIGNANT NEOPLASM OF CENTRAL PORTION OF RIGHT BREAST IN FEMALE, ESTROGEN RECEPTOR POSITIVE (HCC): ICD-10-CM

## 2025-06-09 DIAGNOSIS — Z17.0 MALIGNANT NEOPLASM OF CENTRAL PORTION OF RIGHT BREAST IN FEMALE, ESTROGEN RECEPTOR POSITIVE (HCC): ICD-10-CM

## 2025-06-09 RX ORDER — CYCLOBENZAPRINE HCL 10 MG
10 TABLET ORAL 3 TIMES DAILY PRN
Qty: 21 TABLET | Refills: 0 | Status: SHIPPED | OUTPATIENT
Start: 2025-06-09 | End: 2025-06-16

## 2025-06-10 NOTE — PROGRESS NOTES
Name: Jenny Pereyra      : 1975      MRN: [id number]  Encounter Provider: Rafi Collins MD  Encounter Date: 2025   Encounter department: [facility] [employer]    Chief Complaint   Patient presents with   • Follow-up     :  Assessment & Plan  Malignant neoplasm of central portion of right breast in female, estrogen receptor positive (HCC)  2024 status post left mastectomy with margin excision concurrent with right breast mastectomy, sentinel lymph node excision and review with margin resections  Diagnosis: Invasive ductal carcinoma right breast-grade 2-as pT1 cpN0 with ER/SC positive, HER2/keyona negative tumor phenotype  Luminal A biology  Orders:  •  CBC and differential; Standing  •  Comprehensive metabolic panel; Standing  •  Magnesium; Standing    Osteopenia, unspecified location  Bone health education provided and materials exchange  Orders:  •  Vitamin D 25 hydroxy; Standing    H/O: hysterectomy  History of hysterectomy with unilateral oophorectomy due to dysfunctional bleeding at age 29 years  Right ovary remains intact  Orders:  •  FSH and LH; Future  •  Estradiol; Future          Clinical/Pathologic Stage  Cancer Staging   Malignant neoplasm of central portion of right breast in female, estrogen receptor positive (HCC)  Staging form: Breast, AJCC 8th Edition  - Clinical stage from 10/14/2024: Stage IA (cT1b, cN0, cM0, G1, ER+, SC+, HER2-) - Signed by Cassandra Lynn Cardarelli, MD on 10/14/2024  Histopathologic type: Lobular carcinoma, NOS  Stage prefix: Initial diagnosis  Method of lymph node assessment: Clinical  Nuclear grade: G1  Histologic grading system: 3 grade system  - Pathologic stage from 2024: Stage IA (pT1c, pN0, cM0, G2, ER+, SC+, HER2-) - Signed by Cassandra Lynn Cardarelli, MD on 2024  Histopathologic type: Lobular carcinoma, NOS  Stage prefix: Initial diagnosis  Histologic grading system: 3 grade system  Laterality:  Right  Lymph-vascular invasion (LVI): LVI not present (absent)/not identified      Current Therapy  Exemestane 25 mg/day from April 2025    Discussion  This 49-year-old patient reports with her  in support for ongoing medical oncology review and care planning.  The patient is status post bilateral mastectomy surgery from March 28, 2025 around her diagnosis of clinical pathologic stage IA invasive lobular carcinoma of the right breast.      The patient carries clinical low risk and biologic low risk tumor profiling.    The patient was started on oral aromatase inhibitor therapy with the agent letrozole and quickly noted complaints strongly of headaches.  I have encouraged her o continue the trial of oral exemestane.    I spent considerable time reviewing her diagnosis of early-stage node-negative receptor positive breast cancer.  I reviewed curative intent decision making and care planning.  Copious educational materials were provided including a handwritten discussion sheet summarizing all elements of my review on today.    The patient and her  expressed understanding, clarity and gratitude.    The patient will continue close follow-up with medical oncology and breast surgical oncology.          Special Studies  October 11, 2024 MammaPrint  Luminal a-ultralow risk    October 16,2024  germline genetic testing  Negative                GENETIC ANALYSIS OVERALL INTERPRETATION   Date Value Ref Range Status   10/16/2024   Final    NEGATIVE: No Clinically Significant Variants Detected   10/16/2024   Final    NEGATIVE: No Clinically Significant Variants Detected     INTERPRETATION   Date Value Ref Range Status   10/16/2024 See Notes  Final     Comment:     No pathogenic mutations, variants of unknown significance, or gross deletions or duplications were detected.  Risk Estimate: low likelihood of variants in the genes analyzed contributing to this individual's clinical history.  Genetic counseling is a  recommended option for all individuals undergoing genetic testing.  Genes Analyzed (13 total): SUKHI, BARD1, BRCA1, BRCA2, CDH1, CHEK2, NF1, PALB2, PTEN, RAD51C, RAD51D, STK11 and TP53 (sequencing and deletion/duplication).     10/16/2024 See Notes  Final     Comment:     No pathogenic mutations, variants of unknown significance, or gross deletions or duplications were detected.  Risk Estimate: low likelihood of variants in the genes analyzed contributing to this individual's clinical history.  Genetic counseling is a recommended option for all individuals undergoing genetic testing.  Genes Analyzed (59 total): APC, SUKHI, BAP1, BARD1, BMPR1A, BRCA1, BRCA2, BRIP1, CDH1, CDK4, CDKN1B, CDKN2A, CHEK2, DICER1, FH, FLCN, KIF1B, MAX, MEN1, MET, MLH1, MSH2, MSH6, MUTYH, NF1, NTHL1, PALB2, PMS2, POT1, PTEN, RAD51C, RAD51D, RB1, RET, SDHA,   SDHAF2, SDHB, SDHC, SDHD, SMAD4, SMARCA4, STK11, NRPO981, TP53, TSC1, TSC2 and VHL (sequencing and deletion/duplication); AXIN2, CTNNA1, EGLN1, HOXB13, KIT, MITF, MSH3, PDGFRA, POLD1 and POLE (sequencing only); EPCAM and GREM1 (deletion/duplication   only). RNA data is routinely analyzed for use in variant interpretation for all genes.       GENES NOT REPORTED   Date Value Ref Range Status   10/16/2024   Final    SUKHI,BARD1,CDH1,CHEK2,PALB2,PTEN,RAD51C,RAD51D,STK11,TP53,NF1,BRCA1,BRCA2   10/16/2024   Final    APC,SUKHI,AXIN2,BAP1,BARD1,BMPR1A,BRCA1,BRCA2,BRIP1,CDH1,CDK4,CDKN1B,CDKN2A,CHEK2,CTNNA1,DICER1,EGLN1,EPCAM,FH,FLCN,GREM1,HOXB13,KIF1B,KIT,MAX,MEN1,MET,MITF,MLH1,MSH2,MSH3,MSH6,MUTYH,NF1,NTHL1,PALB2,PDGFRA,PMS2,POLD1,POLE,POT1,PTEN,RAD51C,RAD51D,RB1,RET,SD  HA,SDHAF2,SDHB,SDHC,SDHD,SMAD4,SMARCA4,STK11,DIUY399,TP53,TSC1,TSC2,VHL           History of Present Illness     This 49-year-old postmenopausal female reports for ongoing medical oncology review and adjuvant therapeutic care.    The patient is  1 para 1 and post hysterectomy with unilateral right oophorectomy from age 29 due to  heavy bleeding.    The patient's journey with breast cancer began in the setting of an abnormal screening mammogram from September 26, 2024    Her breast cancer history briefly reviewed    On November 12, 2024 the patient completed bilateral mastectomy surgery with a focus on right breast mastectomy with sentinel lymph node reviews and margin excisions .please see surgical pathology number  [id number] for confirmation and review.  Final pathology confirms the presence of usual ductal hyperplasia and other benign changes in the left breast without evidence of preinvasive carcinoma or invasive carcinoma.  Right breast demonstrates invasive carcinoma with mixed ductal and lobular elements with grade 2 designation as a single focus of carcinoma. Tumor found  in association with atypical lobular hyperplasia.  5 regional lymph nodes were reviewed and negative for metastatic involvement.  Margins were clear.      Final staging pT1cN0  As previously charted estrogen receptor +%, progesterone receptor +91% - 100% and HER2/keyona -0% by IHC.    The patient was initially started on oral letrozole in the postop first consultative timeframe.  She developed headaches and was transition to a second line aromatase inhibitor agent exemestane.    The patient has been initiated on oral exemestane 25 mg/day from April 2025    Pertinent History from December 2024    Complains that use of letrozole produced headaches  Complains of chest wall discomfort postsurgery    Cancer Screening and Prevention  Mammography: 09/26/2024   GI/Colonoscopy: 08/06/2024   GYN: Not on file   Bone Density: 04/22/2025   Covid 19 Vaccination Status: Yes    Oncology Therapeutic Summary:        November 12, 2024 [id number] status post left breast mastectomy with margin resection and right breast mastectomy with sentinel lymph node review and margin excision    Oncology History   Cancer Staging   Malignant neoplasm of central portion of right breast in  female, estrogen receptor positive (HCC)  Staging form: Breast, AJCC 8th Edition  - Clinical stage from 10/14/2024: Stage IA (cT1b, cN0, cM0, G1, ER+, MA+, HER2-) - Signed by Cassandra Lynn Cardarelli, MD on 10/14/2024  Histopathologic type: Lobular carcinoma, NOS  Stage prefix: Initial diagnosis  Method of lymph node assessment: Clinical  Nuclear grade: G1  Histologic grading system: 3 grade system  - Pathologic stage from 11/20/2024: Stage IA (pT1c, pN0, cM0, G2, ER+, MA+, HER2-) - Signed by Cassandra Lynn Cardarelli, MD on 11/20/2024  Histopathologic type: Lobular carcinoma, NOS  Stage prefix: Initial diagnosis  Histologic grading system: 3 grade system  Laterality: Right  Lymph-vascular invasion (LVI): LVI not present (absent)/not identified  Oncology History   Malignant neoplasm of central portion of right breast in female, estrogen receptor positive (HCC)   10/8/2024 Biopsy    Right breast ultrasound-guided biopsy  3 o'clock, 4 cm from nipple (open coil)  Invasive breast carcinoma with ductal and lobular features  Grade 1  ER , MA , HER2 0  Lymphovascular invasion not identified    Concordant. Unifocal / multifocal. SIZE. Concordant. Right malignancy appears unifocal; suspicious mass covers an area up to 6 mm. US right axilla negative. Left breast clear. Breast MRI should be considered given lobular features within the carcinoma and overall appearance of the breast parenchyma (scattered with borderline heterogeneously dense components).     10/11/2024 Genomic Testing    Reflex MammaPrint/BluePrint UltraLow Risk (Luminal A)     10/14/2024 -  Cancer Staged    Staging form: Breast, AJCC 8th Edition  - Clinical stage from 10/14/2024: Stage IA (cT1b, cN0, cM0, G1, ER+, MA+, HER2-) - Signed by Cassandra Lynn Cardarelli, MD on 10/14/2024  Histopathologic type: Lobular carcinoma, NOS  Stage prefix: Initial diagnosis  Method of lymph node assessment: Clinical  Nuclear grade: G1  Histologic grading system: 3  grade system       10/16/2024 Genetic Testing    NEGATIVE: No Clinically Significant Variants Detected  [place] - A total of 59 genes were evaluated with RNA insight: APC, SUKHI, BAP1, BARD1, BMPR1A, BRCA1, BRCA2, BRIP1, CDH1, CDK4, CDKN1B, CDKN2A, CHEK2, DICER1, FH, FLCN, KIF1B, MAX, MEN1, MET, MLH1, MSH2, MSH6, MUTYH, NF1, NTHL1, PALB2, PMS2, POT1, PTEN, RAD51C, RAD51D, RB1, RET, SDHA, SDHAF2, SDHB, SDHC, SDHD, SMAD4, SMARCA4, STK11, EFON006, TP53, TSC1, TSC2 and VHL (sequencing and deletion/duplication); AXIN2, CTNNA1, EGLN1, HOXB13, KIT, MITF, MSH3, PDGFRA, POLD1 and POLE (sequencing only); EPCAM and GREM1 (deletion/duplication only).     10/17/2024 Observation    Breast MRI IMPRESSION:  1.  Right breast 3:00 biopsy-proven invasive carcinoma measures up to 12 mm.  2.  Possible satellite lesion right breast 6:00 for which MRI guided biopsy is recommended if it would change clinical management.  3.  Otherwise, no MR evidence of contralateral left breast malignancy.  4.  No axillary or internal mammary adenopathy.     11/12/2024 Surgery    Right mastectomy with SLN biopsy (Dr. Cardarelli)  Invasive carcinoma with mixed ductal and lobular features  Grade 2  1.1 cm  Margins negative  0/5 Lymph nodes    Left simple mastectomy  Columnar cell change, usual ductal hyperplasia, apocrine metaplasia    Immediate reconstruction with tissue expanders (Dr. Brito)     11/20/2024 -  Cancer Staged    Staging form: Breast, AJCC 8th Edition  - Pathologic stage from 11/20/2024: Stage IA (pT1c, pN0, cM0, G2, ER+, FL+, HER2-) - Signed by Cassandra Lynn Cardarelli, MD on 11/20/2024  Histopathologic type: Lobular carcinoma, NOS  Stage prefix: Initial diagnosis  Histologic grading system: 3 grade system  Laterality: Right  Lymph-vascular invasion (LVI): LVI not present (absent)/not identified       12/2024 -  Hormone Therapy    Letrozole 12/2024 - 4/2025; discontinued due to headaches and bone pain  Exemestane 4/2025 - 5/2025; ~HOLD~  "RECOMMENDED due to weight gain and worsening bone pain  Dr. Xavier        Review of Systems  Medical History Reviewed by provider this encounter:     .    Medications Ordered Prior to Encounter[1]   Social History[2]      Objective   /74 (BP Location: Left arm, Patient Position: Sitting, Cuff Size: Adult)   Pulse 86   Temp 98.4 °F (36.9 °C) (Temporal)   Resp 18   Ht 5' 2\" (1.575 m)   Wt 66.2 kg (146 lb)   SpO2 94%   BMI 26.70 kg/m²     Pain Screening:  Pain Score: 0-No pain  ECOG   0  Physical Exam  Constitutional:       General: She is not in acute distress.     Appearance: She is normal weight. She is not ill-appearing, toxic-appearing or diaphoretic.   HENT:      Head: Normocephalic.   Neck:      Thyroid: No thyroid mass, thyromegaly or thyroid tenderness.      Trachea: Trachea normal.     Cardiovascular:      Rate and Rhythm: Normal rate and regular rhythm.      Pulses: Normal pulses.      Heart sounds: Murmur heard.      Systolic murmur is present with a grade of 2/6.      No diastolic murmur is present.      No friction rub. No gallop. No S3 or S4 sounds.   Pulmonary:      Effort: Pulmonary effort is normal.      Breath sounds: Normal breath sounds. No decreased air movement or transmitted upper airway sounds. No decreased breath sounds, wheezing, rhonchi or rales.   Chest:      Chest wall: No mass or tenderness. There is no dullness to percussion.   Breasts:     Right: Absent.      Left: Absent.   Abdominal:      General: Abdomen is flat. Bowel sounds are normal.      Palpations: Abdomen is soft. There is no hepatomegaly, splenomegaly or mass.      Tenderness: There is no abdominal tenderness. There is no right CVA tenderness or left CVA tenderness.     Musculoskeletal:      Cervical back: Normal range of motion. No tenderness or bony tenderness.      Thoracic back: No tenderness or bony tenderness.      Lumbar back: No tenderness or bony tenderness.      Right lower leg: No edema.      Left " lower leg: No edema.   Lymphadenopathy:      Cervical: No cervical adenopathy.      Right cervical: No superficial, deep or posterior cervical adenopathy.     Left cervical: No superficial, deep or posterior cervical adenopathy.      Upper Body:      Right upper body: No supraclavicular, axillary or pectoral adenopathy.      Left upper body: No supraclavicular, axillary or pectoral adenopathy.     Neurological:      Mental Status: She is alert.         Labs: I have reviewed the following labs:  Lab Results   Component Value Date/Time    WBC 10.26 (H) 03/15/2025 07:53 AM    RBC 5.03 03/15/2025 07:53 AM    Hemoglobin 14.7 03/15/2025 07:53 AM    Hematocrit 46.1 03/15/2025 07:53 AM    MCV 92 03/15/2025 07:53 AM    MCH 29.2 03/15/2025 07:53 AM    RDW 12.4 03/15/2025 07:53 AM    Platelets 314 03/15/2025 07:53 AM    Segmented % 69 03/15/2025 07:53 AM    Lymphocytes % 23 03/15/2025 07:53 AM    Monocytes % 5 03/15/2025 07:53 AM    Eosinophils Relative 2 03/15/2025 07:53 AM    Basophils Relative 1 03/15/2025 07:53 AM    Immature Grans % 0 03/15/2025 07:53 AM    Absolute Neutrophils 7.10 03/15/2025 07:53 AM     Lab Results   Component Value Date/Time    Potassium 4.6 03/15/2025 07:53 AM    Chloride 106 03/15/2025 07:53 AM    CO2 28 03/15/2025 07:53 AM    BUN 19 03/15/2025 07:53 AM    Creatinine 0.81 03/15/2025 07:53 AM    Glucose, Fasting 82 03/15/2025 07:53 AM    Calcium 10.1 03/15/2025 07:53 AM    AST 24 12/20/2024 04:46 AM    ALT 22 12/20/2024 04:46 AM    Alkaline Phosphatase 42 12/20/2024 04:46 AM    Total Protein 5.3 (L) 12/20/2024 04:46 AM    Albumin 3.5 12/20/2024 04:46 AM    Total Bilirubin 0.25 12/20/2024 04:46 AM    eGFR 86 03/15/2025 07:53 AM         Pathology:     November 12, 2024 S status post left breast mastectomy and right breast mastectomy with right axillary sentinel lymph node reviews and margin resections  Final Diagnosis   A. Left breast, mastectomy:  - Columnar cell change. Please see note 1.   - Usual  ductal hyperplasia.   - Apocrine metaplasia.   - Unremarkable skin.  - Calcifications associated with apocrine metaplasia, columnar cell change and breast tissue with no significant pathologic abnormality.        B.Left breast lateral margin; excision:  - Benign breast tissue.      C. Right breast, mastectomy:  - Invasive carcinoma with mixed ductal and lobular features, modified Helton-Shaw grade II, (tubules=3, nuclear pleomorphism=2, mitoses=1), measuring up to 1.1 cm.   - All margins are negative for carcinoma.  - Atypical lobular hyperplasia (e-cadherin and p120 confirmatory).   - Biopsy site changes.   - Cluster of apocrine cysts.   - Unremarkable skin.   - Coil clip is identified.    - Calcifications associated with breast tissue with no significant pathologic abnormality.       D. Right axillary sentinel lymph nodes, excision:  - Five lymph nodes, negative for metastatic carcinoma (0/5).   - Cytokeratin immunostain, performed on D1-5 is confirmatory.       E. Right breast, anterior medial margin; excision:  - Benign breast tissue.       F. Right breast anterior lateral margin; excision:  - Benign breast tissue.     Synoptic Checklist   INVASIVE CARCINOMA OF THE BREAST: Resection   8th Edition - Protocol posted: 6/19/2024INVASIVE CARCINOMA OF THE BREAST: RESECTION - C, D, F  SPECIMEN   Procedure  Total mastectomy   Specimen Laterality  Right   TUMOR   Histologic Type  Invasive carcinoma with mixed ductal and lobular features   Histologic Grade (Rogelio Histologic Score)     Glandular (Acinar) / Tubular Differentiation  Score 3   Nuclear Pleomorphism  Score 2   Mitotic Rate  Score 1   Overall Grade  Grade 2 (scores of 6 or 7)   Tumor Size  Greatest dimension of largest invasive focus (Millimeters): 11 mm   Tumor Focality  Single focus of invasive carcinoma   Ductal Carcinoma In Situ (DCIS)  Not identified   Lymphatic and / or Vascular Invasion  Not identified   Dermal Lymphatic and / or Vascular  Invasion  Not identified   Microcalcifications  Present in non-neoplastic tissue   Treatment Effect in the Breast  No known presurgical therapy   MARGINS   Margin Status for Invasive Carcinoma  All margins negative for invasive carcinoma   Distance from Invasive Carcinoma to Closest Margin  15 mm   Closest Margin(s) to Invasive Carcinoma  Posterior   REGIONAL LYMPH NODES   Regional Lymph Node Status  All regional lymph nodes negative for tumor   Total Number of Lymph Nodes Examined (sentinel and non-sentinel)  5   Number of [place] Nodes Examined  5   pTNM CLASSIFICATION (AJCC 8th Edition)   Reporting of pT, pN, and (when applicable) pM categories is based on information available to the pathologist at the time the report is issued. As per the AJCC (Chapter 1, 8th Ed.) it is the managing physician's responsibility to establish the final pathologic stage based upon all pertinent information, including but potentially not limited to this pathology report.   pT Category  pT1c   pN Category  pN0   N Suffix  (sn)   SPECIAL STUDIES        Estrogen Receptor (ER) Status  Positive (greater than 10% of cells demonstrate nuclear positivity)   Percentage of Cells with Nuclear Positivity  %        Progesterone Receptor (PgR) Status  Positive   Percentage of Cells with Nuclear Positivity  %        HER2 (by immunohistochemistry)  Negative (Score 0)   Testing Performed on Case Number  [id number]          October 8, 2024 S 24 status post ultrasound-guided biopsy right breast 3 o'clock position    Final Diagnosis   A. Breast, Right, US BX Rt Breast 300 4cmfn, 4 passes 14g Marquee:  - Invasive breast carcinoma with ductal and lobular features.   * Rogelio grade 1 of 3 (total score: 5 of 9)    -- tubule formation < 10%, score 3    -- nuclear grade 1 of 3, score 1    -- mitoses < 3/mm2, (</= 7 mitoses/10HPF), score 1.   * Confirmed by tumor cell immunophenotype:    -- positive: nuclear stain for GATA3 and membranous  stain for E-cadherin and p120.    -- negative:  p63, calponin-B.     * Invasive carcinoma involves 4 of 4 submitted core biopsies, max. dimension = 7 millimeters.   * Estrogen, progesterone & HER2 receptor studies pending, to be described in a separate receptor report.    * Ductal carcinoma in situ (DCIS): Not identified;    * Lymph-vascular invasion: Not identified.    * Microcalcifications: Absent.         Synoptic Checklist   Breast Biomarker Reporting Template   Protocol posted: 12/13/2023(Added in Addendum) BREAST BIOMARKER REPORTING TEMPLATE - A  Test(s) Performed     Estrogen Receptor (ER) Status  Positive (greater than 10% of cells demonstrate nuclear positivity)   Percentage of Cells with Nuclear Positivity  %   Average Intensity of Staining  Strong   Test Type  Food and Drug Administration (FDA) cleared (test / vendor): CONFIRM anti-Estrogen Receptor (ER)/[place] [employer]   Primary Antibody  SP1   Test(s) Performed     Progesterone Receptor (PgR) Status  Positive   Percentage of Cells with Nuclear Positivity  %   Average Intensity of Staining  Strong   Test Type  Food and Drug Administration (FDA) cleared (test / vendor): CONFIRM anti-Progesterone Receptor ([place])/[employer]   Primary Antibody  1E2   Test(s) Performed     HER2 by Immunohistochemistry  Negative (Score 0)   Test Type  Food and Drug Administration (FDA) cleared (test / vendor): [employer]AY anti-HER-2/keyona (4B5)/[place] [employer]   Primary Antibody  4B5              Imaging:         April 22, 2025 bone density DEXA scan  Impression  Impression  1. Low bone mass (osteopenia).    2.  The 10 year risk of hip fracture is 0.4% with the 10 year risk of major osteoporotic fracture being 4.0% as calculated by the [employer] fracture risk assessment tool (FRAX, which is based on data generated by the WHO Collaborating  [place] for Metabolic Bone Diseases).    3.  The current Bone Health and Osteoporosis Foundation (BHOF) guidelines  recommend treating patients with a T-score of -2.5 or less in the lumbar spine or hips, or in post-menopausal women and men over the age of 50 with low bone mass (osteopenia;  T-score between -1.0 and -2.5) and a FRAX 10 year risk score of > 3% for hip fracture and/or > 20% for major osteoporosis-related fracture (clinical vertebral, hip, forearm, or proximal humerus).    February 12, 2025 CT scan of the chest without contrast  IMPRESSION:     1. Stable pulmonary nodules measuring up to 4 mm.  2. Interval removal of bilateral chest tissue expanders.     October 14, 2024 CT chest abdomen pelvis with contrast  FINDINGS:     CHEST     LUNGS: Mild emphysema. There is a 4 mm right lower lobe subpleural solid nodule (series 4 image 83), a 4 mm right lower lobe subpleural solid nodule (series 4 image 75), 2 mm right middle lobe perifissural nodule (series 4 image 78) and a 4 mm left lower   lobe subpleural solid nodule (series 4 image 68), unchanged since 1/26/2023. No new or suspicious nodules. No tracheal or endobronchial lesion.     PLEURA: Unremarkable.     HEART/GREAT VESSELS: Heart is unremarkable for patient's age. No thoracic aortic aneurysm.     MEDIASTINUM AND KERRIE: Unremarkable.     CHEST WALL AND LOWER NECK: Unremarkable.     ABDOMEN     LIVER/BILIARY TREE: Unremarkable.     GALLBLADDER: No calcified gallstones. No pericholecystic inflammatory change.     SPLEEN: Unremarkable.     PANCREAS: Unremarkable.     ADRENAL GLANDS: Unremarkable.     KIDNEYS/URETERS: Nonobstructing bilateral renal calculi. No hydronephrosis.     STOMACH AND BOWEL: Colonic diverticulosis without findings of acute diverticulitis.     APPENDIX: Surgically absent.     ABDOMINOPELVIC CAVITY: No ascites. No pneumoperitoneum. No lymphadenopathy.     VESSELS: Unremarkable for patient's age.     PELVIS     REPRODUCTIVE ORGANS: Post hysterectomy.     URINARY BLADDER: Unremarkable.     ABDOMINAL WALL/INGUINAL REGIONS: Unremarkable.     BONES:  Posterior fusion at L5-S1.     IMPRESSION:     Scattered pulmonary nodules measuring up to 4 mm, stable since 1/26/2023, likely benign. Follow-up chest CT in January or February 2025 can be obtained to confirm 2-year stability if clinically warranted.     Otherwise, no evidence of metastatic disease in the chest, abdomen or pelvis.       Workstation performed: [id number]                 [1]  Current Outpatient Medications on File Prior to Visit   Medication Sig Dispense Refill   • ACIDOPHILUS LACTOBACILLUS PO Take 1 tablet by mouth daily     • Armodafinil 150 MG tablet Take 1 tablet (150 mg total) by mouth daily 30 tablet 2   • cyclobenzaprine (FLEXERIL) 10 mg tablet Take 1 tablet (10 mg total) by mouth 3 (three) times a day as needed for muscle spasms for up to 7 days 21 tablet 0   • escitalopram (Lexapro) 10 mg tablet Take 1 tablet (10 mg total) by mouth daily (Patient taking differently: Take 10 mg by mouth daily with lunch) 90 tablet 1   • exemestane (AROMASIN) 25 MG tablet Take 1 tablet (25 mg total) by mouth daily 90 tablet 3   • fenofibrate (TRICOR) 145 mg tablet Take 1 tablet (145 mg total) by mouth daily 100 tablet 1   • gabapentin (NEURONTIN) 300 mg capsule Take 1 capsule (300 mg total) by mouth 3 (three) times a day as needed (nerve pain) 90 capsule 0   • ondansetron (ZOFRAN) 4 mg tablet Take 1 tablet (4 mg total) by mouth every 8 (eight) hours as needed for nausea or vomiting 20 tablet 0   • SUMAtriptan (Imitrex) 50 mg tablet Take 1 tablet (50 mg total) by mouth once as needed for migraine for up to 1 dose 9 tablet 0   • tiotropium-olodaterol (Stiolto Respimat) 2.5-2.5 MCG/ACT inhaler Inhale 2 puffs daily 12 g 5     No current facility-administered medications on file prior to visit.   [2]  Social History  Tobacco Use   • Smoking status: Former     Current packs/day: 0.00     Average packs/day: 0.5 packs/day for 25.0 years (12.5 ttl pk-yrs)     Types: Cigarettes     Start date: 1997     Quit date: 2022      Years since quitting: 3.4   • Smokeless tobacco: Never   Vaping Use   • Vaping status: Never Used   Substance and Sexual Activity   • Alcohol use: Yes     Comment: I may drink once a month if that   • Drug use: Never   • Sexual activity: Yes

## 2025-06-10 NOTE — ASSESSMENT & PLAN NOTE
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

## 2025-06-11 ENCOUNTER — OFFICE VISIT (OUTPATIENT)
Dept: HEMATOLOGY ONCOLOGY | Facility: CLINIC | Age: 50
End: 2025-06-11
Payer: COMMERCIAL

## 2025-06-11 VITALS
DIASTOLIC BLOOD PRESSURE: 74 MMHG | HEART RATE: 86 BPM | BODY MASS INDEX: 26.87 KG/M2 | OXYGEN SATURATION: 94 % | WEIGHT: 146 LBS | HEIGHT: 62 IN | SYSTOLIC BLOOD PRESSURE: 122 MMHG | TEMPERATURE: 98.4 F | RESPIRATION RATE: 18 BRPM

## 2025-06-11 DIAGNOSIS — C50.111 MALIGNANT NEOPLASM OF CENTRAL PORTION OF RIGHT BREAST IN FEMALE, ESTROGEN RECEPTOR POSITIVE (HCC): Primary | ICD-10-CM

## 2025-06-11 DIAGNOSIS — M85.80 OSTEOPENIA, UNSPECIFIED LOCATION: ICD-10-CM

## 2025-06-11 DIAGNOSIS — Z90.710 H/O: HYSTERECTOMY: ICD-10-CM

## 2025-06-11 DIAGNOSIS — Z17.0 MALIGNANT NEOPLASM OF CENTRAL PORTION OF RIGHT BREAST IN FEMALE, ESTROGEN RECEPTOR POSITIVE (HCC): Primary | ICD-10-CM

## 2025-06-11 PROCEDURE — 99245 OFF/OP CONSLTJ NEW/EST HI 55: CPT | Performed by: INTERNAL MEDICINE

## 2025-06-12 ENCOUNTER — PATIENT OUTREACH (OUTPATIENT)
Dept: HEMATOLOGY ONCOLOGY | Facility: CLINIC | Age: 50
End: 2025-06-12

## 2025-06-12 NOTE — PROGRESS NOTES
"Sent patient a my chart message for follow up stating the following:    \"Good Morning Jenny,    Just wanting to check in with you, I hope all is well.  Should you have any questions, concerns or just unsure of something please feel free to reach out to me directly. If I don't know the answer I will find you the answer or at least point you in the right direction.     Just know I'm here for added support and should you run into any issues you can always reach out and I will assist you in anyway I can.     Hope you have a great rest of your day!        Thank you!      Jazmin Benitez MA, PN  Breast Patient Navigator  Oncology Care Coordination   [address] [facility]  [place] [address]  P:[phone]  F:[phone]  [email or web address]  "

## 2025-06-18 NOTE — ASSESSMENT & PLAN NOTE
Patient is 4 weeks s/p bilateral tissue expander placement.  Patient is doing well overall.  Incisions are healing well.  Skin appears healthy.  NO signs of infection.  Right breast drain removed.  Patient underwent first expansion today.    RIGHT BREAST EXPANDER FILL:  OR =  0mL  4/25 =   50mL  ---------------------  TOTAL= 50/250mL    LEFT BREAST EXPANDER FILL:  OR =  0mL  4/25 =   50mL  ---------------------  TOTAL= 50/250mL    -continue to wear bra  -monitor for signs of infection  -no strenuous activity   -return 1 week for next fill

## 2025-06-25 NOTE — PROGRESS NOTES
[facility]   Plastic and Reconstructive Surgery   [address] [place] [id number]     HISTORY & PHYSICAL      Assessment & Plan  S/P breast reconstruction, bilateral  Patient is s/p bilateral mastectomy and prepectoral tissue expander placement with ADM, complicated by wound dehiscence and infection the right tissue expander requiring washout and removal of the expanders.  Patient is now 3 months s/p placement of subpectoral tissue expanders bilaterally.  She has been expanded to 250cc bilaterally.  The breast skin appears healthy.  The left breast expander has migrated superiorly a bit relative to the right.  Plan for implant exchange with capsulotomy to help reposition the left implant.      We then reviewed implant based reconstruction. The expanders will be exchanged to permanent implants as an outpatient.  This procedure typically takes 1-2 hours and often does not require a drain. Though given the degree of capsule work I expect to perform she may require a drain on both sides. Risks were discussed including bleeding, infection (ranging from superficial skin infection to prosthetic infection requiring explantation), injury to surrounding structures, poor scarring, wound non-healing, mastectomy skin flap necrosis, need for prolonged wound care, delay of adjuvant therapy, implant exposure necessitating explantation, chronic pain/muscle spasms, numbness, need for further unanticipated surgery, seroma, capsular contracture, cosmetic deformity, asymmetry, malposition, animation deformity, silent rupture (for silicone implants), device failure, breast implant associated anaplastic large cell lymphoma (HELEN-ALCL), VTE, stroke, MI, and death. Each of these risks were discussed and explained to the patient. If the patient chooses silicone implants, we discussed that the FDA recommends imaging in the form of ultrasound/MRI 5-6 years following the initial placement of the implants and then every 2-3 years after  that to assess for silent rupture.    Patient expresses understanding of the proposed procedures and the associated risks.  She elects to proceed with surgery.        History of Present Illness   Jenny notes that she is doing well overall. Notes that both expanders feel tight but not painful. She does note concern for asymmetry with the left breast riding higher than the right.  Denies fevers, chills, nausea or recent illnesses.  Denies wounds, erythema or drainage from surgical sites.       Review of Systems    Past Medical History[1]     Past Surgical History[2]    Medications Ordered Prior to Encounter[3]    Allergies   Allergen Reactions    Chlorhexidine Hives     Itching and red rash on body from CHG cloth (liquid skin cleanser okay)    Codeine Palpitations     Other reaction(s): Other (See Comments)  Other reaction(s): Irregular heart rate  Rash,vomiting, heart palpitations    Latex Rash       Social History     Socioeconomic History    Marital status: /Civil Union     Spouse name: Not on file    Number of children: Not on file    Years of education: Not on file    Highest education level: Not on file   Occupational History    Not on file   Tobacco Use    Smoking status: Former     Current packs/day: 0.00     Average packs/day: 0.5 packs/day for 25.0 years (12.5 ttl pk-yrs)     Types: Cigarettes     Start date: 1997     Quit date: 2022     Years since quitting: 3.4    Smokeless tobacco: Never   Vaping Use    Vaping status: Never Used   Substance and Sexual Activity    Alcohol use: Yes     Comment: I may drink once a month if that    Drug use: Never    Sexual activity: Yes   Other Topics Concern    Not on file   Social History Narrative    Not on file     Social Drivers of Health     Financial Resource Strain: Not on file   Food Insecurity: Food Insecurity Present (12/15/2024)    Nursing - Inadequate Food Risk Classification     Worried About Running Out of Food in the Last Year: Not on file     Ran Out  of Food in the Last Year: Not on file     Ran Out of Food in the Last Year: Often true   Transportation Needs: No Transportation Needs (12/15/2024)    Nursing - Transportation Risk Classification     Lack of Transportation: Not on file     Lack of Transportation: No   Physical Activity: Not on file   Stress: Not on file   Social Connections: Not on file   Intimate Partner Violence: Unknown (12/15/2024)    Nursing IPS     Feels Physically and Emotionally Safe: Not on file     Physically Hurt by Someone: Not on file     Humiliated or Emotionally Abused by Someone: Not on file     Physically Hurt by Someone: No     Hurt or Threatened by Someone: No   Housing Stability: At Risk (12/15/2024)    Nursing: Inadequate Housing Risk Classification     Has Housing: Not on file     Worried About Losing Housing: Not on file     Unable to Get Utilities: Not on file     Unable to Pay for Housing in the Last Year: Yes     Has Housing: Yes           Physical Exam   Alert, oriented, NAD  Breathing comfortably on room air  Normal rate  Bilateral chest with well approximated transverse incisions.  No appreciable wounds or fluctuance.   Skin is well perfused bilaterally  Left breast is sitting approximately 3 cm higher on the chest than the right.  Abdomen soft  Extremities without edema or deformity             [1]   Past Medical History:  Diagnosis Date    Anxiety     Brain lipoma 2007    Breast cancer (HCC)     Cancer (HCC) 10/10/24    COPD (chronic obstructive pulmonary disease) (HCC)     Fatigue 2022    GERD (gastroesophageal reflux disease)     Kidney stone     Motion sickness     PONV (postoperative nausea and vomiting)     Sleep difficulties 2020    Tremors of nervous system     essential   [2]   Past Surgical History:  Procedure Laterality Date    ABDOMINAL WALL SURGERY Bilateral 12/18/2024    Procedure: CLOSURE WITH DRAIN PLACEMENT;  Surgeon: Karthik Brito MD;  [facility];  Service: Plastics    ADENOIDECTOMY       APPENDECTOMY      BACK SURGERY      BLADDER SURGERY      BREAST BIOPSY Right 10/08/2024    300 4cmfn, open coil clip    CARPAL TUNNEL RELEASE Right 05/31/2024    COLONOSCOPY      FL RETROGRADE PYELOGRAM  12/15/2023    HYSTERECTOMY      age 29 still has lt ovary    IR ASPIRATION ONLY  4/11/2025    IR DRAINAGE TUBE PLACEMENT  12/16/2024    IR RADIOFREQUENCY ABLATION      esophagus    LAMINECTOMY      twin, lumbar    LUMBAR FUSION      L5-s1    TN BX/EXC LYMPH NODE OPEN SUPERFICIAL Right 11/12/2024    Procedure: RIGHT SENTINEL LYMPH NODE MAPPING INCLUDING BLUE DYE INJECTION AND RADIOCOLLOID INJECTION, SENTINEL LYMPH NODE BIOPSY (INJECT AT 0730 BY DR CARDARELLI IN THE OR);  Surgeon: Cassandra Lynn Cardarelli, MD;  [facility];  Service: Surgical Oncology    TN CYSTO/URETERO W/LITHOTRIPSY &INDWELL STENT INSRT Left 12/15/2023    Procedure: CYSTO USCOPE W/ LASER, & STENT;  Surgeon: Jhonatan Fleming MD;  [facility]: [facility];  Service: Urology    TN INJ RADIOACTIVE TRACER FOR ID OF SENTINEL NODE Right 11/12/2024    Procedure: RIGHT SENTINEL LYMPH NODE MAPPING INCLUDING BLUE DYE INJECTION AND RADIOCOLLOID INJECTION, SENTINEL LYMPH NODE BIOPSY (INJECT AT 0730 BY DR CARDARELLI IN THE OR);  Surgeon: Cassandra Lynn Cardarelli, MD;  [facility];  Service: Surgical Oncology    TN INTRAOP SENTINEL LYMPH NODE ID W/DYE INJECTION Right 11/12/2024    Procedure: RIGHT SENTINEL LYMPH NODE MAPPING INCLUDING BLUE DYE INJECTION AND RADIOCOLLOID INJECTION, SENTINEL LYMPH NODE BIOPSY (INJECT AT 0730 BY DR CARDARELLI IN THE OR);  Surgeon: Cassandra Lynn Cardarelli, MD;  [facility];  Service: Surgical Oncology    TN MASTECTOMY SIMPLE COMPLETE Bilateral 11/12/2024    Procedure: BILATERAL MASTECTOMY;  Surgeon: Cassandra Lynn Cardarelli, MD;  [facility];  Service: Surgical Oncology    TN NEUROPLASTY &/TRANSPOS MEDIAN NRV CARPAL TUNNE Right 05/31/2024    Procedure: RELEASE CARPAL TUNNEL;  Surgeon: Dorothea  MD Maria Esther;  [facility]: [facility];  Service: Orthopedics    NV NEUROPLASTY &/TRANSPOS MEDIAN NRV CARPAL TUNNE Left 07/12/2024    Procedure: RELEASE CARPAL TUNNEL;  Surgeon: Dorothea Mayo MD;  [facility];  Service: Orthopedics    NV NEUROPLASTY &/TRANSPOSITION ULNAR NERVE ELBOW Right 05/31/2024    Procedure: RELEASE CUBITAL TUNNEL POSSIBLE TRANSPOSITION AND ADIPOSE FLAP;  Surgeon: Dorothea Mayo MD;  [facility];  Service: Orthopedics    NV NEUROPLASTY &/TRANSPOSITION ULNAR NERVE ELBOW Left 07/12/2024    Procedure: RELEASE CUBITAL TUNNEL;  Surgeon: Dorothea Mayo MD;  [facility];  Service: Orthopedics    NV REMOVAL TISSUE EXPANDER W/O INSERTION IMPLANT Bilateral 12/18/2024    Procedure: removal bilateral tissue expanders;  Surgeon: Karthik Brito MD;  [facility];  Service: Plastics    NV TISSUE EXPANDER PLACEMENT BREAST RECONSTRUCTION Bilateral 11/12/2024    Procedure: BILATERAL IMMEDIATE BREAST RECONSTRUCTION WITH TISSUE EXPANDERS AND ADM;  Surgeon: Karthik Brito MD;  [facility];  Service: Plastics    NV TISSUE EXPANDER PLACEMENT BREAST RECONSTRUCTION Bilateral 3/28/2025    Procedure: BILATERAL TISSUE EXPANDER PLACEMENT;  Surgeon: Karthik Brito MD;  [facility];  Service: Plastics    SPINE SURGERY      TOTAL VAGINAL HYSTERECTOMY      AGE 29    TUBAL LIGATION      UPPER GASTROINTESTINAL ENDOSCOPY      US GUIDED BREAST BIOPSY RIGHT COMPLETE Right 10/08/2024    WOUND DEBRIDEMENT Bilateral 12/18/2024    Procedure: DEBRIDEMENT WOUND (WASH OUT);  Surgeon: Karthik Brito MD;  [facility];  Service: Plastics   [3]   Current Outpatient Medications on File Prior to Visit   Medication Sig Dispense Refill    ACIDOPHILUS LACTOBACILLUS PO Take 1 tablet by mouth daily      Armodafinil 150 MG tablet Take 1 tablet (150 mg total) by mouth daily 30 tablet 2    cyclobenzaprine (FLEXERIL) 10 mg tablet Take 1 tablet (10 mg total) by mouth 3  (three) times a day as needed for muscle spasms for up to 7 days 21 tablet 0    escitalopram (Lexapro) 10 mg tablet Take 1 tablet (10 mg total) by mouth daily (Patient taking differently: Take 10 mg by mouth daily with lunch) 90 tablet 1    exemestane (AROMASIN) 25 MG tablet Take 1 tablet (25 mg total) by mouth daily 90 tablet 3    fenofibrate (TRICOR) 145 mg tablet Take 1 tablet (145 mg total) by mouth daily 100 tablet 1    gabapentin (NEURONTIN) 300 mg capsule Take 1 capsule (300 mg total) by mouth 3 (three) times a day as needed (nerve pain) 90 capsule 0    ondansetron (ZOFRAN) 4 mg tablet Take 1 tablet (4 mg total) by mouth every 8 (eight) hours as needed for nausea or vomiting 20 tablet 0    SUMAtriptan (Imitrex) 50 mg tablet Take 1 tablet (50 mg total) by mouth once as needed for migraine for up to 1 dose 9 tablet 0    tiotropium-olodaterol (Stiolto Respimat) 2.5-2.5 MCG/ACT inhaler Inhale 2 puffs daily 12 g 5     No current facility-administered medications on file prior to visit.

## 2025-06-25 NOTE — H&P (VIEW-ONLY)
[facility]   Plastic and Reconstructive Surgery   [address] [place] [id number]     HISTORY & PHYSICAL      Assessment & Plan  S/P breast reconstruction, bilateral  Patient is s/p bilateral mastectomy and prepectoral tissue expander placement with ADM, complicated by wound dehiscence and infection the right tissue expander requiring washout and removal of the expanders.  Patient is now 3 months s/p placement of subpectoral tissue expanders bilaterally.  She has been expanded to 250cc bilaterally.  The breast skin appears healthy.  The left breast expander has migrated superiorly a bit relative to the right.  Plan for implant exchange with capsulotomy to help reposition the left implant.      We then reviewed implant based reconstruction. The expanders will be exchanged to permanent implants as an outpatient.  This procedure typically takes 1-2 hours and often does not require a drain. Though given the degree of capsule work I expect to perform she may require a drain on both sides. Risks were discussed including bleeding, infection (ranging from superficial skin infection to prosthetic infection requiring explantation), injury to surrounding structures, poor scarring, wound non-healing, mastectomy skin flap necrosis, need for prolonged wound care, delay of adjuvant therapy, implant exposure necessitating explantation, chronic pain/muscle spasms, numbness, need for further unanticipated surgery, seroma, capsular contracture, cosmetic deformity, asymmetry, malposition, animation deformity, silent rupture (for silicone implants), device failure, breast implant associated anaplastic large cell lymphoma (HELEN-ALCL), VTE, stroke, MI, and death. Each of these risks were discussed and explained to the patient. If the patient chooses silicone implants, we discussed that the FDA recommends imaging in the form of ultrasound/MRI 5-6 years following the initial placement of the implants and then every 2-3 years after  that to assess for silent rupture.    Patient expresses understanding of the proposed procedures and the associated risks.  She elects to proceed with surgery.        History of Present Illness   Jenny notes that she is doing well overall. Notes that both expanders feel tight but not painful. She does note concern for asymmetry with the left breast riding higher than the right.  Denies fevers, chills, nausea or recent illnesses.  Denies wounds, erythema or drainage from surgical sites.       Review of Systems    Past Medical History[1]     Past Surgical History[2]    Medications Ordered Prior to Encounter[3]    Allergies   Allergen Reactions    Chlorhexidine Hives     Itching and red rash on body from CHG cloth (liquid skin cleanser okay)    Codeine Palpitations     Other reaction(s): Other (See Comments)  Other reaction(s): Irregular heart rate  Rash,vomiting, heart palpitations    Latex Rash       Social History     Socioeconomic History    Marital status: /Civil Union     Spouse name: Not on file    Number of children: Not on file    Years of education: Not on file    Highest education level: Not on file   Occupational History    Not on file   Tobacco Use    Smoking status: Former     Current packs/day: 0.00     Average packs/day: 0.5 packs/day for 25.0 years (12.5 ttl pk-yrs)     Types: Cigarettes     Start date: 1997     Quit date: 2022     Years since quitting: 3.4    Smokeless tobacco: Never   Vaping Use    Vaping status: Never Used   Substance and Sexual Activity    Alcohol use: Yes     Comment: I may drink once a month if that    Drug use: Never    Sexual activity: Yes   Other Topics Concern    Not on file   Social History Narrative    Not on file     Social Drivers of Health     Financial Resource Strain: Not on file   Food Insecurity: Food Insecurity Present (12/15/2024)    Nursing - Inadequate Food Risk Classification     Worried About Running Out of Food in the Last Year: Not on file     Ran Out  of Food in the Last Year: Not on file     Ran Out of Food in the Last Year: Often true   Transportation Needs: No Transportation Needs (12/15/2024)    Nursing - Transportation Risk Classification     Lack of Transportation: Not on file     Lack of Transportation: No   Physical Activity: Not on file   Stress: Not on file   Social Connections: Not on file   Intimate Partner Violence: Unknown (12/15/2024)    Nursing IPS     Feels Physically and Emotionally Safe: Not on file     Physically Hurt by Someone: Not on file     Humiliated or Emotionally Abused by Someone: Not on file     Physically Hurt by Someone: No     Hurt or Threatened by Someone: No   Housing Stability: At Risk (12/15/2024)    Nursing: Inadequate Housing Risk Classification     Has Housing: Not on file     Worried About Losing Housing: Not on file     Unable to Get Utilities: Not on file     Unable to Pay for Housing in the Last Year: Yes     Has Housing: Yes           Physical Exam   Alert, oriented, NAD  Breathing comfortably on room air  Normal rate  Bilateral chest with well approximated transverse incisions.  No appreciable wounds or fluctuance.   Skin is well perfused bilaterally  Left breast is sitting approximately 3 cm higher on the chest than the right.  Abdomen soft  Extremities without edema or deformity             [1]   Past Medical History:  Diagnosis Date    Anxiety     Brain lipoma 2007    Breast cancer (HCC)     Cancer (HCC) 10/10/24    COPD (chronic obstructive pulmonary disease) (HCC)     Fatigue 2022    GERD (gastroesophageal reflux disease)     Kidney stone     Motion sickness     PONV (postoperative nausea and vomiting)     Sleep difficulties 2020    Tremors of nervous system     essential   [2]   Past Surgical History:  Procedure Laterality Date    ABDOMINAL WALL SURGERY Bilateral 12/18/2024    Procedure: CLOSURE WITH DRAIN PLACEMENT;  Surgeon: Karthik Brito MD;  [facility];  Service: Plastics    ADENOIDECTOMY       APPENDECTOMY      BACK SURGERY      BLADDER SURGERY      BREAST BIOPSY Right 10/08/2024    300 4cmfn, open coil clip    CARPAL TUNNEL RELEASE Right 05/31/2024    COLONOSCOPY      FL RETROGRADE PYELOGRAM  12/15/2023    HYSTERECTOMY      age 29 still has lt ovary    IR ASPIRATION ONLY  4/11/2025    IR DRAINAGE TUBE PLACEMENT  12/16/2024    IR RADIOFREQUENCY ABLATION      esophagus    LAMINECTOMY      twin, lumbar    LUMBAR FUSION      L5-s1    TX BX/EXC LYMPH NODE OPEN SUPERFICIAL Right 11/12/2024    Procedure: RIGHT SENTINEL LYMPH NODE MAPPING INCLUDING BLUE DYE INJECTION AND RADIOCOLLOID INJECTION, SENTINEL LYMPH NODE BIOPSY (INJECT AT 0730 BY DR CARDARELLI IN THE OR);  Surgeon: Cassandra Lynn Cardarelli, MD;  [facility];  Service: Surgical Oncology    TX CYSTO/URETERO W/LITHOTRIPSY &INDWELL STENT INSRT Left 12/15/2023    Procedure: CYSTO USCOPE W/ LASER, & STENT;  Surgeon: Jhonatan Fleming MD;  [facility];  Service: Urology    TX INJ RADIOACTIVE TRACER FOR ID OF SENTINEL NODE Right 11/12/2024    Procedure: RIGHT SENTINEL LYMPH NODE MAPPING INCLUDING BLUE DYE INJECTION AND RADIOCOLLOID INJECTION, SENTINEL LYMPH NODE BIOPSY (INJECT AT 0730 BY DR CARDARELLI IN THE OR);  Surgeon: Cassandra Lynn Cardarelli, MD;  [facility];  Service: Surgical Oncology    TX INTRAOP SENTINEL LYMPH NODE ID W/DYE INJECTION Right 11/12/2024    Procedure: RIGHT SENTINEL LYMPH NODE MAPPING INCLUDING BLUE DYE INJECTION AND RADIOCOLLOID INJECTION, SENTINEL LYMPH NODE BIOPSY (INJECT AT 0730 BY DR CARDARELLI IN THE OR);  Surgeon: Cassandra Lynn Cardarelli, MD;  [facility];  Service: Surgical Oncology    TX MASTECTOMY SIMPLE COMPLETE Bilateral 11/12/2024    Procedure: BILATERAL MASTECTOMY;  Surgeon: Cassandra Lynn Cardarelli, MD;  [facility];  Service: Surgical Oncology    TX NEUROPLASTY &/TRANSPOS MEDIAN NRV CARPAL TUNNE Right 05/31/2024    Procedure: RELEASE CARPAL TUNNEL;  Surgeon: Dorothea  MD Maria Esther;  [facility];  Service: Orthopedics    AK NEUROPLASTY &/TRANSPOS MEDIAN NRV CARPAL TUNNE Left 07/12/2024    Procedure: RELEASE CARPAL TUNNEL;  Surgeon: Dorothea Mayo MD;  [facility];  Service: Orthopedics    AK NEUROPLASTY &/TRANSPOSITION ULNAR NERVE ELBOW Right 05/31/2024    Procedure: RELEASE CUBITAL TUNNEL POSSIBLE TRANSPOSITION AND ADIPOSE FLAP;  Surgeon: Dorothea Mayo MD;  [facility];  Service: Orthopedics    AK NEUROPLASTY &/TRANSPOSITION ULNAR NERVE ELBOW Left 07/12/2024    Procedure: RELEASE CUBITAL TUNNEL;  Surgeon: Dorothea Mayo MD;  [facility];  Service: Orthopedics    AK REMOVAL TISSUE EXPANDER W/O INSERTION IMPLANT Bilateral 12/18/2024    Procedure: removal bilateral tissue expanders;  Surgeon: Karthik Brito MD;  [facility];  Service: Plastics    AK TISSUE EXPANDER PLACEMENT BREAST RECONSTRUCTION Bilateral 11/12/2024    Procedure: BILATERAL IMMEDIATE BREAST RECONSTRUCTION WITH TISSUE EXPANDERS AND ADM;  Surgeon: Karthik Brito MD;  [facility];  Service: Plastics    AK TISSUE EXPANDER PLACEMENT BREAST RECONSTRUCTION Bilateral 3/28/2025    Procedure: BILATERAL TISSUE EXPANDER PLACEMENT;  Surgeon: Karthik Brito MD;  [facility];  Service: Plastics    SPINE SURGERY      TOTAL VAGINAL HYSTERECTOMY      AGE 29    TUBAL LIGATION      UPPER GASTROINTESTINAL ENDOSCOPY      US GUIDED BREAST BIOPSY RIGHT COMPLETE Right 10/08/2024    WOUND DEBRIDEMENT Bilateral 12/18/2024    Procedure: DEBRIDEMENT WOUND (WASH OUT);  Surgeon: Karthik Brito MD;  [facility];  Service: Plastics   [3]   Current Outpatient Medications on File Prior to Visit   Medication Sig Dispense Refill    ACIDOPHILUS LACTOBACILLUS PO Take 1 tablet by mouth daily      Armodafinil 150 MG tablet Take 1 tablet (150 mg total) by mouth daily 30 tablet 2    cyclobenzaprine (FLEXERIL) 10 mg tablet Take 1 tablet (10 mg total) by mouth 3  (three) times a day as needed for muscle spasms for up to 7 days 21 tablet 0    escitalopram (Lexapro) 10 mg tablet Take 1 tablet (10 mg total) by mouth daily (Patient taking differently: Take 10 mg by mouth daily with lunch) 90 tablet 1    exemestane (AROMASIN) 25 MG tablet Take 1 tablet (25 mg total) by mouth daily 90 tablet 3    fenofibrate (TRICOR) 145 mg tablet Take 1 tablet (145 mg total) by mouth daily 100 tablet 1    gabapentin (NEURONTIN) 300 mg capsule Take 1 capsule (300 mg total) by mouth 3 (three) times a day as needed (nerve pain) 90 capsule 0    ondansetron (ZOFRAN) 4 mg tablet Take 1 tablet (4 mg total) by mouth every 8 (eight) hours as needed for nausea or vomiting 20 tablet 0    SUMAtriptan (Imitrex) 50 mg tablet Take 1 tablet (50 mg total) by mouth once as needed for migraine for up to 1 dose 9 tablet 0    tiotropium-olodaterol (Stiolto Respimat) 2.5-2.5 MCG/ACT inhaler Inhale 2 puffs daily 12 g 5     No current facility-administered medications on file prior to visit.

## 2025-06-26 ENCOUNTER — OFFICE VISIT (OUTPATIENT)
Age: 50
End: 2025-06-26
Payer: COMMERCIAL

## 2025-06-26 VITALS
TEMPERATURE: 97.4 F | HEART RATE: 86 BPM | WEIGHT: 148 LBS | SYSTOLIC BLOOD PRESSURE: 118 MMHG | BODY MASS INDEX: 27.94 KG/M2 | HEIGHT: 61 IN | DIASTOLIC BLOOD PRESSURE: 90 MMHG

## 2025-06-26 DIAGNOSIS — Z98.890 S/P BREAST RECONSTRUCTION, BILATERAL: Primary | ICD-10-CM

## 2025-06-26 PROCEDURE — 99214 OFFICE O/P EST MOD 30 MIN: CPT | Performed by: PLASTIC SURGERY

## 2025-06-30 DIAGNOSIS — E78.49 OTHER HYPERLIPIDEMIA: ICD-10-CM

## 2025-07-02 RX ORDER — FENOFIBRATE 145 MG/1
145 TABLET, FILM COATED ORAL DAILY
Qty: 100 TABLET | Refills: 1 | Status: SHIPPED | OUTPATIENT
Start: 2025-07-02

## 2025-07-05 ENCOUNTER — APPOINTMENT (OUTPATIENT)
Dept: LAB | Facility: IMAGING CENTER | Age: 50
End: 2025-07-05

## 2025-07-05 DIAGNOSIS — C50.111 MALIGNANT NEOPLASM OF CENTRAL PORTION OF RIGHT BREAST IN FEMALE, ESTROGEN RECEPTOR POSITIVE (HCC): ICD-10-CM

## 2025-07-05 DIAGNOSIS — Z17.0 MALIGNANT NEOPLASM OF CENTRAL PORTION OF RIGHT BREAST IN FEMALE, ESTROGEN RECEPTOR POSITIVE (HCC): ICD-10-CM

## 2025-07-05 DIAGNOSIS — Z98.890 S/P BREAST RECONSTRUCTION, BILATERAL: ICD-10-CM

## 2025-07-05 DIAGNOSIS — D72.829 LEUKOCYTOSIS, UNSPECIFIED TYPE: ICD-10-CM

## 2025-07-05 DIAGNOSIS — Z90.710 H/O: HYSTERECTOMY: ICD-10-CM

## 2025-07-05 DIAGNOSIS — Z00.8 HEALTH EXAMINATION IN POPULATION SURVEY: ICD-10-CM

## 2025-07-05 LAB
ANION GAP SERPL CALCULATED.3IONS-SCNC: 11 MMOL/L (ref 4–13)
BASOPHILS # BLD AUTO: 0.09 THOUSANDS/ÂΜL (ref 0–0.1)
BASOPHILS NFR BLD AUTO: 1 % (ref 0–1)
BUN SERPL-MCNC: 17 MG/DL (ref 5–25)
CALCIUM SERPL-MCNC: 9.8 MG/DL (ref 8.4–10.2)
CHLORIDE SERPL-SCNC: 105 MMOL/L (ref 96–108)
CHOLEST SERPL-MCNC: 171 MG/DL (ref ?–200)
CO2 SERPL-SCNC: 24 MMOL/L (ref 21–32)
CREAT SERPL-MCNC: 0.73 MG/DL (ref 0.6–1.3)
EOSINOPHIL # BLD AUTO: 0.25 THOUSAND/ÂΜL (ref 0–0.61)
EOSINOPHIL NFR BLD AUTO: 2 % (ref 0–6)
ERYTHROCYTE [DISTWIDTH] IN BLOOD BY AUTOMATED COUNT: 13.1 % (ref 11.6–15.1)
EST. AVERAGE GLUCOSE BLD GHB EST-MCNC: 117 MG/DL
ESTRADIOL SERPL-MCNC: <15 PG/ML
FSH SERPL-ACNC: 69.5 MIU/ML
GFR SERPL CREATININE-BSD FRML MDRD: 98 ML/MIN/1.73SQ M
GLUCOSE P FAST SERPL-MCNC: 84 MG/DL (ref 65–99)
HBA1C MFR BLD: 5.7 %
HCT VFR BLD AUTO: 44.8 % (ref 34.8–46.1)
HDLC SERPL-MCNC: 70 MG/DL
HGB BLD-MCNC: 14.3 G/DL (ref 11.5–15.4)
IMM GRANULOCYTES # BLD AUTO: 0.05 THOUSAND/UL (ref 0–0.2)
IMM GRANULOCYTES NFR BLD AUTO: 0 % (ref 0–2)
LDLC SERPL CALC-MCNC: 82 MG/DL (ref 0–100)
LH SERPL-ACNC: 54.9 MIU/ML
LYMPHOCYTES # BLD AUTO: 2.1 THOUSANDS/ÂΜL (ref 0.6–4.47)
LYMPHOCYTES NFR BLD AUTO: 18 % (ref 14–44)
MCH RBC QN AUTO: 28 PG (ref 26.8–34.3)
MCHC RBC AUTO-ENTMCNC: 31.9 G/DL (ref 31.4–37.4)
MCV RBC AUTO: 88 FL (ref 82–98)
MONOCYTES # BLD AUTO: 0.6 THOUSAND/ÂΜL (ref 0.17–1.22)
MONOCYTES NFR BLD AUTO: 5 % (ref 4–12)
NEUTROPHILS # BLD AUTO: 8.67 THOUSANDS/ÂΜL (ref 1.85–7.62)
NEUTS SEG NFR BLD AUTO: 74 % (ref 43–75)
NONHDLC SERPL-MCNC: 101 MG/DL
NRBC BLD AUTO-RTO: 0 /100 WBCS
PLATELET # BLD AUTO: 401 THOUSANDS/UL (ref 149–390)
PMV BLD AUTO: 10.3 FL (ref 8.9–12.7)
POTASSIUM SERPL-SCNC: 4.3 MMOL/L (ref 3.5–5.3)
RBC # BLD AUTO: 5.11 MILLION/UL (ref 3.81–5.12)
SODIUM SERPL-SCNC: 140 MMOL/L (ref 135–147)
TRIGL SERPL-MCNC: 94 MG/DL (ref ?–150)
WBC # BLD AUTO: 11.76 THOUSAND/UL (ref 4.31–10.16)

## 2025-07-05 PROCEDURE — 85025 COMPLETE CBC W/AUTO DIFF WBC: CPT

## 2025-07-05 PROCEDURE — 82670 ASSAY OF TOTAL ESTRADIOL: CPT

## 2025-07-05 PROCEDURE — 83002 ASSAY OF GONADOTROPIN (LH): CPT

## 2025-07-05 PROCEDURE — 80061 LIPID PANEL: CPT

## 2025-07-05 PROCEDURE — 83001 ASSAY OF GONADOTROPIN (FSH): CPT

## 2025-07-05 PROCEDURE — 36415 COLL VENOUS BLD VENIPUNCTURE: CPT

## 2025-07-05 PROCEDURE — 80048 BASIC METABOLIC PNL TOTAL CA: CPT

## 2025-07-05 PROCEDURE — 83036 HEMOGLOBIN GLYCOSYLATED A1C: CPT

## 2025-07-07 ENCOUNTER — APPOINTMENT (OUTPATIENT)
Dept: LAB | Age: 50
End: 2025-07-07
Payer: COMMERCIAL

## 2025-07-07 DIAGNOSIS — D72.829 LEUKOCYTOSIS, UNSPECIFIED TYPE: ICD-10-CM

## 2025-07-07 DIAGNOSIS — D72.829 LEUKOCYTOSIS, UNSPECIFIED TYPE: Primary | ICD-10-CM

## 2025-07-07 LAB
BACTERIA UR QL AUTO: ABNORMAL /HPF
BILIRUB UR QL STRIP: NEGATIVE
CAOX CRY URNS QL MICRO: ABNORMAL /HPF
CLARITY UR: ABNORMAL
COLOR UR: YELLOW
GLUCOSE UR STRIP-MCNC: NEGATIVE MG/DL
HGB UR QL STRIP.AUTO: NEGATIVE
HYALINE CASTS #/AREA URNS LPF: ABNORMAL /LPF
KETONES UR STRIP-MCNC: NEGATIVE MG/DL
LEUKOCYTE ESTERASE UR QL STRIP: ABNORMAL
MUCOUS THREADS UR QL AUTO: ABNORMAL
NITRITE UR QL STRIP: NEGATIVE
NON-SQ EPI CELLS URNS QL MICRO: ABNORMAL /HPF
PH UR STRIP.AUTO: 6 [PH]
PROT UR STRIP-MCNC: NEGATIVE MG/DL
RBC #/AREA URNS AUTO: ABNORMAL /HPF
SP GR UR STRIP.AUTO: 1.02 (ref 1–1.03)
UROBILINOGEN UR STRIP-ACNC: 2 MG/DL
WBC #/AREA URNS AUTO: ABNORMAL /HPF

## 2025-07-07 PROCEDURE — 81001 URINALYSIS AUTO W/SCOPE: CPT

## 2025-07-10 NOTE — PRE-PROCEDURE INSTRUCTIONS
Pre-Surgery Instructions:   Medication Instructions    ACIDOPHILUS LACTOBACILLUS PO Hold day of surgery.    Armodafinil 150 MG tablet Hold day of surgery.    cyclobenzaprine (FLEXERIL) 10 mg tablet Uses PRN- OK to take day of surgery    escitalopram (Lexapro) 10 mg tablet Take day of surgery.    exemestane (AROMASIN) 25 MG tablet Take day of surgery.    fenofibrate (TRICOR) 145 mg tablet Take night before surgery    gabapentin (NEURONTIN) 300 mg capsule Take night before surgery    ondansetron (ZOFRAN) 4 mg tablet Uses PRN- OK to take day of surgery    SUMAtriptan (Imitrex) 50 mg tablet Uses PRN- OK to take day of surgery    tiotropium-olodaterol (Stiolto Respimat) 2.5-2.5 MCG/ACT inhaler Continue to take these inhaler medications on your normal schedule up to and including the day of surgery.    Medication instructions for day of surgery reviewed. Patient verbalized understanding and agrees with the plan.  Please take all instructed medications with only a sip of water. Please do not take any over the counter (non-prescribed) vitamins or supplements for one week prior to date of surgery.      You will receive a call one business day prior to surgery with an arrival time and hospital directions. If your surgery is scheduled on a Monday, the hospital will be calling you on the Friday prior to your surgery. If you have not heard from anyone by 8pm, please call the hospital supervisor through the hospital  at [phone]. ([place] [phone] or [place] [phone]).    Do not eat or drink anything after midnight the night before your surgery, including candy, mints, lifesavers, or chewing gum. Do not drink alcohol 24hrs before your surgery. Try not to smoke at least 24hrs before your surgery.       Follow the pre surgery showering instructions as listed in the “My Surgical Experience Booklet” or otherwise provided by your surgeon's office. Do not use a blade to shave the surgical area 1 week before  surgery. It is okay to use a clean electric clippers up to 24 hours before surgery. Do not apply any lotions, creams, including makeup, cologne, deodorant, or perfumes after showering on the day of your surgery. Do not use dry shampoo, hair spray, hair gel, or any type of hair products.     No contact lenses, eye make-up, or artificial eyelashes. Remove nail polish, including gel polish, and any artificial, gel, or acrylic nails if possible. Remove all jewelry including rings and body piercing jewelry.     Wear causal clothing that is easy to take on and off. Consider your type of surgery.    Keep any valuables, jewelry, piercings at home. Please bring any specially ordered equipment (sling, braces) if indicated.    Arrange for a responsible person to drive you to and from the hospital on the day of your surgery. Please confirm the visitor policy for the day of your procedure when you receive your phone call with an arrival time.     Call the surgeon's office with any new illnesses, exposures, or additional questions prior to surgery.    Please reference your “My Surgical Experience Booklet” for additional information to prepare for your upcoming surgery.

## 2025-07-17 ENCOUNTER — OFFICE VISIT (OUTPATIENT)
Dept: SLEEP CENTER | Facility: CLINIC | Age: 50
End: 2025-07-17
Payer: COMMERCIAL

## 2025-07-17 VITALS
WEIGHT: 149 LBS | DIASTOLIC BLOOD PRESSURE: 70 MMHG | HEART RATE: 96 BPM | BODY MASS INDEX: 27.42 KG/M2 | OXYGEN SATURATION: 96 % | SYSTOLIC BLOOD PRESSURE: 118 MMHG | HEIGHT: 62 IN

## 2025-07-17 DIAGNOSIS — F41.9 ANXIETY: ICD-10-CM

## 2025-07-17 DIAGNOSIS — G47.19 EXCESSIVE DAYTIME SLEEPINESS: ICD-10-CM

## 2025-07-17 DIAGNOSIS — G47.00 INSOMNIA, UNSPECIFIED TYPE: Primary | ICD-10-CM

## 2025-07-17 DIAGNOSIS — G47.11 IDIOPATHIC HYPERSOMNIA: ICD-10-CM

## 2025-07-17 PROCEDURE — 99214 OFFICE O/P EST MOD 30 MIN: CPT | Performed by: INTERNAL MEDICINE

## 2025-07-17 RX ORDER — GABAPENTIN 600 MG/1
600 TABLET ORAL
Qty: 30 TABLET | Refills: 1 | Status: SHIPPED | OUTPATIENT
Start: 2025-07-17

## 2025-07-17 NOTE — ASSESSMENT & PLAN NOTE
Continue armodafinil 150 mg we will increase the dose if her symptoms during the day continues to be disturbing despite increasing her gabapentin dose at night  Orders:    gabapentin (Neurontin) 600 MG tablet; Take 1 tablet (600 mg total) by mouth daily at bedtime

## 2025-07-17 NOTE — ASSESSMENT & PLAN NOTE
Orders:    gabapentin (Neurontin) 600 MG tablet; Take 1 tablet (600 mg total) by mouth daily at bedtime

## 2025-07-17 NOTE — ASSESSMENT & PLAN NOTE
Secondary to underlying RLS as detailed below management was  Orders:    gabapentin (Neurontin) 600 MG tablet; Take 1 tablet (600 mg total) by mouth daily at bedtime

## 2025-07-18 ENCOUNTER — PATIENT OUTREACH (OUTPATIENT)
Dept: HEMATOLOGY ONCOLOGY | Facility: CLINIC | Age: 50
End: 2025-07-18

## 2025-07-18 NOTE — PROGRESS NOTES
I reached out and spoke with Jenny to follow up and to review for any new changes in barriers to care and offer supportive services as needed.     Barriers noted previously and outcome of interventions;  Previously no barriers to care and currently no new barriers to care.  Jenny is having what she hopes is her last surgery on 7/21/25 with her plastic surgeon.  Patient is still eating and drinking ok- she mentioned that she is trying to be mindful of her eating since she has gained some weight recently.  Jenny is still able to drive herself to and from all of her appointments however her  likes to take her to her appointments also.   Jenny still feels she has a good support system at home with family and friends.  Jenny did not have any additional questions or concerns for me at this time.  She is aware that should she have any questions or concerns in the future she can reach out to me directly.      Reviewed for any new barriers as noted below.    Are you having any side effects from your treatment?No    Are you eating and drinking normally? yes    Have you been experiencing any uncontrolled pain related to your cancer diagnosis? No    If not already established, have needs changed for a palliative care referral? no    Do you have a good support system? Yes     Are you interested in any support groups? Not at this time.     How are you doing with transportation to your appointments? Patient is still afua to drive herself to and from all of her appointments.     Do you have any questions or concerns regarding your treatment plan? No    Any new financial concerns for your household or medical bills? No    Do you know when your upcoming appointments are? yes  Future Appointments   Date Time Provider Department Center   7/29/2025  4:30 PM Emma Alejandro PA-C [facility]   10/6/2025  4:15 PM Nelson Akhtar MD [facility]   12/18/2025  3:00 PM Rafi Collins MD [facility]    12/18/2025  3:45 PM Cassandra Lynn Cardarelli, MD [facility]   1/29/2026 11:00 AM John Fairbanks MD [facility]   2/25/2026  3:30 PM Anshu Lorenzo MD [facility]          Based on individual needs I will follow up with them in 4/5 weeks. I have provided my direct contact information and welcome them to contact me if their needs as discussed above change. They were appreciative for the call.

## 2025-07-20 ENCOUNTER — ANESTHESIA EVENT (OUTPATIENT)
Dept: PERIOP | Facility: HOSPITAL | Age: 50
End: 2025-07-20
Payer: COMMERCIAL

## 2025-07-21 ENCOUNTER — ANESTHESIA (OUTPATIENT)
Dept: PERIOP | Facility: HOSPITAL | Age: 50
End: 2025-07-21
Payer: COMMERCIAL

## 2025-07-21 ENCOUNTER — HOSPITAL ENCOUNTER (OUTPATIENT)
Facility: HOSPITAL | Age: 50
Setting detail: OUTPATIENT SURGERY
Discharge: HOME | End: 2025-07-21
Attending: PLASTIC SURGERY | Admitting: PLASTIC SURGERY
Payer: COMMERCIAL

## 2025-07-21 VITALS
BODY MASS INDEX: 28.13 KG/M2 | HEART RATE: 73 BPM | HEIGHT: 61 IN | DIASTOLIC BLOOD PRESSURE: 71 MMHG | SYSTOLIC BLOOD PRESSURE: 113 MMHG | OXYGEN SATURATION: 93 % | WEIGHT: 149 LBS | TEMPERATURE: 97 F | RESPIRATION RATE: 16 BRPM

## 2025-07-21 DIAGNOSIS — C50.111 MALIGNANT NEOPLASM OF CENTRAL PORTION OF RIGHT BREAST IN FEMALE, ESTROGEN RECEPTOR POSITIVE (HCC): ICD-10-CM

## 2025-07-21 DIAGNOSIS — Z17.0 MALIGNANT NEOPLASM OF CENTRAL PORTION OF RIGHT BREAST IN FEMALE, ESTROGEN RECEPTOR POSITIVE (HCC): ICD-10-CM

## 2025-07-21 DIAGNOSIS — Z98.890 S/P BREAST RECONSTRUCTION, BILATERAL: Primary | ICD-10-CM

## 2025-07-21 PROCEDURE — L8600 IMPLANT BREAST SILICONE/EQ: HCPCS | Performed by: PLASTIC SURGERY

## 2025-07-21 PROCEDURE — 19370 REVJ PERI-IMPLT CAPSULE BRST: CPT | Performed by: PLASTIC SURGERY

## 2025-07-21 PROCEDURE — 11970 RPLCMT TISS XPNDR PERM IMPLT: CPT | Performed by: PLASTIC SURGERY

## 2025-07-21 PROCEDURE — 88302 TISSUE EXAM BY PATHOLOGIST: CPT | Performed by: PATHOLOGY

## 2025-07-21 PROCEDURE — 88300 SURGICAL PATH GROSS: CPT | Performed by: PATHOLOGY

## 2025-07-21 DEVICE — SMOOTH MODERATE PLUS PROFILE, 265CC  SMOOTH ROUND SILICONE
Type: IMPLANTABLE DEVICE | Site: BREAST | Status: FUNCTIONAL
Brand: MENTOR MEMORYGEL BOOST BREAST IMPLANT

## 2025-07-21 RX ORDER — ROCURONIUM BROMIDE 10 MG/ML
INJECTION, SOLUTION INTRAVENOUS AS NEEDED
Status: DISCONTINUED | OUTPATIENT
Start: 2025-07-21 | End: 2025-07-21

## 2025-07-21 RX ORDER — FENTANYL CITRATE/PF 50 MCG/ML
25 SYRINGE (ML) INJECTION
Status: DISCONTINUED | OUTPATIENT
Start: 2025-07-21 | End: 2025-07-21 | Stop reason: HOSPADM

## 2025-07-21 RX ORDER — PROPOFOL 10 MG/ML
INJECTION, EMULSION INTRAVENOUS CONTINUOUS PRN
Status: DISCONTINUED | OUTPATIENT
Start: 2025-07-21 | End: 2025-07-21

## 2025-07-21 RX ORDER — ONDANSETRON 4 MG/1
4 TABLET, ORALLY DISINTEGRATING ORAL EVERY 6 HOURS PRN
Status: DISCONTINUED | OUTPATIENT
Start: 2025-07-21 | End: 2025-07-21 | Stop reason: HOSPADM

## 2025-07-21 RX ORDER — KETAMINE HCL IN NACL, ISO-OSM 100MG/10ML
SYRINGE (ML) INJECTION AS NEEDED
Status: DISCONTINUED | OUTPATIENT
Start: 2025-07-21 | End: 2025-07-21

## 2025-07-21 RX ORDER — DIPHENHYDRAMINE HYDROCHLORIDE 50 MG/ML
12.5 INJECTION, SOLUTION INTRAMUSCULAR; INTRAVENOUS ONCE AS NEEDED
Status: DISCONTINUED | OUTPATIENT
Start: 2025-07-21 | End: 2025-07-21 | Stop reason: HOSPADM

## 2025-07-21 RX ORDER — ONDANSETRON 2 MG/ML
4 INJECTION INTRAMUSCULAR; INTRAVENOUS ONCE AS NEEDED
Status: DISCONTINUED | OUTPATIENT
Start: 2025-07-21 | End: 2025-07-21 | Stop reason: HOSPADM

## 2025-07-21 RX ORDER — ALBUTEROL SULFATE 0.83 MG/ML
2.5 SOLUTION RESPIRATORY (INHALATION) ONCE AS NEEDED
Status: DISCONTINUED | OUTPATIENT
Start: 2025-07-21 | End: 2025-07-21 | Stop reason: HOSPADM

## 2025-07-21 RX ORDER — OXYCODONE HYDROCHLORIDE 5 MG/1
5 TABLET ORAL EVERY 4 HOURS PRN
Refills: 0 | Status: DISCONTINUED | OUTPATIENT
Start: 2025-07-21 | End: 2025-07-21 | Stop reason: HOSPADM

## 2025-07-21 RX ORDER — MAGNESIUM HYDROXIDE 1200 MG/15ML
LIQUID ORAL AS NEEDED
Status: DISCONTINUED | OUTPATIENT
Start: 2025-07-21 | End: 2025-07-21 | Stop reason: HOSPADM

## 2025-07-21 RX ORDER — BUPIVACAINE HYDROCHLORIDE 2.5 MG/ML
INJECTION, SOLUTION EPIDURAL; INFILTRATION; INTRACAUDAL; PERINEURAL AS NEEDED
Status: DISCONTINUED | OUTPATIENT
Start: 2025-07-21 | End: 2025-07-21 | Stop reason: HOSPADM

## 2025-07-21 RX ORDER — CYCLOBENZAPRINE HCL 10 MG
10 TABLET ORAL 3 TIMES DAILY PRN
Qty: 21 TABLET | Refills: 0 | Status: SHIPPED | OUTPATIENT
Start: 2025-07-21 | End: 2025-07-28

## 2025-07-21 RX ORDER — METOCLOPRAMIDE HYDROCHLORIDE 5 MG/ML
INJECTION INTRAMUSCULAR; INTRAVENOUS AS NEEDED
Status: DISCONTINUED | OUTPATIENT
Start: 2025-07-21 | End: 2025-07-21

## 2025-07-21 RX ORDER — ACETAMINOPHEN 500 MG
500 TABLET ORAL EVERY 6 HOURS PRN
COMMUNITY

## 2025-07-21 RX ORDER — EPHEDRINE SULFATE 50 MG/ML
INJECTION INTRAVENOUS AS NEEDED
Status: DISCONTINUED | OUTPATIENT
Start: 2025-07-21 | End: 2025-07-21

## 2025-07-21 RX ORDER — SODIUM CHLORIDE, SODIUM LACTATE, POTASSIUM CHLORIDE, CALCIUM CHLORIDE 600; 310; 30; 20 MG/100ML; MG/100ML; MG/100ML; MG/100ML
20 INJECTION, SOLUTION INTRAVENOUS CONTINUOUS
Status: DISCONTINUED | OUTPATIENT
Start: 2025-07-21 | End: 2025-07-21 | Stop reason: HOSPADM

## 2025-07-21 RX ORDER — ACETAMINOPHEN 325 MG/1
975 TABLET ORAL EVERY 6 HOURS PRN
Status: DISCONTINUED | OUTPATIENT
Start: 2025-07-21 | End: 2025-07-21 | Stop reason: HOSPADM

## 2025-07-21 RX ORDER — SCOPOLAMINE 1 MG/3D
1 PATCH, EXTENDED RELEASE TRANSDERMAL ONCE
Status: DISCONTINUED | OUTPATIENT
Start: 2025-07-21 | End: 2025-07-21 | Stop reason: HOSPADM

## 2025-07-21 RX ORDER — CEPHALEXIN 500 MG/1
500 CAPSULE ORAL EVERY 12 HOURS SCHEDULED
Qty: 14 CAPSULE | Refills: 0 | Status: SHIPPED | OUTPATIENT
Start: 2025-07-21 | End: 2025-07-28

## 2025-07-21 RX ORDER — MIDAZOLAM HYDROCHLORIDE 2 MG/2ML
INJECTION, SOLUTION INTRAMUSCULAR; INTRAVENOUS AS NEEDED
Status: DISCONTINUED | OUTPATIENT
Start: 2025-07-21 | End: 2025-07-21

## 2025-07-21 RX ORDER — ALBUTEROL SULFATE 90 UG/1
INHALANT RESPIRATORY (INHALATION) AS NEEDED
Status: DISCONTINUED | OUTPATIENT
Start: 2025-07-21 | End: 2025-07-21

## 2025-07-21 RX ORDER — HYDROMORPHONE HCL/PF 1 MG/ML
0.5 SYRINGE (ML) INJECTION
Status: DISCONTINUED | OUTPATIENT
Start: 2025-07-21 | End: 2025-07-21 | Stop reason: HOSPADM

## 2025-07-21 RX ORDER — LIDOCAINE HYDROCHLORIDE 10 MG/ML
INJECTION, SOLUTION EPIDURAL; INFILTRATION; INTRACAUDAL; PERINEURAL AS NEEDED
Status: DISCONTINUED | OUTPATIENT
Start: 2025-07-21 | End: 2025-07-21

## 2025-07-21 RX ORDER — SODIUM CHLORIDE, SODIUM LACTATE, POTASSIUM CHLORIDE, CALCIUM CHLORIDE 600; 310; 30; 20 MG/100ML; MG/100ML; MG/100ML; MG/100ML
75 INJECTION, SOLUTION INTRAVENOUS CONTINUOUS
Status: DISCONTINUED | OUTPATIENT
Start: 2025-07-21 | End: 2025-07-21 | Stop reason: HOSPADM

## 2025-07-21 RX ORDER — DEXAMETHASONE SODIUM PHOSPHATE 10 MG/ML
INJECTION, SOLUTION INTRAMUSCULAR; INTRAVENOUS AS NEEDED
Status: DISCONTINUED | OUTPATIENT
Start: 2025-07-21 | End: 2025-07-21

## 2025-07-21 RX ORDER — ONDANSETRON 4 MG/1
4 TABLET, FILM COATED ORAL EVERY 8 HOURS PRN
Qty: 21 TABLET | Refills: 0 | Status: SHIPPED | OUTPATIENT
Start: 2025-07-21 | End: 2025-07-28

## 2025-07-21 RX ORDER — OXYCODONE HYDROCHLORIDE 5 MG/1
5 TABLET ORAL EVERY 4 HOURS PRN
Qty: 10 TABLET | Refills: 0 | Status: SHIPPED | OUTPATIENT
Start: 2025-07-21

## 2025-07-21 RX ORDER — HYDROMORPHONE HCL/PF 1 MG/ML
SYRINGE (ML) INJECTION AS NEEDED
Status: DISCONTINUED | OUTPATIENT
Start: 2025-07-21 | End: 2025-07-21

## 2025-07-21 RX ORDER — ACETAMINOPHEN 500 MG
500 TABLET ORAL EVERY 6 HOURS PRN
Qty: 28 TABLET | Refills: 0 | Status: SHIPPED | OUTPATIENT
Start: 2025-07-21 | End: 2025-07-28

## 2025-07-21 RX ORDER — PROPOFOL 10 MG/ML
INJECTION, EMULSION INTRAVENOUS AS NEEDED
Status: DISCONTINUED | OUTPATIENT
Start: 2025-07-21 | End: 2025-07-21

## 2025-07-21 RX ORDER — FENTANYL CITRATE 50 UG/ML
INJECTION, SOLUTION INTRAMUSCULAR; INTRAVENOUS AS NEEDED
Status: DISCONTINUED | OUTPATIENT
Start: 2025-07-21 | End: 2025-07-21

## 2025-07-21 RX ORDER — ACETAMINOPHEN 10 MG/ML
1000 INJECTION, SOLUTION INTRAVENOUS ONCE
Status: COMPLETED | OUTPATIENT
Start: 2025-07-21 | End: 2025-07-21

## 2025-07-21 RX ORDER — ONDANSETRON 2 MG/ML
INJECTION INTRAMUSCULAR; INTRAVENOUS AS NEEDED
Status: DISCONTINUED | OUTPATIENT
Start: 2025-07-21 | End: 2025-07-21

## 2025-07-21 RX ORDER — CEFAZOLIN SODIUM 2 G/50ML
SOLUTION INTRAVENOUS AS NEEDED
Status: DISCONTINUED | OUTPATIENT
Start: 2025-07-21 | End: 2025-07-21

## 2025-07-21 RX ORDER — SODIUM CHLORIDE, SODIUM LACTATE, POTASSIUM CHLORIDE, CALCIUM CHLORIDE 600; 310; 30; 20 MG/100ML; MG/100ML; MG/100ML; MG/100ML
INJECTION, SOLUTION INTRAVENOUS CONTINUOUS PRN
Status: DISCONTINUED | OUTPATIENT
Start: 2025-07-21 | End: 2025-07-21

## 2025-07-21 RX ADMIN — ROCURONIUM BROMIDE 25 MG: 10 INJECTION, SOLUTION INTRAVENOUS at 11:39

## 2025-07-21 RX ADMIN — PROPOFOL 40 MG: 10 INJECTION, EMULSION INTRAVENOUS at 14:17

## 2025-07-21 RX ADMIN — CEFAZOLIN SODIUM 2000 MG: 2 SOLUTION INTRAVENOUS at 11:25

## 2025-07-21 RX ADMIN — SCOPOLAMINE 1 PATCH: 1.5 PATCH, EXTENDED RELEASE TRANSDERMAL at 10:12

## 2025-07-21 RX ADMIN — PROPOFOL 100 MCG/KG/MIN: 10 INJECTION, EMULSION INTRAVENOUS at 13:02

## 2025-07-21 RX ADMIN — EPHEDRINE SULFATE 5 MG: 50 INJECTION, SOLUTION INTRAVENOUS at 14:17

## 2025-07-21 RX ADMIN — FENTANYL CITRATE 25 MCG: 50 INJECTION INTRAMUSCULAR; INTRAVENOUS at 15:15

## 2025-07-21 RX ADMIN — ALBUTEROL SULFATE 2 PUFF: 90 AEROSOL, METERED RESPIRATORY (INHALATION) at 11:07

## 2025-07-21 RX ADMIN — PROPOFOL 50 MG: 10 INJECTION, EMULSION INTRAVENOUS at 11:20

## 2025-07-21 RX ADMIN — HYDROMORPHONE HYDROCHLORIDE 0.5 MG: 1 INJECTION, SOLUTION INTRAMUSCULAR; INTRAVENOUS; SUBCUTANEOUS at 13:05

## 2025-07-21 RX ADMIN — REMIFENTANIL HYDROCHLORIDE 0.15 MCG/KG/MIN: 1 INJECTION, POWDER, LYOPHILIZED, FOR SOLUTION INTRAVENOUS at 11:25

## 2025-07-21 RX ADMIN — FENTANYL CITRATE 25 MCG: 50 INJECTION INTRAMUSCULAR; INTRAVENOUS at 15:22

## 2025-07-21 RX ADMIN — SODIUM CHLORIDE, SODIUM LACTATE, POTASSIUM CHLORIDE, AND CALCIUM CHLORIDE: .6; .31; .03; .02 INJECTION, SOLUTION INTRAVENOUS at 10:45

## 2025-07-21 RX ADMIN — Medication 20 MG: at 14:43

## 2025-07-21 RX ADMIN — Medication 30 MG: at 10:56

## 2025-07-21 RX ADMIN — MIDAZOLAM 2 MG: 1 INJECTION INTRAMUSCULAR; INTRAVENOUS at 10:46

## 2025-07-21 RX ADMIN — SODIUM CHLORIDE, SODIUM LACTATE, POTASSIUM CHLORIDE, AND CALCIUM CHLORIDE 20 ML/HR: .6; .31; .03; .02 INJECTION, SOLUTION INTRAVENOUS at 10:07

## 2025-07-21 RX ADMIN — PROPOFOL 50 MG: 10 INJECTION, EMULSION INTRAVENOUS at 11:04

## 2025-07-21 RX ADMIN — ROCURONIUM BROMIDE 5 MG: 10 INJECTION, SOLUTION INTRAVENOUS at 13:24

## 2025-07-21 RX ADMIN — FENTANYL CITRATE 25 MCG: 50 INJECTION INTRAMUSCULAR; INTRAVENOUS at 10:46

## 2025-07-21 RX ADMIN — PROPOFOL 100 MCG/KG/MIN: 10 INJECTION, EMULSION INTRAVENOUS at 11:00

## 2025-07-21 RX ADMIN — FENTANYL CITRATE 25 MCG: 50 INJECTION INTRAMUSCULAR; INTRAVENOUS at 11:04

## 2025-07-21 RX ADMIN — FENTANYL CITRATE 50 MCG: 50 INJECTION INTRAMUSCULAR; INTRAVENOUS at 10:56

## 2025-07-21 RX ADMIN — Medication 30 MG: at 14:36

## 2025-07-21 RX ADMIN — HYDROMORPHONE HYDROCHLORIDE 0.5 MG: 1 INJECTION, SOLUTION INTRAMUSCULAR; INTRAVENOUS; SUBCUTANEOUS at 14:33

## 2025-07-21 RX ADMIN — DEXAMETHASONE SODIUM PHOSPHATE 10 MG: 10 INJECTION, SOLUTION INTRAMUSCULAR; INTRAVENOUS at 11:44

## 2025-07-21 RX ADMIN — ROCURONIUM BROMIDE 50 MG: 10 INJECTION, SOLUTION INTRAVENOUS at 10:57

## 2025-07-21 RX ADMIN — REMIFENTANIL HYDROCHLORIDE 0.08 MCG/KG/MIN: 1 INJECTION, POWDER, LYOPHILIZED, FOR SOLUTION INTRAVENOUS at 11:00

## 2025-07-21 RX ADMIN — ACETAMINOPHEN 1000 MG: 10 INJECTION INTRAVENOUS at 15:14

## 2025-07-21 RX ADMIN — ONDANSETRON 4 MG: 2 INJECTION INTRAMUSCULAR; INTRAVENOUS at 14:10

## 2025-07-21 RX ADMIN — PROPOFOL 100 MG: 10 INJECTION, EMULSION INTRAVENOUS at 10:56

## 2025-07-21 RX ADMIN — LIDOCAINE HYDROCHLORIDE 80 MG: 10 INJECTION, SOLUTION EPIDURAL; INFILTRATION; INTRACAUDAL; PERINEURAL at 10:56

## 2025-07-21 RX ADMIN — FENTANYL CITRATE 25 MCG: 50 INJECTION INTRAMUSCULAR; INTRAVENOUS at 15:18

## 2025-07-21 RX ADMIN — FENTANYL CITRATE 25 MCG: 50 INJECTION INTRAMUSCULAR; INTRAVENOUS at 15:10

## 2025-07-21 RX ADMIN — REMIFENTANIL HYDROCHLORIDE 0.08 MCG/KG/MIN: 1 INJECTION, POWDER, LYOPHILIZED, FOR SOLUTION INTRAVENOUS at 13:02

## 2025-07-21 RX ADMIN — PHENYLEPHRINE HYDROCHLORIDE 40 MCG/MIN: 50 INJECTION INTRAVENOUS at 11:05

## 2025-07-21 RX ADMIN — Medication 20 MG: at 12:47

## 2025-07-21 RX ADMIN — METOCLOPRAMIDE 10 MG: 5 INJECTION, SOLUTION INTRAMUSCULAR; INTRAVENOUS at 14:13

## 2025-07-21 RX ADMIN — ROCURONIUM BROMIDE 20 MG: 10 INJECTION, SOLUTION INTRAVENOUS at 12:47

## 2025-07-21 RX ADMIN — SUGAMMADEX 200 MG: 100 INJECTION, SOLUTION INTRAVENOUS at 14:17

## 2025-07-21 NOTE — DISCHARGE INSTR - AVS FIRST PAGE
Post-Op Discharge Instructions  Dr. Karthik Brito  [facility] Plastic and Reconstructive Surgery  [address], [address], [place] [id number]  [phone]    You may shower in 48 hours. Please shower with your back facing the water. You cannot get your dressings wet.    Your dressings should stay in place until seen in the office. Please do not get dressings wet or submerge them in water until seen in the office  Keep CHON dressings on until seen by Dr. Brito  If the light is blinking green, the battery pack is functioning properly  If the light blinks orange, hit the orange button to re-establish the seal, this will usually correct the problem, if the light continues to blink orange, the dressing will still continue to function  Call the office if the dressing becomes completely saturated    Keep surgical bra on at all times, except to shower.  It is ok to remove bra while laying down at rest for short intervals for comfort  After 2 weeks, it is ok to switch to a sports bra.  It is recommended to use one that sara in the front.  No underwire bra for 6 weeks.  Use ABD pads or a form of padding for extra compression.    No strenuous activity or lifting over 10 lbs for a minimum of 4 weeks. No repetitive pushing, pulling or overhead arm motions.    While you should avoid strenuous activity you should be out of bed and moving around.  Please ambulate at least 4 times throughout the day.  Try to only be in bed when its time to sleep.    Please record fluid from both of your drains daily and bring this to your first post-op appointment. After emptying the drain, be sure to squeeze the bulb as you reseal the cap.    Please lay on your back, with your head at an incline.     Your first post-op appointment is scheduled the week of 7/28/25. We will call you for confirmation.     Take following medications with a checkmark ([x]) as prescribed, and do not take any pain medication on an empty  stomach.  [x]Tylenol 1000mg, every 6 hours as needed for mild pain.  [x]Flexeril 10mg, every 8 hours as needed for muscle spasms/pain.  [x]Oxycodone, 5mg, 1 tablet every 4 hours, as needed for severe pain.  [x]Keflex, 500mg, 1 tablet every 12 hours. (Antibiotic)  [x]Zofran, 4mg, 1 tablet every 6 hours, as needed for nausea/vomiting  Please do not take any vitamins, minerals, ibuprofen, hormonal drugs or immunologic drugs for 7 days post-op.    Resume any prior medications at home unless otherwise instructed by Dr. Brito    You must be off all pain medications and have a clear field of vision before driving.    For the next 24 hours:  a. Do not sign any legal documents or operate machinery.  b. Have a responsible adult help you.  c. Take it easy & rest.    Call Dr. Brito at the office [phone] if any:   a. Obvious bleeding, excessive swelling, or warmth at the site  b. Fever over 101.0°  c. Shortness of breath, severe calf or thigh pain.  d. Redness, odor, or pus at the wound. (Some oozing is normal from incision sites and may continue for several days)  e. Persistent vomiting or pain that is not relieved by your medication.

## 2025-07-21 NOTE — ANESTHESIA PREPROCEDURE EVALUATION
Procedure:  BILATERAL BREAST TISSUE EXPANDER REMOVAL, BILATERAL IMPLANT EXCHANGE (Bilateral: Breast)    EKG 12/2024:  Normal sinus rhythm  Normal ECG    Relevant Problems   CARDIO   (+) Chronic migraine without aura   (+) Other hemorrhoids   (+) Other hyperlipidemia      GI/HEPATIC   (+) Gastroesophageal reflux disease without esophagitis      /RENAL   (+) Kidney stone on left side      GYN   (+) Malignant neoplasm of central portion of right breast in female, estrogen receptor positive (HCC)      NEURO/PSYCH   (+) Anxiety   (+) Chronic interstitial cystitis   (+) Chronic migraine without aura      PULMONARY   (+) Stage 1 mild COPD by GOLD classification ([facility])   #PONV    Meds  Armodafinil for yesterday am 630 idopathic hypersomnea  Tiotropium-olodaterol     METS  Can ascend 2 flights of stairs  No recent URIs    NPO?:  Appropriate    No current smoking     Physical Exam    Airway     Mallampati score: II  TM Distance: >3 FB  Neck ROM: full  Upper bite lip test: I  Mouth opening: >= 4 cm      Cardiovascular      Dental   No notable dental hx     Pulmonary      Neurological      Other Findings  post-pubertal.      Anesthesia Plan  ASA Score- 2     Anesthesia Type- general with ASA Monitors.         Additional Monitors:     Airway Plan: Oral ETT.    Comment: Prop gtt + Scope patch for PONV.       Plan Factors-    Chart reviewed. EKG reviewed.   Patient summary reviewed.                  Induction- intravenous.    Postoperative Plan- .   Monitoring Plan - Monitoring plan - standard ASA monitoring and processed EEG monitor  Post Operative Pain Plan - multimodal analgesia        Informed Consent-       NPO Status:  No vitals data found for the desired time range.

## 2025-07-21 NOTE — INTERVAL H&P NOTE
H&P reviewed. After examining the patient I find no changes in the patients condition since the H&P had been written.    Vitals:    07/21/25 0954   BP: 125/87   Pulse: 72   Resp: 20   Temp: 97.8 °F (36.6 °C)   SpO2: 94%     Alert, oriented, NAD  Breathing company on room air, symmetric chest rise, normal work of breathing  Bilateral chest with well-healed transverse incisions.  No appreciable dehiscence, wounds, rashes or erythema.  No appreciable drainage.  No appreciable fluctuance  Mild excess skin and soft tissue of the right and left chest wall laterally.  Left breast implant sits approximate 3 cm higher than the right.  Extremities are without edema or deformity  Abdomen soft    Assessment and plan: Patient is now 3 months status post subpectoral tissue expander placement with complete expansion of 250 cc bilaterally.  Plan for today is to perform expander to implant exchange and capsule work to adjust positioning of the implants.  Reviewed the nature of the procedure with the patient.  Reviewed the risks which include but are not limited to bleeding, infection, loss of implant, failure of implant, contour deformity, asymmetry, implant malposition, wound breakdown, skin loss, seroma, need for drains, need for revisions, need for additional surgery.  Patient acknowledges the nature of the procedure as well as the associated risks.  Patient elects to proceed with surgery at this time.

## 2025-07-22 NOTE — ANESTHESIA POSTPROCEDURE EVALUATION
Post-Op Assessment Note    CV Status:  Stable    Pain management: adequate       Mental Status:  Sleepy   Hydration Status:  Euvolemic   PONV Controlled:  Controlled   Airway Patency:  Patent     Post Op Vitals Reviewed: Yes    No anethesia notable event occurred.    Staff: Anesthesiologist, other anesthesia staff (+ SRNA)           Last Filed PACU Vitals:  Vitals Value Taken Time   Temp 97.8 °F (36.6 °C) 07/21/25 14:41   Pulse 80 07/21/25 16:14   /70 07/21/25 16:00   Resp 29 07/21/25 16:13   SpO2 95 % 07/21/25 16:14   Vitals shown include unfiled device data.    Modified Mateo:     Vitals Value Taken Time   Activity 2 07/21/25 16:00   Respiration 2 07/21/25 16:00   Circulation 2 07/21/25 16:00   Consciousness 1 07/21/25 16:00   Oxygen Saturation 1 07/21/25 16:00     Modified Mateo Score: 8

## 2025-07-24 ENCOUNTER — TELEPHONE (OUTPATIENT)
Age: 50
End: 2025-07-24

## 2025-07-24 NOTE — TELEPHONE ENCOUNTER
Received call from Kate with [employer] stating she is working on the patient's short term disability and wanted to know if the patient had her surgery?    I told Kate that the patient did have her surgery already.    Kate then asked if the patient had a post op vernon already.    I told her that the patient does have a post op vernon.    Kate verbalized understanding.

## 2025-07-28 PROCEDURE — 88300 SURGICAL PATH GROSS: CPT | Performed by: PATHOLOGY

## 2025-07-28 PROCEDURE — 88304 TISSUE EXAM BY PATHOLOGIST: CPT | Performed by: PATHOLOGY

## 2025-07-29 ENCOUNTER — RESULTS FOLLOW-UP (OUTPATIENT)
Dept: INTERNAL MEDICINE CLINIC | Facility: OTHER | Age: 50
End: 2025-07-29

## 2025-07-29 ENCOUNTER — OFFICE VISIT (OUTPATIENT)
Dept: PLASTIC SURGERY | Facility: CLINIC | Age: 50
End: 2025-07-29

## 2025-07-29 DIAGNOSIS — D72.829 LEUKOCYTOSIS, UNSPECIFIED TYPE: Primary | ICD-10-CM

## 2025-07-29 DIAGNOSIS — R73.01 IFG (IMPAIRED FASTING GLUCOSE): ICD-10-CM

## 2025-07-29 DIAGNOSIS — Z98.890 S/P BREAST RECONSTRUCTION, BILATERAL: Primary | ICD-10-CM

## 2025-07-31 DIAGNOSIS — F41.9 ANXIETY: ICD-10-CM

## 2025-08-01 RX ORDER — ESCITALOPRAM OXALATE 10 MG/1
10 TABLET ORAL DAILY
Qty: 90 TABLET | Refills: 1 | Status: SHIPPED | OUTPATIENT
Start: 2025-08-01

## 2025-08-06 NOTE — OP NOTE
OPERATIVE REPORT  PATIENT NAME: Jenny Pereyra    :  1975  MRN: [id number]  Pt Location: [facility] OR ROOM 04    SURGERY DATE: 2025    Surgeons and Role:     * Karthik Brito MD - Primary     * Miguel Ray MD - Assisting     * Emma Alejandro PA-C - Assisting    Preop Diagnosis:  S/P breast reconstruction, bilateral [Z98.890]  Malignant neoplasm of central portion of right breast in female, estrogen receptor positive (HCC) [C50.111, Z17.0]    Post-Op Diagnosis Codes:     * S/P breast reconstruction, bilateral [Z98.890]     * Malignant neoplasm of central portion of right breast in female, estrogen receptor positive (HCC) [C50.111, Z17.0]    Procedure(s):  1) Bilateral expander to implant exchange  2) Extensive capsulotomies and capsular scoring bilaterally    Specimen(s):  ID Type Source Tests Collected by Time Destination   1 : Breast, Right TISSUE EXPANDER-GROSS EXAM Tissue Surgical Hardware/Implant TISSUE EXAM Karthik Brito MD 2025 1147    2 : LEFT BREAST TISSUE EXPANDER-GROSS EXAM Tissue Surgical Hardware/Implant TISSUE EXAM Karthik Brito MD 2025 1150    3 : CAPSULE Tissue Breast, Right TISSUE EXAM Karthik Brito MD 2025 1153    4 : LEFT BREAST INFERIOR CAPSULE Tissue Breast, Left TISSUE EXAM Karthik Brito MD 2025 1223      IMPLANTS:  RIGHT BREAST:  SMOOTH MODERATE PROFILE PLUS SOFT MENTOR MEMORYGEL BOOST 265 cc  Ref: [id number]  Lot: [id number]  SN: [id number]    LEFT BREAST  SMOOTH MODERATE PROFILE PLUS SOFT MENTOR MEMORYGEL BOOST 265 cc  Ref: [id number]  Lot: [id number]  SN: [id number]      Estimated Blood Loss:   Minimal    Drains:  Closed/Suction Drain Lateral;Other (Comment) RUQ 15 Fr. (Active)   Site Description Unable to view 25 1441   Dressing Status Clean;Dry;Intact 25 1651   Drainage Appearance Bloody 25 1651   Status To bulb suction 25 1651   Output (mL) 25 mL 25 1800   Number of days: 16       Closed/Suction Drain Lateral;Left  Other (Comment) Bulb 15 Fr. (Active)   Site Description Unable to view 07/21/25 1441   Dressing Status Clean;Dry;Intact 07/21/25 1651   Drainage Appearance Bloody 07/21/25 1651   Status To bulb suction 07/21/25 1651   Output (mL) 30 mL 07/21/25 1800   Number of days: 16       Anesthesia Type:   General    Operative Indications:  S/P breast reconstruction, bilateral [Z98.890]  Malignant neoplasm of central portion of right breast in female, estrogen receptor positive (HCC) [C50.111, Z17.0]    Patient is now 3 months status post subpectoral tissue expander placement with complete expansion of 250 cc bilaterally. Plan for today is to perform expander to implant exchange and capsule work to adjust positioning of the implants. Reviewed the nature of the procedure with the patient. Reviewed the risks which include but are not limited to bleeding, infection, loss of implant, failure of implant, contour deformity, asymmetry, implant malposition, wound breakdown, skin loss, seroma, need for drains, need for revisions, need for additional surgery. Patient acknowledges the nature of the procedure as well as the associated risks. Patient elects to proceed with surgery at this time.       Operative Findings:  Right breast capsule well formed, tight, fairly well positioned.  Intact right chest tissue expander, breast pocket benign.  Left breast capsule well formed, very tight, displaced superolaterally.  Intact left chest tissue expander, remnants of old hematoma/coagulated blood. No purulence or fluid collection.       Complications:   None    Procedure and Technique:  Patient identified in the preoperative holding area.  Patient was marked in the standing position.  Patient's midline was marked patient's inframammary folds were identified.  The left inframammary fold was approximately 2 cm higher on the chest than the right inframammary fold.  Areas of contour deformity at the edges of the expander were also marked bilaterally.   Patient was then taken to the operating room placed on the operating room table in the supine position.  Patient received appropriate weight-based antibiosis and SCDs were placed on bilateral lower extremities.  Smooth induction of general anesthesia was achieved.  The patient's chest was then prepped and draped in the usual sterile fashion.  A surgical timeout was performed from the patient's identity, the procedure to be performed, and the laterality of these procedures.  All were in agreement.    We began with the left reconstructed breast.  The expander appears tight and elevated on the left chest with some concavity and scar tethering of the inferolateral aspect.  The overall left reconstructed breast felt relatively firm and tight.  A 9 cm segment of the previously healed transverse incision was marked for incision.  Using a 15 blade the incision was made through the skin.  Then using monopolar cautery the incision was carried out through the dermis subcutaneous tissue and underlying muscle to expose the breast pocket and expander.  Upon entering the capsule approximately 30 cc of old coagulated blood was encountered.  Patient did note slight bruising following her last expansion in clinic which may correlate with this finding.  No evidence of purulence.  The coagulated blood was removed.  The expander was found to be intact.  The expander was then drained and removed.  The breast pocket was then evaluated.  Breast pocket appeared benign without evidence of purulence or other concerning features.  Given the given the elevated nature of the breast pocket as well as the overall tightness of the breast decision was made to perform a capsulotomy circumferentially at the base of the breast pocket extending this down to the level of the contralateral inframammary fold.  This was marked prior to surgery.  Further dissection was carried out over the inferolateral chest wall releasing the overlying scar that was  tethering the skin.  This released improve the overall skin contour very well.  The breast pocket still felt very tight therefore the decision was made to radially score both the superior and inferior aspects of the breast capsule.  Inadvertently a small segment of the inferior breast capsule was excised during this and therefore sent to pathology.  This significantly softened the skin envelope and expanded the space within the breast pocket.  Meticulous hemostasis was achieved.  Copious irrigation with sterile saline was performed and hemostasis confirmed.  We then tried 2 different sizers and felt that the 265 cc smooth moderate plus memory gel boost was the best fit for both volume and shape with in this particular breast pocket.  The sizer was left in place and the skin temporarily tacked together with staples and the surgical site covered with a sterile towel.  We then turned our attention to the right chest.    The right tissue expander appeared to be in appropriate position with some tethering of the scar of the lateral breast with some concavity as well as some concavity at the superior edge of the expander.  The breast was also tight and relatively firm.  A 9 cm segment of the incision was designated for opening.  We then used a a 15 blade to incise the skin along the previously healed incision.  Monopolar cautery was used to continue dissection through the dermis subcutaneous tissue capsule and muscle to expose the underlying expander.  The expander was intact.  There was no fluid or debris within the breast capsule.  The expander was then drained and removed.  The capsule pocket was inspected and no purulence appreciated no other concerning features appreciated.  Again this pocket similarly was fairly tight the decision was made to perform circumferential capsulotomy around the base of the capsule pocket.  The capsule was only incised and released at the inferior aspect no further dissection was carried  out.  At the lateral and superior aspects no the capsule was released and further undermining was performed.  Despite this the capsule/pocket still felt relatively tight therefore decision was also made to perform radial scoring of the capsule.  Inadvertently a small segment of the superior capsule was excised and therefore sent to pathology.  Following radial scoring the overall shape and size of the pocket appeared appropriate and the overlying skin envelope felt much softer and less tight.  The meticulous hemostasis was then achieved.  Copious irrigation with sterile saline was performed.  We then took a 265 cc [place] boost moderate plus profile silicone sizer and placed it in the right breast pocket and tacked the edges together with staples.  Patient was then sat up to assess for symmetry.   upon sitting up the breast appeared relatively symmetrical and in good position.  The skin did not appear under significant tension.  The decision was then made to go with a 265 cc [place] boost moderate plus profile implant on both sides.      For the left chest, the implant was opened and irrigated with antibiotic saline comprised of 80 mg of gentamicin and 1000 mg of cefazolin and 1 L of normal saline.  This was then covered.  The staples were removed and the sizer was removed from the left chest.  The left chest pocket was then irrigated again with copious sterile saline.  This was followed by 500 cc of antibiotic irrigation followed by 400 cc of Betadine followed by 450 cc of Irrisept.  A 15 [place] Radhames drain was then placed in the left breast pocket made to exit the left chest wall skin this was secured with a 2-0 nylon suture.  At this point the chest wall skin was then painted with Betadine paint.  And our instruments were dipped in Betadine.  We then switched our top gloves.  A Alejandro funnel was then opened and bathed in antibiotic irrigation.  The implant was placed in the Alejandro funnel and orientation confirmed.   The implant was then injected into the breast pocket where it fit comfortably.  The capsule and muscle layer were then closed using 3-0 Vicryl suture in a simple interrupted fashion.  This was followed by 3-0 Vicryl in the deep dermis followed by a 4-0 Monocryl strata fix in a running subcuticular fashion.    For the right chest, the implant was opened and irrigated with antibiotic saline comprised of 80 mg of gentamicin and 1000 mg of cefazolin and 1 L of normal saline.  This was then covered.  The staples were removed and the sizer was removed from the right chest.  The right chest pocket was then irrigated again with copious sterile saline.  This was followed by 500 cc of antibiotic irrigation followed by 400 cc of Betadine followed by 450 cc of Irrisept.  A 15 [place] Radhames drain was then placed in the right breast pocket made to exit the right chest wall skin this was secured with a 2-0 nylon suture.  At this point the chest wall skin was then painted with Betadine paint.  And our instruments were dipped in Betadine.  We then switched our top gloves.  A Alejandro funnel was then opened and bathed in antibiotic irrigation.  The implant was placed in the Alejandro funnel and orientation confirmed.  The implant was then injected into the breast pocket where it fit comfortably.  The capsule and muscle layer were then closed using 3-0 Vicryl suture in a simple interrupted fashion.  This was followed by 3-0 Vicryl in the deep dermis followed by a 4-0 Monocryl strata fix in a running subcuticular fashion.  The chest was then cleaned.  The incisions were dressed with cathie negative pressure dressings.  Patient was then placed in a surgical bra.    The patient awoke from general anesthesia without issue.  There were no complications during the case.  The patient tolerated procedure well.  I was present and scrubbed for the entire case.  The expander from each chest was sent as well as a small piece of capsule from each chest  was sent for pathology.  All counts were correct.  The patient was delivered to the PACU in stable condition.           I was present for the entire procedure.    Patient Disposition:  PACU          SIGNATURE: Karthik Brito MD  DATE: August 6, 2025  TIME: 9:15 AM

## 2025-08-07 ENCOUNTER — OFFICE VISIT (OUTPATIENT)
Age: 50
End: 2025-08-07

## 2025-08-07 ENCOUNTER — TELEPHONE (OUTPATIENT)
Age: 50
End: 2025-08-07

## 2025-08-07 DIAGNOSIS — Z98.890 S/P BREAST RECONSTRUCTION, BILATERAL: Primary | ICD-10-CM

## 2025-08-07 PROCEDURE — 99024 POSTOP FOLLOW-UP VISIT: CPT

## 2025-08-12 ENCOUNTER — OFFICE VISIT (OUTPATIENT)
Dept: PLASTIC SURGERY | Facility: CLINIC | Age: 50
End: 2025-08-12

## 2025-08-13 ENCOUNTER — TELEPHONE (OUTPATIENT)
Dept: PLASTIC SURGERY | Facility: CLINIC | Age: 50
End: 2025-08-13

## 2025-08-14 ENCOUNTER — TELEPHONE (OUTPATIENT)
Age: 50
End: 2025-08-14

## 2025-08-15 ENCOUNTER — PATIENT OUTREACH (OUTPATIENT)
Dept: HEMATOLOGY ONCOLOGY | Facility: CLINIC | Age: 50
End: 2025-08-15

## 2025-08-18 ENCOUNTER — PATIENT MESSAGE (OUTPATIENT)
Dept: SLEEP CENTER | Facility: CLINIC | Age: 50
End: 2025-08-18

## 2025-08-21 ENCOUNTER — OFFICE VISIT (OUTPATIENT)
Age: 50
End: 2025-08-21

## 2025-08-21 DIAGNOSIS — Z98.890 S/P BREAST RECONSTRUCTION, BILATERAL: Primary | ICD-10-CM

## 2025-08-21 PROCEDURE — 99024 POSTOP FOLLOW-UP VISIT: CPT

## 2025-08-26 PROBLEM — Z79.811 ENCOUNTER FOR MONITORING AROMATASE INHIBITOR THERAPY: Status: ACTIVE | Noted: 2025-08-26

## 2025-08-26 PROBLEM — R23.2 AROMATASE INHIBITOR-INDUCED HOT FLASH: Status: ACTIVE | Noted: 2025-08-26

## 2025-08-26 PROBLEM — T45.1X5A AROMATASE INHIBITOR-INDUCED HOT FLASH: Status: ACTIVE | Noted: 2025-08-26

## 2025-08-26 PROBLEM — Z51.81 ENCOUNTER FOR MONITORING AROMATASE INHIBITOR THERAPY: Status: ACTIVE | Noted: 2025-08-26

## 2025-08-26 PROBLEM — Z79.811 USE OF EXEMESTANE (AROMASIN): Status: ACTIVE | Noted: 2025-08-26

## (undated) DEVICE — BETHLEHEM UNIVERSAL BREAST PK: Brand: CARDINAL HEALTH

## (undated) DEVICE — TOURNIQUET HEMACLEAR 30-550CM ORANGE STRL

## (undated) DEVICE — PREP PAD BNS: Brand: CONVERTORS

## (undated) DEVICE — DRESSING MEPILEX AG BORDER POST-OP 4 X 8 IN

## (undated) DEVICE — MODERATE PLUS PROFILE, M+ 250CC GEL SIZER: Brand: MENTOR MEMORYGEL RESTERILIZABLE GEL SIZER

## (undated) DEVICE — BLADE MINI RND TIP ONE SIDE SHARP

## (undated) DEVICE — SUT MONOCRYL 3-0 SH 27 IN Y416H

## (undated) DEVICE — HYDROPHILIC WOUND DRESSING WITH ZINC PLUS VITAMINS A AND B6.: Brand: DERMAGRAN®-B

## (undated) DEVICE — GAUZE SPONGES,16 PLY: Brand: CURITY

## (undated) DEVICE — GLOVE SRG BIOGEL 6.5

## (undated) DEVICE — PAD GROUNDING DUAL ADULT

## (undated) DEVICE — DRAIN SPONGES,6 PLY: Brand: EXCILON

## (undated) DEVICE — ARM SLING: Brand: DEROYAL

## (undated) DEVICE — PEANUT 5 PK

## (undated) DEVICE — Device

## (undated) DEVICE — MODERATE PLUS PROFILE BOOST, FOR USE WITH SMPB-265: Brand: MENTOR MEMORYGEL BOOST RESTERILIZABLE GEL SIZER

## (undated) DEVICE — 450 ML BOTTLE OF 0.05% CHLORHEXIDINE GLUCONATE IN 99.95% STERILE WATER FOR IRRIGATION, USP AND APPLICATOR.: Brand: IRRISEPT ANTIMICROBIAL WOUND LAVAGE

## (undated) DEVICE — SUT STRATAFIX SPIRAL 3-0 PGA/PCL 30 X 30 CM SXMD2B408

## (undated) DEVICE — JACKSON-PRATT 100CC BULB RESERVOIR: Brand: CARDINAL HEALTH

## (undated) DEVICE — SUT STRATAFIX SPIRAL 2-0 CT-1 30 CM SXPP1B410

## (undated) DEVICE — SUT ETHILON 2-0 FSLX 30 IN 1674H

## (undated) DEVICE — ACE WRAP 3 IN STERILE

## (undated) DEVICE — 3M™ IOBAN™ 2 ANTIMICROBIAL INCISE DRAPE 6640EZ: Brand: IOBAN™ 2

## (undated) DEVICE — SUT SILK 2-0 SH 30 IN K833H

## (undated) DEVICE — PROXIMATE SKIN STAPLERS (35 WIDE) CONTAINS 35 STAINLESS STEEL STAPLES (FIXED HEAD): Brand: PROXIMATE

## (undated) DEVICE — NEEDLE 25G X 1 1/2

## (undated) DEVICE — SPONGE STICK WITH PVP-I: Brand: KENDALL

## (undated) DEVICE — UROLOGIC DRAIN BAG: Brand: UNBRANDED

## (undated) DEVICE — SUT MONOCRYL 4-0 PS-2 18 IN Y496G

## (undated) DEVICE — BULB SYRINGE,IRRIGATION WITH PROTECTIVE CAP: Brand: DOVER

## (undated) DEVICE — GLOVE INDICATOR PI UNDERGLOVE SZ 6.5 BLUE

## (undated) DEVICE — BASKET SPECIMEN RETRIVAL 1.9FR 120CM

## (undated) DEVICE — SKIN MARKER DUAL TIP WITH RULER CAP, FLEXIBLE RULER AND LABELS: Brand: DEVON

## (undated) DEVICE — TUBING SUCTION 5MM X 12 FT

## (undated) DEVICE — GLOVE SRG BIOGEL 8

## (undated) DEVICE — 3M™ TEGADERM™ TRANSPARENT FILM DRESSING FRAME STYLE, 1624W, 2-3/8 IN X 2-3/4 IN (6 CM X 7 CM), 100/CT 4CT/CASE: Brand: 3M™ TEGADERM™

## (undated) DEVICE — INTENDED FOR TISSUE SEPARATION, AND OTHER PROCEDURES THAT REQUIRE A SHARP SURGICAL BLADE TO PUNCTURE OR CUT.: Brand: BARD-PARKER ® CARBON RIB-BACK BLADES

## (undated) DEVICE — INSULATED BLADE ELECTRODE: Brand: EDGE

## (undated) DEVICE — STRETCH BANDAGE: Brand: CURITY

## (undated) DEVICE — OCCLUSIVE GAUZE STRIP,3% BISMUTH TRIBROMOPHENATE IN PETROLATUM BLEND: Brand: XEROFORM

## (undated) DEVICE — ABDOMINAL PAD: Brand: DERMACEA

## (undated) DEVICE — CHLORAPREP HI-LITE 26ML ORANGE

## (undated) DEVICE — MAYO STAND COVER: Brand: CONVERTORS

## (undated) DEVICE — ANTIBACTERIAL UNDYED BRAIDED (POLYGLACTIN 910), SYNTHETIC ABSORBABLE SUTURE: Brand: COATED VICRYL

## (undated) DEVICE — DRAPE SURGIKIT SADDLE BAG

## (undated) DEVICE — KNIFE LIGHT 10,PK: Brand: KNIFELIGHT

## (undated) DEVICE — SLIM BODY SKIN STAPLER: Brand: APPOSE ULC

## (undated) DEVICE — SUT PROLENE 4-0 PS-2 18 IN 8682G

## (undated) DEVICE — ENDOSCOPIC VALVE WITH ADAPTER.: Brand: SURSEAL® II

## (undated) DEVICE — ACE WRAP 4 IN STERILE

## (undated) DEVICE — LAPAROTOMY SPONGE - RF AND X-RAY DETECTABLE PRE-WASHED: Brand: SITUATE

## (undated) DEVICE — TELFA ADHESIVE ISLAND DRESSING: Brand: TELFA

## (undated) DEVICE — MEDI-VAC YANK SUCT HNDL W/TPRD BULBOUS TIP: Brand: CARDINAL HEALTH

## (undated) DEVICE — ACE WRAP 4 IN UNSTERILE

## (undated) DEVICE — STERILE BETHLEHEM PLASTIC HAND: Brand: CARDINAL HEALTH

## (undated) DEVICE — ELECTRODE BLADE MOD E-Z CLEAN 2.5IN 6.4CM -0012M

## (undated) DEVICE — TELFA NON-ADHERENT ABSORBENT DRESSING: Brand: TELFA

## (undated) DEVICE — ELECTRODE BLADE MOD  E-Z CLEAN 6.5IN -0014M

## (undated) DEVICE — BETHLEHEM UNIVERSAL MINOR GEN: Brand: CARDINAL HEALTH

## (undated) DEVICE — FIBER STD QUANTA 365 MICRON

## (undated) DEVICE — SUT PDS II 4-0 PS-2 18 IN Z496G

## (undated) DEVICE — PREMIUM DRY TRAY LF: Brand: MEDLINE INDUSTRIES, INC.

## (undated) DEVICE — JP CHANNEL DRAIN 19FR, FULL FLUTES: Brand: JACKSON-PRATT

## (undated) DEVICE — PICO 7 SINGLE 10X20CM: Brand: PICO™ 7

## (undated) DEVICE — INVIEW CLEAR LEGGINGS: Brand: CONVERTORS

## (undated) DEVICE — WET SKIN PREP TRAY: Brand: MEDLINE INDUSTRIES, INC.

## (undated) DEVICE — FUNNEL E KELLER 2 DELIVERY DEVICE

## (undated) DEVICE — DISPOSABLE OR TOWEL: Brand: CARDINAL HEALTH

## (undated) DEVICE — LIGACLIP MCA MULTIPLE CLIP APPLIERS, 20 MEDIUM CLIPS: Brand: LIGACLIP

## (undated) DEVICE — HLDR INSTRMNT LAP-KIT LAPRSC 3.2CM X 30.5CM 4-POCKET W/TAPE - STERILE

## (undated) DEVICE — GLOVE INDICATOR PI UNDERGLOVE SZ 8 BLUE

## (undated) DEVICE — JP CHAN DRN SIL HUBLESS 15FR W/TRO: Brand: CARDINAL HEALTH

## (undated) DEVICE — DECANTER: Brand: UNBRANDED

## (undated) DEVICE — PLUMEPEN PRO 10FT

## (undated) DEVICE — GLOVE INDICATOR PI UNDERGLOVE SZ 7 BLUE

## (undated) DEVICE — VIOLET BRAIDED (POLYGLACTIN 910), SYNTHETIC ABSORBABLE SUTURE: Brand: COATED VICRYL

## (undated) DEVICE — SUT MONOCRYL PLUS 3-0 PS-2 27 IN MCP427H

## (undated) DEVICE — BASIN EMESIS 700CC ROSE 250/CS 64/PLT: Brand: MEDEGEN MEDICAL PRODUCTS, LLC

## (undated) DEVICE — URETERAL CATHETER 5 FR CONE TIP

## (undated) DEVICE — PACK UNIVERSAL DRAPES SUB-Q ICD

## (undated) DEVICE — 3M™ STERI-STRIP™ REINFORCED ADHESIVE SKIN CLOSURES, R1547, 1/2 IN X 4 IN (12 MM X 100 MM), 6 STRIPS/ENVELOPE: Brand: 3M™ STERI-STRIP™

## (undated) DEVICE — ELECTRODE EZ CLEAN BLADE -0012

## (undated) DEVICE — SUT VICRYL 2-0 CT-1 27 IN J259H

## (undated) DEVICE — BULB SYRINGE, IRRIGATION WITH PROTECTIVE CAP, 60 CC, INDIVIDUALLY WRAPPED: Brand: DOVER

## (undated) DEVICE — EXOFIN PRECISION PEN HIGH VISCOSITY TOPICAL SKIN ADHESIVE: Brand: EXOFIN PRECISION PEN, 1G

## (undated) DEVICE — PACK TUR

## (undated) DEVICE — SUT MONOCRYL PLUS 4-0 PS-2 18 IN MCP496G

## (undated) DEVICE — PADDING CAST 4 IN  COTTON STRL

## (undated) DEVICE — GLOVE SRG BIOGEL 7

## (undated) DEVICE — CHEST/BREAST DRAPE: Brand: CONVERTORS

## (undated) DEVICE — MODERATE PLUS PROFILE, M+ 275CC GEL SIZER: Brand: MENTOR MEMORYGEL RESTERILIZABLE GEL SIZER

## (undated) DEVICE — STERILE EMESIS BASIN                 070: Brand: CARDINAL HEALTH

## (undated) DEVICE — DRAPE SHEET THREE QUARTER

## (undated) DEVICE — GUIDEWIRE STRGHT TIP 0.035 IN  SOLO PLUS

## (undated) DEVICE — SPECIMEN CONTAINER STERILE PEEL PACK

## (undated) DEVICE — HANDPIECE SET WITH RETRACTABLE COAXIAL FAN SPRAY TIP AND SUCTION TUBE: Brand: INTERPULSE

## (undated) DEVICE — INSULATED BLADE ELECTRODE;CAUTION: FOR MANUFACTURING, PROCESSING, OR REPACKING.: Brand: EDGE

## (undated) DEVICE — NEPTUNE E-SEP SMOKE EVACUATION PENCIL, COATED, 70MM BLADE, PUSH BUTTON SWITCH: Brand: NEPTUNE E-SEP

## (undated) DEVICE — ASTOUND STANDARD SURGICAL GOWN, XL: Brand: CONVERTORS

## (undated) DEVICE — CUFF TOURNIQUET 18 X 4 IN QUICK CONNECT DISP 1 BLADDER

## (undated) DEVICE — SOLUTION BOWL: Brand: KENDALL

## (undated) DEVICE — GAUZE SPONGES,USP TYPE VII GAUZE, 12 PLY: Brand: CURITY

## (undated) DEVICE — SINGLE-USE DIGITAL FLEXIBLE URETEROSCOPE: Brand: APTRA

## (undated) DEVICE — INTENDED FOR TISSUE SEPARATION, AND OTHER PROCEDURES THAT REQUIRE A SHARP SURGICAL BLADE TO PUNCTURE OR CUT.: Brand: BARD-PARKER SAFETY BLADES SIZE 15, STERILE

## (undated) DEVICE — TRAY FOLEY 16FR URIMETER SILICONE SURESTEP

## (undated) DEVICE — SUT VICRYL 2-0 SH 27 IN UNDYED J417H

## (undated) DEVICE — SYRINGE 20ML LL